# Patient Record
Sex: MALE | NOT HISPANIC OR LATINO | URBAN - METROPOLITAN AREA
[De-identification: names, ages, dates, MRNs, and addresses within clinical notes are randomized per-mention and may not be internally consistent; named-entity substitution may affect disease eponyms.]

---

## 2020-11-19 ENCOUNTER — TRANSCRIBE ORDERS (OUTPATIENT)
Dept: ADMINISTRATIVE | Facility: HOSPITAL | Age: 75
End: 2020-11-19

## 2020-11-19 DIAGNOSIS — R97.20 ELEVATED PSA: Primary | ICD-10-CM

## 2020-12-10 ENCOUNTER — HOSPITAL ENCOUNTER (OUTPATIENT)
Dept: MRI IMAGING | Facility: HOSPITAL | Age: 75
Discharge: HOME | End: 2020-12-10
Admitting: UROLOGY

## 2020-12-10 DIAGNOSIS — R97.20 ELEVATED PSA: ICD-10-CM

## 2020-12-10 PROCEDURE — 0 GADOBENATE DIMEGLUMINE 529 MG/ML SOLUTION: Performed by: UROLOGY

## 2020-12-10 PROCEDURE — 82565 ASSAY OF CREATININE: CPT

## 2020-12-10 PROCEDURE — A9577 INJ MULTIHANCE: HCPCS | Performed by: UROLOGY

## 2020-12-10 PROCEDURE — 72197 MRI PELVIS W/O & W/DYE: CPT

## 2020-12-10 RX ADMIN — GADOBENATE DIMEGLUMINE 19 ML: 529 INJECTION, SOLUTION INTRAVENOUS at 20:16

## 2020-12-15 LAB — CREAT BLDA-MCNC: 1.6 MG/DL (ref 0.6–1.3)

## 2024-08-04 ENCOUNTER — APPOINTMENT (OUTPATIENT)
Dept: GENERAL RADIOLOGY | Facility: HOSPITAL | Age: 79
DRG: 638 | End: 2024-08-04
Payer: MEDICARE

## 2024-08-04 ENCOUNTER — HOSPITAL ENCOUNTER (INPATIENT)
Facility: HOSPITAL | Age: 79
LOS: 5 days | Discharge: NURSING, REHAB OR LONG-TERM CARE | DRG: 638 | End: 2024-08-09
Attending: EMERGENCY MEDICINE | Admitting: INTERNAL MEDICINE
Payer: MEDICARE

## 2024-08-04 ENCOUNTER — APPOINTMENT (OUTPATIENT)
Dept: GENERAL RADIOLOGY | Facility: HOSPITAL | Age: 79
DRG: 638 | End: 2024-08-04
Payer: COMMERCIAL

## 2024-08-04 ENCOUNTER — APPOINTMENT (OUTPATIENT)
Dept: CT IMAGING | Facility: HOSPITAL | Age: 79
DRG: 638 | End: 2024-08-04
Payer: COMMERCIAL

## 2024-08-04 DIAGNOSIS — R41.82 ALTERED MENTAL STATUS, UNSPECIFIED ALTERED MENTAL STATUS TYPE: ICD-10-CM

## 2024-08-04 DIAGNOSIS — N17.0 ACUTE KIDNEY INJURY (AKI) WITH ACUTE TUBULAR NECROSIS (ATN): ICD-10-CM

## 2024-08-04 DIAGNOSIS — E13.10 DIABETIC KETOACIDOSIS WITHOUT COMA ASSOCIATED WITH OTHER SPECIFIED DIABETES MELLITUS: Primary | ICD-10-CM

## 2024-08-04 DIAGNOSIS — E87.5 HYPERKALEMIA: ICD-10-CM

## 2024-08-04 DIAGNOSIS — E86.0 SEVERE DEHYDRATION: ICD-10-CM

## 2024-08-04 PROBLEM — E11.10 DKA (DIABETIC KETOACIDOSIS): Status: ACTIVE | Noted: 2024-08-04

## 2024-08-04 LAB
ALBUMIN SERPL-MCNC: 3.4 G/DL (ref 3.5–5.2)
ALBUMIN/GLOB SERPL: 0.9 G/DL
ALP SERPL-CCNC: 155 U/L (ref 39–117)
ALT SERPL W P-5'-P-CCNC: 27 U/L (ref 1–41)
AMPHET+METHAMPHET UR QL: NEGATIVE
ANION GAP SERPL CALCULATED.3IONS-SCNC: 29 MMOL/L (ref 5–15)
ANION GAP SERPL CALCULATED.3IONS-SCNC: 29.5 MMOL/L (ref 5–15)
APAP SERPL-MCNC: <5 MCG/ML (ref 0–30)
APTT PPP: 28.6 SECONDS (ref 22.7–35.4)
AST SERPL-CCNC: 19 U/L (ref 1–40)
ATMOSPHERIC PRESS: 748.2 MMHG
B-OH-BUTYR SERPL-SCNC: 1.49 MMOL/L (ref 0.02–0.27)
BACTERIA UR QL AUTO: ABNORMAL /HPF
BARBITURATES UR QL SCN: NEGATIVE
BASE EXCESS BLDV CALC-SCNC: -16.5 MMOL/L (ref -2–2)
BASOPHILS # BLD AUTO: 0.02 10*3/MM3 (ref 0–0.2)
BASOPHILS NFR BLD AUTO: 0.1 % (ref 0–1.5)
BENZODIAZ UR QL SCN: NEGATIVE
BILIRUB SERPL-MCNC: 0.9 MG/DL (ref 0–1.2)
BILIRUB UR QL STRIP: NEGATIVE
BUN SERPL-MCNC: 175 MG/DL (ref 8–23)
BUN SERPL-MCNC: 189 MG/DL (ref 8–23)
BUN/CREAT SERPL: 10.6 (ref 7–25)
BUN/CREAT SERPL: 11 (ref 7–25)
CALCIUM SPEC-SCNC: 8.2 MG/DL (ref 8.6–10.5)
CALCIUM SPEC-SCNC: 8.8 MG/DL (ref 8.6–10.5)
CANNABINOIDS SERPL QL: NEGATIVE
CHLORIDE SERPL-SCNC: 87 MMOL/L (ref 98–107)
CHLORIDE SERPL-SCNC: 92 MMOL/L (ref 98–107)
CLARITY UR: CLEAR
CO2 BLDA-SCNC: 8.5 MMOL/L (ref 23–27)
CO2 SERPL-SCNC: 11 MMOL/L (ref 22–29)
CO2 SERPL-SCNC: 13.5 MMOL/L (ref 22–29)
COCAINE UR QL: NEGATIVE
COLOR UR: YELLOW
CREAT SERPL-MCNC: 16.55 MG/DL (ref 0.76–1.27)
CREAT SERPL-MCNC: 17.15 MG/DL (ref 0.76–1.27)
D-LACTATE SERPL-SCNC: 1.2 MMOL/L (ref 0.5–2)
DEPRECATED RDW RBC AUTO: 38.6 FL (ref 37–54)
DEVICE COMMENT: ABNORMAL
EGFRCR SERPLBLD CKD-EPI 2021: 2.5 ML/MIN/1.73
EGFRCR SERPLBLD CKD-EPI 2021: 2.6 ML/MIN/1.73
EOSINOPHIL # BLD AUTO: 0.03 10*3/MM3 (ref 0–0.4)
EOSINOPHIL NFR BLD AUTO: 0.2 % (ref 0.3–6.2)
ERYTHROCYTE [DISTWIDTH] IN BLOOD BY AUTOMATED COUNT: 11.8 % (ref 12.3–15.4)
ETHANOL BLD-MCNC: <10 MG/DL (ref 0–10)
ETHANOL UR QL: <0.01 %
FENTANYL UR-MCNC: NEGATIVE NG/ML
GLOBULIN UR ELPH-MCNC: 3.6 GM/DL
GLUCOSE BLDC GLUCOMTR-MCNC: 142 MG/DL (ref 70–130)
GLUCOSE BLDC GLUCOMTR-MCNC: 150 MG/DL (ref 70–130)
GLUCOSE BLDC GLUCOMTR-MCNC: 151 MG/DL (ref 70–130)
GLUCOSE BLDC GLUCOMTR-MCNC: 156 MG/DL (ref 70–130)
GLUCOSE BLDC GLUCOMTR-MCNC: 162 MG/DL (ref 70–130)
GLUCOSE BLDC GLUCOMTR-MCNC: 172 MG/DL (ref 70–130)
GLUCOSE SERPL-MCNC: 151 MG/DL (ref 65–99)
GLUCOSE SERPL-MCNC: 162 MG/DL (ref 65–99)
GLUCOSE UR STRIP-MCNC: ABNORMAL MG/DL
HBA1C MFR BLD: 7.9 % (ref 4.8–5.6)
HCO3 BLDV-SCNC: 7.9 MMOL/L (ref 22–28)
HCT VFR BLD AUTO: 44.2 % (ref 37.5–51)
HGB BLD-MCNC: 15 G/DL (ref 13–17.7)
HGB UR QL STRIP.AUTO: ABNORMAL
HYALINE CASTS UR QL AUTO: ABNORMAL /LPF
IMM GRANULOCYTES # BLD AUTO: 0.16 10*3/MM3 (ref 0–0.05)
IMM GRANULOCYTES NFR BLD AUTO: 0.9 % (ref 0–0.5)
INR PPP: 1.36 (ref 0.9–1.1)
KETONES UR QL STRIP: NEGATIVE
LEUKOCYTE ESTERASE UR QL STRIP.AUTO: ABNORMAL
LYMPHOCYTES # BLD AUTO: 0.41 10*3/MM3 (ref 0.7–3.1)
LYMPHOCYTES NFR BLD AUTO: 2.3 % (ref 19.6–45.3)
MAGNESIUM SERPL-MCNC: 2.5 MG/DL (ref 1.6–2.4)
MAGNESIUM SERPL-MCNC: 2.7 MG/DL (ref 1.6–2.4)
MCH RBC QN AUTO: 30.4 PG (ref 26.6–33)
MCHC RBC AUTO-ENTMCNC: 33.9 G/DL (ref 31.5–35.7)
MCV RBC AUTO: 89.5 FL (ref 79–97)
METHADONE UR QL SCN: NEGATIVE
MODALITY: ABNORMAL
MONOCYTES # BLD AUTO: 1.64 10*3/MM3 (ref 0.1–0.9)
MONOCYTES NFR BLD AUTO: 9.1 % (ref 5–12)
NEUTROPHILS NFR BLD AUTO: 15.84 10*3/MM3 (ref 1.7–7)
NEUTROPHILS NFR BLD AUTO: 87.4 % (ref 42.7–76)
NITRITE UR QL STRIP: NEGATIVE
NOTIFIED WHO: ABNORMAL
NRBC BLD AUTO-RTO: 0 /100 WBC (ref 0–0.2)
OPIATES UR QL: NEGATIVE
OSMOLALITY SERPL: 350 MOSM/KG (ref 280–301)
OXYCODONE UR QL SCN: NEGATIVE
PCO2 BLDV: 18 MM HG (ref 41–51)
PH BLDV: 7.25 PH UNITS (ref 7.31–7.41)
PH UR STRIP.AUTO: 5.5 [PH] (ref 5–8)
PHOSPHATE SERPL-MCNC: 9.2 MG/DL (ref 2.5–4.5)
PHOSPHATE SERPL-MCNC: 9.9 MG/DL (ref 2.5–4.5)
PLATELET # BLD AUTO: 297 10*3/MM3 (ref 140–450)
PMV BLD AUTO: 9.4 FL (ref 6–12)
PO2 BLDV: 53.8 MM HG (ref 35–45)
POTASSIUM SERPL-SCNC: 6.2 MMOL/L (ref 3.5–5.2)
POTASSIUM SERPL-SCNC: 7 MMOL/L (ref 3.5–5.2)
PROCALCITONIN SERPL-MCNC: 2.66 NG/ML (ref 0–0.25)
PROT SERPL-MCNC: 7 G/DL (ref 6–8.5)
PROT UR QL STRIP: ABNORMAL
PROTHROMBIN TIME: 17 SECONDS (ref 11.7–14.2)
PSA SERPL-MCNC: 53.1 NG/ML (ref 0–4)
RBC # BLD AUTO: 4.94 10*6/MM3 (ref 4.14–5.8)
RBC # UR STRIP: ABNORMAL /HPF
REF LAB TEST METHOD: ABNORMAL
SALICYLATES SERPL-MCNC: <0.3 MG/DL
SAO2 % BLDCOV: 83.5 % (ref 45–75)
SODIUM SERPL-SCNC: 127 MMOL/L (ref 136–145)
SODIUM SERPL-SCNC: 135 MMOL/L (ref 136–145)
SP GR UR STRIP: 1.01 (ref 1–1.03)
SQUAMOUS #/AREA URNS HPF: ABNORMAL /HPF
TOTAL RATE: 18 BREATHS/MINUTE
TROPONIN T SERPL HS-MCNC: 44 NG/L
UROBILINOGEN UR QL STRIP: ABNORMAL
WBC # UR STRIP: ABNORMAL /HPF
WBC NRBC COR # BLD AUTO: 18.1 10*3/MM3 (ref 3.4–10.8)

## 2024-08-04 PROCEDURE — 84153 ASSAY OF PSA TOTAL: CPT | Performed by: INTERNAL MEDICINE

## 2024-08-04 PROCEDURE — 82948 REAGENT STRIP/BLOOD GLUCOSE: CPT

## 2024-08-04 PROCEDURE — 84484 ASSAY OF TROPONIN QUANT: CPT | Performed by: EMERGENCY MEDICINE

## 2024-08-04 PROCEDURE — 83036 HEMOGLOBIN GLYCOSYLATED A1C: CPT | Performed by: EMERGENCY MEDICINE

## 2024-08-04 PROCEDURE — 93010 ELECTROCARDIOGRAM REPORT: CPT | Performed by: INTERNAL MEDICINE

## 2024-08-04 PROCEDURE — 85610 PROTHROMBIN TIME: CPT | Performed by: EMERGENCY MEDICINE

## 2024-08-04 PROCEDURE — 83605 ASSAY OF LACTIC ACID: CPT | Performed by: EMERGENCY MEDICINE

## 2024-08-04 PROCEDURE — 70450 CT HEAD/BRAIN W/O DYE: CPT

## 2024-08-04 PROCEDURE — 80307 DRUG TEST PRSMV CHEM ANLYZR: CPT | Performed by: EMERGENCY MEDICINE

## 2024-08-04 PROCEDURE — 74176 CT ABD & PELVIS W/O CONTRAST: CPT

## 2024-08-04 PROCEDURE — 80179 DRUG ASSAY SALICYLATE: CPT | Performed by: EMERGENCY MEDICINE

## 2024-08-04 PROCEDURE — 71045 X-RAY EXAM CHEST 1 VIEW: CPT

## 2024-08-04 PROCEDURE — 0 DEXTROSE 5 % SOLUTION: Performed by: INTERNAL MEDICINE

## 2024-08-04 PROCEDURE — 85730 THROMBOPLASTIN TIME PARTIAL: CPT | Performed by: EMERGENCY MEDICINE

## 2024-08-04 PROCEDURE — 81001 URINALYSIS AUTO W/SCOPE: CPT | Performed by: EMERGENCY MEDICINE

## 2024-08-04 PROCEDURE — 87040 BLOOD CULTURE FOR BACTERIA: CPT | Performed by: EMERGENCY MEDICINE

## 2024-08-04 PROCEDURE — 36415 COLL VENOUS BLD VENIPUNCTURE: CPT

## 2024-08-04 PROCEDURE — 85025 COMPLETE CBC W/AUTO DIFF WBC: CPT | Performed by: EMERGENCY MEDICINE

## 2024-08-04 PROCEDURE — 80143 DRUG ASSAY ACETAMINOPHEN: CPT | Performed by: EMERGENCY MEDICINE

## 2024-08-04 PROCEDURE — 84145 PROCALCITONIN (PCT): CPT | Performed by: EMERGENCY MEDICINE

## 2024-08-04 PROCEDURE — 99285 EMERGENCY DEPT VISIT HI MDM: CPT

## 2024-08-04 PROCEDURE — 83930 ASSAY OF BLOOD OSMOLALITY: CPT | Performed by: EMERGENCY MEDICINE

## 2024-08-04 PROCEDURE — 87086 URINE CULTURE/COLONY COUNT: CPT | Performed by: INTERNAL MEDICINE

## 2024-08-04 PROCEDURE — 25010000002 CALCIUM GLUCONATE-NACL 1-0.675 GM/50ML-% SOLUTION: Performed by: EMERGENCY MEDICINE

## 2024-08-04 PROCEDURE — 25010000002 CEFTRIAXONE PER 250 MG: Performed by: INTERNAL MEDICINE

## 2024-08-04 PROCEDURE — 82803 BLOOD GASES ANY COMBINATION: CPT | Performed by: EMERGENCY MEDICINE

## 2024-08-04 PROCEDURE — 80053 COMPREHEN METABOLIC PANEL: CPT | Performed by: EMERGENCY MEDICINE

## 2024-08-04 PROCEDURE — 25810000003 SODIUM CHLORIDE 0.9 % SOLUTION: Performed by: EMERGENCY MEDICINE

## 2024-08-04 PROCEDURE — 25810000003 DEXTROSE 5 % AND SODIUM CHLORIDE 0.9 % 5-0.9 % SOLUTION: Performed by: EMERGENCY MEDICINE

## 2024-08-04 PROCEDURE — 83735 ASSAY OF MAGNESIUM: CPT | Performed by: EMERGENCY MEDICINE

## 2024-08-04 PROCEDURE — 93005 ELECTROCARDIOGRAM TRACING: CPT | Performed by: EMERGENCY MEDICINE

## 2024-08-04 PROCEDURE — 82010 KETONE BODYS QUAN: CPT | Performed by: EMERGENCY MEDICINE

## 2024-08-04 PROCEDURE — 51702 INSERT TEMP BLADDER CATH: CPT

## 2024-08-04 PROCEDURE — 84100 ASSAY OF PHOSPHORUS: CPT | Performed by: EMERGENCY MEDICINE

## 2024-08-04 PROCEDURE — 82077 ASSAY SPEC XCP UR&BREATH IA: CPT | Performed by: EMERGENCY MEDICINE

## 2024-08-04 RX ORDER — DEXTROSE MONOHYDRATE, SODIUM CHLORIDE, AND POTASSIUM CHLORIDE 50; 2.98; 4.5 G/1000ML; G/1000ML; G/1000ML
150 INJECTION, SOLUTION INTRAVENOUS CONTINUOUS PRN
Status: DISCONTINUED | OUTPATIENT
Start: 2024-08-04 | End: 2024-08-04

## 2024-08-04 RX ORDER — ASPIRIN 81 MG/1
81 TABLET ORAL DAILY
Status: ON HOLD | COMMUNITY

## 2024-08-04 RX ORDER — SODIUM CHLORIDE 9 MG/ML
250 INJECTION, SOLUTION INTRAVENOUS CONTINUOUS PRN
Status: DISCONTINUED | OUTPATIENT
Start: 2024-08-04 | End: 2024-08-04

## 2024-08-04 RX ORDER — SODIUM CHLORIDE 0.9 % (FLUSH) 0.9 %
10 SYRINGE (ML) INJECTION EVERY 12 HOURS SCHEDULED
Status: DISCONTINUED | OUTPATIENT
Start: 2024-08-04 | End: 2024-08-04

## 2024-08-04 RX ORDER — DEXTROSE MONOHYDRATE, SODIUM CHLORIDE, AND POTASSIUM CHLORIDE 50; 1.49; 9 G/1000ML; G/1000ML; G/1000ML
150 INJECTION, SOLUTION INTRAVENOUS CONTINUOUS PRN
Status: DISCONTINUED | OUTPATIENT
Start: 2024-08-04 | End: 2024-08-04

## 2024-08-04 RX ORDER — SODIUM CHLORIDE 450 MG/100ML
250 INJECTION, SOLUTION INTRAVENOUS CONTINUOUS PRN
Status: DISCONTINUED | OUTPATIENT
Start: 2024-08-04 | End: 2024-08-04

## 2024-08-04 RX ORDER — NICOTINE POLACRILEX 4 MG
15 LOZENGE BUCCAL
Status: DISCONTINUED | OUTPATIENT
Start: 2024-08-04 | End: 2024-08-06 | Stop reason: SDUPTHER

## 2024-08-04 RX ORDER — SODIUM CHLORIDE 0.9 % (FLUSH) 0.9 %
10 SYRINGE (ML) INJECTION EVERY 12 HOURS SCHEDULED
Status: DISCONTINUED | OUTPATIENT
Start: 2024-08-04 | End: 2024-08-09 | Stop reason: HOSPADM

## 2024-08-04 RX ORDER — DEXTROSE MONOHYDRATE AND SODIUM CHLORIDE 5; .45 G/100ML; G/100ML
150 INJECTION, SOLUTION INTRAVENOUS CONTINUOUS PRN
Status: DISCONTINUED | OUTPATIENT
Start: 2024-08-04 | End: 2024-08-04

## 2024-08-04 RX ORDER — DEXTROSE MONOHYDRATE, SODIUM CHLORIDE, AND POTASSIUM CHLORIDE 50; 2.98; 9 G/1000ML; G/1000ML; G/1000ML
150 INJECTION, SOLUTION INTRAVENOUS CONTINUOUS PRN
Status: DISCONTINUED | OUTPATIENT
Start: 2024-08-04 | End: 2024-08-04

## 2024-08-04 RX ORDER — SODIUM CHLORIDE AND POTASSIUM CHLORIDE 300; 900 MG/100ML; MG/100ML
250 INJECTION, SOLUTION INTRAVENOUS CONTINUOUS PRN
Status: DISCONTINUED | OUTPATIENT
Start: 2024-08-04 | End: 2024-08-04

## 2024-08-04 RX ORDER — DEXTROSE MONOHYDRATE, SODIUM CHLORIDE, AND POTASSIUM CHLORIDE 50; 1.49; 4.5 G/1000ML; G/1000ML; G/1000ML
150 INJECTION, SOLUTION INTRAVENOUS CONTINUOUS PRN
Status: DISCONTINUED | OUTPATIENT
Start: 2024-08-04 | End: 2024-08-04

## 2024-08-04 RX ORDER — ACETAMINOPHEN 650 MG/1
650 SUPPOSITORY RECTAL EVERY 4 HOURS PRN
Status: DISCONTINUED | OUTPATIENT
Start: 2024-08-04 | End: 2024-08-09 | Stop reason: HOSPADM

## 2024-08-04 RX ORDER — POLYETHYLENE GLYCOL 3350 17 G/17G
17 POWDER, FOR SOLUTION ORAL DAILY PRN
Status: DISCONTINUED | OUTPATIENT
Start: 2024-08-04 | End: 2024-08-09 | Stop reason: HOSPADM

## 2024-08-04 RX ORDER — SODIUM CHLORIDE 9 MG/ML
125 INJECTION, SOLUTION INTRAVENOUS CONTINUOUS
Status: DISCONTINUED | OUTPATIENT
Start: 2024-08-04 | End: 2024-08-04

## 2024-08-04 RX ORDER — SODIUM CHLORIDE 9 MG/ML
40 INJECTION, SOLUTION INTRAVENOUS AS NEEDED
Status: DISCONTINUED | OUTPATIENT
Start: 2024-08-04 | End: 2024-08-09 | Stop reason: HOSPADM

## 2024-08-04 RX ORDER — DONEPEZIL HYDROCHLORIDE 10 MG/1
10 TABLET, FILM COATED ORAL NIGHTLY
Status: ON HOLD | COMMUNITY
Start: 2023-10-04 | End: 2024-10-03

## 2024-08-04 RX ORDER — ATORVASTATIN CALCIUM 10 MG/1
10 TABLET, FILM COATED ORAL DAILY
Status: ON HOLD | COMMUNITY

## 2024-08-04 RX ORDER — BISACODYL 10 MG
10 SUPPOSITORY, RECTAL RECTAL DAILY PRN
Status: DISCONTINUED | OUTPATIENT
Start: 2024-08-04 | End: 2024-08-09 | Stop reason: HOSPADM

## 2024-08-04 RX ORDER — IBUPROFEN 600 MG/1
1 TABLET ORAL
Status: DISCONTINUED | OUTPATIENT
Start: 2024-08-04 | End: 2024-08-04

## 2024-08-04 RX ORDER — MEMANTINE HYDROCHLORIDE 10 MG/1
10 TABLET ORAL DAILY
Status: ON HOLD | COMMUNITY
Start: 2023-11-06 | End: 2024-11-05

## 2024-08-04 RX ORDER — SODIUM CHLORIDE 9 MG/ML
40 INJECTION, SOLUTION INTRAVENOUS AS NEEDED
Status: DISCONTINUED | OUTPATIENT
Start: 2024-08-04 | End: 2024-08-04

## 2024-08-04 RX ORDER — IBUPROFEN 600 MG/1
1 TABLET ORAL
Status: DISCONTINUED | OUTPATIENT
Start: 2024-08-04 | End: 2024-08-06 | Stop reason: SDUPTHER

## 2024-08-04 RX ORDER — ONDANSETRON 2 MG/ML
4 INJECTION INTRAMUSCULAR; INTRAVENOUS EVERY 6 HOURS PRN
Status: DISCONTINUED | OUTPATIENT
Start: 2024-08-04 | End: 2024-08-09 | Stop reason: HOSPADM

## 2024-08-04 RX ORDER — SODIUM CHLORIDE AND POTASSIUM CHLORIDE 150; 450 MG/100ML; MG/100ML
250 INJECTION, SOLUTION INTRAVENOUS CONTINUOUS PRN
Status: DISCONTINUED | OUTPATIENT
Start: 2024-08-04 | End: 2024-08-04

## 2024-08-04 RX ORDER — DEXTROSE MONOHYDRATE AND SODIUM CHLORIDE 5; .9 G/100ML; G/100ML
150 INJECTION, SOLUTION INTRAVENOUS CONTINUOUS PRN
Status: DISCONTINUED | OUTPATIENT
Start: 2024-08-04 | End: 2024-08-04

## 2024-08-04 RX ORDER — CALCIUM GLUCONATE 20 MG/ML
1000 INJECTION, SOLUTION INTRAVENOUS ONCE
Status: COMPLETED | OUTPATIENT
Start: 2024-08-04 | End: 2024-08-04

## 2024-08-04 RX ORDER — DEXTROSE MONOHYDRATE 25 G/50ML
10-50 INJECTION, SOLUTION INTRAVENOUS
Status: DISCONTINUED | OUTPATIENT
Start: 2024-08-04 | End: 2024-08-04

## 2024-08-04 RX ORDER — NITROGLYCERIN 0.4 MG/1
0.4 TABLET SUBLINGUAL
Status: DISCONTINUED | OUTPATIENT
Start: 2024-08-04 | End: 2024-08-09 | Stop reason: HOSPADM

## 2024-08-04 RX ORDER — DEXTROSE MONOHYDRATE 25 G/50ML
10-50 INJECTION, SOLUTION INTRAVENOUS
Status: DISCONTINUED | OUTPATIENT
Start: 2024-08-04 | End: 2024-08-09 | Stop reason: HOSPADM

## 2024-08-04 RX ORDER — AMOXICILLIN 250 MG
2 CAPSULE ORAL 2 TIMES DAILY
Status: DISCONTINUED | OUTPATIENT
Start: 2024-08-04 | End: 2024-08-09 | Stop reason: HOSPADM

## 2024-08-04 RX ORDER — SODIUM CHLORIDE 0.9 % (FLUSH) 0.9 %
10 SYRINGE (ML) INJECTION AS NEEDED
Status: DISCONTINUED | OUTPATIENT
Start: 2024-08-04 | End: 2024-08-09 | Stop reason: HOSPADM

## 2024-08-04 RX ORDER — MEMANTINE HYDROCHLORIDE 10 MG/1
10 TABLET ORAL DAILY
Status: DISCONTINUED | OUTPATIENT
Start: 2024-08-05 | End: 2024-08-09 | Stop reason: HOSPADM

## 2024-08-04 RX ORDER — DONEPEZIL HYDROCHLORIDE 10 MG/1
10 TABLET, FILM COATED ORAL NIGHTLY
Status: DISCONTINUED | OUTPATIENT
Start: 2024-08-04 | End: 2024-08-09 | Stop reason: HOSPADM

## 2024-08-04 RX ORDER — NICOTINE POLACRILEX 4 MG
15 LOZENGE BUCCAL
Status: DISCONTINUED | OUTPATIENT
Start: 2024-08-04 | End: 2024-08-04

## 2024-08-04 RX ORDER — ACETAMINOPHEN 325 MG/1
650 TABLET ORAL EVERY 4 HOURS PRN
Status: DISCONTINUED | OUTPATIENT
Start: 2024-08-04 | End: 2024-08-09 | Stop reason: HOSPADM

## 2024-08-04 RX ORDER — SODIUM CHLORIDE 0.9 % (FLUSH) 0.9 %
10 SYRINGE (ML) INJECTION AS NEEDED
Status: DISCONTINUED | OUTPATIENT
Start: 2024-08-04 | End: 2024-08-04

## 2024-08-04 RX ORDER — SODIUM CHLORIDE AND POTASSIUM CHLORIDE 150; 900 MG/100ML; MG/100ML
250 INJECTION, SOLUTION INTRAVENOUS CONTINUOUS PRN
Status: DISCONTINUED | OUTPATIENT
Start: 2024-08-04 | End: 2024-08-04

## 2024-08-04 RX ORDER — BISACODYL 5 MG/1
5 TABLET, DELAYED RELEASE ORAL DAILY PRN
Status: DISCONTINUED | OUTPATIENT
Start: 2024-08-04 | End: 2024-08-09 | Stop reason: HOSPADM

## 2024-08-04 RX ORDER — SILODOSIN 8 MG/1
8 CAPSULE ORAL DAILY
COMMUNITY
End: 2024-08-09 | Stop reason: HOSPADM

## 2024-08-04 RX ADMIN — SODIUM CHLORIDE 1000 ML/HR: 9 INJECTION, SOLUTION INTRAVENOUS at 19:54

## 2024-08-04 RX ADMIN — Medication 10 ML: at 20:01

## 2024-08-04 RX ADMIN — SODIUM CHLORIDE 1000 ML: 9 INJECTION, SOLUTION INTRAVENOUS at 18:15

## 2024-08-04 RX ADMIN — SODIUM CHLORIDE 1000 ML: 9 INJECTION, SOLUTION INTRAVENOUS at 19:46

## 2024-08-04 RX ADMIN — INSULIN HUMAN 1 UNITS/HR: 1 INJECTION, SOLUTION INTRAVENOUS at 20:33

## 2024-08-04 RX ADMIN — CALCIUM GLUCONATE 1000 MG: 20 INJECTION, SOLUTION INTRAVENOUS at 20:00

## 2024-08-04 RX ADMIN — CEFTRIAXONE 2000 MG: 2 INJECTION, POWDER, FOR SOLUTION INTRAMUSCULAR; INTRAVENOUS at 23:45

## 2024-08-04 RX ADMIN — Medication 10 ML: at 20:29

## 2024-08-04 RX ADMIN — SODIUM BICARBONATE 150 MEQ: 84 INJECTION, SOLUTION INTRAVENOUS at 21:30

## 2024-08-04 RX ADMIN — SODIUM CHLORIDE 1000 ML: 9 INJECTION, SOLUTION INTRAVENOUS at 20:05

## 2024-08-04 RX ADMIN — SODIUM CHLORIDE 125 ML/HR: 9 INJECTION, SOLUTION INTRAVENOUS at 18:15

## 2024-08-04 RX ADMIN — DEXTROSE AND SODIUM CHLORIDE 150 ML/HR: 5; 900 INJECTION, SOLUTION INTRAVENOUS at 20:28

## 2024-08-04 RX ADMIN — SODIUM BICARBONATE 100 MEQ: 84 INJECTION INTRAVENOUS at 19:51

## 2024-08-04 RX ADMIN — Medication 10 ML: at 22:08

## 2024-08-04 NOTE — ED PROVIDER NOTES
EMERGENCY DEPARTMENT ENCOUNTER  Room Number:  28/28  PCP: Ryan Gutierrez MD  Independent Historians: Patient, family and paramedic      HPI:  Chief Complaint: Poor appetite, altered mental status    A complete HPI/ROS/PMH/PSH/SH/FH are unobtainable due to: Altered Mental Status    Chronic or social conditions impacting patient care (Social Determinants of Health): None      Context: Froy Ngo is a 78 y.o. male with a medical history of BPH, GERD, hyperlipidemia, hypertension, early onset dementia, and diabetes who presents to the ED c/o acute generalized weakness and inability to stand or walk since yesterday.  Patient began with GI symptoms that initially were limited to vomiting 1 week ago and throughout the week he had really no appetite and very poor p.o. intake.  Last night he was sitting on the toilet and his spouse could not get him up and required assistance from emergency services.  Today he was so weak that EMS was called again and he was brought to the emergency department.  Patient who is being investigated for early onset dementia has been even more confused this week.  Patient denies any ongoing focal pain such as headache, chest pain, abdominal pain.  No reported diarrhea, black or bloody stools, dysuria or fevers.      Review of prior external notes (non-ED) -and- Review of prior external test results outside of this encounter:  Neurology office note 1/17/2024 by Dr. 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      FAMILY HISTORY  No family history on file.      SOCIAL HISTORY  Social History     Socioeconomic History     Marital status: Single         ALLERGIES  Patient has no known allergies.      REVIEW OF SYSTEMS  Review of Systems  Included in HPI  All systems reviewed and negative except for those discussed in HPI.      PHYSICAL EXAM    I have reviewed the triage vital signs and nursing notes.    ED Triage Vitals [08/04/24 1653]   Temp Heart Rate Resp BP SpO2   98.6 °F (37 °C) 110 20 123/75 95 %      Temp src Heart Rate Source Patient Position BP Location FiO2 (%)   Oral Monitor -- -- --       Physical Exam    Physical Exam   Constitutional: No distress.  Nontoxic but ill-appearing  HENT:  Head: Normocephalic and atraumatic.   Oropharynx: Mucous membranes are moderately dry  Eyes: . No scleral icterus. No conjunctival pallor.  PERRL, EOMI  Neck: Normal range of motion. Neck supple.   Cardiovascular: Pink warm and well perfused throughout.  Giller but tachycardic  Pulmonary/Chest: No respiratory distress.  No tachypnea or increased work of breathing appreciated.  Clear  Abdominal: Soft. There is no tenderness. There is no rebound and no guarding.  Benign  Musculoskeletal: Moves all extremities equally.    Neurological: Alert and to self and family.  Tangential and limited historian suggesting confusion/dementia.  Moves all extremities equally.  Speech intelligible and face symmetric  Skin: Skin is pink, warm, and dry.   Psychiatric: Mood and affect normal.   Nursing note and vitals reviewed.             LAB RESULTS  Recent Results (from the past 24 hour(s))   ECG 12 Lead Electrolyte Imbalance    Collection Time: 08/04/24  5:27 PM   Result Value Ref Range    QT Interval 318 ms    QTC Interval 426 ms   Comprehensive Metabolic Panel    Collection Time: 08/04/24  6:14 PM    Specimen: Blood   Result Value Ref Range    Glucose 162 (H) 65 - 99 mg/dL     (H) 8 - 23 mg/dL    Creatinine 17.15 (H) 0.76 - 1.27 mg/dL    Sodium 127 (L) 136 - 145 mmol/L    Potassium 7.0 (C) 3.5 - 5.2 mmol/L    Chloride 87 (L) 98 - 107 mmol/L    CO2 11.0  (L) 22.0 - 29.0 mmol/L    Calcium 8.8 8.6 - 10.5 mg/dL    Total Protein 7.0 6.0 - 8.5 g/dL    Albumin 3.4 (L) 3.5 - 5.2 g/dL    ALT (SGPT) 27 1 - 41 U/L    AST (SGOT) 19 1 - 40 U/L    Alkaline Phosphatase 155 (H) 39 - 117 U/L    Total Bilirubin 0.9 0.0 - 1.2 mg/dL    Globulin 3.6 gm/dL    A/G Ratio 0.9 g/dL    BUN/Creatinine Ratio 11.0 7.0 - 25.0    Anion Gap 29.0 (H) 5.0 - 15.0 mmol/L    eGFR 2.5 (L) >60.0 mL/min/1.73   Protime-INR    Collection Time: 08/04/24  6:14 PM    Specimen: Blood   Result Value Ref Range    Protime 17.0 (H) 11.7 - 14.2 Seconds    INR 1.36 (H) 0.90 - 1.10   aPTT    Collection Time: 08/04/24  6:14 PM    Specimen: Blood   Result Value Ref Range    PTT 28.6 22.7 - 35.4 seconds   Single High Sensitivity Troponin T    Collection Time: 08/04/24  6:14 PM    Specimen: Blood   Result Value Ref Range    HS Troponin T 44 (H) <22 ng/L   Acetaminophen Level    Collection Time: 08/04/24  6:14 PM    Specimen: Blood   Result Value Ref Range    Acetaminophen <5.0 0.0 - 30.0 mcg/mL   Ethanol    Collection Time: 08/04/24  6:14 PM    Specimen: Blood   Result Value Ref Range    Ethanol <10 0 - 10 mg/dL    Ethanol % <0.010 %   Salicylate Level    Collection Time: 08/04/24  6:14 PM    Specimen: Blood   Result Value Ref Range    Salicylate <0.3 <=30.0 mg/dL   CBC Auto Differential    Collection Time: 08/04/24  6:14 PM    Specimen: Blood   Result Value Ref Range    WBC 18.10 (H) 3.40 - 10.80 10*3/mm3    RBC 4.94 4.14 - 5.80 10*6/mm3    Hemoglobin 15.0 13.0 - 17.7 g/dL    Hematocrit 44.2 37.5 - 51.0 %    MCV 89.5 79.0 - 97.0 fL    MCH 30.4 26.6 - 33.0 pg    MCHC 33.9 31.5 - 35.7 g/dL    RDW 11.8 (L) 12.3 - 15.4 %    RDW-SD 38.6 37.0 - 54.0 fl    MPV 9.4 6.0 - 12.0 fL    Platelets 297 140 - 450 10*3/mm3    Neutrophil % 87.4 (H) 42.7 - 76.0 %    Lymphocyte % 2.3 (L) 19.6 - 45.3 %    Monocyte % 9.1 5.0 - 12.0 %    Eosinophil % 0.2 (L) 0.3 - 6.2 %    Basophil % 0.1 0.0 - 1.5 %    Immature Grans % 0.9 (H) 0.0 - 0.5 %     Neutrophils, Absolute 15.84 (H) 1.70 - 7.00 10*3/mm3    Lymphocytes, Absolute 0.41 (L) 0.70 - 3.10 10*3/mm3    Monocytes, Absolute 1.64 (H) 0.10 - 0.90 10*3/mm3    Eosinophils, Absolute 0.03 0.00 - 0.40 10*3/mm3    Basophils, Absolute 0.02 0.00 - 0.20 10*3/mm3    Immature Grans, Absolute 0.16 (H) 0.00 - 0.05 10*3/mm3    nRBC 0.0 0.0 - 0.2 /100 WBC   Beta Hydroxybutyrate Quantitative    Collection Time: 08/04/24  6:14 PM    Specimen: Blood   Result Value Ref Range    Beta-Hydroxybutyrate Quant 1.489 (H) 0.020 - 0.270 mmol/L   Phosphorus    Collection Time: 08/04/24  6:14 PM    Specimen: Blood   Result Value Ref Range    Phosphorus 9.9 (H) 2.5 - 4.5 mg/dL   Magnesium    Collection Time: 08/04/24  6:14 PM    Specimen: Blood   Result Value Ref Range    Magnesium 2.7 (H) 1.6 - 2.4 mg/dL   Hemoglobin A1c    Collection Time: 08/04/24  6:14 PM    Specimen: Blood   Result Value Ref Range    Hemoglobin A1C 7.90 (H) 4.80 - 5.60 %   POC Glucose Once    Collection Time: 08/04/24  6:38 PM    Specimen: Blood   Result Value Ref Range    Glucose 151 (H) 70 - 130 mg/dL   Blood Gas, Venous -    Collection Time: 08/04/24  7:10 PM    Specimen: Venous Blood   Result Value Ref Range    pH, Venous 7.252 (C) 7.310 - 7.410 pH Units    pCO2, Venous 18.0 (L) 41.0 - 51.0 mm Hg    pO2, Venous 53.8 (H) 35.0 - 45.0 mm Hg    HCO3, Venous 7.9 (L) 22.0 - 28.0 mmol/L    Base Excess, Venous -16.5 (L) -2.0 - 2.0 mmol/L    O2 Saturation, Venous 83.5 (H) 45.0 - 75.0 %    CO2 Content 8.5 (L) 23 - 27 mmol/L    Barometric Pressure for Blood Gas 748.2000 mmHg    Modality Room Air     Rate 18 Breaths/minute    Notified Who Ryan Smith M.D Device Comment drawn by [id number] Sat 97%          RADIOLOGY  No Radiology Exams Resulted Within Past 24 Hours      MEDICATIONS GIVEN IN ER  Medications   sodium chloride 0.9 % flush 10 mL (has no administration in time range)   sodium chloride 0.9 % infusion (125 mL/hr Intravenous New Bag 8/4/24 5912)   sodium chloride  0.9 % bolus 1,000 mL (1,000 mL Intravenous New Bag 8/4/24 1946)   sodium chloride 0.9 % flush 10 mL (has no administration in time range)   sodium chloride 0.9 % flush 10 mL (has no administration in time range)   sodium chloride 0.9 % infusion 40 mL (has no administration in time range)   dextrose (GLUTOSE) oral gel 15 g (has no administration in time range)   dextrose (D50W) (25 g/50 mL) IV injection 10-50 mL (has no administration in time range)   glucagon (GLUCAGEN) injection 1 mg (has no administration in time range)   sodium chloride 0.9 % bolus (has no administration in time range)   sodium chloride 0.9 % infusion (has no administration in time range)   sodium chloride 0.9 % with KCl 20 mEq/L infusion (has no administration in time range)   sodium chloride 0.9 % with KCl 40 mEq/L infusion (has no administration in time range)   dextrose 5 % and sodium chloride 0.9 % infusion (has no administration in time range)   dextrose 5 % and sodium chloride 0.9 % with KCl 20 mEq/L infusion (has no administration in time range)   dextrose 5 % and sodium chloride 0.9 % with KCl 40 mEq/L infusion (has no administration in time range)   sodium chloride 0.45 % infusion (has no administration in time range)   sodium chloride 0.45 % with KCl 20 mEq/L infusion (has no administration in time range)   sodium chloride 0.45 % 1,000 mL with potassium chloride 40 mEq infusion (has no administration in time range)   dextrose 5 % and sodium chloride 0.45 % infusion (has no administration in time range)   dextrose 5 % and sodium chloride 0.45 % with KCl 20 mEq/L infusion (has no administration in time range)   dextrose 5 % and sodium chloride 0.45 % with KCl 40 mEq/L infusion (has no administration in time range)   insulin regular 1 unit/mL in 0.9% sodium chloride (Glucommander) (has no administration in time range)   Potassium Replacement - Follow Nurse / BPA Driven Protocol (has no administration in time range)   Magnesium Standard  Dose Replacement - Follow Nurse / BPA Driven Protocol (has no administration in time range)   Phosphorus Replacement - Follow Nurse / BPA Driven Protocol (has no administration in time range)   Calcium Replacement - Follow Nurse / BPA Driven Protocol (has no administration in time range)   calcium gluconate 1000 Mg/50ml 0.675% NaCl IV SOLN (has no administration in time range)   sodium chloride 0.9 % bolus 1,000 mL (has no administration in time range)   sodium bicarbonate injection 8.4% 100 mEq (has no administration in time range)   sodium chloride 0.9 % bolus 1,000 mL (0 mL Intravenous Stopped 8/4/24 1946)         ORDERS PLACED DURING THIS VISIT:  Orders Placed This Encounter   Procedures    Blood Culture - Blood,    Blood Culture - Blood,    CT Head Without Contrast    CT Abdomen Pelvis Without Contrast    XR Chest 1 View    Comprehensive Metabolic Panel    Protime-INR    aPTT    Urinalysis With Microscopic If Indicated (No Culture) - Urine, Clean Catch    Single High Sensitivity Troponin T    Acetaminophen Level    Ethanol    Urine Drug Screen - Urine, Clean Catch    Salicylate Level    CBC Auto Differential    Blood Gas, Venous -    Beta Hydroxybutyrate Quantitative    Phosphorus    Magnesium    Osmolality, Serum    Hemoglobin A1c    Basic Metabolic Panel    Magnesium    Phosphorus    Lactic Acid, Plasma    Procalcitonin    PSA DIAGNOSTIC    NPO Diet NPO Type: Strict NPO    Monitor Blood Pressure    Vital Signs    Strict Intake & Output    Daily Weights    Continuous Pulse Oximetry    Saline Lock & Maintain IV Access    Corrected Serum Sodium = Measured Sodium + [1.6 x ((Glucose - 100)/100)]    Do NOT Discontinue Insulin Infusion Until 2 Hours After First Dose of Basal SQ Insulin    Prior to Initiating Glucommander™, Ensure All Prior Insulin Orders Are Discontinued    Do Not Start Insulin Infusion if Potassium < 3.3    Use a Dedicated Line for Insulin Infusion (If Possible).  May Use a Carrier Fluid of NS at  KVO Rate if Insulin Rate is Insufficient to Maintain IV Patency.  Prime IV Line With Insulin Infusion    Glucommander Must Be Discontinued if Insulin Infusion is Discontinued.  If Insulin Infusion is Restarted, Previous Glucommander Settings Must Be Discontinued and Re-Entered From New Order    Once DKA Meets Resolution Criteria - Call Provider for Transition Orders From IV to SQ Insulin    Utilize the Start Meal Feature / Meal Bolus Feature in Glucommander if Patient Starts a Diet or Bolus Tube Feedings    Notify Provider - Insulin Infusion    RN to Release PRN POC Glucose Orders Per Glucommander    RN to Order STAT Glucose For Any POC Glucose <10 or >600    If Insulin Infusion is Paused - Follow Glucommander Instructions    DKA / HHS Patients - Phosphorus of 1 mg/dL or Higher Does NOT Require Replacement (While Insulin Infusing)    Insert Indwelling Urinary Catheter    Assess Need for Indwelling Urinary Catheter - Follow Removal Protocol    Urinary Catheter Care    Inpatient Diabetes Educator Consult    Pulmonology (on-call MD unless specified)    Nephrology (on -call MD unless specified)    Patient is on Glucommander    Oxygen Therapy- Nasal Cannula; Titrate 1-6 LPM Per SpO2; 90 - 95%    POC Glucose STAT    POC Glucose Once    POC Glucose PRN    ECG 12 Lead Electrolyte Imbalance    Insert Peripheral IV    Insert Peripheral IV x2    Inpatient Admission    CBC & Differential    Ketone Bodies, Serum (Not performed at [place])         OUTPATIENT MEDICATION MANAGEMENT:  Current Facility-Administered Medications Ordered in Epic   Medication Dose Route Frequency Provider Last Rate Last Admin    calcium gluconate 1000 Mg/50ml 0.675% NaCl IV SOLN  1,000 mg Intravenous Once Dread Melendez MD        Calcium Replacement - Follow Nurse / BPA Driven Protocol   Does not apply PRN Dread Melendez MD        dextrose (D50W) (25 g/50 mL) IV injection 10-50 mL  10-50 mL Intravenous Q15 Min PRN Dread Melendez MD        dextrose  (GLUTOSE) oral gel 15 g  15 g Oral Q15 Min PRN Dread Melendez MD        dextrose 5 % and sodium chloride 0.45 % infusion  150 mL/hr Intravenous Continuous PRN Dread Melendez MD        dextrose 5 % and sodium chloride 0.45 % with KCl 20 mEq/L infusion  150 mL/hr Intravenous Continuous PRN Dread Melendez MD        dextrose 5 % and sodium chloride 0.45 % with KCl 40 mEq/L infusion  150 mL/hr Intravenous Continuous PRN Dread Melendez MD        dextrose 5 % and sodium chloride 0.9 % infusion  150 mL/hr Intravenous Continuous PRN Dread Melendez MD        dextrose 5 % and sodium chloride 0.9 % with KCl 20 mEq/L infusion  150 mL/hr Intravenous Continuous PRN Dread Melendez MD        dextrose 5 % and sodium chloride 0.9 % with KCl 40 mEq/L infusion  150 mL/hr Intravenous Continuous PRN Dread Melendez MD        glucagon (GLUCAGEN) injection 1 mg  1 mg Intramuscular Q15 Min PRN Dread Melendez MD        insulin regular 1 unit/mL in 0.9% sodium chloride (Glucommander)  0-100 Units/hr Intravenous Titrated Dread Melendez MD        Magnesium Standard Dose Replacement - Follow Nurse / BPA Driven Protocol   Does not apply Dread Blankenship MD        Phosphorus Replacement - Follow Nurse / BPA Driven Protocol   Does not apply Dread Blankenship MD        Potassium Replacement - Follow Nurse / BPA Driven Protocol   Does not apply Dread Blankenship MD        sodium bicarbonate injection 8.4% 100 mEq  100 mEq Intravenous Once Dread Melendez MD        sodium chloride 0.45 % 1,000 mL with potassium chloride 40 mEq infusion  250 mL/hr Intravenous Continuous PRDread Cordon MD        sodium chloride 0.45 % infusion  250 mL/hr Intravenous Continuous PRN Dread Melendez MD        sodium chloride 0.45 % with KCl 20 mEq/L infusion  250 mL/hr Intravenous Continuous PRN Dread Melendez MD        sodium chloride 0.9 % bolus 1,000 mL  1,000 mL Intravenous Once Dread Melendez MD 2,000 mL/hr at 08/04/24 1946 1,000 mL at 08/04/24 1946    sodium  chloride 0.9 % bolus 1,000 mL  1,000 mL Intravenous Once Dread Melendez MD        sodium chloride 0.9 % bolus  1,000 mL/hr Intravenous Continuous Dread Melendez MD        sodium chloride 0.9 % flush 10 mL  10 mL Intravenous PRN Dread Melendez MD        sodium chloride 0.9 % flush 10 mL  10 mL Intravenous Q12H Dread Melendez MD        sodium chloride 0.9 % flush 10 mL  10 mL Intravenous PRN Dread Melendez MD        sodium chloride 0.9 % infusion 40 mL  40 mL Intravenous PRN Dread Melendez MD        sodium chloride 0.9 % infusion  125 mL/hr Intravenous Continuous Dread Melendez  mL/hr at 08/04/24 1815 125 mL/hr at 08/04/24 1815    sodium chloride 0.9 % infusion  250 mL/hr Intravenous Continuous PRN Dread Melendez MD        sodium chloride 0.9 % with KCl 20 mEq/L infusion  250 mL/hr Intravenous Continuous PRN Dread Melendez MD        sodium chloride 0.9 % with KCl 40 mEq/L infusion  250 mL/hr Intravenous Continuous PRDread Cordon MD         Current Outpatient Medications Ordered in Epic   Medication Sig Dispense Refill    aspirin 81 MG EC tablet Take 1 tablet by mouth Daily.      atorvastatin (LIPITOR) 10 MG tablet Take 1 tablet by mouth Daily.      Dapagliflozin Propanediol (FARXIGA PO) Take  by mouth Daily.      donepezil (ARICEPT) 10 MG tablet Take 1 tablet by mouth Every Night.      memantine (NAMENDA) 10 MG tablet Take 1 tablet by mouth Daily.      metFORMIN (GLUCOPHAGE) 1000 MG tablet Take 1 tablet by mouth 2 (Two) Times a Day With Meals. 1.5 tablets in AM; 1 tablet in PM      Semaglutide,0.25 or 0.5MG/DOS, (OZEMPIC) 2 MG/1.5ML solution pen-injector Inject 0.25 mg under the skin into the appropriate area as directed 1 (One) Time Per Week.      silodosin (RAPAFLO) 8 MG capsule capsule Take 1 capsule by mouth Daily. With a meal           PROCEDURES  Procedures      Total critical care time: Approximately 65 minutes    Due to a high probability of clinically significant, life threatening deterioration, the  patient required my highest level of preparedness to intervene emergently and I personally spent this critical care time directly and personally managing the patient. This critical care time included obtaining a history; examining the patient; vital sign monitoring; ordering and review of studies; arranging urgent treatment with development of a management plan; evaluation of patient's response to treatment; frequent reassessment; and, discussions with other providers.    This critical care time was performed to assess and manage the high probability of imminent, life-threatening deterioration that could result in multi-organ failure. It was exclusive of separately billable procedures and treating other patients and teaching time.    Please see MDM section and the rest of the note for further information on patient assessment and treatment.        PROGRESS, DATA ANALYSIS, CONSULTS, AND MEDICAL DECISION MAKING  All labs have been independently interpreted by me.  All radiology studies have been reviewed by me. All EKG's have been independently viewed and interpreted by me.  Discussion below represents my analysis of pertinent findings related to patient's condition, differential diagnosis, treatment plan and final disposition.    Differential diagnosis:   My differential diagnosis includes but is not limited to generalized weakness, electrolyte abnormality, CVA, TIA, Bell's palsy, acute MI, GI bleed, urinary tract infection, systemic infections including sepsis, alcohol abuse, drug abuse including prescription and street drug.      Clinical Scores:                  ED Course as of 08/04/24 1950   Sun Aug 04, 2024   1729 EKG           EKG time: 1727  Rhythm/Rate: Sinus tachycardia, 105-110  P waves and DC: GABINO within normal limits  QRS, axis: Narrow complex  ST and T waves: No STEMI; QTc within normal limits    Interpreted Contemporaneously by me, independently viewed  Comparison: None available   [RS]   1852  Beta-Hydroxybutyrate Quant(!): 1.489  High-level suspicion for DKA.  Awaiting chemistries and VBG. [RS]   1858 HS Troponin T(!): 44 [RS]   1858 Ethanol: <10 [RS]   1858 WBC(!): 18.10 [RS]   1858 Hemoglobin: 15.0 [RS]   1858 Immature Grans, Absolute(!): 0.16 [RS]   1912 BUN(!): 189 [RS]   1912 Creatinine(!): 17.15 [RS]   1912 Potassium(!!): 7.0  Initiate therapy [RS]   1912 Anion Gap(!): 29.0 [RS]   1917 pH, Venous(!!): 7.252  Confirms acidosis.  Copious IV fluids infusing.  Initiate insulin [RS]   1928 CONSULT        Provider: Dr. Armstrong -clinical pharmacist    Discussion: Discussed patient's critical nature and significant laboratory abnormalities as well as need to initiate insulin drip.  Agreeable with plan for calcium.    Agreeable c treatment and planned disposition.         [RS]   1938 CONSULT        Provider: Dr. Salas - Nephro    Discussion: Reviewed patient history, ED presentation evaluation.  He agrees with plan for insulin drip in ICU.  He request to boluses of bicarb and he will see the patient in consultation.    Agreeable c treatment and planned disposition.         [RS]   1941 CONSULT        Provider: Dr. GRISELDA Delcid-ICU    Discussion: Patient history, ED presentation evaluation as well as consultation with nephrology and my concerns.  Agreeable to accept patient to the ICU.    Agreeable c treatment and planned disposition.         [RS]   1941 RADIOLOGY      Study: Noncontrast CT abdomen pelvis  Findings: Massively distended bladder with very large prostate and bilateral hydronephrosis and perinephric stranding concerning for urinary outlet obstruction.  I independently viewed and interpreted these images contemporaneously with treatment.    [RS]   1941 Rodriguez catheter requested. [RS]      ED Course User Index  [RS] Dread Melendez MD         Prescription drug monitoring program review:     AS OF 19:50 EDT VITALS:    BP - 171/97  HR - 98  TEMP - 98.6 °F (37 °C) (Oral)  O2 SATS - 98%    COMPLEXITY OF  CARE  The patient requires admission.      DIAGNOSIS  Final diagnoses:   Diabetic ketoacidosis without coma associated with other specified diabetes mellitus   Altered mental status, unspecified altered mental status type   Severe dehydration   Acute kidney injury (NEHEMIAS) with acute tubular necrosis (ATN)   Hyperkalemia         DISPOSITION  ED Disposition       ED Disposition   Decision to Admit    Condition   --    Comment   Level of Care: Critical Care [6]   Diagnosis: DKA (diabetic ketoacidosis) [283466]   Admitting Physician: DIONICIO SULLIVAN [[id number]]   Certification: I Certify That Inpatient Hospital Services Are Medically Necessary For Greater Than 2 Midnights                    ADMISSION    Discussed treatment plan and reason for admission with pt/family and admitting physician.  Pt/family voiced understanding of the plan for admission for further testing/treatment as needed.       Please note that portions of this document were completed with a voice recognition program.    Note Disclaimer: At [facility], we believe that sharing information builds trust and better relationships. You are receiving this note because you recently visited [facility]. It is possible you will see health information before a provider has talked with you about it. This kind of information can be easy to misunderstand. To help you fully understand what it means for your health, we urge you to discuss this note with your provider.         Dread Melendez MD  08/04/24 1950

## 2024-08-04 NOTE — PROGRESS NOTES
Attending MD Staffing Note.   [facility] EMERGENCY DEPARTMENT  8/4/2024    Patient:  Name:  Froy Ngo  MRN:  [id number]  1945  78 y.o.  male         CC: ams    History:  Patient is a 78-year-old with previous medical history of BPH diabetes hyperlipidemia and Alzheimer's based who presented to the emergency room with confusion and decreased p.o. intake.  Found to have acute renal failure leukocytosis euglycemic DKA with metabolic acidosis anion gap with a CT scan showing bilateral hydro and ureteral nephrosis enlarged prostate and significantly distended bladder.        Physical Exam:  /97   Pulse 98   Temp 98.6 °F (37 °C) (Oral)   Resp 20   SpO2 98%   There is no height or weight on file to calculate BMI.    Intake/Output Summary (Last 24 hours) at 8/4/2024 1955  Last data filed at 8/4/2024 1946  Gross per 24 hour   Intake 1000 ml   Output --   Net 1000 ml     General appearance: Ill-appearing however conversant  Eyes: anicteric sclerae, moist conjunctivae  HENT: Atraumatic; oropharynx clear with dry mucous membranes    Neck: Trachea midline;  supple   Lungs: CTA, with normal respiratory effort and no intercostal retractions  CV: RRR, no rub   Abdomen: Soft, nontender; BS+  Extremities: No peripheral edema  Skin: Warm dry      Data Review:  Notable Labs:  Results from last 7 days   Lab Units 08/04/24  1814   WBC 10*3/mm3 18.10*   HEMOGLOBIN g/dL 15.0   PLATELETS 10*3/mm3 297     Results from last 7 days   Lab Units 08/04/24  1814   SODIUM mmol/L 127*   POTASSIUM mmol/L 7.0*   CHLORIDE mmol/L 87*   CO2 mmol/L 11.0*   BUN mg/dL 189*   CREATININE mg/dL 17.15*   GLUCOSE mg/dL 162*   CALCIUM mg/dL 8.8   MAGNESIUM mg/dL 2.7*   PHOSPHORUS mg/dL 9.9*   CrCl cannot be calculated (Unknown ideal weight.).    Results from last 7 days   Lab Units 08/04/24  1814   AST (SGOT) U/L 19   ALT (SGPT) U/L 27   PLATELETS 10*3/mm3 297             Imaging:  Reviewed chest images personally from  past 3 days    IMPRESSION:     1. The patient has moderate bilateral hydroureteronephrosis. This is favored to be secondary to urinary retention, given marked distention of the urinary bladder, and the patient's enlarged prostate gland. Patient may benefit from catheterization. There is extensive bilateral perinephric and periureteral stranding, and findings are favored to  reflect ascending urinary tract infection.  Radiation dose reduction techniques were utilized, including automated exposure control and exposure modulation based on body size.     This report was finalized on 8/4/2024 8:06 PM by Dr. Amy Hernandez M.D on Workstation: [id number]       ASSESSMENT  /  PLAN:  Obstructive uropathy   Enlarged prostate  Uremia  Hyperkalemia  AGMA  Euglycemic DKA  Hyponatremia  Leukocytosis  Bilateral hydroureteronephrosis  Alzheimers dementia  Hematuria  Hld  Htn  GERD      Rodriguez catheter  Psa  Urology consultation    Bicarb  Ivfs  Serial labs  Nephrology consulted - juan Crawley Rai he wishes to manage bicarb drip and IVFs.  If unable to resole anion gap with above may need to convert to complete dka protocol with iv insulin and dextrose containing ivfs.  Will monitor gap with serial labs and above plan    Insulin iv  Ivfs    Cover with ceftriaxone, leukocytosis could be reactive however also will need to follow UA, uc bcs.    Juan er md, nephrologist and patients wife at bedside.    Total critical care time was 60 minutes, excluding any separately billable procedure time.  Time did not overlap with any other provider.    Electronically signed by Tio Delcid MD, 08/04/24, 10:20 PM EDT.

## 2024-08-04 NOTE — H&P
Group: [facility]        HISTORY AND PHYSICAL  Patient Identification:  Froy Ngo  78 y.o.  male  1945  [id number]            CC: Confusion, weakness    History of Present Illness:  Froy Ngo is an 78-year-old male with a past medical history of BPH, diabetes mellitus, type II , hyperlipidemia, and Alzheimer's disease.    He presented to the emergency department on 8/4/2024 with complaints of generalized weakness and inability to ambulate.  Patient's wife is at bedside providing most of patient's history.  She reports that about a week ago patient began to feel nauseous and developed on bloody vomiting.  Since that episode, patient has not had any thing solid to eat for about a week.  She has been trying to encourage him to increase his oral liquid intake.  Night prior to ED presentation, patient was so weak that he was unable to get off the toilet and required emergency medical services to assist him back to bed.  Patient was hesitant to come to the emergency department at that time.  His weakness persisted and emergency medical services was called to the house again prior to ED presentation and patient presented to the ER at that time.  Patient denies any specific complaints besides weakness.  Denies headache, chest pain, shortness of breath, fever, chills, diarrhea.  ED evaluation included: High-sensitivity troponin 44, sodium 127, potassium 7.0, creatinine 17.15, , glucose 162, beta-hydroxybutyrate 1.489, WBC 18.10.  PCA was 53.10.  VBG was performed which showed pH 7.252, CO2 18.0, O2 53.8, HCO3 7.9.  CT of the abdomen and pelvis without contrast was performed which showed moderate bilateral hydroureteronephrosis, marked distention of the urinary bladder and enlarged prostate gland.  Extensive bilateral perinephritic and periureteral stranding noted as well.    Nephrology was consulted from the emergency department for his acute kidney injury.  Patient is admitted  to the intensive care unit for new ischemic DKA, metabolic acidosis, and acute renal failure.    Review of Systems:  CONSTITUTIONAL:  Denies fevers or chills, positive for weakness  EYE:  No new vision changes  EAR:  No change in hearing  CARDIAC:  No chest pain  PULMONARY:  No productive cough or shortness of breath  GI:  No diarrhea, hematemesis or hematochezia, + nausea  RENAL:  No dysuria or urinary frequency  MUSCULOSKELETAL:  No musculoskeletal complaints  ENDOCRINE:  No heat or cold intolerance  INTEGUMENTARY: No skin rashes  NEUROLOGICAL: Positive for confusion, no dizziness or seizure activity  PSYCHIATRIC:  No new anxiety or depression  12 system review of systems performed and all else negative     Past Medical History:  No past medical history on file.    Past Surgical History:  No past surgical history on file.     Home Meds:  (Not in a hospital admission)      Allergies:  No Known Allergies    Social History:   Social History     Socioeconomic History    Marital status: Single       Family History:  No family history on file.    Physical Exam:  /90   Pulse 102   Temp 98.6 °F (37 °C) (Oral)   Resp 16   SpO2 99%  There is no height or weight on file to calculate BMI. 99%      Constitutional: Elderly male, acutely ill appearing pt in bed, No acute respiratory distress, no accessory muscle use  Head: - NCAT  Eyes: No pallor, Anicteric conjunctiva, EOMI.  ENMT:  Mallampati 3, no oral thrush. Dry MM.   NECK: Trachea midline, No thyromegaly, no palpable cervical LNpathy, no JVD  Heart: RRR, no murmur. No pedal edema   Lungs: TAYLOR +, No wheezes/ crackles heard    Abdomen: Soft. No tenderness, guarding or rigidity. No palpable masses  Extremities: Extremities warm and well perfused. No cyanosis/ clubbing, poor skin turgor  Neuro: Conscious, answers appropriately, no gross focal neuro deficits, tremors noted in bilateral upper extremities with arms held up  Psych: Mood and affect appropriate    PPE  "recommended per [facility] infectious disease Isolation protocol for the current clinical scenario(as mentioned below) was followed.      LABS:  No results found for: \"COVID19\"    Lab Results   Component Value Date    CALCIUM 8.8 08/04/2024    PHOS 9.2 (H) 08/04/2024     Results from last 7 days   Lab Units 08/04/24 1958 08/04/24 1814   MAGNESIUM mg/dL 2.5* 2.7*   SODIUM mmol/L  --  127*   POTASSIUM mmol/L  --  7.0*   CHLORIDE mmol/L  --  87*   CO2 mmol/L  --  11.0*   BUN mg/dL  --  189*   CREATININE mg/dL  --  17.15*   GLUCOSE mg/dL  --  162*   CALCIUM mg/dL  --  8.8   WBC 10*3/mm3  --  18.10*   HEMOGLOBIN g/dL  --  15.0   PLATELETS 10*3/mm3  --  297   ALT (SGPT) U/L  --  27   AST (SGOT) U/L  --  19   PROCALCITONIN ng/mL  --  2.66*     Lab Results   Component Value Date    TROPONINT 44 (H) 08/04/2024     Results from last 7 days   Lab Units 08/04/24  1814   HSTROP T ng/L 44*         Results from last 7 days   Lab Units 08/04/24 1958 08/04/24  1814   PROCALCITONIN ng/mL  --  2.66*   LACTATE mmol/L 1.2  --      Results from last 7 days   Lab Units 08/04/24  1910   MODALITY  Room Air         Results from last 7 days   Lab Units 08/04/24  1814   INR  1.36*         No results found for: \"TSH\"  CrCl cannot be calculated (Unknown ideal weight.).  Results from last 7 days   Lab Units 08/04/24 1951   NITRITE UA  Negative   WBC UA /HPF 6-10*   BACTERIA UA /HPF None Seen   SQUAM EPITHEL UA /HPF 0-2        Imaging: I personally visualized the images of scans/x-rays performed within last 3 days.      Assessment:  Euglycemic DKA  Metabolic acidosis  Acute renal failure  Hyperkalemia  Leukocytosis  Enlarged prostate  Hematuria  Hyperlipidemia  Hypertension  Alzheimer disease  GERD    Recommendations:  Euglycemic DKA  Patient is Farxiga-sodium HCO3 11.0, anion gap 29.9, blood sugar 126, beta hydroxybutyrate 1.489.  Discussed with nephrology, plan for sodium bicarbonate drip given significance of metabolic acidosis-felt " "to be secondary to post renal failure and dehydration.  Sodium bicarbonate to be ordered by nephrology.  Continue insulin drip per hyperglycemia protocol.  Home medication: Farxiga, metformin, Ozempic-on hold    Metabolic acidosis  VB.252/18.0/7.9/base excess -16.5.  Nephrology consulted to aid in management.  Plan for sodium bicarbonate drip with serial labs.    Acute renal failure  Hyperkalemia  Creatinine 17.15, , potassium 7.0.  CT of the abdomen and pelvis shows enlarged prostate with distended bladder, hydroureteronephrosis.  Rodriguez catheter has been inserted with urine output.  IV fluid resuscitation ongoing.  Followed by nephrology-the hope is that with IV fluid resuscitation and resolution of his postrenal obstruction patient's kidneys will improve.  Monitoring BMP every 4 hours.  Defer initiation of hemodialysis to nephrology if indicated.    Leukocytosis  WBC 18.10, no complaints of fever, chills, shortness of breath, dysuria.  Urinalysis performed which showed no evidence of nitrates or bacteria.  Procalcitonin elevated, however of unclear significance with NEHEMIAS.  Hold off on antibiotics, monitor fever and WBC curve.    Enlarged prostate  Hematuria  CT abdomen pelvis performed describing a \"very enlarged prostate gland\".  PSA 53.100.  According to patient's current cares provider at bedside, he has had multiple appointments with outpatient neurology and has canceled all of them.  Consult urology to assess patient for enlarged prostate and for hematuria noted after Rodriguez catheter insertion.    Hyperlipidemia  Medication: Atorvastatin 10 mg daily, hold for now, restart when appropriate.    Hypertension  Currently not on antihypertensive therapy at baseline-patient has had a couple high blood pressures in the emergency department.  As needed hydralazine available for systolic greater than 180.    Alzheimer disease  Home medication: Donezepil, and Namenda-continue.    Discussed plan of care with " patient's wife, patient, Dr. Tio Delcid, Dr. Dempsey and patient's  physician at bedside in the emergency department.    Full code  Clear liquid diet, encourage oral intake as tolerated  SCDs    Patient was placed in face mask upon entering room and kept mask on throughout our encounter. I wore full protective equipment throughout this patient encounter including a face mask, gown and gloves. Hand hygiene was performed before donning protective equipment and after removal when leaving the room.    Mary Brunner, APRN  8/4/2024  20:29 EDT      Much of this encounter note is an electronic transcription/translation of spoken language to printed text using Dragon Software.

## 2024-08-05 LAB
ANION GAP SERPL CALCULATED.3IONS-SCNC: 15 MMOL/L (ref 5–15)
ANION GAP SERPL CALCULATED.3IONS-SCNC: 16 MMOL/L (ref 5–15)
ANION GAP SERPL CALCULATED.3IONS-SCNC: 16.7 MMOL/L (ref 5–15)
ANION GAP SERPL CALCULATED.3IONS-SCNC: 18.1 MMOL/L (ref 5–15)
ANION GAP SERPL CALCULATED.3IONS-SCNC: 21.1 MMOL/L (ref 5–15)
ANION GAP SERPL CALCULATED.3IONS-SCNC: 23.3 MMOL/L (ref 5–15)
BASOPHILS # BLD AUTO: 0.01 10*3/MM3 (ref 0–0.2)
BASOPHILS NFR BLD AUTO: 0.1 % (ref 0–1.5)
BUN SERPL-MCNC: 117 MG/DL (ref 8–23)
BUN SERPL-MCNC: 137 MG/DL (ref 8–23)
BUN SERPL-MCNC: 137 MG/DL (ref 8–23)
BUN SERPL-MCNC: 143 MG/DL (ref 8–23)
BUN SERPL-MCNC: 147 MG/DL (ref 8–23)
BUN SERPL-MCNC: 160 MG/DL (ref 8–23)
BUN/CREAT SERPL: 11.2 (ref 7–25)
BUN/CREAT SERPL: 11.4 (ref 7–25)
BUN/CREAT SERPL: 13.1 (ref 7–25)
BUN/CREAT SERPL: 13.1 (ref 7–25)
BUN/CREAT SERPL: 13.3 (ref 7–25)
BUN/CREAT SERPL: 14.6 (ref 7–25)
CALCIUM SPEC-SCNC: 7.3 MG/DL (ref 8.6–10.5)
CALCIUM SPEC-SCNC: 7.6 MG/DL (ref 8.6–10.5)
CALCIUM SPEC-SCNC: 7.6 MG/DL (ref 8.6–10.5)
CALCIUM SPEC-SCNC: 7.9 MG/DL (ref 8.6–10.5)
CALCIUM SPEC-SCNC: 7.9 MG/DL (ref 8.6–10.5)
CALCIUM SPEC-SCNC: 8 MG/DL (ref 8.6–10.5)
CHLORIDE SERPL-SCNC: 101 MMOL/L (ref 98–107)
CHLORIDE SERPL-SCNC: 102 MMOL/L (ref 98–107)
CHLORIDE SERPL-SCNC: 102 MMOL/L (ref 98–107)
CHLORIDE SERPL-SCNC: 98 MMOL/L (ref 98–107)
CHLORIDE SERPL-SCNC: 99 MMOL/L (ref 98–107)
CHLORIDE SERPL-SCNC: 99 MMOL/L (ref 98–107)
CO2 SERPL-SCNC: 13.7 MMOL/L (ref 22–29)
CO2 SERPL-SCNC: 16.9 MMOL/L (ref 22–29)
CO2 SERPL-SCNC: 18.9 MMOL/L (ref 22–29)
CO2 SERPL-SCNC: 19 MMOL/L (ref 22–29)
CO2 SERPL-SCNC: 19 MMOL/L (ref 22–29)
CO2 SERPL-SCNC: 20.3 MMOL/L (ref 22–29)
CREAT SERPL-MCNC: 10.27 MG/DL (ref 0.76–1.27)
CREAT SERPL-MCNC: 10.47 MG/DL (ref 0.76–1.27)
CREAT SERPL-MCNC: 12.9 MG/DL (ref 0.76–1.27)
CREAT SERPL-MCNC: 14.34 MG/DL (ref 0.76–1.27)
CREAT SERPL-MCNC: 8.93 MG/DL (ref 0.76–1.27)
CREAT SERPL-MCNC: 9.8 MG/DL (ref 0.76–1.27)
DEPRECATED RDW RBC AUTO: 43.1 FL (ref 37–54)
EGFRCR SERPLBLD CKD-EPI 2021: 3.1 ML/MIN/1.73
EGFRCR SERPLBLD CKD-EPI 2021: 3.5 ML/MIN/1.73
EGFRCR SERPLBLD CKD-EPI 2021: 4.5 ML/MIN/1.73
EGFRCR SERPLBLD CKD-EPI 2021: 4.6 ML/MIN/1.73
EGFRCR SERPLBLD CKD-EPI 2021: 4.9 ML/MIN/1.73
EGFRCR SERPLBLD CKD-EPI 2021: 5.5 ML/MIN/1.73
EOSINOPHIL # BLD AUTO: 0.04 10*3/MM3 (ref 0–0.4)
EOSINOPHIL NFR BLD AUTO: 0.3 % (ref 0.3–6.2)
ERYTHROCYTE [DISTWIDTH] IN BLOOD BY AUTOMATED COUNT: 13.1 % (ref 12.3–15.4)
GLUCOSE BLDC GLUCOMTR-MCNC: 101 MG/DL (ref 70–130)
GLUCOSE BLDC GLUCOMTR-MCNC: 113 MG/DL (ref 70–130)
GLUCOSE BLDC GLUCOMTR-MCNC: 116 MG/DL (ref 70–130)
GLUCOSE BLDC GLUCOMTR-MCNC: 118 MG/DL (ref 70–130)
GLUCOSE BLDC GLUCOMTR-MCNC: 119 MG/DL (ref 70–130)
GLUCOSE BLDC GLUCOMTR-MCNC: 122 MG/DL (ref 70–130)
GLUCOSE BLDC GLUCOMTR-MCNC: 124 MG/DL (ref 70–130)
GLUCOSE BLDC GLUCOMTR-MCNC: 125 MG/DL (ref 70–130)
GLUCOSE BLDC GLUCOMTR-MCNC: 125 MG/DL (ref 70–130)
GLUCOSE BLDC GLUCOMTR-MCNC: 129 MG/DL (ref 70–130)
GLUCOSE BLDC GLUCOMTR-MCNC: 130 MG/DL (ref 70–130)
GLUCOSE BLDC GLUCOMTR-MCNC: 136 MG/DL (ref 70–130)
GLUCOSE BLDC GLUCOMTR-MCNC: 137 MG/DL (ref 70–130)
GLUCOSE BLDC GLUCOMTR-MCNC: 139 MG/DL (ref 70–130)
GLUCOSE BLDC GLUCOMTR-MCNC: 165 MG/DL (ref 70–130)
GLUCOSE BLDC GLUCOMTR-MCNC: 166 MG/DL (ref 70–130)
GLUCOSE BLDC GLUCOMTR-MCNC: 173 MG/DL (ref 70–130)
GLUCOSE BLDC GLUCOMTR-MCNC: 176 MG/DL (ref 70–130)
GLUCOSE BLDC GLUCOMTR-MCNC: 185 MG/DL (ref 70–130)
GLUCOSE BLDC GLUCOMTR-MCNC: 98 MG/DL (ref 70–130)
GLUCOSE SERPL-MCNC: 104 MG/DL (ref 65–99)
GLUCOSE SERPL-MCNC: 130 MG/DL (ref 65–99)
GLUCOSE SERPL-MCNC: 164 MG/DL (ref 65–99)
GLUCOSE SERPL-MCNC: 167 MG/DL (ref 65–99)
GLUCOSE SERPL-MCNC: 226 MG/DL (ref 65–99)
GLUCOSE SERPL-MCNC: 271 MG/DL (ref 65–99)
HCT VFR BLD AUTO: 35.5 % (ref 37.5–51)
HGB BLD-MCNC: 12 G/DL (ref 13–17.7)
IMM GRANULOCYTES # BLD AUTO: 0.08 10*3/MM3 (ref 0–0.05)
IMM GRANULOCYTES NFR BLD AUTO: 0.7 % (ref 0–0.5)
LYMPHOCYTES # BLD AUTO: 0.58 10*3/MM3 (ref 0.7–3.1)
LYMPHOCYTES NFR BLD AUTO: 4.8 % (ref 19.6–45.3)
MAGNESIUM SERPL-MCNC: 2.4 MG/DL (ref 1.6–2.4)
MCH RBC QN AUTO: 30.5 PG (ref 26.6–33)
MCHC RBC AUTO-ENTMCNC: 33.8 G/DL (ref 31.5–35.7)
MCV RBC AUTO: 90.3 FL (ref 79–97)
MONOCYTES # BLD AUTO: 1.31 10*3/MM3 (ref 0.1–0.9)
MONOCYTES NFR BLD AUTO: 10.8 % (ref 5–12)
NEUTROPHILS NFR BLD AUTO: 10.06 10*3/MM3 (ref 1.7–7)
NEUTROPHILS NFR BLD AUTO: 83.3 % (ref 42.7–76)
NRBC BLD AUTO-RTO: 0 /100 WBC (ref 0–0.2)
PLATELET # BLD AUTO: 252 10*3/MM3 (ref 140–450)
PMV BLD AUTO: 9.6 FL (ref 6–12)
POTASSIUM SERPL-SCNC: 4 MMOL/L (ref 3.5–5.2)
POTASSIUM SERPL-SCNC: 4.2 MMOL/L (ref 3.5–5.2)
POTASSIUM SERPL-SCNC: 4.3 MMOL/L (ref 3.5–5.2)
POTASSIUM SERPL-SCNC: 4.5 MMOL/L (ref 3.5–5.2)
POTASSIUM SERPL-SCNC: 4.5 MMOL/L (ref 3.5–5.2)
POTASSIUM SERPL-SCNC: 5.3 MMOL/L (ref 3.5–5.2)
QT INTERVAL: 318 MS
QTC INTERVAL: 426 MS
RBC # BLD AUTO: 3.93 10*6/MM3 (ref 4.14–5.8)
SODIUM SERPL-SCNC: 134 MMOL/L (ref 136–145)
SODIUM SERPL-SCNC: 135 MMOL/L (ref 136–145)
SODIUM SERPL-SCNC: 135 MMOL/L (ref 136–145)
SODIUM SERPL-SCNC: 136 MMOL/L (ref 136–145)
SODIUM SERPL-SCNC: 139 MMOL/L (ref 136–145)
SODIUM SERPL-SCNC: 140 MMOL/L (ref 136–145)
WBC NRBC COR # BLD AUTO: 12.08 10*3/MM3 (ref 3.4–10.8)

## 2024-08-05 PROCEDURE — 85025 COMPLETE CBC W/AUTO DIFF WBC: CPT

## 2024-08-05 PROCEDURE — 83735 ASSAY OF MAGNESIUM: CPT

## 2024-08-05 PROCEDURE — 80048 BASIC METABOLIC PNL TOTAL CA: CPT

## 2024-08-05 PROCEDURE — 82948 REAGENT STRIP/BLOOD GLUCOSE: CPT

## 2024-08-05 PROCEDURE — 25010000002 CEFTRIAXONE PER 250 MG: Performed by: INTERNAL MEDICINE

## 2024-08-05 PROCEDURE — 0 DEXTROSE 5 % SOLUTION: Performed by: INTERNAL MEDICINE

## 2024-08-05 RX ORDER — TAMSULOSIN HYDROCHLORIDE 0.4 MG/1
0.4 CAPSULE ORAL DAILY
Status: DISCONTINUED | OUTPATIENT
Start: 2024-08-05 | End: 2024-08-09 | Stop reason: HOSPADM

## 2024-08-05 RX ORDER — SODIUM BICARBONATE 650 MG/1
650 TABLET ORAL 3 TIMES DAILY
Status: DISCONTINUED | OUTPATIENT
Start: 2024-08-05 | End: 2024-08-07

## 2024-08-05 RX ORDER — DEXTROSE MONOHYDRATE AND SODIUM CHLORIDE 5; .45 G/100ML; G/100ML
125 INJECTION, SOLUTION INTRAVENOUS CONTINUOUS
Status: DISCONTINUED | OUTPATIENT
Start: 2024-08-05 | End: 2024-08-06

## 2024-08-05 RX ADMIN — MEMANTINE HYDROCHLORIDE 10 MG: 10 TABLET, FILM COATED ORAL at 08:32

## 2024-08-05 RX ADMIN — DONEPEZIL HYDROCHLORIDE 10 MG: 10 TABLET, FILM COATED ORAL at 20:50

## 2024-08-05 RX ADMIN — DEXTROSE AND SODIUM CHLORIDE 125 ML/HR: 5; 450 INJECTION, SOLUTION INTRAVENOUS at 21:55

## 2024-08-05 RX ADMIN — SODIUM BICARBONATE 650 MG: 650 TABLET, ORALLY DISINTEGRATING ORAL at 08:32

## 2024-08-05 RX ADMIN — CEFTRIAXONE 2000 MG: 2 INJECTION, POWDER, FOR SOLUTION INTRAMUSCULAR; INTRAVENOUS at 23:06

## 2024-08-05 RX ADMIN — Medication 10 ML: at 10:28

## 2024-08-05 RX ADMIN — Medication 10 ML: at 20:51

## 2024-08-05 RX ADMIN — SODIUM BICARBONATE 150 MEQ: 84 INJECTION, SOLUTION INTRAVENOUS at 05:28

## 2024-08-05 RX ADMIN — SENNOSIDES AND DOCUSATE SODIUM 2 TABLET: 50; 8.6 TABLET ORAL at 08:32

## 2024-08-05 RX ADMIN — ACETAMINOPHEN 325MG 650 MG: 325 TABLET ORAL at 11:07

## 2024-08-05 RX ADMIN — SODIUM BICARBONATE 650 MG: 650 TABLET, ORALLY DISINTEGRATING ORAL at 20:50

## 2024-08-05 RX ADMIN — DEXTROSE AND SODIUM CHLORIDE 125 ML/HR: 5; 450 INJECTION, SOLUTION INTRAVENOUS at 08:03

## 2024-08-05 RX ADMIN — TAMSULOSIN HYDROCHLORIDE 0.4 MG: 0.4 CAPSULE ORAL at 16:01

## 2024-08-05 RX ADMIN — SODIUM BICARBONATE 650 MG: 650 TABLET, ORALLY DISINTEGRATING ORAL at 16:02

## 2024-08-05 NOTE — PROGRESS NOTES
78 year old white male admitted to ICU with     . Euglycemic ketoacidosis    . Acute urinary retention from prostate enlargement. History of elevated PSA with a nonmalignant MRi in 2020    . Hyperkalemia    . 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 Hypertension    . Hypercholesterolemia on atorvastatin    . Microcytic anemia with normal EGD, colonoscopy    . Gastroesophageal reflux disease    . Vertigo    . Alllergy to cefdinir-rash, lip swelling    . Tdap Nov 2023, Pvax and Prevnar 2014, 2016, Zostavax 2015, Hep A 2019    Appreciate all the care. Please feel free to let me know if I can help in any way.        Ryan Gutierrez MD  [phone]

## 2024-08-05 NOTE — PAYOR COMM NOTE
"Froy Turner (78 y.o. Male)                ATTENTION; INITIAL CLINICALS AUTH PENDING [id number]             REPLY TO [facility] DEPT  [phone] OR CALL   AVIS KING LPN           Date of Birth   1945    Social Security Number       Address   [address] [place] [address]    Home Phone   [phone]    MRN   [id number]       Jain   Unknown    Marital Status   Single                            Admission Date   8/4/24    Admission Type   Emergency    Admitting Provider   Tio Delcid MD    Attending Provider   Scott Delgado MD    Department, Room/Bed   [facility] INTENSIVE CARE, I382/1       Discharge Date       Discharge Disposition       Discharge Destination                                 Attending Provider: Scott Delgado MD    Allergies: No Known Allergies    Isolation: None   Infection: None   Code Status: CPR    Ht: 182.9 cm (72\")   Wt: 83.9 kg (185 lb)    Admission Cmt: None   Principal Problem: DKA (diabetic ketoacidosis) [E11.10]                   Active Insurance as of 8/4/2024       Primary Coverage       Payor Plan Insurance Group Employer/Plan Group    ANTHEM BLUE CROSS ANTHEM BLUE CROSS BLUE SHIELD PPO [id number]        Payor Plan Address Payor Plan Phone Number Payor Plan Fax Number Effective Dates    [address] [phone]  4/1/2019 - None Entered    [facility] [id number]         Subscriber Name Subscriber Birth Date Member ID       FROY TURNER 1945 [id number]               Secondary Coverage       Payor Plan Insurance Group Employer/Plan Group    MEDICARE MEDICARE A ONLY        Payor Plan Address Payor Plan Phone Number Payor Plan Fax Number Effective Dates    [address] [phone]  6/1/2009 - None Entered    [facility] [id number]         Subscriber Name Subscriber Birth Date Member ID       FROY TURNER 1945 [id number]                     Emergency Contacts        (Rel.) Home Phone " Work Phone Mobile Phone    ROBERT NGO (Spouse) [phone] -- [phone]    robert braswell (Daughter) [phone] -- --                 History & Physical        Mary Brunner APRN at 08/04/24 1942          Group: [facility]        HISTORY AND PHYSICAL  Patient Identification:  Froy Ngo  78 y.o.  male  1945  [id number]            CC: Confusion, weakness    History of Present Illness:  Froy Ngo is an 78-year-old male with a past medical history of BPH, diabetes mellitus, type II , hyperlipidemia, and Alzheimer's disease.    He presented to the emergency department on 8/4/2024 with complaints of generalized weakness and inability to ambulate.  Patient's wife is at bedside providing most of patient's history.  She reports that about a week ago patient began to feel nauseous and developed on bloody vomiting.  Since that episode, patient has not had any thing solid to eat for about a week.  She has been trying to encourage him to increase his oral liquid intake.  Night prior to ED presentation, patient was so weak that he was unable to get off the toilet and required emergency medical services to assist him back to bed.  Patient was hesitant to come to the emergency department at that time.  His weakness persisted and emergency medical services was called to the house again prior to ED presentation and patient presented to the ER at that time.  Patient denies any specific complaints besides weakness.  Denies headache, chest pain, shortness of breath, fever, chills, diarrhea.  ED evaluation included: High-sensitivity troponin 44, sodium 127, potassium 7.0, creatinine 17.15, , glucose 162, beta-hydroxybutyrate 1.489, WBC 18.10.  PCA was 53.10.  VBG was performed which showed pH 7.252, CO2 18.0, O2 53.8, HCO3 7.9.  CT of the abdomen and pelvis without contrast was performed which showed moderate bilateral hydroureteronephrosis, marked distention of the  urinary bladder and enlarged prostate gland.  Extensive bilateral perinephritic and periureteral stranding noted as well.    Nephrology was consulted from the emergency department for his acute kidney injury.  Patient is admitted to the intensive care unit for new ischemic DKA, metabolic acidosis, and acute renal failure.    Review of Systems:  CONSTITUTIONAL:  Denies fevers or chills, positive for weakness  EYE:  No new vision changes  EAR:  No change in hearing  CARDIAC:  No chest pain  PULMONARY:  No productive cough or shortness of breath  GI:  No diarrhea, hematemesis or hematochezia, + nausea  RENAL:  No dysuria or urinary frequency  MUSCULOSKELETAL:  No musculoskeletal complaints  ENDOCRINE:  No heat or cold intolerance  INTEGUMENTARY: No skin rashes  NEUROLOGICAL: Positive for confusion, no dizziness or seizure activity  PSYCHIATRIC:  No new anxiety or depression  12 system review of systems performed and all else negative     Past Medical History:  No past medical history on file.    Past Surgical History:  No past surgical history on file.     Home Meds:  (Not in a hospital admission)      Allergies:  No Known Allergies    Social History:   Social History     Socioeconomic History    Marital status: Single       Family History:  No family history on file.    Physical Exam:  /90   Pulse 102   Temp 98.6 °F (37 °C) (Oral)   Resp 16   SpO2 99%  There is no height or weight on file to calculate BMI. 99%      Constitutional: Elderly male, acutely ill appearing pt in bed, No acute respiratory distress, no accessory muscle use  Head: - NCAT  Eyes: No pallor, Anicteric conjunctiva, EOMI.  ENMT:  Mallampati 3, no oral thrush. Dry MM.   NECK: Trachea midline, No thyromegaly, no palpable cervical LNpathy, no JVD  Heart: RRR, no murmur. No pedal edema   Lungs: TAYLOR +, No wheezes/ crackles heard    Abdomen: Soft. No tenderness, guarding or rigidity. No palpable masses  Extremities: Extremities warm and well  "perfused. No cyanosis/ clubbing, poor skin turgor  Neuro: Conscious, answers appropriately, no gross focal neuro deficits, tremors noted in bilateral upper extremities with arms held up  Psych: Mood and affect appropriate    PPE recommended per [facility] infectious disease Isolation protocol for the current clinical scenario(as mentioned below) was followed.      LABS:  No results found for: \"COVID19\"    Lab Results   Component Value Date    CALCIUM 8.8 08/04/2024    PHOS 9.2 (H) 08/04/2024     Results from last 7 days   Lab Units 08/04/24 1958 08/04/24 1814   MAGNESIUM mg/dL 2.5* 2.7*   SODIUM mmol/L  --  127*   POTASSIUM mmol/L  --  7.0*   CHLORIDE mmol/L  --  87*   CO2 mmol/L  --  11.0*   BUN mg/dL  --  189*   CREATININE mg/dL  --  17.15*   GLUCOSE mg/dL  --  162*   CALCIUM mg/dL  --  8.8   WBC 10*3/mm3  --  18.10*   HEMOGLOBIN g/dL  --  15.0   PLATELETS 10*3/mm3  --  297   ALT (SGPT) U/L  --  27   AST (SGOT) U/L  --  19   PROCALCITONIN ng/mL  --  2.66*     Lab Results   Component Value Date    TROPONINT 44 (H) 08/04/2024     Results from last 7 days   Lab Units 08/04/24  1814   HSTROP T ng/L 44*         Results from last 7 days   Lab Units 08/04/24 1958 08/04/24  1814   PROCALCITONIN ng/mL  --  2.66*   LACTATE mmol/L 1.2  --      Results from last 7 days   Lab Units 08/04/24  1910   MODALITY  Room Air         Results from last 7 days   Lab Units 08/04/24  1814   INR  1.36*         No results found for: \"TSH\"  CrCl cannot be calculated (Unknown ideal weight.).  Results from last 7 days   Lab Units 08/04/24 1951   NITRITE UA  Negative   WBC UA /HPF 6-10*   BACTERIA UA /HPF None Seen   SQUAM EPITHEL UA /HPF 0-2        Imaging: I personally visualized the images of scans/x-rays performed within last 3 days.      Assessment:  Euglycemic DKA  Metabolic acidosis  Acute renal failure  Hyperkalemia  Leukocytosis  Enlarged prostate  Hematuria  Hyperlipidemia  Hypertension  Alzheimer " "disease  GERD    Recommendations:  Euglycemic DKA  Patient is Farxiga-sodium HCO3 11.0, anion gap 29.9, blood sugar 126, beta hydroxybutyrate 1.489.  Discussed with nephrology, plan for sodium bicarbonate drip given significance of metabolic acidosis-felt to be secondary to post renal failure and dehydration.  Sodium bicarbonate to be ordered by nephrology.  Continue insulin drip per hyperglycemia protocol.  Home medication: Farxiga, metformin, Ozempic-on hold    Metabolic acidosis  VB.252/18.0/7.9/base excess -16.5.  Nephrology consulted to aid in management.  Plan for sodium bicarbonate drip with serial labs.    Acute renal failure  Hyperkalemia  Creatinine 17.15, , potassium 7.0.  CT of the abdomen and pelvis shows enlarged prostate with distended bladder, hydroureteronephrosis.  Rodriguez catheter has been inserted with urine output.  IV fluid resuscitation ongoing.  Followed by nephrology-the hope is that with IV fluid resuscitation and resolution of his postrenal obstruction patient's kidneys will improve.  Monitoring BMP every 4 hours.  Defer initiation of hemodialysis to nephrology if indicated.    Leukocytosis  WBC 18.10, no complaints of fever, chills, shortness of breath, dysuria.  Urinalysis performed which showed no evidence of nitrates or bacteria.  Procalcitonin elevated, however of unclear significance with NEHEMIAS.  Hold off on antibiotics, monitor fever and WBC curve.    Enlarged prostate  Hematuria  CT abdomen pelvis performed describing a \"very enlarged prostate gland\".  PSA 53.100.  According to patient's current cares provider at bedside, he has had multiple appointments with outpatient neurology and has canceled all of them.  Consult urology to assess patient for enlarged prostate and for hematuria noted after Rodriguez catheter insertion.    Hyperlipidemia  Medication: Atorvastatin 10 mg daily, hold for now, restart when appropriate.    Hypertension  Currently not on antihypertensive therapy " at baseline-patient has had a couple high blood pressures in the emergency department.  As needed hydralazine available for systolic greater than 180.    Alzheimer disease  Home medication: Donezepil, and Namenda-continue.    Discussed plan of care with patient's wife, patient, Dr. Tio Delcid, Dr. Dempsey and patient's  physician at bedside in the emergency department.    Full code  Clear liquid diet, encourage oral intake as tolerated  SCDs    Patient was placed in face mask upon entering room and kept mask on throughout our encounter. I wore full protective equipment throughout this patient encounter including a face mask, gown and gloves. Hand hygiene was performed before donning protective equipment and after removal when leaving the room.    HAILY Garcia  8/4/2024  20:29 EDT      Much of this encounter note is an electronic transcription/translation of spoken language to printed text using Dragon Software.     Electronically signed by Mary Brunner APRN at 08/04/24 2055          Emergency Department Notes        Dread Melendez MD at 08/04/24 1659           EMERGENCY DEPARTMENT ENCOUNTER  Room Number:  28/28  PCP: Ryan Gutierrez MD  Independent Historians: Patient, family and paramedic      HPI:  Chief Complaint: Poor appetite, altered mental status    A complete HPI/ROS/PMH/PSH/SH/FH are unobtainable due to: Altered Mental Status    Chronic or social conditions impacting patient care (Social Determinants of Health): None      Context: Froy Ngo is a 78 y.o. male with a medical history of BPH, GERD, hyperlipidemia, hypertension, early onset dementia, and diabetes who presents to the ED c/o acute generalized weakness and inability to stand or walk since yesterday.  Patient began with GI symptoms that initially were limited to vomiting 1 week ago and throughout the week he had really no appetite and very poor p.o. intake.  Last night he was sitting on the toilet and his spouse  could not get him up and required assistance from emergency services.  Today he was so weak that EMS was called again and he was brought to the emergency department.  Patient who is being investigated for early onset dementia has been even more confused this week.  Patient denies any ongoing focal pain such as headache, chest pain, abdominal pain.  No reported diarrhea, black or bloody stools, dysuria or fevers.      Review of prior external notes (non-ED) -and- Review of prior external test results outside of this encounter:  Neurology office note 1/17/2024 by Dr. 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      FAMILY HISTORY  No family history on file.      SOCIAL HISTORY  Social History     Socioeconomic History    Marital status: Single         ALLERGIES  Patient has no known allergies.      REVIEW OF SYSTEMS  Review of Systems  Included in HPI  All systems reviewed and negative except for those discussed in HPI.      PHYSICAL EXAM    I have reviewed the triage vital signs and nursing notes.    ED Triage Vitals [08/04/24 1653]   Temp Heart Rate Resp BP SpO2   98.6 °F (37 °C) 110 20 123/75 95 %      Temp src Heart Rate Source Patient Position BP Location FiO2 (%)   Oral Monitor -- -- --       Physical Exam    Physical Exam   Constitutional: No distress.  Nontoxic but ill-appearing  HENT:  Head: Normocephalic and atraumatic.   Oropharynx: Mucous membranes are moderately dry  Eyes: . No scleral icterus. No conjunctival pallor.  PERRL, EOMI  Neck: Normal range of motion. Neck supple.   Cardiovascular: Pink warm and well perfused throughout.  Anaya  but tachycardic  Pulmonary/Chest: No respiratory distress.  No tachypnea or increased work of breathing appreciated.  Clear  Abdominal: Soft. There is no tenderness. There is no rebound and no guarding.  Benign  Musculoskeletal: Moves all extremities equally.    Neurological: Alert and to self and family.  Tangential and limited historian suggesting confusion/dementia.  Moves all extremities equally.  Speech intelligible and face symmetric  Skin: Skin is pink, warm, and dry.   Psychiatric: Mood and affect normal.   Nursing note and vitals reviewed.             LAB RESULTS  Recent Results (from the past 24 hour(s))   ECG 12 Lead Electrolyte Imbalance    Collection Time: 08/04/24  5:27 PM   Result Value Ref Range    QT Interval 318 ms    QTC Interval 426 ms   Comprehensive Metabolic Panel    Collection Time: 08/04/24  6:14 PM    Specimen: Blood   Result Value Ref Range    Glucose 162 (H) 65 - 99 mg/dL     (H) 8 - 23 mg/dL    Creatinine 17.15 (H) 0.76 - 1.27 mg/dL    Sodium 127 (L) 136 - 145 mmol/L    Potassium 7.0 (C) 3.5 - 5.2 mmol/L    Chloride 87 (L) 98 - 107 mmol/L    CO2 11.0 (L) 22.0 - 29.0 mmol/L    Calcium 8.8 8.6 - 10.5 mg/dL    Total Protein 7.0 6.0 - 8.5 g/dL    Albumin 3.4 (L) 3.5 - 5.2 g/dL    ALT (SGPT) 27 1 - 41 U/L    AST (SGOT) 19 1 - 40 U/L    Alkaline Phosphatase 155 (H) 39 - 117 U/L    Total Bilirubin 0.9 0.0 - 1.2 mg/dL    Globulin 3.6 gm/dL    A/G Ratio 0.9 g/dL    BUN/Creatinine Ratio 11.0 7.0 - 25.0    Anion Gap 29.0 (H) 5.0 - 15.0 mmol/L    eGFR 2.5 (L) >60.0 mL/min/1.73   Protime-INR    Collection Time: 08/04/24  6:14 PM    Specimen: Blood   Result Value Ref Range    Protime 17.0 (H) 11.7 - 14.2 Seconds    INR 1.36 (H) 0.90 - 1.10   aPTT    Collection Time: 08/04/24  6:14 PM    Specimen: Blood   Result Value Ref Range    PTT 28.6 22.7 - 35.4 seconds   Single High Sensitivity Troponin T    Collection Time: 08/04/24  6:14 PM    Specimen: Blood   Result Value Ref Range    HS Troponin T 44  (H) <22 ng/L   Acetaminophen Level    Collection Time: 08/04/24  6:14 PM    Specimen: Blood   Result Value Ref Range    Acetaminophen <5.0 0.0 - 30.0 mcg/mL   Ethanol    Collection Time: 08/04/24  6:14 PM    Specimen: Blood   Result Value Ref Range    Ethanol <10 0 - 10 mg/dL    Ethanol % <0.010 %   Salicylate Level    Collection Time: 08/04/24  6:14 PM    Specimen: Blood   Result Value Ref Range    Salicylate <0.3 <=30.0 mg/dL   CBC Auto Differential    Collection Time: 08/04/24  6:14 PM    Specimen: Blood   Result Value Ref Range    WBC 18.10 (H) 3.40 - 10.80 10*3/mm3    RBC 4.94 4.14 - 5.80 10*6/mm3    Hemoglobin 15.0 13.0 - 17.7 g/dL    Hematocrit 44.2 37.5 - 51.0 %    MCV 89.5 79.0 - 97.0 fL    MCH 30.4 26.6 - 33.0 pg    MCHC 33.9 31.5 - 35.7 g/dL    RDW 11.8 (L) 12.3 - 15.4 %    RDW-SD 38.6 37.0 - 54.0 fl    MPV 9.4 6.0 - 12.0 fL    Platelets 297 140 - 450 10*3/mm3    Neutrophil % 87.4 (H) 42.7 - 76.0 %    Lymphocyte % 2.3 (L) 19.6 - 45.3 %    Monocyte % 9.1 5.0 - 12.0 %    Eosinophil % 0.2 (L) 0.3 - 6.2 %    Basophil % 0.1 0.0 - 1.5 %    Immature Grans % 0.9 (H) 0.0 - 0.5 %    Neutrophils, Absolute 15.84 (H) 1.70 - 7.00 10*3/mm3    Lymphocytes, Absolute 0.41 (L) 0.70 - 3.10 10*3/mm3    Monocytes, Absolute 1.64 (H) 0.10 - 0.90 10*3/mm3    Eosinophils, Absolute 0.03 0.00 - 0.40 10*3/mm3    Basophils, Absolute 0.02 0.00 - 0.20 10*3/mm3    Immature Grans, Absolute 0.16 (H) 0.00 - 0.05 10*3/mm3    nRBC 0.0 0.0 - 0.2 /100 WBC   Beta Hydroxybutyrate Quantitative    Collection Time: 08/04/24  6:14 PM    Specimen: Blood   Result Value Ref Range    Beta-Hydroxybutyrate Quant 1.489 (H) 0.020 - 0.270 mmol/L   Phosphorus    Collection Time: 08/04/24  6:14 PM    Specimen: Blood   Result Value Ref Range    Phosphorus 9.9 (H) 2.5 - 4.5 mg/dL   Magnesium    Collection Time: 08/04/24  6:14 PM    Specimen: Blood   Result Value Ref Range    Magnesium 2.7 (H) 1.6 - 2.4 mg/dL   Hemoglobin A1c    Collection Time: 08/04/24  6:14 PM     Specimen: Blood   Result Value Ref Range    Hemoglobin A1C 7.90 (H) 4.80 - 5.60 %   POC Glucose Once    Collection Time: 08/04/24  6:38 PM    Specimen: Blood   Result Value Ref Range    Glucose 151 (H) 70 - 130 mg/dL   Blood Gas, Venous -    Collection Time: 08/04/24  7:10 PM    Specimen: Venous Blood   Result Value Ref Range    pH, Venous 7.252 (C) 7.310 - 7.410 pH Units    pCO2, Venous 18.0 (L) 41.0 - 51.0 mm Hg    pO2, Venous 53.8 (H) 35.0 - 45.0 mm Hg    HCO3, Venous 7.9 (L) 22.0 - 28.0 mmol/L    Base Excess, Venous -16.5 (L) -2.0 - 2.0 mmol/L    O2 Saturation, Venous 83.5 (H) 45.0 - 75.0 %    CO2 Content 8.5 (L) 23 - 27 mmol/L    Barometric Pressure for Blood Gas 748.2000 mmHg    Modality Room Air     Rate 18 Breaths/minute    Notified Who Dread Melendez M.D     Device Comment drawn by [id number] Sat 97%          RADIOLOGY  No Radiology Exams Resulted Within Past 24 Hours      MEDICATIONS GIVEN IN ER  Medications   sodium chloride 0.9 % flush 10 mL (has no administration in time range)   sodium chloride 0.9 % infusion (125 mL/hr Intravenous New Bag 8/4/24 1815)   sodium chloride 0.9 % bolus 1,000 mL (1,000 mL Intravenous New Bag 8/4/24 1946)   sodium chloride 0.9 % flush 10 mL (has no administration in time range)   sodium chloride 0.9 % flush 10 mL (has no administration in time range)   sodium chloride 0.9 % infusion 40 mL (has no administration in time range)   dextrose (GLUTOSE) oral gel 15 g (has no administration in time range)   dextrose (D50W) (25 g/50 mL) IV injection 10-50 mL (has no administration in time range)   glucagon (GLUCAGEN) injection 1 mg (has no administration in time range)   sodium chloride 0.9 % bolus (has no administration in time range)   sodium chloride 0.9 % infusion (has no administration in time range)   sodium chloride 0.9 % with KCl 20 mEq/L infusion (has no administration in time range)   sodium chloride 0.9 % with KCl 40 mEq/L infusion (has no administration in time range)    dextrose 5 % and sodium chloride 0.9 % infusion (has no administration in time range)   dextrose 5 % and sodium chloride 0.9 % with KCl 20 mEq/L infusion (has no administration in time range)   dextrose 5 % and sodium chloride 0.9 % with KCl 40 mEq/L infusion (has no administration in time range)   sodium chloride 0.45 % infusion (has no administration in time range)   sodium chloride 0.45 % with KCl 20 mEq/L infusion (has no administration in time range)   sodium chloride 0.45 % 1,000 mL with potassium chloride 40 mEq infusion (has no administration in time range)   dextrose 5 % and sodium chloride 0.45 % infusion (has no administration in time range)   dextrose 5 % and sodium chloride 0.45 % with KCl 20 mEq/L infusion (has no administration in time range)   dextrose 5 % and sodium chloride 0.45 % with KCl 40 mEq/L infusion (has no administration in time range)   insulin regular 1 unit/mL in 0.9% sodium chloride (Glucommander) (has no administration in time range)   Potassium Replacement - Follow Nurse / BPA Driven Protocol (has no administration in time range)   Magnesium Standard Dose Replacement - Follow Nurse / BPA Driven Protocol (has no administration in time range)   Phosphorus Replacement - Follow Nurse / BPA Driven Protocol (has no administration in time range)   Calcium Replacement - Follow Nurse / BPA Driven Protocol (has no administration in time range)   calcium gluconate 1000 Mg/50ml 0.675% NaCl IV SOLN (has no administration in time range)   sodium chloride 0.9 % bolus 1,000 mL (has no administration in time range)   sodium bicarbonate injection 8.4% 100 mEq (has no administration in time range)   sodium chloride 0.9 % bolus 1,000 mL (0 mL Intravenous Stopped 8/4/24 1946)         ORDERS PLACED DURING THIS VISIT:  Orders Placed This Encounter   Procedures    Blood Culture - Blood,    Blood Culture - Blood,    CT Head Without Contrast    CT Abdomen Pelvis Without Contrast    XR Chest 1 View     Comprehensive Metabolic Panel    Protime-INR    aPTT    Urinalysis With Microscopic If Indicated (No Culture) - Urine, Clean Catch    Single High Sensitivity Troponin T    Acetaminophen Level    Ethanol    Urine Drug Screen - Urine, Clean Catch    Salicylate Level    CBC Auto Differential    Blood Gas, Venous -    Beta Hydroxybutyrate Quantitative    Phosphorus    Magnesium    Osmolality, Serum    Hemoglobin A1c    Basic Metabolic Panel    Magnesium    Phosphorus    Lactic Acid, Plasma    Procalcitonin    PSA DIAGNOSTIC    NPO Diet NPO Type: Strict NPO    Monitor Blood Pressure    Vital Signs    Strict Intake & Output    Daily Weights    Continuous Pulse Oximetry    Saline Lock & Maintain IV Access    Corrected Serum Sodium = Measured Sodium + [1.6 x ((Glucose - 100)/100)]    Do NOT Discontinue Insulin Infusion Until 2 Hours After First Dose of Basal SQ Insulin    Prior to Initiating Glucommander™, Ensure All Prior Insulin Orders Are Discontinued    Do Not Start Insulin Infusion if Potassium < 3.3    Use a Dedicated Line for Insulin Infusion (If Possible).  May Use a Carrier Fluid of NS at KVO Rate if Insulin Rate is Insufficient to Maintain IV Patency.  Prime IV Line With Insulin Infusion    Glucommander Must Be Discontinued if Insulin Infusion is Discontinued.  If Insulin Infusion is Restarted, Previous Glucommander Settings Must Be Discontinued and Re-Entered From New Order    Once DKA Meets Resolution Criteria - Call Provider for Transition Orders From IV to SQ Insulin    Utilize the Start Meal Feature / Meal Bolus Feature in Glucommander if Patient Starts a Diet or Bolus Tube Feedings    Notify Provider - Insulin Infusion    RN to Release PRN POC Glucose Orders Per Glucommander    RN to Order STAT Glucose For Any POC Glucose <10 or >600    If Insulin Infusion is Paused - Follow Glucommander Instructions    DKA / HHS Patients - Phosphorus of 1 mg/dL or Higher Does NOT Require Replacement (While Insulin Infusing)     Insert Indwelling Urinary Catheter    Assess Need for Indwelling Urinary Catheter - Follow Removal Protocol    Urinary Catheter Care    Inpatient Diabetes Educator Consult    Pulmonology (on-call MD unless specified)    Nephrology (on -call MD unless specified)    Patient is on Glucommander    Oxygen Therapy- Nasal Cannula; Titrate 1-6 LPM Per SpO2; 90 - 95%    POC Glucose STAT    POC Glucose Once    POC Glucose PRN    ECG 12 Lead Electrolyte Imbalance    Insert Peripheral IV    Insert Peripheral IV x2    Inpatient Admission    CBC & Differential    Ketone Bodies, Serum (Not performed at [facility])         OUTPATIENT MEDICATION MANAGEMENT:  Current Facility-Administered Medications Ordered in Epic   Medication Dose Route Frequency Provider Last Rate Last Admin    calcium gluconate 1000 Mg/50ml 0.675% NaCl IV SOLN  1,000 mg Intravenous Once Dread Melendez MD        Calcium Replacement - Follow Nurse / BPA Driven Protocol   Does not apply PRN Dread Melendez MD        dextrose (D50W) (25 g/50 mL) IV injection 10-50 mL  10-50 mL Intravenous Q15 Min Dread Blankenship MD        dextrose (GLUTOSE) oral gel 15 g  15 g Oral Q15 Min PRDread Cordon MD        dextrose 5 % and sodium chloride 0.45 % infusion  150 mL/hr Intravenous Continuous PRDread Cordon MD        dextrose 5 % and sodium chloride 0.45 % with KCl 20 mEq/L infusion  150 mL/hr Intravenous Continuous Dread Blankenship MD        dextrose 5 % and sodium chloride 0.45 % with KCl 40 mEq/L infusion  150 mL/hr Intravenous Continuous PRDread Cordon MD        dextrose 5 % and sodium chloride 0.9 % infusion  150 mL/hr Intravenous Continuous PRDread Cordon MD        dextrose 5 % and sodium chloride 0.9 % with KCl 20 mEq/L infusion  150 mL/hr Intravenous Continuous Dread Blankenship MD        dextrose 5 % and sodium chloride 0.9 % with KCl 40 mEq/L infusion  150 mL/hr Intravenous Continuous PRDread Cordon MD        glucagon (GLUCAGEN) injection 1 mg   1 mg Intramuscular Q15 Min PRDread Cordon MD        insulin regular 1 unit/mL in 0.9% sodium chloride (Glucommander)  0-100 Units/hr Intravenous Titrated Dread Melendez MD        Magnesium Standard Dose Replacement - Follow Nurse / BPA Driven Protocol   Does not apply Dread Blankenship MD        Phosphorus Replacement - Follow Nurse / BPA Driven Protocol   Does not apply Dread Blankenship MD        Potassium Replacement - Follow Nurse / BPA Driven Protocol   Does not apply Dread Blankenship MD        sodium bicarbonate injection 8.4% 100 mEq  100 mEq Intravenous Once Dread Melendez MD        sodium chloride 0.45 % 1,000 mL with potassium chloride 40 mEq infusion  250 mL/hr Intravenous Continuous PRN Dread Melendez MD        sodium chloride 0.45 % infusion  250 mL/hr Intravenous Continuous PRN Dread Melendez MD        sodium chloride 0.45 % with KCl 20 mEq/L infusion  250 mL/hr Intravenous Continuous PRN Dread Melendez MD        sodium chloride 0.9 % bolus 1,000 mL  1,000 mL Intravenous Once Dread Melendez MD 2,000 mL/hr at 08/04/24 1946 1,000 mL at 08/04/24 1946    sodium chloride 0.9 % bolus 1,000 mL  1,000 mL Intravenous Once Dread Melendez MD        sodium chloride 0.9 % bolus  1,000 mL/hr Intravenous Continuous Dread Melendez MD        sodium chloride 0.9 % flush 10 mL  10 mL Intravenous PRN Dread Melendez MD        sodium chloride 0.9 % flush 10 mL  10 mL Intravenous Q12H Dread Melendez MD        sodium chloride 0.9 % flush 10 mL  10 mL Intravenous PRN Dread Melendez MD        sodium chloride 0.9 % infusion 40 mL  40 mL Intravenous PRN Dread Melendez MD        sodium chloride 0.9 % infusion  125 mL/hr Intravenous Continuous Dread Melendez  mL/hr at 08/04/24 1815 125 mL/hr at 08/04/24 1815    sodium chloride 0.9 % infusion  250 mL/hr Intravenous Continuous PRN Dread Melendez MD        sodium chloride 0.9 % with KCl 20 mEq/L infusion  250 mL/hr Intravenous Continuous PRDread Cordon MD         sodium chloride 0.9 % with KCl 40 mEq/L infusion  250 mL/hr Intravenous Continuous PRN Dread Melendez MD         Current Outpatient Medications Ordered in Epic   Medication Sig Dispense Refill    aspirin 81 MG EC tablet Take 1 tablet by mouth Daily.      atorvastatin (LIPITOR) 10 MG tablet Take 1 tablet by mouth Daily.      Dapagliflozin Propanediol (FARXIGA PO) Take  by mouth Daily.      donepezil (ARICEPT) 10 MG tablet Take 1 tablet by mouth Every Night.      memantine (NAMENDA) 10 MG tablet Take 1 tablet by mouth Daily.      metFORMIN (GLUCOPHAGE) 1000 MG tablet Take 1 tablet by mouth 2 (Two) Times a Day With Meals. 1.5 tablets in AM; 1 tablet in PM      Semaglutide,0.25 or 0.5MG/DOS, (OZEMPIC) 2 MG/1.5ML solution pen-injector Inject 0.25 mg under the skin into the appropriate area as directed 1 (One) Time Per Week.      silodosin (RAPAFLO) 8 MG capsule capsule Take 1 capsule by mouth Daily. With a meal           PROCEDURES  Procedures      Total critical care time: Approximately 65 minutes    Due to a high probability of clinically significant, life threatening deterioration, the patient required my highest level of preparedness to intervene emergently and I personally spent this critical care time directly and personally managing the patient. This critical care time included obtaining a history; examining the patient; vital sign monitoring; ordering and review of studies; arranging urgent treatment with development of a management plan; evaluation of patient's response to treatment; frequent reassessment; and, discussions with other providers.    This critical care time was performed to assess and manage the high probability of imminent, life-threatening deterioration that could result in multi-organ failure. It was exclusive of separately billable procedures and treating other patients and teaching time.    Please see MDM section and the rest of the note for further information on patient assessment and  treatment.        PROGRESS, DATA ANALYSIS, CONSULTS, AND MEDICAL DECISION MAKING  All labs have been independently interpreted by me.  All radiology studies have been reviewed by me. All EKG's have been independently viewed and interpreted by me.  Discussion below represents my analysis of pertinent findings related to patient's condition, differential diagnosis, treatment plan and final disposition.    Differential diagnosis:   My differential diagnosis includes but is not limited to generalized weakness, electrolyte abnormality, CVA, TIA, Bell's palsy, acute MI, GI bleed, urinary tract infection, systemic infections including sepsis, alcohol abuse, drug abuse including prescription and street drug.      Clinical Scores:                  ED Course as of 08/04/24 1950   Sun Aug 04, 2024   1729 EKG           EKG time: 1727  Rhythm/Rate: Sinus tachycardia, 105-110  P waves and CT: GABINO within normal limits  QRS, axis: Narrow complex  ST and T waves: No STEMI; QTc within normal limits    Interpreted Contemporaneously by me, independently viewed  Comparison: None available   [RS]   1857 Beta-Hydroxybutyrate Quant(!): 1.489  High-level suspicion for DKA.  Awaiting chemistries and VBG. [RS]   1858 HS Troponin T(!): 44 [RS]   1858 Ethanol: <10 [RS]   1858 WBC(!): 18.10 [RS]   1858 Hemoglobin: 15.0 [RS]   1858 Immature Grans, Absolute(!): 0.16 [RS]   1912 BUN(!): 189 [RS]   1912 Creatinine(!): 17.15 [RS]   1912 Potassium(!!): 7.0  Initiate therapy [RS]   1912 Anion Gap(!): 29.0 [RS]   1917 pH, Venous(!!): 7.252  Confirms acidosis.  Copious IV fluids infusing.  Initiate insulin [RS]   1928 CONSULT        Provider: Dr. Armstrong -clinical pharmacist    Discussion: Discussed patient's critical nature and significant laboratory abnormalities as well as need to initiate insulin drip.  Agreeable with plan for calcium.    Agreeable c treatment and planned disposition.         [RS]   1938 CONSULT        Provider: Dr. Salas -  Nephro    Discussion: Reviewed patient history, ED presentation evaluation.  He agrees with plan for insulin drip in ICU.  He request to boluses of bicarb and he will see the patient in consultation.    Agreeable c treatment and planned disposition.         [RS]   1941 CONSULT        Provider: Dr. GRISELDA Delcid-ICU    Discussion: Patient history, ED presentation evaluation as well as consultation with nephrology and my concerns.  Agreeable to accept patient to the ICU.    Agreeable c treatment and planned disposition.         [RS]   1941 RADIOLOGY      Study: Noncontrast CT abdomen pelvis  Findings: Massively distended bladder with very large prostate and bilateral hydronephrosis and perinephric stranding concerning for urinary outlet obstruction.  I independently viewed and interpreted these images contemporaneously with treatment.    [RS]   1941 Rodriguez catheter requested. [RS]      ED Course User Index  [RS] Dread Melendez MD         Prescription drug monitoring program review:     AS OF 19:50 EDT VITALS:    BP - 171/97  HR - 98  TEMP - 98.6 °F (37 °C) (Oral)  O2 SATS - 98%    COMPLEXITY OF CARE  The patient requires admission.      DIAGNOSIS  Final diagnoses:   Diabetic ketoacidosis without coma associated with other specified diabetes mellitus   Altered mental status, unspecified altered mental status type   Severe dehydration   Acute kidney injury (NEHEMIAS) with acute tubular necrosis (ATN)   Hyperkalemia         DISPOSITION  ED Disposition       ED Disposition   Decision to Admit    Condition   --    Comment   Level of Care: Critical Care [6]   Diagnosis: DKA (diabetic ketoacidosis) [892005]   Admitting Physician: DIONICIO DELCID [[id number]]   Certification: I Certify That Inpatient Hospital Services Are Medically Necessary For Greater Than 2 Midnights                    ADMISSION    Discussed treatment plan and reason for admission with pt/family and admitting physician.  Pt/family voiced understanding of the plan  for admission for further testing/treatment as needed.       Please note that portions of this document were completed with a voice recognition program.    Note Disclaimer: At [facility], we believe that sharing information builds trust and better relationships. You are receiving this note because you recently visited [facility]. It is possible you will see health information before a provider has talked with you about it. This kind of information can be easy to misunderstand. To help you fully understand what it means for your health, we urge you to discuss this note with your provider.         Dread Melendez MD  08/04/24 1950      Electronically signed by Dread Melendez MD at 08/04/24 1950       Vital Signs (last day)       Date/Time Temp Temp src Pulse Resp BP Patient Position SpO2    08/05/24 1629 98 (36.7) Oral 79 -- 161/80 -- 97    08/05/24 1600 -- -- 82 -- 160/70 -- 97    08/05/24 1500 -- -- 78 -- 142/67 -- 96    08/05/24 1430 -- -- 80 -- 143/68 -- 95    08/05/24 1415 -- -- 90 -- -- -- 98    08/05/24 1400 -- -- 92 -- 92/70 -- --    08/05/24 1345 -- -- 91 -- -- -- --    08/05/24 1330 -- -- 95 -- 154/118 -- 99    08/05/24 1315 -- -- 97 -- -- -- 94    08/05/24 1300 -- -- 90 -- 185/58 -- 96    08/05/24 1245 -- -- 93 -- -- -- 97    08/05/24 1230 -- -- 91 -- 196/86 -- 96    08/05/24 1215 -- -- 90 -- -- -- 97    08/05/24 1200 -- -- 82 -- -- -- 97    08/05/24 1129 98.2 (36.8) Oral -- -- -- -- --    08/05/24 1100 -- -- 84 -- 183/137 -- 97    08/05/24 1000 -- -- 121 -- -- -- 96    08/05/24 0900 -- -- 94 -- 136/85 -- 98    08/05/24 0800 -- -- 84 -- 157/81 -- 98    08/05/24 0722 97.7 (36.5) Oral -- -- -- -- --    08/05/24 0600 -- -- 87 -- 167/74 -- 96    08/05/24 0500 97.9 (36.6) Oral 89 -- 144/86 -- 96    08/05/24 0400 -- -- 87 -- 139/85 -- 96    08/05/24 0300 -- -- 85 -- 143/85 -- 95    08/05/24 0200 -- -- 90 -- 138/69 -- 93    08/05/24 0100 -- -- 81 -- 139/81 -- 96    08/05/24 0000 -- -- 89 -- 150/79 -- 96     08/04/24 2300 -- -- 87 -- 146/74 -- 96    08/04/24 2200 98.3 (36.8) Oral 97 -- 181/86 -- 98    08/04/24 2045 -- -- 100 16 189/91 -- 99    08/04/24 2030 -- -- 93 16 190/76 -- 100    08/04/24 2015 -- -- 99 16 165/87 -- 99    08/04/24 2000 -- -- 102 16 159/90 -- 99    08/04/24 1901 -- -- 98 -- 171/97 -- 98    08/04/24 1830 -- -- 99 -- 175/99 -- 97    08/04/24 1653 98.6 (37) Oral 110 20 123/75 -- 95          Oxygen Therapy (last day)       Date/Time SpO2 Device (Oxygen Therapy) Flow (L/min) Oxygen Concentration (%) ETCO2 (mmHg)    08/05/24 1629 97 -- -- -- --    08/05/24 1600 97 -- -- -- --    08/05/24 1500 96 -- -- -- --    08/05/24 1430 95 -- -- -- --    08/05/24 1415 98 -- -- -- --    08/05/24 1330 99 -- -- -- --    08/05/24 1315 94 -- -- -- --    08/05/24 1300 96 -- -- -- --    08/05/24 1245 97 -- -- -- --    08/05/24 1230 96 -- -- -- --    08/05/24 1215 97 -- -- -- --    08/05/24 1200 97 room air -- -- --    08/05/24 1100 97 -- -- -- --    08/05/24 1000 96 -- -- -- --    08/05/24 0900 98 -- -- -- --    08/05/24 0800 98 room air -- -- --    08/05/24 0600 96 -- -- -- --    08/05/24 0500 96 -- -- -- --    08/05/24 0400 96 -- -- -- --    08/05/24 0300 95 -- -- -- --    08/05/24 0200 93 -- -- -- --    08/05/24 0100 96 -- -- -- --    08/05/24 0000 96 -- -- -- --    08/04/24 2300 96 -- -- -- --    08/04/24 2200 98 room air -- -- --    08/04/24 2045 99 room air -- -- --    08/04/24 2030 100 room air -- -- --    08/04/24 2015 99 -- -- -- --    08/04/24 2000 99 room air -- -- --    08/04/24 1901 98 -- -- -- --    08/04/24 1830 97 -- -- -- --    08/04/24 1653 95 room air -- -- --          Lines, Drains & Airways       Active LDAs       Name Placement date Placement time Site Days    Peripheral IV 08/04/24 1814 Left Antecubital 08/04/24 1814  Antecubital  less than 1    Peripheral IV 08/04/24 2000 Right;Upper;Medial Arm 08/04/24 2000  Arm  less than 1    Peripheral IV 08/04/24 2028 Left;Upper;Medial Arm 08/04/24 2028  Arm   less than 1    Urethral Catheter Latex;Coude 16 Fr. 08/04/24 2001  -- less than 1              Inactive LDAs       None                  Facility-Administered Medications as of 8/5/2024   Medication Dose Route Frequency Provider Last Rate Last Admin    acetaminophen (TYLENOL) tablet 650 mg  650 mg Oral Q4H PRN Mary Brunner APRN   650 mg at 08/05/24 1107    Or    acetaminophen (TYLENOL) suppository 650 mg  650 mg Rectal Q4H PRN Mary Brunner APRN        sennosides-docusate (PERICOLACE) 8.6-50 MG per tablet 2 tablet  2 tablet Oral BID Mary Brunner APRN   2 tablet at 08/05/24 0832    And    polyethylene glycol (MIRALAX) packet 17 g  17 g Oral Daily PRN Mary Brunner APRN        And    bisacodyl (DULCOLAX) EC tablet 5 mg  5 mg Oral Daily PRN Mary Brunner APRN        And    bisacodyl (DULCOLAX) suppository 10 mg  10 mg Rectal Daily PRN Mary Brunner APRN        [COMPLETED] calcium gluconate 1000 Mg/50ml 0.675% NaCl IV SOLN  1,000 mg Intravenous Once Dread Melendez MD   Stopped at 08/04/24 2027    cefTRIAXone (ROCEPHIN) 2,000 mg in sodium chloride 0.9 % 100 mL MBP  2,000 mg Intravenous Q24H Tio Delcid  mL/hr at 08/04/24 2345 2,000 mg at 08/04/24 2345    dextrose (D50W) (25 g/50 mL) IV injection 10-50 mL  10-50 mL Intravenous Q15 Min PRN Mary Brunner APRN        dextrose (GLUTOSE) oral gel 15 g  15 g Oral Q15 Min PRN Mary Brunner APRN        dextrose 5 % and sodium chloride 0.45 % infusion  125 mL/hr Intravenous Continuous Gigi Engel  mL/hr at 08/05/24 0803 125 mL/hr at 08/05/24 0803    donepezil (ARICEPT) tablet 10 mg  10 mg Oral Nightly Mary Brunner APRN        glucagon (GLUCAGEN) injection 1 mg  1 mg Intramuscular Q15 Min PRN Kannan, Mary J, APRN        insulin regular 1 unit/mL in 0.9% sodium chloride (Glucommander)  0-100 Units/hr Intravenous Titrated Mary Brunner, HAILY 2 mL/hr at 08/05/24 1609 2 Units/hr at 08/05/24 1609    memantine  (NAMENDA) tablet 10 mg  10 mg Oral Daily Mary Brunner APRN   10 mg at 08/05/24 0832    nitroglycerin (NITROSTAT) SL tablet 0.4 mg  0.4 mg Sublingual Q5 Min PRN Mary Brunner APRN        ondansetron (ZOFRAN) injection 4 mg  4 mg Intravenous Q6H PRN Mary Brunner APRN        Potassium Replacement - Follow Nurse / BPA Driven Protocol   Does not apply PRN Mary Brunner APRN        [COMPLETED] sodium bicarbonate injection 8.4% 100 mEq  100 mEq Intravenous Once Dread Melendez MD   100 mEq at 08/04/24 1951    sodium bicarbonate tablet 650 mg  650 mg Oral TID Gigi Engel MD   650 mg at 08/05/24 1602    [COMPLETED] sodium chloride 0.9 % bolus 1,000 mL  1,000 mL Intravenous Once Dread Melendez MD   Stopped at 08/04/24 1946    [COMPLETED] sodium chloride 0.9 % bolus 1,000 mL  1,000 mL Intravenous Once Dread Melendez MD   Stopped at 08/04/24 2034    [COMPLETED] sodium chloride 0.9 % bolus 1,000 mL  1,000 mL Intravenous Once Dread Melendez MD 2,000 mL/hr at 08/04/24 2005 1,000 mL at 08/04/24 2005    sodium chloride 0.9 % flush 10 mL  10 mL Intravenous PRN Dread Melendez MD        sodium chloride 0.9 % flush 10 mL  10 mL Intravenous Q12H Mary Brunner APRN   10 mL at 08/05/24 1028    sodium chloride 0.9 % flush 10 mL  10 mL Intravenous PRN Mary Brunner APRN        sodium chloride 0.9 % flush 10 mL  10 mL Intravenous Q12H Mary Brunner APRN   10 mL at 08/05/24 1028    sodium chloride 0.9 % flush 10 mL  10 mL Intravenous PRN Mary Brunner APRN        sodium chloride 0.9 % infusion 40 mL  40 mL Intravenous PRN Mary Brunner APRN        sodium chloride 0.9 % infusion 40 mL  40 mL Intravenous PRN Mary Brunner APRN        tamsulosin (FLOMAX) 24 hr capsule 0.4 mg  0.4 mg Oral Daily Duane Hubbard MD   0.4 mg at 08/05/24 1601     Orders (last 24 hrs)        Start     Ordered    08/05/24 1610  POC Glucose Once  PROCEDURE ONCE        Comments: Complete no more than 45 minutes prior to  patient eating      08/05/24 1607    08/05/24 1403  POC Glucose Once  PROCEDURE ONCE        Comments: Complete no more than 45 minutes prior to patient eating      08/05/24 1400    08/05/24 1330  tamsulosin (FLOMAX) 24 hr capsule 0.4 mg  Daily         08/05/24 1227    08/05/24 1307  POC Glucose Once  PROCEDURE ONCE        Comments: Complete no more than 45 minutes prior to patient eating      08/05/24 1305    08/05/24 1213  Bed Rest  Until Discontinued         08/05/24 1212    08/05/24 1145  POC Glucose Once  PROCEDURE ONCE        Comments: Complete no more than 45 minutes prior to patient eating      08/05/24 1143    08/05/24 1124  POC Glucose Once  PROCEDURE ONCE        Comments: Complete no more than 45 minutes prior to patient eating      08/05/24 1118    08/05/24 1044  PT Consult: Eval & Treat Functional Mobility Below Baseline  Once         08/05/24 1043    08/05/24 1044  OT Consult: Eval & Treat ADL Performance Below Baseline  Once         08/05/24 1043    08/05/24 1015  POC Glucose Once  PROCEDURE ONCE        Comments: Complete no more than 45 minutes prior to patient eating      08/05/24 1011    08/05/24 0910  POC Glucose Once  PROCEDURE ONCE        Comments: Complete no more than 45 minutes prior to patient eating      08/05/24 0908    08/05/24 0900  memantine (NAMENDA) tablet 10 mg  Daily         08/04/24 2050    08/05/24 0900  sodium bicarbonate tablet 650 mg  3 Times Daily         08/05/24 0725    08/05/24 0815  dextrose 5 % and sodium chloride 0.45 % infusion  Continuous         08/05/24 0725    08/05/24 0806  POC Glucose Once  PROCEDURE ONCE        Comments: Complete no more than 45 minutes prior to patient eating      08/05/24 0804    08/05/24 0719  Please switch IVF back to DKA protocol  Nursing Communication  Once        Comments: Please switch IVF back to DKA protocol    08/05/24 0719    08/05/24 0702  Inpatient Urology Consult  IN         Specialty:  Urology  Provider:  Duane Hubbard,  MD    08/04/24 2028 08/05/24 0659  POC Glucose Once  PROCEDURE ONCE        Comments: Complete no more than 45 minutes prior to patient eating      08/05/24 0657    08/05/24 0600  Daily Weights  Daily,   Status:  Canceled       08/04/24 1918    08/05/24 0600  Basic Metabolic Panel  Daily       08/04/24 2028 08/05/24 0600  CBC & Differential  Daily       08/04/24 2028 08/05/24 0600  Magnesium  Daily       08/04/24 2028 08/05/24 0600  CBC Auto Differential  PROCEDURE ONCE         08/04/24 2202 08/05/24 0502  POC Glucose Once  PROCEDURE ONCE        Comments: Complete no more than 45 minutes prior to patient eating      08/05/24 0500    08/05/24 0402  POC Glucose Once  PROCEDURE ONCE        Comments: Complete no more than 45 minutes prior to patient eating      08/05/24 0356    08/05/24 0241  POC Glucose Once  PROCEDURE ONCE        Comments: Complete no more than 45 minutes prior to patient eating      08/05/24 0238    08/05/24 0146  POC Glucose Once  PROCEDURE ONCE        Comments: Complete no more than 45 minutes prior to patient eating      08/05/24 0143    08/05/24 0041  POC Glucose Once  PROCEDURE ONCE        Comments: Complete no more than 45 minutes prior to patient eating      08/05/24 0037    08/05/24 0000  Basic Metabolic Panel  Every 4 Hours       08/04/24 2054    08/04/24 2337  POC Glucose Once  PROCEDURE ONCE        Comments: Complete no more than 45 minutes prior to patient eating      08/04/24 2329 08/04/24 2314  Urine Culture - Urine, Urine, Clean Catch  Once         08/04/24 2313    08/04/24 2245  cefTRIAXone (ROCEPHIN) 2,000 mg in sodium chloride 0.9 % 100 mL MBP  Every 24 Hours         08/04/24 2155 08/04/24 2214  POC Glucose Once  PROCEDURE ONCE        Comments: Complete no more than 45 minutes prior to patient eating      08/04/24 2212 08/04/24 2156  Urinalysis With Culture If Indicated - Urine, Clean Catch  Once,   Status:  Canceled         08/04/24 2155 08/04/24 2110   "sodium chloride 0.9 % flush 10 mL  Every 12 Hours Scheduled         08/04/24 2054 08/04/24 2110  insulin regular 1 unit/mL in 0.9% sodium chloride (Glucommander)  Titrated        Note to Pharmacy: Upon initiation of Glucommander insulin drip, make sure all other insulin orders and anti-diabetic medications have been discontinued.    08/04/24 2054 08/04/24 2106  donepezil (ARICEPT) tablet 10 mg  Nightly         08/04/24 2050 08/04/24 2100  sodium chloride 0.9 % flush 10 mL  Every 12 Hours Scheduled,   Status:  Discontinued         08/04/24 1918 08/04/24 2100  Intake & Output  Every Hour       08/04/24 2028 08/04/24 2100  sodium chloride 0.9 % flush 10 mL  Every 12 Hours Scheduled         08/04/24 2028 08/04/24 2100  sennosides-docusate (PERICOLACE) 8.6-50 MG per tablet 2 tablet  2 Times Daily        Placed in \"And\" Linked Group    08/04/24 2028 08/04/24 2058  POC Glucose Once  PROCEDURE ONCE        Comments: Complete no more than 45 minutes prior to patient eating      08/04/24 2057 08/04/24 2056  sodium bicarbonate 150 mEq/1000 mL D5W infusion  Continuous,   Status:  Discontinued        Note to Pharmacy: D5W with 3 amps(150 meq/L) of sodium bicarbonate at 125 cc an hour    08/04/24 2040 08/04/24 2054  Vital Signs  Per Hospital Policy         08/04/24 2054 08/04/24 2054  Insert Peripheral IV x2  Once         08/04/24 2054 08/04/24 2054  Saline Lock & Maintain IV Access  Continuous         08/04/24 2054 08/04/24 2054  Prior to Initiating Glucommander™, Ensure All Prior Insulin Orders Are Discontinued  Once         08/04/24 2054 08/04/24 2054  Do Not Start Insulin Infusion if Potassium < 3.3  Per Order Details         08/04/24 2054 08/04/24 2054  Use a Dedicated Line for Insulin Infusion (If Possible).  May Use a Carrier Fluid of NS at KVO Rate if Insulin Rate is Insufficient to Maintain IV Patency.  Prime IV Line With Insulin Infusion  Continuous         08/04/24 2054    " 08/04/24 2054  Glucommander Must Be Discontinued if Insulin Infusion is Discontinued.  If Insulin Infusion is Restarted, Previous Glucommander Settings Must Be Discontinued and Re-Entered From New Order  Per Order Details         08/04/24 2054 08/04/24 2054  Patient is on Glucommander  Continuous         08/04/24 2054 08/04/24 2054  Utilize the Start Meal Feature / Meal Bolus Feature in Glucommander if Patient Starts a Diet or Bolus Tube Feedings  Until Discontinued         08/04/24 2054 08/04/24 2054  Notify Provider - Insulin Infusion  Continuous        Comments: Open Order Report to View Parameters Requiring Provider Notification    08/04/24 2054 08/04/24 2054  RN to Release PRN POC Glucose Orders Per Glucommander  Continuous        Comments: Glucommander Recommended POC Glucose Testing Will Vary Between Every 15 Minutes & Every 2 Hours   Release PRN POC Glucose Orders as Needed    08/04/24 2054 08/04/24 2054  RN to Order STAT Glucose For Any POC Glucose <10 or >600  Continuous        Comments: Do Not Delay Treatment of Unstable Patient to Obtain Glucose Sample  Inform Provider of Results    08/04/24 2054 08/04/24 2053  Potassium Replacement - Follow Nurse / BPA Driven Protocol  As Needed         08/04/24 2054 08/04/24 2053  sodium chloride 0.9 % flush 10 mL  As Needed         08/04/24 2054 08/04/24 2053  sodium chloride 0.9 % infusion 40 mL  As Needed         08/04/24 2054 08/04/24 2053  dextrose (GLUTOSE) oral gel 15 g  Every 15 Minutes PRN         08/04/24 2054 08/04/24 2053  dextrose (D50W) (25 g/50 mL) IV injection 10-50 mL  Every 15 Minutes PRN         08/04/24 2054 08/04/24 2053  glucagon (GLUCAGEN) injection 1 mg  Every 15 Minutes PRN         08/04/24 2054 08/04/24 2034  POC Glucose Once  PROCEDURE ONCE        Comments: Complete no more than 45 minutes prior to patient eating      08/04/24 2032 08/04/24 2029  Daily Weights  Daily       08/04/24 2028 08/04/24 2028   "ondansetron (ZOFRAN) injection 4 mg  Every 6 Hours PRN         08/04/24 2028 08/04/24 2028  acetaminophen (TYLENOL) tablet 650 mg  Every 4 Hours PRN        Placed in \"Or\" Linked Group    08/04/24 2028 08/04/24 2028  acetaminophen (TYLENOL) suppository 650 mg  Every 4 Hours PRN        Placed in \"Or\" Linked Group    08/04/24 2028 08/04/24 2027  Diet: Liquid; Clear Liquid; Fluid Consistency: Thin (IDDSI 0)  Diet Effective Now         08/04/24 2028 08/04/24 2026  Continuous Cardiac Monitoring  Continuous        Comments: Follow Standing Orders As Outlined in Process Instructions (Open Order Report to View Full Instructions)    08/04/24 2028 08/04/24 2026  Maintain IV Access  Continuous,   Status:  Canceled         08/04/24 2028 08/04/24 2026  Telemetry - Place Orders & Notify Provider of Results When Patient Experiences Acute Chest Pain, Dysrhythmia or Respiratory Distress  Continuous        Comments: Open Order Report to View Parameters Requiring Provider Notification    08/04/24 2028 08/04/24 2026  Continuous Pulse Oximetry  Continuous         08/04/24 2028 08/04/24 2026  Height & Weight  Once         08/04/24 2028 08/04/24 2026  Oral Care - Patient Not on NPPV & Not Intubated  Every Shift       08/04/24 2028 08/04/24 2026  Target Arousal Level RASS 0 to -2  Continuous         08/04/24 2028 08/04/24 2026  Use Mobility Guidelines for Advancement of Activity  Continuous         08/04/24 2028 08/04/24 2026  Insert Peripheral IV  Once         08/04/24 2028 08/04/24 2026  Saline Lock & Maintain IV Access  Continuous,   Status:  Canceled         08/04/24 2028 08/04/24 2026  Code Status and Medical Interventions: CPR (Attempt to Resuscitate); Full Support  Continuous         08/04/24 2028 08/04/24 2026  Place Sequential Compression Device  Once         08/04/24 2028 08/04/24 2026  Maintain Sequential Compression Device  Continuous         08/04/24 2028 08/04/24 2026  If " "Patient has BG Less Than 80 & is Symptomatic But Not on IV Insulin Protocol - Use Adult Hypoglycemia Treatment Orders  Continuous         08/04/24 2028 08/04/24 2026  Maintain IV Access  Continuous,   Status:  Canceled         08/04/24 2028 08/04/24 2026  ICU / CCU - Place Order Consult Intensivist For Critical Care Management (If Patient Not Admitted to Cardiology for Primary Cardiology Condition)  Continuous         08/04/24 2028 08/04/24 2026  ICU / CCU - Notify All Physicians When Patient is Transferred  Continuous        Comments: Open Order Report to View Parameters Requiring Provider Notification    08/04/24 2028 08/04/24 2025  polyethylene glycol (MIRALAX) packet 17 g  Daily PRN        Placed in \"And\" Linked Group    08/04/24 2028 08/04/24 2025  bisacodyl (DULCOLAX) EC tablet 5 mg  Daily PRN        Placed in \"And\" Linked Group    08/04/24 2028 08/04/24 2025  bisacodyl (DULCOLAX) suppository 10 mg  Daily PRN        Placed in \"And\" Linked Group    08/04/24 2028 08/04/24 2025  nitroglycerin (NITROSTAT) SL tablet 0.4 mg  Every 5 Minutes PRN         08/04/24 2028 08/04/24 2025  sodium chloride 0.9 % flush 10 mL  As Needed         08/04/24 2028 08/04/24 2025  sodium chloride 0.9 % infusion 40 mL  As Needed         08/04/24 2028 08/04/24 2009  Urinalysis, Microscopic Only - Urine, Clean Catch  Once         08/04/24 2008 08/04/24 2000  Vital Signs  Every Hour,   Status:  Canceled       08/04/24 1918 08/04/24 2000  Strict Intake & Output  Every Hour,   Status:  Canceled      Comments: While on Insulin Infusion    08/04/24 1918 08/04/24 2000  Basic Metabolic Panel  Every 4 Hours,   Status:  Canceled       08/04/24 1918 08/04/24 2000  Magnesium  Every 4 Hours,   Status:  Canceled       08/04/24 1918 08/04/24 2000  Phosphorus  Every 4 Hours,   Status:  Canceled       08/04/24 1918 08/04/24 1959  POC Glucose Once  PROCEDURE ONCE        Comments: Complete no more than 45 " "minutes prior to patient eating      08/04/24 1957 08/04/24 1958  sodium bicarbonate injection 8.4% 100 mEq  Once         08/1943 08/04/24 1944  calcium gluconate 1000 Mg/50ml 0.675% NaCl IV SOLN  Once         08/04/24 1928 08/04/24 1944  sodium chloride 0.9 % bolus 1,000 mL  Once         08/04/24 1928 08/04/24 1944  PSA DIAGNOSTIC  Once         08/1943    08/04/24 1944  Inpatient Admission  Once         08/1943    08/04/24 1938  Insert Indwelling Urinary Catheter  Once        Comments: Follow Protocol As Outlined in Process Instructions (Open Order Report to View Full Instructions)   Placed in \"And\" Linked Group    08/04/24 1937 08/04/24 1938  Assess Need for Indwelling Urinary Catheter - Follow Removal Protocol  Continuous        Comments: Follow Protocol As Outlined in Process Instructions (Open Order Report to View Full Instructions)   Placed in \"And\" Linked Group    08/04/24 1937 08/04/24 1938  Urinary Catheter Care  Every Shift      Placed in \"And\" Linked Group    08/04/24 1937 08/04/24 1934  sodium chloride 0.9 % bolus  Continuous,   Status:  Discontinued         08/04/24 1918 08/04/24 1934  insulin regular 1 unit/mL in 0.9% sodium chloride (Glucommander)  Titrated,   Status:  Discontinued        Note to Pharmacy: Upon initiation of Glucommander insulin drip, make sure all other insulin orders and anti-diabetic medications have been discontinued.    08/04/24 1918 08/04/24 1931  Blood Culture - Blood,  Once         08/04/24 1930 08/04/24 1931  Blood Culture - Blood, Arm, Left  Once         08/04/24 1930 08/04/24 1931  Lactic Acid, Plasma  Once         08/04/24 1930 08/04/24 1931  Procalcitonin  Once         08/04/24 1930 08/04/24 1919  Pulmonology (on-call MD unless specified)  Once        Specialty:  Pulmonary Disease  Provider:  (Not yet assigned)    08/04/24 1918 08/04/24 1919  Nephrology (on -call MD unless specified)  Once        Specialty:  " Nephrology  Provider:  (Not yet assigned)    08/04/24 1918 08/04/24 1918  Continuous Pulse Oximetry  Continuous,   Status:  Canceled         08/04/24 1918 08/04/24 1918  Oxygen Therapy- Nasal Cannula; Titrate 1-6 LPM Per SpO2; 90 - 95%  Continuous,   Status:  Canceled         08/04/24 1918 08/04/24 1918  Insert Peripheral IV x2  Once,   Status:  Canceled         08/04/24 1918 08/04/24 1918  Saline Lock & Maintain IV Access  Continuous,   Status:  Canceled         08/04/24 1918 08/04/24 1918  Corrected Serum Sodium = Measured Sodium + [1.6 x ((Glucose - 100)/100)]  Continuous,   Status:  Canceled         08/04/24 1918 08/04/24 1918  Do NOT Discontinue Insulin Infusion Until 2 Hours After First Dose of Basal SQ Insulin  Continuous,   Status:  Canceled         08/04/24 1918 08/04/24 1918  Prior to Initiating Glucommander™, Ensure All Prior Insulin Orders Are Discontinued  Once,   Status:  Canceled         08/04/24 1918 08/04/24 1918  Do Not Start Insulin Infusion if Potassium < 3.3  Per Order Details,   Status:  Canceled         08/04/24 1918 08/04/24 1918  Use a Dedicated Line for Insulin Infusion (If Possible).  May Use a Carrier Fluid of NS at KVO Rate if Insulin Rate is Insufficient to Maintain IV Patency.  Prime IV Line With Insulin Infusion  Continuous,   Status:  Canceled         08/04/24 1918 08/04/24 1918  Glucommander Must Be Discontinued if Insulin Infusion is Discontinued.  If Insulin Infusion is Restarted, Previous Glucommander Settings Must Be Discontinued and Re-Entered From New Order  Per Order Details,   Status:  Canceled         08/04/24 1918 08/04/24 1918  Patient is on Glucommander  Continuous,   Status:  Canceled         08/04/24 1918 08/04/24 1918  Once DKA Meets Resolution Criteria - Call Provider for Transition Orders From IV to SQ Insulin  Per Order Details,   Status:  Canceled        Comments: RESOLUTION of DKA:   Blood Glucose < 200 mg/dL, AND TWO of the  Following:   - Serum Bicarbonate > 15   - Venous pH > 7.3   - Calculated Anion Gap </= 12 mEq/L    08/04/24 1918 08/04/24 1918  NPO Diet NPO Type: Strict NPO  Diet Effective Now,   Status:  Canceled         08/04/24 1918 08/04/24 1918  Utilize the Start Meal Feature / Meal Bolus Feature in Glucommander if Patient Starts a Diet or Bolus Tube Feedings  Until Discontinued,   Status:  Canceled         08/04/24 1918 08/04/24 1918  Notify Provider - Insulin Infusion  Continuous,   Status:  Canceled        Comments: Open Order Report to View Parameters Requiring Provider Notification    08/04/24 1918 08/04/24 1918  Inpatient Diabetes Educator Consult  Once,   Status:  Canceled        Provider:  (Not yet assigned)    08/04/24 1918 08/04/24 1918  Phosphorus  STAT         08/04/24 1918 08/04/24 1918  Magnesium  STAT         08/04/24 1918 08/04/24 1918  Osmolality, Serum  STAT         08/04/24 1918 08/04/24 1918  Hemoglobin A1c  STAT         08/04/24 1918 08/04/24 1918  XR Chest 1 View  1 Time Imaging         08/04/24 1918 08/04/24 1918  RN to Release PRN POC Glucose Orders Per Glucommander  Continuous,   Status:  Canceled        Comments: Glucommander Recommended POC Glucose Testing Will Vary Between Every 15 Minutes & Every 2 Hours   Release PRN POC Glucose Orders as Needed    08/04/24 1918 08/04/24 1918  RN to Order STAT Glucose For Any POC Glucose <10 or >600  Continuous,   Status:  Canceled        Comments: Do Not Delay Treatment of Unstable Patient to Obtain Glucose Sample  Inform Provider of Results    08/04/24 1918 08/04/24 1918  Magnesium Standard Dose Replacement - Follow Nurse / BPA Driven Protocol  As Needed,   Status:  Discontinued         08/04/24 1918 08/04/24 1918  Phosphorus Replacement - Follow Nurse / BPA Driven Protocol  As Needed,   Status:  Discontinued         08/04/24 1918 08/04/24 1918  Calcium Replacement - Follow Nurse / BPA Driven Protocol  As Needed,    Status:  Discontinued         08/04/24 1918 08/04/24 1918  DKA / HHS Patients - Phosphorus of 1 mg/dL or Higher Does NOT Require Replacement (While Insulin Infusing)  Continuous,   Status:  Canceled         08/04/24 1918 08/04/24 1917  POC Glucose PRN  As Needed,   Status:  Canceled      Comments: Glucommander Recommended POC Glucose Testing Will Vary Between Every 15 Minutes & Every 2 Hours Release PRN POC Glucose Orders as Needed      08/04/24 1918 08/04/24 1917  Treat Hypoglycemia As Recommended By Glucommander™ & Notify Provider of Treatment  As Needed,   Status:  Canceled      Comments: Follow Hypoglycemia Orders As Outlined in Process Instructions (Open Order Report to View Full Instructions)  Notify Provider Any Time Hypoglycemia Treatment is Administered    08/04/24 1918 08/04/24 1917  If Insulin Infusion is Paused - Follow Glucommander Instructions  As Needed,   Status:  Canceled       08/04/24 1918 08/04/24 1917  dextrose (GLUTOSE) oral gel 15 g  Every 15 Minutes PRN,   Status:  Discontinued         08/04/24 1918 08/04/24 1917  dextrose (D50W) (25 g/50 mL) IV injection 10-50 mL  Every 15 Minutes PRN,   Status:  Discontinued         08/04/24 1918 08/04/24 1917  glucagon (GLUCAGEN) injection 1 mg  Every 15 Minutes PRN,   Status:  Discontinued         08/04/24 1918 08/04/24 1917  sodium chloride 0.9 % infusion  Continuous PRN,   Status:  Discontinued         08/04/24 1918 08/04/24 1917  sodium chloride 0.9 % with KCl 20 mEq/L infusion  Continuous PRN,   Status:  Discontinued         08/04/24 1918 08/04/24 1917  sodium chloride 0.9 % with KCl 40 mEq/L infusion  Continuous PRN,   Status:  Discontinued         08/04/24 1918 08/04/24 1917  dextrose 5 % and sodium chloride 0.9 % infusion  Continuous PRN,   Status:  Discontinued         08/04/24 1918 08/04/24 1917  dextrose 5 % and sodium chloride 0.9 % with KCl 20 mEq/L infusion  Continuous PRN,   Status:  Discontinued          08/04/24 1918 08/04/24 1917  dextrose 5 % and sodium chloride 0.9 % with KCl 40 mEq/L infusion  Continuous PRN,   Status:  Discontinued         08/04/24 1918 08/04/24 1917  sodium chloride 0.45 % infusion  Continuous PRN,   Status:  Discontinued         08/04/24 1918 08/04/24 1917  sodium chloride 0.45 % with KCl 20 mEq/L infusion  Continuous PRN,   Status:  Discontinued         08/04/24 1918 08/04/24 1917  sodium chloride 0.45 % 1,000 mL with potassium chloride 40 mEq infusion  Continuous PRN,   Status:  Discontinued         08/04/24 1918 08/04/24 1917  dextrose 5 % and sodium chloride 0.45 % infusion  Continuous PRN,   Status:  Discontinued         08/04/24 1918 08/04/24 1917  dextrose 5 % and sodium chloride 0.45 % with KCl 20 mEq/L infusion  Continuous PRN,   Status:  Discontinued         08/04/24 1918 08/04/24 1917  dextrose 5 % and sodium chloride 0.45 % with KCl 40 mEq/L infusion  Continuous PRN,   Status:  Discontinued         08/04/24 1918 08/04/24 1917  Potassium Replacement - Follow Nurse / BPA Driven Protocol  As Needed,   Status:  Discontinued         08/04/24 1918 08/04/24 1917  sodium chloride 0.9 % flush 10 mL  As Needed,   Status:  Discontinued         08/04/24 1918 08/04/24 1917  sodium chloride 0.9 % infusion 40 mL  As Needed,   Status:  Discontinued         08/04/24 1918 08/04/24 1916  sodium chloride 0.9 % bolus 1,000 mL  Once         08/04/24 1900    08/04/24 1910  CT Abdomen Pelvis Without Contrast  1 Time Imaging        Comments: IV CONTRAST ONLY      08/04/24 1741    08/04/24 1840  POC Glucose Once  PROCEDURE ONCE        Comments: Complete no more than 45 minutes prior to patient eating      08/04/24 1838    08/04/24 1751  sodium chloride 0.9 % bolus 1,000 mL  Once         08/04/24 1735    08/04/24 1751  sodium chloride 0.9 % infusion  Continuous,   Status:  Discontinued         08/04/24 1735    08/04/24 1742  Ketone Bodies, Serum (Not performed at [place])   "Once         08/04/24 1741    08/04/24 1742  Beta Hydroxybutyrate Quantitative  PROCEDURE ONCE         08/04/24 1741    08/04/24 1742  CT Abdomen Pelvis With Contrast  1 Time Imaging,   Status:  Canceled        Comments: IV CONTRAST ONLY      08/04/24 1741    08/04/24 1736  Blood Gas, Venous -  Once         08/04/24 1735    08/04/24 1703  sodium chloride 0.9 % flush 10 mL  As Needed        Placed in \"And\" Linked Group    08/04/24 1703    11/06/23 0000  memantine (NAMENDA) 10 MG tablet  Daily         08/04/24 1748    10/04/23 0000  donepezil (ARICEPT) 10 MG tablet  Nightly         08/04/24 1748    Unscheduled  Oxygen Therapy- Nasal Cannula; Titrate 1-6 LPM Per SpO2; 90 - 95%  Continuous PRN       08/04/24 2028    Unscheduled  Treat Hypoglycemia As Recommended By Glucommander™ & Notify Provider of Treatment  As Needed      Comments: Follow Hypoglycemia Orders As Outlined in Process Instructions (Open Order Report to View Full Instructions)  Notify Provider Any Time Hypoglycemia Treatment is Administered    08/04/24 2054    Unscheduled  If Insulin Infusion is Paused - Follow Glucommander Instructions  As Needed       08/04/24 2054    Unscheduled  POC Glucose PRN  As Needed      Comments: Glucommander Recommended POC Glucose Testing Will Vary Between Every 15 Minutes & Every 2 Hours Release PRN POC Glucose Orders as Needed      08/04/24 2054    --  aspirin 81 MG EC tablet  Daily         08/04/24 1748    --  atorvastatin (LIPITOR) 10 MG tablet  Daily         08/04/24 1748    --  silodosin (RAPAFLO) 8 MG capsule capsule  Daily         08/04/24 1748    --  metFORMIN (GLUCOPHAGE) 1000 MG tablet  2 Times Daily With Meals         08/04/24 1750    --  Dapagliflozin Propanediol (FARXIGA PO)  Daily         08/04/24 1750    --  Semaglutide,0.25 or 0.5MG/DOS, (OZEMPIC) 2 MG/1.5ML solution pen-injector  Weekly         08/04/24 1750                     Physician Progress Notes (all)        Ryan Gutierrez MD at 08/05/24 1249  "         78 year old white male admitted to ICU with     . Euglycemic ketoacidosis    . Acute urinary retention from prostate enlargement. History of elevated PSA with a nonmalignant MRi in 2020    . Hyperkalemia    . 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 Hypertension    . Hypercholesterolemia on atorvastatin    . Microcytic anemia with normal EGD, colonoscopy    . Gastroesophageal reflux disease    . Vertigo    . Alllergy to cefdinir-rash, lip swelling    . Tdap Nov 2023, Pvax and Prevnar 2014, 2016, Zostavax 2015, Hep A 2019    Appreciate all the care. Please feel free to let me know if I can help in any way.        Ryan Gutierrez MD  [phone]    Electronically signed by Ryan Gutierrez MD at 08/05/24 1300       Scott Delgado MD at 08/05/24 0967                                                        LOS: 1 day   Patient Care Team:  Ryan Gutierrez MD as PCP - General (Internal Medicine)    Chief Complaint:  F/up EUKA, NEHEMIAS, obstructive uropathy and critical care management    Subjective   Interval History  I reviewed the admission note, progress notes, PMH, PSH, Family hx, social history, imagings and prior records on this admission, summarized the finding in my note and formulated a transition of care plan.      Blood sugar improved with insulin drip.  Patient has a Rodriguez catheter.  Hematuria improved.    REVIEW OF SYSTEMS:   CARDIOVASCULAR: No chest pain, chest pressure or chest discomfort. No palpitations or edema.   RESPIRATORY: No shortness of breath, cough or sputum.   GASTROINTESTINAL: No anorexia, nausea, vomiting or diarrhea. No abdominal pain.  CONSTITUTIONAL: No fever or chills.     Ventilator/Non-Invasive Ventilation Settings (From admission, onward)      None                  Physical Exam:     Vital Signs  Temp:  [97.7 °F (36.5 °C)-98.6 °F (37 °C)] 97.7 °F (36.5 °C)  Heart Rate:  [] 84  Resp:  [16-20] 16  BP: (123-190)/(69-99) 157/81    Intake/Output  "Summary (Last 24 hours) at 8/5/2024 0939  Last data filed at 8/5/2024 0628  Gross per 24 hour   Intake 4313 ml   Output 5875 ml   Net -1562 ml     Flowsheet Rows      Flowsheet Row First Filed Value   Admission Height 182.9 cm (72\") Documented at 08/04/2024 2029   Admission Weight 83.9 kg (185 lb) Documented at 08/04/2024 2029            PPE used per hospital policy    General Appearance:   Alert, cooperative, in no acute distress   ENMT:  Mallampati score 3, dry mucous membrane   Eyes:  Pupils equal and reactive to light. EOMI   Neck:    Trachea midline. No thyromegaly.   Lungs:    Clear to auscultation,respirations regular, even and nonlabored    Heart:   Regular rhythm and normal rate, normal S1 and S2, no         murmur   Skin:   No rash or ecchymosis   Abdomen:    Obese. Soft. No tenderness. No HSM.   Neuro/psych:  Conscious, alert, oriented x3. Strength 5/5 in upper and lower  ext.  Appropriate mood and affect   Extremities:  No cyanosis, clubbing or edema.  Warm extremities and well-perfused          Results Review:        Results from last 7 days   Lab Units 08/05/24  0359 08/04/24  2335 08/04/24 1958   SODIUM mmol/L 140 136 135*   POTASSIUM mmol/L 4.5 5.3* 6.2*   CHLORIDE mmol/L 102 99 92*   CO2 mmol/L 16.9* 13.7* 13.5*   BUN mg/dL 147* 160* 175*   CREATININE mg/dL 12.90* 14.34* 16.55*   GLUCOSE mg/dL 130* 164* 151*   CALCIUM mg/dL 8.0* 7.9* 8.2*     Results from last 7 days   Lab Units 08/04/24  1814   HSTROP T ng/L 44*     Results from last 7 days   Lab Units 08/05/24  0359 08/04/24  1814   WBC 10*3/mm3 12.08* 18.10*   HEMOGLOBIN g/dL 12.0* 15.0   HEMATOCRIT % 35.5* 44.2   PLATELETS 10*3/mm3 252 297     Results from last 7 days   Lab Units 08/04/24  1814   INR  1.36*   APTT seconds 28.6                       Results from last 7 days   Lab Units 08/04/24  1910   MODALITY  Room Air         I reviewed the patient's new clinical results.        Medication Review:   cefTRIAXone, 2,000 mg, Intravenous, " Q24H  donepezil, 10 mg, Oral, Nightly  memantine, 10 mg, Oral, Daily  senna-docusate sodium, 2 tablet, Oral, BID  sodium bicarbonate, 650 mg, Oral, TID  sodium chloride, 10 mL, Intravenous, Q12H  sodium chloride, 10 mL, Intravenous, Q12H        dextrose 5 % and sodium chloride 0.45 %, 125 mL/hr, Last Rate: 125 mL/hr (24 0803)  insulin, 0-100 Units/hr, Last Rate: 1.8 Units/hr (24 0909)        Diagnostic imaging:  I personally and independently reviewed the following images:  CXR 2024: Clear    Assessment     Nonoliguric NEHEMIAS, secondary to obstructive uropathy.  Obstructive uropathy with bilateral hydronephrosis  Gross hematuria post Hagen insertion  Euglycemic DKA  Electrolytes disturbance: Hyperkalemia and hyperphosphatemia  Leukocytosis: Stress-induced VS UTI  Anemia  Enlarged prostate on CT abdomen pelvis 2024    HTN  DM type II, on Ozempic, Farxiga and metformin  Alzheimer's  GERD    All problems new to me    Plan       Empiric Rocephin awaiting urine culture  Hagen catheter.  Start tamsulosin.  Consult urology.  Insulin drip per DKA protocol  Continue donepezil and memantine  IV hydration with D5 half-normal saline.    Discussed with family at bedside and discussed with ICU staff    Scott Delgado MD  24  09:39 EDT      Time: Critical care 37 min      This note was dictated utilizing Dragon dictation     Electronically signed by Scott Delgado MD at 24 [phone]       Gigi Engel MD at 24 [phone]              [facility] Progress Note      Patient Name: Froy Ngo  : 1945  MRN: [id number]  Primary Care Physician:  Ryan Gutierrez MD  Date of admission: 2024    Subjective     Interval History:   F/u NEHEMIAS     Review of Systems:   I/O 4.3/5.8  UOP robust after hagen  On bicarb drip   On insulin drip  Denies dyspnea/nausea     Objective     Vitals:   Temp:  [97.9 °F (36.6 °C)-98.6 °F (37 °C)] 97.9 °F (36.6 °C)  Heart Rate:   [] 87  Resp:  [16-20] 16  BP: (123-190)/(69-99) 167/74    Intake/Output Summary (Last 24 hours) at 8/5/2024 0655  Last data filed at 8/5/2024 0628  Gross per 24 hour   Intake 4313 ml   Output 5875 ml   Net -1562 ml       Physical Exam:    General Appearance: frail confused WM no distress on RA  Neck: supple, no JVD  Lungs: CTA bilat no rales  Heart: RRR, normal S1 and S2  Abdomen: soft, nontender, nondistended  : +hagen with bloody urine  Extremities: no edema, cyanosis or clubbing       Scheduled Meds:     cefTRIAXone, 2,000 mg, Intravenous, Q24H  donepezil, 10 mg, Oral, Nightly  memantine, 10 mg, Oral, Daily  senna-docusate sodium, 2 tablet, Oral, BID  sodium chloride, 10 mL, Intravenous, Q12H  sodium chloride, 10 mL, Intravenous, Q12H      IV Meds:   insulin, 0-100 Units/hr, Last Rate: 1 Units/hr (08/05/24 0358)  sodium bicarbonate, 150 mEq, Last Rate: 150 mEq (08/05/24 0528)        Results Reviewed:   I have personally reviewed the results from the time of this admission to 8/5/2024 06:55 EDT     Results from last 7 days   Lab Units 08/05/24  0359 08/04/24  2335 08/04/24  1958 08/04/24  1814   SODIUM mmol/L 140 136 135* 127*   POTASSIUM mmol/L 4.5 5.3* 6.2* 7.0*   CHLORIDE mmol/L 102 99 92* 87*   CO2 mmol/L 16.9* 13.7* 13.5* 11.0*   BUN mg/dL 147* 160* 175* 189*   CREATININE mg/dL 12.90* 14.34* 16.55* 17.15*   CALCIUM mg/dL 8.0* 7.9* 8.2* 8.8   BILIRUBIN mg/dL  --   --   --  0.9   ALK PHOS U/L  --   --   --  155*   ALT (SGPT) U/L  --   --   --  27   AST (SGOT) U/L  --   --   --  19   GLUCOSE mg/dL 130* 164* 151* 162*     Estimated Creatinine Clearance: 5.4 mL/min (A) (by C-G formula based on SCr of 12.9 mg/dL (H)).  Results from last 7 days   Lab Units 08/05/24  0359 08/04/24 1958 08/04/24  1814   MAGNESIUM mg/dL 2.4 2.5* 2.7*   PHOSPHORUS mg/dL  --  9.2* 9.9*         Results from last 7 days   Lab Units 08/05/24  0359 08/04/24  1814   WBC 10*3/mm3 12.08* 18.10*   HEMOGLOBIN g/dL 12.0* 15.0   PLATELETS  10*3/mm3 252 297     Results from last 7 days   Lab Units 08/04/24  1814   INR  1.36*       Assessment / Plan     ASSESSMENT:  Non olig NEHEMIAS - obs uropathy.  CT abdomen showed marked distention of bladder with bilateral hydronephrosis.  Improved post hagen, BUN/Cr 189/17 -> 147/13.  Post-obs diuresis  Hyperkalemia.  Secondary to acute kidney injury and metabolic acidosis.  K down to 4.5 (from 7)  EuDKA - on insulin drip and bicarb drip, change latter back to DKA protocol as bicarb up to 17 and AG has closed.  Was on SGLT2 inh  Dementia   DM2, on ozempic, farxiga, metformin at home  Bladder outlet obstruction with bilateral hydronephrosis.  Hagen catheter in place with good urine output.  Gross hematuria.  On rocephin.  UROL to see   Hyperphosphatemia.  Secondary to renal failure, should improve as NEHEMIAS resolves   Hypertension.  BP labile, better in general overnight 130s to 160s systolic, not no antihypertensives at home.  Will give IV hydralazine PRN     PLAN:  Stop bicarb drip and resume IVF per DKA protocol ie D5 1/2 NS  Start oral nahco3  UROL to see  D/W RN      Gigi Engel MD  08/05/24  06:55 EDT    [facility]  [phone]    Electronically signed by Gigi Engel MD at 08/05/24 5051       Tio Delcid MD at 08/04/24 9295            Attending MD Staffing Note.   [facility] EMERGENCY DEPARTMENT  8/4/2024    Patient:  Name:  Froy Ngo  MRN:  [id number]  1945  78 y.o.  male         CC: ams    History:  Patient is a 78-year-old with previous medical history of BPH diabetes hyperlipidemia and Alzheimer's based who presented to the emergency room with confusion and decreased p.o. intake.  Found to have acute renal failure leukocytosis euglycemic DKA with metabolic acidosis anion gap with a CT scan showing bilateral hydro and ureteral nephrosis enlarged prostate and significantly distended bladder.        Physical Exam:  BP  171/97   Pulse 98   Temp 98.6 °F (37 °C) (Oral)   Resp 20   SpO2 98%   There is no height or weight on file to calculate BMI.    Intake/Output Summary (Last 24 hours) at 8/4/2024 1955  Last data filed at 8/4/2024 1946  Gross per 24 hour   Intake 1000 ml   Output --   Net 1000 ml     General appearance: Ill-appearing however conversant  Eyes: anicteric sclerae, moist conjunctivae  HENT: Atraumatic; oropharynx clear with dry mucous membranes    Neck: Trachea midline;  supple   Lungs: CTA, with normal respiratory effort and no intercostal retractions  CV: RRR, no rub   Abdomen: Soft, nontender; BS+  Extremities: No peripheral edema  Skin: Warm dry      Data Review:  Notable Labs:  Results from last 7 days   Lab Units 08/04/24  1814   WBC 10*3/mm3 18.10*   HEMOGLOBIN g/dL 15.0   PLATELETS 10*3/mm3 297     Results from last 7 days   Lab Units 08/04/24  1814   SODIUM mmol/L 127*   POTASSIUM mmol/L 7.0*   CHLORIDE mmol/L 87*   CO2 mmol/L 11.0*   BUN mg/dL 189*   CREATININE mg/dL 17.15*   GLUCOSE mg/dL 162*   CALCIUM mg/dL 8.8   MAGNESIUM mg/dL 2.7*   PHOSPHORUS mg/dL 9.9*   CrCl cannot be calculated (Unknown ideal weight.).    Results from last 7 days   Lab Units 08/04/24  1814   AST (SGOT) U/L 19   ALT (SGPT) U/L 27   PLATELETS 10*3/mm3 297             Imaging:  Reviewed chest images personally from past 3 days    IMPRESSION:     1. The patient has moderate bilateral hydroureteronephrosis. This is favored to be secondary to urinary retention, given marked distention of the urinary bladder, and the patient's enlarged prostate gland. Patient may benefit from catheterization. There is extensive bilateral perinephric and periureteral stranding, and findings are favored to  reflect ascending urinary tract infection.  Radiation dose reduction techniques were utilized, including automated exposure control and exposure modulation based on body size.     This report was finalized on 8/4/2024 8:06 PM by Dr. Reyes  HEIDI Hernandez on Workstation: [id number]       ASSESSMENT  /  PLAN:  Obstructive uropathy   Enlarged prostate  Uremia  Hyperkalemia  AGMA  Euglycemic DKA  Hyponatremia  Leukocytosis  Bilateral hydroureteronephrosis  Alzheimers dementia  Hematuria  Hld  Htn  GERD      Rodriguez catheter  Psa  Urology consultation    Bicarb  Ivfs  Serial labs  Nephrology consulted - melody Crawley Rai he wishes to manage bicarb drip and IVFs.  If unable to resole anion gap with above may need to convert to complete dka protocol with iv insulin and dextrose containing ivfs.  Will monitor gap with serial labs and above plan    Insulin iv  Ivfs    Cover with ceftriaxone, leukocytosis could be reactive however also will need to follow UA, uc bcs.    Melody er md, nephrologist and patients wife at bedside.    Total critical care time was 60 minutes, excluding any separately billable procedure time.  Time did not overlap with any other provider.    Electronically signed by Tio Delcid MD, 24, 10:20 PM EDT.          Electronically signed by Tio Delcid MD at 24 2222          Consult Notes (all)        Duane Hubbard MD at 24 1216               FIRST UROLOGY CONSULT      Patient Identification:  NAME:  Froy Ngo  Age:  78 y.o.   Sex:  male   :  1945   MRN:  [id number]     Chief complaint: Retention.      History of present illness:      Admitted with nausea inability to urinate.  History from family. NEHEMIAS on admission with severe bladder distention B HN. Rodriguez in place currently. CR improving.  Not on Flomax. Large prostate on ct scan.  Elevated PSa - 53.      In hospital:  -AVSS, good UOP  -WBC - 12.08  -Creat - 10.47  -Asked to see    Past medical history:  No past medical history on file.    Past surgical history:  No past surgical history on file.    Allergies:  Patient has no known allergies.    Home medications:  Medications Prior to Admission   Medication Sig Dispense Refill Last  Dose    aspirin 81 MG EC tablet Take 1 tablet by mouth Daily.   Past Week    atorvastatin (LIPITOR) 10 MG tablet Take 1 tablet by mouth Daily.   Past Week    Dapagliflozin Propanediol (FARXIGA PO) Take  by mouth Daily.   Past Week    donepezil (ARICEPT) 10 MG tablet Take 1 tablet by mouth Every Night.   Past Week    memantine (NAMENDA) 10 MG tablet Take 1 tablet by mouth Daily.   Past Week    metFORMIN (GLUCOPHAGE) 1000 MG tablet Take 1 tablet by mouth 2 (Two) Times a Day With Meals. 1.5 tablets in AM; 1 tablet in PM   Past Week    Semaglutide,0.25 or 0.5MG/DOS, (OZEMPIC) 2 MG/1.5ML solution pen-injector Inject 0.25 mg under the skin into the appropriate area as directed 1 (One) Time Per Week.   Past Week    silodosin (RAPAFLO) 8 MG capsule capsule Take 1 capsule by mouth Daily. With a meal   Past Week        Hospital medications:  cefTRIAXone, 2,000 mg, Intravenous, Q24H  donepezil, 10 mg, Oral, Nightly  memantine, 10 mg, Oral, Daily  senna-docusate sodium, 2 tablet, Oral, BID  sodium bicarbonate, 650 mg, Oral, TID  sodium chloride, 10 mL, Intravenous, Q12H  sodium chloride, 10 mL, Intravenous, Q12H      dextrose 5 % and sodium chloride 0.45 %, 125 mL/hr, Last Rate: 125 mL/hr (24 0803)  insulin, 0-100 Units/hr, Last Rate: 0.7 Units/hr (24 1145)        acetaminophen **OR** acetaminophen    senna-docusate sodium **AND** polyethylene glycol **AND** bisacodyl **AND** bisacodyl    dextrose    dextrose    glucagon (human recombinant)    nitroglycerin    ondansetron    Potassium Replacement - Follow Nurse / BPA Driven Protocol    [COMPLETED] Insert Peripheral IV **AND** sodium chloride    sodium chloride    sodium chloride    sodium chloride    sodium chloride    Family history:  No family history on file.    Social history:       Review of systems:      Positive for:  Retention.    Negative for:  chest pain, cough, sob, o/w neg    Objective:  TMax 24 hours:   Temp (24hrs), Av.1 °F (36.7 °C), Min:97.7 °F  (36.5 °C), Max:98.6 °F (37 °C)      Vitals Ranges:   Temp:  [97.7 °F (36.5 °C)-98.6 °F (37 °C)] 98.2 °F (36.8 °C)  Heart Rate:  [] 84  Resp:  [16-20] 16  BP: (123-190)/() 183/137    Intake/Output Last 3 shifts:  I/O last 3 completed shifts:  In: 4313 [P.O.:240; I.V.:2023; IV Piggyback:2050]  Out: 5875 [Urine:5875]     Physical Exam:    General Appearance:    Alert, cooperative, NAD   Back:     No CVA tenderness   Lungs:     Respirations unlabored, no wheezing    Heart:    RRR, intact peripheral pulses   Abdomen:     Soft, NDNT, no masses, no guarding   Neuro/Psych:   Orientation intact, mood/affect pleasant       Results review:   I reviewed the patient's new clinical results.    Data review:  Lab Results (last 24 hours)       Procedure Component Value Units Date/Time    POC Glucose Once [[id number]]  (Normal) Collected: 08/05/24 1143    Specimen: Blood Updated: 08/05/24 1145     Glucose 113 mg/dL     POC Glucose Once [[id number]]  (Normal) Collected: 08/05/24 1118    Specimen: Blood Updated: 08/05/24 1123     Glucose 118 mg/dL     Basic Metabolic Panel [[id number]]  (Abnormal) Collected: 08/05/24 1027    Specimen: Blood Updated: 08/05/24 1107     Glucose 226 mg/dL       mg/dL      Creatinine 10.47 mg/dL      Sodium 135 mmol/L      Potassium 4.0 mmol/L      Chloride 101 mmol/L      CO2 19.0 mmol/L      Calcium 7.6 mg/dL      BUN/Creatinine Ratio 13.1     Anion Gap 15.0 mmol/L      eGFR 4.5 mL/min/1.73      Comment: <15 Indicative of kidney failure       Narrative:      GFR Normal >60  Chronic Kidney Disease <60  Kidney Failure <15    The GFR formula is only valid for adults with stable renal function between ages 18 and 70.    POC Glucose Once [[id number]]  (Abnormal) Collected: 08/05/24 1011    Specimen: Blood Updated: 08/05/24 1014     Glucose 176 mg/dL     Urine Culture - Urine, Urine, Clean Catch [[id number]]  (Normal) Collected: 08/04/24 1951    Specimen: Urine, Clean Catch Updated: 08/05/24 0937      Urine Culture No growth    POC Glucose Once [[id number]]  (Abnormal) Collected: 08/05/24 0908    Specimen: Blood Updated: 08/05/24 0909     Glucose 173 mg/dL     POC Glucose Once [[id number]]  (Normal) Collected: 08/05/24 0804    Specimen: Blood Updated: 08/05/24 0805     Glucose 129 mg/dL     POC Glucose Once [[id number]]  (Normal) Collected: 08/05/24 0657    Specimen: Blood Updated: 08/05/24 0658     Glucose 119 mg/dL     POC Glucose Once [[id number]]  (Normal) Collected: 08/05/24 0500    Specimen: Blood Updated: 08/05/24 0501     Glucose 125 mg/dL     Basic Metabolic Panel [[id number]]  (Abnormal) Collected: 08/05/24 0359    Specimen: Blood Updated: 08/05/24 0456     Glucose 130 mg/dL       mg/dL      Creatinine 12.90 mg/dL      Sodium 140 mmol/L      Potassium 4.5 mmol/L      Chloride 102 mmol/L      CO2 16.9 mmol/L      Calcium 8.0 mg/dL      BUN/Creatinine Ratio 11.4     Anion Gap 21.1 mmol/L      eGFR 3.5 mL/min/1.73      Comment: <15 Indicative of kidney failure       Narrative:      GFR Normal >60  Chronic Kidney Disease <60  Kidney Failure <15    The GFR formula is only valid for adults with stable renal function between ages 18 and 70.    Magnesium [[id number]]  (Normal) Collected: 08/05/24 0359    Specimen: Blood Updated: 08/05/24 0451     Magnesium 2.4 mg/dL     CBC & Differential [[id number]]  (Abnormal) Collected: 08/05/24 0359    Specimen: Blood Updated: 08/05/24 0436    Narrative:      The following orders were created for panel order CBC & Differential.  Procedure                               Abnormality         Status                     ---------                               -----------         ------                     CBC Auto Differential[[id number]]        Abnormal            Final result                 Please view results for these tests on the individual orders.    CBC Auto Differential [[id number]]  (Abnormal) Collected: 08/05/24 0359    Specimen: Blood Updated: 08/05/24 0436     WBC  12.08 10*3/mm3      RBC 3.93 10*6/mm3      Hemoglobin 12.0 g/dL      Hematocrit 35.5 %      MCV 90.3 fL      MCH 30.5 pg      MCHC 33.8 g/dL      RDW 13.1 %      RDW-SD 43.1 fl      MPV 9.6 fL      Platelets 252 10*3/mm3      Neutrophil % 83.3 %      Lymphocyte % 4.8 %      Monocyte % 10.8 %      Eosinophil % 0.3 %      Basophil % 0.1 %      Immature Grans % 0.7 %      Neutrophils, Absolute 10.06 10*3/mm3      Lymphocytes, Absolute 0.58 10*3/mm3      Monocytes, Absolute 1.31 10*3/mm3      Eosinophils, Absolute 0.04 10*3/mm3      Basophils, Absolute 0.01 10*3/mm3      Immature Grans, Absolute 0.08 10*3/mm3      nRBC 0.0 /100 WBC     POC Glucose Once [[id number]]  (Normal) Collected: 08/05/24 0356    Specimen: Blood Updated: 08/05/24 0401     Glucose 125 mg/dL     POC Glucose Once [[id number]]  (Abnormal) Collected: 08/05/24 0238    Specimen: Blood Updated: 08/05/24 0240     Glucose 136 mg/dL     POC Glucose Once [[id number]]  (Abnormal) Collected: 08/05/24 0143    Specimen: Blood Updated: 08/05/24 0145     Glucose 137 mg/dL     POC Glucose Once [[id number]]  (Abnormal) Collected: 08/05/24 0037    Specimen: Blood Updated: 08/05/24 0040     Glucose 165 mg/dL     Basic Metabolic Panel [[id number]]  (Abnormal) Collected: 08/04/24 2335    Specimen: Blood Updated: 08/05/24 0011     Glucose 164 mg/dL       mg/dL      Creatinine 14.34 mg/dL      Sodium 136 mmol/L      Potassium 5.3 mmol/L      Chloride 99 mmol/L      CO2 13.7 mmol/L      Calcium 7.9 mg/dL      BUN/Creatinine Ratio 11.2     Anion Gap 23.3 mmol/L      eGFR 3.1 mL/min/1.73      Comment: <15 Indicative of kidney failure       Narrative:      GFR Normal >60  Chronic Kidney Disease <60  Kidney Failure <15    The GFR formula is only valid for adults with stable renal function between ages 18 and 70.    Blood Culture - Blood, Arm, Left [[id number]] Collected: 08/04/24 2335    Specimen: Blood from Arm, Left Updated: 08/04/24 2340    POC Glucose Once [[id number]]   (Abnormal) Collected: 08/04/24 2329    Specimen: Blood Updated: 08/04/24 2336     Glucose 162 mg/dL     POC Glucose Once [[id number]]  (Abnormal) Collected: 08/04/24 2212    Specimen: Blood Updated: 08/04/24 2213     Glucose 150 mg/dL     POC Glucose Once [[id number]]  (Abnormal) Collected: 08/04/24 2057    Specimen: Blood Updated: 08/04/24 2057     Glucose 142 mg/dL     Basic Metabolic Panel [[id number]]  (Abnormal) Collected: 08/04/24 1958    Specimen: Blood Updated: 08/04/24 2043     Glucose 151 mg/dL       mg/dL      Creatinine 16.55 mg/dL      Sodium 135 mmol/L      Potassium 6.2 mmol/L      Comment: Slight hemolysis detected by analyzer. Result may be falsely elevated.        Chloride 92 mmol/L      CO2 13.5 mmol/L      Calcium 8.2 mg/dL      BUN/Creatinine Ratio 10.6     Anion Gap 29.5 mmol/L      eGFR 2.6 mL/min/1.73      Comment: <15 Indicative of kidney failure       Narrative:      GFR Normal >60  Chronic Kidney Disease <60  Kidney Failure <15    The GFR formula is only valid for adults with stable renal function between ages 18 and 70.    POC Glucose Once [[id number]]  (Abnormal) Collected: 08/04/24 2032    Specimen: Blood Updated: 08/04/24 2033     Glucose 156 mg/dL     Urine Drug Screen - Urine, Clean Catch [[id number]]  (Normal) Collected: 08/04/24 1951    Specimen: Urine, Clean Catch Updated: 08/04/24 2028     Amphet/Methamphet, Screen Negative     Barbiturates Screen, Urine Negative     Benzodiazepine Screen, Urine Negative     Cocaine Screen, Urine Negative     Opiate Screen Negative     THC, Screen, Urine Negative     Methadone Screen, Urine Negative     Oxycodone Screen, Urine Negative     Fentanyl, Urine Negative    Narrative:      Negative Thresholds Per Drugs Screened:    Amphetamines                 500 ng/ml  Barbiturates                 200 ng/ml  Benzodiazepines              100 ng/ml  Cocaine                      300 ng/ml  Methadone                    300 ng/ml  Opiates                       300 ng/ml  Oxycodone                    100 ng/ml  THC                           50 ng/ml  Fentanyl                       5 ng/ml      The Normal Value for all drugs tested is negative. This report includes final unconfirmed screening results to be used for medical treatment purposes only. Unconfirmed results must not be used for non-medical purposes such as employment or legal testing. Clinical consideration should be applied to any drug of abuse test, particularly when unconfirmed results are used.            Magnesium [[id number]]  (Abnormal) Collected: 08/04/24 1958    Specimen: Blood Updated: 08/04/24 2026     Magnesium 2.5 mg/dL     PSA DIAGNOSTIC [[id number]]  (Abnormal) Collected: 08/04/24 1814    Specimen: Blood Updated: 08/04/24 2026     PSA 53.100 ng/mL     Narrative:      Results may be falsely decreased if patient taking Biotin.    Testing Method: [employer] Electrochemiluminescence Immunoassay(ECLIA)  Values obtained with different assay methods or kits cannot be used interchangeably.    Phosphorus [[id number]]  (Abnormal) Collected: 08/04/24 1958    Specimen: Blood Updated: 08/04/24 2026     Phosphorus 9.2 mg/dL     Lactic Acid, Plasma [[id number]]  (Normal) Collected: 08/04/24 1958    Specimen: Blood Updated: 08/04/24 2024     Lactate 1.2 mmol/L     Osmolality, Serum [[id number]]  (Abnormal) Collected: 08/04/24 1958    Specimen: Blood Updated: 08/04/24 2018     Osmolality 350 mOsm/kg     Urinalysis With Microscopic If Indicated (No Culture) - Urine, Clean Catch [[id number]]  (Abnormal) Collected: 08/04/24 1951    Specimen: Urine, Clean Catch Updated: 08/04/24 2011     Color, UA Yellow     Appearance, UA Clear     pH, UA 5.5     Specific Gravity, UA 1.014     Glucose,  mg/dL (Trace)     Ketones, UA Negative     Bilirubin, UA Negative     Blood, UA Large (3+)     Protein, UA 30 mg/dL (1+)     Leuk Esterase, UA Small (1+)     Nitrite, UA Negative     Urobilinogen, UA 0.2 E.U./dL     "Urinalysis, Microscopic Only - Urine, Clean Catch [[id number]]  (Abnormal) Collected: 08/04/24 1951    Specimen: Urine, Clean Catch Updated: 08/04/24 2011     RBC, UA Too Numerous to Count /HPF      WBC, UA 6-10 /HPF      Bacteria, UA None Seen /HPF      Squamous Epithelial Cells, UA 0-2 /HPF      Hyaline Casts, UA 0-2 /LPF      Methodology Automated Microscopy    Procalcitonin [[id number]]  (Abnormal) Collected: 08/04/24 1814    Specimen: Blood Updated: 08/04/24 2008     Procalcitonin 2.66 ng/mL     Narrative:      As a Marker for Sepsis (Non-Neonates):    1. <0.5 ng/mL represents a low risk of severe sepsis and/or septic shock.  2. >2 ng/mL represents a high risk of severe sepsis and/or septic shock.    As a Marker for Lower Respiratory Tract Infections that require antibiotic therapy:    PCT on Admission    Antibiotic Therapy       6-12 Hrs later    >0.5                Strongly Recommended  >0.25 - <0.5        Recommended   0.1 - 0.25          Discouraged              Remeasure/reassess PCT  <0.1                Strongly Discouraged     Remeasure/reassess PCT    As 28 day mortality risk marker: \"Change in Procalcitonin Result\" (>80% or <=80%) if Day 0 (or Day 1) and Day 4 values are available. Refer to http://[email or web address]-pct-calculator.com    Change in PCT <=80%  A decrease of PCT levels below or equal to 80% defines a positive change in PCT test result representing a higher risk for 28-day all-cause mortality of patients diagnosed with severe sepsis for septic shock.    Change in PCT >80%  A decrease of PCT levels of more than 80% defines a negative change in PCT result representing a lower risk for 28-day all-cause mortality of patients diagnosed with severe sepsis or septic shock.       POC Glucose Once [[id number]]  (Abnormal) Collected: 08/04/24 1957    Specimen: Blood Updated: 08/04/24 1958     Glucose 172 mg/dL     Phosphorus [[id number]]  (Abnormal) Collected: 08/04/24 1814    Specimen: Blood Updated: 08/04/24 " 1935     Phosphorus 9.9 mg/dL     Magnesium [[id number]]  (Abnormal) Collected: 08/04/24 1814    Specimen: Blood Updated: 08/04/24 1935     Magnesium 2.7 mg/dL     Hemoglobin A1c [[id number]]  (Abnormal) Collected: 08/04/24 1814    Specimen: Blood Updated: 08/04/24 1934     Hemoglobin A1C 7.90 %     Narrative:      Hemoglobin A1C Ranges:    Increased Risk for Diabetes  5.7% to 6.4%  Diabetes                     >= 6.5%  Diabetic Goal                < 7.0%    Blood Gas, Venous - [[id number]]  (Abnormal) Collected: 08/04/24 1910    Specimen: Venous Blood Updated: 08/04/24 1914     pH, Venous 7.252 pH Units      pCO2, Venous 18.0 mm Hg      pO2, Venous 53.8 mm Hg      HCO3, Venous 7.9 mmol/L      Base Excess, Venous -16.5 mmol/L      Comment: Serial Number: [id number]:  [id number]        O2 Saturation, Venous 83.5 %      CO2 Content 8.5 mmol/L      Barometric Pressure for Blood Gas 748.2000 mmHg      Modality Room Air     Rate 18 Breaths/minute      Notified Who Dread Melendez M.[email or web address]-social     Device Comment drawn by [id number] Sat 97%    Comprehensive Metabolic Panel [[id number]]  (Abnormal) Collected: 08/04/24 1814    Specimen: Blood Updated: 08/04/24 1904     Glucose 162 mg/dL       mg/dL      Creatinine 17.15 mg/dL      Sodium 127 mmol/L      Potassium 7.0 mmol/L      Comment: Slight hemolysis detected by analyzer. Result may be falsely elevated.        Chloride 87 mmol/L      CO2 11.0 mmol/L      Calcium 8.8 mg/dL      Total Protein 7.0 g/dL      Albumin 3.4 g/dL      ALT (SGPT) 27 U/L      AST (SGOT) 19 U/L      Alkaline Phosphatase 155 U/L      Total Bilirubin 0.9 mg/dL      Globulin 3.6 gm/dL      A/G Ratio 0.9 g/dL      BUN/Creatinine Ratio 11.0     Anion Gap 29.0 mmol/L      eGFR 2.5 mL/min/1.73      Comment: <15 Indicative of kidney failure       Narrative:      GFR Normal >60  Chronic Kidney Disease <60  Kidney Failure <15    The GFR formula is only valid for adults with stable renal function between ages 18 and 70.     Ketone Bodies, Serum (Not performed at [place]) [[id number]]  (Abnormal) Collected: 08/04/24 1814    Specimen: Blood Updated: 08/04/24 1853    Narrative:      The following orders were created for panel order Ketone Bodies, Serum (Not performed at [place]).  Procedure                               Abnormality         Status                     ---------                               -----------         ------                     Beta Hydroxybutyrate Martín...[[id number]]  Abnormal            Final result                 Please view results for these tests on the individual orders.    Beta Hydroxybutyrate Quantitative [[id number]]  (Abnormal) Collected: 08/04/24 1814    Specimen: Blood Updated: 08/04/24 1853     Beta-Hydroxybutyrate Quant 1.489 mmol/L     Narrative:      In the assessment of possible diabetic ketoacidosis, the test should be interpreted along with other clinical and laboratory findings.  A level greater than 1 mmol/L should require further evaluation and levels of more than 3 mmol/L require immediate medical review.    Single High Sensitivity Troponin T [[id number]]  (Abnormal) Collected: 08/04/24 1814    Specimen: Blood Updated: 08/04/24 1852     HS Troponin T 44 ng/L     Narrative:      High Sensitive Troponin T Reference Range:  <14.0 ng/L- Negative Female for AMI  <22.0 ng/L- Negative Male for AMI  >=14 - Abnormal Female indicating possible myocardial injury.  >=22 - Abnormal Male indicating possible myocardial injury.   Clinicians would have to utilize clinical acumen, EKG, Troponin, and serial changes to determine if it is an Acute Myocardial Infarction or myocardial injury due to an underlying chronic condition.         Acetaminophen Level [[id number]]  (Normal) Collected: 08/04/24 1814    Specimen: Blood Updated: 08/04/24 1852     Acetaminophen <5.0 mcg/mL     Ethanol [[id number]] Collected: 08/04/24 1814    Specimen: Blood Updated: 08/04/24 1852     Ethanol <10 mg/dL      Ethanol % <0.010 %      Salicylate Level [[id number]]  (Normal) Collected: 08/04/24 1814    Specimen: Blood Updated: 08/04/24 1850     Salicylate <0.3 mg/dL     Narrative:      Therapeutic range for Salicylates:  3.0 - 10.0 mg/dL for antipyretic/analgesic conditions  15.0 - 30.0 mg/dL for anti-inflammatory conditions    Protime-INR [[id number]]  (Abnormal) Collected: 08/04/24 1814    Specimen: Blood Updated: 08/04/24 1843     Protime 17.0 Seconds      INR 1.36    aPTT [[id number]]  (Normal) Collected: 08/04/24 1814    Specimen: Blood Updated: 08/04/24 1843     PTT 28.6 seconds     POC Glucose Once [[id number]]  (Abnormal) Collected: 08/04/24 1838    Specimen: Blood Updated: 08/04/24 1839     Glucose 151 mg/dL     CBC & Differential [[id number]]  (Abnormal) Collected: 08/04/24 1814    Specimen: Blood Updated: 08/04/24 1830    Narrative:      The following orders were created for panel order CBC & Differential.  Procedure                               Abnormality         Status                     ---------                               -----------         ------                     CBC Auto Differential[[id number]]        Abnormal            Final result                 Please view results for these tests on the individual orders.    CBC Auto Differential [[id number]]  (Abnormal) Collected: 08/04/24 1814    Specimen: Blood Updated: 08/04/24 1830     WBC 18.10 10*3/mm3      RBC 4.94 10*6/mm3      Hemoglobin 15.0 g/dL      Hematocrit 44.2 %      MCV 89.5 fL      MCH 30.4 pg      MCHC 33.9 g/dL      RDW 11.8 %      RDW-SD 38.6 fl      MPV 9.4 fL      Platelets 297 10*3/mm3      Neutrophil % 87.4 %      Lymphocyte % 2.3 %      Monocyte % 9.1 %      Eosinophil % 0.2 %      Basophil % 0.1 %      Immature Grans % 0.9 %      Neutrophils, Absolute 15.84 10*3/mm3      Lymphocytes, Absolute 0.41 10*3/mm3      Monocytes, Absolute 1.64 10*3/mm3      Eosinophils, Absolute 0.03 10*3/mm3      Basophils, Absolute 0.02 10*3/mm3      Immature Grans, Absolute 0.16  10*3/mm3      nRBC 0.0 /100 WBC              Imaging:  Imaging Results (Last 24 Hours)       Procedure Component Value Units Date/Time    XR Chest 1 View [[id number]] Collected: 08/04/24 2059     Updated: 08/04/24 2102    Narrative:      SINGLE VIEW OF THE CHEST     HISTORY: Altered mental status     COMPARISON: None available.     FINDINGS:  Heart size is within normal limits, given technique. There is  calcification of the aorta. No pneumothorax, pleural effusion, or acute  infiltrate is seen.       Impression:      No acute findings.     This report was finalized on 8/4/2024 8:59 PM by Dr. Amy Hernandez M.D on Workstation: [id number]       CT Abdomen Pelvis Without Contrast [[id number]] Collected: 08/04/24 2000     Updated: 08/04/24 2009    Narrative:      CT OF THE AND PELVIS WITHOUT CONTRAST     HISTORY: Altered mental status     COMPARISON: None available.     TECHNIQUE: Axial CT imaging was obtained through the abdomen and pelvis.  No IV contrast was administered.     FINDINGS:  Images are degraded by artifact from patient's arms. There is some  minimal dependent atelectasis. There is a trace right pleural effusion  and small left pleural effusion. No suspicious hepatic lesions are seen.  There is cholelithiasis. Pancreas is atrophic. Adrenal glands are  normal. There is borderline enlargement of the spleen, which measures up  to 13.7 cm in AP dimensions. Duodenum appears normal. The patient has  bilateral hydroureteronephrosis. No obstructing stone is seen. Urinary  bladder is distended, which may reflect urinary retention, especially  given the presence of a very enlarged prostate gland. There is extensive  perinephric and periureteral stranding. Appearance is concerning for  ascending urinary tract infection. There are aortoiliac calcifications.  Low-attenuation lesion within the inferior pole of the right kidney is  favored to be a cyst. Trace fluid tracks along the paracolic gutters,  particularly  on the right. There is colonic diverticulosis. The appendix  is normal. There is no bowel obstruction. No acute osseous abnormalities  are seen. There is some mild body wall edema.          Impression:         1. The patient has moderate bilateral hydroureteronephrosis. This is  favored to be secondary to urinary retention, given marked distention of  the urinary bladder, and the patient's enlarged prostate gland. Patient  may benefit from catheterization. There is extensive bilateral  perinephric and periureteral stranding, and findings are favored to  reflect ascending urinary tract infection.  Radiation dose reduction techniques were utilized, including automated  exposure control and exposure modulation based on body size.        This report was finalized on 8/4/2024 8:06 PM by Dr. Amy Hernandez M.D on Workstation: [id number]       CT Head Without Contrast [[id number]] Collected: 08/04/24 1957     Updated: 08/04/24 2003    Narrative:      CT OF THE HEAD WITHOUT CONTRAST     HISTORY: Inability to eat and altered mental status     COMPARISON: None available.     TECHNIQUE: Axial CT imaging was obtained through the brain. No IV  contrast was administered.     FINDINGS:  No acute intracranial hemorrhage is seen. There is atrophy. There is  periventricular and deep white matter microangiopathic change. There is  no midline shift or mass effect. Images suggest prior sinus surgery.  Mastoid air cells are clear.       Impression:      No acute intracranial findings.     Radiation dose reduction techniques were utilized, including automated  exposure control and exposure modulation based on body size.        This report was finalized on 8/4/2024 7:59 PM by Dr. Amy Hernandez M.D on Workstation: [id number]                  Assessment:     Urinary retention.  BPH.  NEHEMIAS.      Plan:     Start Flomax 0.4mg QD. Cont hagen on discharge to allow bladder to hopefully recover. Will plan VT 2 weeks in office. Will  repeat MICAELA at that time. Family understands bladder may not recover and is atonic.   Elevated PSA 53. Could be related to retention.  Will work up at later time.    Duane Hubbard MD  08/05/24  12:16 EDT              Electronically signed by Duane Hubbard MD at 08/05/24 1224       Yair Salas MD at 08/04/24 2040        Consult Orders    1. Nephrology (on -call MD unless specified) [[id number]] ordered by Dread Melendez MD at 08/04/24 1918                 [facility] CONSULT NOTE    Referring Provider: Dread Muniz MD   Reason for Consultation: Acute kidney injury, metabolic acidosis, hyperkalemia    Chief complaint generalized weakness    History of present illness: Patient is 78 years old male with a history of hypertension, type 2 diabetes, dementia, brought to the hospital with generalized weakness and some confusion.  As per patient's wife, he started feeling sick about a week ago after eating out and had nausea and vomiting.  Since then he has been feeling very tired with decreased appetite and oral intake.  Patient is not very good historian but admits decreased urination.  No reported fever, cough, expectoration, hematuria or dysuria.  Patient lab workup showed acute renal failure along with hyperkalemia and severe metabolic acidosis.  Patient blood sugar was not very high but beta-hydroxybutyrate level was high.  CT abdomen showed marked distention of urinary bladder and moderate bilateral hydronephrosis.      History  No past medical history on file.  No past surgical history on file.     No family history on file.    Review of Systems  ROS  As per HPI  Objective     Vital Signs  Temp:  [98.6 °F (37 °C)] 98.6 °F (37 °C)  Heart Rate:  [] 93  Resp:  [16-20] 16  BP: (123-190)/(75-99) 190/76    I/O this shift:  In: 2325 [I.V.:275; IV Piggyback:2050]  Out: 1300 [Urine:1300]  No intake/output data recorded.    Physical Exam:  Physical Exam    General Appearance: Chronically  "ill-appearing  Skin: warm and dry  HEENT: oral mucosa dry, nonicteric sclera  Neck: supple, no JVD  Lungs: CTA  Heart: RRR, normal S1 and S2  Abdomen: soft, nontender, nondistended  : no palpable bladder  Extremities: no edema, cyanosis or clubbing  Neuro: normal speech and mental status     Results Review:   I reviewed the patient's new clinical results.    Lab Results   Component Value Date    CALCIUM 8.2 (L) 08/04/2024    PHOS 9.2 (H) 08/04/2024     Results from last 7 days   Lab Units 08/04/24 1958 08/04/24  1814   MAGNESIUM mg/dL 2.5* 2.7*   SODIUM mmol/L 135* 127*   POTASSIUM mmol/L 6.2* 7.0*   CHLORIDE mmol/L 92* 87*   CO2 mmol/L 13.5* 11.0*   BUN mg/dL 175* 189*   CREATININE mg/dL 16.55* 17.15*   GLUCOSE mg/dL 151* 162*   CALCIUM mg/dL 8.2* 8.8   WBC 10*3/mm3  --  18.10*   HEMOGLOBIN g/dL  --  15.0   PLATELETS 10*3/mm3  --  297   ALT (SGPT) U/L  --  27   AST (SGOT) U/L  --  19     Lab Results   Component Value Date    TROPONINT 44 (H) 08/04/2024     Estimated Creatinine Clearance: 4.2 mL/min (A) (by C-G formula based on SCr of 16.55 mg/dL (H)).No results found for: \"URICACID\"    Brief Urine Lab Results  (Last result in the past 365 days)        Color   Clarity   Blood   Leuk Est   Nitrite   Protein   CREAT   Urine HCG        08/04/24 1951 Yellow   Clear   Large (3+)   Small (1+)   Negative   30 mg/dL (1+)                   Prior to Admission medications    Medication Sig Start Date End Date Taking? Authorizing Provider   aspirin 81 MG EC tablet Take 1 tablet by mouth Daily.   Yes Araseli Price MD   atorvastatin (LIPITOR) 10 MG tablet Take 1 tablet by mouth Daily.   Yes ProviderAraseli MD   Dapagliflozin Propanediol (FARXIGA PO) Take  by mouth Daily.   Yes rAaseli Price MD   donepezil (ARICEPT) 10 MG tablet Take 1 tablet by mouth Every Night. 10/4/23 10/3/24 Yes ProviderAraseli MD   memantine (NAMENDA) 10 MG tablet Take 1 tablet by mouth Daily. 11/6/23 11/5/24 Yes Provider, " MD Araseli   metFORMIN (GLUCOPHAGE) 1000 MG tablet Take 1 tablet by mouth 2 (Two) Times a Day With Meals. 1.5 tablets in AM; 1 tablet in PM   Yes ProviderAraseli MD   Semaglutide,0.25 or 0.5MG/DOS, (OZEMPIC) 2 MG/1.5ML solution pen-injector Inject 0.25 mg under the skin into the appropriate area as directed 1 (One) Time Per Week.   Yes Araseli Price MD   silodosin (RAPAFLO) 8 MG capsule capsule Take 1 capsule by mouth Daily. With a meal   Yes Araseli Price MD       senna-docusate sodium, 2 tablet, Oral, BID  sodium chloride, 10 mL, Intravenous, Q12H  sodium chloride, 10 mL, Intravenous, Q12H      dextrose 5 % and sodium chloride 0.45 %, 150 mL/hr  dextrose 5 % and sodium chloride 0.45 % with KCl 20 mEq/L, 150 mL/hr  dextrose 5 % and sodium chloride 0.45 % with KCl 40 mEq/L, 150 mL/hr  dextrose 5 % and sodium chloride 0.9 %, 150 mL/hr, Last Rate: 150 mL/hr (08/04/24 2028)  dextrose 5 % and sodium chloride 0.9 % with KCl 20 mEq, 150 mL/hr  dextrose 5% and sodium chloride 0.9% with KCl 40 mEq/L, 150 mL/hr  insulin, 0-100 Units/hr, Last Rate: 1 Units/hr (08/04/24 2033)  sodium bicarbonate, 150 mEq  sodium chloride 0.45 % 1,000 mL with potassium chloride 40 mEq infusion, 250 mL/hr  sodium chloride, 250 mL/hr  sodium chloride 0.45 % with KCl 20 mEq, 250 mL/hr  sodium chloride, 1,000 mL/hr, Last Rate: 1,000 mL/hr (08/04/24 1954)  sodium chloride, 125 mL/hr, Last Rate: Stopped (08/04/24 2027)  sodium chloride, 250 mL/hr  sodium chloride 0.9 % with KCl 20 mEq, 250 mL/hr  sodium chloride 0.9 % with KCl 40 mEq/L, 250 mL/hr        Assessment & Plan       Acute kidney injury.  Secondary to obstruction.  CT abdomen showed marked distention of bladder with bilateral hydronephrosis.  Rodriguez catheter in place and has good urine output  Hyperkalemia.  Secondary to acute kidney injury and metabolic acidosis.  Patient received IV calcium gluconate and bolus IV sodium bicarbonate.  Patient started on insulin  drip  Acute metabolic acidosis.  Secondary to acute kidney injury and possibly DKA  Diabetic ketoacidosis?  Euglycemic.  Patient was on SGLT2 inhibitor  Hyponatremia  Bladder outlet obstruction with bilateral hydronephrosis.  Rodriguez catheter in place with good urine output  Hyperphosphatemia.  Secondary to renal failure  Hypertension.  Blood pressure very high, stress-induced    Plan:  Patient receiving IV fluid boluses  Start bicarb drip after fluid boluses  Check BMP every 4 hours.   Correcting hyperkalemia medically.  Potassium already improving, hopefully will not require dialysis  Patient started on IV hydralazine as needed for blood pressure control for now      I discussed the patients findings and my recommendations with patient, family, nursing staff, and consulting provider    Yair Salas MD  08/04/24  20:46 EDT            Electronically signed by Yair Slaas MD at 08/04/24 2100

## 2024-08-05 NOTE — PLAN OF CARE
Goal Outcome Evaluation:               Pt's still on insulin drip. Pt VSS. Hypertension addressed to physician:No new orders. Pt have intermittent agitation. Rodriguez flush to maintain patency.

## 2024-08-05 NOTE — CONSULTS
FIRST UROLOGY CONSULT      Patient Identification:  NAME:  Froy Ngo  Age:  78 y.o.   Sex:  male   :  1945   MRN:  [id number]     Chief complaint: Retention.      History of present illness:      Admitted with nausea inability to urinate.  History from family. NEHEMIAS on admission with severe bladder distention B HN. Rodriguez in place currently. CR improving.  Not on Flomax. Large prostate on ct scan.  Elevated PSa - 53.      In hospital:  -AVSS, good UOP  -WBC - 12.08  -Creat - 10.47  -Asked to see    Past medical history:  No past medical history on file.    Past surgical history:  No past surgical history on file.    Allergies:  Patient has no known allergies.    Home medications:  Medications Prior to Admission   Medication Sig Dispense Refill Last Dose    aspirin 81 MG EC tablet Take 1 tablet by mouth Daily.   Past Week    atorvastatin (LIPITOR) 10 MG tablet Take 1 tablet by mouth Daily.   Past Week    Dapagliflozin Propanediol (FARXIGA PO) Take  by mouth Daily.   Past Week    donepezil (ARICEPT) 10 MG tablet Take 1 tablet by mouth Every Night.   Past Week    memantine (NAMENDA) 10 MG tablet Take 1 tablet by mouth Daily.   Past Week    metFORMIN (GLUCOPHAGE) 1000 MG tablet Take 1 tablet by mouth 2 (Two) Times a Day With Meals. 1.5 tablets in AM; 1 tablet in PM   Past Week    Semaglutide,0.25 or 0.5MG/DOS, (OZEMPIC) 2 MG/1.5ML solution pen-injector Inject 0.25 mg under the skin into the appropriate area as directed 1 (One) Time Per Week.   Past Week    silodosin (RAPAFLO) 8 MG capsule capsule Take 1 capsule by mouth Daily. With a meal   Past Week        Hospital medications:  cefTRIAXone, 2,000 mg, Intravenous, Q24H  donepezil, 10 mg, Oral, Nightly  memantine, 10 mg, Oral, Daily  senna-docusate sodium, 2 tablet, Oral, BID  sodium bicarbonate, 650 mg, Oral, TID  sodium chloride, 10 mL, Intravenous, Q12H  sodium chloride, 10 mL, Intravenous, Q12H      dextrose 5 % and sodium chloride 0.45 %, 125  mL/hr, Last Rate: 125 mL/hr (24 0803)  insulin, 0-100 Units/hr, Last Rate: 0.7 Units/hr (24 1145)        acetaminophen **OR** acetaminophen    senna-docusate sodium **AND** polyethylene glycol **AND** bisacodyl **AND** bisacodyl    dextrose    dextrose    glucagon (human recombinant)    nitroglycerin    ondansetron    Potassium Replacement - Follow Nurse / BPA Driven Protocol    [COMPLETED] Insert Peripheral IV **AND** sodium chloride    sodium chloride    sodium chloride    sodium chloride    sodium chloride    Family history:  No family history on file.    Social history:       Review of systems:      Positive for:  Retention.    Negative for:  chest pain, cough, sob, o/w neg    Objective:  TMax 24 hours:   Temp (24hrs), Av.1 °F (36.7 °C), Min:97.7 °F (36.5 °C), Max:98.6 °F (37 °C)      Vitals Ranges:   Temp:  [97.7 °F (36.5 °C)-98.6 °F (37 °C)] 98.2 °F (36.8 °C)  Heart Rate:  [] 84  Resp:  [16-20] 16  BP: (123-190)/() 183/137    Intake/Output Last 3 shifts:  I/O last 3 completed shifts:  In: 4313 [P.O.:240; I.V.:; IV Piggyback:]  Out: 5875 [Urine:5875]     Physical Exam:    General Appearance:    Alert, cooperative, NAD   Back:     No CVA tenderness   Lungs:     Respirations unlabored, no wheezing    Heart:    RRR, intact peripheral pulses   Abdomen:     Soft, NDNT, no masses, no guarding   Neuro/Psych:   Orientation intact, mood/affect pleasant       Results review:   I reviewed the patient's new clinical results.    Data review:  Lab Results (last 24 hours)       Procedure Component Value Units Date/Time    POC Glucose Once [[id number]]  (Normal) Collected: 24 1143    Specimen: Blood Updated: 24 1145     Glucose 113 mg/dL     POC Glucose Once [[id number]]  (Normal) Collected: 24 1118    Specimen: Blood Updated: 24 1123     Glucose 118 mg/dL     Basic Metabolic Panel [[id number]]  (Abnormal) Collected: 24 1027    Specimen: Blood Updated: 24  1107     Glucose 226 mg/dL       mg/dL      Creatinine 10.47 mg/dL      Sodium 135 mmol/L      Potassium 4.0 mmol/L      Chloride 101 mmol/L      CO2 19.0 mmol/L      Calcium 7.6 mg/dL      BUN/Creatinine Ratio 13.1     Anion Gap 15.0 mmol/L      eGFR 4.5 mL/min/1.73      Comment: <15 Indicative of kidney failure       Narrative:      GFR Normal >60  Chronic Kidney Disease <60  Kidney Failure <15    The GFR formula is only valid for adults with stable renal function between ages 18 and 70.    POC Glucose Once [[id number]]  (Abnormal) Collected: 08/05/24 1011    Specimen: Blood Updated: 08/05/24 1014     Glucose 176 mg/dL     Urine Culture - Urine, Urine, Clean Catch [[id number]]  (Normal) Collected: 08/04/24 1951    Specimen: Urine, Clean Catch Updated: 08/05/24 0937     Urine Culture No growth    POC Glucose Once [[id number]]  (Abnormal) Collected: 08/05/24 0908    Specimen: Blood Updated: 08/05/24 0909     Glucose 173 mg/dL     POC Glucose Once [[id number]]  (Normal) Collected: 08/05/24 0804    Specimen: Blood Updated: 08/05/24 0805     Glucose 129 mg/dL     POC Glucose Once [[id number]]  (Normal) Collected: 08/05/24 0657    Specimen: Blood Updated: 08/05/24 0658     Glucose 119 mg/dL     POC Glucose Once [[id number]]  (Normal) Collected: 08/05/24 0500    Specimen: Blood Updated: 08/05/24 0501     Glucose 125 mg/dL     Basic Metabolic Panel [[id number]]  (Abnormal) Collected: 08/05/24 0359    Specimen: Blood Updated: 08/05/24 0456     Glucose 130 mg/dL       mg/dL      Creatinine 12.90 mg/dL      Sodium 140 mmol/L      Potassium 4.5 mmol/L      Chloride 102 mmol/L      CO2 16.9 mmol/L      Calcium 8.0 mg/dL      BUN/Creatinine Ratio 11.4     Anion Gap 21.1 mmol/L      eGFR 3.5 mL/min/1.73      Comment: <15 Indicative of kidney failure       Narrative:      GFR Normal >60  Chronic Kidney Disease <60  Kidney Failure <15    The GFR formula is only valid for adults with stable renal function between ages 18 and  70.    Magnesium [[id number]]  (Normal) Collected: 08/05/24 0359    Specimen: Blood Updated: 08/05/24 0451     Magnesium 2.4 mg/dL     CBC & Differential [[id number]]  (Abnormal) Collected: 08/05/24 0359    Specimen: Blood Updated: 08/05/24 0436    Narrative:      The following orders were created for panel order CBC & Differential.  Procedure                               Abnormality         Status                     ---------                               -----------         ------                     CBC Auto Differential[[id number]]        Abnormal            Final result                 Please view results for these tests on the individual orders.    CBC Auto Differential [[id number]]  (Abnormal) Collected: 08/05/24 0359    Specimen: Blood Updated: 08/05/24 0436     WBC 12.08 10*3/mm3      RBC 3.93 10*6/mm3      Hemoglobin 12.0 g/dL      Hematocrit 35.5 %      MCV 90.3 fL      MCH 30.5 pg      MCHC 33.8 g/dL      RDW 13.1 %      RDW-SD 43.1 fl      MPV 9.6 fL      Platelets 252 10*3/mm3      Neutrophil % 83.3 %      Lymphocyte % 4.8 %      Monocyte % 10.8 %      Eosinophil % 0.3 %      Basophil % 0.1 %      Immature Grans % 0.7 %      Neutrophils, Absolute 10.06 10*3/mm3      Lymphocytes, Absolute 0.58 10*3/mm3      Monocytes, Absolute 1.31 10*3/mm3      Eosinophils, Absolute 0.04 10*3/mm3      Basophils, Absolute 0.01 10*3/mm3      Immature Grans, Absolute 0.08 10*3/mm3      nRBC 0.0 /100 WBC     POC Glucose Once [[id number]]  (Normal) Collected: 08/05/24 0356    Specimen: Blood Updated: 08/05/24 0401     Glucose 125 mg/dL     POC Glucose Once [[id number]]  (Abnormal) Collected: 08/05/24 0238    Specimen: Blood Updated: 08/05/24 0240     Glucose 136 mg/dL     POC Glucose Once [[id number]]  (Abnormal) Collected: 08/05/24 0143    Specimen: Blood Updated: 08/05/24 0145     Glucose 137 mg/dL     POC Glucose Once [[id number]]  (Abnormal) Collected: 08/05/24 0037    Specimen: Blood Updated: 08/05/24 0040     Glucose 165  mg/dL     Basic Metabolic Panel [[id number]]  (Abnormal) Collected: 08/04/24 2335    Specimen: Blood Updated: 08/05/24 0011     Glucose 164 mg/dL       mg/dL      Creatinine 14.34 mg/dL      Sodium 136 mmol/L      Potassium 5.3 mmol/L      Chloride 99 mmol/L      CO2 13.7 mmol/L      Calcium 7.9 mg/dL      BUN/Creatinine Ratio 11.2     Anion Gap 23.3 mmol/L      eGFR 3.1 mL/min/1.73      Comment: <15 Indicative of kidney failure       Narrative:      GFR Normal >60  Chronic Kidney Disease <60  Kidney Failure <15    The GFR formula is only valid for adults with stable renal function between ages 18 and 70.    Blood Culture - Blood, Arm, Left [[id number]] Collected: 08/04/24 2335    Specimen: Blood from Arm, Left Updated: 08/04/24 2340    POC Glucose Once [[id number]]  (Abnormal) Collected: 08/04/24 2329    Specimen: Blood Updated: 08/04/24 2336     Glucose 162 mg/dL     POC Glucose Once [[id number]]  (Abnormal) Collected: 08/04/24 2212    Specimen: Blood Updated: 08/04/24 2213     Glucose 150 mg/dL     POC Glucose Once [[id number]]  (Abnormal) Collected: 08/04/24 2057    Specimen: Blood Updated: 08/04/24 2057     Glucose 142 mg/dL     Basic Metabolic Panel [[id number]]  (Abnormal) Collected: 08/04/24 1958    Specimen: Blood Updated: 08/04/24 2043     Glucose 151 mg/dL       mg/dL      Creatinine 16.55 mg/dL      Sodium 135 mmol/L      Potassium 6.2 mmol/L      Comment: Slight hemolysis detected by analyzer. Result may be falsely elevated.        Chloride 92 mmol/L      CO2 13.5 mmol/L      Calcium 8.2 mg/dL      BUN/Creatinine Ratio 10.6     Anion Gap 29.5 mmol/L      eGFR 2.6 mL/min/1.73      Comment: <15 Indicative of kidney failure       Narrative:      GFR Normal >60  Chronic Kidney Disease <60  Kidney Failure <15    The GFR formula is only valid for adults with stable renal function between ages 18 and 70.    POC Glucose Once [[id number]]  (Abnormal) Collected: 08/04/24 2032    Specimen: Blood Updated:  08/04/24 2033     Glucose 156 mg/dL     Urine Drug Screen - Urine, Clean Catch [[id number]]  (Normal) Collected: 08/04/24 1951    Specimen: Urine, Clean Catch Updated: 08/04/24 2028     Amphet/Methamphet, Screen Negative     Barbiturates Screen, Urine Negative     Benzodiazepine Screen, Urine Negative     Cocaine Screen, Urine Negative     Opiate Screen Negative     THC, Screen, Urine Negative     Methadone Screen, Urine Negative     Oxycodone Screen, Urine Negative     Fentanyl, Urine Negative    Narrative:      Negative Thresholds Per Drugs Screened:    Amphetamines                 500 ng/ml  Barbiturates                 200 ng/ml  Benzodiazepines              100 ng/ml  Cocaine                      300 ng/ml  Methadone                    300 ng/ml  Opiates                      300 ng/ml  Oxycodone                    100 ng/ml  THC                           50 ng/ml  Fentanyl                       5 ng/ml      The Normal Value for all drugs tested is negative. This report includes final unconfirmed screening results to be used for medical treatment purposes only. Unconfirmed results must not be used for non-medical purposes such as employment or legal testing. Clinical consideration should be applied to any drug of abuse test, particularly when unconfirmed results are used.            Magnesium [[id number]]  (Abnormal) Collected: 08/04/24 1958    Specimen: Blood Updated: 08/04/24 2026     Magnesium 2.5 mg/dL     PSA DIAGNOSTIC [[id number]]  (Abnormal) Collected: 08/04/24 1814    Specimen: Blood Updated: 08/04/24 2026     PSA 53.100 ng/mL     Narrative:      Results may be falsely decreased if patient taking Biotin.    Testing Method: [employer] Electrochemiluminescence Immunoassay(ECLIA)  Values obtained with different assay methods or kits cannot be used interchangeably.    Phosphorus [[id number]]  (Abnormal) Collected: 08/04/24 1958    Specimen: Blood Updated: 08/04/24 2026     Phosphorus 9.2 mg/dL     Lactic  "Acid, Plasma [[id number]]  (Normal) Collected: 08/04/24 1958    Specimen: Blood Updated: 08/04/24 2024     Lactate 1.2 mmol/L     Osmolality, Serum [[id number]]  (Abnormal) Collected: 08/04/24 1958    Specimen: Blood Updated: 08/04/24 2018     Osmolality 350 mOsm/kg     Urinalysis With Microscopic If Indicated (No Culture) - Urine, Clean Catch [[id number]]  (Abnormal) Collected: 08/04/24 1951    Specimen: Urine, Clean Catch Updated: 08/04/24 2011     Color, UA Yellow     Appearance, UA Clear     pH, UA 5.5     Specific Gravity, UA 1.014     Glucose,  mg/dL (Trace)     Ketones, UA Negative     Bilirubin, UA Negative     Blood, UA Large (3+)     Protein, UA 30 mg/dL (1+)     Leuk Esterase, UA Small (1+)     Nitrite, UA Negative     Urobilinogen, UA 0.2 E.U./dL    Urinalysis, Microscopic Only - Urine, Clean Catch [[id number]]  (Abnormal) Collected: 08/04/24 1951    Specimen: Urine, Clean Catch Updated: 08/04/24 2011     RBC, UA Too Numerous to Count /HPF      WBC, UA 6-10 /HPF      Bacteria, UA None Seen /HPF      Squamous Epithelial Cells, UA 0-2 /HPF      Hyaline Casts, UA 0-2 /LPF      Methodology Automated Microscopy    Procalcitonin [[id number]]  (Abnormal) Collected: 08/04/24 1814    Specimen: Blood Updated: 08/04/24 2008     Procalcitonin 2.66 ng/mL     Narrative:      As a Marker for Sepsis (Non-Neonates):    1. <0.5 ng/mL represents a low risk of severe sepsis and/or septic shock.  2. >2 ng/mL represents a high risk of severe sepsis and/or septic shock.    As a Marker for Lower Respiratory Tract Infections that require antibiotic therapy:    PCT on Admission    Antibiotic Therapy       6-12 Hrs later    >0.5                Strongly Recommended  >0.25 - <0.5        Recommended   0.1 - 0.25          Discouraged              Remeasure/reassess PCT  <0.1                Strongly Discouraged     Remeasure/reassess PCT    As 28 day mortality risk marker: \"Change in Procalcitonin Result\" (>80% or <=80%) if Day 0 (or " Day 1) and Day 4 values are available. Refer to http://[email or web address]-pct-[email or web address]    Change in PCT <=80%  A decrease of PCT levels below or equal to 80% defines a positive change in PCT test result representing a higher risk for 28-day all-cause mortality of patients diagnosed with severe sepsis for septic shock.    Change in PCT >80%  A decrease of PCT levels of more than 80% defines a negative change in PCT result representing a lower risk for 28-day all-cause mortality of patients diagnosed with severe sepsis or septic shock.       POC Glucose Once [[id number]]  (Abnormal) Collected: 08/04/24 1957    Specimen: Blood Updated: 08/04/24 1958     Glucose 172 mg/dL     Phosphorus [[id number]]  (Abnormal) Collected: 08/04/24 1814    Specimen: Blood Updated: 08/04/24 1935     Phosphorus 9.9 mg/dL     Magnesium [[id number]]  (Abnormal) Collected: 08/04/24 1814    Specimen: Blood Updated: 08/04/24 1935     Magnesium 2.7 mg/dL     Hemoglobin A1c [[id number]]  (Abnormal) Collected: 08/04/24 1814    Specimen: Blood Updated: 08/04/24 1934     Hemoglobin A1C 7.90 %     Narrative:      Hemoglobin A1C Ranges:    Increased Risk for Diabetes  5.7% to 6.4%  Diabetes                     >= 6.5%  Diabetic Goal                < 7.0%    Blood Gas, Venous - [[id number]]  (Abnormal) Collected: 08/04/24 1910    Specimen: Venous Blood Updated: 08/04/24 1914     pH, Venous 7.252 pH Units      pCO2, Venous 18.0 mm Hg      pO2, Venous 53.8 mm Hg      HCO3, Venous 7.9 mmol/L      Base Excess, Venous -16.5 mmol/L      Comment: Serial Number: [id number]:  [id number]        O2 Saturation, Venous 83.5 %      CO2 Content 8.5 mmol/L      Barometric Pressure for Blood Gas 748.2000 mmHg      Modality Room Air     Rate 18 Breaths/minute      Notified Who Ryan Smith M.D Device Comment drawn by [id number] Sat 97%    Comprehensive Metabolic Panel [[id number]]  (Abnormal) Collected: 08/04/24 1814    Specimen: Blood Updated: 08/04/24 1904     Glucose 162  mg/dL       mg/dL      Creatinine 17.15 mg/dL      Sodium 127 mmol/L      Potassium 7.0 mmol/L      Comment: Slight hemolysis detected by analyzer. Result may be falsely elevated.        Chloride 87 mmol/L      CO2 11.0 mmol/L      Calcium 8.8 mg/dL      Total Protein 7.0 g/dL      Albumin 3.4 g/dL      ALT (SGPT) 27 U/L      AST (SGOT) 19 U/L      Alkaline Phosphatase 155 U/L      Total Bilirubin 0.9 mg/dL      Globulin 3.6 gm/dL      A/G Ratio 0.9 g/dL      BUN/Creatinine Ratio 11.0     Anion Gap 29.0 mmol/L      eGFR 2.5 mL/min/1.73      Comment: <15 Indicative of kidney failure       Narrative:      GFR Normal >60  Chronic Kidney Disease <60  Kidney Failure <15    The GFR formula is only valid for adults with stable renal function between ages 18 and 70.    Ketone Bodies, Serum (Not performed at [facility]) [[id number]]  (Abnormal) Collected: 08/04/24 1814    Specimen: Blood Updated: 08/04/24 1853    Narrative:      The following orders were created for panel order Ketone Bodies, Serum (Not performed at [facility]).  Procedure                               Abnormality         Status                     ---------                               -----------         ------                     Beta Hydroxybutyrate Martín...[[id number]]  Abnormal            Final result                 Please view results for these tests on the individual orders.    Beta Hydroxybutyrate Quantitative [[id number]]  (Abnormal) Collected: 08/04/24 1814    Specimen: Blood Updated: 08/04/24 1853     Beta-Hydroxybutyrate Quant 1.489 mmol/L     Narrative:      In the assessment of possible diabetic ketoacidosis, the test should be interpreted along with other clinical and laboratory findings.  A level greater than 1 mmol/L should require further evaluation and levels of more than 3 mmol/L require immediate medical review.    Single High Sensitivity Troponin T [[id number]]  (Abnormal) Collected: 08/04/24 1814    Specimen: Blood Updated: 08/04/24  1852     HS Troponin T 44 ng/L     Narrative:      High Sensitive Troponin T Reference Range:  <14.0 ng/L- Negative Female for AMI  <22.0 ng/L- Negative Male for AMI  >=14 - Abnormal Female indicating possible myocardial injury.  >=22 - Abnormal Male indicating possible myocardial injury.   Clinicians would have to utilize clinical acumen, EKG, Troponin, and serial changes to determine if it is an Acute Myocardial Infarction or myocardial injury due to an underlying chronic condition.         Acetaminophen Level [[id number]]  (Normal) Collected: 08/04/24 1814    Specimen: Blood Updated: 08/04/24 1852     Acetaminophen <5.0 mcg/mL     Ethanol [[id number]] Collected: 08/04/24 1814    Specimen: Blood Updated: 08/04/24 1852     Ethanol <10 mg/dL      Ethanol % <0.010 %     Salicylate Level [[id number]]  (Normal) Collected: 08/04/24 1814    Specimen: Blood Updated: 08/04/24 1850     Salicylate <0.3 mg/dL     Narrative:      Therapeutic range for Salicylates:  3.0 - 10.0 mg/dL for antipyretic/analgesic conditions  15.0 - 30.0 mg/dL for anti-inflammatory conditions    Protime-INR [[id number]]  (Abnormal) Collected: 08/04/24 1814    Specimen: Blood Updated: 08/04/24 1843     Protime 17.0 Seconds      INR 1.36    aPTT [[id number]]  (Normal) Collected: 08/04/24 1814    Specimen: Blood Updated: 08/04/24 1843     PTT 28.6 seconds     POC Glucose Once [[id number]]  (Abnormal) Collected: 08/04/24 1838    Specimen: Blood Updated: 08/04/24 1839     Glucose 151 mg/dL     CBC & Differential [[id number]]  (Abnormal) Collected: 08/04/24 1814    Specimen: Blood Updated: 08/04/24 1830    Narrative:      The following orders were created for panel order CBC & Differential.  Procedure                               Abnormality         Status                     ---------                               -----------         ------                     CBC Auto Differential[[id number]]        Abnormal            Final result                 Please view  results for these tests on the individual orders.    CBC Auto Differential [[id number]]  (Abnormal) Collected: 08/04/24 1814    Specimen: Blood Updated: 08/04/24 1830     WBC 18.10 10*3/mm3      RBC 4.94 10*6/mm3      Hemoglobin 15.0 g/dL      Hematocrit 44.2 %      MCV 89.5 fL      MCH 30.4 pg      MCHC 33.9 g/dL      RDW 11.8 %      RDW-SD 38.6 fl      MPV 9.4 fL      Platelets 297 10*3/mm3      Neutrophil % 87.4 %      Lymphocyte % 2.3 %      Monocyte % 9.1 %      Eosinophil % 0.2 %      Basophil % 0.1 %      Immature Grans % 0.9 %      Neutrophils, Absolute 15.84 10*3/mm3      Lymphocytes, Absolute 0.41 10*3/mm3      Monocytes, Absolute 1.64 10*3/mm3      Eosinophils, Absolute 0.03 10*3/mm3      Basophils, Absolute 0.02 10*3/mm3      Immature Grans, Absolute 0.16 10*3/mm3      nRBC 0.0 /100 WBC              Imaging:  Imaging Results (Last 24 Hours)       Procedure Component Value Units Date/Time    XR Chest 1 View [[id number]] Collected: 08/04/24 2059     Updated: 08/04/24 2102    Narrative:      SINGLE VIEW OF THE CHEST     HISTORY: Altered mental status     COMPARISON: None available.     FINDINGS:  Heart size is within normal limits, given technique. There is  calcification of the aorta. No pneumothorax, pleural effusion, or acute  infiltrate is seen.       Impression:      No acute findings.     This report was finalized on 8/4/2024 8:59 PM by Dr. Amy Hernandez M.D on Workstation: [id number]       CT Abdomen Pelvis Without Contrast [[id number]] Collected: 08/04/24 2000     Updated: 08/04/24 2009    Narrative:      CT OF THE AND PELVIS WITHOUT CONTRAST     HISTORY: Altered mental status     COMPARISON: None available.     TECHNIQUE: Axial CT imaging was obtained through the abdomen and pelvis.  No IV contrast was administered.     FINDINGS:  Images are degraded by artifact from patient's arms. There is some  minimal dependent atelectasis. There is a trace right pleural effusion  and small left pleural  effusion. No suspicious hepatic lesions are seen.  There is cholelithiasis. Pancreas is atrophic. Adrenal glands are  normal. There is borderline enlargement of the spleen, which measures up  to 13.7 cm in AP dimensions. Duodenum appears normal. The patient has  bilateral hydroureteronephrosis. No obstructing stone is seen. Urinary  bladder is distended, which may reflect urinary retention, especially  given the presence of a very enlarged prostate gland. There is extensive  perinephric and periureteral stranding. Appearance is concerning for  ascending urinary tract infection. There are aortoiliac calcifications.  Low-attenuation lesion within the inferior pole of the right kidney is  favored to be a cyst. Trace fluid tracks along the paracolic gutters,  particularly on the right. There is colonic diverticulosis. The appendix  is normal. There is no bowel obstruction. No acute osseous abnormalities  are seen. There is some mild body wall edema.          Impression:         1. The patient has moderate bilateral hydroureteronephrosis. This is  favored to be secondary to urinary retention, given marked distention of  the urinary bladder, and the patient's enlarged prostate gland. Patient  may benefit from catheterization. There is extensive bilateral  perinephric and periureteral stranding, and findings are favored to  reflect ascending urinary tract infection.  Radiation dose reduction techniques were utilized, including automated  exposure control and exposure modulation based on body size.        This report was finalized on 8/4/2024 8:06 PM by Dr. Amy Hernandez M.D on Workstation: [id number]       CT Head Without Contrast [[id number]] Collected: 08/04/24 1957     Updated: 08/04/24 2003    Narrative:      CT OF THE HEAD WITHOUT CONTRAST     HISTORY: Inability to eat and altered mental status     COMPARISON: None available.     TECHNIQUE: Axial CT imaging was obtained through the brain. No IV  contrast was  administered.     FINDINGS:  No acute intracranial hemorrhage is seen. There is atrophy. There is  periventricular and deep white matter microangiopathic change. There is  no midline shift or mass effect. Images suggest prior sinus surgery.  Mastoid air cells are clear.       Impression:      No acute intracranial findings.     Radiation dose reduction techniques were utilized, including automated  exposure control and exposure modulation based on body size.        This report was finalized on 8/4/2024 7:59 PM by Dr. Amy Hernandez M.D on Workstation: [id number]                  Assessment:     Urinary retention.  BPH.  NEHEMIAS.      Plan:     Start Flomax 0.4mg QD. Cont hagen on discharge to allow bladder to hopefully recover. Will plan VT 2 weeks in office. Will repeat MICAELA at that time. Family understands bladder may not recover and is atonic.   Elevated PSA 53. Could be related to retention.  Will work up at later time.    Duane Hubbard MD  08/05/24  12:16 EDT

## 2024-08-05 NOTE — PROGRESS NOTES
[facility] Progress Note      Patient Name: Froy Ngo  : 1945  MRN: [id number]  Primary Care Physician:  Ryan Gutierrez MD  Date of admission: 2024    Subjective     Interval History:   F/u NEHEMIAS     Review of Systems:   I/O 4.3/5.8  UOP robust after hagen  On bicarb drip   On insulin drip  Denies dyspnea/nausea     Objective     Vitals:   Temp:  [97.9 °F (36.6 °C)-98.6 °F (37 °C)] 97.9 °F (36.6 °C)  Heart Rate:  [] 87  Resp:  [16-20] 16  BP: (123-190)/(69-99) 167/74    Intake/Output Summary (Last 24 hours) at 2024 0655  Last data filed at 2024 0628  Gross per 24 hour   Intake 4313 ml   Output 5875 ml   Net -1562 ml       Physical Exam:    General Appearance: frail confused WM no distress on RA  Neck: supple, no JVD  Lungs: CTA bilat no rales  Heart: RRR, normal S1 and S2  Abdomen: soft, nontender, nondistended  : +hagen with bloody urine  Extremities: no edema, cyanosis or clubbing       Scheduled Meds:     cefTRIAXone, 2,000 mg, Intravenous, Q24H  donepezil, 10 mg, Oral, Nightly  memantine, 10 mg, Oral, Daily  senna-docusate sodium, 2 tablet, Oral, BID  sodium chloride, 10 mL, Intravenous, Q12H  sodium chloride, 10 mL, Intravenous, Q12H      IV Meds:   insulin, 0-100 Units/hr, Last Rate: 1 Units/hr (24)  sodium bicarbonate, 150 mEq, Last Rate: 150 mEq (24)        Results Reviewed:   I have personally reviewed the results from the time of this admission to 2024 06:55 EDT     Results from last 7 days   Lab Units 24  0359 24  2335 248 24  1814   SODIUM mmol/L 140 136 135* 127*   POTASSIUM mmol/L 4.5 5.3* 6.2* 7.0*   CHLORIDE mmol/L 102 99 92* 87*   CO2 mmol/L 16.9* 13.7* 13.5* 11.0*   BUN mg/dL 147* 160* 175* 189*   CREATININE mg/dL 12.90* 14.34* 16.55* 17.15*   CALCIUM mg/dL 8.0* 7.9* 8.2* 8.8   BILIRUBIN mg/dL  --   --   --  0.9   ALK PHOS U/L  --   --   --  155*   ALT (SGPT) U/L  --   --    --  27   AST (SGOT) U/L  --   --   --  19   GLUCOSE mg/dL 130* 164* 151* 162*     Estimated Creatinine Clearance: 5.4 mL/min (A) (by C-G formula based on SCr of 12.9 mg/dL (H)).  Results from last 7 days   Lab Units 08/05/24  0359 08/04/24 1958 08/04/24  1814   MAGNESIUM mg/dL 2.4 2.5* 2.7*   PHOSPHORUS mg/dL  --  9.2* 9.9*         Results from last 7 days   Lab Units 08/05/24  0359 08/04/24  1814   WBC 10*3/mm3 12.08* 18.10*   HEMOGLOBIN g/dL 12.0* 15.0   PLATELETS 10*3/mm3 252 297     Results from last 7 days   Lab Units 08/04/24  1814   INR  1.36*       Assessment / Plan     ASSESSMENT:  Non olig NEHEMIAS - obs uropathy.  CT abdomen showed marked distention of bladder with bilateral hydronephrosis.  Improved post hagen, BUN/Cr 189/17 -> 147/13.  Post-obs diuresis  Hyperkalemia.  Secondary to acute kidney injury and metabolic acidosis.  K down to 4.5 (from 7)  EuDKA - on insulin drip and bicarb drip, change latter back to DKA protocol as bicarb up to 17 and AG has closed.  Was on SGLT2 inh  Dementia   DM2, on ozempic, farxiga, metformin at home  Bladder outlet obstruction with bilateral hydronephrosis.  Hagen catheter in place with good urine output.  Gross hematuria.  On rocephin.  UROL to see   Hyperphosphatemia.  Secondary to renal failure, should improve as NEHEMIAS resolves   Hypertension.  BP labile, better in general overnight 130s to 160s systolic, not no antihypertensives at home.  Will give IV hydralazine PRN     PLAN:  Stop bicarb drip and resume IVF per DKA protocol ie D5 1/2 NS  Start oral nahco3  UROL to see  D/W RN      Gigi Engel MD  08/05/24  06:55 EDT    [facility]  [phone]

## 2024-08-05 NOTE — PROGRESS NOTES
"                                              LOS: 1 day   Patient Care Team:  Ryan Gutierrez MD as PCP - General (Internal Medicine)    Chief Complaint:  F/up EUKA, NEHEMIAS, obstructive uropathy and critical care management    Subjective   Interval History  I reviewed the admission note, progress notes, PMH, PSH, Family hx, social history, imagings and prior records on this admission, summarized the finding in my note and formulated a transition of care plan.      Blood sugar improved with insulin drip.  Patient has a Rodriguez catheter.  Hematuria improved.    REVIEW OF SYSTEMS:   CARDIOVASCULAR: No chest pain, chest pressure or chest discomfort. No palpitations or edema.   RESPIRATORY: No shortness of breath, cough or sputum.   GASTROINTESTINAL: No anorexia, nausea, vomiting or diarrhea. No abdominal pain.  CONSTITUTIONAL: No fever or chills.     Ventilator/Non-Invasive Ventilation Settings (From admission, onward)      None                  Physical Exam:     Vital Signs  Temp:  [97.7 °F (36.5 °C)-98.6 °F (37 °C)] 97.7 °F (36.5 °C)  Heart Rate:  [] 84  Resp:  [16-20] 16  BP: (123-190)/(69-99) 157/81    Intake/Output Summary (Last 24 hours) at 8/5/2024 0939  Last data filed at 8/5/2024 0628  Gross per 24 hour   Intake 4313 ml   Output 5875 ml   Net -1562 ml     Flowsheet Rows      Flowsheet Row First Filed Value   Admission Height 182.9 cm (72\") Documented at 08/04/2024 2029   Admission Weight 83.9 kg (185 lb) Documented at 08/04/2024 2029            PPE used per hospital policy    General Appearance:   Alert, cooperative, in no acute distress   ENMT:  Mallampati score 3, dry mucous membrane   Eyes:  Pupils equal and reactive to light. EOMI   Neck:    Trachea midline. No thyromegaly.   Lungs:    Clear to auscultation,respirations regular, even and nonlabored    Heart:   Regular rhythm and normal rate, normal S1 and S2, no         murmur   Skin:   No rash or ecchymosis   Abdomen:    Obese. Soft. No tenderness. " No HSM.   Neuro/psych:  Conscious, alert, oriented x3. Strength 5/5 in upper and lower  ext.  Appropriate mood and affect   Extremities:  No cyanosis, clubbing or edema.  Warm extremities and well-perfused          Results Review:        Results from last 7 days   Lab Units 08/05/24  0359 08/04/24  2335 08/04/24  1958   SODIUM mmol/L 140 136 135*   POTASSIUM mmol/L 4.5 5.3* 6.2*   CHLORIDE mmol/L 102 99 92*   CO2 mmol/L 16.9* 13.7* 13.5*   BUN mg/dL 147* 160* 175*   CREATININE mg/dL 12.90* 14.34* 16.55*   GLUCOSE mg/dL 130* 164* 151*   CALCIUM mg/dL 8.0* 7.9* 8.2*     Results from last 7 days   Lab Units 08/04/24  1814   HSTROP T ng/L 44*     Results from last 7 days   Lab Units 08/05/24  0359 08/04/24  1814   WBC 10*3/mm3 12.08* 18.10*   HEMOGLOBIN g/dL 12.0* 15.0   HEMATOCRIT % 35.5* 44.2   PLATELETS 10*3/mm3 252 297     Results from last 7 days   Lab Units 08/04/24  1814   INR  1.36*   APTT seconds 28.6                       Results from last 7 days   Lab Units 08/04/24  1910   MODALITY  Room Air         I reviewed the patient's new clinical results.        Medication Review:   cefTRIAXone, 2,000 mg, Intravenous, Q24H  donepezil, 10 mg, Oral, Nightly  memantine, 10 mg, Oral, Daily  senna-docusate sodium, 2 tablet, Oral, BID  sodium bicarbonate, 650 mg, Oral, TID  sodium chloride, 10 mL, Intravenous, Q12H  sodium chloride, 10 mL, Intravenous, Q12H        dextrose 5 % and sodium chloride 0.45 %, 125 mL/hr, Last Rate: 125 mL/hr (08/05/24 0803)  insulin, 0-100 Units/hr, Last Rate: 1.8 Units/hr (08/05/24 0909)        Diagnostic imaging:  I personally and independently reviewed the following images:  CXR 8/4/2024: Clear    Assessment     Nonoliguric NEHEMIAS, secondary to obstructive uropathy.  Obstructive uropathy with bilateral hydronephrosis  Gross hematuria post Rodriguez insertion  Euglycemic DKA  Electrolytes disturbance: Hyperkalemia and hyperphosphatemia  Leukocytosis: Stress-induced VS UTI  Anemia  Enlarged prostate on  CT abdomen pelvis 8/4/2024    HTN  DM type II, on Ozempic, Farxiga and metformin  Alzheimer's  GERD    All problems new to me    Plan       Empiric Rocephin awaiting urine culture  Rodriguez catheter.  Start tamsulosin.  Consult urology.  Insulin drip per DKA protocol  Continue donepezil and memantine  IV hydration with D5 half-normal saline.    Discussed with family at bedside and discussed with ICU staff    Scott Delgado MD  08/05/24  09:39 EDT      Time: Critical care 37 min      This note was dictated utilizing Dragon dictation

## 2024-08-05 NOTE — CASE MANAGEMENT/SOCIAL WORK
Discharge Planning Assessment  [facility]     Patient Name: Froy Ngo  MRN: [id number]  Today's Date: 8/5/2024    Admit Date: 8/4/2024    Plan: Plan is undetermined  Following for therapy evaluations   Discharge Needs Assessment       Row Name 08/05/24 1549       Living Environment    People in Home spouse    Current Living Arrangements home    Potentially Unsafe Housing Conditions none    In the past 12 months has the electric, gas, oil, or water company threatened to shut off services in your home? No    Primary Care Provided by self    Provides Primary Care For no one    Family Caregiver if Needed none       Resource/Environmental Concerns    Resource/Environmental Concerns none    Transportation Concerns none       Transportation Needs    In the past 12 months, has lack of transportation kept you from medical appointments or from getting medications? no    In the past 12 months, has lack of transportation kept you from meetings, work, or from getting things needed for daily living? No       Transition Planning    Patient/Family Anticipates Transition to home with family    Patient/Family Anticipated Services at Transition none       Discharge Needs Assessment    Equipment Currently Used at Home none    Anticipated Changes Related to Illness none    Equipment Needed After Discharge none                   Discharge Plan       Row Name 08/05/24 1556       Plan    Plan Plan is undetermined  Following for therapy evaluations    Plan Comments CCP spoke to patient and his wife at bedside  Pt face sheet verified Pharmacy and physician verified.  Pt lives with his wife in a single story house.  He uses no DME to ambulate.  he is IADL's.  He has no Hh or rehab history  CCP following for discharge needs as clinical course evolves.                  Continued Care and Services - Admitted Since 8/4/2024    No active coordination exists for this encounter.          Demographic Summary    No documentation.                   Functional Status    No documentation.                  Psychosocial    No documentation.                  Abuse/Neglect    No documentation.                  Legal    No documentation.                  Substance Abuse    No documentation.                  Patient Forms    No documentation.                     Linnea Fine RN

## 2024-08-06 LAB
ANION GAP SERPL CALCULATED.3IONS-SCNC: 10 MMOL/L (ref 5–15)
ANION GAP SERPL CALCULATED.3IONS-SCNC: 15.5 MMOL/L (ref 5–15)
ANION GAP SERPL CALCULATED.3IONS-SCNC: 8.9 MMOL/L (ref 5–15)
BACTERIA SPEC AEROBE CULT: NO GROWTH
BASOPHILS # BLD AUTO: 0.01 10*3/MM3 (ref 0–0.2)
BASOPHILS NFR BLD AUTO: 0.1 % (ref 0–1.5)
BUN SERPL-MCNC: 108 MG/DL (ref 8–23)
BUN SERPL-MCNC: 110 MG/DL (ref 8–23)
BUN SERPL-MCNC: 111 MG/DL (ref 8–23)
BUN/CREAT SERPL: 14.2 (ref 7–25)
BUN/CREAT SERPL: 16.8 (ref 7–25)
BUN/CREAT SERPL: 18.8 (ref 7–25)
CALCIUM SPEC-SCNC: 6.9 MG/DL (ref 8.6–10.5)
CALCIUM SPEC-SCNC: 7.6 MG/DL (ref 8.6–10.5)
CALCIUM SPEC-SCNC: 7.8 MG/DL (ref 8.6–10.5)
CHLORIDE SERPL-SCNC: 100 MMOL/L (ref 98–107)
CHLORIDE SERPL-SCNC: 103 MMOL/L (ref 98–107)
CHLORIDE SERPL-SCNC: 103 MMOL/L (ref 98–107)
CO2 SERPL-SCNC: 19.1 MMOL/L (ref 22–29)
CO2 SERPL-SCNC: 19.5 MMOL/L (ref 22–29)
CO2 SERPL-SCNC: 20 MMOL/L (ref 22–29)
CREAT SERPL-MCNC: 5.84 MG/DL (ref 0.76–1.27)
CREAT SERPL-MCNC: 6.62 MG/DL (ref 0.76–1.27)
CREAT SERPL-MCNC: 7.6 MG/DL (ref 0.76–1.27)
DEPRECATED RDW RBC AUTO: 42 FL (ref 37–54)
EGFRCR SERPLBLD CKD-EPI 2021: 6.7 ML/MIN/1.73
EGFRCR SERPLBLD CKD-EPI 2021: 7.9 ML/MIN/1.73
EGFRCR SERPLBLD CKD-EPI 2021: 9.2 ML/MIN/1.73
EOSINOPHIL # BLD AUTO: 0.08 10*3/MM3 (ref 0–0.4)
EOSINOPHIL NFR BLD AUTO: 0.9 % (ref 0.3–6.2)
ERYTHROCYTE [DISTWIDTH] IN BLOOD BY AUTOMATED COUNT: 12.7 % (ref 12.3–15.4)
GLUCOSE BLDC GLUCOMTR-MCNC: 118 MG/DL (ref 70–130)
GLUCOSE BLDC GLUCOMTR-MCNC: 131 MG/DL (ref 70–130)
GLUCOSE BLDC GLUCOMTR-MCNC: 134 MG/DL (ref 70–130)
GLUCOSE BLDC GLUCOMTR-MCNC: 137 MG/DL (ref 70–130)
GLUCOSE BLDC GLUCOMTR-MCNC: 155 MG/DL (ref 70–130)
GLUCOSE BLDC GLUCOMTR-MCNC: 156 MG/DL (ref 70–130)
GLUCOSE BLDC GLUCOMTR-MCNC: 157 MG/DL (ref 70–130)
GLUCOSE BLDC GLUCOMTR-MCNC: 159 MG/DL (ref 70–130)
GLUCOSE BLDC GLUCOMTR-MCNC: 160 MG/DL (ref 70–130)
GLUCOSE BLDC GLUCOMTR-MCNC: 177 MG/DL (ref 70–130)
GLUCOSE BLDC GLUCOMTR-MCNC: 184 MG/DL (ref 70–130)
GLUCOSE BLDC GLUCOMTR-MCNC: 220 MG/DL (ref 70–130)
GLUCOSE BLDC GLUCOMTR-MCNC: 314 MG/DL (ref 70–130)
GLUCOSE SERPL-MCNC: 148 MG/DL (ref 65–99)
GLUCOSE SERPL-MCNC: 157 MG/DL (ref 65–99)
GLUCOSE SERPL-MCNC: 337 MG/DL (ref 65–99)
HCT VFR BLD AUTO: 32.4 % (ref 37.5–51)
HGB BLD-MCNC: 10.6 G/DL (ref 13–17.7)
IMM GRANULOCYTES # BLD AUTO: 0.08 10*3/MM3 (ref 0–0.05)
IMM GRANULOCYTES NFR BLD AUTO: 0.9 % (ref 0–0.5)
LYMPHOCYTES # BLD AUTO: 0.65 10*3/MM3 (ref 0.7–3.1)
LYMPHOCYTES NFR BLD AUTO: 7.2 % (ref 19.6–45.3)
MAGNESIUM SERPL-MCNC: 2.1 MG/DL (ref 1.6–2.4)
MCH RBC QN AUTO: 30 PG (ref 26.6–33)
MCHC RBC AUTO-ENTMCNC: 32.7 G/DL (ref 31.5–35.7)
MCV RBC AUTO: 91.8 FL (ref 79–97)
MONOCYTES # BLD AUTO: 0.79 10*3/MM3 (ref 0.1–0.9)
MONOCYTES NFR BLD AUTO: 8.8 % (ref 5–12)
NEUTROPHILS NFR BLD AUTO: 7.39 10*3/MM3 (ref 1.7–7)
NEUTROPHILS NFR BLD AUTO: 82.1 % (ref 42.7–76)
NRBC BLD AUTO-RTO: 0 /100 WBC (ref 0–0.2)
PLATELET # BLD AUTO: 246 10*3/MM3 (ref 140–450)
PMV BLD AUTO: 9.4 FL (ref 6–12)
POTASSIUM SERPL-SCNC: 3.6 MMOL/L (ref 3.5–5.2)
POTASSIUM SERPL-SCNC: 4 MMOL/L (ref 3.5–5.2)
POTASSIUM SERPL-SCNC: 4.7 MMOL/L (ref 3.5–5.2)
RBC # BLD AUTO: 3.53 10*6/MM3 (ref 4.14–5.8)
SODIUM SERPL-SCNC: 131 MMOL/L (ref 136–145)
SODIUM SERPL-SCNC: 133 MMOL/L (ref 136–145)
SODIUM SERPL-SCNC: 135 MMOL/L (ref 136–145)
WBC NRBC COR # BLD AUTO: 9 10*3/MM3 (ref 3.4–10.8)

## 2024-08-06 PROCEDURE — 97530 THERAPEUTIC ACTIVITIES: CPT

## 2024-08-06 PROCEDURE — 85025 COMPLETE CBC W/AUTO DIFF WBC: CPT

## 2024-08-06 PROCEDURE — 97535 SELF CARE MNGMENT TRAINING: CPT

## 2024-08-06 PROCEDURE — 63710000001 INSULIN GLARGINE PER 5 UNITS: Performed by: INTERNAL MEDICINE

## 2024-08-06 PROCEDURE — 25010000002 CALCIUM GLUCONATE-NACL 1-0.675 GM/50ML-% SOLUTION: Performed by: INTERNAL MEDICINE

## 2024-08-06 PROCEDURE — 82948 REAGENT STRIP/BLOOD GLUCOSE: CPT

## 2024-08-06 PROCEDURE — 80048 BASIC METABOLIC PNL TOTAL CA: CPT

## 2024-08-06 PROCEDURE — 97166 OT EVAL MOD COMPLEX 45 MIN: CPT

## 2024-08-06 PROCEDURE — 83735 ASSAY OF MAGNESIUM: CPT

## 2024-08-06 PROCEDURE — 97116 GAIT TRAINING THERAPY: CPT

## 2024-08-06 PROCEDURE — 25810000003 SODIUM CHLORIDE 0.9 % SOLUTION: Performed by: INTERNAL MEDICINE

## 2024-08-06 PROCEDURE — 97162 PT EVAL MOD COMPLEX 30 MIN: CPT

## 2024-08-06 RX ORDER — INSULIN LISPRO 100 [IU]/ML
1-200 INJECTION, SOLUTION INTRAVENOUS; SUBCUTANEOUS AS NEEDED
Status: DISCONTINUED | OUTPATIENT
Start: 2024-08-06 | End: 2024-08-09 | Stop reason: HOSPADM

## 2024-08-06 RX ORDER — INSULIN LISPRO 100 [IU]/ML
1-200 INJECTION, SOLUTION INTRAVENOUS; SUBCUTANEOUS
Status: DISCONTINUED | OUTPATIENT
Start: 2024-08-06 | End: 2024-08-08

## 2024-08-06 RX ORDER — SODIUM CHLORIDE 9 MG/ML
75 INJECTION, SOLUTION INTRAVENOUS CONTINUOUS
Status: DISCONTINUED | OUTPATIENT
Start: 2024-08-06 | End: 2024-08-08

## 2024-08-06 RX ORDER — CALCIUM GLUCONATE 20 MG/ML
1000 INJECTION, SOLUTION INTRAVENOUS ONCE
Status: COMPLETED | OUTPATIENT
Start: 2024-08-06 | End: 2024-08-06

## 2024-08-06 RX ORDER — DEXTROSE MONOHYDRATE 25 G/50ML
10-50 INJECTION, SOLUTION INTRAVENOUS
Status: DISCONTINUED | OUTPATIENT
Start: 2024-08-06 | End: 2024-08-09 | Stop reason: HOSPADM

## 2024-08-06 RX ORDER — IBUPROFEN 600 MG/1
1 TABLET ORAL
Status: DISCONTINUED | OUTPATIENT
Start: 2024-08-06 | End: 2024-08-09 | Stop reason: HOSPADM

## 2024-08-06 RX ORDER — NICOTINE POLACRILEX 4 MG
15 LOZENGE BUCCAL
Status: DISCONTINUED | OUTPATIENT
Start: 2024-08-06 | End: 2024-08-09 | Stop reason: HOSPADM

## 2024-08-06 RX ADMIN — DONEPEZIL HYDROCHLORIDE 10 MG: 10 TABLET, FILM COATED ORAL at 22:19

## 2024-08-06 RX ADMIN — Medication 10 ML: at 11:51

## 2024-08-06 RX ADMIN — INSULIN GLARGINE 16 UNITS: 100 INJECTION, SOLUTION SUBCUTANEOUS at 13:30

## 2024-08-06 RX ADMIN — SODIUM BICARBONATE 650 MG: 650 TABLET, ORALLY DISINTEGRATING ORAL at 08:00

## 2024-08-06 RX ADMIN — Medication 10 ML: at 20:53

## 2024-08-06 RX ADMIN — SODIUM BICARBONATE 650 MG: 650 TABLET, ORALLY DISINTEGRATING ORAL at 22:19

## 2024-08-06 RX ADMIN — SODIUM CHLORIDE 75 ML/HR: 9 INJECTION, SOLUTION INTRAVENOUS at 11:44

## 2024-08-06 RX ADMIN — MEMANTINE HYDROCHLORIDE 10 MG: 10 TABLET, FILM COATED ORAL at 08:00

## 2024-08-06 RX ADMIN — SODIUM BICARBONATE 650 MG: 650 TABLET, ORALLY DISINTEGRATING ORAL at 16:45

## 2024-08-06 RX ADMIN — INSULIN GLARGINE 13 UNITS: 100 INJECTION, SOLUTION SUBCUTANEOUS at 20:52

## 2024-08-06 RX ADMIN — CALCIUM GLUCONATE 1000 MG: 20 INJECTION, SOLUTION INTRAVENOUS at 11:44

## 2024-08-06 RX ADMIN — TAMSULOSIN HYDROCHLORIDE 0.4 MG: 0.4 CAPSULE ORAL at 08:00

## 2024-08-06 NOTE — PLAN OF CARE
Goal Outcome Evaluation:  Plan of Care Reviewed With: patient, daughter           Outcome Evaluation: Pt is an 77 yo M admitted from home with DKA, AMS, NEHEMIAS, B hydronephrosis. Pt with hx of dementia and confused on eval. Per chart, pt lives with his wife and reports independence at BL with no AD. Pt impulsive throughout session, perseverating on various things and needing frequent redirection. Pt presents to PT with impaired balance, endurance, and cognition limiting mobility. Pt transferred to EOB with min A x2 and stood with min A x2 - HHA. Pt with posterior lean initially but able to correct with cues. Sequenced side steps well, so progressed gait in room - ambulated 40ft with HHA x1-2 and min A for safety/line management. Pt's family requesting pt use the bathroom, increased time spent d/t perseverating on wiping, but required only SV for sitting balance on commode for safety though pt repeatedly asking door to be closed. Pt stood min A x1 from commode and ambulated back around room, RN requesting pt return to bed. PT will continue to follow, anticipate DC to SNF at this time, though hopeful for improved mobility status pending cognition.      Anticipated Discharge Disposition (PT): skilled nursing facility (Pending progress)

## 2024-08-06 NOTE — PLAN OF CARE
Goal Outcome Evaluation:  Plan of Care Reviewed With: patient, daughter           Outcome Evaluation: Pt is an 79 y/o M admitted with DKA, NEHEMIAS, howard hydronephrosis. Hx of dementia. Pt lives with his spouse and is independent with ADLs/fxl mobility. Today he presents with confusion and impulsivity, requiring multiple cues for safety t/o session. He sits EOB and stands with MinAx2, posterior LOBs initially in standing. Performs fxl ambulation in room/to bathroom with minAx2 for balance/safety. Perseverates on toileting hygiene after BM, requires cues to terminate task and assisted back to bed. OT will cont to follow. D/c recs for SNF pending cognition.

## 2024-08-06 NOTE — THERAPY EVALUATION
Patient Name: Froy Ngo  : 1945    MRN: [id number]                              Today's Date: 2024       Admit Date: 2024    Visit Dx:     ICD-10-CM ICD-9-CM   1. Diabetic ketoacidosis without coma associated with other specified diabetes mellitus  E13.10 250.12   2. Altered mental status, unspecified altered mental status type  R41.82 780.97   3. Severe dehydration  E86.0 276.51   4. Acute kidney injury (NEHEMIAS) with acute tubular necrosis (ATN)  N17.0 584.5   5. Hyperkalemia  E87.5 276.7     Patient Active Problem List   Diagnosis    DKA (diabetic ketoacidosis)     No past medical history on file.  No past surgical history on file.   General Information       Row Name 24 1239          OT Time and Intention    Document Type evaluation  -     Mode of Treatment occupational therapy  -       Row Name 24 1239          General Information    Patient Profile Reviewed yes  -JW     Prior Level of Function independent:;ADL's;all household mobility  w/o AD  -     Existing Precautions/Restrictions fall  -     Barriers to Rehab cognitive status  -       Row Name 24 1239          Living Environment    People in Home spouse  -       Row Name 24 1239          Cognition    Orientation Status (Cognition) oriented to;person;place;verbal cues/prompts needed for orientation  confused about situation. Easily distracted and perseverating on multiple topics t/o  -       Row Name 24 1239          Safety Issues, Functional Mobility    Safety Issues Affecting Function (Mobility) awareness of need for assistance;impulsivity;insight into deficits/self-awareness;judgment;problem-solving;safety precaution awareness;safety precautions follow-through/compliance;sequencing abilities  -     Impairments Affecting Function (Mobility) balance;cognition;endurance/activity tolerance  -     Cognitive Impairments, Mobility Safety/Performance impulsivity;insight into  deficits/self-awareness;judgment;problem-solving/reasoning;safety precaution follow-through;sequencing abilities;attention  -     Comment, Safety Issues/Impairments (Mobility) cues for safety  -               User Key  (r) = Recorded By, (t) = Taken By, (c) = Cosigned By      Initials Name Provider Type     Shelli Bloom OT Occupational Therapist                     Mobility/ADL's       Row Name 08/06/24 1242          Bed Mobility    Bed Mobility supine-sit;sit-supine  -     Supine-Sit [place] (Bed Mobility) minimum assist (75% patient effort);2 person assist;verbal cues  -     Sit-Supine [place] (Bed Mobility) standby assist;contact guard;verbal cues  -     Assistive Device (Bed Mobility) bed rails;head of bed elevated  -[facility] Name 08/06/24 1242          Transfers    Transfers sit-stand transfer;toilet transfer  -       Row Name 08/06/24 124          Sit-Stand Transfer    Sit-Stand [place] (Transfers) minimum assist (75% patient effort);2 person assist;verbal cues  [facility]     Comment, (Sit-Stand Transfer) HHA, posterior leaning initially  -[facility] Name 08/06/24 1242          Toilet Transfer    Type (Toilet Transfer) sit-stand;stand-sit  -     [place] Level (Toilet Transfer) minimum assist (75% patient effort);verbal cues;1 person assist  -     Assistive Device (Toilet Transfer) commode;grab bars/safety frame  -       Row Name 08/06/24 1242          Functional Mobility    Functional Mobility- Comment fxl ambulation around room and into bathroom with MinAx2/ HHA, multiple cues for safety  -       Row Name 08/06/24 1242          Activities of Daily Living    BADL Assessment/Intervention toileting  -[facility] Name 08/06/24 1242          Toileting Assessment/Training    [place] Level (Toileting) toileting skills;adjust/manage clothing;perform perineal hygiene;standby assist;verbal cues  [facility]     Assistive Devices (Toileting) commode;grab bar/safety frame  -      Position (Toileting) supported standing;unsupported sitting  -     Comment, (Toileting) has hagen cath. Ambulates to bathroom for BM, able to complete [place] hygiene with sba, pt perseverating on task and requires cues to terminate.  -               User Key  (r) = Recorded By, (t) = Taken By, (c) = Cosigned By      Initials Name Provider Type     Shelli Bloom OT Occupational Therapist                   Obj/Interventions       Row Name 08/06/24 1246          Sensory Assessment (Somatosensory)    Sensory Assessment (Somatosensory) sensation intact  -[facility] Name 08/06/24 1246          Vision Assessment/Intervention    Visual Impairment/Limitations WNL  -[facility] Name 08/06/24 1246          Range of Motion Comprehensive    General Range of Motion bilateral upper extremity ROM [facility] Name 08/06/24 1246          Strength Comprehensive (MMT)    Comment, General Manual Muscle Testing (MMT) Assessment UE strength [facility]  -[facility] Name 08/06/24 1246          Balance    Balance Assessment sitting static balance;sitting dynamic balance;standing static balance;standing dynamic balance  -     Static Sitting Balance standby assist  -     Dynamic Sitting Balance contact guard  -     Position, Sitting Balance sitting edge of bed  -     Static Standing Balance minimal assist  -     Dynamic Standing Balance minimal assist;2-person assist  -     Position/Device Used, Standing Balance supported  -     Balance Interventions sitting;standing;occupation based/functional task  -               User Key  (r) = Recorded By, (t) = Taken By, (c) = Cosigned By      Initials Name Provider Type     Shelli Bloom OT Occupational Therapist                   Goals/Plan       Row Name 08/06/24 1300          Transfer Goal 1 (OT)    Activity/Assistive Device (Transfer Goal 1, OT) transfers, all  -     [place] Level/Cues Needed (Transfer Goal 1, OT) contact guard required  -     Time Frame (Transfer  Goal 1, OT) short term goal (STG);2 weeks  -JW     Progress/Outcome (Transfer Goal 1, OT) goal ongoing  -       Row Name 08/06/24 1300          Dressing Goal 1 (OT)    Activity/Device (Dressing Goal 1, OT) dressing skills, all  -JW     [place]/Cues Needed (Dressing Goal 1, OT) standby assist  -JW     Time Frame (Dressing Goal 1, OT) short term goal (STG);2 weeks  -JW     Progress/Outcome (Dressing Goal 1, OT) goal ongoing  -       Row Name 08/06/24 1300          Toileting Goal 1 (OT)    Activity/Device (Toileting Goal 1, OT) toileting skills, all  -JW     [place] Level/Cues Needed (Toileting Goal 1, OT) set-up required;supervision required  -JW     Time Frame (Toileting Goal 1, OT) short term goal (STG);2 weeks  -JW     Progress/Outcome (Toileting Goal 1, OT) goal ongoing  -       Row Name 08/06/24 1300          Grooming Goal 1 (OT)    Activity/Device (Grooming Goal 1, OT) grooming skills, all  -JW     [place] (Grooming Goal 1, OT) independent  -JW     Time Frame (Grooming Goal 1, OT) short term goal (STG);2 weeks  -JW     Progress/Outcome (Grooming Goal 1, OT) goal ongoing  -       Row Name 08/06/24 1300          Problem Specific Goal 1 (OT)    Problem Specific Goal 1 (OT) Pt will perform ADLs at the sink with </=1 VC for sequencing/termination of task  -JW     Time Frame (Problem Specific Goal 1, OT) short term goal (STG);2 weeks  -     Progress/Outcome (Problem Specific Goal 1, OT) goal ongoing  -       Row Name 08/06/24 1300          Therapy Assessment/Plan (OT)    Planned Therapy Interventions (OT) activity tolerance training;BADL retraining;functional balance retraining;occupation/activity based interventions;patient/caregiver education/training;transfer/mobility retraining  -               User Key  (r) = Recorded By, (t) = Taken By, (c) = Cosigned By      Initials Name Provider Type    JW Shelli Bloom, OT Occupational Therapist                   Clinical Impression       Row Name  08/06/24 1246          Pain Assessment    Pretreatment Pain Rating 0/10 - no pain  -     Posttreatment Pain Rating 0/10 - no pain  -       Row Name 08/06/24 1246          Plan of Care Review    Plan of Care Reviewed With patient;daughter  -     Outcome Evaluation Pt is an 77 y/o M admitted with DKA, NEHEMIAS, howard hydronephrosis. Hx of dementia. Pt lives with his spouse and is independent with ADLs/fxl mobility. Today he presents with confusion and impulsivity, requiring multiple cues for safety t/o session. He sits EOB and stands with MinAx2, posterior LOBs initially in standing. Performs fxl ambulation in room/to bathroom with minAx2 for balance/safety. Perseverates on toileting hygiene after BM, requires cues to terminate task and assisted back to bed. OT will cont to follow. D/c recs for SNF pending cognition.  -       Row Name 08/06/24 1246          Therapy Assessment/Plan (OT)    Rehab Potential (OT) good, to achieve stated therapy goals  -     Criteria for Skilled Therapeutic Interventions Met (OT) yes;skilled treatment is necessary  -     Therapy Frequency (OT) 5 times/wk  -       Row Name 08/06/24 1246          Therapy Plan Review/Discharge Plan (OT)    Anticipated Discharge Disposition (OT) skilled nursing facility  pending cognition  -       Row Name 08/06/24 1246          Positioning and Restraints    Pre-Treatment Position in bed  -     Post Treatment Position bed  -JW     In Bed fowlers;call light within reach;encouraged to call for assist;exit alarm on  -               User Key  (r) = Recorded By, (t) = Taken By, (c) = Cosigned By      Initials Name Provider Type    Shelli Banuelos OT Occupational Therapist                   Outcome Measures       Row Name 08/06/24 8314          How much help from another is currently needed...    Putting on and taking off regular lower body clothing? 2  -JW     Bathing (including washing, rinsing, and drying) 3  -JW     Toileting (which includes using  toilet bed pan or urinal) 3  -JW     Putting on and taking off regular upper body clothing 3  -JW     Taking care of personal grooming (such as brushing teeth) 3  -JW     Eating meals 3  -     AM-PAC 6 Clicks Score (OT) 17  -       Row Name 08/06/24 1303          Modified [place] Scale    Modified [place] Scale 4 - Moderately severe disability.  Unable to walk without assistance, and unable to attend to own bodily needs without assistance.  -       Row Name 08/06/24 1303          Functional Assessment    Outcome Measure Options AM-PAC 6 Clicks Daily Activity (OT);Modified [place]  -               User Key  (r) = Recorded By, (t) = Taken By, (c) = Cosigned By      Initials Name Provider Type    Shelli Banuelos OT Occupational Therapist                    Occupational Therapy Education       Title: PT OT SLP Therapies (In Progress)       Topic: Occupational Therapy (In Progress)       Point: ADL training (Done)       Description:   Instruct learner(s) on proper safety adaptation and remediation techniques during self care or transfers.   Instruct in proper use of assistive devices.                  Learning Progress Summary             Patient Acceptance, E, VU by TYLER at 8/6/2024 1303                         Point: Home exercise program (Not Started)       Description:   Instruct learner(s) on appropriate technique for monitoring, assisting and/or progressing therapeutic exercises/activities.                  Learner Progress:  Not documented in this visit.              Point: Precautions (Done)       Description:   Instruct learner(s) on prescribed precautions during self-care and functional transfers.                  Learning Progress Summary             Patient Acceptance, E, VU by TYLER at 8/6/2024 1303                         Point: Body mechanics (Done)       Description:   Instruct learner(s) on proper positioning and spine alignment during self-care, functional mobility activities and/or exercises.                   Learning Progress Summary             Patient Acceptance, E, VU by  at 8/6/2024 1303                                         User Key       Initials Effective Dates Name Provider Type Discipline     06/10/21 -  Shelli Bloom OT Occupational Therapist OT                  OT Recommendation and Plan  Planned Therapy Interventions (OT): activity tolerance training, BADL retraining, functional balance retraining, occupation/activity based interventions, patient/caregiver education/training, transfer/mobility retraining  Therapy Frequency (OT): 5 times/wk  Plan of Care Review  Plan of Care Reviewed With: patient, daughter  Outcome Evaluation: Pt is an 79 y/o M admitted with DKA, NEHEMIAS, howard hydronephrosis. Hx of dementia. Pt lives with his spouse and is independent with ADLs/fxl mobility. Today he presents with confusion and impulsivity, requiring multiple cues for safety t/o session. He sits EOB and stands with MinAx2, posterior LOBs initially in standing. Performs fxl ambulation in room/to bathroom with minAx2 for balance/safety. Perseverates on toileting hygiene after BM, requires cues to terminate task and assisted back to bed. OT will cont to follow. D/c recs for SNF pending cognition.     Time Calculation:   Evaluation Complexity (OT)  Review Occupational Profile/Medical/Therapy History Complexity: expanded/moderate complexity  Assessment, Occupational Performance/Identification of Deficit Complexity: 3-5 performance deficits  Clinical Decision Making Complexity (OT): detailed assessment/moderate complexity  Overall Complexity of Evaluation (OT): moderate complexity     Time Calculation- OT       Row Name 08/06/24 1304             Time Calculation- OT    OT Start Time 1029  -      OT Stop Time 1105  -      OT Time Calculation (min) 36 min  -      Total Timed Code Minutes- OT 26 minute(s)  -      OT Received On 08/06/24  -      OT - Next Appointment 08/07/24  -      OT Goal Re-Cert Due Date  08/20/24  -         Timed Charges    13217 - OT Self Care/Mgmt Minutes 26  -JW         Untimed Charges    OT Eval/Re-eval Minutes 10  -JW         Total Minutes    Timed Charges Total Minutes 26  -JW      Untimed Charges Total Minutes 10  -JW       Total Minutes 36  -JW                User Key  (r) = Recorded By, (t) = Taken By, (c) = Cosigned By      Initials Name Provider Type     Shelli Bloom OT Occupational Therapist                  Therapy Charges for Today       Code Description Service Date Service Provider Modifiers Qty    85711367902 HC OT SELF CARE/MGMT/TRAIN EA 15 MIN 8/6/2024 Shelli Bloom OT GO 2    [id number]  OT EVAL MOD COMPLEXITY 3 8/6/2024 Shelli Bloom OT GO 1                 Shelli Bloom OT  8/6/2024

## 2024-08-06 NOTE — PROGRESS NOTES
[facility] Progress Note      Patient Name: Froy Ngo  : 1945  MRN: [id number]  Primary Care Physician:  Ryan Gutierrez MD  Date of admission: 2024    Subjective     Interval History:   F/u NEHEMIAS    Review of Systems:   UOP 6750   Remains on insulin drip   D5 1/2 NS IVF @ 125   Denies dyspnea or nausea     Objective     Vitals:   Temp:  [97.7 °F (36.5 °C)-98.2 °F (36.8 °C)] 97.7 °F (36.5 °C)  Heart Rate:  [] 65  BP: ()/() 143/54    Intake/Output Summary (Last 24 hours) at 2024 0730  Last data filed at 2024 0700  Gross per 24 hour   Intake --   Output 6750 ml   Net -6750 ml       Physical Exam:    General Appearance: comfortable alert  Neck: supple, no JVD  Lungs: CTA bilat no rales  Heart: RRR, normal S1 and S2  Abdomen: soft, nontender, nondistended  : hagen with vi urine  Extremities: trace BLE ankle edema, no cyanosis or clubbing       Scheduled Meds:     cefTRIAXone, 2,000 mg, Intravenous, Q24H  donepezil, 10 mg, Oral, Nightly  memantine, 10 mg, Oral, Daily  senna-docusate sodium, 2 tablet, Oral, BID  sodium bicarbonate, 650 mg, Oral, TID  sodium chloride, 10 mL, Intravenous, Q12H  sodium chloride, 10 mL, Intravenous, Q12H  tamsulosin, 0.4 mg, Oral, Daily      IV Meds:   dextrose 5 % and sodium chloride 0.45 %, 125 mL/hr, Last Rate: 125 mL/hr (24 2155)  insulin, 0-100 Units/hr, Last Rate: 0.9 Units/hr (24 0650)        Results Reviewed:   I have personally reviewed the results from the time of this admission to 2024 07:30 EDT     Results from last 7 days   Lab Units 24  0444 24  0015 24  1948 24  1958 24  1814   SODIUM mmol/L 133* 135* 139   < > 127*   POTASSIUM mmol/L 4.7 4.0 4.5   < > 7.0*   CHLORIDE mmol/L 103 100 102   < > 87*   CO2 mmol/L 20.0* 19.5* 20.3*   < > 11.0*   BUN mg/dL 111* 108* 117*   < > 189*   CREATININE mg/dL 6.62* 7.60* 8.93*   < > 17.15*   CALCIUM mg/dL 7.6*  7.8* 7.9*   < > 8.8   BILIRUBIN mg/dL  --   --   --   --  0.9   ALK PHOS U/L  --   --   --   --  155*   ALT (SGPT) U/L  --   --   --   --  27   AST (SGOT) U/L  --   --   --   --  19   GLUCOSE mg/dL 157* 148* 104*   < > 162*    < > = values in this interval not displayed.     Estimated Creatinine Clearance: 10.6 mL/min (A) (by C-G formula based on SCr of 6.62 mg/dL (H)).  Results from last 7 days   Lab Units 08/06/24  0444 08/05/24  0359 08/04/24 1958 08/04/24  1814   MAGNESIUM mg/dL 2.1 2.4 2.5* 2.7*   PHOSPHORUS mg/dL  --   --  9.2* 9.9*         Results from last 7 days   Lab Units 08/06/24  0444 08/05/24  0359 08/04/24  1814   WBC 10*3/mm3 9.00 12.08* 18.10*   HEMOGLOBIN g/dL 10.6* 12.0* 15.0   PLATELETS 10*3/mm3 246 252 297     Results from last 7 days   Lab Units 08/04/24  1814   INR  1.36*       Assessment / Plan     ASSESSMENT:  Non olig NEHEMIAS - obs uropathy.  CT abdomen showed marked distention of bladder with bilateral hydronephrosis.  Improving rapidly post hagen, peak BUN/Cr 189/17, now 111/6.6.  Post-obs diuresis continues.  K normal.  Na down to 133, on hypotonic IVF with DKA protocol   Hyperkalemia.  Resolved  EuDKA - Was on SGLT2 inh.  AG has closed.   this AM.  On insulin drip  Dementia, treated   DM2, on ozempic, farxiga, metformin at home  Bladder outlet obstruction with bilateral hydronephrosis.  Gross hematuria resolving.  On rocephin.  UROL eval noted, plan outpatient VT but note atonic bladder  Hyperphosphatemia.  Secondary to renal failure, should improve as NEHEMIAS resolves - recheck  Hypertension.  BP labile, better in general, not no antihypertensives at home  Hypocalcemia - ca 7.6 - will replace but check alb    PLAN:  Change IVF to NS at 75 cc/hr once off insulin drip  D/W RN, family at bedside  Ca gluconate  Recheck Phos/alb      Gigi Engel MD  08/06/24  07:30 EDT    [facility]  [phone]

## 2024-08-06 NOTE — THERAPY EVALUATION
Patient Name: Froy Ngo  : 1945    MRN: [id number]                              Today's Date: 2024       Admit Date: 2024    Visit Dx:     ICD-10-CM ICD-9-CM   1. Diabetic ketoacidosis without coma associated with other specified diabetes mellitus  E13.10 250.12   2. Altered mental status, unspecified altered mental status type  R41.82 780.97   3. Severe dehydration  E86.0 276.51   4. Acute kidney injury (NEHEMIAS) with acute tubular necrosis (ATN)  N17.0 584.5   5. Hyperkalemia  E87.5 276.7     Patient Active Problem List   Diagnosis    DKA (diabetic ketoacidosis)     No past medical history on file.  No past surgical history on file.   General Information       [facility] Name 24 1321          Physical Therapy Time and Intention    Document Type evaluation  -     Mode of Treatment physical therapy;co-treatment  -       Row Name 24 1321          General Information    Patient Profile Reviewed yes  -     Prior Level of Function independent:;gait;transfer;bed mobility  -     Existing Precautions/Restrictions fall  -     Barriers to Rehab cognitive status  -       Row Name 24 1321          Living Environment    People in Home spouse  -       Row Name 24 1321          Cognition    Orientation Status (Cognition) oriented to;person;place;verbal cues/prompts needed for orientation;other (see comments)  Situational confusion, easliy distracted and difficult to redirect  -       Row Name 24 1321          Safety Issues, Functional Mobility    Safety Issues Affecting Function (Mobility) awareness of need for assistance;impulsivity;insight into deficits/self-awareness;judgment;safety precaution awareness;safety precautions follow-through/compliance;sequencing abilities;problem-solving  -     Impairments Affecting Function (Mobility) balance;cognition;endurance/activity tolerance  -     Cognitive Impairments, Mobility Safety/Performance attention;awareness, need  for assistance;impulsivity;insight into deficits/self-awareness;judgment;problem-solving/reasoning;safety precaution awareness;safety precaution follow-through;sequencing abilities  -     Comment, Safety Issues/Impairments (Mobility) Co treatment medically appropriate and necessary due to patient acuity level, activity tolerance and safety of patient and staff. Evaluation established to achieve all goals in POC.  -               User Key  (r) = Recorded By, (t) = Taken By, (c) = Cosigned By      Initials Name Provider Type     Dolores Obrien PT Physical Therapist                   Mobility       Row Name 08/06/24 1322          Bed Mobility    Bed Mobility supine-sit;sit-supine  -     Supine-Sit Desha (Bed Mobility) minimum assist (75% patient effort);2 person assist;verbal cues  -     Sit-Supine Desha (Bed Mobility) standby assist;contact guard;verbal cues  -     Assistive Device (Bed Mobility) bed rails;head of bed elevated  -       Row Name 08/06/24 1322          Sit-Stand Transfer    Sit-Stand Desha (Transfers) minimum assist (75% patient effort);2 person assist;verbal cues  -     Assistive Device (Sit-Stand Transfers) other (see comments)  HHA x2  -       Row Name 08/06/24 1322          Gait/Stairs (Locomotion)    Desha Level (Gait) verbal cues;minimum assist (75% patient effort);1 person to manage equipment;1 person assist  -     Assistive Device (Gait) other (see comments)  HHA x1  -     Distance in Feet (Gait) 40  -     Deviations/Abnormal Patterns (Gait) mary decreased;gait speed decreased;stride length decreased  -     Bilateral Gait Deviations forward flexed posture  -               User Key  (r) = Recorded By, (t) = Taken By, (c) = Cosigned By      Initials Name Provider Type     Dolores Obrien PT Physical Therapist                   Obj/Interventions       Row Name 08/06/24 1324          Range of Motion Comprehensive    General Range of  Motion bilateral lower extremity ROM L  -[facility] Name 08/06/24 1324          Strength Comprehensive (MMT)    General Manual Muscle Testing (MMT) Assessment no strength deficits identified  -     Comment, General Manual Muscle Testing (MMT) Assessment BLE [facility] Name 08/06/24 1324          Balance    Balance Assessment sitting static balance;sitting dynamic balance;standing static balance;standing dynamic balance  -     Static Sitting Balance standby assist  -     Dynamic Sitting Balance contact guard  -     Position, Sitting Balance unsupported;sitting edge of bed  -     Static Standing Balance contact guard;verbal cues;minimal assist  -     Dynamic Standing Balance minimal assist;verbal cues;2-person assist  -     Position/Device Used, Standing Balance supported;other (see comments)  A x1-2  -     Balance Interventions sitting;standing;sit to stand;static;dynamic  -     Comment, Balance A couple posterior LOB requiring min A to correct  -               User Key  (r) = Recorded By, (t) = Taken By, (c) = Cosigned By      Initials Name Provider Type     Dolores Obrien, PT Physical Therapist                   Goals/Plan       Row Name 08/06/24 1330          Bed Mobility Goal 1 (PT)    Activity/Assistive Device (Bed Mobility Goal 1, PT) bed mobility activities, all  -     [place] Level/Cues Needed (Bed Mobility Goal 1, PT) supervision required  -     Time Frame (Bed Mobility Goal 1, PT) 1 week  -BH       Row Name 08/06/24 1330          Transfer Goal 1 (PT)    Activity/Assistive Device (Transfer Goal 1, PT) transfers, all  -     [place] Level/Cues Needed (Transfer Goal 1, PT) supervision required  -     Time Frame (Transfer Goal 1, PT) 1 week  -BH       Row Name 08/06/24 1330          Gait Training Goal 1 (PT)    Activity/Assistive Device (Gait Training Goal 1, PT) gait (walking locomotion)  -     [place] Level (Gait Training Goal 1, PT) supervision  required  -     Distance (Gait Training Goal 1, PT) 100ft  -     Time Frame (Gait Training Goal 1, PT) 1 week  -       Row Name 08/06/24 8160          Therapy Assessment/Plan (PT)    Planned Therapy Interventions (PT) balance training;bed mobility training;gait training;home exercise program;patient/family education;strengthening;transfer training  -               User Key  (r) = Recorded By, (t) = Taken By, (c) = Cosigned By      Initials Name Provider Type     Dolores Obrien, PT Physical Therapist                   Clinical Impression       Row Name 08/06/24 1324          Pain    Pretreatment Pain Rating 0/10 - no pain  -     Posttreatment Pain Rating 0/10 - no pain  -       Row Name 08/06/24 1327          Plan of Care Review    Plan of Care Reviewed With patient;daughter  -     Outcome Evaluation Pt is an 77 yo M admitted from home with DKA, AMS, NEHEMIAS, B hydronephrosis. Pt with hx of dementia and confused on eval. Per chart, pt lives with his wife and reports independence at BL with no AD. Pt impulsive throughout session, perseverating on various things and needing frequent redirection. Pt presents to PT with impaired balance, endurance, and cognition limiting mobility. Pt transferred to EOB with min A x2 and stood with min A x2 - HHA. Pt with posterior lean initially but able to correct with cues. Sequenced side steps well, so progressed gait in room - ambulated 40ft with HHA x1-2 and min A for safety/line management. Pt's family requesting pt use the bathroom, increased time spent d/t perseverating on wiping, but required only SV for sitting balance on commode for safety though pt repeatedly asking door to be closed. Pt stood min A x1 from commode and ambulated back around room, RN requesting pt return to bed. PT will continue to follow, anticipate DC to SNF at this time, though hopeful for improved mobility status pending cognition.  -       Row Name 08/06/24 3306          Therapy  Assessment/Plan (PT)    Patient/Family Therapy Goals Statement (PT) Return to PLOF  -     Rehab Potential (PT) good, to achieve stated therapy goals  -     Criteria for Skilled Interventions Met (PT) yes  -     Therapy Frequency (PT) 5 times/wk  -       Row Name 08/06/24 1325          Vital Signs    O2 Delivery Pre Treatment room air  -     O2 Delivery Intra Treatment room air  -     O2 Delivery Post Treatment room air  -[facility] Name 08/06/24 1325          Positioning and Restraints    Pre-Treatment Position in bed  -     Post Treatment Position bed  -     In Bed notified nsg;fowlers;call light within reach;encouraged to call for assist;exit alarm on  -               User Key  (r) = Recorded By, (t) = Taken By, (c) = Cosigned By      Initials Name Provider Type     Dolores Obrien, PT Physical Therapist                   Outcome Measures       [facility] Name 08/06/24 1330          How much help from another person do you currently need...    Turning from your back to your side while in flat bed without using bedrails? 3  -     Moving from lying on back to sitting on the side of a flat bed without bedrails? 3  -     Moving to and from a bed to a chair (including a wheelchair)? 3  -     Standing up from a chair using your arms (e.g., wheelchair, bedside chair)? 3  -     Climbing 3-5 steps with a railing? 2  -     To walk in hospital room? 3  -     AM-PAC 6 Clicks Score (PT) 17  -     Highest Level of Mobility Goal 5 --> Static standing  -       Row Name 08/06/24 1303          Modified Benson Scale    Modified Benson Scale 4 - Moderately severe disability.  Unable to walk without assistance, and unable to attend to own bodily needs without assistance.  -       Row Name 08/06/24 1330 08/06/24 1303       Functional Assessment    Outcome Measure Options AM-PAC 6 Clicks Basic Mobility (PT)  - AM-PAC 6 Clicks Daily Activity (OT);Modified Jose  -              User Key  (r) =  Recorded By, (t) = Taken By, (c) = Cosigned By      Initials Name Provider Type     Dolores Obrien, PT Physical Therapist    Shelli Banuelos OT Occupational Therapist                                 Physical Therapy Education       Title: PT OT SLP Therapies (In Progress)       Topic: Physical Therapy (In Progress)       Point: Mobility training (Done)       Learning Progress Summary             Patient Acceptance, E,TB,D, VU,NR by  at 8/6/2024 1330                         Point: Home exercise program (Not Started)       Learner Progress:  Not documented in this visit.              Point: Body mechanics (Done)       Learning Progress Summary             Patient Acceptance, E,TB,D, VU,NR by  at 8/6/2024 1330                         Point: Precautions (Done)       Learning Progress Summary             Patient Acceptance, E,TB,D, VU,NR by  at 8/6/2024 1330                                         User Key       Initials Effective Dates Name Provider Type Discipline     04/08/22 -  Dolores Obrien PT Physical Therapist PT                  PT Recommendation and Plan  Planned Therapy Interventions (PT): balance training, bed mobility training, gait training, home exercise program, patient/family education, strengthening, transfer training  Plan of Care Reviewed With: patient, daughter  Outcome Evaluation: Pt is an 79 yo M admitted from home with DKA, AMS, NEHEMIAS, B hydronephrosis. Pt with hx of dementia and confused on eval. Per chart, pt lives with his wife and reports independence at BL with no AD. Pt impulsive throughout session, perseverating on various things and needing frequent redirection. Pt presents to PT with impaired balance, endurance, and cognition limiting mobility. Pt transferred to EOB with min A x2 and stood with min A x2 - HHA. Pt with posterior lean initially but able to correct with cues. Sequenced side steps well, so progressed gait in room - ambulated 40ft with HHA x1-2 and min A for  safety/line management. Pt's family requesting pt use the bathroom, increased time spent d/t perseverating on wiping, but required only SV for sitting balance on commode for safety though pt repeatedly asking door to be closed. Pt stood min A x1 from commode and ambulated back around room, RN requesting pt return to bed. PT will continue to follow, anticipate DC to SNF at this time, though hopeful for improved mobility status pending cognition.     Time Calculation:   PT Evaluation Complexity  History, PT Evaluation Complexity: 1-2 personal factors and/or comorbidities  Examination of Body Systems (PT Eval Complexity): total of 3 or more elements  Clinical Presentation (PT Evaluation Complexity): evolving  Clinical Decision Making (PT Evaluation Complexity): moderate complexity  Overall Complexity (PT Evaluation Complexity): moderate complexity     PT Charges       Row Name 08/06/24 1331             Time Calculation    Start Time 1032  -      Stop Time 1105  -      Time Calculation (min) 33 min  -      PT Received On 08/06/24  -      PT - Next Appointment 08/07/24  -      PT Goal Re-Cert Due Date 08/13/24  -         Time Calculation- PT    Total Timed Code Minutes- PT 25 minute(s)  -         Timed Charges    04389 - Gait Training Minutes  10  -BH      34899 - PT Therapeutic Activity Minutes 15  -BH         Total Minutes    Timed Charges Total Minutes 25  -       Total Minutes 25  -                User Key  (r) = Recorded By, (t) = Taken By, (c) = Cosigned By      Initials Name Provider Type     Dolores Obrien, PT Physical Therapist                  Therapy Charges for Today       Code Description Service Date Service Provider Modifiers Qty    52977494293 HC GAIT TRAINING EA 15 MIN 8/6/2024 Dolores Obrien, PT GP 1    87580091932 HC PT THERAPEUTIC ACT EA 15 MIN 8/6/2024 Dolores Obrien, PT GP 1    07161306271 HC PT EVAL MOD COMPLEXITY 3 8/6/2024 Dolores Obrien, PT GP 1            PT  G-Codes  Outcome Measure Options: AM-PAC 6 Clicks Basic Mobility (PT)  AM-PAC 6 Clicks Score (PT): 17  AM-PAC 6 Clicks Score (OT): 17  Modified [place] Scale: 4 - Moderately severe disability.  Unable to walk without assistance, and unable to attend to own bodily needs without assistance.  PT Discharge Summary  Anticipated Discharge Disposition (PT): skilled nursing facility (Pending progress)    Dolores Obrien, PT  8/6/2024

## 2024-08-06 NOTE — PROGRESS NOTES
"                                              LOS: 2 days   Patient Care Team:  Ryan Gutierrez MD as PCP - General (Internal Medicine)    Chief Complaint:  F/up EUKA, NEHEMIAS, obstructive uropathy and critical care management    Subjective   Interval History      Blood sugar improved with insulin drip.  Patient has a Rodriguez catheter.  Hematuria improved.    REVIEW OF SYSTEMS:   CARDIOVASCULAR: No chest pain, chest pressure or chest discomfort. No palpitations or edema.   RESPIRATORY: No shortness of breath, cough or sputum.   GASTROINTESTINAL: No anorexia, nausea, vomiting or diarrhea. No abdominal pain.  CONSTITUTIONAL: No fever or chills.     Ventilator/Non-Invasive Ventilation Settings (From admission, onward)      None                  Physical Exam:     Vital Signs  Temp:  [97.7 °F (36.5 °C)-98.2 °F (36.8 °C)] 97.7 °F (36.5 °C)  Heart Rate:  [] 65  BP: ()/() 143/54    Intake/Output Summary (Last 24 hours) at 8/6/2024 0827  Last data filed at 8/6/2024 0700  Gross per 24 hour   Intake --   Output 6750 ml   Net -6750 ml     Flowsheet Rows      Flowsheet Row First Filed Value   Admission Height 182.9 cm (72\") Documented at 08/04/2024 2029   Admission Weight 83.9 kg (185 lb) Documented at 08/04/2024 2029            PPE used per hospital policy    General Appearance:   Alert, cooperative, in no acute distress   ENMT:  Mallampati score 3, dry mucous membrane   Eyes:  Pupils equal and reactive to light. EOMI   Neck:    Trachea midline. No thyromegaly.   Lungs:    Clear to auscultation,respirations regular, even and nonlabored    Heart:   Regular rhythm and normal rate, normal S1 and S2, no         murmur   Skin:   No rash or ecchymosis   Abdomen:    Obese. Soft. No tenderness. No HSM.   Neuro/psych:  Conscious, alert, oriented x3. Strength 5/5 in upper and lower  ext.  Appropriate mood and affect   Extremities:  No cyanosis, clubbing or edema.  Warm extremities and well-perfused          Results " Review:        Results from last 7 days   Lab Units 08/06/24  0444 08/06/24  0015 08/05/24  1948   SODIUM mmol/L 133* 135* 139   POTASSIUM mmol/L 4.7 4.0 4.5   CHLORIDE mmol/L 103 100 102   CO2 mmol/L 20.0* 19.5* 20.3*   BUN mg/dL 111* 108* 117*   CREATININE mg/dL 6.62* 7.60* 8.93*   GLUCOSE mg/dL 157* 148* 104*   CALCIUM mg/dL 7.6* 7.8* 7.9*     Results from last 7 days   Lab Units 08/04/24  1814   HSTROP T ng/L 44*     Results from last 7 days   Lab Units 08/06/24  0444 08/05/24  0359 08/04/24  1814   WBC 10*3/mm3 9.00 12.08* 18.10*   HEMOGLOBIN g/dL 10.6* 12.0* 15.0   HEMATOCRIT % 32.4* 35.5* 44.2   PLATELETS 10*3/mm3 246 252 297     Results from last 7 days   Lab Units 08/04/24  1814   INR  1.36*   APTT seconds 28.6                       Results from last 7 days   Lab Units 08/04/24  1910   MODALITY  Room Air         I reviewed the patient's new clinical results.        Medication Review:   cefTRIAXone, 2,000 mg, Intravenous, Q24H  donepezil, 10 mg, Oral, Nightly  memantine, 10 mg, Oral, Daily  senna-docusate sodium, 2 tablet, Oral, BID  sodium bicarbonate, 650 mg, Oral, TID  sodium chloride, 10 mL, Intravenous, Q12H  sodium chloride, 10 mL, Intravenous, Q12H  tamsulosin, 0.4 mg, Oral, Daily        dextrose 5 % and sodium chloride 0.45 %, 125 mL/hr, Last Rate: 125 mL/hr (08/05/24 2155)  insulin, 0-100 Units/hr, Last Rate: 0.6 Units/hr (08/06/24 0811)        Diagnostic imaging:  I personally and independently reviewed the following images:  CXR 8/4/2024: Clear    Assessment     Nonoliguric NEHEMIAS, secondary to obstructive uropathy.  Obstructive uropathy with bilateral hydronephrosis  Gross hematuria post Rodriguez insertion  Euglycemic DKA  Electrolytes disturbance: Hyperkalemia and hyperphosphatemia  Leukocytosis: Stress-induced VS UTI  Anemia, unclear chronicity.  Worse probably due to hydration  Enlarged prostate on CT abdomen pelvis 8/4/2024    HTN  DM type II, on Ozempic, Farxiga and  metformin  Alzheimer's  GERD      Plan       Empiric Rocephin awaiting urine culture  Rodriguez catheter.  Tamsulosin- he will have to go home with that.   DC Insulin drip and transition to SC Insulin  Continue donepezil and memantine  IV hydration: DC D5 half-normal saline and switch NS    Discussed with family at bedside and discussed with ICU staff    Scott Delgado MD  08/06/24  08:27 EDT          This note was dictated utilizing Dragon dictation

## 2024-08-07 PROBLEM — N13.9 OBSTRUCTIVE UROPATHY: Status: ACTIVE | Noted: 2024-08-07

## 2024-08-07 PROBLEM — N13.30 BILATERAL HYDRONEPHROSIS: Status: ACTIVE | Noted: 2024-08-07

## 2024-08-07 PROBLEM — E87.5 HYPERKALEMIA: Status: ACTIVE | Noted: 2024-08-07

## 2024-08-07 LAB
ALBUMIN SERPL-MCNC: 2.3 G/DL (ref 3.5–5.2)
ANION GAP SERPL CALCULATED.3IONS-SCNC: 11.3 MMOL/L (ref 5–15)
BASOPHILS # BLD AUTO: 0.02 10*3/MM3 (ref 0–0.2)
BASOPHILS NFR BLD AUTO: 0.2 % (ref 0–1.5)
BUN SERPL-MCNC: 73 MG/DL (ref 8–23)
BUN/CREAT SERPL: 19 (ref 7–25)
CALCIUM SPEC-SCNC: 7.8 MG/DL (ref 8.6–10.5)
CHLORIDE SERPL-SCNC: 106 MMOL/L (ref 98–107)
CO2 SERPL-SCNC: 20.7 MMOL/L (ref 22–29)
CREAT SERPL-MCNC: 3.84 MG/DL (ref 0.76–1.27)
DEPRECATED RDW RBC AUTO: 42.4 FL (ref 37–54)
EGFRCR SERPLBLD CKD-EPI 2021: 15.1 ML/MIN/1.73
EOSINOPHIL # BLD AUTO: 0.24 10*3/MM3 (ref 0–0.4)
EOSINOPHIL NFR BLD AUTO: 2.9 % (ref 0.3–6.2)
ERYTHROCYTE [DISTWIDTH] IN BLOOD BY AUTOMATED COUNT: 12.3 % (ref 12.3–15.4)
GLUCOSE BLDC GLUCOMTR-MCNC: 110 MG/DL (ref 70–130)
GLUCOSE BLDC GLUCOMTR-MCNC: 148 MG/DL (ref 70–130)
GLUCOSE BLDC GLUCOMTR-MCNC: 153 MG/DL (ref 70–130)
GLUCOSE BLDC GLUCOMTR-MCNC: 79 MG/DL (ref 70–130)
GLUCOSE SERPL-MCNC: 90 MG/DL (ref 65–99)
HCT VFR BLD AUTO: 32.4 % (ref 37.5–51)
HGB BLD-MCNC: 10.7 G/DL (ref 13–17.7)
IMM GRANULOCYTES # BLD AUTO: 0.08 10*3/MM3 (ref 0–0.05)
IMM GRANULOCYTES NFR BLD AUTO: 1 % (ref 0–0.5)
LYMPHOCYTES # BLD AUTO: 1.2 10*3/MM3 (ref 0.7–3.1)
LYMPHOCYTES NFR BLD AUTO: 14.7 % (ref 19.6–45.3)
MAGNESIUM SERPL-MCNC: 1.6 MG/DL (ref 1.6–2.4)
MCH RBC QN AUTO: 30.5 PG (ref 26.6–33)
MCHC RBC AUTO-ENTMCNC: 33 G/DL (ref 31.5–35.7)
MCV RBC AUTO: 92.3 FL (ref 79–97)
MONOCYTES # BLD AUTO: 0.92 10*3/MM3 (ref 0.1–0.9)
MONOCYTES NFR BLD AUTO: 11.3 % (ref 5–12)
NEUTROPHILS NFR BLD AUTO: 5.71 10*3/MM3 (ref 1.7–7)
NEUTROPHILS NFR BLD AUTO: 69.9 % (ref 42.7–76)
NRBC BLD AUTO-RTO: 0 /100 WBC (ref 0–0.2)
PHOSPHATE SERPL-MCNC: 4.1 MG/DL (ref 2.5–4.5)
PLATELET # BLD AUTO: 268 10*3/MM3 (ref 140–450)
PMV BLD AUTO: 9.2 FL (ref 6–12)
POTASSIUM SERPL-SCNC: 3.7 MMOL/L (ref 3.5–5.2)
RBC # BLD AUTO: 3.51 10*6/MM3 (ref 4.14–5.8)
SODIUM SERPL-SCNC: 138 MMOL/L (ref 136–145)
WBC NRBC COR # BLD AUTO: 8.17 10*3/MM3 (ref 3.4–10.8)

## 2024-08-07 PROCEDURE — 63710000001 INSULIN GLARGINE PER 5 UNITS: Performed by: INTERNAL MEDICINE

## 2024-08-07 PROCEDURE — 82948 REAGENT STRIP/BLOOD GLUCOSE: CPT

## 2024-08-07 PROCEDURE — 25810000003 SODIUM CHLORIDE 0.9 % SOLUTION: Performed by: INTERNAL MEDICINE

## 2024-08-07 PROCEDURE — 63710000001 INSULIN LISPRO (HUMAN) PER 5 UNITS: Performed by: INTERNAL MEDICINE

## 2024-08-07 PROCEDURE — 83735 ASSAY OF MAGNESIUM: CPT | Performed by: INTERNAL MEDICINE

## 2024-08-07 PROCEDURE — 80069 RENAL FUNCTION PANEL: CPT | Performed by: INTERNAL MEDICINE

## 2024-08-07 PROCEDURE — 85025 COMPLETE CBC W/AUTO DIFF WBC: CPT | Performed by: INTERNAL MEDICINE

## 2024-08-07 PROCEDURE — 25010000002 CEFTRIAXONE PER 250 MG: Performed by: INTERNAL MEDICINE

## 2024-08-07 PROCEDURE — 36415 COLL VENOUS BLD VENIPUNCTURE: CPT | Performed by: INTERNAL MEDICINE

## 2024-08-07 RX ORDER — SODIUM BICARBONATE 650 MG/1
650 TABLET ORAL 2 TIMES DAILY
Status: DISCONTINUED | OUTPATIENT
Start: 2024-08-07 | End: 2024-08-08

## 2024-08-07 RX ORDER — AMLODIPINE BESYLATE 5 MG/1
5 TABLET ORAL
Status: DISCONTINUED | OUTPATIENT
Start: 2024-08-07 | End: 2024-08-09 | Stop reason: HOSPADM

## 2024-08-07 RX ADMIN — DONEPEZIL HYDROCHLORIDE 10 MG: 10 TABLET, FILM COATED ORAL at 21:00

## 2024-08-07 RX ADMIN — Medication 10 ML: at 08:38

## 2024-08-07 RX ADMIN — CEFTRIAXONE 2000 MG: 2 INJECTION, POWDER, FOR SOLUTION INTRAMUSCULAR; INTRAVENOUS at 00:02

## 2024-08-07 RX ADMIN — SENNOSIDES AND DOCUSATE SODIUM 2 TABLET: 50; 8.6 TABLET ORAL at 21:00

## 2024-08-07 RX ADMIN — TAMSULOSIN HYDROCHLORIDE 0.4 MG: 0.4 CAPSULE ORAL at 08:38

## 2024-08-07 RX ADMIN — Medication 10 ML: at 21:00

## 2024-08-07 RX ADMIN — SODIUM BICARBONATE 650 MG: 650 TABLET ORAL at 21:00

## 2024-08-07 RX ADMIN — SODIUM CHLORIDE 75 ML/HR: 9 INJECTION, SOLUTION INTRAVENOUS at 00:03

## 2024-08-07 RX ADMIN — INSULIN LISPRO 1 UNITS: 100 INJECTION, SOLUTION INTRAVENOUS; SUBCUTANEOUS at 08:38

## 2024-08-07 RX ADMIN — INSULIN GLARGINE 10 UNITS: 100 INJECTION, SOLUTION SUBCUTANEOUS at 21:00

## 2024-08-07 RX ADMIN — SODIUM BICARBONATE 650 MG: 650 TABLET ORAL at 08:38

## 2024-08-07 RX ADMIN — INSULIN LISPRO 2 UNITS: 100 INJECTION, SOLUTION INTRAVENOUS; SUBCUTANEOUS at 12:29

## 2024-08-07 RX ADMIN — MEMANTINE HYDROCHLORIDE 10 MG: 10 TABLET, FILM COATED ORAL at 08:38

## 2024-08-07 RX ADMIN — INSULIN LISPRO 4 UNITS: 100 INJECTION, SOLUTION INTRAVENOUS; SUBCUTANEOUS at 17:22

## 2024-08-07 RX ADMIN — AMLODIPINE BESYLATE 5 MG: 5 TABLET ORAL at 12:29

## 2024-08-07 RX ADMIN — SENNOSIDES AND DOCUSATE SODIUM 2 TABLET: 50; 8.6 TABLET ORAL at 08:37

## 2024-08-07 RX ADMIN — CEFTRIAXONE 2000 MG: 2 INJECTION, POWDER, FOR SOLUTION INTRAMUSCULAR; INTRAVENOUS at 22:14

## 2024-08-07 NOTE — PROGRESS NOTES
[facility] Progress Note      Patient Name: Froy Ngo  : 1945  MRN: [id number]  Primary Care Physician:  Ryan Gutierrez MD  Date of admission: 2024    Subjective     Interval History:   F/u NEHEMIAS    Review of Systems:   UOP 6400   No c/o  Refused PT per family     Objective     Vitals:   Temp:  [97.8 °F (36.6 °C)-98.6 °F (37 °C)] 98.6 °F (37 °C)  Heart Rate:  [60-89] 70  Resp:  [17-18] 18  BP: (129-189)/() 176/59    Intake/Output Summary (Last 24 hours) at 2024 0824  Last data filed at 2024 0500  Gross per 24 hour   Intake 1281.25 ml   Output 6400 ml   Net -5118.75 ml       Physical Exam:    General Appearance: frail WM no distress  Neck: supple, no JVD  Lungs: CTA bilat no rales  Heart: RRR, normal S1 and S2  Abdomen: soft, nontender, nondistended  : pink tinged urine in hagen bag  Extremities: no edema, cyanosis or clubbing       Scheduled Meds:     cefTRIAXone, 2,000 mg, Intravenous, Q24H  donepezil, 10 mg, Oral, Nightly  insulin glargine, 1-200 Units, Subcutaneous, Nightly - Glucommander  insulin lispro, 1-200 Units, Subcutaneous, 4x Daily With Meals & Nightly  memantine, 10 mg, Oral, Daily  senna-docusate sodium, 2 tablet, Oral, BID  sodium bicarbonate, 650 mg, Oral, TID  sodium chloride, 10 mL, Intravenous, Q12H  sodium chloride, 10 mL, Intravenous, Q12H  tamsulosin, 0.4 mg, Oral, Daily      IV Meds:   sodium chloride, 75 mL/hr, Last Rate: 75 mL/hr (24 0003)        Results Reviewed:   I have personally reviewed the results from the time of this admission to 2024 08:24 EDT     Results from last 7 days   Lab Units 24  0227 24  0809 24  0444 24  1958 24  1814   SODIUM mmol/L 138 131* 133*   < > 127*   POTASSIUM mmol/L 3.7 3.6 4.7   < > 7.0*   CHLORIDE mmol/L 106 103 103   < > 87*   CO2 mmol/L 20.7* 19.1* 20.0*   < > 11.0*   BUN mg/dL 73* 110* 111*   < > 189*   CREATININE mg/dL 3.84* 5.84* 6.62*   < >  17.15*   CALCIUM mg/dL 7.8* 6.9* 7.6*   < > 8.8   BILIRUBIN mg/dL  --   --   --   --  0.9   ALK PHOS U/L  --   --   --   --  155*   ALT (SGPT) U/L  --   --   --   --  27   AST (SGOT) U/L  --   --   --   --  19   GLUCOSE mg/dL 90 337* 157*   < > 162*    < > = values in this interval not displayed.     Estimated Creatinine Clearance: 19.1 mL/min (A) (by C-G formula based on SCr of 3.84 mg/dL (H)).  Results from last 7 days   Lab Units 08/07/24 0227 08/06/24 0444 08/05/24 0359 08/04/24 1958 08/04/24  1814   MAGNESIUM mg/dL 1.6 2.1 2.4 2.5* 2.7*   PHOSPHORUS mg/dL 4.1  --   --  9.2* 9.9*         Results from last 7 days   Lab Units 08/07/24 0227 08/06/24 0444 08/05/24 0359 08/04/24  1814   WBC 10*3/mm3 8.17 9.00 12.08* 18.10*   HEMOGLOBIN g/dL 10.7* 10.6* 12.0* 15.0   PLATELETS 10*3/mm3 268 246 252 297     Results from last 7 days   Lab Units 08/04/24  1814   INR  1.36*       Assessment / Plan     ASSESSMENT:  Non olig NEHEMIAS - obs uropathy.  CT abdomen showed marked distention of bladder with bilateral hydronephrosis.  Improving rapidly post hagen, peak BUN/Cr 189/17, now 73/3.6.  Post-obs diuresis continues.  K normal.     Hyperkalemia.  Resolved  EuDKA - Was on SGLT2 inh.  Resolved.  Now w NAGMA stable on bicarb supplement  Dementia, treated .  Note need for MRI with contrast before discharge for poss immune therapy for Alzheimers - on hold based on GFR currently  DM2, on ozempic, farxiga, metformin at home  Bladder outlet obstruction with bilateral hydronephrosis.  Gross hematuria resolving.  On rocephin.  UROL eval noted, plan outpatient VT but note atonic bladder  Hyperphosphatemia.  Resolved  Hypertension.  BP high still.  Not on BP meds at home  Hypocalcemia - ca 7.8 but corrects ~ 9 for low alb    PLAN:  Dec nahco3 650 BID  Continue NS IVF 75 cc/hr for now, may stop it tomorrow  Start low dose norvasc 5mg  Hold off MRI contrast pending bit more recovery of renal fcn (ie Cr down to ~ 2 should  sufftanya)      Gigi Engel MD  08/07/24  08:24 EDT    [facility]  [phone]

## 2024-08-07 NOTE — PROGRESS NOTES
Chart reviewed, discussed with patient, wife, daughter. Bun/cr responding well. Family is  interested in rehab at [facility], bed evidently available. Stressed to patient his involvement/cooperation is imperative. Will need to get changed over to a home diabetes regimen and decide if a trial without the hagen on Flomax is needed. Also hoping to get an MRI head with contrast and [id number] test prior to discharge (will need a Xanax around MRI) for possible Alzheimer's immune therapy.    Appreciate all the care.

## 2024-08-07 NOTE — PLAN OF CARE
Goal Outcome Evaluation:            Up to chair today. Pt still confused. Rodriguez cath in place and patent. Plan of care ongoing

## 2024-08-07 NOTE — DISCHARGE PLACEMENT REQUEST
"Froy Ngo (78 y.o. Male)       Date of Birth   1945    Social Security Number       Address   [address] [place] [address]    Home Phone   [phone]    MRN   [id number]       Christianity   Unknown    Marital Status   Single                            Admission Date   8/4/24    Admission Type   Emergency    Admitting Provider   Tio Delcid MD    Attending Provider   Scott Delgado MD    Department, Room/Bed   [address], [address]/1       Discharge Date       Discharge Disposition       Discharge Destination                                 Attending Provider: Scott Delgado MD    Allergies: No Known Allergies    Isolation: None   Infection: None   Code Status: CPR    Ht: 182.9 cm (72\")   Wt: 88.2 kg (194 lb 7.1 oz)    Admission Cmt: None   Principal Problem: DKA (diabetic ketoacidosis) [E11.10]                   Active Insurance as of 8/4/2024       Primary Coverage       Payor Plan Insurance Group Employer/Plan Group    ANTHEM BLUE CROSS ANTHEM BLUE CROSS BLUE SHIELD PPO [id number]       Payor Plan Address Payor Plan Phone Number Payor Plan Fax Number Effective Dates    [address] [phone]  4/1/2019 - None Entered    [facility] [id number]         Subscriber Name Subscriber Birth Date Member ID       FROY NGO 1945 [id number]               Secondary Coverage       Payor Plan Insurance Group Employer/Plan Group    MEDICARE MEDICARE A ONLY        Payor Plan Address Payor Plan Phone Number Payor Plan Fax Number Effective Dates    [address] [phone]  6/1/2009 - None Entered    [facility] [id number]         Subscriber Name Subscriber Birth Date Member ID       FROY NGO 1945 [id number]                     Emergency Contacts        (Rel.) Home Phone Work Phone Mobile Phone    VASU NGO (Spouse) [phone] -- [phone]    [email or web address] (Daughter) [phone] -- --                "

## 2024-08-07 NOTE — PROGRESS NOTES
"                                              LOS: 3 days   Patient Care Team:  Ryan Gutierrez MD as PCP - General (Internal Medicine)    Chief Complaint:  F/up EUKA, NEHEMIAS, obstructive uropathy and critical care management    Subjective   Interval History      Feels well.  Rodriguez catheter remains in place.  Hematuria improving.    REVIEW OF SYSTEMS:   CARDIOVASCULAR: No chest pain, chest pressure or chest discomfort. No palpitations or edema.   RESPIRATORY: No shortness of breath, cough or sputum.   GASTROINTESTINAL: No anorexia, nausea, vomiting or diarrhea. No abdominal pain.  CONSTITUTIONAL: No fever or chills.     Ventilator/Non-Invasive Ventilation Settings (From admission, onward)      None                  Physical Exam:     Vital Signs  Temp:  [97.9 °F (36.6 °C)-98.6 °F (37 °C)] 98.6 °F (37 °C)  Heart Rate:  [60-80] 70  Resp:  [17-18] 18  BP: (129-189)/(59-88) 176/59    Intake/Output Summary (Last 24 hours) at 8/7/2024 1159  Last data filed at 8/7/2024 1100  Gross per 24 hour   Intake 1281.25 ml   Output 7300 ml   Net -6018.75 ml     Flowsheet Rows      Flowsheet Row First Filed Value   Admission Height 182.9 cm (72\") Documented at 08/04/2024 2029   Admission Weight 83.9 kg (185 lb) Documented at 08/04/2024 2029            PPE used per hospital policy    General Appearance:   Alert, cooperative, in no acute distress   ENMT:  Mallampati score 3, dry mucous membrane   Eyes:  Pupils equal and reactive to light. EOMI   Neck:    Trachea midline. No thyromegaly.   Lungs:    Clear to auscultation,respirations regular, even and nonlabored    Heart:   Regular rhythm and normal rate, normal S1 and S2, no         murmur   Skin:   No rash or ecchymosis   Abdomen:    Obese. Soft. No tenderness. No HSM.   Neuro/psych:  Conscious, alert, oriented x3. Strength 5/5 in upper and lower  ext.  Appropriate mood and affect   Extremities:  No cyanosis, clubbing or edema.  Warm extremities and well-perfused          Results " Review:        Results from last 7 days   Lab Units 08/07/24 0227 08/06/24  0809 08/06/24  0444   SODIUM mmol/L 138 131* 133*   POTASSIUM mmol/L 3.7 3.6 4.7   CHLORIDE mmol/L 106 103 103   CO2 mmol/L 20.7* 19.1* 20.0*   BUN mg/dL 73* 110* 111*   CREATININE mg/dL 3.84* 5.84* 6.62*   GLUCOSE mg/dL 90 337* 157*   CALCIUM mg/dL 7.8* 6.9* 7.6*     Results from last 7 days   Lab Units 08/04/24  1814   HSTROP T ng/L 44*     Results from last 7 days   Lab Units 08/07/24 0227 08/06/24 0444 08/05/24  0359   WBC 10*3/mm3 8.17 9.00 12.08*   HEMOGLOBIN g/dL 10.7* 10.6* 12.0*   HEMATOCRIT % 32.4* 32.4* 35.5*   PLATELETS 10*3/mm3 268 246 252     Results from last 7 days   Lab Units 08/04/24  1814   INR  1.36*   APTT seconds 28.6                       Results from last 7 days   Lab Units 08/04/24  1910   MODALITY  Room Air         I reviewed the patient's new clinical results.        Medication Review:   amLODIPine, 5 mg, Oral, Q24H  cefTRIAXone, 2,000 mg, Intravenous, Q24H  donepezil, 10 mg, Oral, Nightly  insulin glargine, 1-200 Units, Subcutaneous, Nightly - Glucommander  insulin lispro, 1-200 Units, Subcutaneous, 4x Daily With Meals & Nightly  memantine, 10 mg, Oral, Daily  senna-docusate sodium, 2 tablet, Oral, BID  sodium bicarbonate, 650 mg, Oral, BID  sodium chloride, 10 mL, Intravenous, Q12H  sodium chloride, 10 mL, Intravenous, Q12H  tamsulosin, 0.4 mg, Oral, Daily        sodium chloride, 75 mL/hr, Last Rate: 75 mL/hr (08/07/24 0003)        Diagnostic imaging:  I personally and independently reviewed the following images:  CXR 8/4/2024: Clear    Assessment     Nonoliguric NEHEMIAS, secondary to obstructive uropathy.  Obstructive uropathy with bilateral hydronephrosis  Gross hematuria post Rodriguez insertion  Euglycemic DKA  Electrolytes disturbance: Hyperkalemia and hyperphosphatemia, corrected  Leukocytosis: Stress-induced VS UTI  Anemia, unclear chronicity.  Worse probably due to hydration and some hematuria  Enlarged  prostate on CT abdomen pelvis 8/4/2024    HTN  DM type II, on Ozempic, Farxiga and metformin  Alzheimer's  GERD      Plan       Change Rocephin to 5 days only as urine culture is negative.  Rodriguez catheter.  Tamsulosin- he will have to go home with his Rodriguez  Insulin glargine and lispro per Glucomander  Continue donepezil and memantine  IV hydration: NS 75 mL/H    Consult medicine team for management of hyperglycemia and NEHEMIAS.  Dispo: SNF VS home with caregiver    Scott Delgado MD  08/07/24  11:59 EDT          This note was dictated utilizing Dragon dictation

## 2024-08-07 NOTE — SIGNIFICANT NOTE
08/07/24 1130   OTHER   Discipline occupational therapist   Rehab Time/Intention   Session Not Performed patient/family declined treatment  (Attempted OT/PT cotreat 2x this morning, patient refusing despite multiple attempts from therapy and family. Pt becoming argumentative and agitated. OT will f/u next service date as appropriate.)   Recommendation   OT - Next Appointment 08/08/24

## 2024-08-07 NOTE — PLAN OF CARE
Goal Outcome Evaluation:  Plan of Care Reviewed With: patient           Outcome Evaluation: Pt transfer from ICU. VSS, no c/o pain. Pt oriented to self at baseline. Pt walked with walker and gait belt with 1 x assist. Caregiver at bedside, attentive to pt. IVF continued. Alarms in place. Will CTM, safety maintained.

## 2024-08-07 NOTE — CASE MANAGEMENT/SOCIAL WORK
Continued Stay Note  [facility]     Patient Name: Froy Ngo  MRN: [id number]  Today's Date: 8/7/2024    Admit Date: 8/4/2024    Plan: SNF vs home with care givers, may need ambulance transport   Discharge Plan       Row Name 08/07/24 1004       Plan    Plan SNF vs home with care givers, may need ambulance transport    Patient/Family in Agreement with Plan yes    Plan Comments Met with pt at bedside. Permission obtained to speak to pt in front of his family. Confirmed with pt previous dc planning notes.  Discussed dc needs and PT recommendations of rehab. Pt stated that he would consider it. Family has requested a referral to [facility]. Referral placed in Epic. Gemma F/Barnard informed of referral. Will need transportation arranged if dc to facility per family.                   Discharge Codes    No documentation.                 Expected Discharge Date and Time       Expected Discharge Date Expected Discharge Time    Aug 9, 2024               Davina Cesar RN

## 2024-08-07 NOTE — CONSULTS
Patient Name:  Froy Ngo  YOB: 1945  MRN:  [id number]  Admit Date:  8/4/2024  Patient Care Team:  Ryan Gutierrez MD as PCP - General (Internal Medicine)      Subjective   History Present Illness     Chief Complaint   Patient presents with    Altered Mental Status       This is an 78-year-old man with a past medical history of type 2 diabetes, hyperlipidemia, Alzheimer's dementia who presented to hospital with generalized weakness and difficulty walking.  He was ultimately brought to the ED where he was noted to have a beta-hydroxybutyrate level of 1.5, pH of 7.25, PaCO2 of 18.  Potassium was 7, sodium was 127.  Was started on Glucomander protocol for DKA.  His anion gap, which was initially as high as 29, eventually closed.  He was initially started on ceftriaxone for UTI.    Personal History     Past Medical History:   Diagnosis Date    Dementia     Diabetes mellitus     Elevated cholesterol     Prostate disorder     Urinary retention      History reviewed. No pertinent surgical history.  History reviewed. No pertinent family history.  Social History     Tobacco Use    Smoking status: Never     Passive exposure: Never    Smokeless tobacco: Never   Vaping Use    Vaping status: Never Used   Substance Use Topics    Alcohol use: Never    Drug use: Never     No current facility-administered medications on file prior to encounter.     Current Outpatient Medications on File Prior to Encounter   Medication Sig Dispense Refill    aspirin 81 MG EC tablet Take 1 tablet by mouth Daily.      atorvastatin (LIPITOR) 10 MG tablet Take 1 tablet by mouth Daily.      Dapagliflozin Propanediol (FARXIGA PO) Take  by mouth Daily.      donepezil (ARICEPT) 10 MG tablet Take 1 tablet by mouth Every Night.      memantine (NAMENDA) 10 MG tablet Take 1 tablet by mouth Daily.      metFORMIN (GLUCOPHAGE) 1000 MG tablet Take 1 tablet by mouth 2 (Two) Times a Day With Meals. 1.5 tablets in AM; 1 tablet in PM       Semaglutide,0.25 or 0.5MG/DOS, (OZEMPIC) 2 MG/1.5ML solution pen-injector Inject 0.25 mg under the skin into the appropriate area as directed 1 (One) Time Per Week.      silodosin (RAPAFLO) 8 MG capsule capsule Take 1 capsule by mouth Daily. With a meal       No Known Allergies    Objective    Objective     Vital Signs  Temp:  [97.9 °F (36.6 °C)-98.6 °F (37 °C)] 98.6 °F (37 °C)  Heart Rate:  [60-80] 70  Resp:  [17-18] 18  BP: (129-189)/(59-88) 176/59  SpO2:  [95 %-100 %] 97 %  on   ;   Device (Oxygen Therapy): room air  Body mass index is 26.37 kg/m².    Physical Exam  Constitutional:       General: He is not in acute distress.  HENT:      Head: Normocephalic and atraumatic.   Eyes:      Extraocular Movements: Extraocular movements intact.      Pupils: Pupils are equal, round, and reactive to light.   Cardiovascular:      Rate and Rhythm: Normal rate and regular rhythm.   Pulmonary:      Effort: Pulmonary effort is normal. No respiratory distress.   Abdominal:      General: There is no distension.      Palpations: Abdomen is soft.      Tenderness: There is no abdominal tenderness.   Musculoskeletal:      Right lower leg: No edema.      Left lower leg: No edema.   Skin:     General: Skin is warm and dry.   Neurological:      General: No focal deficit present.      Mental Status: He is alert and oriented to person, place, and time.         Results Review:  I reviewed the patient's new clinical results.  I reviewed the patient's new imaging results and agree with the interpretation.  I reviewed the patient's other test results and agree with the interpretation  I personally viewed and interpreted the patient's EKG/Telemetry data  Discussed with ED provider.    Lab Results (last 24 hours)       Procedure Component Value Units Date/Time    POC Glucose Once [[id number]]  (Abnormal) Collected: 08/06/24 1337    Specimen: Blood Updated: 08/06/24 1337     Glucose 220 mg/dL     POC Glucose Once [[id number]]  (Abnormal)  Collected: 08/06/24 1605    Specimen: Blood Updated: 08/06/24 1607     Glucose 157 mg/dL     POC Glucose Once [[id number]]  (Abnormal) Collected: 08/06/24 2041    Specimen: Blood Updated: 08/06/24 2042     Glucose 159 mg/dL     CBC & Differential [[id number]]  (Abnormal) Collected: 08/07/24 0227    Specimen: Blood Updated: 08/07/24 0321    Narrative:      The following orders were created for panel order CBC & Differential.  Procedure                               Abnormality         Status                     ---------                               -----------         ------                     CBC Auto Differential[[id number]]        Abnormal            Final result                 Please view results for these tests on the individual orders.    Magnesium [[id number]]  (Normal) Collected: 08/07/24 0227    Specimen: Blood Updated: 08/07/24 0342     Magnesium 1.6 mg/dL     Renal Function Panel [[id number]]  (Abnormal) Collected: 08/07/24 0227    Specimen: Blood Updated: 08/07/24 0403     Glucose 90 mg/dL      BUN 73 mg/dL      Creatinine 3.84 mg/dL      Sodium 138 mmol/L      Potassium 3.7 mmol/L      Chloride 106 mmol/L      CO2 20.7 mmol/L      Calcium 7.8 mg/dL      Albumin 2.3 g/dL      Phosphorus 4.1 mg/dL      Anion Gap 11.3 mmol/L      BUN/Creatinine Ratio 19.0     eGFR 15.1 mL/min/1.73     Narrative:      GFR Normal >60  Chronic Kidney Disease <60  Kidney Failure <15    The GFR formula is only valid for adults with stable renal function between ages 18 and 70.    CBC Auto Differential [[id number]]  (Abnormal) Collected: 08/07/24 0227    Specimen: Blood Updated: 08/07/24 0321     WBC 8.17 10*3/mm3      RBC 3.51 10*6/mm3      Hemoglobin 10.7 g/dL      Hematocrit 32.4 %      MCV 92.3 fL      MCH 30.5 pg      MCHC 33.0 g/dL      RDW 12.3 %      RDW-SD 42.4 fl      MPV 9.2 fL      Platelets 268 10*3/mm3      Neutrophil % 69.9 %      Lymphocyte % 14.7 %      Monocyte % 11.3 %      Eosinophil % 2.9 %      Basophil % 0.2  %      Immature Grans % 1.0 %      Neutrophils, Absolute 5.71 10*3/mm3      Lymphocytes, Absolute 1.20 10*3/mm3      Monocytes, Absolute 0.92 10*3/mm3      Eosinophils, Absolute 0.24 10*3/mm3      Basophils, Absolute 0.02 10*3/mm3      Immature Grans, Absolute 0.08 10*3/mm3      nRBC 0.0 /100 WBC     POC Glucose Once [[id number]]  (Normal) Collected: 08/07/24 0806    Specimen: Blood Updated: 08/07/24 0808     Glucose 79 mg/dL     POC Glucose Once [[id number]]  (Normal) Collected: 08/07/24 1216    Specimen: Blood Updated: 08/07/24 1218     Glucose 110 mg/dL             Results for orders placed or performed during the hospital encounter of 08/04/24   Blood Culture - Blood, Arm, Left    Specimen: Arm, Left; Blood   Result Value Ref Range    Blood Culture No growth at 2 days        Imaging Results (Last 24 Hours)       ** No results found for the last 24 hours. **                ECG 12 Lead Electrolyte Imbalance   Final Result   HEART FVHT=035  bpm   RR Qmsxzrmh=373  ms   VT Tkitpcxn=332  ms   P Horizontal Axis=17  deg   P Front Axis=55  deg   QRSD Interval=83  ms   QT Cqduvvgt=458  ms   VRaK=823  ms   QRS Axis=-32  deg   T Wave Axis=31  deg   - ABNORMAL ECG -   Sinus tachycardia   Inferior  infarct, old   Anteroseptal  infarct, old   No Prior Tracing for Comparison   Electronically Signed By: Mere Johnston ([facility]) 2024-08-05 08:26:32   Date and Time of Study:2024-08-04 17:27:48      Telemetry Scan   Final Result      Telemetry Scan   Final Result      Telemetry Scan   Final Result                 Assessment/Plan     Active Hospital Problems    Diagnosis  POA    **DKA (diabetic ketoacidosis) [E11.10]  Yes    Obstructive uropathy [N13.9]  Unknown    Bilateral hydronephrosis [N13.30]  Unknown    Hyperkalemia [E87.5]  Unknown      Resolved Hospital Problems   No resolved problems to display.     Diabetic you acidosis  Type 2 diabetes  Patient is on several oral antihyperglycemic medications at home.  Currently on insulin  via Glucomander protocol.   Hemoglobin A1c is 7.9 and I am not compelled to start the patient on insulin upon discharge.    Obstructive uropathy with bilateral hydronephrosis  Acute renal failure  Rodriguez catheter has been placed per urology see the patient.  He will need to be discharged with a Rodriguez.  Continue tamsulosin  Trend Scr. Was initially as high as 17. Nephrology following    Urine tract infection  On Rocephin.  Urine cultures negative.    Dementia  On namenda and Aricept.  I would rather have the MRI occur as an outpatient when his renal function sandra improved to baseline due to the risks of nephrogenic systemic fibrosis in the setting of MRI contrast.     I discussed the patient's findings and my recommendations with patient and family.    VTE Prophylaxis - SCDs.  Code Status - Full code.       Gurvinder Farrell MD  [facility]  08/07/24  13:04 EDT

## 2024-08-07 NOTE — SIGNIFICANT NOTE
08/07/24 1300   OTHER   Discipline physical therapy assistant   Rehab Time/Intention   Session Not Performed patient/family declined treatment  (PT checked on pt twice this AM. Pt refused both times to participate. Extensive Education and encouragement provided. Pt continued to refuse. Informed pt and family PT unable to get back today d/t PT census.  Will follow up with pt tomorrow.)   Recommendation   PT - Next Appointment 08/08/24

## 2024-08-08 LAB
ALBUMIN SERPL-MCNC: 2.4 G/DL (ref 3.5–5.2)
ANION GAP SERPL CALCULATED.3IONS-SCNC: 8.9 MMOL/L (ref 5–15)
BUN SERPL-MCNC: 49 MG/DL (ref 8–23)
BUN/CREAT SERPL: 20 (ref 7–25)
CALCIUM SPEC-SCNC: 8 MG/DL (ref 8.6–10.5)
CHLORIDE SERPL-SCNC: 109 MMOL/L (ref 98–107)
CO2 SERPL-SCNC: 22.1 MMOL/L (ref 22–29)
CREAT SERPL-MCNC: 2.45 MG/DL (ref 0.76–1.27)
EGFRCR SERPLBLD CKD-EPI 2021: 26 ML/MIN/1.73
GLUCOSE BLDC GLUCOMTR-MCNC: 131 MG/DL (ref 70–130)
GLUCOSE BLDC GLUCOMTR-MCNC: 153 MG/DL (ref 70–130)
GLUCOSE BLDC GLUCOMTR-MCNC: 159 MG/DL (ref 70–130)
GLUCOSE BLDC GLUCOMTR-MCNC: 163 MG/DL (ref 70–130)
GLUCOSE SERPL-MCNC: 147 MG/DL (ref 65–99)
PHOSPHATE SERPL-MCNC: 3.6 MG/DL (ref 2.5–4.5)
POTASSIUM SERPL-SCNC: 3.8 MMOL/L (ref 3.5–5.2)
SODIUM SERPL-SCNC: 140 MMOL/L (ref 136–145)

## 2024-08-08 PROCEDURE — 80069 RENAL FUNCTION PANEL: CPT | Performed by: INTERNAL MEDICINE

## 2024-08-08 PROCEDURE — 63710000001 INSULIN LISPRO (HUMAN) PER 5 UNITS: Performed by: INTERNAL MEDICINE

## 2024-08-08 PROCEDURE — 97116 GAIT TRAINING THERAPY: CPT

## 2024-08-08 PROCEDURE — 82948 REAGENT STRIP/BLOOD GLUCOSE: CPT

## 2024-08-08 PROCEDURE — 63710000001 INSULIN LISPRO (HUMAN) PER 5 UNITS: Performed by: STUDENT IN AN ORGANIZED HEALTH CARE EDUCATION/TRAINING PROGRAM

## 2024-08-08 PROCEDURE — 25010000002 CEFTRIAXONE PER 250 MG: Performed by: INTERNAL MEDICINE

## 2024-08-08 PROCEDURE — 25810000003 SODIUM CHLORIDE 0.9 % SOLUTION: Performed by: INTERNAL MEDICINE

## 2024-08-08 RX ORDER — DEXTROSE MONOHYDRATE 25 G/50ML
25 INJECTION, SOLUTION INTRAVENOUS
Status: DISCONTINUED | OUTPATIENT
Start: 2024-08-08 | End: 2024-08-09 | Stop reason: HOSPADM

## 2024-08-08 RX ORDER — NICOTINE POLACRILEX 4 MG
15 LOZENGE BUCCAL
Status: DISCONTINUED | OUTPATIENT
Start: 2024-08-08 | End: 2024-08-09 | Stop reason: HOSPADM

## 2024-08-08 RX ORDER — HYDRALAZINE HYDROCHLORIDE 25 MG/1
25 TABLET, FILM COATED ORAL EVERY 8 HOURS SCHEDULED
Status: DISCONTINUED | OUTPATIENT
Start: 2024-08-08 | End: 2024-08-09 | Stop reason: HOSPADM

## 2024-08-08 RX ORDER — INSULIN LISPRO 100 [IU]/ML
2-7 INJECTION, SOLUTION INTRAVENOUS; SUBCUTANEOUS
Status: DISCONTINUED | OUTPATIENT
Start: 2024-08-08 | End: 2024-08-09 | Stop reason: HOSPADM

## 2024-08-08 RX ADMIN — Medication 10 ML: at 08:56

## 2024-08-08 RX ADMIN — HYDRALAZINE HYDROCHLORIDE 25 MG: 50 TABLET ORAL at 22:48

## 2024-08-08 RX ADMIN — AMLODIPINE BESYLATE 5 MG: 5 TABLET ORAL at 08:56

## 2024-08-08 RX ADMIN — TAMSULOSIN HYDROCHLORIDE 0.4 MG: 0.4 CAPSULE ORAL at 08:56

## 2024-08-08 RX ADMIN — Medication 10 ML: at 20:06

## 2024-08-08 RX ADMIN — DONEPEZIL HYDROCHLORIDE 10 MG: 10 TABLET, FILM COATED ORAL at 20:06

## 2024-08-08 RX ADMIN — MEMANTINE HYDROCHLORIDE 10 MG: 10 TABLET, FILM COATED ORAL at 08:56

## 2024-08-08 RX ADMIN — INSULIN LISPRO 2 UNITS: 100 INJECTION, SOLUTION INTRAVENOUS; SUBCUTANEOUS at 08:55

## 2024-08-08 RX ADMIN — HYDRALAZINE HYDROCHLORIDE 25 MG: 50 TABLET ORAL at 15:31

## 2024-08-08 RX ADMIN — CEFTRIAXONE 2000 MG: 2 INJECTION, POWDER, FOR SOLUTION INTRAMUSCULAR; INTRAVENOUS at 22:48

## 2024-08-08 RX ADMIN — INSULIN LISPRO 2 UNITS: 100 INJECTION, SOLUTION INTRAVENOUS; SUBCUTANEOUS at 17:25

## 2024-08-08 RX ADMIN — INSULIN LISPRO 2 UNITS: 100 INJECTION, SOLUTION INTRAVENOUS; SUBCUTANEOUS at 12:36

## 2024-08-08 RX ADMIN — SODIUM CHLORIDE 75 ML/HR: 9 INJECTION, SOLUTION INTRAVENOUS at 06:15

## 2024-08-08 RX ADMIN — INSULIN LISPRO 2 UNITS: 100 INJECTION, SOLUTION INTRAVENOUS; SUBCUTANEOUS at 20:24

## 2024-08-08 NOTE — THERAPY TREATMENT NOTE
Patient Name: Froy Ngo  : 1945    MRN: [id number]                              Today's Date: 2024       Admit Date: 2024    Visit Dx:     ICD-10-CM ICD-9-CM   1. Diabetic ketoacidosis without coma associated with other specified diabetes mellitus  E13.10 250.12   2. Altered mental status, unspecified altered mental status type  R41.82 780.97   3. Severe dehydration  E86.0 276.51   4. Acute kidney injury (NEHEMIAS) with acute tubular necrosis (ATN)  N17.0 584.5   5. Hyperkalemia  E87.5 276.7     Patient Active Problem List   Diagnosis    DKA (diabetic ketoacidosis)    Obstructive uropathy    Bilateral hydronephrosis    Hyperkalemia     Past Medical History:   Diagnosis Date    Dementia     Diabetes mellitus     Elevated cholesterol     Prostate disorder     Urinary retention      History reviewed. No pertinent surgical history.   General Information       Row Name 24 1305          Physical Therapy Time and Intention    Document Type therapy note (daily note)  -     Mode of Treatment physical therapy  -EB       Row Name 24 1305          General Information    Patient Profile Reviewed yes  -EB     Existing Precautions/Restrictions fall  -EB       Row Name 24 1305          Cognition    Orientation Status (Cognition) oriented to;person;place  -EB       Row Name 24 1305          Safety Issues, Functional Mobility    Safety Issues Affecting Function (Mobility) awareness of need for assistance;impulsivity;insight into deficits/self-awareness;judgment;problem-solving;sequencing abilities  -EB     Impairments Affecting Function (Mobility) balance;cognition;endurance/activity tolerance;strength  -EB               User Key  (r) = Recorded By, (t) = Taken By, (c) = Cosigned By      Initials Name Provider Type    EB Rakel Mccormack PTA Physical Therapist Assistant                   Mobility       Row Name 24 1306          Bed Mobility    Supine-Sit [place] (Bed Mobility)  minimum assist (75% patient effort);verbal cues;nonverbal cues (demo/gesture)  -EB     Sit-Supine [place] (Bed Mobility) not tested  -EB     Assistive Device (Bed Mobility) bed rails;head of bed elevated  -EB     Comment, (Bed Mobility) encouragement/education provided.  Pt's spouse present in room attempting to encourage. Pt a little more receptive/agreeable when wife present in room than daughter.  -EB       Row Name 08/08/24 1306          Sit-Stand Transfer    Sit-Stand [place] (Transfers) minimum assist (75% patient effort);2 person assist  -EB     Assistive Device (Sit-Stand Transfers) walker, front-wheeled  -EB       Row Name 08/08/24 1306          Gait/Stairs (Locomotion)    [place] Level (Gait) minimum assist (75% patient effort)  -EB     Assistive Device (Gait) walker, front-wheeled  -EB     Distance in Feet (Gait) 30  -EB     Deviations/Abnormal Patterns (Gait) mary decreased;gait speed decreased;stride length decreased  -EB     Bilateral Gait Deviations forward flexed posture  -EB     Comment, (Gait/Stairs) pt impulsive, declined to attempt further gait today. Needs redirection to stay on task, can be impulsive.  -EB               User Key  (r) = Recorded By, (t) = Taken By, (c) = Cosigned By      Initials Name Provider Type    Rakel Pizano PTA Physical Therapist Assistant                   Obj/Interventions    No documentation.                  Goals/Plan    No documentation.                  Clinical Impression       Row Name 08/08/24 131          Plan of Care Review    Plan of Care Reviewed With patient  -EB     Progress improving  -EB     Outcome Evaluation Pt seen for PT tx this AM. Pt agreeable with encouragement and education.  Pt is confused and can be impulsive.  Pt able to complete bed mobility with Brien and stood with MinAX2. Pt ambulated 30ft with rwx, Brien. Pt a little steadier with walker today but needed cues to maintain a safe distance. Pt declined further gait and  sat in the chair. Will continue to follow pt for d/c needs.  -EB       Row Name 08/08/24 1314          Therapy Assessment/Plan (PT)    Therapy Frequency (PT) 5 times/wk  -EB       Row Name 08/08/24 1314          Positioning and Restraints    Pre-Treatment Position in bed  -EB     Post Treatment Position chair  -EB     In Chair sitting;call light within reach;encouraged to call for assist;with family/caregiver  -               User Key  (r) = Recorded By, (t) = Taken By, (c) = Cosigned By      Initials Name Provider Type    Rakel Pizano PTA Physical Therapist Assistant                   Outcome Measures       Row Name 08/08/24 1321          How much help from another person do you currently need...    Turning from your back to your side while in flat bed without using bedrails? 3  -EB     Moving from lying on back to sitting on the side of a flat bed without bedrails? 3  -EB     Moving to and from a bed to a chair (including a wheelchair)? 3  -EB     Standing up from a chair using your arms (e.g., wheelchair, bedside chair)? 2  -EB     Climbing 3-5 steps with a railing? 2  -EB     To walk in hospital room? 3  -EB     AM-PAC 6 Clicks Score (PT) 16  -EB     Highest Level of Mobility Goal 5 --> Static standing  -               User Key  (r) = Recorded By, (t) = Taken By, (c) = Cosigned By      Initials Name Provider Type    Rakel Pizano PTA Physical Therapist Assistant                                 Physical Therapy Education       Title: PT OT SLP Therapies (In Progress)       Topic: Physical Therapy (In Progress)       Point: Mobility training (Done)       Learning Progress Summary             Patient Acceptance, E,D, VU,NR by TYRA at 8/8/2024 1321    Acceptance, E,TB,D, VU,NR by  at 8/6/2024 1330                         Point: Home exercise program (Not Started)       Learner Progress:  Not documented in this visit.              Point: Body mechanics (Done)       Learning Progress Summary              Patient Acceptance, E,D, VU,NR by  at 8/8/2024 1321    Acceptance, E,TB,D, VU,NR by  at 8/6/2024 1330                         Point: Precautions (Done)       Learning Progress Summary             Patient Acceptance, E,TB,D, VU,NR by  at 8/6/2024 1330                                         User Key       Initials Effective Dates Name Provider Type Discipline     02/14/23 -  Rakel Mccormack PTA Physical Therapist Assistant PT     04/08/22 -  Dolores Obrien PT Physical Therapist PT                  PT Recommendation and Plan     Plan of Care Reviewed With: patient  Progress: improving  Outcome Evaluation: Pt seen for PT tx this AM. Pt agreeable with encouragement and education.  Pt is confused and can be impulsive.  Pt able to complete bed mobility with Brien and stood with MinAX2. Pt ambulated 30ft with rwx, Brien. Pt a little steadier with walker today but needed cues to maintain a safe distance. Pt declined further gait and sat in the chair. Will continue to follow pt for d/c needs.     Time Calculation:         PT Charges       Row Name 08/08/24 1322             Time Calculation    Start Time 1004  -EB      Stop Time 1016  -      Time Calculation (min) 12 min  -      PT Received On 08/08/24  -      PT - Next Appointment 08/09/24  -         Time Calculation- PT    Total Timed Code Minutes- PT 12 minute(s)  -                User Key  (r) = Recorded By, (t) = Taken By, (c) = Cosigned By      Initials Name Provider Type     Rakel Mccormack PTA Physical Therapist Assistant                  Therapy Charges for Today       Code Description Service Date Service Provider Modifiers Qty    [id number] HC GAIT TRAINING EA 15 MIN 8/8/2024 Rakel Mccormack PTA GP 1            PT G-Codes  Outcome Measure Options: AM-PAC 6 Clicks Basic Mobility (PT)  AM-PAC 6 Clicks Score (PT): 16  AM-PAC 6 Clicks Score (OT): 17  Modified Jose Scale: 4 - Moderately severe disability.  Unable to walk without assistance, and  unable to attend to own bodily needs without assistance.       Rakel Mccormack, PTA  8/8/2024

## 2024-08-08 NOTE — PLAN OF CARE
Goal Outcome Evaluation:  Plan of Care Reviewed With: patient        Progress: improving  Outcome Evaluation: Pt seen for PT tx this AM. Pt agreeable with encouragement and education.  Pt is confused and can be impulsive.  Pt able to complete bed mobility with Brien and stood with MinAX2. Pt ambulated 30ft with rwx, Brien. Pt a little steadier with walker today but needed cues to maintain a safe distance. Pt declined further gait and sat in the chair. Will continue to follow pt for d/c needs.

## 2024-08-08 NOTE — PROGRESS NOTES
Name: Froy Ngo ADMIT: 2024   : 1945  PCP: Ryan Gutierrez MD    MRN: [id number] LOS: 4 days   AGE/SEX: 78 y.o. male  ROOM: [facility]     Subjective   No new complaints. Scr continues to improve.     Objective   Objective   Vital Signs  Temp:  [97.9 °F (36.6 °C)-98.2 °F (36.8 °C)] 98.2 °F (36.8 °C)  Heart Rate:  [62-73] 69  Resp:  [18] 18  BP: (135-199)/(53-84) 188/84  SpO2:  [96 %-98 %] 96 %  on   ;   Device (Oxygen Therapy): room air  Body mass index is 26.37 kg/m².  Physical Exam  Constitutional:       General: He is not in acute distress.  HENT:      Head: Normocephalic and atraumatic.   Cardiovascular:      Rate and Rhythm: Normal rate and regular rhythm.   Pulmonary:      Effort: Pulmonary effort is normal. No respiratory distress.   Abdominal:      General: There is no distension.      Palpations: Abdomen is soft.      Tenderness: There is no abdominal tenderness.   Neurological:      Mental Status: He is alert and oriented to person, place, and time.         Results Review     I reviewed the patient's new clinical results.  Results from last 7 days   Lab Units 24  0227 24  0444 24  0359 24  1814   WBC 10*3/mm3 8.17 9.00 12.08* 18.10*   HEMOGLOBIN g/dL 10.7* 10.6* 12.0* 15.0   PLATELETS 10*3/mm3 268 246 252 297     Results from last 7 days   Lab Units 24  0313 24  0227 24  0809 24  0444   SODIUM mmol/L 140 138 131* 133*   POTASSIUM mmol/L 3.8 3.7 3.6 4.7   CHLORIDE mmol/L 109* 106 103 103   CO2 mmol/L 22.1 20.7* 19.1* 20.0*   BUN mg/dL 49* 73* 110* 111*   CREATININE mg/dL 2.45* 3.84* 5.84* 6.62*   GLUCOSE mg/dL 147* 90 337* 157*   Estimated Creatinine Clearance: 30 mL/min (A) (by C-G formula based on SCr of 2.45 mg/dL (H)).  Results from last 7 days   Lab Units 24  0313 24  0227 24  1814   ALBUMIN g/dL 2.4* 2.3* 3.4*   BILIRUBIN mg/dL  --   --  0.9   ALK PHOS U/L  --   --  155*   AST (SGOT) U/L  --   --  19   ALT  "(SGPT) U/L  --   --  27     Results from last 7 days   Lab Units 08/08/24  0313 08/07/24  0227 08/06/24  0809 08/06/24  0444 08/05/24  1027 08/05/24  0359 08/04/24 2335 08/04/24 1958 08/04/24  1814   CALCIUM mg/dL 8.0* 7.8* 6.9* 7.6*   < > 8.0*   < > 8.2* 8.8   ALBUMIN g/dL 2.4* 2.3*  --   --   --   --   --   --  3.4*   MAGNESIUM mg/dL  --  1.6  --  2.1  --  2.4  --  2.5* 2.7*   PHOSPHORUS mg/dL 3.6 4.1  --   --   --   --   --  9.2* 9.9*    < > = values in this interval not displayed.     Results from last 7 days   Lab Units 08/04/24 1958 08/04/24  1814   PROCALCITONIN ng/mL  --  2.66*   LACTATE mmol/L 1.2  --    No results found for: \"COVID19\"  Glucose   Date/Time Value Ref Range Status   08/08/2024 0753 131 (H) 70 - 130 mg/dL Final   08/07/2024 2033 148 (H) 70 - 130 mg/dL Final   08/07/2024 1712 153 (H) 70 - 130 mg/dL Final   08/07/2024 1216 110 70 - 130 mg/dL Final   08/07/2024 0806 79 70 - 130 mg/dL Final   08/06/2024 2041 159 (H) 70 - 130 mg/dL Final   08/06/2024 1605 157 (H) 70 - 130 mg/dL Final     Results for orders placed or performed during the hospital encounter of 08/04/24   Blood Culture - Blood, Arm, Left    Specimen: Arm, Left; Blood   Result Value Ref Range    Blood Culture No growth at 3 days          XR Chest 1 View  Narrative: SINGLE VIEW OF THE CHEST     HISTORY: Altered mental status     COMPARISON: None available.     FINDINGS:  Heart size is within normal limits, given technique. There is  calcification of the aorta. No pneumothorax, pleural effusion, or acute  infiltrate is seen.     Impression: No acute findings.     This report was finalized on 8/4/2024 8:59 PM by Dr. Amy Hernandez M.D on Workstation: [id number]     CT Abdomen Pelvis Without Contrast  Narrative: CT OF THE AND PELVIS WITHOUT CONTRAST     HISTORY: Altered mental status     COMPARISON: None available.     TECHNIQUE: Axial CT imaging was obtained through the abdomen and pelvis.  No IV contrast was administered.   "   FINDINGS:  Images are degraded by artifact from patient's arms. There is some  minimal dependent atelectasis. There is a trace right pleural effusion  and small left pleural effusion. No suspicious hepatic lesions are seen.  There is cholelithiasis. Pancreas is atrophic. Adrenal glands are  normal. There is borderline enlargement of the spleen, which measures up  to 13.7 cm in AP dimensions. Duodenum appears normal. The patient has  bilateral hydroureteronephrosis. No obstructing stone is seen. Urinary  bladder is distended, which may reflect urinary retention, especially  given the presence of a very enlarged prostate gland. There is extensive  perinephric and periureteral stranding. Appearance is concerning for  ascending urinary tract infection. There are aortoiliac calcifications.  Low-attenuation lesion within the inferior pole of the right kidney is  favored to be a cyst. Trace fluid tracks along the paracolic gutters,  particularly on the right. There is colonic diverticulosis. The appendix  is normal. There is no bowel obstruction. No acute osseous abnormalities  are seen. There is some mild body wall edema.        Impression:    1. The patient has moderate bilateral hydroureteronephrosis. This is  favored to be secondary to urinary retention, given marked distention of  the urinary bladder, and the patient's enlarged prostate gland. Patient  may benefit from catheterization. There is extensive bilateral  perinephric and periureteral stranding, and findings are favored to  reflect ascending urinary tract infection.  Radiation dose reduction techniques were utilized, including automated  exposure control and exposure modulation based on body size.        This report was finalized on 8/4/2024 8:06 PM by Dr. Amy Hernandez M.D on Workstation: [id number]     CT Head Without Contrast  Narrative: CT OF THE HEAD WITHOUT CONTRAST     HISTORY: Inability to eat and altered mental status     COMPARISON: None  available.     TECHNIQUE: Axial CT imaging was obtained through the brain. No IV  contrast was administered.     FINDINGS:  No acute intracranial hemorrhage is seen. There is atrophy. There is  periventricular and deep white matter microangiopathic change. There is  no midline shift or mass effect. Images suggest prior sinus surgery.  Mastoid air cells are clear.     Impression: No acute intracranial findings.     Radiation dose reduction techniques were utilized, including automated  exposure control and exposure modulation based on body size.        This report was finalized on 8/4/2024 7:59 PM by Dr. Amy Hernandez M.D on Workstation: [id number]       Scheduled Medications  amLODIPine, 5 mg, Oral, Q24H  cefTRIAXone, 2,000 mg, Intravenous, Q24H  donepezil, 10 mg, Oral, Nightly  hydrALAZINE, 25 mg, Oral, Q8H  insulin glargine, 1-200 Units, Subcutaneous, Nightly - Glucommander  memantine, 10 mg, Oral, Daily  senna-docusate sodium, 2 tablet, Oral, BID  sodium chloride, 10 mL, Intravenous, Q12H  sodium chloride, 10 mL, Intravenous, Q12H  tamsulosin, 0.4 mg, Oral, Daily    Infusions   Diet  Diet: Diabetic; Consistent Carbohydrate; Fluid Consistency: Thin (IDDSI 0)       Assessment/Plan     Active Hospital Problems    Diagnosis  POA    **DKA (diabetic ketoacidosis) [E11.10]  Yes    Obstructive uropathy [N13.9]  Unknown    Bilateral hydronephrosis [N13.30]  Unknown    Hyperkalemia [E87.5]  Unknown      Resolved Hospital Problems   No resolved problems to display.       78 y.o. male admitted with DKA (diabetic ketoacidosis).    Diabetic you acidosis  Type 2 diabetes  Patient is on several oral antihyperglycemic medications at home.  Currently on insulin via Glucomander protocol.   Hemoglobin A1c is 7.9 and I am not compelled to start the patient on insulin upon discharge.  Monitor blood sugars      Obstructive uropathy with bilateral hydronephrosis  Acute renal failure  Rodriguez catheter has been placed per urology see  the patient.  He will need to be discharged with a Rodriguez.  Continue tamsulosin  Trend Scr. Was initially as high as 17. Nephrology following. Continues to improve     Urine tract infection  On Rocephin.  Urine cultures negative.     Dementia  On namenda and Aricept.  -MRI brain w and wo as long as Scr under 2          SCDs for DVT prophylaxis.  Full code.  Discussed with patient, spouse, and nursing staff.  Anticipate discharge home with HH vs SNU facility in 1-2 days.      Gurvinder Farrell MD  [facility]  08/08/24  11:26 EDT

## 2024-08-08 NOTE — PROGRESS NOTES
[facility] Progress Note      Patient Name: Froy Ngo  : 1945  MRN: [id number]  Primary Care Physician:  Ryan Gutierrez MD  Date of admission: 2024    Subjective     Interval History:   F/u NEHEMIAS    Review of Systems:   UOP down slightly 5.5L/24h  Denies dyspnea or nausea     Objective     Vitals:   Temp:  [97.9 °F (36.6 °C)-98.2 °F (36.8 °C)] 98.2 °F (36.8 °C)  Heart Rate:  [62-73] 69  Resp:  [18] 18  BP: (135-199)/(53-84) 188/84    Intake/Output Summary (Last 24 hours) at 2024 0823  Last data filed at 2024 0716  Gross per 24 hour   Intake --   Output 5950 ml   Net -5950 ml       Physical Exam:    General Appearance: alert   Neck: supple, no JVD  Lungs: CTA bilat no rales  Heart: RRR, normal S1 and S2  Abdomen: soft, nontender, nondistended  : +hagen   Extremities: no edema, cyanosis or clubbing       Scheduled Meds:     amLODIPine, 5 mg, Oral, Q24H  cefTRIAXone, 2,000 mg, Intravenous, Q24H  donepezil, 10 mg, Oral, Nightly  insulin glargine, 1-200 Units, Subcutaneous, Nightly - Glucommander  insulin lispro, 1-200 Units, Subcutaneous, 4x Daily With Meals & Nightly  memantine, 10 mg, Oral, Daily  senna-docusate sodium, 2 tablet, Oral, BID  sodium bicarbonate, 650 mg, Oral, BID  sodium chloride, 10 mL, Intravenous, Q12H  sodium chloride, 10 mL, Intravenous, Q12H  tamsulosin, 0.4 mg, Oral, Daily      IV Meds:   sodium chloride, 75 mL/hr, Last Rate: 75 mL/hr (24 0615)        Results Reviewed:   I have personally reviewed the results from the time of this admission to 2024 08:23 EDT     Results from last 7 days   Lab Units 24  0313 24  0227 24  0809 24  1958 24  1814   SODIUM mmol/L 140 138 131*   < > 127*   POTASSIUM mmol/L 3.8 3.7 3.6   < > 7.0*   CHLORIDE mmol/L 109* 106 103   < > 87*   CO2 mmol/L 22.1 20.7* 19.1*   < > 11.0*   BUN mg/dL 49* 73* 110*   < > 189*   CREATININE mg/dL 2.45* 3.84* 5.84*   < > 17.15*    CALCIUM mg/dL 8.0* 7.8* 6.9*   < > 8.8   BILIRUBIN mg/dL  --   --   --   --  0.9   ALK PHOS U/L  --   --   --   --  155*   ALT (SGPT) U/L  --   --   --   --  27   AST (SGOT) U/L  --   --   --   --  19   GLUCOSE mg/dL 147* 90 337*   < > 162*    < > = values in this interval not displayed.     Estimated Creatinine Clearance: 30 mL/min (A) (by C-G formula based on SCr of 2.45 mg/dL (H)).  Results from last 7 days   Lab Units 08/08/24  0313 08/07/24  0227 08/06/24  0444 08/05/24  0359 08/04/24  1958   MAGNESIUM mg/dL  --  1.6 2.1 2.4 2.5*   PHOSPHORUS mg/dL 3.6 4.1  --   --  9.2*         Results from last 7 days   Lab Units 08/07/24 0227 08/06/24 0444 08/05/24 0359 08/04/24  1814   WBC 10*3/mm3 8.17 9.00 12.08* 18.10*   HEMOGLOBIN g/dL 10.7* 10.6* 12.0* 15.0   PLATELETS 10*3/mm3 268 246 252 297     Results from last 7 days   Lab Units 08/04/24  1814   INR  1.36*       Assessment / Plan     ASSESSMENT:  Non olig NEHEMIAS - obs uropathy.  CT abdomen showed marked distention of bladder with bilateral hydronephrosis.  Improving rapidly post hagen, peak BUN/Cr 189/17, now 49/2.5.  Post-obs diuresis continues but slowing some.  K normal.     Hyperkalemia.  Resolved  EuDKA - Was on SGLT2 inh.  Resolved.  Now w NAGMA stable on bicarb supplement  Dementia, treated .  Note need for MRI with contrast before discharge for poss immune therapy for Alzheimers - on hold based on GFR currently  DM2, on ozempic, farxiga, metformin at home  Bladder outlet obstruction with bilateral hydronephrosis.  Gross hematuria resolving.  On rocephin.  UROL eval noted, plan outpatient VT but note atonic bladder  Hyperphosphatemia.  Resolved  Hypertension.  BP high still.  Added norvasc 5mg.  Was briefly lower 135/73 yesterday      PLAN:  DC IVF   DC bicarb tabs  Add hydralazine   Ok with MRI with contrast tomorrow , defer ordering of study to primary team      Gigi Engel MD  08/08/24  08:23 EDT    [facility]  [phone]

## 2024-08-08 NOTE — PLAN OF CARE
Goal Outcome Evaluation:              Outcome Evaluation: vss. pt confused to place time situation. caregivers at the bedside.

## 2024-08-09 ENCOUNTER — APPOINTMENT (OUTPATIENT)
Dept: MRI IMAGING | Facility: HOSPITAL | Age: 79
DRG: 638 | End: 2024-08-09
Payer: MEDICARE

## 2024-08-09 VITALS
WEIGHT: 193.78 LBS | SYSTOLIC BLOOD PRESSURE: 121 MMHG | DIASTOLIC BLOOD PRESSURE: 68 MMHG | BODY MASS INDEX: 26.25 KG/M2 | OXYGEN SATURATION: 95 % | HEIGHT: 72 IN | TEMPERATURE: 98.2 F | HEART RATE: 98 BPM | RESPIRATION RATE: 18 BRPM

## 2024-08-09 LAB
ALBUMIN SERPL-MCNC: 2.5 G/DL (ref 3.5–5.2)
ANION GAP SERPL CALCULATED.3IONS-SCNC: 8.7 MMOL/L (ref 5–15)
BACTERIA SPEC AEROBE CULT: NORMAL
BUN SERPL-MCNC: 37 MG/DL (ref 8–23)
BUN/CREAT SERPL: 16.7 (ref 7–25)
CALCIUM SPEC-SCNC: 7.9 MG/DL (ref 8.6–10.5)
CHLORIDE SERPL-SCNC: 105 MMOL/L (ref 98–107)
CO2 SERPL-SCNC: 21.3 MMOL/L (ref 22–29)
CREAT SERPL-MCNC: 2.22 MG/DL (ref 0.76–1.27)
EGFRCR SERPLBLD CKD-EPI 2021: 29.2 ML/MIN/1.73
GLUCOSE BLDC GLUCOMTR-MCNC: 113 MG/DL (ref 70–130)
GLUCOSE BLDC GLUCOMTR-MCNC: 141 MG/DL (ref 70–130)
GLUCOSE SERPL-MCNC: 126 MG/DL (ref 65–99)
PHOSPHATE SERPL-MCNC: 3.5 MG/DL (ref 2.5–4.5)
POTASSIUM SERPL-SCNC: 3.7 MMOL/L (ref 3.5–5.2)
SODIUM SERPL-SCNC: 135 MMOL/L (ref 136–145)

## 2024-08-09 PROCEDURE — 70553 MRI BRAIN STEM W/O & W/DYE: CPT

## 2024-08-09 PROCEDURE — A9585 GADOBUTROL INJECTION: HCPCS | Performed by: STUDENT IN AN ORGANIZED HEALTH CARE EDUCATION/TRAINING PROGRAM

## 2024-08-09 PROCEDURE — 0 GADOBUTROL 1 MMOL/ML SOLUTION: Performed by: STUDENT IN AN ORGANIZED HEALTH CARE EDUCATION/TRAINING PROGRAM

## 2024-08-09 PROCEDURE — 80069 RENAL FUNCTION PANEL: CPT | Performed by: INTERNAL MEDICINE

## 2024-08-09 PROCEDURE — 82948 REAGENT STRIP/BLOOD GLUCOSE: CPT

## 2024-08-09 RX ORDER — AMLODIPINE BESYLATE 5 MG/1
5 TABLET ORAL
Qty: 30 TABLET | Refills: 0 | Status: ON HOLD | OUTPATIENT
Start: 2024-08-10

## 2024-08-09 RX ORDER — HYDRALAZINE HYDROCHLORIDE 25 MG/1
25 TABLET, FILM COATED ORAL EVERY 8 HOURS SCHEDULED
Qty: 90 TABLET | Refills: 0 | Status: ON HOLD | OUTPATIENT
Start: 2024-08-09

## 2024-08-09 RX ORDER — ALPRAZOLAM 0.25 MG/1
0.25 TABLET ORAL ONCE
Status: COMPLETED | OUTPATIENT
Start: 2024-08-09 | End: 2024-08-09

## 2024-08-09 RX ORDER — TAMSULOSIN HYDROCHLORIDE 0.4 MG/1
0.4 CAPSULE ORAL DAILY
Qty: 30 CAPSULE | Refills: 0 | Status: ON HOLD | OUTPATIENT
Start: 2024-08-10

## 2024-08-09 RX ORDER — GADOBUTROL 604.72 MG/ML
9 INJECTION INTRAVENOUS
Status: COMPLETED | OUTPATIENT
Start: 2024-08-09 | End: 2024-08-09

## 2024-08-09 RX ADMIN — SENNOSIDES AND DOCUSATE SODIUM 2 TABLET: 50; 8.6 TABLET ORAL at 09:30

## 2024-08-09 RX ADMIN — Medication 10 ML: at 09:31

## 2024-08-09 RX ADMIN — GADOBUTROL 9 ML: 604.72 INJECTION INTRAVENOUS at 15:58

## 2024-08-09 RX ADMIN — MEMANTINE HYDROCHLORIDE 10 MG: 10 TABLET, FILM COATED ORAL at 09:30

## 2024-08-09 RX ADMIN — HYDRALAZINE HYDROCHLORIDE 25 MG: 50 TABLET ORAL at 06:42

## 2024-08-09 RX ADMIN — AMLODIPINE BESYLATE 5 MG: 5 TABLET ORAL at 09:30

## 2024-08-09 RX ADMIN — ALPRAZOLAM 0.25 MG: 0.25 TABLET ORAL at 12:16

## 2024-08-09 RX ADMIN — TAMSULOSIN HYDROCHLORIDE 0.4 MG: 0.4 CAPSULE ORAL at 09:30

## 2024-08-09 NOTE — PROGRESS NOTES
[facility] Progress Note      Patient Name: Froy Ngo  : 1945  MRN: [id number]  Primary Care Physician:  Ryan Gutierrez MD  Date of admission: 2024    Subjective     Interval History:   F/u NEHEMIAS    Review of Systems:   UOP 5100   No c/o  Going down for MRI brain with contrast - I spoke to Dr Farrell earlier to approve this    Objective     Vitals:   Temp:  [98.1 °F (36.7 °C)-98.4 °F (36.9 °C)] 98.1 °F (36.7 °C)  Heart Rate:  [73-86] 78  Resp:  [18] 18  BP: (147-175)/(66-82) 147/68    Intake/Output Summary (Last 24 hours) at 2024 0841  Last data filed at 2024 0642  Gross per 24 hour   Intake 222 ml   Output 4800 ml   Net -4578 ml       Physical Exam:    General Appearance: frail WM no distress  Neck: supple, no JVD  Lungs: CTA bilat no rales  Heart: RRR, normal S1 and S2  Abdomen: soft, nontender, nondistended  : +hagen  Extremities: no edema, cyanosis or clubbing       Scheduled Meds:     amLODIPine, 5 mg, Oral, Q24H  donepezil, 10 mg, Oral, Nightly  hydrALAZINE, 25 mg, Oral, Q8H  insulin lispro, 2-7 Units, Subcutaneous, 4x Daily AC & at Bedtime  memantine, 10 mg, Oral, Daily  senna-docusate sodium, 2 tablet, Oral, BID  sodium chloride, 10 mL, Intravenous, Q12H  sodium chloride, 10 mL, Intravenous, Q12H  tamsulosin, 0.4 mg, Oral, Daily      IV Meds:        Results Reviewed:   I have personally reviewed the results from the time of this admission to 2024 08:41 EDT     Results from last 7 days   Lab Units 24  0325 24  0313 24  0227 24  1958 24  1814   SODIUM mmol/L 135* 140 138   < > 127*   POTASSIUM mmol/L 3.7 3.8 3.7   < > 7.0*   CHLORIDE mmol/L 105 109* 106   < > 87*   CO2 mmol/L 21.3* 22.1 20.7*   < > 11.0*   BUN mg/dL 37* 49* 73*   < > 189*   CREATININE mg/dL 2.22* 2.45* 3.84*   < > 17.15*   CALCIUM mg/dL 7.9* 8.0* 7.8*   < > 8.8   BILIRUBIN mg/dL  --   --   --   --  0.9   ALK PHOS U/L  --   --   --   --  155*   ALT  (SGPT) U/L  --   --   --   --  27   AST (SGOT) U/L  --   --   --   --  19   GLUCOSE mg/dL 126* 147* 90   < > 162*    < > = values in this interval not displayed.     Estimated Creatinine Clearance: 33 mL/min (A) (by C-G formula based on SCr of 2.22 mg/dL (H)).  Results from last 7 days   Lab Units 08/09/24  0325 08/08/24  0313 08/07/24 0227 08/06/24  0444 08/05/24  0359   MAGNESIUM mg/dL  --   --  1.6 2.1 2.4   PHOSPHORUS mg/dL 3.5 3.6 4.1  --   --          Results from last 7 days   Lab Units 08/07/24 0227 08/06/24 0444 08/05/24  0359 08/04/24  1814   WBC 10*3/mm3 8.17 9.00 12.08* 18.10*   HEMOGLOBIN g/dL 10.7* 10.6* 12.0* 15.0   PLATELETS 10*3/mm3 268 246 252 297     Results from last 7 days   Lab Units 08/04/24  1814   INR  1.36*       Assessment / Plan     ASSESSMENT:  Non olig NEHEMIAS - obs uropathy.  CT abdomen showed marked distention of bladder with bilateral hydronephrosis.  Improving rapidly post hagen, peak BUN/Cr 189/17, now 372/2.2.  Post-obs diuresis continues but slowing some.  K normal.     Hyperkalemia.  Resolved  EuDKA - Was on SGLT2 inh.  Resolved.  Now w NAGMA stable, stopped bicarb supplement  Dementia, treated .  Needs MRI with contrast before discharge for poss immune therapy consideration for Alzheimers    DM2, on ozempic, farxiga, metformin at home  Bladder outlet obstruction with bilateral hydronephrosis.  Gross hematuria resolved.  Finished rocephin.  UROL eval noted, plan outpatient VT but note atonic bladder  Hyperphosphatemia.  Resolved  Hypertension.  BP labile but improved.  Added norvasc 5mg.          PLAN:  Ok to proceed with MRI with contrast given degree of improvement in renal fcn (low risk for nephrogenic fibrosis); note plan for discharge to SNF, ok from nephrology standpoint.  Will do outpatient VT with UROL.  WIll arrange 2 week follow up with me      Gigi Engel MD  08/09/24  08:41 EDT    [facility]  [phone]

## 2024-08-09 NOTE — CASE MANAGEMENT/SOCIAL WORK
Case Management Discharge Note      Final Note: DC to [facility] via [facility]         Selected Continued Care - Discharged on 8/9/2024 Admission date: 8/4/2024 - Discharge disposition: Skilled Nursing Facility (DC - External)      Destination Coordination complete.      Service Provider Selected Services Address Phone Fax Patient Preferred    [facility] [address] [place], [place] [id number] [phone] --              Durable Medical Equipment    No services have been selected for the patient.                Dialysis/Infusion    No services have been selected for the patient.                Home Medical Care    No services have been selected for the patient.                Therapy    No services have been selected for the patient.                Community Resources    No services have been selected for the patient.                Community & DME    No services have been selected for the patient.                    Transportation Services  Ambulance: [facility]    Final Discharge Disposition Code: 03 - skilled nursing facility (SNF)

## 2024-08-09 NOTE — PAYOR COMM NOTE
"Froy Turner (78 y.o. Male)                      ATTENTION; CONTINUED CLINICALS CASE REF [id number]                     REPLY TO [facility] DEPT  [phone] OR CALL   AVIS KING LPSHAHRZAD           Date of Birth   1945    Social Security Number       Address   [address] [place] [address]    Home Phone   [phone]    MRN   [id number]       Latter-day   Unknown    Marital Status   Single                            Admission Date   8/4/24    Admission Type   Emergency    Admitting Provider   Tio Delcid MD    Attending Provider   Gurvinder Farrell MD    Department, Room/Bed   [address], N425/1       Discharge Date       Discharge Disposition   Skilled Nursing Facility (DC - External)    Discharge Destination                                 Attending Provider: Gurvinder Farrell MD    Allergies: No Known Allergies    Isolation: None   Infection: None   Code Status: CPR    Ht: 182.9 cm (72\")   Wt: 87.9 kg (193 lb 12.6 oz)    Admission Cmt: None   Principal Problem: DKA (diabetic ketoacidosis) [E11.10]                   Active Insurance as of 8/4/2024       Primary Coverage       Payor Plan Insurance Group Employer/Plan Group    MEDICARE MEDICARE A ONLY        Payor Plan Address Payor Plan Phone Number Payor Plan Fax Number Effective Dates    [address] [phone]  6/1/2009 - None Entered    [facility] [id number]         Subscriber Name Subscriber Birth Date Member ID       FROY TURNER 1945 [id number]               Secondary Coverage       Payor Plan Insurance Group Employer/Plan Group    ANTHEM BLUE CROSS ANTHEM BLUE CROSS BLUE SHIELD PPO [id number]       Payor Plan Address Payor Plan Phone Number Payor Plan Fax Number Effective Dates    [address] [phone]  4/1/2019 - None Entered    [facility] [id number]         Subscriber Name Subscriber Birth Date Member ID       FROY TURNER 1945 [id number]                     Emergency " Contacts        (Rel.) Home Phone Work Phone Mobile Phone    ROBERT TURNER (Spouse) [phone] -- [phone]    robert braswell (Daughter) [phone] -- --              Vital Signs (last 4 days)       Date/Time Temp Temp src Pulse Resp BP Patient Position SpO2    08/09/24 1301 98.2 (36.8) Oral 98 18 121/68 Lying 95    08/09/24 0803 98.1 (36.7) Oral 78 18 147/68 Lying 97    08/09/24 0642 -- -- 73 -- 166/77 -- --    08/08/24 2248 98.4 (36.9) Oral 75 18 162/66 Lying 98    08/08/24 2017 98.1 (36.7) Oral 80 18 157/69 Lying 98    08/08/24 1307 98.1 (36.7) Oral 86 18 175/82 Lying 97    08/08/24 0716 98.2 (36.8) Oral 69 18 188/84  Lying 96    BP: RN Notified at 08/08/24 0716    08/08/24 0041 98.1 (36.7) Oral 62 18 154/53 Lying 98    08/07/24 2030 97.9 (36.6) Oral 73 18 135/73 Lying 98    08/07/24 1314 98.2 (36.8) Oral 69 18 199/78  Lying 97    BP: RN Notified at 08/07/24 1314    08/07/24 0730 -- -- 70 -- 176/59 Lying 97    08/07/24 0728 98.6 (37) Oral 77 18 179/69 Lying 96    08/06/24 2314 98.6 (37) Oral 76 18 168/65 Lying 100    08/06/24 1935 97.9 (36.6) Oral 75 17 141/68 Lying 98    08/06/24 1745 -- -- 80 -- -- -- 100    08/06/24 1730 -- -- 71 -- -- -- 99    08/06/24 1715 -- -- 80 -- -- -- 97    08/06/24 1700 -- -- 74 -- -- -- 98    08/06/24 1645 -- -- 78 -- -- -- 98    08/06/24 1630 -- -- 80 -- 183/80 -- 97    08/06/24 1615 -- -- 71 -- -- -- 96    08/06/24 1600 -- -- 78 -- 189/88 -- 96 08/06/24 1552 98 (36.7) Oral -- -- -- -- --    08/06/24 1545 -- -- 78 -- -- -- 98    08/06/24 1530 -- -- 64 -- 139/69 -- 96 08/06/24 1515 -- -- 67 -- -- -- 96 08/06/24 1500 -- -- 60 -- 138/66 -- 96 08/06/24 1445 -- -- 61 -- -- -- 95 08/06/24 1430 -- -- 60 -- 155/72 -- 97    08/06/24 1415 -- -- 63 -- -- -- 95 08/06/24 1400 -- -- 64 -- 129/66 -- 95    08/06/24 1345 -- -- 63 -- -- -- 95 08/06/24 1330 -- -- 66 -- 134/67 -- 96    08/06/24 1315 -- -- 73 -- -- -- 95 08/06/24 1300 -- -- 69 --  181/68 -- 92    08/06/24 1200 -- -- 74 -- 153/75 -- 93    08/06/24 1130 97.8 (36.6) Oral 89 -- -- -- 96    08/06/24 1115 -- -- 80 -- -- -- 94    08/06/24 1100 -- -- -- -- 166/98 -- --    08/06/24 1030 -- -- 80 -- 160/73 -- 97    08/06/24 1015 -- -- 68 -- -- -- 98    08/06/24 1000 -- -- 66 -- 154/65 -- 96    08/06/24 0930 -- -- 77 -- 170/86 -- 97    08/06/24 0915 -- -- 80 -- -- -- 97    08/06/24 0900 -- -- 78 -- 174/64 -- 97    08/06/24 0845 -- -- 84 -- -- -- 97    08/06/24 0830 -- -- 83 -- 148/131 -- 95 08/06/24 0815 -- -- 70 -- -- -- 98    08/06/24 0800 -- -- 71 -- 153/68 -- 97    08/06/24 0745 -- -- 76 -- -- -- 95    08/06/24 0730 -- -- 65 -- 157/64 -- 95    08/06/24 0717 97.7 (36.5) Oral -- -- -- -- --    08/06/24 0715 -- -- 65 -- -- -- 96    08/06/24 0700 -- -- 67 -- 155/69 -- 96    08/06/24 0600 -- -- 65 -- 143/54 -- 95    08/06/24 0500 -- -- 65 -- 143/64 -- 96    08/06/24 0450 98 (36.7) Axillary -- -- -- -- --    08/06/24 0400 -- -- 68 -- 132/69 -- 96    08/06/24 0300 -- -- 74 -- 142/65 -- 97    08/06/24 0200 -- -- 69 -- 132/59 -- 94    08/06/24 0100 -- -- 73 -- 131/56 -- 95    08/06/24 0000 -- -- 90 -- 149/87 -- 97    08/05/24 2300 -- -- 91 -- 173/75 -- 97    08/05/24 2200 -- -- 78 -- 165/72 -- 97    08/05/24 2115 98.2 (36.8) Oral -- -- -- -- --    08/05/24 2100 -- -- 80 -- 160/70 -- 97    08/05/24 2000 -- -- 80 -- 169/83 -- 98    08/05/24 1900 -- -- 77 -- 160/80 -- 96    08/05/24 1800 -- -- 72 -- 147/82 -- 96    08/05/24 1700 -- -- 71 -- 152/77 -- 98    08/05/24 1629 98 (36.7) Oral 79 -- 161/80 -- 97    08/05/24 1600 -- -- 82 -- 160/70 -- 97    08/05/24 1500 -- -- 78 -- 142/67 -- 96    08/05/24 1430 -- -- 80 -- 143/68 -- 95    08/05/24 1415 -- -- 90 -- -- -- 98    08/05/24 1400 -- -- 92 -- 92/70 -- --    08/05/24 1345 -- -- 91 -- -- -- --    08/05/24 1330 -- -- 95 -- 154/118 -- 99    08/05/24 1315 -- -- 97 -- -- -- 94    08/05/24 1300 -- -- 90 -- 185/58 -- 96    08/05/24 1245 -- -- 93 -- -- -- 97     08/05/24 1230 -- -- 91 -- 196/86 -- 96    08/05/24 1215 -- -- 90 -- -- -- 97    08/05/24 1200 -- -- 82 -- -- -- 97    08/05/24 1129 98.2 (36.8) Oral -- -- -- -- --    08/05/24 1100 -- -- 84 -- 183/137 -- 97    08/05/24 1000 -- -- 121 -- -- -- 96    08/05/24 0900 -- -- 94 -- 136/85 -- 98    08/05/24 0800 -- -- 84 -- 157/81 -- 98    08/05/24 0722 97.7 (36.5) Oral -- -- -- -- --    08/05/24 0600 -- -- 87 -- 167/74 -- 96    08/05/24 0500 97.9 (36.6) Oral 89 -- 144/86 -- 96    08/05/24 0400 -- -- 87 -- 139/85 -- 96    08/05/24 0300 -- -- 85 -- 143/85 -- 95    08/05/24 0200 -- -- 90 -- 138/69 -- 93    08/05/24 0100 -- -- 81 -- 139/81 -- 96    08/05/24 0000 -- -- 89 -- 150/79 -- 96          Orders (last 72 hrs)        Start     Ordered    08/16/24 0000  metFORMIN (GLUCOPHAGE) 1000 MG tablet  2 Times Daily With Meals         08/09/24 1229    08/10/24 0000  amLODIPine (NORVASC) 5 MG tablet  Every 24 Hours Scheduled         08/09/24 1229    08/10/24 0000  tamsulosin (FLOMAX) 0.4 MG capsule 24 hr capsule  Daily         08/09/24 1232    08/09/24 1229  Discharge patient  Once         08/09/24 1229    08/09/24 1200  POC Glucose Once  PROCEDURE ONCE        Comments: Complete no more than 45 minutes prior to patient eating      08/09/24 1157    08/09/24 1124  ALPRAZolam (XANAX) tablet 0.25 mg  Once         08/09/24 1124    08/09/24 1116  MRI Brain With & Without Contrast  1 Time Imaging         08/09/24 1115    08/09/24 1111  MRI Brain With & Without Contrast  1 Time Imaging,   Status:  Canceled         08/09/24 1111    08/09/24 0803  POC Glucose Once  PROCEDURE ONCE        Comments: Complete no more than 45 minutes prior to patient eating      08/09/24 0800    08/09/24 0000  hydrALAZINE (APRESOLINE) 25 MG tablet  Every 8 Hours Scheduled         08/09/24 1229    08/08/24 2025  POC Glucose Once  PROCEDURE ONCE        Comments: Complete no more than 45 minutes prior to patient eating      08/08/24 2018    08/08/24 1700  POC Glucose  4x Daily Before Meals & at Bedtime  4 Times Daily Before Meals & at Bedtime      Comments: Complete no more than 45 minutes prior to patient eating      08/08/24 1130    08/08/24 1645  POC Glucose Once  PROCEDURE ONCE        Comments: Complete no more than 45 minutes prior to patient eating      08/08/24 1643    08/08/24 1400  hydrALAZINE (APRESOLINE) tablet 25 mg  Every 8 Hours Scheduled         08/08/24 1037    08/08/24 1230  insulin lispro (HUMALOG/ADMELOG) injection 2-7 Units  4 Times Daily Before Meals & Nightly         08/08/24 1130    08/08/24 1201  POC Glucose Once  PROCEDURE ONCE        Comments: Complete no more than 45 minutes prior to patient eating      08/08/24 1158    08/08/24 1131  Discontinue Cardiac Monitoring  Once         08/08/24 1130    08/08/24 1129  dextrose (GLUTOSE) oral gel 15 g  Every 15 Minutes PRN         08/08/24 1130    08/08/24 1129  dextrose (D50W) (25 g/50 mL) IV injection 25 g  Every 15 Minutes PRN         08/08/24 1130    08/08/24 0755  POC Glucose Once  PROCEDURE ONCE        Comments: Complete no more than 45 minutes prior to patient eating      08/08/24 0753    08/07/24 2040  POC Glucose Once  PROCEDURE ONCE        Comments: Complete no more than 45 minutes prior to patient eating      08/07/24 2033    08/07/24 1714  POC Glucose Once  PROCEDURE ONCE        Comments: Complete no more than 45 minutes prior to patient eating      08/07/24 1712    08/07/24 1218  POC Glucose Once  PROCEDURE ONCE        Comments: Complete no more than 45 minutes prior to patient eating      08/07/24 1216    08/07/24 0915  amLODIPine (NORVASC) tablet 5 mg  Every 24 Hours Scheduled         08/07/24 0825    08/07/24 0900  sodium bicarbonate tablet 650 mg  2 Times Daily,   Status:  Discontinued         08/07/24 0825 08/07/24 0809  POC Glucose Once  PROCEDURE ONCE        Comments: Complete no more than 45 minutes prior to patient eating      08/07/24 0806    08/07/24 0736  Inpatient Internal Medicine  Consult  Once        Specialty:  Internal Medicine  Provider:  Justo Lewis DO    08/07/24 0735    08/07/24 0733  Inpatient Case Management  Consult  Once        Provider:  (Not yet assigned)    08/07/24 0732    08/07/24 0600  Renal Function Panel  Daily       08/06/24 0846    08/07/24 0600  CBC Auto Differential  PROCEDURE ONCE         08/06/24 2201 08/06/24 2043  POC Glucose Once  PROCEDURE ONCE        Comments: Complete no more than 45 minutes prior to patient eating      08/06/24 2041 08/06/24 2037  Advance Diet As Tolerated -  Until Discontinued         08/06/24 2036 08/06/24 1608  POC Glucose Once  PROCEDURE ONCE        Comments: Complete no more than 45 minutes prior to patient eating      08/06/24 1605    08/06/24 1430  insulin lispro (HUMALOG/ADMELOG) injection 1-200 Units  4 Times Daily With Meals & Nightly,   Status:  Discontinued         08/06/24 1040    08/06/24 1130  insulin glargine (LANTUS, SEMGLEE) injection 1-200 Units  Nightly - Glucommander,   Status:  Discontinued         08/06/24 1040    08/06/24 1100  POC Glucose 4x Daily Before Meals & at Bedtime  4 Times Daily Before Meals & at Bedtime      Comments: Complete no more than 45 minutes prior to patient eating      08/06/24 1040    08/06/24 1040  insulin lispro (HUMALOG/ADMELOG) injection 1-200 Units  As Needed         08/06/24 1040    08/06/24 1040  Treat Hypoglycemia As Recommended By Glucommander™ & Notify Provider of Treatment  As Needed,   Status:  Canceled      Comments: Follow Hypoglycemia Orders As Outlined in Process Instructions (Open Order Report to View Full Instructions)  Notify Provider Any Time Hypoglycemia Treatment is Administered    08/06/24 1040    08/06/24 1040  dextrose (GLUTOSE) oral gel 15 g  Every 15 Minutes PRN         08/06/24 1040    08/06/24 1040  dextrose (D50W) (25 g/50 mL) IV injection 10-50 mL  Every 15 Minutes PRN         08/06/24 1040    08/06/24 1040  glucagon (GLUCAGEN) injection 1 mg   "Every 15 Minutes PRN         08/06/24 1040    08/06/24 1040  Potassium Replacement - Follow Nurse / BPA Driven Protocol  As Needed         08/06/24 1040    08/06/24 0945  sodium chloride 0.9 % infusion  Continuous,   Status:  Discontinued         08/06/24 0846    08/05/24 1330  tamsulosin (FLOMAX) 24 hr capsule 0.4 mg  Daily         08/05/24 1227    08/05/24 0900  memantine (NAMENDA) tablet 10 mg  Daily         08/04/24 2050 08/05/24 0900  sodium bicarbonate tablet 650 mg  3 Times Daily,   Status:  Discontinued         08/05/24 0725    08/05/24 0815  dextrose 5 % and sodium chloride 0.45 % infusion  Continuous,   Status:  Discontinued         08/05/24 0725 08/05/24 0600  CBC & Differential  Daily       08/04/24 2028 08/05/24 0600  Magnesium  Daily       08/04/24 2028 08/05/24 0000  Basic Metabolic Panel  Every 4 Hours,   Status:  Canceled       08/04/24 2054 08/04/24 2245  cefTRIAXone (ROCEPHIN) 2,000 mg in sodium chloride 0.9 % 100 mL MBP  Every 24 Hours         08/04/24 2155 08/04/24 2110  sodium chloride 0.9 % flush 10 mL  Every 12 Hours Scheduled         08/04/24 2054 08/04/24 2110  insulin regular 1 unit/mL in 0.9% sodium chloride (Glucommander)  Titrated,   Status:  Discontinued        Note to Pharmacy: Upon initiation of Glucommander insulin drip, make sure all other insulin orders and anti-diabetic medications have been discontinued.    08/04/24 2054 08/04/24 2106  donepezil (ARICEPT) tablet 10 mg  Nightly         08/04/24 2050 08/04/24 2100  Intake & Output  Every Hour       08/04/24 2028 08/04/24 2100  sodium chloride 0.9 % flush 10 mL  Every 12 Hours Scheduled         08/04/24 2028 08/04/24 2100  sennosides-docusate (PERICOLACE) 8.6-50 MG per tablet 2 tablet  2 Times Daily        Placed in \"And\" Linked Group    08/04/24 2028 08/04/24 2053  sodium chloride 0.9 % flush 10 mL  As Needed         08/04/24 2054 08/04/24 2053  sodium chloride 0.9 % infusion 40 mL  As Needed " "        08/04/24 2054 08/04/24 2053  Treat Hypoglycemia As Recommended By Glucommander™ & Notify Provider of Treatment  As Needed,   Status:  Canceled      Comments: Follow Hypoglycemia Orders As Outlined in Process Instructions (Open Order Report to View Full Instructions)  Notify Provider Any Time Hypoglycemia Treatment is Administered    08/04/24 2054 08/04/24 2053  dextrose (D50W) (25 g/50 mL) IV injection 10-50 mL  Every 15 Minutes PRN         08/04/24 2054 08/04/24 2029  Daily Weights  Daily       08/04/24 2028 08/04/24 2028  ondansetron (ZOFRAN) injection 4 mg  Every 6 Hours PRN         08/04/24 2028 08/04/24 2028  acetaminophen (TYLENOL) tablet 650 mg  Every 4 Hours PRN        Placed in \"Or\" Linked Group    08/04/24 2028 08/04/24 2028  acetaminophen (TYLENOL) suppository 650 mg  Every 4 Hours PRN        Placed in \"Or\" Linked Group    08/04/24 2028 08/04/24 2026  Oral Care - Patient Not on NPPV & Not Intubated  Every Shift       08/04/24 2028 08/04/24 2025  polyethylene glycol (MIRALAX) packet 17 g  Daily PRN        Placed in \"And\" Linked Group    08/04/24 2028 08/04/24 2025  bisacodyl (DULCOLAX) EC tablet 5 mg  Daily PRN        Placed in \"And\" Linked Group    08/04/24 2028 08/04/24 2025  bisacodyl (DULCOLAX) suppository 10 mg  Daily PRN        Placed in \"And\" Linked Group    08/04/24 2028 08/04/24 2025  nitroglycerin (NITROSTAT) SL tablet 0.4 mg  Every 5 Minutes PRN         08/04/24 2028 08/04/24 2025  sodium chloride 0.9 % flush 10 mL  As Needed         08/04/24 2028 08/04/24 2025  sodium chloride 0.9 % infusion 40 mL  As Needed         08/04/24 2028 08/04/24 1938  Urinary Catheter Care  Every Shift      Placed in \"And\" Linked Group    08/04/24 1937 08/04/24 1703  sodium chloride 0.9 % flush 10 mL  As Needed        Placed in \"And\" Linked Group    08/04/24 1703    11/06/23 0000  memantine (NAMENDA) 10 MG tablet  Daily         08/04/24 1748    10/04/23 0000  " donepezil (ARICEPT) 10 MG tablet  Nightly         24    Unscheduled  Oxygen Therapy- Nasal Cannula; Titrate 1-6 LPM Per SpO2; 90 - 95%  Continuous PRN       24    Unscheduled  If Insulin Infusion is Paused - Follow Glucommander Instructions  As Needed       24    Unscheduled  POC Glucose PRN  As Needed      Comments: Glucommander Recommended POC Glucose Testing Will Vary Between Every 15 Minutes & Every 2 Hours Release PRN POC Glucose Orders as Needed      24    Unscheduled  POC Glucose PRN  As Needed      Comments: Complete no more than 45 minutes prior to patient eating      24 1040    Unscheduled  Follow Hypoglycemia Standing Orders For Blood Glucose <70 & Notify Provider of Treatment  As Needed      Comments: Follow Hypoglycemia Orders As Outlined in Process Instructions (Open Order Report to View Full Instructions)  Notify Provider Any Time Hypoglycemia Treatment is Administered    24 1130    --  aspirin 81 MG EC tablet  Daily         24    --  atorvastatin (LIPITOR) 10 MG tablet  Daily         24    --  silodosin (RAPAFLO) 8 MG capsule capsule  Daily         24    --  metFORMIN (GLUCOPHAGE) 1000 MG tablet  2 Times Daily With Meals,   Status:  Discontinued         24    --  Dapagliflozin Propanediol (FARXIGA PO)  Daily         24    --  Semaglutide,0.25 or 0.5MG/DOS, (OZEMPIC) 2 MG/1.5ML solution pen-injector  Weekly         24 175                     Physician Progress Notes (last 5 days)        Gigi Engel MD at 24 0841              [facility] Progress Note      Patient Name: Froy Ngo  : 1945  MRN: [id number]  Primary Care Physician:  Ryan Gutierrez MD  Date of admission: 2024    Subjective     Interval History:   F/u NEHEMIAS    Review of Systems:   UOP 5100   No c/o  Going down for MRI brain with contrast - I spoke to   Bud moore to approve this    Objective     Vitals:   Temp:  [98.1 °F (36.7 °C)-98.4 °F (36.9 °C)] 98.1 °F (36.7 °C)  Heart Rate:  [73-86] 78  Resp:  [18] 18  BP: (147-175)/(66-82) 147/68    Intake/Output Summary (Last 24 hours) at 8/9/2024 0841  Last data filed at 8/9/2024 0642  Gross per 24 hour   Intake 222 ml   Output 4800 ml   Net -4578 ml       Physical Exam:    General Appearance: frail WM no distress  Neck: supple, no JVD  Lungs: CTA bilat no rales  Heart: RRR, normal S1 and S2  Abdomen: soft, nontender, nondistended  : +hagen  Extremities: no edema, cyanosis or clubbing       Scheduled Meds:     amLODIPine, 5 mg, Oral, Q24H  donepezil, 10 mg, Oral, Nightly  hydrALAZINE, 25 mg, Oral, Q8H  insulin lispro, 2-7 Units, Subcutaneous, 4x Daily AC & at Bedtime  memantine, 10 mg, Oral, Daily  senna-docusate sodium, 2 tablet, Oral, BID  sodium chloride, 10 mL, Intravenous, Q12H  sodium chloride, 10 mL, Intravenous, Q12H  tamsulosin, 0.4 mg, Oral, Daily      IV Meds:        Results Reviewed:   I have personally reviewed the results from the time of this admission to 8/9/2024 08:41 EDT     Results from last 7 days   Lab Units 08/09/24  0325 08/08/24  0313 08/07/24  0227 08/04/24  1958 08/04/24  1814   SODIUM mmol/L 135* 140 138   < > 127*   POTASSIUM mmol/L 3.7 3.8 3.7   < > 7.0*   CHLORIDE mmol/L 105 109* 106   < > 87*   CO2 mmol/L 21.3* 22.1 20.7*   < > 11.0*   BUN mg/dL 37* 49* 73*   < > 189*   CREATININE mg/dL 2.22* 2.45* 3.84*   < > 17.15*   CALCIUM mg/dL 7.9* 8.0* 7.8*   < > 8.8   BILIRUBIN mg/dL  --   --   --   --  0.9   ALK PHOS U/L  --   --   --   --  155*   ALT (SGPT) U/L  --   --   --   --  27   AST (SGOT) U/L  --   --   --   --  19   GLUCOSE mg/dL 126* 147* 90   < > 162*    < > = values in this interval not displayed.     Estimated Creatinine Clearance: 33 mL/min (A) (by C-G formula based on SCr of 2.22 mg/dL (H)).  Results from last 7 days   Lab Units 08/09/24  0325 08/08/24  0313 08/07/24  2895  24  0444 24  0359   MAGNESIUM mg/dL  --   --  1.6 2.1 2.4   PHOSPHORUS mg/dL 3.5 3.6 4.1  --   --          Results from last 7 days   Lab Units 24  0227 24  0444 24  0359 24  1814   WBC 10*3/mm3 8.17 9.00 12.08* 18.10*   HEMOGLOBIN g/dL 10.7* 10.6* 12.0* 15.0   PLATELETS 10*3/mm3 268 246 252 297     Results from last 7 days   Lab Units 24  1814   INR  1.36*       Assessment / Plan     ASSESSMENT:  Non olig NEHEMIAS - obs uropathy.  CT abdomen showed marked distention of bladder with bilateral hydronephrosis.  Improving rapidly post hagen, peak BUN/Cr 189/17, now 372/2.2.  Post-obs diuresis continues but slowing some.  K normal.     Hyperkalemia.  Resolved  EuDKA - Was on SGLT2 inh.  Resolved.  Now w NAGMA stable, stopped bicarb supplement  Dementia, treated .  Needs MRI with contrast before discharge for poss immune therapy consideration for Alzheimers    DM2, on ozempic, farxiga, metformin at home  Bladder outlet obstruction with bilateral hydronephrosis.  Gross hematuria resolved.  Finished rocephin.  UROL eval noted, plan outpatient VT but note atonic bladder  Hyperphosphatemia.  Resolved  Hypertension.  BP labile but improved.  Added norvasc 5mg.          PLAN:  Ok to proceed with MRI with contrast given degree of improvement in renal fcn (low risk for nephrogenic fibrosis); note plan for discharge to SNF, ok from nephrology standpoint.  Will do outpatient VT with UROL.  WIll arrange 2 week follow up with me      Gigi Engel MD  24  08:41 EDT    [facility]  [phone]    Electronically signed by Gigi Engel MD at 24 4205       Gurvinder Farrell MD at 24 1126              Name: Froy Ngo ADMIT: 2024   : 1945  PCP: Ryan Gutierrez MD    MRN: [id number] LOS: 4 days   AGE/SEX: 78 y.o. male  ROOM: N4/     Subjective   No new complaints. Scr continues to improve.     Objective    Objective   Vital Signs  Temp:  [97.9 °F (36.6 °C)-98.2 °F (36.8 °C)] 98.2 °F (36.8 °C)  Heart Rate:  [62-73] 69  Resp:  [18] 18  BP: (135-199)/(53-84) 188/84  SpO2:  [96 %-98 %] 96 %  on   ;   Device (Oxygen Therapy): room air  Body mass index is 26.37 kg/m².  Physical Exam  Constitutional:       General: He is not in acute distress.  HENT:      Head: Normocephalic and atraumatic.   Cardiovascular:      Rate and Rhythm: Normal rate and regular rhythm.   Pulmonary:      Effort: Pulmonary effort is normal. No respiratory distress.   Abdominal:      General: There is no distension.      Palpations: Abdomen is soft.      Tenderness: There is no abdominal tenderness.   Neurological:      Mental Status: He is alert and oriented to person, place, and time.         Results Review     I reviewed the patient's new clinical results.  Results from last 7 days   Lab Units 08/07/24 0227 08/06/24 0444 08/05/24  0359 08/04/24  1814   WBC 10*3/mm3 8.17 9.00 12.08* 18.10*   HEMOGLOBIN g/dL 10.7* 10.6* 12.0* 15.0   PLATELETS 10*3/mm3 268 246 252 297     Results from last 7 days   Lab Units 08/08/24 0313 08/07/24 0227 08/06/24  0809 08/06/24  0444   SODIUM mmol/L 140 138 131* 133*   POTASSIUM mmol/L 3.8 3.7 3.6 4.7   CHLORIDE mmol/L 109* 106 103 103   CO2 mmol/L 22.1 20.7* 19.1* 20.0*   BUN mg/dL 49* 73* 110* 111*   CREATININE mg/dL 2.45* 3.84* 5.84* 6.62*   GLUCOSE mg/dL 147* 90 337* 157*   Estimated Creatinine Clearance: 30 mL/min (A) (by C-G formula based on SCr of 2.45 mg/dL (H)).  Results from last 7 days   Lab Units 08/08/24 0313 08/07/24 0227 08/04/24  1814   ALBUMIN g/dL 2.4* 2.3* 3.4*   BILIRUBIN mg/dL  --   --  0.9   ALK PHOS U/L  --   --  155*   AST (SGOT) U/L  --   --  19   ALT (SGPT) U/L  --   --  27     Results from last 7 days   Lab Units 08/08/24  0313 08/07/24  0227 08/06/24  0809 08/06/24  0444 08/05/24  1027 08/05/24  0359 08/04/24  2335 08/04/24  1958 08/04/24  1814   CALCIUM mg/dL 8.0* 7.8* 6.9* 7.6*   < >  "8.0*   < > 8.2* 8.8   ALBUMIN g/dL 2.4* 2.3*  --   --   --   --   --   --  3.4*   MAGNESIUM mg/dL  --  1.6  --  2.1  --  2.4  --  2.5* 2.7*   PHOSPHORUS mg/dL 3.6 4.1  --   --   --   --   --  9.2* 9.9*    < > = values in this interval not displayed.     Results from last 7 days   Lab Units 08/04/24 1958 08/04/24  1814   PROCALCITONIN ng/mL  --  2.66*   LACTATE mmol/L 1.2  --    No results found for: \"COVID19\"  Glucose   Date/Time Value Ref Range Status   08/08/2024 0753 131 (H) 70 - 130 mg/dL Final   08/07/2024 2033 148 (H) 70 - 130 mg/dL Final   08/07/2024 1712 153 (H) 70 - 130 mg/dL Final   08/07/2024 1216 110 70 - 130 mg/dL Final   08/07/2024 0806 79 70 - 130 mg/dL Final   08/06/2024 2041 159 (H) 70 - 130 mg/dL Final   08/06/2024 1605 157 (H) 70 - 130 mg/dL Final     Results for orders placed or performed during the hospital encounter of 08/04/24   Blood Culture - Blood, Arm, Left    Specimen: Arm, Left; Blood   Result Value Ref Range    Blood Culture No growth at 3 days          XR Chest 1 View  Narrative: SINGLE VIEW OF THE CHEST     HISTORY: Altered mental status     COMPARISON: None available.     FINDINGS:  Heart size is within normal limits, given technique. There is  calcification of the aorta. No pneumothorax, pleural effusion, or acute  infiltrate is seen.     Impression: No acute findings.     This report was finalized on 8/4/2024 8:59 PM by Dr. Amy Hernandez M.D on Workstation: [id number]     CT Abdomen Pelvis Without Contrast  Narrative: CT OF THE AND PELVIS WITHOUT CONTRAST     HISTORY: Altered mental status     COMPARISON: None available.     TECHNIQUE: Axial CT imaging was obtained through the abdomen and pelvis.  No IV contrast was administered.     FINDINGS:  Images are degraded by artifact from patient's arms. There is some  minimal dependent atelectasis. There is a trace right pleural effusion  and small left pleural effusion. No suspicious hepatic lesions are seen.  There is " cholelithiasis. Pancreas is atrophic. Adrenal glands are  normal. There is borderline enlargement of the spleen, which measures up  to 13.7 cm in AP dimensions. Duodenum appears normal. The patient has  bilateral hydroureteronephrosis. No obstructing stone is seen. Urinary  bladder is distended, which may reflect urinary retention, especially  given the presence of a very enlarged prostate gland. There is extensive  perinephric and periureteral stranding. Appearance is concerning for  ascending urinary tract infection. There are aortoiliac calcifications.  Low-attenuation lesion within the inferior pole of the right kidney is  favored to be a cyst. Trace fluid tracks along the paracolic gutters,  particularly on the right. There is colonic diverticulosis. The appendix  is normal. There is no bowel obstruction. No acute osseous abnormalities  are seen. There is some mild body wall edema.        Impression:    1. The patient has moderate bilateral hydroureteronephrosis. This is  favored to be secondary to urinary retention, given marked distention of  the urinary bladder, and the patient's enlarged prostate gland. Patient  may benefit from catheterization. There is extensive bilateral  perinephric and periureteral stranding, and findings are favored to  reflect ascending urinary tract infection.  Radiation dose reduction techniques were utilized, including automated  exposure control and exposure modulation based on body size.        This report was finalized on 8/4/2024 8:06 PM by Dr. Amy Hernandez M.D on Workstation: [id number]     CT Head Without Contrast  Narrative: CT OF THE HEAD WITHOUT CONTRAST     HISTORY: Inability to eat and altered mental status     COMPARISON: None available.     TECHNIQUE: Axial CT imaging was obtained through the brain. No IV  contrast was administered.     FINDINGS:  No acute intracranial hemorrhage is seen. There is atrophy. There is  periventricular and deep white matter  microangiopathic change. There is  no midline shift or mass effect. Images suggest prior sinus surgery.  Mastoid air cells are clear.     Impression: No acute intracranial findings.     Radiation dose reduction techniques were utilized, including automated  exposure control and exposure modulation based on body size.        This report was finalized on 8/4/2024 7:59 PM by Dr. Amy Hernandez M.D on Workstation: [id number]       Scheduled Medications  amLODIPine, 5 mg, Oral, Q24H  cefTRIAXone, 2,000 mg, Intravenous, Q24H  donepezil, 10 mg, Oral, Nightly  hydrALAZINE, 25 mg, Oral, Q8H  insulin glargine, 1-200 Units, Subcutaneous, Nightly - Glucommander  memantine, 10 mg, Oral, Daily  senna-docusate sodium, 2 tablet, Oral, BID  sodium chloride, 10 mL, Intravenous, Q12H  sodium chloride, 10 mL, Intravenous, Q12H  tamsulosin, 0.4 mg, Oral, Daily    Infusions   Diet  Diet: Diabetic; Consistent Carbohydrate; Fluid Consistency: Thin (IDDSI 0)      Assessment/Plan     Active Hospital Problems    Diagnosis  POA    **DKA (diabetic ketoacidosis) [E11.10]  Yes    Obstructive uropathy [N13.9]  Unknown    Bilateral hydronephrosis [N13.30]  Unknown    Hyperkalemia [E87.5]  Unknown      Resolved Hospital Problems   No resolved problems to display.       78 y.o. male admitted with DKA (diabetic ketoacidosis).    Diabetic you acidosis  Type 2 diabetes  Patient is on several oral antihyperglycemic medications at home.  Currently on insulin via Glucomander protocol.   Hemoglobin A1c is 7.9 and I am not compelled to start the patient on insulin upon discharge.  Monitor blood sugars      Obstructive uropathy with bilateral hydronephrosis  Acute renal failure  Rodriguez catheter has been placed per urology see the patient.  He will need to be discharged with a Rodriguez.  Continue tamsulosin  Trend Scr. Was initially as high as 17. Nephrology following. Continues to improve     Urine tract infection  On Rocephin.  Urine cultures negative.      Dementia  On namenda and Aricept.  -MRI brain w and wo as long as Scr under 2          SCDs for DVT prophylaxis.  Full code.  Discussed with patient, spouse, and nursing staff.  Anticipate discharge home with HH vs SNU facility in 1-2 days.      Gurvinder Farrell MD  [facility]  24  11:26 EDT          Electronically signed by Gurvinder Farrell MD at 24 1226       Gigi Engel MD at 24 0823              [facility] Progress Note      Patient Name: Froy Ngo  : 1945  MRN: [id number]  Primary Care Physician:  Ryan Gutierrez MD  Date of admission: 2024    Subjective     Interval History:   F/u NEHEMIAS    Review of Systems:   UOP down slightly 5.5L/24h  Denies dyspnea or nausea     Objective     Vitals:   Temp:  [97.9 °F (36.6 °C)-98.2 °F (36.8 °C)] 98.2 °F (36.8 °C)  Heart Rate:  [62-73] 69  Resp:  [18] 18  BP: (135-199)/(53-84) 188/84    Intake/Output Summary (Last 24 hours) at 2024 0823  Last data filed at 2024 0716  Gross per 24 hour   Intake --   Output 5950 ml   Net -5950 ml       Physical Exam:    General Appearance: alert   Neck: supple, no JVD  Lungs: CTA bilat no rales  Heart: RRR, normal S1 and S2  Abdomen: soft, nontender, nondistended  : +hagen   Extremities: no edema, cyanosis or clubbing       Scheduled Meds:     amLODIPine, 5 mg, Oral, Q24H  cefTRIAXone, 2,000 mg, Intravenous, Q24H  donepezil, 10 mg, Oral, Nightly  insulin glargine, 1-200 Units, Subcutaneous, Nightly - Glucommander  insulin lispro, 1-200 Units, Subcutaneous, 4x Daily With Meals & Nightly  memantine, 10 mg, Oral, Daily  senna-docusate sodium, 2 tablet, Oral, BID  sodium bicarbonate, 650 mg, Oral, BID  sodium chloride, 10 mL, Intravenous, Q12H  sodium chloride, 10 mL, Intravenous, Q12H  tamsulosin, 0.4 mg, Oral, Daily      IV Meds:   sodium chloride, 75 mL/hr, Last Rate: 75 mL/hr (24 0615)        Results Reviewed:   I have  personally reviewed the results from the time of this admission to 8/8/2024 08:23 EDT     Results from last 7 days   Lab Units 08/08/24 0313 08/07/24 0227 08/06/24  0809 08/04/24 1958 08/04/24 1814   SODIUM mmol/L 140 138 131*   < > 127*   POTASSIUM mmol/L 3.8 3.7 3.6   < > 7.0*   CHLORIDE mmol/L 109* 106 103   < > 87*   CO2 mmol/L 22.1 20.7* 19.1*   < > 11.0*   BUN mg/dL 49* 73* 110*   < > 189*   CREATININE mg/dL 2.45* 3.84* 5.84*   < > 17.15*   CALCIUM mg/dL 8.0* 7.8* 6.9*   < > 8.8   BILIRUBIN mg/dL  --   --   --   --  0.9   ALK PHOS U/L  --   --   --   --  155*   ALT (SGPT) U/L  --   --   --   --  27   AST (SGOT) U/L  --   --   --   --  19   GLUCOSE mg/dL 147* 90 337*   < > 162*    < > = values in this interval not displayed.     Estimated Creatinine Clearance: 30 mL/min (A) (by C-G formula based on SCr of 2.45 mg/dL (H)).  Results from last 7 days   Lab Units 08/08/24 0313 08/07/24 0227 08/06/24 0444 08/05/24 0359 08/04/24 1958   MAGNESIUM mg/dL  --  1.6 2.1 2.4 2.5*   PHOSPHORUS mg/dL 3.6 4.1  --   --  9.2*         Results from last 7 days   Lab Units 08/07/24 0227 08/06/24 0444 08/05/24 0359 08/04/24 1814   WBC 10*3/mm3 8.17 9.00 12.08* 18.10*   HEMOGLOBIN g/dL 10.7* 10.6* 12.0* 15.0   PLATELETS 10*3/mm3 268 246 252 297     Results from last 7 days   Lab Units 08/04/24 1814   INR  1.36*       Assessment / Plan     ASSESSMENT:  Non olig NEHEMIAS - obs uropathy.  CT abdomen showed marked distention of bladder with bilateral hydronephrosis.  Improving rapidly post hagen, peak BUN/Cr 189/17, now 49/2.5.  Post-obs diuresis continues but slowing some.  K normal.     Hyperkalemia.  Resolved  EuDKA - Was on SGLT2 inh.  Resolved.  Now w NAGMA stable on bicarb supplement  Dementia, treated .  Note need for MRI with contrast before discharge for poss immune therapy for Alzheimers - on hold based on GFR currently  DM2, on ozempic, farxiga, metformin at home  Bladder outlet obstruction with bilateral  hydronephrosis.  Gross hematuria resolving.  On rocephin.  UROL eval noted, plan outpatient VT but note atonic bladder  Hyperphosphatemia.  Resolved  Hypertension.  BP high still.  Added norvasc 5mg.  Was briefly lower 135/73 yesterday      PLAN:  DC IVF   DC bicarb tabs  Add hydralazine   Ok with MRI with contrast tomorrow , defer ordering of study to primary team      Gigi Engel MD  08/08/24  08:23 EDT    [facility]  [phone]    Electronically signed by Gigi Engel MD at 08/08/24 1037       Ryan Gutierrez MD at 08/07/24 1205          Chart reviewed, discussed with patient, wife, daughter. Bun/cr responding well. Family is  interested in rehab at [facility], bed evidently available. Stressed to patient his involvement/cooperation is imperative. Will need to get changed over to a home diabetes regimen and decide if a trial without the hagen on Flomax is needed. Also hoping to get an MRI head with contrast and [id number] test prior to discharge (will need a Xanax around MRI) for possible Alzheimer's immune therapy.    Appreciate all the care.        Electronically signed by Ryan Gutierrez MD at 08/07/24 1209       Scott Delgado MD at 08/07/24 1159                                                        LOS: 3 days   Patient Care Team:  Ryan Gutierrez MD as PCP - General (Internal Medicine)    Chief Complaint:  F/up EUKA, NEHEMIAS, obstructive uropathy and critical care management    Subjective   Interval History      Feels well.  Hagen catheter remains in place.  Hematuria improving.    REVIEW OF SYSTEMS:   CARDIOVASCULAR: No chest pain, chest pressure or chest discomfort. No palpitations or edema.   RESPIRATORY: No shortness of breath, cough or sputum.   GASTROINTESTINAL: No anorexia, nausea, vomiting or diarrhea. No abdominal pain.  CONSTITUTIONAL: No fever or chills.     Ventilator/Non-Invasive Ventilation Settings (From admission, onward)      None  "                 Physical Exam:     Vital Signs  Temp:  [97.9 °F (36.6 °C)-98.6 °F (37 °C)] 98.6 °F (37 °C)  Heart Rate:  [60-80] 70  Resp:  [17-18] 18  BP: (129-189)/(59-88) 176/59    Intake/Output Summary (Last 24 hours) at 8/7/2024 1159  Last data filed at 8/7/2024 1100  Gross per 24 hour   Intake 1281.25 ml   Output 7300 ml   Net -6018.75 ml     Flowsheet Rows      Flowsheet Row First Filed Value   Admission Height 182.9 cm (72\") Documented at 08/04/2024 2029   Admission Weight 83.9 kg (185 lb) Documented at 08/04/2024 2029            PPE used per hospital policy    General Appearance:   Alert, cooperative, in no acute distress   ENMT:  Mallampati score 3, dry mucous membrane   Eyes:  Pupils equal and reactive to light. EOMI   Neck:    Trachea midline. No thyromegaly.   Lungs:    Clear to auscultation,respirations regular, even and nonlabored    Heart:   Regular rhythm and normal rate, normal S1 and S2, no         murmur   Skin:   No rash or ecchymosis   Abdomen:    Obese. Soft. No tenderness. No HSM.   Neuro/psych:  Conscious, alert, oriented x3. Strength 5/5 in upper and lower  ext.  Appropriate mood and affect   Extremities:  No cyanosis, clubbing or edema.  Warm extremities and well-perfused          Results Review:        Results from last 7 days   Lab Units 08/07/24 0227 08/06/24  0809 08/06/24  0444   SODIUM mmol/L 138 131* 133*   POTASSIUM mmol/L 3.7 3.6 4.7   CHLORIDE mmol/L 106 103 103   CO2 mmol/L 20.7* 19.1* 20.0*   BUN mg/dL 73* 110* 111*   CREATININE mg/dL 3.84* 5.84* 6.62*   GLUCOSE mg/dL 90 337* 157*   CALCIUM mg/dL 7.8* 6.9* 7.6*     Results from last 7 days   Lab Units 08/04/24  1814   HSTROP T ng/L 44*     Results from last 7 days   Lab Units 08/07/24 0227 08/06/24  0444 08/05/24  0359   WBC 10*3/mm3 8.17 9.00 12.08*   HEMOGLOBIN g/dL 10.7* 10.6* 12.0*   HEMATOCRIT % 32.4* 32.4* 35.5*   PLATELETS 10*3/mm3 268 246 252     Results from last 7 days   Lab Units 08/04/24  1814   INR  1.36*   APTT " seconds 28.6                       Results from last 7 days   Lab Units 08/04/24  1910   MODALITY  Room Air         I reviewed the patient's new clinical results.        Medication Review:   amLODIPine, 5 mg, Oral, Q24H  cefTRIAXone, 2,000 mg, Intravenous, Q24H  donepezil, 10 mg, Oral, Nightly  insulin glargine, 1-200 Units, Subcutaneous, Nightly - Glucommander  insulin lispro, 1-200 Units, Subcutaneous, 4x Daily With Meals & Nightly  memantine, 10 mg, Oral, Daily  senna-docusate sodium, 2 tablet, Oral, BID  sodium bicarbonate, 650 mg, Oral, BID  sodium chloride, 10 mL, Intravenous, Q12H  sodium chloride, 10 mL, Intravenous, Q12H  tamsulosin, 0.4 mg, Oral, Daily        sodium chloride, 75 mL/hr, Last Rate: 75 mL/hr (08/07/24 0003)        Diagnostic imaging:  I personally and independently reviewed the following images:  CXR 8/4/2024: Clear    Assessment     Nonoliguric NEHEMIAS, secondary to obstructive uropathy.  Obstructive uropathy with bilateral hydronephrosis  Gross hematuria post Rodriguez insertion  Euglycemic DKA  Electrolytes disturbance: Hyperkalemia and hyperphosphatemia, corrected  Leukocytosis: Stress-induced VS UTI  Anemia, unclear chronicity.  Worse probably due to hydration and some hematuria  Enlarged prostate on CT abdomen pelvis 8/4/2024    HTN  DM type II, on Ozempic, Farxiga and metformin  Alzheimer's  GERD      Plan       Change Rocephin to 5 days only as urine culture is negative.  Rodriguez catheter.  Tamsulosin- he will have to go home with his Rodriguez  Insulin glargine and lispro per Glucomander  Continue donepezil and memantine  IV hydration: NS 75 mL/H    Consult medicine team for management of hyperglycemia and NEHEMIAS.  Dispo: SNF VS home with caregiver    Scott Delgado MD  08/07/24  11:59 EDT          This note was dictated utilizing Dragon dictation     Electronically signed by Scott Delgado MD at 08/07/24 1201       Gigi Engel MD at 08/07/24 0818              [facility] Progress Note      Patient Name: Froy Ngo  : 1945  MRN: [id number]  Primary Care Physician:  Ryan Gutierrez MD  Date of admission: 2024    Subjective     Interval History:   F/u NEHEMIAS    Review of Systems:   UOP 6400   No c/o  Refused PT per family     Objective     Vitals:   Temp:  [97.8 °F (36.6 °C)-98.6 °F (37 °C)] 98.6 °F (37 °C)  Heart Rate:  [60-89] 70  Resp:  [17-18] 18  BP: (129-189)/() 176/59    Intake/Output Summary (Last 24 hours) at 2024 0824  Last data filed at 2024 0500  Gross per 24 hour   Intake 1281.25 ml   Output 6400 ml   Net -5118.75 ml       Physical Exam:    General Appearance: frail WM no distress  Neck: supple, no JVD  Lungs: CTA bilat no rales  Heart: RRR, normal S1 and S2  Abdomen: soft, nontender, nondistended  : pink tinged urine in hagen bag  Extremities: no edema, cyanosis or clubbing       Scheduled Meds:     cefTRIAXone, 2,000 mg, Intravenous, Q24H  donepezil, 10 mg, Oral, Nightly  insulin glargine, 1-200 Units, Subcutaneous, Nightly - Glucommander  insulin lispro, 1-200 Units, Subcutaneous, 4x Daily With Meals & Nightly  memantine, 10 mg, Oral, Daily  senna-docusate sodium, 2 tablet, Oral, BID  sodium bicarbonate, 650 mg, Oral, TID  sodium chloride, 10 mL, Intravenous, Q12H  sodium chloride, 10 mL, Intravenous, Q12H  tamsulosin, 0.4 mg, Oral, Daily      IV Meds:   sodium chloride, 75 mL/hr, Last Rate: 75 mL/hr (24 0003)        Results Reviewed:   I have personally reviewed the results from the time of this admission to 2024 08:24 EDT     Results from last 7 days   Lab Units 24  0227 24  0809 24  0444 24  1958 24  1814   SODIUM mmol/L 138 131* 133*   < > 127*   POTASSIUM mmol/L 3.7 3.6 4.7   < > 7.0*   CHLORIDE mmol/L 106 103 103   < > 87*   CO2 mmol/L 20.7* 19.1* 20.0*   < > 11.0*   BUN mg/dL 73* 110* 111*   < > 189*   CREATININE mg/dL 3.84* 5.84* 6.62*   < > 17.15*   CALCIUM mg/dL 7.8* 6.9*  7.6*   < > 8.8   BILIRUBIN mg/dL  --   --   --   --  0.9   ALK PHOS U/L  --   --   --   --  155*   ALT (SGPT) U/L  --   --   --   --  27   AST (SGOT) U/L  --   --   --   --  19   GLUCOSE mg/dL 90 337* 157*   < > 162*    < > = values in this interval not displayed.     Estimated Creatinine Clearance: 19.1 mL/min (A) (by C-G formula based on SCr of 3.84 mg/dL (H)).  Results from last 7 days   Lab Units 08/07/24 0227 08/06/24 0444 08/05/24 0359 08/04/24 1958 08/04/24  1814   MAGNESIUM mg/dL 1.6 2.1 2.4 2.5* 2.7*   PHOSPHORUS mg/dL 4.1  --   --  9.2* 9.9*         Results from last 7 days   Lab Units 08/07/24 0227 08/06/24 0444 08/05/24 0359 08/04/24  1814   WBC 10*3/mm3 8.17 9.00 12.08* 18.10*   HEMOGLOBIN g/dL 10.7* 10.6* 12.0* 15.0   PLATELETS 10*3/mm3 268 246 252 297     Results from last 7 days   Lab Units 08/04/24  1814   INR  1.36*       Assessment / Plan     ASSESSMENT:  Non olig NEHEMIAS - obs uropathy.  CT abdomen showed marked distention of bladder with bilateral hydronephrosis.  Improving rapidly post hagen, peak BUN/Cr 189/17, now 73/3.6.  Post-obs diuresis continues.  K normal.     Hyperkalemia.  Resolved  EuDKA - Was on SGLT2 inh.  Resolved.  Now w NAGMA stable on bicarb supplement  Dementia, treated .  Note need for MRI with contrast before discharge for poss immune therapy for Alzheimers - on hold based on GFR currently  DM2, on ozempic, farxiga, metformin at home  Bladder outlet obstruction with bilateral hydronephrosis.  Gross hematuria resolving.  On rocephin.  UROL eval noted, plan outpatient VT but note atonic bladder  Hyperphosphatemia.  Resolved  Hypertension.  BP high still.  Not on BP meds at home  Hypocalcemia - ca 7.8 but corrects ~ 9 for low alb    PLAN:  Dec nahco3 650 BID  Continue NS IVF 75 cc/hr for now, may stop it tomorrow  Start low dose norvasc 5mg  Hold off MRI contrast pending bit more recovery of renal fcn (ie Cr down to ~ 2 should suffice)      Gigi Engel,  "MD  08/07/24  08:24 EDT    [facility]  [phone]    Electronically signed by Gigi Engel MD at 08/07/24 1454       Scott Delgado MD at 08/06/24 0827                                                        LOS: 2 days   Patient Care Team:  Ryan Gutierrez MD as PCP - General (Internal Medicine)    Chief Complaint:  F/up EUKA, NEHEMIAS, obstructive uropathy and critical care management    Subjective   Interval History      Blood sugar improved with insulin drip.  Patient has a Rodriguez catheter.  Hematuria improved.    REVIEW OF SYSTEMS:   CARDIOVASCULAR: No chest pain, chest pressure or chest discomfort. No palpitations or edema.   RESPIRATORY: No shortness of breath, cough or sputum.   GASTROINTESTINAL: No anorexia, nausea, vomiting or diarrhea. No abdominal pain.  CONSTITUTIONAL: No fever or chills.     Ventilator/Non-Invasive Ventilation Settings (From admission, onward)      None                  Physical Exam:     Vital Signs  Temp:  [97.7 °F (36.5 °C)-98.2 °F (36.8 °C)] 97.7 °F (36.5 °C)  Heart Rate:  [] 65  BP: ()/() 143/54    Intake/Output Summary (Last 24 hours) at 8/6/2024 0827  Last data filed at 8/6/2024 0700  Gross per 24 hour   Intake --   Output 6750 ml   Net -6750 ml     Flowsheet Rows      Flowsheet Row First Filed Value   Admission Height 182.9 cm (72\") Documented at 08/04/2024 2029   Admission Weight 83.9 kg (185 lb) Documented at 08/04/2024 2029            PPE used per hospital policy    General Appearance:   Alert, cooperative, in no acute distress   ENMT:  Mallampati score 3, dry mucous membrane   Eyes:  Pupils equal and reactive to light. EOMI   Neck:    Trachea midline. No thyromegaly.   Lungs:    Clear to auscultation,respirations regular, even and nonlabored    Heart:   Regular rhythm and normal rate, normal S1 and S2, no         murmur   Skin:   No rash or ecchymosis   Abdomen:    Obese. Soft. No tenderness. No HSM.   Neuro/psych:  " Conscious, alert, oriented x3. Strength 5/5 in upper and lower  ext.  Appropriate mood and affect   Extremities:  No cyanosis, clubbing or edema.  Warm extremities and well-perfused          Results Review:        Results from last 7 days   Lab Units 08/06/24  0444 08/06/24  0015 08/05/24  1948   SODIUM mmol/L 133* 135* 139   POTASSIUM mmol/L 4.7 4.0 4.5   CHLORIDE mmol/L 103 100 102   CO2 mmol/L 20.0* 19.5* 20.3*   BUN mg/dL 111* 108* 117*   CREATININE mg/dL 6.62* 7.60* 8.93*   GLUCOSE mg/dL 157* 148* 104*   CALCIUM mg/dL 7.6* 7.8* 7.9*     Results from last 7 days   Lab Units 08/04/24  1814   HSTROP T ng/L 44*     Results from last 7 days   Lab Units 08/06/24  0444 08/05/24  0359 08/04/24  1814   WBC 10*3/mm3 9.00 12.08* 18.10*   HEMOGLOBIN g/dL 10.6* 12.0* 15.0   HEMATOCRIT % 32.4* 35.5* 44.2   PLATELETS 10*3/mm3 246 252 297     Results from last 7 days   Lab Units 08/04/24  1814   INR  1.36*   APTT seconds 28.6                       Results from last 7 days   Lab Units 08/04/24  1910   MODALITY  Room Air         I reviewed the patient's new clinical results.        Medication Review:   cefTRIAXone, 2,000 mg, Intravenous, Q24H  donepezil, 10 mg, Oral, Nightly  memantine, 10 mg, Oral, Daily  senna-docusate sodium, 2 tablet, Oral, BID  sodium bicarbonate, 650 mg, Oral, TID  sodium chloride, 10 mL, Intravenous, Q12H  sodium chloride, 10 mL, Intravenous, Q12H  tamsulosin, 0.4 mg, Oral, Daily        dextrose 5 % and sodium chloride 0.45 %, 125 mL/hr, Last Rate: 125 mL/hr (08/05/24 2155)  insulin, 0-100 Units/hr, Last Rate: 0.6 Units/hr (08/06/24 0811)        Diagnostic imaging:  I personally and independently reviewed the following images:  CXR 8/4/2024: Clear    Assessment     Nonoliguric NEHEMIAS, secondary to obstructive uropathy.  Obstructive uropathy with bilateral hydronephrosis  Gross hematuria post Rodriguez insertion  Euglycemic DKA  Electrolytes disturbance: Hyperkalemia and hyperphosphatemia  Leukocytosis:  Stress-induced VS UTI  Anemia, unclear chronicity.  Worse probably due to hydration  Enlarged prostate on CT abdomen pelvis 2024    HTN  DM type II, on Ozempic, Farxiga and metformin  Alzheimer's  GERD      Plan       Empiric Rocephin awaiting urine culture  Hagen catheter.  Tamsulosin- he will have to go home with that.   DC Insulin drip and transition to SC Insulin  Continue donepezil and memantine  IV hydration: DC D5 half-normal saline and switch NS    Discussed with family at bedside and discussed with ICU staff    Scott Delgado MD  24  08:27 EDT          This note was dictated utilizing Dragon dictation     Electronically signed by Scott Delgado MD at 24 1406       Gigi Engel MD at 24 0730              [facility] Progress Note      Patient Name: Froy Ngo  : 1945  MRN: [id number]  Primary Care Physician:  Ryan Gutierrez MD  Date of admission: 2024    Subjective     Interval History:   F/u NEHEMIAS    Review of Systems:   UOP 6750   Remains on insulin drip   D5 1/2 NS IVF @ 125   Denies dyspnea or nausea     Objective     Vitals:   Temp:  [97.7 °F (36.5 °C)-98.2 °F (36.8 °C)] 97.7 °F (36.5 °C)  Heart Rate:  [] 65  BP: ()/() 143/54    Intake/Output Summary (Last 24 hours) at 2024 0730  Last data filed at 2024 0700  Gross per 24 hour   Intake --   Output 6750 ml   Net -6750 ml       Physical Exam:    General Appearance: comfortable alert  Neck: supple, no JVD  Lungs: CTA bilat no rales  Heart: RRR, normal S1 and S2  Abdomen: soft, nontender, nondistended  : hagen with vi urine  Extremities: trace BLE ankle edema, no cyanosis or clubbing       Scheduled Meds:     cefTRIAXone, 2,000 mg, Intravenous, Q24H  donepezil, 10 mg, Oral, Nightly  memantine, 10 mg, Oral, Daily  senna-docusate sodium, 2 tablet, Oral, BID  sodium bicarbonate, 650 mg, Oral, TID  sodium chloride, 10 mL, Intravenous, Q12H  sodium  chloride, 10 mL, Intravenous, Q12H  tamsulosin, 0.4 mg, Oral, Daily      IV Meds:   dextrose 5 % and sodium chloride 0.45 %, 125 mL/hr, Last Rate: 125 mL/hr (08/05/24 2155)  insulin, 0-100 Units/hr, Last Rate: 0.9 Units/hr (08/06/24 0650)        Results Reviewed:   I have personally reviewed the results from the time of this admission to 8/6/2024 07:30 EDT     Results from last 7 days   Lab Units 08/06/24 0444 08/06/24  0015 08/05/24 1948 08/04/24 1958 08/04/24  1814   SODIUM mmol/L 133* 135* 139   < > 127*   POTASSIUM mmol/L 4.7 4.0 4.5   < > 7.0*   CHLORIDE mmol/L 103 100 102   < > 87*   CO2 mmol/L 20.0* 19.5* 20.3*   < > 11.0*   BUN mg/dL 111* 108* 117*   < > 189*   CREATININE mg/dL 6.62* 7.60* 8.93*   < > 17.15*   CALCIUM mg/dL 7.6* 7.8* 7.9*   < > 8.8   BILIRUBIN mg/dL  --   --   --   --  0.9   ALK PHOS U/L  --   --   --   --  155*   ALT (SGPT) U/L  --   --   --   --  27   AST (SGOT) U/L  --   --   --   --  19   GLUCOSE mg/dL 157* 148* 104*   < > 162*    < > = values in this interval not displayed.     Estimated Creatinine Clearance: 10.6 mL/min (A) (by C-G formula based on SCr of 6.62 mg/dL (H)).  Results from last 7 days   Lab Units 08/06/24 0444 08/05/24  0359 08/04/24 1958 08/04/24  1814   MAGNESIUM mg/dL 2.1 2.4 2.5* 2.7*   PHOSPHORUS mg/dL  --   --  9.2* 9.9*         Results from last 7 days   Lab Units 08/06/24 0444 08/05/24  0359 08/04/24  1814   WBC 10*3/mm3 9.00 12.08* 18.10*   HEMOGLOBIN g/dL 10.6* 12.0* 15.0   PLATELETS 10*3/mm3 246 252 297     Results from last 7 days   Lab Units 08/04/24  1814   INR  1.36*       Assessment / Plan     ASSESSMENT:  Non olig NEHEMIAS - obs uropathy.  CT abdomen showed marked distention of bladder with bilateral hydronephrosis.  Improving rapidly post hagen, peak BUN/Cr 189/17, now 111/6.6.  Post-obs diuresis continues.  K normal.  Na down to 133, on hypotonic IVF with DKA protocol   Hyperkalemia.  Resolved  EuDKA - Was on SGLT2 inh.  AG has closed.   this AM.   On insulin drip  Dementia, treated   DM2, on ozempic, farxiga, metformin at home  Bladder outlet obstruction with bilateral hydronephrosis.  Gross hematuria resolving.  On rocephin.  UROL eval noted, plan outpatient VT but note atonic bladder  Hyperphosphatemia.  Secondary to renal failure, should improve as NEHEMIAS resolves - recheck  Hypertension.  BP labile, better in general, not no antihypertensives at home  Hypocalcemia - ca 7.6 - will replace but check alb    PLAN:  Change IVF to NS at 75 cc/hr once off insulin drip  D/W RN, family at bedside  Ca gluconate  Recheck Phos/alb      Gigi Engel MD  24  07:30 EDT    [facility]  [phone]    Electronically signed by Gigi Engel MD at [phone]       Ryan Gutierrez MD at [phone]          78 year old white male admitted to ICU with     . Euglycemic ketoacidosis    . Acute urinary retention from prostate enlargement. History of elevated PSA with a nonmalignant MRi in     . Hyperkalemia    . 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 Hypertension    . Hypercholesterolemia on atorvastatin    . Microcytic anemia with normal EGD, colonoscopy    . Gastroesophageal reflux disease    . Vertigo    . Alllergy to cefdinir-rash, lip swelling    . Tdap 2023, Pvax and Prevnar , 2016, Zostavax 2015, Hep A 2019    Appreciate all the care. Please feel free to let me know if I can help in any way.        Ryan Gutierrez MD  [phone]    Electronically signed by Ryan Gutierrez MD at 24 1300          Gurvinder Farrell MD   Physician  Hospitalist     Discharge Summary      Addendum     Date of Service: 24 1105  Creation Time: 24 110     Expand All Collapse All         Patient Name: Froy Ngo  : 1945  MRN: [id number]     Date of Admission: 2024  Date of Discharge:  2024  Primary Care Physician: Ryan Gutierrez MD        Chief  Complaint:   Altered Mental Status        Discharge Diagnoses            Active Hospital Problems     Diagnosis   POA    **DKA (diabetic ketoacidosis) [E11.10]   Yes    Obstructive uropathy [N13.9]   Unknown    Bilateral hydronephrosis [N13.30]   Unknown    Hyperkalemia [E87.5]   Unknown       Resolved Hospital Problems   No resolved problems to display.         Hospital Course      This is an 78-year-old man with a past medical history of type 2 diabetes, hyperlipidemia, Alzheimer's dementia who presented to hospital with generalized weakness and difficulty walking. he was noted to have a beta-hydroxybutyrate level of 1.5, pH of 7.25, PaCO2 of 18. Potassium was 7, sodium was 127. He was admitted to the ICU for insulin drip and fluid resuscitation. He was noted to have a very distended bladder on imaging. Urology was consulted, it was noted that he had an atonic bladder and a hagen catheter was inserted.     He will not be discharged on any insulin as his A1c is only 7.9.      For further workup of Alzheimer's dementia, an MRI was requested.  After reviewing with nephrology, as his renal recovery was occurring at a high rate, it was deemed that he would be able to tolerate gadolinium without any concern.  The MRI was ordered prior to discharge.     His hemoglobin did decrease after initial fluid resuscitation but has remained stable thereafter.  He denies any GI bleeding or gross blood loss. CBC should be monitored in the near future.     He should follow-up with his primary care provider in approximately 1 week after discharge for further evaluation of chemistries.             Physical Exam:  Temp:  [98.1 °F (36.7 °C)-98.4 °F (36.9 °C)] 98.1 °F (36.7 °C)  Heart Rate:  [73-86] 78  Resp:  [18] 18  BP: (147-175)/(66-82) 147/68  Body mass index is 26.28 kg/m².  Physical Exam  Constitutional:       General: He is not in acute distress.  HENT:      Head: Normocephalic and atraumatic.   Cardiovascular:      Rate and  Rhythm: Normal rate and regular rhythm.   Pulmonary:      Effort: Pulmonary effort is normal. No respiratory distress.   Abdominal:      General: There is no distension.      Palpations: Abdomen is soft.      Tenderness: There is no abdominal tenderness.   Neurological:      Mental Status: He is alert. Mental status is at baseline.      Motor: Weakness present.            Consultants      Consult Orders (all) (From admission, onward)          Start     Ordered     08/07/24 0736   Inpatient Internal Medicine Consult  Once        Specialty:  Internal Medicine  Provider:  Justo Lewis DO    08/07/24 0735     08/07/24 0733   Inpatient Case Management  Consult  Once        Provider:  (Not yet assigned)    08/07/24 0732     08/05/24 0702   Inpatient Urology Consult  IN AM        Specialty:  Urology  Provider:  Duane Hubbard MD    08/04/24 2028 08/04/24 1919   Pulmonology (on-call MD unless specified)  Once        Specialty:  Pulmonary Disease  Provider:  (Not yet assigned)    08/04/24 1918 08/04/24 1919   Nephrology (on -call MD unless specified)  Once        Specialty:  Nephrology  Provider:  (Not yet assigned)    08/04/24 1918 08/04/24 1918   Inpatient Diabetes Educator Consult  Once,   Status:  Canceled        Provider:  (Not yet assigned)    08/04/24 1918                       Procedures      Imaging Results (All)         Procedure Component Value Units Date/Time     XR Chest 1 View [[id number]] Collected: 08/04/24 2059       Updated: 08/04/24 2102     Narrative:       SINGLE VIEW OF THE CHEST     HISTORY: Altered mental status     COMPARISON: None available.     FINDINGS:  Heart size is within normal limits, given technique. There is  calcification of the aorta. No pneumothorax, pleural effusion, or acute  infiltrate is seen.        Impression:       No acute findings.     This report was finalized on 8/4/2024 8:59 PM by Dr. Amy Hernandez M.D on Workstation: [id number]         CT Abdomen Pelvis Without Contrast [[id number]] Collected: 08/04/24 2000       Updated: 08/04/24 2009     Narrative:       CT OF THE AND PELVIS WITHOUT CONTRAST     HISTORY: Altered mental status     COMPARISON: None available.     TECHNIQUE: Axial CT imaging was obtained through the abdomen and pelvis.  No IV contrast was administered.     FINDINGS:  Images are degraded by artifact from patient's arms. There is some  minimal dependent atelectasis. There is a trace right pleural effusion  and small left pleural effusion. No suspicious hepatic lesions are seen.  There is cholelithiasis. Pancreas is atrophic. Adrenal glands are  normal. There is borderline enlargement of the spleen, which measures up  to 13.7 cm in AP dimensions. Duodenum appears normal. The patient has  bilateral hydroureteronephrosis. No obstructing stone is seen. Urinary  bladder is distended, which may reflect urinary retention, especially  given the presence of a very enlarged prostate gland. There is extensive  perinephric and periureteral stranding. Appearance is concerning for  ascending urinary tract infection. There are aortoiliac calcifications.  Low-attenuation lesion within the inferior pole of the right kidney is  favored to be a cyst. Trace fluid tracks along the paracolic gutters,  particularly on the right. There is colonic diverticulosis. The appendix  is normal. There is no bowel obstruction. No acute osseous abnormalities  are seen. There is some mild body wall edema.           Impression:          1. The patient has moderate bilateral hydroureteronephrosis. This is  favored to be secondary to urinary retention, given marked distention of  the urinary bladder, and the patient's enlarged prostate gland. Patient  may benefit from catheterization. There is extensive bilateral  perinephric and periureteral stranding, and findings are favored to  reflect ascending urinary tract infection.  Radiation dose reduction techniques were  utilized, including automated  exposure control and exposure modulation based on body size.        This report was finalized on 8/4/2024 8:06 PM by Dr. Amy Hernandez M.D on Workstation: [id number]        CT Head Without Contrast [[id number]] Collected: 08/04/24 1957       Updated: 08/04/24 2003     Narrative:       CT OF THE HEAD WITHOUT CONTRAST     HISTORY: Inability to eat and altered mental status     COMPARISON: None available.     TECHNIQUE: Axial CT imaging was obtained through the brain. No IV  contrast was administered.     FINDINGS:  No acute intracranial hemorrhage is seen. There is atrophy. There is  periventricular and deep white matter microangiopathic change. There is  no midline shift or mass effect. Images suggest prior sinus surgery.  Mastoid air cells are clear.        Impression:       No acute intracranial findings.     Radiation dose reduction techniques were utilized, including automated  exposure control and exposure modulation based on body size.        This report was finalized on 8/4/2024 7:59 PM by Dr. Amy Hernandez M.D on Workstation: [id number]                   Pertinent Labs              Results from last 7 days   Lab Units 08/07/24  0227 08/06/24  0444 08/05/24  0359 08/04/24  1814   WBC 10*3/mm3 8.17 9.00 12.08* 18.10*   HEMOGLOBIN g/dL 10.7* 10.6* 12.0* 15.0   PLATELETS 10*3/mm3 268 246 252 297              Results from last 7 days   Lab Units 08/09/24  0325 08/08/24  0313 08/07/24  0227 08/06/24  0809   SODIUM mmol/L 135* 140 138 131*   POTASSIUM mmol/L 3.7 3.8 3.7 3.6   CHLORIDE mmol/L 105 109* 106 103   CO2 mmol/L 21.3* 22.1 20.7* 19.1*   BUN mg/dL 37* 49* 73* 110*   CREATININE mg/dL 2.22* 2.45* 3.84* 5.84*   GLUCOSE mg/dL 126* 147* 90 337*   Estimated Creatinine Clearance: 33 mL/min (A) (by C-G formula based on SCr of 2.22 mg/dL (H)).          Results from last 7 days   Lab Units 08/09/24  0325 08/08/24  0313 08/07/24  0227 08/04/24  1814   ALBUMIN g/dL 2.5* 2.4*  "2.3* 3.4*   BILIRUBIN mg/dL  --   --   --  0.9   ALK PHOS U/L  --   --   --  155*   AST (SGOT) U/L  --   --   --  19   ALT (SGPT) U/L  --   --   --  27                    Results from last 7 days   Lab Units 08/09/24  0325 08/08/24  0313 08/07/24  0227 08/06/24  0809 08/06/24  0444 08/05/24  1027 08/05/24  0359 08/04/24 2335 08/04/24 1958 08/04/24 1814   CALCIUM mg/dL 7.9* 8.0* 7.8* 6.9* 7.6*   < > 8.0*   < > 8.2* 8.8   ALBUMIN g/dL 2.5* 2.4* 2.3*  --   --   --   --   --   --  3.4*   MAGNESIUM mg/dL  --   --  1.6  --  2.1  --  2.4  --  2.5* 2.7*   PHOSPHORUS mg/dL 3.5 3.6 4.1  --   --   --   --   --  9.2* 9.9*    < > = values in this interval not displayed.            Results from last 7 days   Lab Units 08/04/24 1814   HSTROP T ng/L 44*            Invalid input(s): \"LDLCALC\"        Results from last 7 days   Lab Units 08/04/24 2335 08/04/24 1951   BLOODCX   No growth at 4 days  --    URINECX    --  No growth         Test Results Pending at Discharge      Pending Labs         Order Current Status     Blood Culture - Blood, Arm, Left Preliminary result                Discharge Details          Discharge Medications          New Medications         Instructions Start Date   amLODIPine 5 MG tablet  Commonly known as: NORVASC    5 mg, Oral, Every 24 Hours Scheduled    Start Date: August 10, 2024      hydrALAZINE 25 MG tablet  Commonly known as: APRESOLINE    25 mg, Oral, Every 8 Hours Scheduled        tamsulosin 0.4 MG capsule 24 hr capsule  Commonly known as: FLOMAX    0.4 mg, Oral, Daily    Start Date: August 10, 2024                Changes to Medications         Instructions Start Date   metFORMIN 1000 MG tablet  Commonly known as: GLUCOPHAGE  What changed: These instructions start on August 16, 2024. If you are unsure what to do until then, ask your doctor or other care provider.    1,000 mg, Oral, 2 Times Daily With Meals, 1.5 tablets in AM; 1 tablet in PM    Start Date: August 16, 2024              "   Continue These Medications         Instructions Start Date   aspirin 81 MG EC tablet    81 mg, Oral, Daily        atorvastatin 10 MG tablet  Commonly known as: LIPITOR    10 mg, Oral, Daily        donepezil 10 MG tablet  Commonly known as: ARICEPT    10 mg, Oral, Nightly        memantine 10 MG tablet  Commonly known as: NAMENDA    10 mg, Oral, Daily        Semaglutide(0.25 or 0.5MG/DOS) 2 MG/1.5ML solution pen-injector  Commonly known as: OZEMPIC    0.25 mg, Subcutaneous, Weekly                  Stop These Medications       FARXIGA PO      silodosin 8 MG capsule capsule  Commonly known as: RAPAFLO                   Allergies   No Known Allergies           Discharge Disposition: SNF  Skilled Nursing Facility (DC - External)     Discharge Diet:      Diet Order   Procedures    Diet: Diabetic; Consistent Carbohydrate; Fluid Consistency: Thin (IDDSI 0)         Discharge Activity: as tolerated         CODE STATUS:        Code Status and Medical Interventions: CPR (Attempt to Resuscitate); Full Support   Ordered at: 08/04/24 2028     Code Status (Patient has no pulse and is not breathing):     CPR (Attempt to Resuscitate)     Medical Interventions (Patient has pulse or is breathing):     Full Support         No future appointments.    Contact information for follow-up providers         Ryan Gutierrez MD .    Specialty: Internal Medicine  Contact information:  [address] [place] [id number] [phone]                              Contact information for after-discharge care         Destination         [facility] .    Service: Skilled Nursing  Contact information:  [address] [id number] [phone]                                             Time Spent on Discharge:  Greater than 30 minutes        Gurvinder Farrell MD  [facility]  08/09/24  11:06 EDT                          Revision History    Routing History

## 2024-08-09 NOTE — PLAN OF CARE
Goal Outcome Evaluation:  Plan of Care Reviewed With: patient        Progress: no change  Outcome Evaluation: All needs met. Rested well. CC diet. Rodriguez catheter. VSS. Oriented to self and place only. SR. RA. IV antibiotics continued. No complaints. Blood glucose monitoring. Plans for MRI today.

## 2024-08-09 NOTE — DISCHARGE SUMMARY
Patient Name: Froy Ngo  : 1945  MRN: [id number]    Date of Admission: 2024  Date of Discharge:  2024  Primary Care Physician: Ryan Gutierrez MD      Chief Complaint:   Altered Mental Status      Discharge Diagnoses     Active Hospital Problems    Diagnosis  POA    **DKA (diabetic ketoacidosis) [E11.10]  Yes    Obstructive uropathy [N13.9]  Unknown    Bilateral hydronephrosis [N13.30]  Unknown    Hyperkalemia [E87.5]  Unknown      Resolved Hospital Problems   No resolved problems to display.        Hospital Course     This is an 78-year-old man with a past medical history of type 2 diabetes, hyperlipidemia, Alzheimer's dementia who presented to hospital with generalized weakness and difficulty walking. he was noted to have a beta-hydroxybutyrate level of 1.5, pH of 7.25, PaCO2 of 18. Potassium was 7, sodium was 127. He was admitted to the ICU for insulin drip and fluid resuscitation. He was noted to have a very distended bladder on imaging. Urology was consulted, it was noted that he had an atonic bladder and a hagen catheter was inserted.    He will not be discharged on any insulin as his A1c is only 7.9.     For further workup of Alzheimer's dementia, an MRI was requested.  After reviewing with nephrology, as his renal recovery was occurring at a high rate, it was deemed that he would be able to tolerate gadolinium without any concern.  The MRI was ordered prior to discharge.    His hemoglobin did decrease after initial fluid resuscitation but has remained stable thereafter.  He denies any GI bleeding or gross blood loss. CBC should be monitored in the near future.    He should follow-up with his primary care provider in approximately 1 week after discharge for further evaluation of chemistries.          Physical Exam:  Temp:  [98.1 °F (36.7 °C)-98.4 °F (36.9 °C)] 98.1 °F (36.7 °C)  Heart Rate:  [73-86] 78  Resp:  [18] 18  BP: (147-175)/(66-82) 147/68  Body mass index is 26.28  kg/m².  Physical Exam  Constitutional:       General: He is not in acute distress.  HENT:      Head: Normocephalic and atraumatic.   Cardiovascular:      Rate and Rhythm: Normal rate and regular rhythm.   Pulmonary:      Effort: Pulmonary effort is normal. No respiratory distress.   Abdominal:      General: There is no distension.      Palpations: Abdomen is soft.      Tenderness: There is no abdominal tenderness.   Neurological:      Mental Status: He is alert. Mental status is at baseline.      Motor: Weakness present.         Consultants     Consult Orders (all) (From admission, onward)       Start     Ordered    08/07/24 0736  Inpatient Internal Medicine Consult  Once        Specialty:  Internal Medicine  Provider:  Justo Lewis DO    08/07/24 0735    08/07/24 0733  Inpatient Case Management  Consult  Once        Provider:  (Not yet assigned)    08/07/24 0732    08/05/24 0702  Inpatient Urology Consult  IN AM        Specialty:  Urology  Provider:  Duane Hubbard MD    08/04/24 2028 08/04/24 1919  Pulmonology (on-call MD unless specified)  Once        Specialty:  Pulmonary Disease  Provider:  (Not yet assigned)    08/04/24 1918 08/04/24 1919  Nephrology (on -call MD unless specified)  Once        Specialty:  Nephrology  Provider:  (Not yet assigned)    08/04/24 1918 08/04/24 1918  Inpatient Diabetes Educator Consult  Once,   Status:  Canceled        Provider:  (Not yet assigned)    08/04/24 1918                  Procedures     Imaging Results (All)       Procedure Component Value Units Date/Time    XR Chest 1 View [[id number]] Collected: 08/04/24 2059     Updated: 08/04/24 2102    Narrative:      SINGLE VIEW OF THE CHEST     HISTORY: Altered mental status     COMPARISON: None available.     FINDINGS:  Heart size is within normal limits, given technique. There is  calcification of the aorta. No pneumothorax, pleural effusion, or acute  infiltrate is seen.       Impression:      No  acute findings.     This report was finalized on 8/4/2024 8:59 PM by Dr. Amy Hernandez M.D on Workstation: [id number]       CT Abdomen Pelvis Without Contrast [[id number]] Collected: 08/04/24 2000     Updated: 08/04/24 2009    Narrative:      CT OF THE AND PELVIS WITHOUT CONTRAST     HISTORY: Altered mental status     COMPARISON: None available.     TECHNIQUE: Axial CT imaging was obtained through the abdomen and pelvis.  No IV contrast was administered.     FINDINGS:  Images are degraded by artifact from patient's arms. There is some  minimal dependent atelectasis. There is a trace right pleural effusion  and small left pleural effusion. No suspicious hepatic lesions are seen.  There is cholelithiasis. Pancreas is atrophic. Adrenal glands are  normal. There is borderline enlargement of the spleen, which measures up  to 13.7 cm in AP dimensions. Duodenum appears normal. The patient has  bilateral hydroureteronephrosis. No obstructing stone is seen. Urinary  bladder is distended, which may reflect urinary retention, especially  given the presence of a very enlarged prostate gland. There is extensive  perinephric and periureteral stranding. Appearance is concerning for  ascending urinary tract infection. There are aortoiliac calcifications.  Low-attenuation lesion within the inferior pole of the right kidney is  favored to be a cyst. Trace fluid tracks along the paracolic gutters,  particularly on the right. There is colonic diverticulosis. The appendix  is normal. There is no bowel obstruction. No acute osseous abnormalities  are seen. There is some mild body wall edema.          Impression:         1. The patient has moderate bilateral hydroureteronephrosis. This is  favored to be secondary to urinary retention, given marked distention of  the urinary bladder, and the patient's enlarged prostate gland. Patient  may benefit from catheterization. There is extensive bilateral  perinephric and periureteral  stranding, and findings are favored to  reflect ascending urinary tract infection.  Radiation dose reduction techniques were utilized, including automated  exposure control and exposure modulation based on body size.        This report was finalized on 8/4/2024 8:06 PM by Dr. Amy Hernandez M.D on Workstation: [id number]       CT Head Without Contrast [[id number]] Collected: 08/04/24 1957     Updated: 08/04/24 2003    Narrative:      CT OF THE HEAD WITHOUT CONTRAST     HISTORY: Inability to eat and altered mental status     COMPARISON: None available.     TECHNIQUE: Axial CT imaging was obtained through the brain. No IV  contrast was administered.     FINDINGS:  No acute intracranial hemorrhage is seen. There is atrophy. There is  periventricular and deep white matter microangiopathic change. There is  no midline shift or mass effect. Images suggest prior sinus surgery.  Mastoid air cells are clear.       Impression:      No acute intracranial findings.     Radiation dose reduction techniques were utilized, including automated  exposure control and exposure modulation based on body size.        This report was finalized on 8/4/2024 7:59 PM by Dr. Amy Hernandez M.D on Workstation: [id number]               Pertinent Labs     Results from last 7 days   Lab Units 08/07/24  0227 08/06/24  0444 08/05/24  0359 08/04/24  1814   WBC 10*3/mm3 8.17 9.00 12.08* 18.10*   HEMOGLOBIN g/dL 10.7* 10.6* 12.0* 15.0   PLATELETS 10*3/mm3 268 246 252 297     Results from last 7 days   Lab Units 08/09/24  0325 08/08/24  0313 08/07/24  0227 08/06/24  0809   SODIUM mmol/L 135* 140 138 131*   POTASSIUM mmol/L 3.7 3.8 3.7 3.6   CHLORIDE mmol/L 105 109* 106 103   CO2 mmol/L 21.3* 22.1 20.7* 19.1*   BUN mg/dL 37* 49* 73* 110*   CREATININE mg/dL 2.22* 2.45* 3.84* 5.84*   GLUCOSE mg/dL 126* 147* 90 337*   Estimated Creatinine Clearance: 33 mL/min (A) (by C-G formula based on SCr of 2.22 mg/dL (H)).  Results from last 7 days   Lab  "Units 08/09/24  0325 08/08/24 0313 08/07/24 0227 08/04/24  1814   ALBUMIN g/dL 2.5* 2.4* 2.3* 3.4*   BILIRUBIN mg/dL  --   --   --  0.9   ALK PHOS U/L  --   --   --  155*   AST (SGOT) U/L  --   --   --  19   ALT (SGPT) U/L  --   --   --  27     Results from last 7 days   Lab Units 08/09/24 0325 08/08/24 0313 08/07/24 0227 08/06/24  0809 08/06/24  0444 08/05/24  1027 08/05/24 0359 08/04/24 2335 08/04/24 1958 08/04/24  1814   CALCIUM mg/dL 7.9* 8.0* 7.8* 6.9* 7.6*   < > 8.0*   < > 8.2* 8.8   ALBUMIN g/dL 2.5* 2.4* 2.3*  --   --   --   --   --   --  3.4*   MAGNESIUM mg/dL  --   --  1.6  --  2.1  --  2.4  --  2.5* 2.7*   PHOSPHORUS mg/dL 3.5 3.6 4.1  --   --   --   --   --  9.2* 9.9*    < > = values in this interval not displayed.       Results from last 7 days   Lab Units 08/04/24  1814   HSTROP T ng/L 44*           Invalid input(s): \"LDLCALC\"  Results from last 7 days   Lab Units 08/04/24 2335 08/04/24 1951   BLOODCX  No growth at 4 days  --    URINECX   --  No growth       Test Results Pending at Discharge     Pending Labs       Order Current Status    Blood Culture - Blood, Arm, Left Preliminary result            Discharge Details        Discharge Medications        New Medications        Instructions Start Date   amLODIPine 5 MG tablet  Commonly known as: NORVASC   5 mg, Oral, Every 24 Hours Scheduled   Start Date: August 10, 2024     hydrALAZINE 25 MG tablet  Commonly known as: APRESOLINE   25 mg, Oral, Every 8 Hours Scheduled      tamsulosin 0.4 MG capsule 24 hr capsule  Commonly known as: FLOMAX   0.4 mg, Oral, Daily   Start Date: August 10, 2024            Changes to Medications        Instructions Start Date   metFORMIN 1000 MG tablet  Commonly known as: GLUCOPHAGE  What changed: These instructions start on August 16, 2024. If you are unsure what to do until then, ask your doctor or other care provider.   1,000 mg, Oral, 2 Times Daily With Meals, 1.5 tablets in AM; 1 tablet in PM   Start Date: August " 16, 2024            Continue These Medications        Instructions Start Date   aspirin 81 MG EC tablet   81 mg, Oral, Daily      atorvastatin 10 MG tablet  Commonly known as: LIPITOR   10 mg, Oral, Daily      donepezil 10 MG tablet  Commonly known as: ARICEPT   10 mg, Oral, Nightly      memantine 10 MG tablet  Commonly known as: NAMENDA   10 mg, Oral, Daily      Semaglutide(0.25 or 0.5MG/DOS) 2 MG/1.5ML solution pen-injector  Commonly known as: OZEMPIC   0.25 mg, Subcutaneous, Weekly             Stop These Medications      FARXIGA PO     silodosin 8 MG capsule capsule  Commonly known as: RAPAFLO              No Known Allergies      Discharge Disposition: SNF  Skilled Nursing Facility (DC - External)    Discharge Diet:  Diet Order   Procedures    Diet: Diabetic; Consistent Carbohydrate; Fluid Consistency: Thin (IDDSI 0)       Discharge Activity: as tolerated       CODE STATUS:    Code Status and Medical Interventions: CPR (Attempt to Resuscitate); Full Support   Ordered at: 08/04/24 2028     Code Status (Patient has no pulse and is not breathing):    CPR (Attempt to Resuscitate)     Medical Interventions (Patient has pulse or is breathing):    Full Support       No future appointments.   Contact information for follow-up providers       Ryan Gutierrez MD .    Specialty: Internal Medicine  Contact information:  [address] [place] [id number] [phone]                       Contact information for after-discharge care       Destination       [facility] .    Service: Skilled Nursing  Contact information:  [address] [id number] [phone]                                   Time Spent on Discharge:  Greater than 30 minutes      Gurvinder Farrell MD  [facility]  08/09/24  11:06 EDT

## 2024-08-09 NOTE — CASE MANAGEMENT/SOCIAL WORK
Continued Stay Note  [facility]     Patient Name: Froy Ngo  MRN: [id number]  Today's Date: 8/9/2024    Admit Date: 8/4/2024    Plan: DC to [facility] to transport   Discharge Plan       Row Name 08/09/24 1310       Plan    Plan DC to [facility] to transport    Plan Comments DC to [facility] to transport at 1500.  Informed pt and spouse of disposition. COntacted [facility] of disposition. Packet to nurse.                   Discharge Codes    No documentation.                 Expected Discharge Date and Time       Expected Discharge Date Expected Discharge Time    Aug 9, 2024               Davina Cesar RN

## 2024-08-09 NOTE — PLAN OF CARE
Goal Outcome Evaluation:pt transferred to [facility]  Pt has been very confused-can't remember from one minute to the next what the plan of care  Report called to Ryan @ [facility]  Pt did NOT want to go to the nursing home @ 5 even though he told Dr Gutierrez this am he was agreeable  Wife at side and is agreeable to transfer to [place]  Safety maintained this shift

## 2024-08-11 ENCOUNTER — APPOINTMENT (OUTPATIENT)
Dept: GENERAL RADIOLOGY | Facility: HOSPITAL | Age: 79
End: 2024-08-11
Payer: COMMERCIAL

## 2024-08-11 ENCOUNTER — HOSPITAL ENCOUNTER (INPATIENT)
Facility: HOSPITAL | Age: 79
LOS: 9 days | Discharge: HOME | End: 2024-08-20
Attending: EMERGENCY MEDICINE | Admitting: STUDENT IN AN ORGANIZED HEALTH CARE EDUCATION/TRAINING PROGRAM
Payer: COMMERCIAL

## 2024-08-11 ENCOUNTER — APPOINTMENT (OUTPATIENT)
Dept: CT IMAGING | Facility: HOSPITAL | Age: 79
End: 2024-08-11
Payer: COMMERCIAL

## 2024-08-11 DIAGNOSIS — N28.9 ACUTE KIDNEY INSUFFICIENCY: ICD-10-CM

## 2024-08-11 DIAGNOSIS — R34 OLIGURIA: ICD-10-CM

## 2024-08-11 DIAGNOSIS — N13.30 BILATERAL HYDRONEPHROSIS: Primary | ICD-10-CM

## 2024-08-11 DIAGNOSIS — D72.829 LEUKOCYTOSIS, UNSPECIFIED TYPE: ICD-10-CM

## 2024-08-11 PROBLEM — F03.90 DEMENTIA WITHOUT BEHAVIORAL DISTURBANCE: Status: ACTIVE | Noted: 2024-08-11

## 2024-08-11 PROBLEM — N17.9 ACUTE RENAL FAILURE: Status: ACTIVE | Noted: 2024-08-11

## 2024-08-11 PROBLEM — E11.65 TYPE 2 DIABETES MELLITUS WITH HYPERGLYCEMIA: Status: ACTIVE | Noted: 2024-08-11

## 2024-08-11 PROBLEM — I10 ESSENTIAL HYPERTENSION, BENIGN: Status: ACTIVE | Noted: 2024-08-11

## 2024-08-11 LAB
ALBUMIN SERPL-MCNC: 2.6 G/DL (ref 3.5–5.2)
ALBUMIN/GLOB SERPL: 0.7 G/DL
ALP SERPL-CCNC: 108 U/L (ref 39–117)
ALT SERPL W P-5'-P-CCNC: 16 U/L (ref 1–41)
ANION GAP SERPL CALCULATED.3IONS-SCNC: 15 MMOL/L (ref 5–15)
AST SERPL-CCNC: 16 U/L (ref 1–40)
BASOPHILS # BLD AUTO: 0.03 10*3/MM3 (ref 0–0.2)
BASOPHILS NFR BLD AUTO: 0.2 % (ref 0–1.5)
BILIRUB SERPL-MCNC: 0.8 MG/DL (ref 0–1.2)
BUN SERPL-MCNC: 59 MG/DL (ref 8–23)
BUN/CREAT SERPL: 10.1 (ref 7–25)
CALCIUM SPEC-SCNC: 8.3 MG/DL (ref 8.6–10.5)
CHLORIDE SERPL-SCNC: 99 MMOL/L (ref 98–107)
CO2 SERPL-SCNC: 18 MMOL/L (ref 22–29)
CREAT SERPL-MCNC: 5.84 MG/DL (ref 0.76–1.27)
D-LACTATE SERPL-SCNC: 0.8 MMOL/L (ref 0.5–2)
DEPRECATED RDW RBC AUTO: 39 FL (ref 37–54)
EGFRCR SERPLBLD CKD-EPI 2021: 9.2 ML/MIN/1.73
EOSINOPHIL # BLD AUTO: 0.15 10*3/MM3 (ref 0–0.4)
EOSINOPHIL NFR BLD AUTO: 0.8 % (ref 0.3–6.2)
ERYTHROCYTE [DISTWIDTH] IN BLOOD BY AUTOMATED COUNT: 12 % (ref 12.3–15.4)
GLOBULIN UR ELPH-MCNC: 3.5 GM/DL
GLUCOSE BLDC GLUCOMTR-MCNC: 136 MG/DL (ref 70–130)
GLUCOSE BLDC GLUCOMTR-MCNC: 148 MG/DL (ref 70–130)
GLUCOSE SERPL-MCNC: 150 MG/DL (ref 65–99)
HCT VFR BLD AUTO: 35.4 % (ref 37.5–51)
HGB BLD-MCNC: 11.8 G/DL (ref 13–17.7)
HOLD SPECIMEN: NORMAL
HOLD SPECIMEN: NORMAL
IMM GRANULOCYTES # BLD AUTO: 0.21 10*3/MM3 (ref 0–0.05)
IMM GRANULOCYTES NFR BLD AUTO: 1.1 % (ref 0–0.5)
LIPASE SERPL-CCNC: 96 U/L (ref 13–60)
LYMPHOCYTES # BLD AUTO: 0.62 10*3/MM3 (ref 0.7–3.1)
LYMPHOCYTES NFR BLD AUTO: 3.1 % (ref 19.6–45.3)
MCH RBC QN AUTO: 30.1 PG (ref 26.6–33)
MCHC RBC AUTO-ENTMCNC: 33.3 G/DL (ref 31.5–35.7)
MCV RBC AUTO: 90.3 FL (ref 79–97)
MONOCYTES # BLD AUTO: 1.54 10*3/MM3 (ref 0.1–0.9)
MONOCYTES NFR BLD AUTO: 7.8 % (ref 5–12)
NEUTROPHILS NFR BLD AUTO: 17.32 10*3/MM3 (ref 1.7–7)
NEUTROPHILS NFR BLD AUTO: 87 % (ref 42.7–76)
NRBC BLD AUTO-RTO: 0 /100 WBC (ref 0–0.2)
PLATELET # BLD AUTO: 279 10*3/MM3 (ref 140–450)
PMV BLD AUTO: 8.7 FL (ref 6–12)
POTASSIUM SERPL-SCNC: 4.3 MMOL/L (ref 3.5–5.2)
PROCALCITONIN SERPL-MCNC: 0.34 NG/ML (ref 0–0.25)
PROT SERPL-MCNC: 6.1 G/DL (ref 6–8.5)
RBC # BLD AUTO: 3.92 10*6/MM3 (ref 4.14–5.8)
SODIUM SERPL-SCNC: 132 MMOL/L (ref 136–145)
URATE SERPL-MCNC: 8.1 MG/DL (ref 3.4–7)
WBC NRBC COR # BLD AUTO: 19.87 10*3/MM3 (ref 3.4–10.8)
WHOLE BLOOD HOLD COAG: NORMAL
WHOLE BLOOD HOLD SPECIMEN: NORMAL

## 2024-08-11 PROCEDURE — 87040 BLOOD CULTURE FOR BACTERIA: CPT | Performed by: EMERGENCY MEDICINE

## 2024-08-11 PROCEDURE — 83690 ASSAY OF LIPASE: CPT | Performed by: EMERGENCY MEDICINE

## 2024-08-11 PROCEDURE — 84550 ASSAY OF BLOOD/URIC ACID: CPT | Performed by: INTERNAL MEDICINE

## 2024-08-11 PROCEDURE — 83605 ASSAY OF LACTIC ACID: CPT | Performed by: EMERGENCY MEDICINE

## 2024-08-11 PROCEDURE — 25010000002 HEPARIN (PORCINE) PER 1000 UNITS: Performed by: STUDENT IN AN ORGANIZED HEALTH CARE EDUCATION/TRAINING PROGRAM

## 2024-08-11 PROCEDURE — 25810000003 SODIUM CHLORIDE 0.9 % SOLUTION: Performed by: EMERGENCY MEDICINE

## 2024-08-11 PROCEDURE — 36415 COLL VENOUS BLD VENIPUNCTURE: CPT

## 2024-08-11 PROCEDURE — 25010000002 CEFTRIAXONE PER 250 MG: Performed by: EMERGENCY MEDICINE

## 2024-08-11 PROCEDURE — 25810000003 SODIUM CHLORIDE 0.9 % SOLUTION: Performed by: STUDENT IN AN ORGANIZED HEALTH CARE EDUCATION/TRAINING PROGRAM

## 2024-08-11 PROCEDURE — 25010000002 HYDROMORPHONE PER 4 MG: Performed by: EMERGENCY MEDICINE

## 2024-08-11 PROCEDURE — 84145 PROCALCITONIN (PCT): CPT | Performed by: EMERGENCY MEDICINE

## 2024-08-11 PROCEDURE — 85025 COMPLETE CBC W/AUTO DIFF WBC: CPT | Performed by: EMERGENCY MEDICINE

## 2024-08-11 PROCEDURE — 99285 EMERGENCY DEPT VISIT HI MDM: CPT

## 2024-08-11 PROCEDURE — 82948 REAGENT STRIP/BLOOD GLUCOSE: CPT

## 2024-08-11 PROCEDURE — 71045 X-RAY EXAM CHEST 1 VIEW: CPT

## 2024-08-11 PROCEDURE — 74176 CT ABD & PELVIS W/O CONTRAST: CPT

## 2024-08-11 PROCEDURE — 80053 COMPREHEN METABOLIC PANEL: CPT | Performed by: EMERGENCY MEDICINE

## 2024-08-11 RX ORDER — AMLODIPINE BESYLATE 5 MG/1
5 TABLET ORAL
Status: DISCONTINUED | OUTPATIENT
Start: 2024-08-11 | End: 2024-08-20 | Stop reason: HOSPADM

## 2024-08-11 RX ORDER — SODIUM CHLORIDE 0.9 % (FLUSH) 0.9 %
10 SYRINGE (ML) INJECTION AS NEEDED
Status: DISCONTINUED | OUTPATIENT
Start: 2024-08-11 | End: 2024-08-20 | Stop reason: HOSPADM

## 2024-08-11 RX ORDER — POLYETHYLENE GLYCOL 3350 17 G/17G
17 POWDER, FOR SOLUTION ORAL DAILY PRN
Status: DISCONTINUED | OUTPATIENT
Start: 2024-08-11 | End: 2024-08-20 | Stop reason: HOSPADM

## 2024-08-11 RX ORDER — SILODOSIN 4 MG/1
8 CAPSULE ORAL
COMMUNITY
End: 2024-08-20 | Stop reason: HOSPADM

## 2024-08-11 RX ORDER — ACETAMINOPHEN 650 MG/1
650 SUPPOSITORY RECTAL EVERY 4 HOURS PRN
Status: DISCONTINUED | OUTPATIENT
Start: 2024-08-11 | End: 2024-08-20 | Stop reason: HOSPADM

## 2024-08-11 RX ORDER — ACETAMINOPHEN 160 MG/5ML
650 SOLUTION ORAL EVERY 4 HOURS PRN
Status: DISCONTINUED | OUTPATIENT
Start: 2024-08-11 | End: 2024-08-20 | Stop reason: HOSPADM

## 2024-08-11 RX ORDER — HYDRALAZINE HYDROCHLORIDE 25 MG/1
25 TABLET, FILM COATED ORAL EVERY 8 HOURS SCHEDULED
Status: DISCONTINUED | OUTPATIENT
Start: 2024-08-11 | End: 2024-08-20 | Stop reason: HOSPADM

## 2024-08-11 RX ORDER — ASPIRIN 81 MG/1
81 TABLET ORAL DAILY
Status: DISCONTINUED | OUTPATIENT
Start: 2024-08-11 | End: 2024-08-20 | Stop reason: HOSPADM

## 2024-08-11 RX ORDER — ONDANSETRON 2 MG/ML
4 INJECTION INTRAMUSCULAR; INTRAVENOUS EVERY 6 HOURS PRN
Status: DISCONTINUED | OUTPATIENT
Start: 2024-08-11 | End: 2024-08-20 | Stop reason: HOSPADM

## 2024-08-11 RX ORDER — BISACODYL 5 MG/1
5 TABLET, DELAYED RELEASE ORAL DAILY PRN
Status: DISCONTINUED | OUTPATIENT
Start: 2024-08-11 | End: 2024-08-20 | Stop reason: HOSPADM

## 2024-08-11 RX ORDER — ATORVASTATIN CALCIUM 20 MG/1
10 TABLET, FILM COATED ORAL DAILY
Status: DISCONTINUED | OUTPATIENT
Start: 2024-08-11 | End: 2024-08-20 | Stop reason: HOSPADM

## 2024-08-11 RX ORDER — INSULIN LISPRO 100 [IU]/ML
2-7 INJECTION, SOLUTION INTRAVENOUS; SUBCUTANEOUS
Status: DISCONTINUED | OUTPATIENT
Start: 2024-08-11 | End: 2024-08-20 | Stop reason: HOSPADM

## 2024-08-11 RX ORDER — TAMSULOSIN HYDROCHLORIDE 0.4 MG/1
0.4 CAPSULE ORAL DAILY
Status: DISCONTINUED | OUTPATIENT
Start: 2024-08-11 | End: 2024-08-20 | Stop reason: HOSPADM

## 2024-08-11 RX ORDER — AMOXICILLIN 250 MG
2 CAPSULE ORAL 2 TIMES DAILY PRN
Status: DISCONTINUED | OUTPATIENT
Start: 2024-08-11 | End: 2024-08-20 | Stop reason: HOSPADM

## 2024-08-11 RX ORDER — SODIUM CHLORIDE 9 MG/ML
40 INJECTION, SOLUTION INTRAVENOUS AS NEEDED
Status: DISCONTINUED | OUTPATIENT
Start: 2024-08-11 | End: 2024-08-20 | Stop reason: HOSPADM

## 2024-08-11 RX ORDER — DONEPEZIL HYDROCHLORIDE 10 MG/1
10 TABLET, FILM COATED ORAL NIGHTLY
Status: DISCONTINUED | OUTPATIENT
Start: 2024-08-11 | End: 2024-08-20 | Stop reason: HOSPADM

## 2024-08-11 RX ORDER — DEXTROSE MONOHYDRATE 25 G/50ML
25 INJECTION, SOLUTION INTRAVENOUS
Status: DISCONTINUED | OUTPATIENT
Start: 2024-08-11 | End: 2024-08-20 | Stop reason: HOSPADM

## 2024-08-11 RX ORDER — SODIUM CHLORIDE 0.9 % (FLUSH) 0.9 %
10 SYRINGE (ML) INJECTION EVERY 12 HOURS SCHEDULED
Status: DISCONTINUED | OUTPATIENT
Start: 2024-08-11 | End: 2024-08-20 | Stop reason: HOSPADM

## 2024-08-11 RX ORDER — SODIUM CHLORIDE 9 MG/ML
100 INJECTION, SOLUTION INTRAVENOUS CONTINUOUS
Status: DISCONTINUED | OUTPATIENT
Start: 2024-08-11 | End: 2024-08-13

## 2024-08-11 RX ORDER — HYDROMORPHONE HYDROCHLORIDE 1 MG/ML
0.25 INJECTION, SOLUTION INTRAMUSCULAR; INTRAVENOUS; SUBCUTANEOUS ONCE
Status: COMPLETED | OUTPATIENT
Start: 2024-08-11 | End: 2024-08-11

## 2024-08-11 RX ORDER — HEPARIN SODIUM 5000 [USP'U]/ML
5000 INJECTION, SOLUTION INTRAVENOUS; SUBCUTANEOUS EVERY 8 HOURS SCHEDULED
Status: DISCONTINUED | OUTPATIENT
Start: 2024-08-11 | End: 2024-08-12

## 2024-08-11 RX ORDER — ONDANSETRON 4 MG/1
4 TABLET, ORALLY DISINTEGRATING ORAL EVERY 6 HOURS PRN
Status: DISCONTINUED | OUTPATIENT
Start: 2024-08-11 | End: 2024-08-20 | Stop reason: HOSPADM

## 2024-08-11 RX ORDER — ACETAMINOPHEN 325 MG/1
650 TABLET ORAL EVERY 4 HOURS PRN
Status: DISCONTINUED | OUTPATIENT
Start: 2024-08-11 | End: 2024-08-20 | Stop reason: HOSPADM

## 2024-08-11 RX ORDER — MEMANTINE HYDROCHLORIDE 10 MG/1
10 TABLET ORAL DAILY
Status: DISCONTINUED | OUTPATIENT
Start: 2024-08-11 | End: 2024-08-20 | Stop reason: HOSPADM

## 2024-08-11 RX ORDER — LIDOCAINE HYDROCHLORIDE 20 MG/ML
JELLY TOPICAL ONCE
Status: COMPLETED | OUTPATIENT
Start: 2024-08-11 | End: 2024-08-11

## 2024-08-11 RX ORDER — BISACODYL 10 MG
10 SUPPOSITORY, RECTAL RECTAL DAILY PRN
Status: DISCONTINUED | OUTPATIENT
Start: 2024-08-11 | End: 2024-08-20 | Stop reason: HOSPADM

## 2024-08-11 RX ORDER — NICOTINE POLACRILEX 4 MG
15 LOZENGE BUCCAL
Status: DISCONTINUED | OUTPATIENT
Start: 2024-08-11 | End: 2024-08-20 | Stop reason: HOSPADM

## 2024-08-11 RX ORDER — IBUPROFEN 600 MG/1
1 TABLET ORAL
Status: DISCONTINUED | OUTPATIENT
Start: 2024-08-11 | End: 2024-08-20 | Stop reason: HOSPADM

## 2024-08-11 RX ADMIN — AMLODIPINE BESYLATE 5 MG: 5 TABLET ORAL at 18:31

## 2024-08-11 RX ADMIN — HYDRALAZINE HYDROCHLORIDE 25 MG: 25 TABLET ORAL at 21:26

## 2024-08-11 RX ADMIN — Medication 10 ML: at 21:41

## 2024-08-11 RX ADMIN — HEPARIN SODIUM 5000 UNITS: 5000 INJECTION INTRAVENOUS; SUBCUTANEOUS at 21:41

## 2024-08-11 RX ADMIN — CEFTRIAXONE SODIUM 1000 MG: 1 INJECTION, POWDER, FOR SOLUTION INTRAMUSCULAR; INTRAVENOUS at 13:24

## 2024-08-11 RX ADMIN — TAMSULOSIN HYDROCHLORIDE 0.4 MG: 0.4 CAPSULE ORAL at 18:31

## 2024-08-11 RX ADMIN — SODIUM CHLORIDE 1000 ML: 9 INJECTION, SOLUTION INTRAVENOUS at 11:42

## 2024-08-11 RX ADMIN — ASPIRIN 81 MG: 81 TABLET, COATED ORAL at 18:31

## 2024-08-11 RX ADMIN — LIDOCAINE HYDROCHLORIDE: 20 JELLY TOPICAL at 11:58

## 2024-08-11 RX ADMIN — HYDROMORPHONE HYDROCHLORIDE 0.25 MG: 1 INJECTION, SOLUTION INTRAMUSCULAR; INTRAVENOUS; SUBCUTANEOUS at 11:57

## 2024-08-11 RX ADMIN — ATORVASTATIN CALCIUM 10 MG: 20 TABLET, FILM COATED ORAL at 18:31

## 2024-08-11 RX ADMIN — SODIUM CHLORIDE 75 ML/HR: 9 INJECTION, SOLUTION INTRAVENOUS at 16:51

## 2024-08-11 RX ADMIN — MEMANTINE HYDROCHLORIDE 10 MG: 10 TABLET, FILM COATED ORAL at 18:37

## 2024-08-11 RX ADMIN — DONEPEZIL HYDROCHLORIDE 10 MG: 10 TABLET, FILM COATED ORAL at 21:26

## 2024-08-11 NOTE — ED NOTES
Continuous bladder irrigation stopped, due to clear output and no clots present. Pt tolerated well.

## 2024-08-11 NOTE — H&P
Patient Name:  Froy Ngo  YOB: 1945  MRN:  [id number]  Admit Date:  8/11/2024  Patient Care Team:  Ryan Gutierrez MD as PCP - General (Internal Medicine)      Subjective   History Present Illness     Chief Complaint   Patient presents with    Urinary Retention       Mr. Ngo is a 78 y.o. with a history of type 2 diabetes, hyperlipidemia, Alzheimer's dementia, recent admission for euglycemic DKA, NEHEMIAS/obstructive uropathy with bilateral hydronephrosis-discharged with in-dwelling Rodriguez that presents to [facility] complaining of decreased urine output.     History of Present Illness  78-year-old gentleman with history as above who presents from his rehab facility for decreased urine output.  History is collected from wife and daughter at bedside.  Patient was discharged from this facility 2 days ago to [facility] for rehab.  He had been diagnosed with urinary obstruction secondary to BPH and discharged with an indwelling Rodriguez.  Family hired a private sitter to be with the patient while at rehab.  The sitter noted that he did not have much urine output from his Rodriguez.  Per wife, the rehab facility did exchange his Rodriguez 1 time however there was no significant increase in urine output.  Patient also had intermittent blood clots via Rodriguez.  Family requested patient be transferred back to [facility] for further evaluation.  The patient denies any abdominal pain pain, nausea, vomiting.  His family does report that he has a decreased appetite and did not eat lunch or dinner yesterday.  The patient's main complaint for me is that he is significantly tired.    Review of Systems   Constitutional:  Positive for appetite change (decreased) and fatigue. Negative for fever.   Respiratory:  Negative for cough and shortness of breath.    Cardiovascular:  Negative for chest pain and leg swelling.   Gastrointestinal:  Negative for abdominal pain, nausea and vomiting.    Genitourinary:  Positive for hematuria.   Neurological:  Negative for weakness.        Personal History     Past Medical History:   Diagnosis Date    Dementia     Diabetes mellitus     Elevated cholesterol     Prostate disorder     Urinary retention      No past surgical history on file.  No family history on file.  Social History     Tobacco Use    Smoking status: Never     Passive exposure: Never    Smokeless tobacco: Never   Vaping Use    Vaping status: Never Used   Substance Use Topics    Alcohol use: Never    Drug use: Never     No current facility-administered medications on file prior to encounter.     Current Outpatient Medications on File Prior to Encounter   Medication Sig Dispense Refill    amLODIPine (NORVASC) 5 MG tablet Take 1 tablet by mouth Daily. 30 tablet 0    aspirin 81 MG EC tablet Take 1 tablet by mouth Daily.      atorvastatin (LIPITOR) 10 MG tablet Take 1 tablet by mouth Daily.      donepezil (ARICEPT) 10 MG tablet Take 1 tablet by mouth Every Night.      hydrALAZINE (APRESOLINE) 25 MG tablet Take 1 tablet by mouth Every 8 (Eight) Hours. 90 tablet 0    memantine (NAMENDA) 10 MG tablet Take 1 tablet by mouth Daily.      [START ON 8/16/2024] metFORMIN (GLUCOPHAGE) 1000 MG tablet Take 1 tablet by mouth 2 (Two) Times a Day With Meals. 1.5 tablets in AM; 1 tablet in PM 90 tablet 0    Semaglutide,0.25 or 0.5MG/DOS, (OZEMPIC) 2 MG/1.5ML solution pen-injector Inject 0.25 mg under the skin into the appropriate area as directed 1 (One) Time Per Week.      tamsulosin (FLOMAX) 0.4 MG capsule 24 hr capsule Take 1 capsule by mouth Daily. 30 capsule 0     No Known Allergies    Objective    Objective     Vital Signs  Temp:  [97.2 °F (36.2 °C)] 97.2 °F (36.2 °C)  Heart Rate:  [] 87  Resp:  [16] 16  BP: (139-157)/(69-81) 139/69  SpO2:  [93 %-95 %] 93 %  on   ;   Device (Oxygen Therapy): room air  There is no height or weight on file to calculate BMI.    Physical Exam  Constitutional:       General: He  is not in acute distress.     Appearance: He is ill-appearing. He is not toxic-appearing.   HENT:      Head: Normocephalic and atraumatic.      Mouth/Throat:      Mouth: Mucous membranes are moist.      Pharynx: Oropharynx is clear.   Eyes:      Extraocular Movements: Extraocular movements intact.      Conjunctiva/sclera: Conjunctivae normal.      Pupils: Pupils are equal, round, and reactive to light.   Cardiovascular:      Rate and Rhythm: Normal rate and regular rhythm.   Pulmonary:      Effort: Pulmonary effort is normal. No respiratory distress.      Breath sounds: Normal breath sounds. No wheezing.   Abdominal:      General: Abdomen is flat.      Palpations: Abdomen is soft.      Tenderness: There is no abdominal tenderness.   Genitourinary:     Comments: Rodriguez in place, no urine noted in bag however recently changed in ED  Musculoskeletal:         General: No tenderness.      Right lower leg: No edema.      Left lower leg: No edema.   Skin:     General: Skin is warm and dry.   Neurological:      General: No focal deficit present.      Mental Status: He is alert and oriented to person, place, and time. Mental status is at baseline.      Motor: No weakness.       Results Review:  I reviewed the patient's new clinical results.  I reviewed the patient's new imaging results and agree with the interpretation.  I reviewed the patient's other test results and agree with the interpretation  I personally viewed and interpreted the patient's EKG/Telemetry data  Discussed with ED provider.    Lab Results (last 24 hours)       Procedure Component Value Units Date/Time    CBC & Differential [[id number]]  (Abnormal) Collected: 08/11/24 1001    Specimen: Blood Updated: 08/11/24 1011    Narrative:      The following orders were created for panel order CBC & Differential.  Procedure                               Abnormality         Status                     ---------                               -----------         ------                      CBC Auto Differential[[id number]]        Abnormal            Final result                 Please view results for these tests on the individual orders.    Comprehensive Metabolic Panel [[id number]]  (Abnormal) Collected: 08/11/24 1001    Specimen: Blood Updated: 08/11/24 1036     Glucose 150 mg/dL      BUN 59 mg/dL      Creatinine 5.84 mg/dL      Sodium 132 mmol/L      Potassium 4.3 mmol/L      Comment: Slight hemolysis detected by analyzer. Result may be falsely elevated.        Chloride 99 mmol/L      CO2 18.0 mmol/L      Calcium 8.3 mg/dL      Total Protein 6.1 g/dL      Albumin 2.6 g/dL      ALT (SGPT) 16 U/L      AST (SGOT) 16 U/L      Comment: Slight hemolysis detected by analyzer. Result may be falsely elevated.        Alkaline Phosphatase 108 U/L      Total Bilirubin 0.8 mg/dL      Globulin 3.5 gm/dL      A/G Ratio 0.7 g/dL      BUN/Creatinine Ratio 10.1     Anion Gap 15.0 mmol/L      eGFR 9.2 mL/min/1.73      Comment: <15 Indicative of kidney failure       Narrative:      GFR Normal >60  Chronic Kidney Disease <60  Kidney Failure <15    The GFR formula is only valid for adults with stable renal function between ages 18 and 70.    CBC Auto Differential [[id number]]  (Abnormal) Collected: 08/11/24 1001    Specimen: Blood Updated: 08/11/24 1011     WBC 19.87 10*3/mm3      RBC 3.92 10*6/mm3      Hemoglobin 11.8 g/dL      Hematocrit 35.4 %      MCV 90.3 fL      MCH 30.1 pg      MCHC 33.3 g/dL      RDW 12.0 %      RDW-SD 39.0 fl      MPV 8.7 fL      Platelets 279 10*3/mm3      Neutrophil % 87.0 %      Lymphocyte % 3.1 %      Monocyte % 7.8 %      Eosinophil % 0.8 %      Basophil % 0.2 %      Immature Grans % 1.1 %      Neutrophils, Absolute 17.32 10*3/mm3      Lymphocytes, Absolute 0.62 10*3/mm3      Monocytes, Absolute 1.54 10*3/mm3      Eosinophils, Absolute 0.15 10*3/mm3      Basophils, Absolute 0.03 10*3/mm3      Immature Grans, Absolute 0.21 10*3/mm3      nRBC 0.0 /100 WBC     Procalcitonin  "[[id number]]  (Abnormal) Collected: 08/11/24 1001    Specimen: Blood Updated: 08/11/24 1158     Procalcitonin 0.34 ng/mL     Narrative:      As a Marker for Sepsis (Non-Neonates):    1. <0.5 ng/mL represents a low risk of severe sepsis and/or septic shock.  2. >2 ng/mL represents a high risk of severe sepsis and/or septic shock.    As a Marker for Lower Respiratory Tract Infections that require antibiotic therapy:    PCT on Admission    Antibiotic Therapy       6-12 Hrs later    >0.5                Strongly Recommended  >0.25 - <0.5        Recommended   0.1 - 0.25          Discouraged              Remeasure/reassess PCT  <0.1                Strongly Discouraged     Remeasure/reassess PCT    As 28 day mortality risk marker: \"Change in Procalcitonin Result\" (>80% or <=80%) if Day 0 (or Day 1) and Day 4 values are available. Refer to http://[email or web address]-pct-calculator.[email or web address]    Change in PCT <=80%  A decrease of PCT levels below or equal to 80% defines a positive change in PCT test result representing a higher risk for 28-day all-cause mortality of patients diagnosed with severe sepsis for septic shock.    Change in PCT >80%  A decrease of PCT levels of more than 80% defines a negative change in PCT result representing a lower risk for 28-day all-cause mortality of patients diagnosed with severe sepsis or septic shock.       Lipase [[id number]]  (Abnormal) Collected: 08/11/24 1001    Specimen: Blood Updated: 08/11/24 1147     Lipase 96 U/L     Blood Culture - Blood, Arm, Left [[id number]] Collected: 08/11/24 1313    Specimen: Blood from Arm, Left Updated: 08/11/24 1322    Blood Culture - Blood, Arm, Left [[id number]] Collected: 08/11/24 1313    Specimen: Blood from Arm, Left Updated: 08/11/24 1322    Lactic Acid, Plasma [[id number]]  (Normal) Collected: 08/11/24 1313    Specimen: Blood Updated: 08/11/24 1346     Lactate 0.8 mmol/L             Imaging Results (Last 24 Hours)       Procedure Component Value Units Date/Time    CT " Abdomen Pelvis Without Contrast [[id number]] Collected: 08/11/24 1321     Updated: 08/11/24 1321    Narrative:      CT OF THE ABDOMEN AND PELVIS WITHOUT CONTRAST 08/11/2024     HISTORY: Abdominal pain. Diminished urinary output.     Spiral images were obtained from the lung bases to the symphysis pubis.  No intravenous or oral contrast was given.     Small left and very minimal right pleural effusions are seen with some  minimal bibasilar atelectasis.     Liver appears unremarkable. There are multiple gallstones. No  gallbladder inflammatory changes are seen. Pancreas is atrophic. Spleen  and adrenals appear unremarkable.     There is mild-to-moderate bilateral hydronephrosis with mild ureteral  dilatation. This finding is also seen on the 08/04/2024 study. There is  bilateral perinephric stranding. There is bilateral periureteral  stranding as well. There is an approximately 4.1 cm low-density right  renal lesion presumably a cyst on this unenhanced scan. No intrarenal  stones or ureteral stones are seen.     There is some aortic calcification. Rodriguez catheter is seen in the  urinary bladder. There is mild urinary bladder wall thickening.  Moderately severe prostate gland enlargement is seen. Small left  inguinal hernia seen containing small amount of fluid. There is colonic  diverticulosis.       Impression:      1. Bilateral hydroureteronephrosis. There is also bilateral perinephric  and periureteral stranding. This finding is also seen on the previous  study of 08/04/2024.  2. Low-density right renal cyst.  3. Small pleural effusions with bibasilar atelectasis.  4. Cholelithiasis.  5. Rodriguez catheter is seen in the urinary bladder as well as some air.  There is urinary bladder wall thickening with moderately severe prostate  gland enlargement.  6. No ureteral stones are seen.     Radiation dose reduction techniques were utilized, including automated  exposure control and exposure modulation based on body size.           XR Chest 1 View [[id number]] Collected: 08/11/24 1221     Updated: 08/11/24 1227    Narrative:      XR CHEST 1 VW-8/11/2024     HISTORY: Possible pneumonia.     The heart size is within normal limits. Lungs appear clear. There is  some aortic calcification. Bony structures appear unremarkable.       Impression:      1. No acute process        This report was finalized on 8/11/2024 12:23 PM by Dr. Rajiv Canela M.D on Workstation: [id number]                   No orders to display        Assessment/Plan     Active Hospital Problems    Diagnosis  POA    **Acute renal failure [N17.9]  Yes    Type 2 diabetes mellitus with hyperglycemia [E11.65]  Yes    Essential hypertension, benign [I10]  Yes    Dementia without behavioral disturbance [F03.90]  Yes    Obstructive uropathy [N13.9]  Yes    Bilateral hydronephrosis [N13.30]  Yes      Resolved Hospital Problems   No resolved problems to display.       Mr. Ngo is a 78 y.o.  with a history of type 2 diabetes, hyperlipidemia, Alzheimer's dementia, recent admission for euglycemic DKA, NEHEMIAS/obstructive uropathy with bilateral hydronephrosis-discharged with in-dwelling Rodriguez who presents with decreased urine output found to have NEHEMIAS.      NEHEMIAS  Non-anion gap metabolic acidosis  Urinary retention from bladder outlet obstruction with bilateral hydroureteronephrosis  Hematuria- had 3-way irrigation in ED- urine cleared  Possible UTI- ED unable to get sample prior to starting abx  - renal and urology consulted; received 1L NS in the ED; start gentle IVF and defer further fluid mgmt to renal  - bilateral hydroureteronephrosis on Ct similar to CT last week  - ct w/ perinephric/periureteral stranding, procal slightly elevated, WBC 19; cont rocephin, urine cx if able; may be unreliable since patient already received abx    Abnormal MRI brain  - MRI from last admit reviewed-lateral cerebral subdural hygromas pachymeningeal thickening possibly related to chronic subdural  hematoma versus hygroma, acute subdural hygroma not excluded; no history of recent head trauma  -Will ask neurosurgery to review    Type 2 diabetes  - hold home meds  - continue SSI while admitted    Hypertension  - resume amlodipine and hydralazine    Dementia  - cont donepezil, namenda  - frequent reorientation; high risk for delirium  -MRI from 8/9 with chronic microhemorrhages likely secondary to 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    VTE Prophylaxis - Heparin SC.  Code Status - Full code.       Shruthi Acosta MD  [facility]  08/11/24  15:20 EDT

## 2024-08-11 NOTE — ED PROVIDER NOTES
EMERGENCY DEPARTMENT ENCOUNTER    CHIEF COMPLAINT  Chief Complaint: Rodriguez malfunction  History given by: EMS and family at bedside  History limited by: Patient is back to baseline confusion  Room Number: 15/15  PMD: Ryan Gutierrez MD  Urologist: Seen by Dr. Lester Marin  Nephrologist: Dr. Yair Salas    HPI:  Pt is a 78 y.o. male brought by EMS from [place] with report of Rodriguez dysfunction.  It was noted that Rodriguez was not working properly, decreased urine output last night with some manipulation of the Rodriguez, irrigated with clot cleared last night but today with persistent oliguria.  Family at bedside reports she requested patient be evaluated in the ER given his recent significant renal failure.  Patient denies nausea, vomiting, fever, cough, chest pain, shortness of air, denies abdominal pain.      Aggravating Factors: Indwelling Rodriguez  Alleviating Factors: Irrigation at the facility  Treatment before arrival: Irrigation, EMS transport  Chronic or social conditions impacting care: Baseline confusion    Additional sources:  Discussed/ obtained information from independent historians: EMS and spouse    External (non-ED) record review:   Patient admitted 8/4 through 8/9/2024 with obstructive uropathy, kidney failure, hyperkalemia with Rodriguez placed in the emergency department with drop of creatinine from admission and 17.15 to 2.22 at discharge.  Patient had plan for voiding trial 2 weeks after discharge in the office.  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        PAST MEDICAL HISTORY  Active Ambulatory Problems     Diagnosis Date Noted    DKA (diabetic ketoacidosis) 08/04/2024    Obstructive uropathy 08/07/2024    Bilateral hydronephrosis 08/07/2024    Hyperkalemia 08/07/2024     Resolved Ambulatory Problems     Diagnosis Date Noted    No Resolved Ambulatory Problems     Past Medical History:   Diagnosis Date    Dementia     Diabetes mellitus      Elevated cholesterol     Prostate disorder     Urinary retention        PAST SURGICAL HISTORY  No past surgical history on file.    FAMILY HISTORY  No family history on file.    SOCIAL HISTORY  Social History     Socioeconomic History    Marital status: Single   Tobacco Use    Smoking status: Never     Passive exposure: Never    Smokeless tobacco: Never   Vaping Use    Vaping status: Never Used   Substance and Sexual Activity    Alcohol use: Never    Drug use: Never    Sexual activity: Not Currently       ALLERGIES  Patient has no known allergies.    REVIEW OF SYSTEMS  Review of Systems    PHYSICAL EXAM  ED Triage Vitals [08/11/24 0916]   Temp Heart Rate Resp BP SpO2   97.2 °F (36.2 °C) 110 16 145/72 95 %      Temp src Heart Rate Source Patient Position BP Location FiO2 (%)   Tympanic Monitor -- -- --       Physical Exam  Vitals and nursing note reviewed.   Constitutional:       General: He is in acute distress.   HENT:      Head: Normocephalic and atraumatic.   Cardiovascular:      Rate and Rhythm: Normal rate.      Pulses:           Posterior tibial pulses are 2+ on the right side and 2+ on the left side.      Heart sounds: Normal heart sounds. No murmur heard.  Pulmonary:      Effort: Pulmonary effort is normal. No respiratory distress.      Breath sounds: Normal breath sounds. No wheezing.   Abdominal:      General: Bowel sounds are normal.      Palpations: Abdomen is soft.      Tenderness: There is abdominal tenderness (Diffuse tenderness without guarding or rebound). There is no guarding or rebound.   Musculoskeletal:         General: Normal range of motion.      Cervical back: Normal range of motion.   Skin:     General: Skin is warm and dry.   Neurological:      Mental Status: He is alert. He is disoriented.      Comments: Patient pleasantly confused, cooperative.   Psychiatric:         Mood and Affect: Mood and affect normal.         LAB RESULTS  Recent Results (from the past 24 hour(s))   Comprehensive  Metabolic Panel    Collection Time: 08/11/24 10:01 AM    Specimen: Blood   Result Value Ref Range    Glucose 150 (H) 65 - 99 mg/dL    BUN 59 (H) 8 - 23 mg/dL    Creatinine 5.84 (H) 0.76 - 1.27 mg/dL    Sodium 132 (L) 136 - 145 mmol/L    Potassium 4.3 3.5 - 5.2 mmol/L    Chloride 99 98 - 107 mmol/L    CO2 18.0 (L) 22.0 - 29.0 mmol/L    Calcium 8.3 (L) 8.6 - 10.5 mg/dL    Total Protein 6.1 6.0 - 8.5 g/dL    Albumin 2.6 (L) 3.5 - 5.2 g/dL    ALT (SGPT) 16 1 - 41 U/L    AST (SGOT) 16 1 - 40 U/L    Alkaline Phosphatase 108 39 - 117 U/L    Total Bilirubin 0.8 0.0 - 1.2 mg/dL    Globulin 3.5 gm/dL    A/G Ratio 0.7 g/dL    BUN/Creatinine Ratio 10.1 7.0 - 25.0    Anion Gap 15.0 5.0 - 15.0 mmol/L    eGFR 9.2 (L) >60.0 mL/min/1.73   CBC Auto Differential    Collection Time: 08/11/24 10:01 AM    Specimen: Blood   Result Value Ref Range    WBC 19.87 (H) 3.40 - 10.80 10*3/mm3    RBC 3.92 (L) 4.14 - 5.80 10*6/mm3    Hemoglobin 11.8 (L) 13.0 - 17.7 g/dL    Hematocrit 35.4 (L) 37.5 - 51.0 %    MCV 90.3 79.0 - 97.0 fL    MCH 30.1 26.6 - 33.0 pg    MCHC 33.3 31.5 - 35.7 g/dL    RDW 12.0 (L) 12.3 - 15.4 %    RDW-SD 39.0 37.0 - 54.0 fl    MPV 8.7 6.0 - 12.0 fL    Platelets 279 140 - 450 10*3/mm3    Neutrophil % 87.0 (H) 42.7 - 76.0 %    Lymphocyte % 3.1 (L) 19.6 - 45.3 %    Monocyte % 7.8 5.0 - 12.0 %    Eosinophil % 0.8 0.3 - 6.2 %    Basophil % 0.2 0.0 - 1.5 %    Immature Grans % 1.1 (H) 0.0 - 0.5 %    Neutrophils, Absolute 17.32 (H) 1.70 - 7.00 10*3/mm3    Lymphocytes, Absolute 0.62 (L) 0.70 - 3.10 10*3/mm3    Monocytes, Absolute 1.54 (H) 0.10 - 0.90 10*3/mm3    Eosinophils, Absolute 0.15 0.00 - 0.40 10*3/mm3    Basophils, Absolute 0.03 0.00 - 0.20 10*3/mm3    Immature Grans, Absolute 0.21 (H) 0.00 - 0.05 10*3/mm3    nRBC 0.0 0.0 - 0.2 /100 WBC   Green Top (Gel)    Collection Time: 08/11/24 10:01 AM   Result Value Ref Range    Extra Tube Hold for add-ons.    Lavender Top    Collection Time: 08/11/24 10:01 AM   Result Value Ref Range     Extra Tube hold for add-on    Gold Top - SST    Collection Time: 08/11/24 10:01 AM   Result Value Ref Range    Extra Tube Hold for add-ons.    Light Blue Top    Collection Time: 08/11/24 10:01 AM   Result Value Ref Range    Extra Tube Hold for add-ons.    Lipase    Collection Time: 08/11/24 10:01 AM    Specimen: Blood   Result Value Ref Range    Lipase 96 (H) 13 - 60 U/L       I ordered the above labs and reviewed the results    RADIOLOGY  Chest x-ray, no obvious pneumothorax    CT abdomen without pending    I ordered the above noted radiological studies. Interpreted by radiologist. Viewed and interpreted by me in PACS -no large pneumothorax      PROCEDURES  Procedures      MEDICATIONS GIVEN IN THE EMERGENCY DEPARTMENT  Medications   sodium chloride 0.9 % flush 10 mL (has no administration in time range)   sodium chloride 0.9 % bolus 1,000 mL (1,000 mL Intravenous New Bag 8/11/24 1142)   cefTRIAXone (ROCEPHIN) 1,000 mg in sodium chloride 0.9 % 100 mL MBP (has no administration in time range)   HYDROmorphone (DILAUDID) injection 0.25 mg (has no administration in time range)   Lidocaine HCl gel (XYLOCAINE) urethral/mucosal syringe (has no administration in time range)           MEDICAL DECISION MAKING  Results were reviewed/discussed with the patient and they were also made aware of online access. Pt also made aware that some labs, such as cultures, will not be resulted during ER visit and followup with PMD is necessary.   EKGs and labs independently viewed and interpreted by me.  Discussion below represents my analysis of pertinent findings related to patient's condition, differential diagnosis, treatment plan and final disposition.    Differential diagnosis includes but is not limited to:  Dehydration, renal failure, hematuria, Rodriguez displacement, urinary catheter complication,    Independent interpretation of labs, radiology studies, and discussions with consultants:  ED Course as of 08/11/24 1157   Sun Aug 11,  2024 0947 RN Tanner reports both bladder scanners and unable to get a value for volume in patient's urinary bladder, reporting they are working properly.  She reports patient with abdominal distention, no urine in Rodriguez bag, we discussed need for patient to have a working Rodriguez and agreed for removal of current Rodriguez with replacement here in the ER [TO]   1111 RN Tanner reports when old Rodriguez removed, a few clots noted to the tip of the Rodriguez, she reports new Rodriguez placed without any urine output.  Plan for irrigation, will CT abdomen given oliguria and lack of urine output.  Plan for admission for NEHEMIAS [TO]   1144 Discussed with Dr. Ritesh Collins, on-call for Dr. Gutierrez who is aware of patient's presentation, we discussed labs, plan for CT imaging and admit to A for further testing, treatment as needed.  He reports he will come and see the patient/family in the ED to discuss the plan.  He reports Dr. Mckenna patients get admitted to the hospitalist service. [TO]   1155 Discussed with Dr. Suki Acosta who is aware of patient presentation, acute renal failure, decreased urine output, some blood with urine cath but minimum amount of blood on irrigation,, thus concern for true oliguria in setting of worsening renal function.  She agrees with a dose of antibiotics in the ER, will follow CT results and reports she will place consult for nephrology.  She is aware patient is being seen by Dr. Ritesh Collins, covering for Dr. Ryan Gutierrez and agrees to admit for further testing, treatment as needed. [TO]      ED Course User Index  [TO] Fatuma Paredes MD         Shared decision making: Discussed with patient, family, Dr. Collins multiple times regarding worsening renal function, however electrolytes are reassuring, poor urine production, hematuria not persistent, and need for admission for further testing, treatment as needed.  Family voiced understanding and agrees to this plan      MDM         DIAGNOSIS  Final diagnoses:    Acute kidney insufficiency   Oliguria   Leukocytosis, unspecified type       DISPOSITION  ADMISSION    Discussed treatment plan and reason for admission with pt/family and admitting physician.  Pt/family voiced understanding of the plan for admission for further testing/treatment as needed.       Latest Documented Vital Signs:  As of 11:57 EDT  BP- 153/78 HR- 85 Temp- 97.2 °F (36.2 °C) (Tympanic) O2 sat- 94%    --      Please note that portions of this note were completed with a voice recognition program.       Note Disclaimer: At [facility], we believe that sharing information builds trust and better relationships. You are receiving this note because you are receiving care at [facility] or recently visited. It is possible you will see health information before a provider has talked with you about it. This kind of information can be easy to misunderstand. To help you fully understand what it means for your health, we urge you to discuss this note with your provider.     Fatuma Paredes MD  08/11/24 [id number]

## 2024-08-11 NOTE — ED NOTES
Pt was discharged from here to [facility] c a hagen cath.  Last night staff flushed it and noted clots.  This am there is no urine in the bag

## 2024-08-11 NOTE — ED NOTES
Nursing report ED to floor  Froy Ngo  78 y.o.  male    HPI :  HPI (Adult)  Stated Reason for Visit: urinary retention  History Obtained From: EMS    Chief Complaint  Chief Complaint   Patient presents with    Urinary Retention       Admitting doctor:   Shruthi Acosta MD    Admitting diagnosis:   The primary encounter diagnosis was Acute kidney insufficiency. Diagnoses of Oliguria and Leukocytosis, unspecified type were also pertinent to this visit.    Code status:   Current Code Status       Date Active Code Status Order ID Comments User Context       Prior            Allergies:   Patient has no known allergies.    Isolation:   No active isolations    Intake and Output    Intake/Output Summary (Last 24 hours) at 8/11/2024 1259  Last data filed at 8/11/2024 1222  Gross per 24 hour   Intake --   Output 525 ml   Net -525 ml       Weight:   There were no vitals filed for this visit.    Most recent vitals:   Vitals:    08/11/24 0916 08/11/24 0947 08/11/24 1031 08/11/24 1201   BP: 145/72 157/74 153/78 140/81   BP Location:  Right arm     Patient Position:  Lying     Pulse: 110  85 113   Resp: 16      Temp: 97.2 °F (36.2 °C)      TempSrc: Tympanic      SpO2: 95%  94% 95%       Active LDAs/IV Access:   Lines, Drains & Airways       Active LDAs       Name Placement date Placement time Site Days    Peripheral IV 08/11/24 1002 Right Antecubital 08/11/24  1002  Antecubital  less than 1    Continuous Bladder Irrigation Triple-lumen Other (Comment) 08/11/24  1137  Triple-lumen  less than 1                    Labs (abnormal labs have a star):   Labs Reviewed   COMPREHENSIVE METABOLIC PANEL - Abnormal; Notable for the following components:       Result Value    Glucose 150 (*)     BUN 59 (*)     Creatinine 5.84 (*)     Sodium 132 (*)     CO2 18.0 (*)     Calcium 8.3 (*)     Albumin 2.6 (*)     eGFR 9.2 (*)     All other components within normal limits    Narrative:     GFR Normal >60  Chronic Kidney Disease  "<60  Kidney Failure <15    The GFR formula is only valid for adults with stable renal function between ages 18 and 70.   CBC WITH AUTO DIFFERENTIAL - Abnormal; Notable for the following components:    WBC 19.87 (*)     RBC 3.92 (*)     Hemoglobin 11.8 (*)     Hematocrit 35.4 (*)     RDW 12.0 (*)     Neutrophil % 87.0 (*)     Lymphocyte % 3.1 (*)     Immature Grans % 1.1 (*)     Neutrophils, Absolute 17.32 (*)     Lymphocytes, Absolute 0.62 (*)     Monocytes, Absolute 1.54 (*)     Immature Grans, Absolute 0.21 (*)     All other components within normal limits   PROCALCITONIN - Abnormal; Notable for the following components:    Procalcitonin 0.34 (*)     All other components within normal limits    Narrative:     As a Marker for Sepsis (Non-Neonates):    1. <0.5 ng/mL represents a low risk of severe sepsis and/or septic shock.  2. >2 ng/mL represents a high risk of severe sepsis and/or septic shock.    As a Marker for Lower Respiratory Tract Infections that require antibiotic therapy:    PCT on Admission    Antibiotic Therapy       6-12 Hrs later    >0.5                Strongly Recommended  >0.25 - <0.5        Recommended   0.1 - 0.25          Discouraged              Remeasure/reassess PCT  <0.1                Strongly Discouraged     Remeasure/reassess PCT    As 28 day mortality risk marker: \"Change in Procalcitonin Result\" (>80% or <=80%) if Day 0 (or Day 1) and Day 4 values are available. Refer to http://[email or web address]-pct-calculator.[email or web address]    Change in PCT <=80%  A decrease of PCT levels below or equal to 80% defines a positive change in PCT test result representing a higher risk for 28-day all-cause mortality of patients diagnosed with severe sepsis for septic shock.    Change in PCT >80%  A decrease of PCT levels of more than 80% defines a negative change in PCT result representing a lower risk for 28-day all-cause mortality of patients diagnosed with severe sepsis or septic shock.      LIPASE - Abnormal; Notable for " the following components:    Lipase 96 (*)     All other components within normal limits   BLOOD CULTURE   BLOOD CULTURE   RAINBOW DRAW    Narrative:     The following orders were created for panel order [place] Draw.  Procedure                               Abnormality         Status                     ---------                               -----------         ------                     Green Top (Gel)[[id number]]                                  Final result               Lavender Top[[id number]]                                     Final result               Gold Top - SST[[id number]]                                   Final result               Light Blue Top[[id number]]                                   Final result                 Please view results for these tests on the individual orders.   URINALYSIS W/ MICROSCOPIC IF INDICATED (NO CULTURE)   LACTIC ACID, PLASMA   CBC AND DIFFERENTIAL    Narrative:     The following orders were created for panel order CBC & Differential.  Procedure                               Abnormality         Status                     ---------                               -----------         ------                     CBC Auto Differential[[id number]]        Abnormal            Final result                 Please view results for these tests on the individual orders.   GREEN TOP   LAVENDER TOP   GOLD TOP - SST   LIGHT BLUE TOP       EKG:   No orders to display       Meds given in ED:   Medications   sodium chloride 0.9 % flush 10 mL (has no administration in time range)   cefTRIAXone (ROCEPHIN) 1,000 mg in sodium chloride 0.9 % 100 mL MBP (has no administration in time range)   sodium chloride 0.9 % flush 10 mL (has no administration in time range)   sodium chloride 0.9 % flush 10 mL (has no administration in time range)   sodium chloride 0.9 % infusion 40 mL (has no administration in time range)   ondansetron ODT (ZOFRAN-ODT) disintegrating tablet 4 mg (has no administration in time  range)     Or   ondansetron (ZOFRAN) injection 4 mg (has no administration in time range)   melatonin tablet 5 mg (has no administration in time range)   sennosides-docusate (PERICOLACE) 8.6-50 MG per tablet 2 tablet (has no administration in time range)     And   polyethylene glycol (MIRALAX) packet 17 g (has no administration in time range)     And   bisacodyl (DULCOLAX) EC tablet 5 mg (has no administration in time range)     And   bisacodyl (DULCOLAX) suppository 10 mg (has no administration in time range)   acetaminophen (TYLENOL) tablet 650 mg (has no administration in time range)     Or   acetaminophen (TYLENOL) 160 MG/5ML oral solution 650 mg (has no administration in time range)     Or   acetaminophen (TYLENOL) suppository 650 mg (has no administration in time range)   sodium chloride 0.9 % bolus 1,000 mL (1,000 mL Intravenous New Bag 8/11/24 1142)   HYDROmorphone (DILAUDID) injection 0.25 mg (0.25 mg Intravenous Given 8/11/24 1157)   Lidocaine HCl gel (XYLOCAINE) urethral/mucosal syringe ( Topical Given 8/11/24 1158)       Imaging results:  XR Chest 1 View    Result Date: 8/11/2024  1. No acute process   This report was finalized on 8/11/2024 12:23 PM by Dr. Rajiv Canela M.D on Workstation: [id number]      MRI Brain With & Without Contrast    Result Date: 8/9/2024   There is mildly prominent diffuse pachymeningeal thickening that is noted supratentorially. There are subtle lateral cerebral hemispheric subdural hygromas which measure up to approximately 4 to 5 mm in maximal diameter lateral to both frontal lobes. The pachymeningeal thickening may be due to chronic subdural hematomas or hygromas. However, the possibility of acute subdural hygromas is not entirely excluded. Please correlate for the possibility of any recent head trauma.  Several chronic microhemorrhages within the right parietal and occipital lobes likely secondary to underlying amyloid.  Mild changes of chronic small vessel ischemic  phenomena.   This report was finalized on 8/9/2024 4:49 PM by Dr. Jimenez Herrera M.D on Workstation: [id number]       Ambulatory status:   - assist     Social issues:   Social History     Socioeconomic History    Marital status: Single   Tobacco Use    Smoking status: Never     Passive exposure: Never    Smokeless tobacco: Never   Vaping Use    Vaping status: Never Used   Substance and Sexual Activity    Alcohol use: Never    Drug use: Never    Sexual activity: Not Currently       Peripheral Neurovascular  Peripheral Neurovascular (Adult)  Peripheral Neurovascular WDL: WDL    Neuro Cognitive  Neuro Cognitive (Adult)  Cognitive/Neuro/Behavioral WDL: WDL    Learning  Learning Assessment (Adult)  Learning Readiness and Ability: cognitive limitation noted  Education Provided  Person Taught: patient, spouse    Respiratory  Respiratory WDL  Respiratory WDL: WDL    Abdominal Pain       Pain Assessments  Pain (Adult)  (0-10) Pain Rating: Rest: 0  (0-10) Pain Rating: Activity: 0    NIH Stroke Scale       Mary Alcazar RN  08/11/24 12:59 EDT

## 2024-08-11 NOTE — Clinical Note
Level of Care: Telemetry [5]   Diagnosis: Acute renal failure [139846]   Admitting Physician: ROMULO FUENTES [[id number]]   Attending Physician: ROMULO FUENTES [[id number]]

## 2024-08-11 NOTE — CONSULTS
[facility] Consult Note      Patient Name: Froy Ngo  : 1945  MRN: [id number]  Primary Care Physician:  Ryan Gutierrez MD  Referring Physician: No ref. provider found  Date of admission: 2024    Subjective     Reason for Consult:  NEHEMIAS vs NEHEMIAS/CKD    HPI:   Froy Ngo is a 78 y.o. male with unclear baseline SCR, recent severe postobstructive NEHEMIAS just earlier this month (peak SCR 17.2 on ; SCR was 2.2 upon discharge ) with severe bladder distention and bilateral hydronephrosis attributed to severe BPH, dementia, DM2, and hypertension, readmitted from nursing home earlier this morning for lack of urine output.  Apparently blood clots were seen in his catheter tubing at the nursing home.  CT scan this afternoon revealed bilateral hydronephrosis and moderately severe prostate enlargement.  In ER earlier today, Rodriguez catheter replaced, and reportedly no significant urine output resulted.  Family describes brief bladder irrigation in the ER, which was then stopped.  Continues not to have any urine in catheter bag.    Denies any current abdominal pain or distention  No fever  No shortness of breath on room air; no chest pain  Appetite fair, though family indicates his appetite has been poor    Review of Systems:   14 point review of systems is otherwise negative except for mentioned above on HPI    Personal History     Past Medical History:   Diagnosis Date    Dementia     Diabetes mellitus     Elevated cholesterol     Prostate disorder     Urinary retention        No past surgical history on file.    Family History: family history is not on file.    Social History:  reports that he has never smoked. He has never been exposed to tobacco smoke. He has never used smokeless tobacco. He reports that he does not drink alcohol and does not use drugs.    Home Medications:  Prior to Admission medications    Medication Sig Start Date End Date Taking? Authorizing  Provider   amLODIPine (NORVASC) 5 MG tablet Take 1 tablet by mouth Daily. 8/10/24  Yes Gurvinder Farrell MD   aspirin 81 MG EC tablet Take 1 tablet by mouth Daily.   Yes Araseli Price MD   atorvastatin (LIPITOR) 10 MG tablet Take 1 tablet by mouth Daily.   Yes Araseli Price MD   donepezil (ARICEPT) 10 MG tablet Take 1 tablet by mouth Every Night. 10/4/23 10/3/24 Yes Araseli Price MD   hydrALAZINE (APRESOLINE) 25 MG tablet Take 1 tablet by mouth Every 8 (Eight) Hours. 8/9/24  Yes Gurvinder Farrell MD   memantine (NAMENDA) 10 MG tablet Take 1 tablet by mouth Daily. 11/6/23 11/5/24 Yes Araseli Price MD   metFORMIN (GLUCOPHAGE) 1000 MG tablet Take 1 tablet by mouth 2 (Two) Times a Day With Meals. 1.5 tablets in AM; 1 tablet in PM 8/16/24  Yes Gurvinder Farrell MD   Semaglutide,0.25 or 0.5MG/DOS, (OZEMPIC) 2 MG/1.5ML solution pen-injector Inject 0.25 mg under the skin into the appropriate area as directed 1 (One) Time Per Week.   Yes Araseli Price MD   silodosin (RAPAFLO) 4 MG capsule capsule Take 2 capsules by mouth Daily With Breakfast.   Yes Araseli Price MD   tamsulosin (FLOMAX) 0.4 MG capsule 24 hr capsule Take 1 capsule by mouth Daily. 8/10/24  Yes Gurvinder Farrell MD       Allergies:  No Known Allergies    Objective     Vitals:   Temp:  [97.2 °F (36.2 °C)-97.9 °F (36.6 °C)] 97.9 °F (36.6 °C)  Heart Rate:  [] 85  Resp:  [16-19] 19  BP: (139-169)/(69-83) 169/83    Intake/Output Summary (Last 24 hours) at 8/11/2024 1811  Last data filed at 8/11/2024 1319  Gross per 24 hour   Intake 240 ml   Output 525 ml   Net -285 ml       Physical Exam:   Constitutional: Awake, alert, NAD, chronically ill  HEENT: Sclera anicteric, no conjunctival injection  Neck: Supple, no carotid bruit, trachea at midline, no JVD  Respiratory: Clear to auscultation bilaterally, nonlabored on RA  Cardiovascular: RRR, no rub  Gastrointestinal: BS +, soft, nontender and nondistended  : No palpable bladder;  Rodriguez catheter anchored  Musculoskeletal: No significant edema, no clubbing or cyanosis  Psychiatric: Appropriate affect, cooperative  Neurologic: No asterixis, moving all extremities, normal speech   Skin: Warm and dry       Scheduled Meds:     amLODIPine, 5 mg, Oral, Q24H  aspirin, 81 mg, Oral, Daily  atorvastatin, 10 mg, Oral, Daily  [START ON 8/12/2024] cefTRIAXone, 1,000 mg, Intravenous, Q24H  donepezil, 10 mg, Oral, Nightly  heparin (porcine), 5,000 Units, Subcutaneous, Q8H  hydrALAZINE, 25 mg, Oral, Q8H  insulin lispro, 2-7 Units, Subcutaneous, 4x Daily AC & at Bedtime  memantine, 10 mg, Oral, Daily  sodium chloride, 10 mL, Intravenous, Q12H  tamsulosin, 0.4 mg, Oral, Daily      IV Meds:   sodium chloride, 75 mL/hr, Last Rate: 75 mL/hr (08/11/24 1651)        Results Reviewed:   I have personally reviewed the results from the time of this admission to 8/11/2024 18:11 EDT     Lab Results   Component Value Date    GLUCOSE 150 (H) 08/11/2024    CALCIUM 8.3 (L) 08/11/2024     (L) 08/11/2024    K 4.3 08/11/2024    CO2 18.0 (L) 08/11/2024    CL 99 08/11/2024    BUN 59 (H) 08/11/2024    CREATININE 5.84 (H) 08/11/2024    BCR 10.1 08/11/2024    ANIONGAP 15.0 08/11/2024      Lab Results   Component Value Date    MG 1.6 08/07/2024    PHOS 3.5 08/09/2024    ALBUMIN 2.6 (L) 08/11/2024           Assessment / Plan       Acute renal failure    Obstructive uropathy    Bilateral hydronephrosis    Type 2 diabetes mellitus with hyperglycemia    Essential hypertension, benign    Dementia without behavioral disturbance      ASSESSMENT:  1.  NEHEMIAS versus NEHEMIAS/CKD:  baseline SCR not known.  Severe obstructive uropathy earlier this month with substantial though not complete recovery (SCR 2.2 upon discharge 8/9).  Wonder if today's CT showing bilateral hydronephrosis is simply a chronic finding, rather than indicating any acute finding.  Current NEHEMIAS may actually represent just prerenal azotemia from lackluster appetite since leaving  the hospital.  Looks dry; likely NAGMA.  No urine studies yet  2.  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  Alzheimer's dementia  4.  Anemia  5.  Hypertension  6.  Leukocytosis  7.  DM2:  apparently was receiving metformin at the nursing home    PLAN:  1.  Normal saline at 100 mL/h  2.  FENa; serum uric acid  3.  Urology evaluation pending  4.  Discussed with family at bedside    Thank you for involving us in the care of Froy Ngo.  Please feel free to call with any questions.    Jorge Javed MD  08/11/24  18:11 EDT    [facility]  [phone]      Please note that portions of this note were completed with a voice recognition program.

## 2024-08-12 LAB
ALBUMIN SERPL-MCNC: 2.4 G/DL (ref 3.5–5.2)
ALBUMIN SERPL-MCNC: 2.4 G/DL (ref 3.5–5.2)
ANION GAP SERPL CALCULATED.3IONS-SCNC: 17.5 MMOL/L (ref 5–15)
ANION GAP SERPL CALCULATED.3IONS-SCNC: 17.8 MMOL/L (ref 5–15)
BACTERIA UR QL AUTO: ABNORMAL /HPF
BILIRUB UR QL STRIP: ABNORMAL
BUN SERPL-MCNC: 63 MG/DL (ref 8–23)
BUN SERPL-MCNC: 67 MG/DL (ref 8–23)
BUN/CREAT SERPL: 8.1 (ref 7–25)
BUN/CREAT SERPL: 8.3 (ref 7–25)
CALCIUM SPEC-SCNC: 7.7 MG/DL (ref 8.6–10.5)
CALCIUM SPEC-SCNC: 8.1 MG/DL (ref 8.6–10.5)
CHLORIDE SERPL-SCNC: 100 MMOL/L (ref 98–107)
CHLORIDE SERPL-SCNC: 99 MMOL/L (ref 98–107)
CLARITY UR: ABNORMAL
CO2 SERPL-SCNC: 14.2 MMOL/L (ref 22–29)
CO2 SERPL-SCNC: 14.5 MMOL/L (ref 22–29)
COLOR UR: ABNORMAL
CREAT SERPL-MCNC: 7.56 MG/DL (ref 0.76–1.27)
CREAT SERPL-MCNC: 8.26 MG/DL (ref 0.76–1.27)
CREAT UR-MCNC: <4.2 MG/DL
DEPRECATED RDW RBC AUTO: 39.8 FL (ref 37–54)
EGFRCR SERPLBLD CKD-EPI 2021: 6 ML/MIN/1.73
EGFRCR SERPLBLD CKD-EPI 2021: 6.7 ML/MIN/1.73
ERYTHROCYTE [DISTWIDTH] IN BLOOD BY AUTOMATED COUNT: 11.9 % (ref 12.3–15.4)
GLUCOSE BLDC GLUCOMTR-MCNC: 127 MG/DL (ref 70–130)
GLUCOSE BLDC GLUCOMTR-MCNC: 130 MG/DL (ref 70–130)
GLUCOSE BLDC GLUCOMTR-MCNC: 132 MG/DL (ref 70–130)
GLUCOSE BLDC GLUCOMTR-MCNC: 168 MG/DL (ref 70–130)
GLUCOSE SERPL-MCNC: 129 MG/DL (ref 65–99)
GLUCOSE SERPL-MCNC: 130 MG/DL (ref 65–99)
GLUCOSE UR STRIP-MCNC: NEGATIVE MG/DL
HCT VFR BLD AUTO: 33.5 % (ref 37.5–51)
HGB BLD-MCNC: 11.1 G/DL (ref 13–17.7)
HGB UR QL STRIP.AUTO: ABNORMAL
HYALINE CASTS UR QL AUTO: ABNORMAL /LPF
KETONES UR QL STRIP: NEGATIVE
LEUKOCYTE ESTERASE UR QL STRIP.AUTO: ABNORMAL
MAGNESIUM SERPL-MCNC: 1.5 MG/DL (ref 1.6–2.4)
MCH RBC QN AUTO: 30.3 PG (ref 26.6–33)
MCHC RBC AUTO-ENTMCNC: 33.1 G/DL (ref 31.5–35.7)
MCV RBC AUTO: 91.5 FL (ref 79–97)
NITRITE UR QL STRIP: POSITIVE
PH UR STRIP.AUTO: 7.5 [PH] (ref 5–8)
PHOSPHATE SERPL-MCNC: 7.1 MG/DL (ref 2.5–4.5)
PHOSPHATE SERPL-MCNC: 7.1 MG/DL (ref 2.5–4.5)
PLATELET # BLD AUTO: 312 10*3/MM3 (ref 140–450)
PMV BLD AUTO: 9 FL (ref 6–12)
POTASSIUM SERPL-SCNC: 4.4 MMOL/L (ref 3.5–5.2)
POTASSIUM SERPL-SCNC: 4.5 MMOL/L (ref 3.5–5.2)
PROT UR QL STRIP: ABNORMAL
RBC # BLD AUTO: 3.66 10*6/MM3 (ref 4.14–5.8)
RBC # UR STRIP: ABNORMAL /HPF
REF LAB TEST METHOD: ABNORMAL
SODIUM SERPL-SCNC: 131 MMOL/L (ref 136–145)
SODIUM SERPL-SCNC: 132 MMOL/L (ref 136–145)
SODIUM UR-SCNC: 145 MMOL/L
SP GR UR STRIP: 1.01 (ref 1–1.03)
SQUAMOUS #/AREA URNS HPF: ABNORMAL /HPF
UROBILINOGEN UR QL STRIP: ABNORMAL
WBC # UR STRIP: ABNORMAL /HPF
WBC NRBC COR # BLD AUTO: 18.57 10*3/MM3 (ref 3.4–10.8)

## 2024-08-12 PROCEDURE — 25010000002 ONDANSETRON PER 1 MG: Performed by: STUDENT IN AN ORGANIZED HEALTH CARE EDUCATION/TRAINING PROGRAM

## 2024-08-12 PROCEDURE — 82948 REAGENT STRIP/BLOOD GLUCOSE: CPT

## 2024-08-12 PROCEDURE — 86037 ANCA TITER EACH ANTIBODY: CPT | Performed by: HOSPITALIST

## 2024-08-12 PROCEDURE — 25010000002 HEPARIN (PORCINE) PER 1000 UNITS: Performed by: STUDENT IN AN ORGANIZED HEALTH CARE EDUCATION/TRAINING PROGRAM

## 2024-08-12 PROCEDURE — 83521 IG LIGHT CHAINS FREE EACH: CPT | Performed by: HOSPITALIST

## 2024-08-12 PROCEDURE — 81001 URINALYSIS AUTO W/SCOPE: CPT | Performed by: EMERGENCY MEDICINE

## 2024-08-12 PROCEDURE — 84155 ASSAY OF PROTEIN SERUM: CPT | Performed by: HOSPITALIST

## 2024-08-12 PROCEDURE — 80069 RENAL FUNCTION PANEL: CPT | Performed by: INTERNAL MEDICINE

## 2024-08-12 PROCEDURE — 86160 COMPLEMENT ANTIGEN: CPT | Performed by: HOSPITALIST

## 2024-08-12 PROCEDURE — 83735 ASSAY OF MAGNESIUM: CPT | Performed by: STUDENT IN AN ORGANIZED HEALTH CARE EDUCATION/TRAINING PROGRAM

## 2024-08-12 PROCEDURE — 99222 1ST HOSP IP/OBS MODERATE 55: CPT | Performed by: NEUROLOGICAL SURGERY

## 2024-08-12 PROCEDURE — 86431 RHEUMATOID FACTOR QUANT: CPT | Performed by: HOSPITALIST

## 2024-08-12 PROCEDURE — 84165 PROTEIN E-PHORESIS SERUM: CPT | Performed by: HOSPITALIST

## 2024-08-12 PROCEDURE — 25010000002 CEFTRIAXONE PER 250 MG: Performed by: STUDENT IN AN ORGANIZED HEALTH CARE EDUCATION/TRAINING PROGRAM

## 2024-08-12 PROCEDURE — 84300 ASSAY OF URINE SODIUM: CPT | Performed by: INTERNAL MEDICINE

## 2024-08-12 PROCEDURE — 83516 IMMUNOASSAY NONANTIBODY: CPT | Performed by: HOSPITALIST

## 2024-08-12 PROCEDURE — 63710000001 INSULIN LISPRO (HUMAN) PER 5 UNITS: Performed by: STUDENT IN AN ORGANIZED HEALTH CARE EDUCATION/TRAINING PROGRAM

## 2024-08-12 PROCEDURE — 82570 ASSAY OF URINE CREATININE: CPT | Performed by: INTERNAL MEDICINE

## 2024-08-12 PROCEDURE — 25810000003 SODIUM CHLORIDE 0.9 % SOLUTION: Performed by: INTERNAL MEDICINE

## 2024-08-12 PROCEDURE — 80069 RENAL FUNCTION PANEL: CPT

## 2024-08-12 PROCEDURE — 86038 ANTINUCLEAR ANTIBODIES: CPT | Performed by: HOSPITALIST

## 2024-08-12 PROCEDURE — 85027 COMPLETE CBC AUTOMATED: CPT | Performed by: STUDENT IN AN ORGANIZED HEALTH CARE EDUCATION/TRAINING PROGRAM

## 2024-08-12 PROCEDURE — 36415 COLL VENOUS BLD VENIPUNCTURE: CPT | Performed by: STUDENT IN AN ORGANIZED HEALTH CARE EDUCATION/TRAINING PROGRAM

## 2024-08-12 RX ORDER — OXYCODONE AND ACETAMINOPHEN 5; 325 MG/1; MG/1
1 TABLET ORAL EVERY 4 HOURS PRN
Status: DISPENSED | OUTPATIENT
Start: 2024-08-12 | End: 2024-08-17

## 2024-08-12 RX ADMIN — CEFTRIAXONE SODIUM 1000 MG: 1 INJECTION, POWDER, FOR SOLUTION INTRAMUSCULAR; INTRAVENOUS at 13:08

## 2024-08-12 RX ADMIN — HYDRALAZINE HYDROCHLORIDE 25 MG: 25 TABLET ORAL at 21:22

## 2024-08-12 RX ADMIN — HYDRALAZINE HYDROCHLORIDE 25 MG: 25 TABLET ORAL at 14:06

## 2024-08-12 RX ADMIN — HYDRALAZINE HYDROCHLORIDE 25 MG: 25 TABLET ORAL at 05:44

## 2024-08-12 RX ADMIN — SODIUM CHLORIDE 100 ML/HR: 9 INJECTION, SOLUTION INTRAVENOUS at 02:56

## 2024-08-12 RX ADMIN — Medication 10 ML: at 08:33

## 2024-08-12 RX ADMIN — Medication 10 ML: at 21:26

## 2024-08-12 RX ADMIN — DONEPEZIL HYDROCHLORIDE 10 MG: 10 TABLET, FILM COATED ORAL at 21:22

## 2024-08-12 RX ADMIN — ONDANSETRON 4 MG: 2 INJECTION INTRAMUSCULAR; INTRAVENOUS at 07:20

## 2024-08-12 RX ADMIN — ATORVASTATIN CALCIUM 10 MG: 20 TABLET, FILM COATED ORAL at 08:32

## 2024-08-12 RX ADMIN — SODIUM CHLORIDE 100 ML/HR: 9 INJECTION, SOLUTION INTRAVENOUS at 23:25

## 2024-08-12 RX ADMIN — SODIUM CHLORIDE 100 ML/HR: 9 INJECTION, SOLUTION INTRAVENOUS at 12:28

## 2024-08-12 RX ADMIN — AMLODIPINE BESYLATE 5 MG: 5 TABLET ORAL at 08:32

## 2024-08-12 RX ADMIN — HEPARIN SODIUM 5000 UNITS: 5000 INJECTION INTRAVENOUS; SUBCUTANEOUS at 05:44

## 2024-08-12 RX ADMIN — ASPIRIN 81 MG: 81 TABLET, COATED ORAL at 08:32

## 2024-08-12 RX ADMIN — INSULIN LISPRO 2 UNITS: 100 INJECTION, SOLUTION INTRAVENOUS; SUBCUTANEOUS at 11:36

## 2024-08-12 RX ADMIN — MEMANTINE HYDROCHLORIDE 10 MG: 10 TABLET, FILM COATED ORAL at 08:31

## 2024-08-12 RX ADMIN — TAMSULOSIN HYDROCHLORIDE 0.4 MG: 0.4 CAPSULE ORAL at 08:31

## 2024-08-12 RX ADMIN — OXYCODONE HYDROCHLORIDE AND ACETAMINOPHEN 1 TABLET: 5; 325 TABLET ORAL at 17:22

## 2024-08-12 NOTE — PAYOR COMM NOTE
"Froy Ngo (78 y.o. Male)     PLEASE SEE ATTACHED FOR INPT AUTH     REF #   [id number]    PLEASE CALL ELIER NELSON RN/ DEPT @ [phone]  OR FAX  DEPARTMENT @  [phone]    THANK YOU   ELIER NELSON RN  [facility]          Date of Birth   1945    Social Security Number       Address   [address] [place] [address]    Home Phone   [phone]    MRN   [id number]       Amish   Unknown    Marital Status   Single                            Admission Date   8/11/24    Admission Type   Emergency    Admitting Provider   Shruthi Acosta MD    Attending Provider   Shruthi Acosta MD    Department, Room/Bed   [address], N524/1       Discharge Date       Discharge Disposition       Discharge Destination                                 Attending Provider: Shruthi Acosta MD    Allergies: No Known Allergies    Isolation: None   Infection: None   Code Status: CPR    Ht: 182.9 cm (72\")   Wt: 91.5 kg (201 lb 11.5 oz)    Admission Cmt: None   Principal Problem: Acute renal failure [N17.9]                   Active Insurance as of 8/11/2024       Primary Coverage       Payor Plan Insurance Group Employer/Plan Group    MEDICARE MEDICARE A ONLY        Payor Plan Address Payor Plan Phone Number Payor Plan Fax Number Effective Dates    [address] [phone]  6/1/2009 - None Entered    [facility] [id number]         Subscriber Name Subscriber Birth Date Member ID       FROY NGO 1945 [id number]               Secondary Coverage       Payor Plan Insurance Group Employer/Plan Group    ANTHEM BLUE CROSS ANTHEM BLUE CROSS BLUE SHIELD PPO [id number]       Payor Plan Address Payor Plan Phone Number Payor Plan Fax Number Effective Dates    [address] [phone]  4/1/2019 - None Entered    [facility] [id number]         Subscriber Name Subscriber Birth Date Member ID       FROY NGO 1945 [id number]               "       Emergency Contacts        (Rel.) Home Phone Work Phone Mobile Phone    VASU TURNER (Spouse) [phone] -- [phone]    michaellevasu arauz (Daughter) [phone] -- --              [place]: [facility] [id number]  Tax ID [id number]     History & Physical        Shruthi Acosta MD at 08/11/24 1208              Patient Name:  Froy Turner  YOB: 1945  MRN:  [id number]  Admit Date:  8/11/2024  Patient Care Team:  Ryan Gutierrez MD as PCP - General (Internal Medicine)      Subjective  History Present Illness     Chief Complaint   Patient presents with    Urinary Retention       Mr. Turner is a 78 y.o. with a history of type 2 diabetes, hyperlipidemia, Alzheimer's dementia, recent admission for euglycemic DKA, NEHEMIAS/obstructive uropathy with bilateral hydronephrosis-discharged with in-dwelling Rodriguez that presents to [facility] complaining of decreased urine output.     History of Present Illness  78-year-old gentleman with history as above who presents from his rehab facility for decreased urine output.  History is collected from wife and daughter at bedside.  Patient was discharged from this facility 2 days ago to [place] for rehab.  He had been diagnosed with urinary obstruction secondary to BPH and discharged with an indwelling Rodriguez.  Family hired a private sitter to be with the patient while at rehab.  The sitter noted that he did not have much urine output from his Rodriguez.  Per wife, the rehab facility did exchange his Rodriguez 1 time however there was no significant increase in urine output.  Patient also had intermittent blood clots via Rodriguez.  Family requested patient be transferred back to [facility] for further evaluation.  The patient denies any abdominal pain pain, nausea, vomiting.  His family does report that he has a decreased appetite and did not eat lunch or dinner yesterday.  The patient's main complaint for me is that he is  significantly tired.    Review of Systems   Constitutional:  Positive for appetite change (decreased) and fatigue. Negative for fever.   Respiratory:  Negative for cough and shortness of breath.    Cardiovascular:  Negative for chest pain and leg swelling.   Gastrointestinal:  Negative for abdominal pain, nausea and vomiting.   Genitourinary:  Positive for hematuria.   Neurological:  Negative for weakness.        Personal History     Past Medical History:   Diagnosis Date    Dementia     Diabetes mellitus     Elevated cholesterol     Prostate disorder     Urinary retention      No past surgical history on file.  No family history on file.  Social History     Tobacco Use    Smoking status: Never     Passive exposure: Never    Smokeless tobacco: Never   Vaping Use    Vaping status: Never Used   Substance Use Topics    Alcohol use: Never    Drug use: Never     No current facility-administered medications on file prior to encounter.     Current Outpatient Medications on File Prior to Encounter   Medication Sig Dispense Refill    amLODIPine (NORVASC) 5 MG tablet Take 1 tablet by mouth Daily. 30 tablet 0    aspirin 81 MG EC tablet Take 1 tablet by mouth Daily.      atorvastatin (LIPITOR) 10 MG tablet Take 1 tablet by mouth Daily.      donepezil (ARICEPT) 10 MG tablet Take 1 tablet by mouth Every Night.      hydrALAZINE (APRESOLINE) 25 MG tablet Take 1 tablet by mouth Every 8 (Eight) Hours. 90 tablet 0    memantine (NAMENDA) 10 MG tablet Take 1 tablet by mouth Daily.      [START ON 8/16/2024] metFORMIN (GLUCOPHAGE) 1000 MG tablet Take 1 tablet by mouth 2 (Two) Times a Day With Meals. 1.5 tablets in AM; 1 tablet in PM 90 tablet 0    Semaglutide,0.25 or 0.5MG/DOS, (OZEMPIC) 2 MG/1.5ML solution pen-injector Inject 0.25 mg under the skin into the appropriate area as directed 1 (One) Time Per Week.      tamsulosin (FLOMAX) 0.4 MG capsule 24 hr capsule Take 1 capsule by mouth Daily. 30 capsule 0     No Known  Allergies    Objective   Objective     Vital Signs  Temp:  [97.2 °F (36.2 °C)] 97.2 °F (36.2 °C)  Heart Rate:  [] 87  Resp:  [16] 16  BP: (139-157)/(69-81) 139/69  SpO2:  [93 %-95 %] 93 %  on   ;   Device (Oxygen Therapy): room air  There is no height or weight on file to calculate BMI.    Physical Exam  Constitutional:       General: He is not in acute distress.     Appearance: He is ill-appearing. He is not toxic-appearing.   HENT:      Head: Normocephalic and atraumatic.      Mouth/Throat:      Mouth: Mucous membranes are moist.      Pharynx: Oropharynx is clear.   Eyes:      Extraocular Movements: Extraocular movements intact.      Conjunctiva/sclera: Conjunctivae normal.      Pupils: Pupils are equal, round, and reactive to light.   Cardiovascular:      Rate and Rhythm: Normal rate and regular rhythm.   Pulmonary:      Effort: Pulmonary effort is normal. No respiratory distress.      Breath sounds: Normal breath sounds. No wheezing.   Abdominal:      General: Abdomen is flat.      Palpations: Abdomen is soft.      Tenderness: There is no abdominal tenderness.   Genitourinary:     Comments: Rodriguez in place, no urine noted in bag however recently changed in ED  Musculoskeletal:         General: No tenderness.      Right lower leg: No edema.      Left lower leg: No edema.   Skin:     General: Skin is warm and dry.   Neurological:      General: No focal deficit present.      Mental Status: He is alert and oriented to person, place, and time. Mental status is at baseline.      Motor: No weakness.       Results Review:  I reviewed the patient's new clinical results.  I reviewed the patient's new imaging results and agree with the interpretation.  I reviewed the patient's other test results and agree with the interpretation  I personally viewed and interpreted the patient's EKG/Telemetry data  Discussed with ED provider.    Lab Results (last 24 hours)       Procedure Component Value Units Date/Time    CBC &  Differential [[id number]]  (Abnormal) Collected: 08/11/24 1001    Specimen: Blood Updated: 08/11/24 1011    Narrative:      The following orders were created for panel order CBC & Differential.  Procedure                               Abnormality         Status                     ---------                               -----------         ------                     CBC Auto Differential[[id number]]        Abnormal            Final result                 Please view results for these tests on the individual orders.    Comprehensive Metabolic Panel [[id number]]  (Abnormal) Collected: 08/11/24 1001    Specimen: Blood Updated: 08/11/24 1036     Glucose 150 mg/dL      BUN 59 mg/dL      Creatinine 5.84 mg/dL      Sodium 132 mmol/L      Potassium 4.3 mmol/L      Comment: Slight hemolysis detected by analyzer. Result may be falsely elevated.        Chloride 99 mmol/L      CO2 18.0 mmol/L      Calcium 8.3 mg/dL      Total Protein 6.1 g/dL      Albumin 2.6 g/dL      ALT (SGPT) 16 U/L      AST (SGOT) 16 U/L      Comment: Slight hemolysis detected by analyzer. Result may be falsely elevated.        Alkaline Phosphatase 108 U/L      Total Bilirubin 0.8 mg/dL      Globulin 3.5 gm/dL      A/G Ratio 0.7 g/dL      BUN/Creatinine Ratio 10.1     Anion Gap 15.0 mmol/L      eGFR 9.2 mL/min/1.73      Comment: <15 Indicative of kidney failure       Narrative:      GFR Normal >60  Chronic Kidney Disease <60  Kidney Failure <15    The GFR formula is only valid for adults with stable renal function between ages 18 and 70.    CBC Auto Differential [[id number]]  (Abnormal) Collected: 08/11/24 1001    Specimen: Blood Updated: 08/11/24 1011     WBC 19.87 10*3/mm3      RBC 3.92 10*6/mm3      Hemoglobin 11.8 g/dL      Hematocrit 35.4 %      MCV 90.3 fL      MCH 30.1 pg      MCHC 33.3 g/dL      RDW 12.0 %      RDW-SD 39.0 fl      MPV 8.7 fL      Platelets 279 10*3/mm3      Neutrophil % 87.0 %      Lymphocyte % 3.1 %      Monocyte % 7.8 %       "Eosinophil % 0.8 %      Basophil % 0.2 %      Immature Grans % 1.1 %      Neutrophils, Absolute 17.32 10*3/mm3      Lymphocytes, Absolute 0.62 10*3/mm3      Monocytes, Absolute 1.54 10*3/mm3      Eosinophils, Absolute 0.15 10*3/mm3      Basophils, Absolute 0.03 10*3/mm3      Immature Grans, Absolute 0.21 10*3/mm3      nRBC 0.0 /100 WBC     Procalcitonin [[id number]]  (Abnormal) Collected: 08/11/24 1001    Specimen: Blood Updated: 08/11/24 1158     Procalcitonin 0.34 ng/mL     Narrative:      As a Marker for Sepsis (Non-Neonates):    1. <0.5 ng/mL represents a low risk of severe sepsis and/or septic shock.  2. >2 ng/mL represents a high risk of severe sepsis and/or septic shock.    As a Marker for Lower Respiratory Tract Infections that require antibiotic therapy:    PCT on Admission    Antibiotic Therapy       6-12 Hrs later    >0.5                Strongly Recommended  >0.25 - <0.5        Recommended   0.1 - 0.25          Discouraged              Remeasure/reassess PCT  <0.1                Strongly Discouraged     Remeasure/reassess PCT    As 28 day mortality risk marker: \"Change in Procalcitonin Result\" (>80% or <=80%) if Day 0 (or Day 1) and Day 4 values are available. Refer to http://www.[facility]-pct-calculator.com    Change in PCT <=80%  A decrease of PCT levels below or equal to 80% defines a positive change in PCT test result representing a higher risk for 28-day all-cause mortality of patients diagnosed with severe sepsis for septic shock.    Change in PCT >80%  A decrease of PCT levels of more than 80% defines a negative change in PCT result representing a lower risk for 28-day all-cause mortality of patients diagnosed with severe sepsis or septic shock.       Lipase [[id number]]  (Abnormal) Collected: 08/11/24 1001    Specimen: Blood Updated: 08/11/24 1147     Lipase 96 U/L     Blood Culture - Blood, Arm, Left [[id number]] Collected: 08/11/24 1313    Specimen: Blood from Arm, Left Updated: 08/11/24 1322    Blood " Culture - Blood, Arm, Left [[id number]] Collected: 08/11/24 1313    Specimen: Blood from Arm, Left Updated: 08/11/24 1322    Lactic Acid, Plasma [[id number]]  (Normal) Collected: 08/11/24 1313    Specimen: Blood Updated: 08/11/24 1346     Lactate 0.8 mmol/L             Imaging Results (Last 24 Hours)       Procedure Component Value Units Date/Time    CT Abdomen Pelvis Without Contrast [[id number]] Collected: 08/11/24 1321     Updated: 08/11/24 1321    Narrative:      CT OF THE ABDOMEN AND PELVIS WITHOUT CONTRAST 08/11/2024     HISTORY: Abdominal pain. Diminished urinary output.     Spiral images were obtained from the lung bases to the symphysis pubis.  No intravenous or oral contrast was given.     Small left and very minimal right pleural effusions are seen with some  minimal bibasilar atelectasis.     Liver appears unremarkable. There are multiple gallstones. No  gallbladder inflammatory changes are seen. Pancreas is atrophic. Spleen  and adrenals appear unremarkable.     There is mild-to-moderate bilateral hydronephrosis with mild ureteral  dilatation. This finding is also seen on the 08/04/2024 study. There is  bilateral perinephric stranding. There is bilateral periureteral  stranding as well. There is an approximately 4.1 cm low-density right  renal lesion presumably a cyst on this unenhanced scan. No intrarenal  stones or ureteral stones are seen.     There is some aortic calcification. Rodriguez catheter is seen in the  urinary bladder. There is mild urinary bladder wall thickening.  Moderately severe prostate gland enlargement is seen. Small left  inguinal hernia seen containing small amount of fluid. There is colonic  diverticulosis.       Impression:      1. Bilateral hydroureteronephrosis. There is also bilateral perinephric  and periureteral stranding. This finding is also seen on the previous  study of 08/04/2024.  2. Low-density right renal cyst.  3. Small pleural effusions with bibasilar atelectasis.  4.  Cholelithiasis.  5. Rodriguez catheter is seen in the urinary bladder as well as some air.  There is urinary bladder wall thickening with moderately severe prostate  gland enlargement.  6. No ureteral stones are seen.     Radiation dose reduction techniques were utilized, including automated  exposure control and exposure modulation based on body size.          XR Chest 1 View [[id number]] Collected: 08/11/24 1221     Updated: 08/11/24 1227    Narrative:      XR CHEST 1 VW-8/11/2024     HISTORY: Possible pneumonia.     The heart size is within normal limits. Lungs appear clear. There is  some aortic calcification. Bony structures appear unremarkable.       Impression:      1. No acute process        This report was finalized on 8/11/2024 12:23 PM by Dr. Rajiv Canela M.D on Workstation: [id number]                   No orders to display        Assessment/Plan     Active Hospital Problems    Diagnosis  POA    **Acute renal failure [N17.9]  Yes    Type 2 diabetes mellitus with hyperglycemia [E11.65]  Yes    Essential hypertension, benign [I10]  Yes    Dementia without behavioral disturbance [F03.90]  Yes    Obstructive uropathy [N13.9]  Yes    Bilateral hydronephrosis [N13.30]  Yes      Resolved Hospital Problems   No resolved problems to display.       Mr. Ngo is a 78 y.o.  with a history of type 2 diabetes, hyperlipidemia, Alzheimer's dementia, recent admission for euglycemic DKA, NEHEMIAS/obstructive uropathy with bilateral hydronephrosis-discharged with in-dwelling Rodriguez who presents with decreased urine output found to have NEHEMIAS.      NEHEMIAS  Non-anion gap metabolic acidosis  Urinary retention from bladder outlet obstruction with bilateral hydroureteronephrosis  Hematuria- had 3-way irrigation in ED- urine cleared  Possible UTI- ED unable to get sample prior to starting abx  - renal and urology consulted; received 1L NS in the ED; start gentle IVF and defer further fluid mgmt to renal  - bilateral  hydroureteronephrosis on Ct similar to CT last week  - ct w/ perinephric/periureteral stranding, procal slightly elevated, WBC 19; cont rocephin, urine cx if able; may be unreliable since patient already received abx    Abnormal MRI brain  - MRI from last admit reviewed-lateral cerebral subdural hygromas pachymeningeal thickening possibly related to chronic subdural hematoma versus hygroma, acute subdural hygroma not excluded; no history of recent head trauma  -Will ask neurosurgery to review    Type 2 diabetes  - hold home meds  - continue SSI while admitted    Hypertension  - resume amlodipine and hydralazine    Dementia  - cont donepezil, namenda  - frequent reorientation; high risk for delirium  -MRI from 8/9 with chronic microhemorrhages likely secondary to 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    VTE Prophylaxis - Heparin SC.  Code Status - Full code.       Shruthi Acosta MD  [facility]  08/11/24  15:20 EDT      Electronically signed by Shruthi Acosta MD at 08/11/24 1649          Emergency Department Notes        Sera Ha, RN at 08/11/24 1321          Continuous bladder irrigation stopped, due to clear output and no clots present. Pt tolerated well.     Electronically signed by Sera Ha, RN at 08/11/24 1322       Mary Alcazar, RN at 08/11/24 1259          Nursing report ED to floor  Froy Ngo  78 y.o.  male    HPI :  HPI (Adult)  Stated Reason for Visit: urinary retention  History Obtained From: EMS    Chief Complaint  Chief Complaint   Patient presents with    Urinary Retention       Admitting doctor:   Shruthi Acosta MD    Admitting diagnosis:   The primary encounter diagnosis was Acute kidney insufficiency. Diagnoses of Oliguria and Leukocytosis, unspecified type  were also pertinent to this visit.    Code status:   Current Code Status       Date Active Code Status Order ID Comments User Context       Prior            Allergies:   Patient has no known allergies.    Isolation:   No active isolations    Intake and Output    Intake/Output Summary (Last 24 hours) at 8/11/2024 1259  Last data filed at 8/11/2024 1222  Gross per 24 hour   Intake --   Output 525 ml   Net -525 ml       Weight:   There were no vitals filed for this visit.    Most recent vitals:   Vitals:    08/11/24 0916 08/11/24 0947 08/11/24 1031 08/11/24 1201   BP: 145/72 157/74 153/78 140/81   BP Location:  Right arm     Patient Position:  Lying     Pulse: 110  85 113   Resp: 16      Temp: 97.2 °F (36.2 °C)      TempSrc: Tympanic      SpO2: 95%  94% 95%       Active LDAs/IV Access:   Lines, Drains & Airways       Active LDAs       Name Placement date Placement time Site Days    Peripheral IV 08/11/24 1002 Right Antecubital 08/11/24  1002  Antecubital  less than 1    Continuous Bladder Irrigation Triple-lumen Other (Comment) 08/11/24  1137  Triple-lumen  less than 1                    Labs (abnormal labs have a star):   Labs Reviewed   COMPREHENSIVE METABOLIC PANEL - Abnormal; Notable for the following components:       Result Value    Glucose 150 (*)     BUN 59 (*)     Creatinine 5.84 (*)     Sodium 132 (*)     CO2 18.0 (*)     Calcium 8.3 (*)     Albumin 2.6 (*)     eGFR 9.2 (*)     All other components within normal limits    Narrative:     GFR Normal >60  Chronic Kidney Disease <60  Kidney Failure <15    The GFR formula is only valid for adults with stable renal function between ages 18 and 70.   CBC WITH AUTO DIFFERENTIAL - Abnormal; Notable for the following components:    WBC 19.87 (*)     RBC 3.92 (*)     Hemoglobin 11.8 (*)     Hematocrit 35.4 (*)     RDW 12.0 (*)     Neutrophil % 87.0 (*)     Lymphocyte % 3.1 (*)     Immature Grans % 1.1 (*)     Neutrophils, Absolute 17.32 (*)     Lymphocytes, Absolute  "0.62 (*)     Monocytes, Absolute 1.54 (*)     Immature Grans, Absolute 0.21 (*)     All other components within normal limits   PROCALCITONIN - Abnormal; Notable for the following components:    Procalcitonin 0.34 (*)     All other components within normal limits    Narrative:     As a Marker for Sepsis (Non-Neonates):    1. <0.5 ng/mL represents a low risk of severe sepsis and/or septic shock.  2. >2 ng/mL represents a high risk of severe sepsis and/or septic shock.    As a Marker for Lower Respiratory Tract Infections that require antibiotic therapy:    PCT on Admission    Antibiotic Therapy       6-12 Hrs later    >0.5                Strongly Recommended  >0.25 - <0.5        Recommended   0.1 - 0.25          Discouraged              Remeasure/reassess PCT  <0.1                Strongly Discouraged     Remeasure/reassess PCT    As 28 day mortality risk marker: \"Change in Procalcitonin Result\" (>80% or <=80%) if Day 0 (or Day 1) and Day 4 values are available. Refer to http://www.[facility]-pct-calculator.com    Change in PCT <=80%  A decrease of PCT levels below or equal to 80% defines a positive change in PCT test result representing a higher risk for 28-day all-cause mortality of patients diagnosed with severe sepsis for septic shock.    Change in PCT >80%  A decrease of PCT levels of more than 80% defines a negative change in PCT result representing a lower risk for 28-day all-cause mortality of patients diagnosed with severe sepsis or septic shock.      LIPASE - Abnormal; Notable for the following components:    Lipase 96 (*)     All other components within normal limits   BLOOD CULTURE   BLOOD CULTURE   RAINBOW DRAW    Narrative:     The following orders were created for panel order [place] Draw.  Procedure                               Abnormality         Status                     ---------                               -----------         ------                     Green Top (Gel)[[id number]]                     "              Final result               Lavender Top[[id number]]                                     Final result               Gold Top - SST[[id number]]                                   Final result               Light Blue Top[[id number]]                                   Final result                 Please view results for these tests on the individual orders.   URINALYSIS W/ MICROSCOPIC IF INDICATED (NO CULTURE)   LACTIC ACID, PLASMA   CBC AND DIFFERENTIAL    Narrative:     The following orders were created for panel order CBC & Differential.  Procedure                               Abnormality         Status                     ---------                               -----------         ------                     CBC Auto Differential[[id number]]        Abnormal            Final result                 Please view results for these tests on the individual orders.   GREEN TOP   LAVENDER TOP   GOLD TOP - SST   LIGHT BLUE TOP       EKG:   No orders to display       Meds given in ED:   Medications   sodium chloride 0.9 % flush 10 mL (has no administration in time range)   cefTRIAXone (ROCEPHIN) 1,000 mg in sodium chloride 0.9 % 100 mL MBP (has no administration in time range)   sodium chloride 0.9 % flush 10 mL (has no administration in time range)   sodium chloride 0.9 % flush 10 mL (has no administration in time range)   sodium chloride 0.9 % infusion 40 mL (has no administration in time range)   ondansetron ODT (ZOFRAN-ODT) disintegrating tablet 4 mg (has no administration in time range)     Or   ondansetron (ZOFRAN) injection 4 mg (has no administration in time range)   melatonin tablet 5 mg (has no administration in time range)   sennosides-docusate (PERICOLACE) 8.6-50 MG per tablet 2 tablet (has no administration in time range)     And   polyethylene glycol (MIRALAX) packet 17 g (has no administration in time range)     And   bisacodyl (DULCOLAX) EC tablet 5 mg (has no administration in time range)     And    bisacodyl (DULCOLAX) suppository 10 mg (has no administration in time range)   acetaminophen (TYLENOL) tablet 650 mg (has no administration in time range)     Or   acetaminophen (TYLENOL) 160 MG/5ML oral solution 650 mg (has no administration in time range)     Or   acetaminophen (TYLENOL) suppository 650 mg (has no administration in time range)   sodium chloride 0.9 % bolus 1,000 mL (1,000 mL Intravenous New Bag 8/11/24 1142)   HYDROmorphone (DILAUDID) injection 0.25 mg (0.25 mg Intravenous Given 8/11/24 1157)   Lidocaine HCl gel (XYLOCAINE) urethral/mucosal syringe ( Topical Given 8/11/24 1158)       Imaging results:  XR Chest 1 View    Result Date: 8/11/2024  1. No acute process   This report was finalized on 8/11/2024 12:23 PM by Dr. Rajiv Canela M.D on Workstation: [id number]      MRI Brain With & Without Contrast    Result Date: 8/9/2024   There is mildly prominent diffuse pachymeningeal thickening that is noted supratentorially. There are subtle lateral cerebral hemispheric subdural hygromas which measure up to approximately 4 to 5 mm in maximal diameter lateral to both frontal lobes. The pachymeningeal thickening may be due to chronic subdural hematomas or hygromas. However, the possibility of acute subdural hygromas is not entirely excluded. Please correlate for the possibility of any recent head trauma.  Several chronic microhemorrhages within the right parietal and occipital lobes likely secondary to underlying amyloid.  Mild changes of chronic small vessel ischemic phenomena.   This report was finalized on 8/9/2024 4:49 PM by Dr. Jimenez Herrera M.D on Workstation: [id number]       Ambulatory status:   - assist     Social issues:   Social History     Socioeconomic History    Marital status: Single   Tobacco Use    Smoking status: Never     Passive exposure: Never    Smokeless tobacco: Never   Vaping Use    Vaping status: Never Used   Substance and Sexual Activity    Alcohol use: Never    Drug  use: Never    Sexual activity: Not Currently       Peripheral Neurovascular  Peripheral Neurovascular (Adult)  Peripheral Neurovascular WDL: WDL    Neuro Cognitive  Neuro Cognitive (Adult)  Cognitive/Neuro/Behavioral WDL: WDL    Learning  Learning Assessment (Adult)  Learning Readiness and Ability: cognitive limitation noted  Education Provided  Person Taught: patient, spouse    Respiratory  Respiratory WDL  Respiratory WDL: WDL    Abdominal Pain       Pain Assessments  Pain (Adult)  (0-10) Pain Rating: Rest: 0  (0-10) Pain Rating: Activity: 0    NIH Stroke Scale       Mary Alcazar RN  08/11/24 12:59 EDT     Electronically signed by Mary Alcazar RN at 08/11/24 1259       Fatuma Paredes MD at 08/11/24 0947           EMERGENCY DEPARTMENT ENCOUNTER    CHIEF COMPLAINT  Chief Complaint: Rodriguez malfunction  History given by: EMS and family at bedside  History limited by: Patient is back to baseline confusion  Room Number: 15/15  PMD: Ryan Gutierrez MD  Urologist: Seen by Dr. Lester Marin  Nephrologist: Dr. Yair Salas    HPI:  Pt is a 78 y.o. male brought by EMS from [place] with report of Rodriguez dysfunction.  It was noted that Rodriguez was not working properly, decreased urine output last night with some manipulation of the Rodriguez, irrigated with clot cleared last night but today with persistent oliguria.  Family at bedside reports she requested patient be evaluated in the ER given his recent significant renal failure.  Patient denies nausea, vomiting, fever, cough, chest pain, shortness of air, denies abdominal pain.      Aggravating Factors: Indwelling Rodriguez  Alleviating Factors: Irrigation at the facility  Treatment before arrival: Irrigation, EMS transport  Chronic or social conditions impacting care: Baseline confusion    Additional sources:  Discussed/ obtained information from independent historians: EMS and spouse    External (non-ED) record review:   Patient admitted 8/4 through 8/9/2024 with obstructive  uropathy, kidney failure, hyperkalemia with Rodriguez placed in the emergency department with drop of creatinine from admission and 17.15 to 2.22 at discharge.  Patient had plan for voiding trial 2 weeks after discharge in the office.  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        PAST MEDICAL HISTORY  Active Ambulatory Problems     Diagnosis Date Noted    DKA (diabetic ketoacidosis) 08/04/2024    Obstructive uropathy 08/07/2024    Bilateral hydronephrosis 08/07/2024    Hyperkalemia 08/07/2024     Resolved Ambulatory Problems     Diagnosis Date Noted    No Resolved Ambulatory Problems     Past Medical History:   Diagnosis Date    Dementia     Diabetes mellitus     Elevated cholesterol     Prostate disorder     Urinary retention        PAST SURGICAL HISTORY  No past surgical history on file.    FAMILY HISTORY  No family history on file.    SOCIAL HISTORY  Social History     Socioeconomic History    Marital status: Single   Tobacco Use    Smoking status: Never     Passive exposure: Never    Smokeless tobacco: Never   Vaping Use    Vaping status: Never Used   Substance and Sexual Activity    Alcohol use: Never    Drug use: Never    Sexual activity: Not Currently       ALLERGIES  Patient has no known allergies.    REVIEW OF SYSTEMS  Review of Systems    PHYSICAL EXAM  ED Triage Vitals [08/11/24 0916]   Temp Heart Rate Resp BP SpO2   97.2 °F (36.2 °C) 110 16 145/72 95 %      Temp src Heart Rate Source Patient Position BP Location FiO2 (%)   Tympanic Monitor -- -- --       Physical Exam  Vitals and nursing note reviewed.   Constitutional:       General: He is in acute distress.   HENT:      Head: Normocephalic and atraumatic.   Cardiovascular:      Rate and Rhythm: Normal rate.      Pulses:           Posterior tibial pulses are 2+ on the right side and 2+ on the left side.      Heart sounds: Normal heart sounds. No murmur heard.  Pulmonary:      Effort:  Pulmonary effort is normal. No respiratory distress.      Breath sounds: Normal breath sounds. No wheezing.   Abdominal:      General: Bowel sounds are normal.      Palpations: Abdomen is soft.      Tenderness: There is abdominal tenderness (Diffuse tenderness without guarding or rebound). There is no guarding or rebound.   Musculoskeletal:         General: Normal range of motion.      Cervical back: Normal range of motion.   Skin:     General: Skin is warm and dry.   Neurological:      Mental Status: He is alert. He is disoriented.      Comments: Patient pleasantly confused, cooperative.   Psychiatric:         Mood and Affect: Mood and affect normal.         LAB RESULTS  Recent Results (from the past 24 hour(s))   Comprehensive Metabolic Panel    Collection Time: 08/11/24 10:01 AM    Specimen: Blood   Result Value Ref Range    Glucose 150 (H) 65 - 99 mg/dL    BUN 59 (H) 8 - 23 mg/dL    Creatinine 5.84 (H) 0.76 - 1.27 mg/dL    Sodium 132 (L) 136 - 145 mmol/L    Potassium 4.3 3.5 - 5.2 mmol/L    Chloride 99 98 - 107 mmol/L    CO2 18.0 (L) 22.0 - 29.0 mmol/L    Calcium 8.3 (L) 8.6 - 10.5 mg/dL    Total Protein 6.1 6.0 - 8.5 g/dL    Albumin 2.6 (L) 3.5 - 5.2 g/dL    ALT (SGPT) 16 1 - 41 U/L    AST (SGOT) 16 1 - 40 U/L    Alkaline Phosphatase 108 39 - 117 U/L    Total Bilirubin 0.8 0.0 - 1.2 mg/dL    Globulin 3.5 gm/dL    A/G Ratio 0.7 g/dL    BUN/Creatinine Ratio 10.1 7.0 - 25.0    Anion Gap 15.0 5.0 - 15.0 mmol/L    eGFR 9.2 (L) >60.0 mL/min/1.73   CBC Auto Differential    Collection Time: 08/11/24 10:01 AM    Specimen: Blood   Result Value Ref Range    WBC 19.87 (H) 3.40 - 10.80 10*3/mm3    RBC 3.92 (L) 4.14 - 5.80 10*6/mm3    Hemoglobin 11.8 (L) 13.0 - 17.7 g/dL    Hematocrit 35.4 (L) 37.5 - 51.0 %    MCV 90.3 79.0 - 97.0 fL    MCH 30.1 26.6 - 33.0 pg    MCHC 33.3 31.5 - 35.7 g/dL    RDW 12.0 (L) 12.3 - 15.4 %    RDW-SD 39.0 37.0 - 54.0 fl    MPV 8.7 6.0 - 12.0 fL    Platelets 279 140 - 450 10*3/mm3    Neutrophil %  87.0 (H) 42.7 - 76.0 %    Lymphocyte % 3.1 (L) 19.6 - 45.3 %    Monocyte % 7.8 5.0 - 12.0 %    Eosinophil % 0.8 0.3 - 6.2 %    Basophil % 0.2 0.0 - 1.5 %    Immature Grans % 1.1 (H) 0.0 - 0.5 %    Neutrophils, Absolute 17.32 (H) 1.70 - 7.00 10*3/mm3    Lymphocytes, Absolute 0.62 (L) 0.70 - 3.10 10*3/mm3    Monocytes, Absolute 1.54 (H) 0.10 - 0.90 10*3/mm3    Eosinophils, Absolute 0.15 0.00 - 0.40 10*3/mm3    Basophils, Absolute 0.03 0.00 - 0.20 10*3/mm3    Immature Grans, Absolute 0.21 (H) 0.00 - 0.05 10*3/mm3    nRBC 0.0 0.0 - 0.2 /100 WBC   Green Top (Gel)    Collection Time: 08/11/24 10:01 AM   Result Value Ref Range    Extra Tube Hold for add-ons.    Lavender Top    Collection Time: 08/11/24 10:01 AM   Result Value Ref Range    Extra Tube hold for add-on    Gold Top - SST    Collection Time: 08/11/24 10:01 AM   Result Value Ref Range    Extra Tube Hold for add-ons.    Light Blue Top    Collection Time: 08/11/24 10:01 AM   Result Value Ref Range    Extra Tube Hold for add-ons.    Lipase    Collection Time: 08/11/24 10:01 AM    Specimen: Blood   Result Value Ref Range    Lipase 96 (H) 13 - 60 U/L       I ordered the above labs and reviewed the results    RADIOLOGY  Chest x-ray, no obvious pneumothorax    CT abdomen without pending    I ordered the above noted radiological studies. Interpreted by radiologist. Viewed and interpreted by me in PACS -no large pneumothorax      PROCEDURES  Procedures      MEDICATIONS GIVEN IN THE EMERGENCY DEPARTMENT  Medications   sodium chloride 0.9 % flush 10 mL (has no administration in time range)   sodium chloride 0.9 % bolus 1,000 mL (1,000 mL Intravenous New Bag 8/11/24 1362)   cefTRIAXone (ROCEPHIN) 1,000 mg in sodium chloride 0.9 % 100 mL MBP (has no administration in time range)   HYDROmorphone (DILAUDID) injection 0.25 mg (has no administration in time range)   Lidocaine HCl gel (XYLOCAINE) urethral/mucosal syringe (has no administration in time range)           MEDICAL  DECISION MAKING  Results were reviewed/discussed with the patient and they were also made aware of online access. Pt also made aware that some labs, such as cultures, will not be resulted during ER visit and followup with PMD is necessary.   EKGs and labs independently viewed and interpreted by me.  Discussion below represents my analysis of pertinent findings related to patient's condition, differential diagnosis, treatment plan and final disposition.    Differential diagnosis includes but is not limited to:  Dehydration, renal failure, hematuria, Rodriguez displacement, urinary catheter complication,    Independent interpretation of labs, radiology studies, and discussions with consultants:  ED Course as of 08/11/24 1157   Sun Aug 11, 2024   0947 MARIA TERESA Hart reports both bladder scanners and unable to get a value for volume in patient's urinary bladder, reporting they are working properly.  She reports patient with abdominal distention, no urine in Rodriguez bag, we discussed need for patient to have a working Rodriguez and agreed for removal of current Rodriguez with replacement here in the ER [TO]   1111 MARIA TERESA Hart reports when old Rodriguez removed, a few clots noted to the tip of the Rodriguez, she reports new Rodriguez placed without any urine output.  Plan for irrigation, will CT abdomen given oliguria and lack of urine output.  Plan for admission for NEHEMIAS [TO]   1144 Discussed with Dr. Ritesh Collins, on-call for Dr. Gutierrez who is aware of patient's presentation, we discussed labs, plan for CT imaging and admit to A for further testing, treatment as needed.  He reports he will come and see the patient/family in the ED to discuss the plan.  He reports Dr. Mckenna patients get admitted to the hospitalist service. [TO]   1155 Discussed with Dr. Suki Acosta who is aware of patient presentation, acute renal failure, decreased urine output, some blood with urine cath but minimum amount of blood on irrigation,, thus concern for true oliguria in  setting of worsening renal function.  She agrees with a dose of antibiotics in the ER, will follow CT results and reports she will place consult for nephrology.  She is aware patient is being seen by Dr. Ritesh Collins, covering for Dr. Ryan Gutierrez and agrees to admit for further testing, treatment as needed. [TO]      ED Course User Index  [TO] Fatuma Paredes MD         Shared decision making: Discussed with patient, family, Dr. Collins multiple times regarding worsening renal function, however electrolytes are reassuring, poor urine production, hematuria not persistent, and need for admission for further testing, treatment as needed.  Family voiced understanding and agrees to this plan      MDM         DIAGNOSIS  Final diagnoses:   Acute kidney insufficiency   Oliguria   Leukocytosis, unspecified type       DISPOSITION  ADMISSION    Discussed treatment plan and reason for admission with pt/family and admitting physician.  Pt/family voiced understanding of the plan for admission for further testing/treatment as needed.       Latest Documented Vital Signs:  As of 11:57 EDT  BP- 153/78 HR- 85 Temp- 97.2 °F (36.2 °C) (Tympanic) O2 sat- 94%    --      Please note that portions of this note were completed with a voice recognition program.       Note Disclaimer: At [facility], we believe that sharing information builds trust and better relationships. You are receiving this note because you are receiving care at [facility] or recently visited. It is possible you will see health information before a provider has talked with you about it. This kind of information can be easy to misunderstand. To help you fully understand what it means for your health, we urge you to discuss this note with your provider.     Fatuma Paredes MD  08/11/24 1159      Electronically signed by Fatuma Paredes MD at 08/11/24 1159       Degonda, Janet, RN at 08/11/24 0961          Pt was discharged from here to [facility] c a hagen cath.  Last  night staff flushed it and noted clots.  This am there is no urine in the bag    Electronically signed by Degonda, Janet, RN at 08/11/24 0915          Physician Progress Notes (last 48 hours)        Ritesh Farrell MD at 08/12/24 0738          Non-billing note covering for Dr. Gutierrez    Pt admitted yesterday from [place] due to no urine output from catheter.  Patient had just been discharged from the hospital last week when he was treated for acidosis related to obstructive renal failure.  ER changed out catheter with no output.  Bladder scan without distended bladder. CT scan showed bilateral hydronephrosis similar to CT scan from previous admit.  He did have elevated white count as well as some evidence of inflammation near the ureters on CT scan.  He was started on Rocephin empirically since there was no urine output to send urinalysis or urine culture.  He was seen by nephrology and was thought that he may be prerenal and was continued on IV fluids at 100 cc an hour overnight.  He had a very small amount of bloody urine this morning.  He refused a.m. labs.  I spoke with wife and daughter giving them an update.  Blood pressure, heart rate, blood sugars remained stable.  Concern is that we are not seeing return of some renal function.  Stressed the importance of him allowing for blood draws in order to monitor.  Urology is going to see today.  Await a.m. labs.  Continues on Rocephin empirically.  Blood cultures pending.  Small amount of urine sent this morning.  Will continue to follow to keep family informed.  Wife currently at bedside.  Spoke with daughter via phone.    Electronically signed by Ritesh Farrell MD at 08/12/24 0744          Consult Notes (last 48 hours)        Dino Henry MD at 08/12/24 0951        Consult Orders    1. Inpatient Neurosurgery Consult [[id number]] ordered by Shruthi Acosta MD at 08/11/24 1644                 NEUROSURGERY CONSULT      Froy BAKER  "Jeni  1945  [id number]    Referring Provider: No referring provider defined for this encounter.  Reason for Consultation: \" Possible subdural hygroma by MRI report\"    Patient Care Team:  Ryan Gutierrez MD as PCP - General (Internal Medicine)    Chief Complaint: Memory dysfunction    Subjective .     History of Present Illness: 78-year-old  male presents to the hospital for decreased urine output with a known diagnosis of BPH and has an indwelling Rodriguez catheter.  Rodriguez catheter was not putting out much output and therefore he was brought to the hospital for reevaluation.  Patient denies any recent trauma.  Patient sitting up talking with family he has no complaint of headache, nausea or vomiting.  Patient's had a recent decline in his mental status with little p.o. intake and confusion therefore workup with CT and MRI were obtained.  Neurosurgery's been asked to comment on the results of the MRI.  Most of his history is provided by his wife and daughter.    Review of Systems  Review of Systems   Unable to perform ROS: Mental status change       History  Past Medical History:   Diagnosis Date    Dementia     Diabetes mellitus     Elevated cholesterol     Prostate disorder     Urinary retention    , No past surgical history on file., No family history on file.,   Social History     Socioeconomic History    Marital status: Single   Tobacco Use    Smoking status: Never     Passive exposure: Never    Smokeless tobacco: Never   Vaping Use    Vaping status: Never Used   Substance and Sexual Activity    Alcohol use: Never    Drug use: Never    Sexual activity: Not Currently     E-cigarette/Vaping    E-cigarette/Vaping Use Never User      E-cigarette/Vaping Substances     E-cigarette/Vaping Devices         ,   Medications Prior to Admission   Medication Sig Dispense Refill Last Dose    amLODIPine (NORVASC) 5 MG tablet Take 1 tablet by mouth Daily. 30 tablet 0     aspirin 81 MG EC tablet Take 1 tablet " by mouth Daily.       atorvastatin (LIPITOR) 10 MG tablet Take 1 tablet by mouth Daily.       donepezil (ARICEPT) 10 MG tablet Take 1 tablet by mouth Every Night.       hydrALAZINE (APRESOLINE) 25 MG tablet Take 1 tablet by mouth Every 8 (Eight) Hours. 90 tablet 0     memantine (NAMENDA) 10 MG tablet Take 1 tablet by mouth Daily.       [START ON 8/16/2024] metFORMIN (GLUCOPHAGE) 1000 MG tablet Take 1 tablet by mouth 2 (Two) Times a Day With Meals. 1.5 tablets in AM; 1 tablet in PM 90 tablet 0     Semaglutide,0.25 or 0.5MG/DOS, (OZEMPIC) 2 MG/1.5ML solution pen-injector Inject 0.25 mg under the skin into the appropriate area as directed 1 (One) Time Per Week.       silodosin (RAPAFLO) 4 MG capsule capsule Take 2 capsules by mouth Daily With Breakfast.       tamsulosin (FLOMAX) 0.4 MG capsule 24 hr capsule Take 1 capsule by mouth Daily. 30 capsule 0    , Scheduled Meds:  amLODIPine, 5 mg, Oral, Q24H  aspirin, 81 mg, Oral, Daily  atorvastatin, 10 mg, Oral, Daily  cefTRIAXone, 1,000 mg, Intravenous, Q24H  donepezil, 10 mg, Oral, Nightly  heparin (porcine), 5,000 Units, Subcutaneous, Q8H  hydrALAZINE, 25 mg, Oral, Q8H  insulin lispro, 2-7 Units, Subcutaneous, 4x Daily AC & at Bedtime  memantine, 10 mg, Oral, Daily  sodium chloride, 10 mL, Intravenous, Q12H  tamsulosin, 0.4 mg, Oral, Daily   , Continuous Infusions:  sodium chloride, 100 mL/hr, Last Rate: 100 mL/hr (08/12/24 0256)   , PRN Meds:    acetaminophen **OR** acetaminophen **OR** acetaminophen    senna-docusate sodium **AND** polyethylene glycol **AND** bisacodyl **AND** bisacodyl    dextrose    dextrose    glucagon (human recombinant)    melatonin    ondansetron ODT **OR** ondansetron    [COMPLETED] Insert Peripheral IV **AND** sodium chloride    sodium chloride    sodium chloride, and Allergies:  Patient has no known allergies.    Tobacco Use: Low Risk  (8/7/2024)    Patient History     Smoking Tobacco Use: Never     Smokeless Tobacco Use: Never     Passive  Exposure: Never        Objective     Vital Signs   Temp:  [97.9 °F (36.6 °C)-98.6 °F (37 °C)] 98.4 °F (36.9 °C)  Heart Rate:  [] 103  Resp:  [17-19] 17  BP: (129-169)/(63-83) 142/67  Body mass index is 27.36 kg/m².    Physical Exam    CONSTITUTIONAL: This 78 year old  male appears well developed, well-nourished and in no acute distress.    HEAD & FACE: the head and face are symmetric, normocephalic and atraumatic.    EYES: Inspection of the conjunctivae and lids reveals no swelling, erythema or discharge.  Pupils are round, equal and reactive to light and there is no scleral icterus.    EARS, NOSE, MOUTH & THROAT: On inspection, the ears, nose and oral cavity are within normal limits.    NECK: The neck is supple and symmetric. The trachea is midline with no masses.    PULMONARY: Respiratory effort is normal with no increased work of breathing or signs of respiratory distress.    CARDIOVASCULAR: Pedal pulses are +2/4 bilaterally. Examination of the extremities shows no edema or varicosities.    MUSCULOSKELETAL: Gait and station are not assessed. The spine has normal alignment and range of motion,    SKIN: The skin is warm, dry and intact    NEUROLOGIC:    Cranial Nerves 2 through 12 grossly intact  Normal motor strength noted without ulnar drift. Muscle bulk and tone are normal.  Sensory exam is normal to fine touch to confrontational testing bilaterally.  Superficial/Primitive Reflexes: primitive reflexes were absent.  No coordination deficit observed.  Cortical function is difficult to assess his family answers most difficult questions for him and he seemed did not recall that we had talked about certain subjects as he brought them back up later in conversation as if we had not spoke about the. Speech is fluent.    PSYCHIATRIC: Oriented to person and place only. Patient's mood and affect are normal with a slightly blunted affect.      Results Review:   I reviewed the patient's new clinical  results.    Images:    I personally reviewed the images from the following radiographic studies.    CT scan of the head done on August 4, 2024 reveals no acute intracranial hemorrhage.  There is atrophy noted and microangiopathic changes to the deep white matter.  No midline shift or mass effect.    MRI of the brain done on August 9, 2024 at [facility] reveals mildly prominent diffuse pachymeningeal thickening noted supratentorially.  There are subtle areas of lateral cerebral hemispheric subdural hygromas measuring no more than 4 to 5 mm in maximum diameter lateral to both frontal lobes.  Changes associated with possible amyloid angiopathy is also noted in the right parietal and occipital regions.    Lab Results (last 24 hours)       Procedure Component Value Units Date/Time    CBC & Differential [[id number]]  (Abnormal) Collected: 08/11/24 1001    Specimen: Blood Updated: 08/11/24 1011    Narrative:      The following orders were created for panel order CBC & Differential.  Procedure                               Abnormality         Status                     ---------                               -----------         ------                     CBC Auto Differential[[id number]]        Abnormal            Final result                 Please view results for these tests on the individual orders.    Comprehensive Metabolic Panel [[id number]]  (Abnormal) Collected: 08/11/24 1001    Specimen: Blood Updated: 08/11/24 1036     Glucose 150 mg/dL      BUN 59 mg/dL      Creatinine 5.84 mg/dL      Sodium 132 mmol/L      Potassium 4.3 mmol/L      Comment: Slight hemolysis detected by analyzer. Result may be falsely elevated.        Chloride 99 mmol/L      CO2 18.0 mmol/L      Calcium 8.3 mg/dL      Total Protein 6.1 g/dL      Albumin 2.6 g/dL      ALT (SGPT) 16 U/L      AST (SGOT) 16 U/L      Comment: Slight hemolysis detected by analyzer. Result may be falsely elevated.        Alkaline Phosphatase 108 U/L      Total  "Bilirubin 0.8 mg/dL      Globulin 3.5 gm/dL      A/G Ratio 0.7 g/dL      BUN/Creatinine Ratio 10.1     Anion Gap 15.0 mmol/L      eGFR 9.2 mL/min/1.73      Comment: <15 Indicative of kidney failure       Narrative:      GFR Normal >60  Chronic Kidney Disease <60  Kidney Failure <15    The GFR formula is only valid for adults with stable renal function between ages 18 and 70.    CBC Auto Differential [[id number]]  (Abnormal) Collected: 08/11/24 1001    Specimen: Blood Updated: 08/11/24 1011     WBC 19.87 10*3/mm3      RBC 3.92 10*6/mm3      Hemoglobin 11.8 g/dL      Hematocrit 35.4 %      MCV 90.3 fL      MCH 30.1 pg      MCHC 33.3 g/dL      RDW 12.0 %      RDW-SD 39.0 fl      MPV 8.7 fL      Platelets 279 10*3/mm3      Neutrophil % 87.0 %      Lymphocyte % 3.1 %      Monocyte % 7.8 %      Eosinophil % 0.8 %      Basophil % 0.2 %      Immature Grans % 1.1 %      Neutrophils, Absolute 17.32 10*3/mm3      Lymphocytes, Absolute 0.62 10*3/mm3      Monocytes, Absolute 1.54 10*3/mm3      Eosinophils, Absolute 0.15 10*3/mm3      Basophils, Absolute 0.03 10*3/mm3      Immature Grans, Absolute 0.21 10*3/mm3      nRBC 0.0 /100 WBC     Procalcitonin [[id number]]  (Abnormal) Collected: 08/11/24 1001    Specimen: Blood Updated: 08/11/24 1158     Procalcitonin 0.34 ng/mL     Narrative:      As a Marker for Sepsis (Non-Neonates):    1. <0.5 ng/mL represents a low risk of severe sepsis and/or septic shock.  2. >2 ng/mL represents a high risk of severe sepsis and/or septic shock.    As a Marker for Lower Respiratory Tract Infections that require antibiotic therapy:    PCT on Admission    Antibiotic Therapy       6-12 Hrs later    >0.5                Strongly Recommended  >0.25 - <0.5        Recommended   0.1 - 0.25          Discouraged              Remeasure/reassess PCT  <0.1                Strongly Discouraged     Remeasure/reassess PCT    As 28 day mortality risk marker: \"Change in Procalcitonin Result\" (>80% or <=80%) if Day 0 (or " Day 1) and Day 4 values are available. Refer to http://www.[facility]-pct-calculator.com    Change in PCT <=80%  A decrease of PCT levels below or equal to 80% defines a positive change in PCT test result representing a higher risk for 28-day all-cause mortality of patients diagnosed with severe sepsis for septic shock.    Change in PCT >80%  A decrease of PCT levels of more than 80% defines a negative change in PCT result representing a lower risk for 28-day all-cause mortality of patients diagnosed with severe sepsis or septic shock.       Lipase [[id number]]  (Abnormal) Collected: 08/11/24 1001    Specimen: Blood Updated: 08/11/24 1147     Lipase 96 U/L     Uric Acid [[id number]]  (Abnormal) Collected: 08/11/24 1001    Specimen: Blood Updated: 08/11/24 1857     Uric Acid 8.1 mg/dL     Blood Culture - Blood, Arm, Left [[id number]] Collected: 08/11/24 1313    Specimen: Blood from Arm, Left Updated: 08/11/24 1322    Blood Culture - Blood, Arm, Left [[id number]] Collected: 08/11/24 1313    Specimen: Blood from Arm, Left Updated: 08/11/24 1322    Lactic Acid, Plasma [[id number]]  (Normal) Collected: 08/11/24 1313    Specimen: Blood Updated: 08/11/24 1346     Lactate 0.8 mmol/L     POC Glucose Once [[id number]]  (Abnormal) Collected: 08/11/24 1623    Specimen: Blood Updated: 08/11/24 1625     Glucose 136 mg/dL     POC Glucose Once [[id number]]  (Abnormal) Collected: 08/11/24 2023    Specimen: Blood Updated: 08/11/24 2024     Glucose 148 mg/dL     Sodium, Urine, Random - Indwelling Urethral Catheter [[id number]] Collected: 08/12/24 0615    Specimen: Urine from Indwelling Urethral Catheter Updated: 08/12/24 0655     Sodium, Urine 145 mmol/L     Narrative:      Reference intervals for random urine have not been established.  Clinical usage is dependent upon physician's interpretation in combination with other laboratory tests.       Creatinine Urine Random (kidney function) GFR component - Indwelling Urethral Catheter [[id number]]  Collected: 08/12/24 0615    Specimen: Urine from Indwelling Urethral Catheter Updated: 08/12/24 0658     Creatinine, Urine <4.2 mg/dL     Narrative:      Reference intervals for random urine have not been established.  Clinical usage is dependent upon physician's interpretation in combination with other laboratory tests.       Urinalysis With Microscopic If Indicated (No Culture) - Indwelling Urethral Catheter [[id number]]  (Abnormal) Collected: 08/12/24 0616    Specimen: Urine from Indwelling Urethral Catheter Updated: 08/12/24 0654     Color, UA Red     Comment: Any Substance that causes an abnormal urine color can alter the accuracy of the chemical reactions.        Appearance, UA Turbid     pH, UA 7.5     Specific Gravity, UA 1.014     Glucose, UA Negative     Ketones, UA Negative     Bilirubin, UA Moderate (2+)     Blood, UA Moderate (2+)     Protein, UA >=300 mg/dL (3+)     Leuk Esterase, UA Large (3+)     Nitrite, UA Positive     Urobilinogen, UA 0.2 E.U./dL    Urinalysis, Microscopic Only - Indwelling Urethral Catheter [[id number]]  (Abnormal) Collected: 08/12/24 0616    Specimen: Urine from Indwelling Urethral Catheter Updated: 08/12/24 0741     RBC, UA Too Numerous to Count /HPF      WBC, UA       Unable to determine due to loaded field     /HPF     Bacteria, UA       Unable to determine due to loaded field     /HPF     Squamous Epithelial Cells, UA       Unable to determine due to loaded field     /HPF     Hyaline Casts, UA       Unable to determine due to loaded field     /LPF     Methodology Manual Light Microscopy    POC Glucose Once [[id number]]  (Normal) Collected: 08/12/24 0619    Specimen: Blood Updated: 08/12/24 0620     Glucose 127 mg/dL     Magnesium [[id number]]  (Abnormal) Collected: 08/12/24 0750    Specimen: Blood Updated: 08/12/24 0839     Magnesium 1.5 mg/dL     CBC (No Diff) [[id number]]  (Abnormal) Collected: 08/12/24 0750    Specimen: Blood Updated: 08/12/24 0820     WBC 18.57 10*3/mm3       RBC 3.66 10*6/mm3      Hemoglobin 11.1 g/dL      Hematocrit 33.5 %      MCV 91.5 fL      MCH 30.3 pg      MCHC 33.1 g/dL      RDW 11.9 %      RDW-SD 39.8 fl      MPV 9.0 fL      Platelets 312 10*3/mm3     Renal Function Panel [[id number]]  (Abnormal) Collected: 08/12/24 0750    Specimen: Blood Updated: 08/12/24 0843     Glucose 130 mg/dL      BUN 63 mg/dL      Creatinine 7.56 mg/dL      Sodium 131 mmol/L      Potassium 4.4 mmol/L      Chloride 99 mmol/L      CO2 14.5 mmol/L      Calcium 8.1 mg/dL      Albumin 2.4 g/dL      Phosphorus 7.1 mg/dL      Anion Gap 17.5 mmol/L      BUN/Creatinine Ratio 8.3     eGFR 6.7 mL/min/1.73      Comment: <15 Indicative of kidney failure       Narrative:      GFR Normal >60  Chronic Kidney Disease <60  Kidney Failure <15    The GFR formula is only valid for adults with stable renal function between ages 18 and 70.            Assessment & Plan       Acute renal failure    Obstructive uropathy    Bilateral hydronephrosis    Type 2 diabetes mellitus with hyperglycemia    Essential hypertension, benign    Dementia without behavioral disturbance      The MRI reveals subtle findings that are not evident on the CT scan of the head and therefore would not be considered surgically significant.  Likely the changes are the result of the patient's atrophy and compensatory extra-axial space associated with that atrophy.  There is no evidence of acute hemorrhage on CT scan of the head and therefore no role for acute neurosurgical intervention or additional imaging at this time.  Due to the findings on the MRI it is reasonable to repeat a CT scan of the head in 1 month to make sure he does not develop any progressive changes related to the dural thickening and extra-axial fluid spaces.    Patient and his family has a desire to pursue a comprehensive dementia evaluation and his family is in contact with Dr. Nghia Butterfield, who is a family friend, to set them up with possible evaluation in the [place]  area or possibly even [place] once the patient is medically stable to travel.    No role for any acute neurosurgical intervention and the findings seen on MRI are likely incidental and asymptomatic in nature.  We will be happy to follow him from the neurosurgical perspective in the office in 1 month with a repeat CT scan of the head without contrast.    I discussed the patient's findings and my recommendations with patient and family    Dino Henry MD  24  09:51 EDT    Electronically signed by Dino Henry MD at 24 1005       Jorge Javed MD at 24 1811            [facility] Consult Note      Patient Name: Froy Ngo  : 1945  MRN: [id number]  Primary Care Physician:  Ryan Gutierrez MD  Referring Physician: No ref. provider found  Date of admission: 2024    Subjective     Reason for Consult:  NEHEMIAS vs NEHEMIAS/CKD    HPI:   Froy Ngo is a 78 y.o. male with unclear baseline SCR, recent severe postobstructive NEHEMIAS just earlier this month (peak SCR 17.2 on ; SCR was 2.2 upon discharge ) with severe bladder distention and bilateral hydronephrosis attributed to severe BPH, dementia, DM2, and hypertension, readmitted from nursing home earlier this morning for lack of urine output.  Apparently blood clots were seen in his catheter tubing at the nursing home.  CT scan this afternoon revealed bilateral hydronephrosis and moderately severe prostate enlargement.  In ER earlier today, Rodriguez catheter replaced, and reportedly no significant urine output resulted.  Family describes brief bladder irrigation in the ER, which was then stopped.  Continues not to have any urine in catheter bag.    Denies any current abdominal pain or distention  No fever  No shortness of breath on room air; no chest pain  Appetite fair, though family indicates his appetite has been poor    Review of Systems:   14 point review of systems is  otherwise negative except for mentioned above on HPI    Personal History     Past Medical History:   Diagnosis Date    Dementia     Diabetes mellitus     Elevated cholesterol     Prostate disorder     Urinary retention        No past surgical history on file.    Family History: family history is not on file.    Social History:  reports that he has never smoked. He has never been exposed to tobacco smoke. He has never used smokeless tobacco. He reports that he does not drink alcohol and does not use drugs.    Home Medications:  Prior to Admission medications    Medication Sig Start Date End Date Taking? Authorizing Provider   amLODIPine (NORVASC) 5 MG tablet Take 1 tablet by mouth Daily. 8/10/24  Yes Gurvinder Farrell MD   aspirin 81 MG EC tablet Take 1 tablet by mouth Daily.   Yes Araseli Price MD   atorvastatin (LIPITOR) 10 MG tablet Take 1 tablet by mouth Daily.   Yes Araseli Price MD   donepezil (ARICEPT) 10 MG tablet Take 1 tablet by mouth Every Night. 10/4/23 10/3/24 Yes Araseli Price MD   hydrALAZINE (APRESOLINE) 25 MG tablet Take 1 tablet by mouth Every 8 (Eight) Hours. 8/9/24  Yes Gurvinder Farrell MD   memantine (NAMENDA) 10 MG tablet Take 1 tablet by mouth Daily. 11/6/23 11/5/24 Yes Araseli Price MD   metFORMIN (GLUCOPHAGE) 1000 MG tablet Take 1 tablet by mouth 2 (Two) Times a Day With Meals. 1.5 tablets in AM; 1 tablet in PM 8/16/24  Yes Gurvinder Farrell MD   Semaglutide,0.25 or 0.5MG/DOS, (OZEMPIC) 2 MG/1.5ML solution pen-injector Inject 0.25 mg under the skin into the appropriate area as directed 1 (One) Time Per Week.   Yes Araseli Price MD   silodosin (RAPAFLO) 4 MG capsule capsule Take 2 capsules by mouth Daily With Breakfast.   Yes Araseli Price MD   tamsulosin (FLOMAX) 0.4 MG capsule 24 hr capsule Take 1 capsule by mouth Daily. 8/10/24  Yes Gurvinder Farrell MD       Allergies:  No Known Allergies    Objective     Vitals:   Temp:  [97.2 °F (36.2 °C)-97.9 °F (36.6  °C)] 97.9 °F (36.6 °C)  Heart Rate:  [] 85  Resp:  [16-19] 19  BP: (139-169)/(69-83) 169/83    Intake/Output Summary (Last 24 hours) at 8/11/2024 1811  Last data filed at 8/11/2024 1319  Gross per 24 hour   Intake 240 ml   Output 525 ml   Net -285 ml       Physical Exam:   Constitutional: Awake, alert, NAD, chronically ill  HEENT: Sclera anicteric, no conjunctival injection  Neck: Supple, no carotid bruit, trachea at midline, no JVD  Respiratory: Clear to auscultation bilaterally, nonlabored on RA  Cardiovascular: RRR, no rub  Gastrointestinal: BS +, soft, nontender and nondistended  : No palpable bladder; Rodriguez catheter anchored  Musculoskeletal: No significant edema, no clubbing or cyanosis  Psychiatric: Appropriate affect, cooperative  Neurologic: No asterixis, moving all extremities, normal speech   Skin: Warm and dry       Scheduled Meds:     amLODIPine, 5 mg, Oral, Q24H  aspirin, 81 mg, Oral, Daily  atorvastatin, 10 mg, Oral, Daily  [START ON 8/12/2024] cefTRIAXone, 1,000 mg, Intravenous, Q24H  donepezil, 10 mg, Oral, Nightly  heparin (porcine), 5,000 Units, Subcutaneous, Q8H  hydrALAZINE, 25 mg, Oral, Q8H  insulin lispro, 2-7 Units, Subcutaneous, 4x Daily AC & at Bedtime  memantine, 10 mg, Oral, Daily  sodium chloride, 10 mL, Intravenous, Q12H  tamsulosin, 0.4 mg, Oral, Daily      IV Meds:   sodium chloride, 75 mL/hr, Last Rate: 75 mL/hr (08/11/24 1651)        Results Reviewed:   I have personally reviewed the results from the time of this admission to 8/11/2024 18:11 EDT     Lab Results   Component Value Date    GLUCOSE 150 (H) 08/11/2024    CALCIUM 8.3 (L) 08/11/2024     (L) 08/11/2024    K 4.3 08/11/2024    CO2 18.0 (L) 08/11/2024    CL 99 08/11/2024    BUN 59 (H) 08/11/2024    CREATININE 5.84 (H) 08/11/2024    BCR 10.1 08/11/2024    ANIONGAP 15.0 08/11/2024      Lab Results   Component Value Date    MG 1.6 08/07/2024    PHOS 3.5 08/09/2024    ALBUMIN 2.6 (L) 08/11/2024           Assessment /  Plan       Acute renal failure    Obstructive uropathy    Bilateral hydronephrosis    Type 2 diabetes mellitus with hyperglycemia    Essential hypertension, benign    Dementia without behavioral disturbance      ASSESSMENT:  1.  NEHEMIAS versus NEHEMIAS/CKD:  baseline SCR not known.  Severe obstructive uropathy earlier this month with substantial though not complete recovery (SCR 2.2 upon discharge 8/9).  Wonder if today's CT showing bilateral hydronephrosis is simply a chronic finding, rather than indicating any acute finding.  Current NEHEMIAS may actually represent just prerenal azotemia from lackluster appetite since leaving the hospital.  Looks dry; likely NAGMA.  No urine studies yet  2.  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  Alzheimer's dementia  4.  Anemia  5.  Hypertension  6.  Leukocytosis  7.  DM2:  apparently was receiving metformin at the nursing home    PLAN:  1.  Normal saline at 100 mL/h  2.  FENa; serum uric acid  3.  Urology evaluation pending  4.  Discussed with family at bedside    Thank you for involving us in the care of Froy Ngo.  Please feel free to call with any questions.    Jorge Javed MD  08/11/24  18:11 EDT    [facility]  [phone]      Please note that portions of this note were completed with a voice recognition program.    Electronically signed by Jorge Javed MD at 08/11/24 1101

## 2024-08-12 NOTE — DISCHARGE PLACEMENT REQUEST
"Dalton Ngo (78 y.o. Male)       Date of Birth   1945    Social Security Number       Address   [address] [place] [address]    Home Phone   [phone]    MRN   [id number]       Taoist   Unknown    Marital Status   Single                            Admission Date   8/11/24    Admission Type   Emergency    Admitting Provider   Shruthi Acosta MD    Attending Provider   Shruthi Acosta MD    Department, Room/Bed   [address], [address]/1       Discharge Date       Discharge Disposition       Discharge Destination                                 Attending Provider: Shruthi Acosta MD    Allergies: No Known Allergies    Isolation: None   Infection: None   Code Status: CPR    Ht: 182.9 cm (72\")   Wt: 91.5 kg (201 lb 11.5 oz)    Admission Cmt: None   Principal Problem: Acute renal failure [N17.9]                   Active Insurance as of 8/11/2024       Primary Coverage       Payor Plan Insurance Group Employer/Plan Group    MEDICARE MEDICARE A ONLY        Payor Plan Address Payor Plan Phone Number Payor Plan Fax Number Effective Dates    [address] [phone]  6/1/2009 - None Entered    [facility] [id number]         Subscriber Name Subscriber Birth Date Member ID       DALTON NGO 1945 [id number]               Secondary Coverage       Payor Plan Insurance Group Employer/Plan Group    [employer]HEM BLUE CROSS ANTHEM BLUE CROSS BLUE SHIELD PPO [id number]       Payor Plan Address Payor Plan Phone Number Payor Plan Fax Number Effective Dates    [address] [phone]  4/1/2019 - None Entered    [facility] [id number]         Subscriber Name Subscriber Birth Date Member ID       DALTON NGO 1945 [id number]                     Emergency Contacts        (Rel.) Home Phone Work Phone Mobile Phone    VASU NGO (Spouse) [phone] -- [phone]    [email or web address] (Daughter) [phone] -- --                "

## 2024-08-12 NOTE — CONSULTS
FIRST UROLOGY CONSULT      Patient Identification:  NAME:  Froy Ngo  Age:  78 y.o.   Sex:  male   :  1945   MRN:  [id number]       Chief complaint: Generalized weakness and malaise    History of present illness: 78-year-old patient with a history of BPH and elevated PSA.  I did seen and probably for 5 years.  He has missed a lot of follow-up visits to follow his PSA.  He had previous MRIs of his prostate and previous prostate biopsies due to his significant elevation of his PSA almost 20.  All of his MRIs have shown an enlarged gland and previous ultrasounds have measured this out to be about 130 cm³.  He was in the hospital last week with renal failure.  A catheter was placed.  My partner Dr. Lester Seymour saw him in consultation then the plan was to send him home with a Rodriguez for follow-up in the office for voiding trial later with a follow-up renal ultrasound.  He comes back in has been readmitted for generalized malaise.  He is a poor urine output and has had some bloody discharge.  CT scan shows mild to moderate bilateral hydronephrosis and his catheter appears to be in good position.  It is a Gaetano three-way catheter and there is not a significant amount of clots that I see on the scan in the lumen of his bladder.  His had some decline in his mental status here recently and did not recognize me or our prior history.  A lot of the information was obtained from his wife and and another family member.  He had a daughter on the phone who lives in [place] as well.      Past medical history:  Past Medical History:   Diagnosis Date    Dementia     Diabetes mellitus     Elevated cholesterol     Prostate disorder     Urinary retention        Past surgical history:  History reviewed. No pertinent surgical history.    Allergies:  Patient has no known allergies.    Home medications:  Medications Prior to Admission   Medication Sig Dispense Refill Last Dose    amLODIPine (NORVASC) 5 MG tablet Take  1 tablet by mouth Daily. 30 tablet 0     aspirin 81 MG EC tablet Take 1 tablet by mouth Daily.       atorvastatin (LIPITOR) 10 MG tablet Take 1 tablet by mouth Daily.       donepezil (ARICEPT) 10 MG tablet Take 1 tablet by mouth Every Night.       hydrALAZINE (APRESOLINE) 25 MG tablet Take 1 tablet by mouth Every 8 (Eight) Hours. 90 tablet 0     memantine (NAMENDA) 10 MG tablet Take 1 tablet by mouth Daily.       [START ON 8/16/2024] metFORMIN (GLUCOPHAGE) 1000 MG tablet Take 1 tablet by mouth 2 (Two) Times a Day With Meals. 1.5 tablets in AM; 1 tablet in PM 90 tablet 0     Semaglutide,0.25 or 0.5MG/DOS, (OZEMPIC) 2 MG/1.5ML solution pen-injector Inject 0.25 mg under the skin into the appropriate area as directed 1 (One) Time Per Week.       silodosin (RAPAFLO) 4 MG capsule capsule Take 2 capsules by mouth Daily With Breakfast.       tamsulosin (FLOMAX) 0.4 MG capsule 24 hr capsule Take 1 capsule by mouth Daily. 30 capsule 0         Hospital medications:  amLODIPine, 5 mg, Oral, Q24H  aspirin, 81 mg, Oral, Daily  atorvastatin, 10 mg, Oral, Daily  cefTRIAXone, 1,000 mg, Intravenous, Q24H  donepezil, 10 mg, Oral, Nightly  hydrALAZINE, 25 mg, Oral, Q8H  insulin lispro, 2-7 Units, Subcutaneous, 4x Daily AC & at Bedtime  memantine, 10 mg, Oral, Daily  sodium chloride, 10 mL, Intravenous, Q12H  tamsulosin, 0.4 mg, Oral, Daily      sodium chloride, 100 mL/hr, Last Rate: 100 mL/hr (08/12/24 1228)        acetaminophen **OR** acetaminophen **OR** acetaminophen    senna-docusate sodium **AND** polyethylene glycol **AND** bisacodyl **AND** bisacodyl    dextrose    dextrose    glucagon (human recombinant)    melatonin    ondansetron ODT **OR** ondansetron    [COMPLETED] Insert Peripheral IV **AND** sodium chloride    sodium chloride    sodium chloride    Family history:  History reviewed. No pertinent family history.    Social history:  Social History     Tobacco Use    Smoking status: Never     Passive exposure: Never     Smokeless tobacco: Never   Vaping Use    Vaping status: Never Used   Substance Use Topics    Alcohol use: Never    Drug use: Never       Review of systems:    Negative 12-system ROS except for the following: As above poor historian      Objective:  TMax 24 hours:   Temp (24hrs), Av.3 °F (36.8 °C), Min:97.9 °F (36.6 °C), Max:98.6 °F (37 °C)      Vitals Ranges:   Temp:  [97.9 °F (36.6 °C)-98.6 °F (37 °C)] 98.4 °F (36.9 °C)  Heart Rate:  [] 103  Resp:  [17-19] 17  BP: (129-169)/(63-83) 142/67    Intake/Output Last 3 shifts:  I/O last 3 completed shifts:  In: 240 [Other:240]  Out: 525 [Urine:525]     Physical Exam:       General Appearance:    Alert, cooperative, in no acute distress   Head:    Normocephalic, without obvious abnormality, atraumatic   Eyes:          PERRL, conjunctivae and corneas clear   Ears:    Normal external inspection   Throat:   No oral lesions, oral mucosa moist   Neck:   Supple, no LAD, trachea midline   Back:     No CVA tenderness   Lungs:     Respirations unlabored, symmetric excursion    Heart:    RRR, intact peripheral pulses   Abdomen:   Soft benign no bladder distention   :  Gaetano three-way catheter anchored   LOLA:  Extremities:   .    No edema, no deformity   Skin:   No bleeding, bruising or rashes   Neuro/Psych:   Orientation intact, mood/affect pleasant, no focal findings       Results review:   I reviewed the patient's prior history and old records to the best of my ability.   I have personally reviewed all pertinent imaging myself and their accompanying reports.   I have reviewed all labs.    Data review:  Lab Results (last 24 hours)       Procedure Component Value Units Date/Time    POC Glucose Once [[id number]]  (Abnormal) Collected: 24 1051    Specimen: Blood Updated: 24 1052     Glucose 168 mg/dL     Renal Function Panel [[id number]]  (Abnormal) Collected: 24 0750    Specimen: Blood Updated: 24 0843     Glucose 130 mg/dL      BUN 63 mg/dL       Creatinine 7.56 mg/dL      Sodium 131 mmol/L      Potassium 4.4 mmol/L      Chloride 99 mmol/L      CO2 14.5 mmol/L      Calcium 8.1 mg/dL      Albumin 2.4 g/dL      Phosphorus 7.1 mg/dL      Anion Gap 17.5 mmol/L      BUN/Creatinine Ratio 8.3     eGFR 6.7 mL/min/1.73      Comment: <15 Indicative of kidney failure       Narrative:      GFR Normal >60  Chronic Kidney Disease <60  Kidney Failure <15    The GFR formula is only valid for adults with stable renal function between ages 18 and 70.    Magnesium [[id number]]  (Abnormal) Collected: 08/12/24 0750    Specimen: Blood Updated: 08/12/24 0839     Magnesium 1.5 mg/dL     CBC (No Diff) [[id number]]  (Abnormal) Collected: 08/12/24 0750    Specimen: Blood Updated: 08/12/24 0820     WBC 18.57 10*3/mm3      RBC 3.66 10*6/mm3      Hemoglobin 11.1 g/dL      Hematocrit 33.5 %      MCV 91.5 fL      MCH 30.3 pg      MCHC 33.1 g/dL      RDW 11.9 %      RDW-SD 39.8 fl      MPV 9.0 fL      Platelets 312 10*3/mm3     Urinalysis, Microscopic Only - Indwelling Urethral Catheter [[id number]]  (Abnormal) Collected: 08/12/24 0616    Specimen: Urine from Indwelling Urethral Catheter Updated: 08/12/24 0741     RBC, UA Too Numerous to Count /HPF      WBC, UA       Unable to determine due to loaded field     /HPF     Bacteria, UA       Unable to determine due to loaded field     /HPF     Squamous Epithelial Cells, UA       Unable to determine due to loaded field     /HPF     Hyaline Casts, UA       Unable to determine due to loaded field     /LPF     Methodology Manual Light Microscopy    Creatinine Urine Random (kidney function) GFR component - Indwelling Urethral Catheter [[id number]] Collected: 08/12/24 0615    Specimen: Urine from Indwelling Urethral Catheter Updated: 08/12/24 0658     Creatinine, Urine <4.2 mg/dL     Narrative:      Reference intervals for random urine have not been established.  Clinical usage is dependent upon physician's interpretation in combination with other  laboratory tests.       Sodium, Urine, Random - Indwelling Urethral Catheter [[id number]] Collected: 08/12/24 0615    Specimen: Urine from Indwelling Urethral Catheter Updated: 08/12/24 0655     Sodium, Urine 145 mmol/L     Narrative:      Reference intervals for random urine have not been established.  Clinical usage is dependent upon physician's interpretation in combination with other laboratory tests.       Urinalysis With Microscopic If Indicated (No Culture) - Indwelling Urethral Catheter [[id number]]  (Abnormal) Collected: 08/12/24 0616    Specimen: Urine from Indwelling Urethral Catheter Updated: 08/12/24 0654     Color, UA Red     Comment: Any Substance that causes an abnormal urine color can alter the accuracy of the chemical reactions.        Appearance, UA Turbid     pH, UA 7.5     Specific Gravity, UA 1.014     Glucose, UA Negative     Ketones, UA Negative     Bilirubin, UA Moderate (2+)     Blood, UA Moderate (2+)     Protein, UA >=300 mg/dL (3+)     Leuk Esterase, UA Large (3+)     Nitrite, UA Positive     Urobilinogen, UA 0.2 E.U./dL    POC Glucose Once [[id number]]  (Normal) Collected: 08/12/24 0619    Specimen: Blood Updated: 08/12/24 0620     Glucose 127 mg/dL     POC Glucose Once [[id number]]  (Abnormal) Collected: 08/11/24 2023    Specimen: Blood Updated: 08/11/24 2024     Glucose 148 mg/dL     Uric Acid [[id number]]  (Abnormal) Collected: 08/11/24 1001    Specimen: Blood Updated: 08/11/24 1857     Uric Acid 8.1 mg/dL     POC Glucose Once [[id number]]  (Abnormal) Collected: 08/11/24 1623    Specimen: Blood Updated: 08/11/24 1625     Glucose 136 mg/dL     Lactic Acid, Plasma [[id number]]  (Normal) Collected: 08/11/24 1313    Specimen: Blood Updated: 08/11/24 1346     Lactate 0.8 mmol/L     Blood Culture - Blood, Arm, Left [[id number]] Collected: 08/11/24 1313    Specimen: Blood from Arm, Left Updated: 08/11/24 1322    Blood Culture - Blood, Arm, Left [[id number]] Collected: 08/11/24 1313    Specimen:  Blood from Arm, Left Updated: 08/11/24 1322             Imaging:  Imaging Results (Last 24 Hours)       Procedure Component Value Units Date/Time    CT Abdomen Pelvis Without Contrast [[id number]] Collected: 08/11/24 1321     Updated: 08/11/24 2239    Narrative:      CT OF THE ABDOMEN AND PELVIS WITHOUT CONTRAST 08/11/2024     HISTORY: Abdominal pain. Diminished urinary output.     Spiral images were obtained from the lung bases to the symphysis pubis.  No intravenous or oral contrast was given.     Small left and very minimal right pleural effusions are seen with some  minimal bibasilar atelectasis.     Liver appears unremarkable. There are multiple gallstones. No  gallbladder inflammatory changes are seen. Pancreas is atrophic. Spleen  and adrenals appear unremarkable.     There is mild-to-moderate bilateral hydronephrosis with mild ureteral  dilatation. This finding is also seen on the 08/04/2024 study. There is  bilateral perinephric stranding. There is bilateral periureteral  stranding as well. There is an approximately 4.1 cm low-density right  renal lesion presumably a cyst on this unenhanced scan. No intrarenal  stones or ureteral stones are seen.     There is some aortic calcification. Rodriguez catheter is seen in the  urinary bladder. There is mild urinary bladder wall thickening.  Moderately severe prostate gland enlargement is seen. Small left  inguinal hernia seen containing small amount of fluid. There is colonic  diverticulosis.       Impression:      1. Bilateral hydroureteronephrosis. There is also bilateral perinephric  and periureteral stranding. This finding is also seen on the previous  study of 08/04/2024.  2. Low-density right renal cyst.  3. Small pleural effusions with bibasilar atelectasis.  4. Cholelithiasis.  5. Rodriguez catheter is seen in the urinary bladder as well as some air.  There is urinary bladder wall thickening with moderately severe prostate  gland enlargement.  6. No ureteral stones  are seen.     Radiation dose reduction techniques were utilized, including automated  exposure control and exposure modulation based on body size.        This report was finalized on 8/11/2024 10:36 PM by Dr. Rajiv Canela M.D on Workstation: [id number]                  Assessment:       Acute renal failure    Obstructive uropathy    Bilateral hydronephrosis    Type 2 diabetes mellitus with hyperglycemia    Essential hypertension, benign    Dementia without behavioral disturbance    Chronic urinary retention with bilateral hydronephrosis now readmitted with worsening renal function.  Severe BPH with chronically elevated PSA.      Plan:     Will start his irrigation slowly today.  N.p.o. after midnight.  He may require stents tomorrow if we do not see an improvement of his creatinine.  He did improve with Rodriguez placement last week.    Antoni Melendez MD  08/12/24  12:45 EDT

## 2024-08-12 NOTE — PLAN OF CARE
Goal Outcome Evaluation:      Pt Aox2, RA. No acute changes during shift. Family at bedside. F/C in place, drainage bag below height of bladder. Bladder irrigation stopped on dayshift according to order parameters. NS @ 100 infusing. Very low UOP this shift. Scant amount of urine in drainage bag noted to be pink with red streaks. F/C needed flushed to allow for more drainage. Plan of care ongoing.

## 2024-08-12 NOTE — PAYOR COMM NOTE
"Froy Turner (78 y.o. Male)                        ATTENTION; DISCHARGE CASE REF [id number]                      FAX DAYS APPROVED TO [facility] DE[facility]  [phone] OR CALL   AVIS JOHNSONBetsy CERDA           Date of Birth   1945    Social Security Number       Address   [address] [address]    Home Phone   [phone]    MRN   [id number]       Shinto   Unknown    Marital Status   Single                            Admission Date   8/4/24    Admission Type   Emergency    Admitting Provider   Tio Delcid MD    Attending Provider       Department, Room/Bed   [address], [address]/1       Discharge Date   8/9/2024    Discharge Disposition   Skilled Nursing Facility (DC - External)    Discharge Destination                                 Attending Provider: (none)   Allergies: No Known Allergies    Isolation: None   Infection: None   Code Status: CPR    Ht: 182.9 cm (72\")   Wt: 87.9 kg (193 lb 12.6 oz)    Admission Cmt: None   Principal Problem: DKA (diabetic ketoacidosis) [E11.10]                   Active Insurance as of 8/4/2024       Primary Coverage       Payor Plan Insurance Group Employer/Plan Group    MEDICARE MEDICARE A ONLY        Payor Plan Address Payor Plan Phone Number Payor Plan Fax Number Effective Dates    [address] [phone]  6/1/2009 - None Entered    [facility] [id number]         Subscriber Name Subscriber Birth Date Member ID       FROY TURNER 1945 [id number]               Secondary Coverage       Payor Plan Insurance Group Employer/Plan Group    ANTHEM BLUE CROSS ANTHEM BLUE CROSS BLUE SHIELD PPO [id number]       Payor Plan Address Payor Plan Phone Number Payor Plan Fax Number Effective Dates    [address] [phone]  4/1/2019 - None Entered    [facility] [id number]         Subscriber Name Subscriber Birth Date Member ID       FROY TURNER 1945 [id number]                     Emergency Contacts       "  (Rel.) Home Phone Work Phone Mobile Phone    VASU TURNER (Spouse) [phone] -- [phone]    vasu braswell (Daughter) [phone] -- --                 Discharge Summary        Gurvinder Farrell MD at 24 1105              Patient Name: Froy Turner  : 1945  MRN: [id number]    Date of Admission: 2024  Date of Discharge:  2024  Primary Care Physician: Ryan Gutierrez MD      Chief Complaint:   Altered Mental Status      Discharge Diagnoses     Active Hospital Problems    Diagnosis  POA    **DKA (diabetic ketoacidosis) [E11.10]  Yes    Obstructive uropathy [N13.9]  Unknown    Bilateral hydronephrosis [N13.30]  Unknown    Hyperkalemia [E87.5]  Unknown      Resolved Hospital Problems   No resolved problems to display.        Hospital Course     This is an 78-year-old man with a past medical history of type 2 diabetes, hyperlipidemia, Alzheimer's dementia who presented to hospital with generalized weakness and difficulty walking. he was noted to have a beta-hydroxybutyrate level of 1.5, pH of 7.25, PaCO2 of 18. Potassium was 7, sodium was 127. He was admitted to the ICU for insulin drip and fluid resuscitation. He was noted to have a very distended bladder on imaging. Urology was consulted, it was noted that he had an atonic bladder and a hagen catheter was inserted.    He will not be discharged on any insulin as his A1c is only 7.9.     For further workup of Alzheimer's dementia, an MRI was requested.  After reviewing with nephrology, as his renal recovery was occurring at a high rate, it was deemed that he would be able to tolerate gadolinium without any concern.  The MRI was ordered prior to discharge.    His hemoglobin did decrease after initial fluid resuscitation but has remained stable thereafter.  He denies any GI bleeding or gross blood loss. CBC should be monitored in the near future.    He should follow-up with his primary care provider in  approximately 1 week after discharge for further evaluation of chemistries.          Physical Exam:  Temp:  [98.1 °F (36.7 °C)-98.4 °F (36.9 °C)] 98.1 °F (36.7 °C)  Heart Rate:  [73-86] 78  Resp:  [18] 18  BP: (147-175)/(66-82) 147/68  Body mass index is 26.28 kg/m².  Physical Exam  Constitutional:       General: He is not in acute distress.  HENT:      Head: Normocephalic and atraumatic.   Cardiovascular:      Rate and Rhythm: Normal rate and regular rhythm.   Pulmonary:      Effort: Pulmonary effort is normal. No respiratory distress.   Abdominal:      General: There is no distension.      Palpations: Abdomen is soft.      Tenderness: There is no abdominal tenderness.   Neurological:      Mental Status: He is alert. Mental status is at baseline.      Motor: Weakness present.         Consultants     Consult Orders (all) (From admission, onward)       Start     Ordered    08/07/24 0736  Inpatient Internal Medicine Consult  Once        Specialty:  Internal Medicine  Provider:  Justo Lewis DO    08/07/24 0735    08/07/24 0733  Inpatient Case Management  Consult  Once        Provider:  (Not yet assigned)    08/07/24 0732    08/05/24 0702  Inpatient Urology Consult  IN AM        Specialty:  Urology  Provider:  Duane Hubbard MD    08/04/24 2028 08/04/24 1919  Pulmonology (on-call MD unless specified)  Once        Specialty:  Pulmonary Disease  Provider:  (Not yet assigned)    08/04/24 1918 08/04/24 1919  Nephrology (on -call MD unless specified)  Once        Specialty:  Nephrology  Provider:  (Not yet assigned)    08/04/24 1918 08/04/24 1918  Inpatient Diabetes Educator Consult  Once,   Status:  Canceled        Provider:  (Not yet assigned)    08/04/24 1918                  Procedures     Imaging Results (All)       Procedure Component Value Units Date/Time    XR Chest 1 View [[id number]] Collected: 08/04/24 2059     Updated: 08/04/24 2102    Narrative:      SINGLE VIEW OF THE CHEST      HISTORY: Altered mental status     COMPARISON: None available.     FINDINGS:  Heart size is within normal limits, given technique. There is  calcification of the aorta. No pneumothorax, pleural effusion, or acute  infiltrate is seen.       Impression:      No acute findings.     This report was finalized on 8/4/2024 8:59 PM by Dr. Amy Hernandez M.D on Workstation: [id number]       CT Abdomen Pelvis Without Contrast [[id number]] Collected: 08/04/24 2000     Updated: 08/04/24 2009    Narrative:      CT OF THE AND PELVIS WITHOUT CONTRAST     HISTORY: Altered mental status     COMPARISON: None available.     TECHNIQUE: Axial CT imaging was obtained through the abdomen and pelvis.  No IV contrast was administered.     FINDINGS:  Images are degraded by artifact from patient's arms. There is some  minimal dependent atelectasis. There is a trace right pleural effusion  and small left pleural effusion. No suspicious hepatic lesions are seen.  There is cholelithiasis. Pancreas is atrophic. Adrenal glands are  normal. There is borderline enlargement of the spleen, which measures up  to 13.7 cm in AP dimensions. Duodenum appears normal. The patient has  bilateral hydroureteronephrosis. No obstructing stone is seen. Urinary  bladder is distended, which may reflect urinary retention, especially  given the presence of a very enlarged prostate gland. There is extensive  perinephric and periureteral stranding. Appearance is concerning for  ascending urinary tract infection. There are aortoiliac calcifications.  Low-attenuation lesion within the inferior pole of the right kidney is  favored to be a cyst. Trace fluid tracks along the paracolic gutters,  particularly on the right. There is colonic diverticulosis. The appendix  is normal. There is no bowel obstruction. No acute osseous abnormalities  are seen. There is some mild body wall edema.          Impression:         1. The patient has moderate bilateral  hydroureteronephrosis. This is  favored to be secondary to urinary retention, given marked distention of  the urinary bladder, and the patient's enlarged prostate gland. Patient  may benefit from catheterization. There is extensive bilateral  perinephric and periureteral stranding, and findings are favored to  reflect ascending urinary tract infection.  Radiation dose reduction techniques were utilized, including automated  exposure control and exposure modulation based on body size.        This report was finalized on 8/4/2024 8:06 PM by Dr. Amy Hernandez M.D on Workstation: [id number]       CT Head Without Contrast [[id number]] Collected: 08/04/24 1957     Updated: 08/04/24 2003    Narrative:      CT OF THE HEAD WITHOUT CONTRAST     HISTORY: Inability to eat and altered mental status     COMPARISON: None available.     TECHNIQUE: Axial CT imaging was obtained through the brain. No IV  contrast was administered.     FINDINGS:  No acute intracranial hemorrhage is seen. There is atrophy. There is  periventricular and deep white matter microangiopathic change. There is  no midline shift or mass effect. Images suggest prior sinus surgery.  Mastoid air cells are clear.       Impression:      No acute intracranial findings.     Radiation dose reduction techniques were utilized, including automated  exposure control and exposure modulation based on body size.        This report was finalized on 8/4/2024 7:59 PM by Dr. Amy Hernandez M.D on Workstation: [id number]               Pertinent Labs     Results from last 7 days   Lab Units 08/07/24  0227 08/06/24  0444 08/05/24  0359 08/04/24  1814   WBC 10*3/mm3 8.17 9.00 12.08* 18.10*   HEMOGLOBIN g/dL 10.7* 10.6* 12.0* 15.0   PLATELETS 10*3/mm3 268 246 252 297     Results from last 7 days   Lab Units 08/09/24  0325 08/08/24  0313 08/07/24  0227 08/06/24  0809   SODIUM mmol/L 135* 140 138 131*   POTASSIUM mmol/L 3.7 3.8 3.7 3.6   CHLORIDE mmol/L 105 109* 106 103  "  CO2 mmol/L 21.3* 22.1 20.7* 19.1*   BUN mg/dL 37* 49* 73* 110*   CREATININE mg/dL 2.22* 2.45* 3.84* 5.84*   GLUCOSE mg/dL 126* 147* 90 337*   Estimated Creatinine Clearance: 33 mL/min (A) (by C-G formula based on SCr of 2.22 mg/dL (H)).  Results from last 7 days   Lab Units 08/09/24 0325 08/08/24 0313 08/07/24 0227 08/04/24  1814   ALBUMIN g/dL 2.5* 2.4* 2.3* 3.4*   BILIRUBIN mg/dL  --   --   --  0.9   ALK PHOS U/L  --   --   --  155*   AST (SGOT) U/L  --   --   --  19   ALT (SGPT) U/L  --   --   --  27     Results from last 7 days   Lab Units 08/09/24 0325 08/08/24 0313 08/07/24 0227 08/06/24  0809 08/06/24  0444 08/05/24  1027 08/05/24 0359 08/04/24 2335 08/04/24 1958 08/04/24  1814   CALCIUM mg/dL 7.9* 8.0* 7.8* 6.9* 7.6*   < > 8.0*   < > 8.2* 8.8   ALBUMIN g/dL 2.5* 2.4* 2.3*  --   --   --   --   --   --  3.4*   MAGNESIUM mg/dL  --   --  1.6  --  2.1  --  2.4  --  2.5* 2.7*   PHOSPHORUS mg/dL 3.5 3.6 4.1  --   --   --   --   --  9.2* 9.9*    < > = values in this interval not displayed.       Results from last 7 days   Lab Units 08/04/24  1814   HSTROP T ng/L 44*           Invalid input(s): \"LDLCALC\"  Results from last 7 days   Lab Units 08/04/24 2335 08/04/24 1951   BLOODCX  No growth at 4 days  --    URINECX   --  No growth       Test Results Pending at Discharge     Pending Labs       Order Current Status    Blood Culture - Blood, Arm, Left Preliminary result            Discharge Details        Discharge Medications        New Medications        Instructions Start Date   amLODIPine 5 MG tablet  Commonly known as: NORVASC   5 mg, Oral, Every 24 Hours Scheduled   Start Date: August 10, 2024     hydrALAZINE 25 MG tablet  Commonly known as: APRESOLINE   25 mg, Oral, Every 8 Hours Scheduled      tamsulosin 0.4 MG capsule 24 hr capsule  Commonly known as: FLOMAX   0.4 mg, Oral, Daily   Start Date: August 10, 2024            Changes to Medications        Instructions Start Date   metFORMIN 1000 MG " tablet  Commonly known as: GLUCOPHAGE  What changed: These instructions start on August 16, 2024. If you are unsure what to do until then, ask your doctor or other care provider.   1,000 mg, Oral, 2 Times Daily With Meals, 1.5 tablets in AM; 1 tablet in PM   Start Date: August 16, 2024            Continue These Medications        Instructions Start Date   aspirin 81 MG EC tablet   81 mg, Oral, Daily      atorvastatin 10 MG tablet  Commonly known as: LIPITOR   10 mg, Oral, Daily      donepezil 10 MG tablet  Commonly known as: ARICEPT   10 mg, Oral, Nightly      memantine 10 MG tablet  Commonly known as: NAMENDA   10 mg, Oral, Daily      Semaglutide(0.25 or 0.5MG/DOS) 2 MG/1.5ML solution pen-injector  Commonly known as: OZEMPIC   0.25 mg, Subcutaneous, Weekly             Stop These Medications      FARXIGA PO     silodosin 8 MG capsule capsule  Commonly known as: RAPAFLO              No Known Allergies      Discharge Disposition: SNF  Skilled Nursing Facility (DC - External)    Discharge Diet:  Diet Order   Procedures    Diet: Diabetic; Consistent Carbohydrate; Fluid Consistency: Thin (IDDSI 0)       Discharge Activity: as tolerated       CODE STATUS:    Code Status and Medical Interventions: CPR (Attempt to Resuscitate); Full Support   Ordered at: 08/04/24 2028     Code Status (Patient has no pulse and is not breathing):    CPR (Attempt to Resuscitate)     Medical Interventions (Patient has pulse or is breathing):    Full Support       No future appointments.   Contact information for follow-up providers       Ryan Gutierrez MD .    Specialty: Internal Medicine  Contact information:  [address] [place] [address] [phone]                       Contact information for after-discharge care       Destination       [facility] .    Service: Skilled Nursing  Contact information:  [address] [facility] [address]  [phone]                                   Time Spent on Discharge:   Greater than 30 minutes      Gurvinder Farrell MD  [facility]  08/09/24  11:06 EDT                Electronically signed by Gurvinder Farrell MD at 08/09/24 6049

## 2024-08-12 NOTE — NURSING NOTE
[facility]  pt has a hagen wife concerned that it is not draining, might have been clotted, this nurse flushed the hagen, no clots were noted, urine pink red, waiting for a response

## 2024-08-12 NOTE — PLAN OF CARE
Goal Outcome Evaluation:    Problem: Adult Inpatient Plan of Care  Goal: Absence of Hospital-Acquired Illness or Injury  Intervention: Identify and Manage Fall Risk  Recent Flowsheet Documentation  Taken 8/12/2024 1800 by Holden Roberts RN  Safety Promotion/Fall Prevention: safety round/check completed  Taken 8/12/2024 1600 by Holden Roberts RN  Safety Promotion/Fall Prevention: safety round/check completed  Taken 8/12/2024 1400 by Holden Roberts RN  Safety Promotion/Fall Prevention: safety round/check completed  Taken 8/12/2024 1245 by Holden Roberts RN  Safety Promotion/Fall Prevention:   toileting scheduled   safety round/check completed   nonskid shoes/slippers when out of bed   lighting adjusted   fall prevention program maintained   clutter free environment maintained   activity supervised  Taken 8/12/2024 1000 by Holden Roberts RN  Safety Promotion/Fall Prevention: safety round/check completed  Taken 8/12/2024 0815 by Holden Roberts RN  Safety Promotion/Fall Prevention: safety round/check completed   Plan of Care Reviewed With: patient confused, room air, uncooperative, grimacing and pushing back gave pain medication see MAR, on iv ABX continuous slow bladder irrigation per order, urine pink no clots noted

## 2024-08-12 NOTE — CASE MANAGEMENT/SOCIAL WORK
Discharge Planning Assessment  [facility]     Patient Name: Froy Ngo  MRN: [id number]  Today's Date: 8/12/2024    Admit Date: 8/11/2024    Plan: [place] pending hospital course   Discharge Needs Assessment       Row Name 08/12/24 1241       Resource/Environmental Concerns    Transportation Concerns none       Discharge Needs Assessment    Equipment Currently Used at Home none    Concerns to be Addressed discharge planning    Anticipated Changes Related to Illness none    Equipment Needed After Discharge none    Provided Post Acute Provider List? N/A    N/A Provider List Comment Patient at [place]      Row Name 08/12/24 1237       Living Environment    People in Home spouse    Current Living Arrangements home    Potentially Unsafe Housing Conditions none    In the past 12 months has the electric, gas, oil, or water company threatened to shut off services in your home? No    Primary Care Provided by self    Provides Primary Care For no one    Family Caregiver if Needed spouse    Quality of Family Relationships helpful;involved;supportive       Resource/Environmental Concerns    Resource/Environmental Concerns none       Transition Planning    Patient/Family Anticipates Transition to inpatient rehabilitation facility    Patient/Family Anticipated Services at Transition rehabilitation services                   Discharge Plan       Row Name 08/12/24 1242       Plan    Plan [place] pending hospital course    Roadmap to Recovery Yes    Patient/Family in Agreement with Plan yes    Plan Comments Spoke with patient, patient's spouse, and family caregiver at bedside. Introduced self and explained CCP role. Verified face sheet. Patient recently discharged to [place] via [facility]. Patient's wife denies use of HH and DME prior to previous hospitalization. Spouse does state they have a w/c but no one uses it. Wife states that plan is for patient to return to [place] at discharge. Will need transport.  Wife states  prior to hospitalization beginning of August patient was IADL, still working, and still drove. Patient lives with spouse in 2 story home with 2 PRAS[address]NA. Wife states that bedroom is on 2nd floor. Spoke with Mary/ [facility], holding bed for patient.                  Continued Care and Services - Admitted Since 8/11/2024       Destination       Service Provider Request Status Selected Services Address Phone Fax Patient Preferred    [facility] Accepted N/A [address] [address] [phone] --                  Selected Continued Care - Prior Encounters Includes continued care and service providers with selected services from prior encounters from 5/13/2024 to 8/12/2024      Discharged on 8/9/2024 Admission date: 8/4/2024 - Discharge disposition: Skilled Nursing Facility (DC - External)      Destination       Service Provider Selected Services Address Phone Fax Patient Preferred    [facility] Skilled Nursing [address] [address] [phone] --                             Demographic Summary       Row Name 08/12/24 1229       General Information    Admission Type inpatient    Required Notices Provided Important Message from Medicare    Referral Source admission list    Reason for Consult discharge planning    Preferred Language English                   Functional Status       Row Name 08/12/24 1237       Functional Status    Usual Activity Tolerance moderate    Current Activity Tolerance moderate       Functional Status, IADL    Medications independent    Meal Preparation independent    Housekeeping assistive person    Laundry assistive person    Shopping assistive person       Mental Status    General Appearance WDL WDL                   Psychosocial    No documentation.                  Abuse/Neglect    No documentation.                  Legal    No documentation.                  Substance Abuse    No documentation.                  Patient Forms    No  documentation.                     Marii Chavez RN

## 2024-08-12 NOTE — PROGRESS NOTES
Name: Froy Ngo ADMIT: 2024   : 1945  PCP: Ryan Gutierrez MD    MRN: [id number] LOS: 1 days   AGE/SEX: 78 y.o. male  ROOM: [facility]     Subjective   Subjective   Resting in bed, labs this morning with declining renal function. Continues on IV fluids, minimal urine production. Oriented but slightly forgetful, asks me the same question several times in our encounter.        Objective   Objective   Vital Signs  Temp:  [97.9 °F (36.6 °C)-98.6 °F (37 °C)] 98.4 °F (36.9 °C)  Heart Rate:  [] 103  Resp:  [17-19] 17  BP: (129-169)/(63-83) 142/67  SpO2:  [93 %-100 %] 96 %  on   ;   Device (Oxygen Therapy): room air  Body mass index is 27.36 kg/m².  Physical Exam  Constitutional:       General: He is not in acute distress.  HENT:      Head: Normocephalic and atraumatic.   Cardiovascular:      Rate and Rhythm: Normal rate and regular rhythm.   Pulmonary:      Effort: Pulmonary effort is normal. No respiratory distress.   Abdominal:      General: Abdomen is flat. There is no distension.      Palpations: Abdomen is soft.      Tenderness: There is no abdominal tenderness.   Genitourinary:     Comments: Rodriguez in place with small amount of mary-aid colored urine  Skin:     General: Skin is warm and dry.   Neurological:      Mental Status: He is alert and oriented to person, place, and time.      Comments: Oriented but forgetful         Results Review     I reviewed the patient's new clinical results.  Results from last 7 days   Lab Units 24  0750 24  1001 24  0227 24  0444   WBC 10*3/mm3 18.57* 19.87* 8.17 9.00   HEMOGLOBIN g/dL 11.1* 11.8* 10.7* 10.6*   PLATELETS 10*3/mm3 312 279 268 246     Results from last 7 days   Lab Units 24  0750 24  1001 24  0325 24  0313   SODIUM mmol/L 131* 132* 135* 140   POTASSIUM mmol/L 4.4 4.3 3.7 3.8   CHLORIDE mmol/L 99 99 105 109*   CO2 mmol/L 14.5* 18.0* 21.3* 22.1   BUN mg/dL 63* 59* 37* 49*   CREATININE mg/dL  "7.56* 5.84* 2.22* 2.45*   GLUCOSE mg/dL 130* 150* 126* 147*   Estimated Creatinine Clearance: 10.1 mL/min (A) (by C-G formula based on SCr of 7.56 mg/dL (H)).  Results from last 7 days   Lab Units 08/12/24  0750 08/11/24  1001 08/09/24  0325 08/08/24  0313   ALBUMIN g/dL 2.4* 2.6* 2.5* 2.4*   BILIRUBIN mg/dL  --  0.8  --   --    ALK PHOS U/L  --  108  --   --    AST (SGOT) U/L  --  16  --   --    ALT (SGPT) U/L  --  16  --   --      Results from last 7 days   Lab Units 08/12/24  0750 08/11/24  1001 08/09/24  0325 08/08/24  0313 08/07/24  0227 08/06/24  0809 08/06/24  0444   CALCIUM mg/dL 8.1* 8.3* 7.9* 8.0* 7.8*   < > 7.6*   ALBUMIN g/dL 2.4* 2.6* 2.5* 2.4* 2.3*   < >  --    MAGNESIUM mg/dL 1.5*  --   --   --  1.6  --  2.1   PHOSPHORUS mg/dL 7.1*  --  3.5 3.6 4.1  --   --     < > = values in this interval not displayed.     Results from last 7 days   Lab Units 08/11/24  1313 08/11/24  1001   PROCALCITONIN ng/mL  --  0.34*   LACTATE mmol/L 0.8  --    No results found for: \"COVID19\"  Glucose   Date/Time Value Ref Range Status   08/12/2024 0619 127 70 - 130 mg/dL Final   08/11/2024 2023 148 (H) 70 - 130 mg/dL Final   08/11/2024 1623 136 (H) 70 - 130 mg/dL Final   08/09/2024 1157 141 (H) 70 - 130 mg/dL Final       CT Abdomen Pelvis Without Contrast  Narrative: CT OF THE ABDOMEN AND PELVIS WITHOUT CONTRAST 08/11/2024     HISTORY: Abdominal pain. Diminished urinary output.     Spiral images were obtained from the lung bases to the symphysis pubis.  No intravenous or oral contrast was given.     Small left and very minimal right pleural effusions are seen with some  minimal bibasilar atelectasis.     Liver appears unremarkable. There are multiple gallstones. No  gallbladder inflammatory changes are seen. Pancreas is atrophic. Spleen  and adrenals appear unremarkable.     There is mild-to-moderate bilateral hydronephrosis with mild ureteral  dilatation. This finding is also seen on the 08/04/2024 study. There is  bilateral " perinephric stranding. There is bilateral periureteral  stranding as well. There is an approximately 4.1 cm low-density right  renal lesion presumably a cyst on this unenhanced scan. No intrarenal  stones or ureteral stones are seen.     There is some aortic calcification. Rodriguez catheter is seen in the  urinary bladder. There is mild urinary bladder wall thickening.  Moderately severe prostate gland enlargement is seen. Small left  inguinal hernia seen containing small amount of fluid. There is colonic  diverticulosis.     Impression: 1. Bilateral hydroureteronephrosis. There is also bilateral perinephric  and periureteral stranding. This finding is also seen on the previous  study of 08/04/2024.  2. Low-density right renal cyst.  3. Small pleural effusions with bibasilar atelectasis.  4. Cholelithiasis.  5. Rodriguez catheter is seen in the urinary bladder as well as some air.  There is urinary bladder wall thickening with moderately severe prostate  gland enlargement.  6. No ureteral stones are seen.     Radiation dose reduction techniques were utilized, including automated  exposure control and exposure modulation based on body size.        This report was finalized on 8/11/2024 10:36 PM by Dr. Rajiv Canela M.D on Workstation: [id number]     XR Chest 1 View  Narrative: XR CHEST 1 VW-8/11/2024     HISTORY: Possible pneumonia.     The heart size is within normal limits. Lungs appear clear. There is  some aortic calcification. Bony structures appear unremarkable.     Impression: 1. No acute process        This report was finalized on 8/11/2024 12:23 PM by Dr. Rajiv Canela M.D on Workstation: [id number]       Scheduled Medications  amLODIPine, 5 mg, Oral, Q24H  aspirin, 81 mg, Oral, Daily  atorvastatin, 10 mg, Oral, Daily  cefTRIAXone, 1,000 mg, Intravenous, Q24H  donepezil, 10 mg, Oral, Nightly  heparin (porcine), 5,000 Units, Subcutaneous, Q8H  hydrALAZINE, 25 mg, Oral, Q8H  insulin lispro, 2-7 Units,  Subcutaneous, 4x Daily AC & at Bedtime  memantine, 10 mg, Oral, Daily  sodium chloride, 10 mL, Intravenous, Q12H  tamsulosin, 0.4 mg, Oral, Daily    Infusions  sodium chloride, 100 mL/hr, Last Rate: 100 mL/hr (08/12/24 0256)    Diet  Diet: Cardiac, Diabetic; Healthy Heart (2-3 Na+); Consistent Carbohydrate; Fluid Consistency: Thin (IDDSI 0)         I have personally reviewed:  []  Laboratory   []  Microbiology   []  Radiology   []  EKG/Telemetry   []  Cardiology/Vascular   []  Pathology   []  Records    Assessment/Plan     Active Hospital Problems    Diagnosis  POA    **Acute renal failure [N17.9]  Yes    Type 2 diabetes mellitus with hyperglycemia [E11.65]  Yes    Essential hypertension, benign [I10]  Yes    Dementia without behavioral disturbance [F03.90]  Yes    Obstructive uropathy [N13.9]  Yes    Bilateral hydronephrosis [N13.30]  Yes      Resolved Hospital Problems   No resolved problems to display.       Mr. Ngo is a 78 y.o.  with a history of type 2 diabetes, hyperlipidemia, Alzheimer's dementia, recent admission for euglycemic DKA, NEHEMIAS/obstructive uropathy with bilateral hydronephrosis-discharged with in-dwelling Rodriguez who presents with decreased urine output found to have NEHEMIAS.       NEHEMIAS  Non-anion gap metabolic acidosis  Urinary retention from bladder outlet obstruction with bilateral hydroureteronephrosis  Hematuria- had 3-way irrigation in ED- urine cleared initially but now bloody  Possible UTI- ED unable to get sample prior to starting abx  - renal and urology consulted; renal managing IVF  - bilateral hydroureteronephrosis on Ct similar to CT last week  - ct w/ perinephric/periureteral stranding, procal slightly elevated, WBC 19; cont rocephin, urine cx if able; may be unreliable since patient already received abx  - Cr up this am, small amount of mary-aid colored urine in Rodriguez, recently flushed- no clots per RN; have asked RN to reach out to urology to ask about CBI     Abnormal MRI brain  -  MRI from last admit reviewed-lateral cerebral subdural hygromas pachymeningeal thickening possibly related to chronic subdural hematoma versus hygroma, acute subdural hygroma not excluded; no history of recent head trauma  -Nicolasa consulted- no acute surgical interventions indicated; findings likely incidental - they recommend follow up 1 month in clinic with CT head     Type 2 diabetes  - hold home meds  - continue SSI while admitted     Hypertension  - resume amlodipine and hydralazine     Dementia  - cont donepezil, namenda  - frequent reorientation; high risk for delirium  -MRI from 8/9 with chronic microhemorrhages likely secondary to 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  Full code.  Discussed with patient, spouse, family, and nursing staff.  Anticipated discharge SNF, D        Shruthi Acosta MD  [facility]  08/12/24  10:50 EDT    I wore protective equipment throughout this patient encounter including gloves.  Hand hygiene was performed before donning protective equipment and after removal when leaving the room.         Copied text in this note has been reviewed and is accurate as of 08/12/24.

## 2024-08-12 NOTE — CONSULTS
"NEUROSURGERY CONSULT      Froy Ngo  1945  [id number]    Referring Provider: No referring provider defined for this encounter.  Reason for Consultation: \" Possible subdural hygroma by MRI report\"    Patient Care Team:  Ryan Gutierrez MD as PCP - General (Internal Medicine)    Chief Complaint: Memory dysfunction    Subjective .     History of Present Illness: 78-year-old  male presents to the hospital for decreased urine output with a known diagnosis of BPH and has an indwelling Rodriguez catheter.  Rodriguez catheter was not putting out much output and therefore he was brought to the hospital for reevaluation.  Patient denies any recent trauma.  Patient sitting up talking with family he has no complaint of headache, nausea or vomiting.  Patient's had a recent decline in his mental status with little p.o. intake and confusion therefore workup with CT and MRI were obtained.  Neurosurgery's been asked to comment on the results of the MRI.  Most of his history is provided by his wife and daughter.    Review of Systems  Review of Systems   Unable to perform ROS: Mental status change       History  Past Medical History:   Diagnosis Date    Dementia     Diabetes mellitus     Elevated cholesterol     Prostate disorder     Urinary retention    , No past surgical history on file., No family history on file.,   Social History     Socioeconomic History    Marital status: Single   Tobacco Use    Smoking status: Never     Passive exposure: Never    Smokeless tobacco: Never   Vaping Use    Vaping status: Never Used   Substance and Sexual Activity    Alcohol use: Never    Drug use: Never    Sexual activity: Not Currently     E-cigarette/Vaping    E-cigarette/Vaping Use Never User      E-cigarette/Vaping Substances     E-cigarette/Vaping Devices         ,   Medications Prior to Admission   Medication Sig Dispense Refill Last Dose    amLODIPine (NORVASC) 5 MG tablet Take 1 tablet by mouth Daily. 30 tablet 0     " aspirin 81 MG EC tablet Take 1 tablet by mouth Daily.       atorvastatin (LIPITOR) 10 MG tablet Take 1 tablet by mouth Daily.       donepezil (ARICEPT) 10 MG tablet Take 1 tablet by mouth Every Night.       hydrALAZINE (APRESOLINE) 25 MG tablet Take 1 tablet by mouth Every 8 (Eight) Hours. 90 tablet 0     memantine (NAMENDA) 10 MG tablet Take 1 tablet by mouth Daily.       [START ON 8/16/2024] metFORMIN (GLUCOPHAGE) 1000 MG tablet Take 1 tablet by mouth 2 (Two) Times a Day With Meals. 1.5 tablets in AM; 1 tablet in PM 90 tablet 0     Semaglutide,0.25 or 0.5MG/DOS, (OZEMPIC) 2 MG/1.5ML solution pen-injector Inject 0.25 mg under the skin into the appropriate area as directed 1 (One) Time Per Week.       silodosin (RAPAFLO) 4 MG capsule capsule Take 2 capsules by mouth Daily With Breakfast.       tamsulosin (FLOMAX) 0.4 MG capsule 24 hr capsule Take 1 capsule by mouth Daily. 30 capsule 0    , Scheduled Meds:  amLODIPine, 5 mg, Oral, Q24H  aspirin, 81 mg, Oral, Daily  atorvastatin, 10 mg, Oral, Daily  cefTRIAXone, 1,000 mg, Intravenous, Q24H  donepezil, 10 mg, Oral, Nightly  heparin (porcine), 5,000 Units, Subcutaneous, Q8H  hydrALAZINE, 25 mg, Oral, Q8H  insulin lispro, 2-7 Units, Subcutaneous, 4x Daily AC & at Bedtime  memantine, 10 mg, Oral, Daily  sodium chloride, 10 mL, Intravenous, Q12H  tamsulosin, 0.4 mg, Oral, Daily   , Continuous Infusions:  sodium chloride, 100 mL/hr, Last Rate: 100 mL/hr (08/12/24 0256)   , PRN Meds:    acetaminophen **OR** acetaminophen **OR** acetaminophen    senna-docusate sodium **AND** polyethylene glycol **AND** bisacodyl **AND** bisacodyl    dextrose    dextrose    glucagon (human recombinant)    melatonin    ondansetron ODT **OR** ondansetron    [COMPLETED] Insert Peripheral IV **AND** sodium chloride    sodium chloride    sodium chloride, and Allergies:  Patient has no known allergies.    Tobacco Use: Low Risk  (8/7/2024)    Patient History     Smoking Tobacco Use: Never      Smokeless Tobacco Use: Never     Passive Exposure: Never        Objective     Vital Signs   Temp:  [97.9 °F (36.6 °C)-98.6 °F (37 °C)] 98.4 °F (36.9 °C)  Heart Rate:  [] 103  Resp:  [17-19] 17  BP: (129-169)/(63-83) 142/67  Body mass index is 27.36 kg/m².    Physical Exam    CONSTITUTIONAL: This 78 year old  male appears well developed, well-nourished and in no acute distress.    HEAD & FACE: the head and face are symmetric, normocephalic and atraumatic.    EYES: Inspection of the conjunctivae and lids reveals no swelling, erythema or discharge.  Pupils are round, equal and reactive to light and there is no scleral icterus.    EARS, NOSE, MOUTH & THROAT: On inspection, the ears, nose and oral cavity are within normal limits.    NECK: The neck is supple and symmetric. The trachea is midline with no masses.    PULMONARY: Respiratory effort is normal with no increased work of breathing or signs of respiratory distress.    CARDIOVASCULAR: Pedal pulses are +2/4 bilaterally. Examination of the extremities shows no edema or varicosities.    MUSCULOSKELETAL: Gait and station are not assessed. The spine has normal alignment and range of motion,    SKIN: The skin is warm, dry and intact    NEUROLOGIC:    Cranial Nerves 2 through 12 grossly intact  Normal motor strength noted without ulnar drift. Muscle bulk and tone are normal.  Sensory exam is normal to fine touch to confrontational testing bilaterally.  Superficial/Primitive Reflexes: primitive reflexes were absent.  No coordination deficit observed.  Cortical function is difficult to assess his family answers most difficult questions for him and he seemed did not recall that we had talked about certain subjects as he brought them back up later in conversation as if we had not spoke about the. Speech is fluent.    PSYCHIATRIC: Oriented to person and place only. Patient's mood and affect are normal with a slightly blunted affect.      Results Review:   I  reviewed the patient's new clinical results.    Images:    I personally reviewed the images from the following radiographic studies.    CT scan of the head done on August 4, 2024 reveals no acute intracranial hemorrhage.  There is atrophy noted and microangiopathic changes to the deep white matter.  No midline shift or mass effect.    MRI of the brain done on August 9, 2024 at [facility] reveals mildly prominent diffuse pachymeningeal thickening noted supratentorially.  There are subtle areas of lateral cerebral hemispheric subdural hygromas measuring no more than 4 to 5 mm in maximum diameter lateral to both frontal lobes.  Changes associated with possible amyloid angiopathy is also noted in the right parietal and occipital regions.    Lab Results (last 24 hours)       Procedure Component Value Units Date/Time    CBC & Differential [[id number]]  (Abnormal) Collected: 08/11/24 1001    Specimen: Blood Updated: 08/11/24 1011    Narrative:      The following orders were created for panel order CBC & Differential.  Procedure                               Abnormality         Status                     ---------                               -----------         ------                     CBC Auto Differential[[id number]]        Abnormal            Final result                 Please view results for these tests on the individual orders.    Comprehensive Metabolic Panel [[id number]]  (Abnormal) Collected: 08/11/24 1001    Specimen: Blood Updated: 08/11/24 1036     Glucose 150 mg/dL      BUN 59 mg/dL      Creatinine 5.84 mg/dL      Sodium 132 mmol/L      Potassium 4.3 mmol/L      Comment: Slight hemolysis detected by analyzer. Result may be falsely elevated.        Chloride 99 mmol/L      CO2 18.0 mmol/L      Calcium 8.3 mg/dL      Total Protein 6.1 g/dL      Albumin 2.6 g/dL      ALT (SGPT) 16 U/L      AST (SGOT) 16 U/L      Comment: Slight hemolysis detected by analyzer. Result may be falsely elevated.        Alkaline  "Phosphatase 108 U/L      Total Bilirubin 0.8 mg/dL      Globulin 3.5 gm/dL      A/G Ratio 0.7 g/dL      BUN/Creatinine Ratio 10.1     Anion Gap 15.0 mmol/L      eGFR 9.2 mL/min/1.73      Comment: <15 Indicative of kidney failure       Narrative:      GFR Normal >60  Chronic Kidney Disease <60  Kidney Failure <15    The GFR formula is only valid for adults with stable renal function between ages 18 and 70.    CBC Auto Differential [[id number]]  (Abnormal) Collected: 08/11/24 1001    Specimen: Blood Updated: 08/11/24 1011     WBC 19.87 10*3/mm3      RBC 3.92 10*6/mm3      Hemoglobin 11.8 g/dL      Hematocrit 35.4 %      MCV 90.3 fL      MCH 30.1 pg      MCHC 33.3 g/dL      RDW 12.0 %      RDW-SD 39.0 fl      MPV 8.7 fL      Platelets 279 10*3/mm3      Neutrophil % 87.0 %      Lymphocyte % 3.1 %      Monocyte % 7.8 %      Eosinophil % 0.8 %      Basophil % 0.2 %      Immature Grans % 1.1 %      Neutrophils, Absolute 17.32 10*3/mm3      Lymphocytes, Absolute 0.62 10*3/mm3      Monocytes, Absolute 1.54 10*3/mm3      Eosinophils, Absolute 0.15 10*3/mm3      Basophils, Absolute 0.03 10*3/mm3      Immature Grans, Absolute 0.21 10*3/mm3      nRBC 0.0 /100 WBC     Procalcitonin [[id number]]  (Abnormal) Collected: 08/11/24 1001    Specimen: Blood Updated: 08/11/24 1158     Procalcitonin 0.34 ng/mL     Narrative:      As a Marker for Sepsis (Non-Neonates):    1. <0.5 ng/mL represents a low risk of severe sepsis and/or septic shock.  2. >2 ng/mL represents a high risk of severe sepsis and/or septic shock.    As a Marker for Lower Respiratory Tract Infections that require antibiotic therapy:    PCT on Admission    Antibiotic Therapy       6-12 Hrs later    >0.5                Strongly Recommended  >0.25 - <0.5        Recommended   0.1 - 0.25          Discouraged              Remeasure/reassess PCT  <0.1                Strongly Discouraged     Remeasure/reassess PCT    As 28 day mortality risk marker: \"Change in Procalcitonin " "Result\" (>80% or <=80%) if Day 0 (or Day 1) and Day 4 values are available. Refer to http://[email or web address]-pct-[email or web address]    Change in PCT <=80%  A decrease of PCT levels below or equal to 80% defines a positive change in PCT test result representing a higher risk for 28-day all-cause mortality of patients diagnosed with severe sepsis for septic shock.    Change in PCT >80%  A decrease of PCT levels of more than 80% defines a negative change in PCT result representing a lower risk for 28-day all-cause mortality of patients diagnosed with severe sepsis or septic shock.       Lipase [[id number]]  (Abnormal) Collected: 08/11/24 1001    Specimen: Blood Updated: 08/11/24 1147     Lipase 96 U/L     Uric Acid [[id number]]  (Abnormal) Collected: 08/11/24 1001    Specimen: Blood Updated: 08/11/24 1857     Uric Acid 8.1 mg/dL     Blood Culture - Blood, Arm, Left [[id number]] Collected: 08/11/24 1313    Specimen: Blood from Arm, Left Updated: 08/11/24 1322    Blood Culture - Blood, Arm, Left [[id number]] Collected: 08/11/24 1313    Specimen: Blood from Arm, Left Updated: 08/11/24 1322    Lactic Acid, Plasma [[id number]]  (Normal) Collected: 08/11/24 1313    Specimen: Blood Updated: 08/11/24 1346     Lactate 0.8 mmol/L     POC Glucose Once [[id number]]  (Abnormal) Collected: 08/11/24 1623    Specimen: Blood Updated: 08/11/24 1625     Glucose 136 mg/dL     POC Glucose Once [[id number]]  (Abnormal) Collected: 08/11/24 2023    Specimen: Blood Updated: 08/11/24 2024     Glucose 148 mg/dL     Sodium, Urine, Random - Indwelling Urethral Catheter [[id number]] Collected: 08/12/24 0615    Specimen: Urine from Indwelling Urethral Catheter Updated: 08/12/24 0655     Sodium, Urine 145 mmol/L     Narrative:      Reference intervals for random urine have not been established.  Clinical usage is dependent upon physician's interpretation in combination with other laboratory tests.       Creatinine Urine Random (kidney function) GFR component - " Indwelling Urethral Catheter [[id number]] Collected: 08/12/24 0615    Specimen: Urine from Indwelling Urethral Catheter Updated: 08/12/24 0658     Creatinine, Urine <4.2 mg/dL     Narrative:      Reference intervals for random urine have not been established.  Clinical usage is dependent upon physician's interpretation in combination with other laboratory tests.       Urinalysis With Microscopic If Indicated (No Culture) - Indwelling Urethral Catheter [[id number]]  (Abnormal) Collected: 08/12/24 0616    Specimen: Urine from Indwelling Urethral Catheter Updated: 08/12/24 0654     Color, UA Red     Comment: Any Substance that causes an abnormal urine color can alter the accuracy of the chemical reactions.        Appearance, UA Turbid     pH, UA 7.5     Specific Gravity, UA 1.014     Glucose, UA Negative     Ketones, UA Negative     Bilirubin, UA Moderate (2+)     Blood, UA Moderate (2+)     Protein, UA >=300 mg/dL (3+)     Leuk Esterase, UA Large (3+)     Nitrite, UA Positive     Urobilinogen, UA 0.2 E.U./dL    Urinalysis, Microscopic Only - Indwelling Urethral Catheter [[id number]]  (Abnormal) Collected: 08/12/24 0616    Specimen: Urine from Indwelling Urethral Catheter Updated: 08/12/24 0741     RBC, UA Too Numerous to Count /HPF      WBC, UA       Unable to determine due to loaded field     /HPF     Bacteria, UA       Unable to determine due to loaded field     /HPF     Squamous Epithelial Cells, UA       Unable to determine due to loaded field     /HPF     Hyaline Casts, UA       Unable to determine due to loaded field     /LPF     Methodology Manual Light Microscopy    POC Glucose Once [[id number]]  (Normal) Collected: 08/12/24 0619    Specimen: Blood Updated: 08/12/24 0620     Glucose 127 mg/dL     Magnesium [[id number]]  (Abnormal) Collected: 08/12/24 0750    Specimen: Blood Updated: 08/12/24 0839     Magnesium 1.5 mg/dL     CBC (No Diff) [[id number]]  (Abnormal) Collected: 08/12/24 0750    Specimen: Blood Updated:  08/12/24 0820     WBC 18.57 10*3/mm3      RBC 3.66 10*6/mm3      Hemoglobin 11.1 g/dL      Hematocrit 33.5 %      MCV 91.5 fL      MCH 30.3 pg      MCHC 33.1 g/dL      RDW 11.9 %      RDW-SD 39.8 fl      MPV 9.0 fL      Platelets 312 10*3/mm3     Renal Function Panel [[id number]]  (Abnormal) Collected: 08/12/24 0750    Specimen: Blood Updated: 08/12/24 0843     Glucose 130 mg/dL      BUN 63 mg/dL      Creatinine 7.56 mg/dL      Sodium 131 mmol/L      Potassium 4.4 mmol/L      Chloride 99 mmol/L      CO2 14.5 mmol/L      Calcium 8.1 mg/dL      Albumin 2.4 g/dL      Phosphorus 7.1 mg/dL      Anion Gap 17.5 mmol/L      BUN/Creatinine Ratio 8.3     eGFR 6.7 mL/min/1.73      Comment: <15 Indicative of kidney failure       Narrative:      GFR Normal >60  Chronic Kidney Disease <60  Kidney Failure <15    The GFR formula is only valid for adults with stable renal function between ages 18 and 70.            Assessment & Plan       Acute renal failure    Obstructive uropathy    Bilateral hydronephrosis    Type 2 diabetes mellitus with hyperglycemia    Essential hypertension, benign    Dementia without behavioral disturbance      The MRI reveals subtle findings that are not evident on the CT scan of the head and therefore would not be considered surgically significant.  Likely the changes are the result of the patient's atrophy and compensatory extra-axial space associated with that atrophy.  There is no evidence of acute hemorrhage on CT scan of the head and therefore no role for acute neurosurgical intervention or additional imaging at this time.  Due to the findings on the MRI it is reasonable to repeat a CT scan of the head in 1 month to make sure he does not develop any progressive changes related to the dural thickening and extra-axial fluid spaces.    Patient and his family has a desire to pursue a comprehensive dementia evaluation and his family is in contact with Dr. Nghia Butterfield, who is a family friend, to set them up  with possible evaluation in the [place] area or possibly even [place] once the patient is medically stable to travel.    No role for any acute neurosurgical intervention and the findings seen on MRI are likely incidental and asymptomatic in nature.  We will be happy to follow him from the neurosurgical perspective in the office in 1 month with a repeat CT scan of the head without contrast.    I discussed the patient's findings and my recommendations with patient and family    Dino Henry MD  08/12/24  09:51 EDT

## 2024-08-12 NOTE — PROGRESS NOTES
[facility] Progress Note      Patient Name: Froy Ngo  : 1945  MRN: [id number]  Primary Care Physician:  Ryan Gutierrez MD  Date of admission: 2024    Subjective     Interval History:   F/U NEHEMIAS vs NEHEMIAS/CKD    Laying in bed.  Family in room  Denies n/v/d  No uremic symptoms.     Review of Systems:   14 point review of systems is otherwise negative except for mentioned above on HPI     Objective     Vitals:   Temp:  [97.9 °F (36.6 °C)-98.6 °F (37 °C)] 98.4 °F (36.9 °C)  Heart Rate:  [] 103  Resp:  [17-19] 17  BP: (129-169)/(63-83) 142/67    Intake/Output Summary (Last 24 hours) at 2024 1015  Last data filed at 2024 1319  Gross per 24 hour   Intake 240 ml   Output 525 ml   Net -285 ml       Physical Exam:    Constitutional: Awake, alert, NAD, chronically ill, frail  HEENT: Sclera anicteric, no conjunctival injection  Neck: Supple, no JVD  Respiratory: Clear to auscultation bilaterally, nonlabored on RA  Cardiovascular: RRR, no rub  Gastrointestinal: BS +, soft, nontender and nondistended  : No palpable bladder; Rodriguez catheter anchored  Musculoskeletal: No significant edema, no clubbing or cyanosis  Psychiatric: Appropriate affect, cooperative  Neurologic: No asterixis, moving all extremities, normal speech   Skin: Warm and dry        Scheduled Meds:     amLODIPine, 5 mg, Oral, Q24H  aspirin, 81 mg, Oral, Daily  atorvastatin, 10 mg, Oral, Daily  cefTRIAXone, 1,000 mg, Intravenous, Q24H  donepezil, 10 mg, Oral, Nightly  heparin (porcine), 5,000 Units, Subcutaneous, Q8H  hydrALAZINE, 25 mg, Oral, Q8H  insulin lispro, 2-7 Units, Subcutaneous, 4x Daily AC & at Bedtime  memantine, 10 mg, Oral, Daily  sodium chloride, 10 mL, Intravenous, Q12H  tamsulosin, 0.4 mg, Oral, Daily      IV Meds:   sodium chloride, 100 mL/hr, Last Rate: 100 mL/hr (24 0256)        Results Reviewed:   I have personally reviewed the results from the time of this admission  to 8/12/2024 10:15 EDT     Results from last 7 days   Lab Units 08/12/24  0750 08/11/24  1001 08/09/24  0325   SODIUM mmol/L 131* 132* 135*   POTASSIUM mmol/L 4.4 4.3 3.7   CHLORIDE mmol/L 99 99 105   CO2 mmol/L 14.5* 18.0* 21.3*   BUN mg/dL 63* 59* 37*   CREATININE mg/dL 7.56* 5.84* 2.22*   CALCIUM mg/dL 8.1* 8.3* 7.9*   BILIRUBIN mg/dL  --  0.8  --    ALK PHOS U/L  --  108  --    ALT (SGPT) U/L  --  16  --    AST (SGOT) U/L  --  16  --    GLUCOSE mg/dL 130* 150* 126*       Estimated Creatinine Clearance: 10.1 mL/min (A) (by C-G formula based on SCr of 7.56 mg/dL (H)).    Results from last 7 days   Lab Units 08/12/24  0750 08/09/24  0325 08/08/24  0313 08/07/24  0227 08/07/24  0227 08/06/24  0444   MAGNESIUM mg/dL 1.5*  --   --   --  1.6 2.1   PHOSPHORUS mg/dL 7.1* 3.5 3.6   < > 4.1  --     < > = values in this interval not displayed.       Results from last 7 days   Lab Units 08/11/24  1001   URIC ACID mg/dL 8.1*       Results from last 7 days   Lab Units 08/12/24  0750 08/11/24  1001 08/07/24  0227 08/06/24  0444   WBC 10*3/mm3 18.57* 19.87* 8.17 9.00   HEMOGLOBIN g/dL 11.1* 11.8* 10.7* 10.6*   PLATELETS 10*3/mm3 312 279 268 246             Assessment / Plan     ASSESSMENT:  1.  NEHEMIAS with unknonw baseline creatinine likely secondary to severe obstructive uropathy earlier this month with substantial though not complete recovery (SCR 2.2 upon discharge 8/9).  Wonder if CT (8/11) showing bilateral hydronephrosis is simply a chronic finding, rather than indicating any acute finding.  Current NEHEMIAS may actually represent just prerenal azotemia from lackluster appetite since leaving the hospital.  Looks dry; likely NAGMA.  Urine studies unreliable given catheter malfunction.   2.  Obstructive uropathy with significant BPH:  Suspicion is for atonic bladder; Urology consulted  3.  Alzheimer's dementia  4.  Anemia  5.  Hypertension  6.  Leukocytosis  7.  DM2:  apparently was receiving metformin at the nursing  home    PLAN:  Creatinine progressively worse.  Reviewed urology note who recommended bladder irrigation and possible double-J stent placement tomorrow if creatinine does not improve  Creatinine is up today to 8.26.  There is no acute indication for dialysis at the time being.  Discussed with patient briefly in the presence of his family the possible need for dialysis if creatinine continues to worsen.  No uremic symptoms.  Will check serology for completion  Will continue to monitor.    Thank you for involving us in the care of Froy Ngo.  Please feel free to call with any questions.    Jessica Aragon MD   08/12/24  10:15 EDT    [facility]  [phone]    Please note that portions of this note were completed with a voice recognition program.

## 2024-08-12 NOTE — PROGRESS NOTES
Non-billing note covering for Dr. Gutierrez    Pt admitted yesterday from [place] due to no urine output from catheter.  Patient had just been discharged from the hospital last week when he was treated for acidosis related to obstructive renal failure.  ER changed out catheter with no output.  Bladder scan without distended bladder. CT scan showed bilateral hydronephrosis similar to CT scan from previous admit.  He did have elevated white count as well as some evidence of inflammation near the ureters on CT scan.  He was started on Rocephin empirically since there was no urine output to send urinalysis or urine culture.  He was seen by nephrology and was thought that he may be prerenal and was continued on IV fluids at 100 cc an hour overnight.  He had a very small amount of bloody urine this morning.  He refused a.m. labs.  I spoke with wife and daughter giving them an update.  Blood pressure, heart rate, blood sugars remained stable.  Concern is that we are not seeing return of some renal function.  Stressed the importance of him allowing for blood draws in order to monitor.  Urology is going to see today.  Await a.m. labs.  Continues on Rocephin empirically.  Blood cultures pending.  Small amount of urine sent this morning.  Will continue to follow to keep family informed.  Wife currently at bedside.  Spoke with daughter via phone.

## 2024-08-13 ENCOUNTER — ANESTHESIA (OUTPATIENT)
Dept: PERIOP | Facility: HOSPITAL | Age: 79
End: 2024-08-13
Payer: COMMERCIAL

## 2024-08-13 ENCOUNTER — TELEPHONE (OUTPATIENT)
Dept: NEUROSURGERY | Facility: CLINIC | Age: 79
End: 2024-08-13
Payer: COMMERCIAL

## 2024-08-13 ENCOUNTER — APPOINTMENT (OUTPATIENT)
Dept: GENERAL RADIOLOGY | Facility: HOSPITAL | Age: 79
End: 2024-08-13
Payer: COMMERCIAL

## 2024-08-13 ENCOUNTER — ANESTHESIA EVENT (OUTPATIENT)
Dept: PERIOP | Facility: HOSPITAL | Age: 79
End: 2024-08-13
Payer: COMMERCIAL

## 2024-08-13 DIAGNOSIS — R41.3 MEMORY DYSFUNCTION: Primary | ICD-10-CM

## 2024-08-13 LAB
ALBUMIN SERPL-MCNC: 2.3 G/DL (ref 3.5–5.2)
ALBUMIN SERPL-MCNC: 2.6 G/DL (ref 3.5–5.2)
ANA SER QL: NEGATIVE
ANION GAP SERPL CALCULATED.3IONS-SCNC: 15.1 MMOL/L (ref 5–15)
ANION GAP SERPL CALCULATED.3IONS-SCNC: 18.8 MMOL/L (ref 5–15)
BUN SERPL-MCNC: 66 MG/DL (ref 8–23)
BUN SERPL-MCNC: 71 MG/DL (ref 8–23)
BUN/CREAT SERPL: 7.5 (ref 7–25)
BUN/CREAT SERPL: 8.6 (ref 7–25)
C-ANCA TITR SER IF: NORMAL TITER
C3 SERPL-MCNC: 139 MG/DL (ref 82–167)
C4 SERPL-MCNC: 28 MG/DL (ref 12–38)
CALCIUM SPEC-SCNC: 7.9 MG/DL (ref 8.6–10.5)
CALCIUM SPEC-SCNC: 8.4 MG/DL (ref 8.6–10.5)
CHLORIDE SERPL-SCNC: 102 MMOL/L (ref 98–107)
CHLORIDE SERPL-SCNC: 103 MMOL/L (ref 98–107)
CO2 SERPL-SCNC: 12.2 MMOL/L (ref 22–29)
CO2 SERPL-SCNC: 13.9 MMOL/L (ref 22–29)
CREAT SERPL-MCNC: 8.23 MG/DL (ref 0.76–1.27)
CREAT SERPL-MCNC: 8.77 MG/DL (ref 0.76–1.27)
DEPRECATED RDW RBC AUTO: 40.5 FL (ref 37–54)
EGFRCR SERPLBLD CKD-EPI 2021: 5.6 ML/MIN/1.73
EGFRCR SERPLBLD CKD-EPI 2021: 6.1 ML/MIN/1.73
ERYTHROCYTE [DISTWIDTH] IN BLOOD BY AUTOMATED COUNT: 12.1 % (ref 12.3–15.4)
GLUCOSE BLDC GLUCOMTR-MCNC: 132 MG/DL (ref 70–130)
GLUCOSE BLDC GLUCOMTR-MCNC: 148 MG/DL (ref 70–130)
GLUCOSE BLDC GLUCOMTR-MCNC: 189 MG/DL (ref 70–130)
GLUCOSE BLDC GLUCOMTR-MCNC: 202 MG/DL (ref 70–130)
GLUCOSE SERPL-MCNC: 137 MG/DL (ref 65–99)
GLUCOSE SERPL-MCNC: 184 MG/DL (ref 65–99)
HCT VFR BLD AUTO: 32.1 % (ref 37.5–51)
HGB BLD-MCNC: 10.7 G/DL (ref 13–17.7)
KAPPA LC FREE SER-MCNC: 99.5 MG/L (ref 3.3–19.4)
KAPPA LC FREE/LAMBDA FREE SER: 1.5 {RATIO} (ref 0.26–1.65)
LAMBDA LC FREE SERPL-MCNC: 66.3 MG/L (ref 5.7–26.3)
MAGNESIUM SERPL-MCNC: 1.7 MG/DL (ref 1.6–2.4)
MCH RBC QN AUTO: 30.7 PG (ref 26.6–33)
MCHC RBC AUTO-ENTMCNC: 33.3 G/DL (ref 31.5–35.7)
MCV RBC AUTO: 92 FL (ref 79–97)
MYELOPEROXIDASE AB SER IA-ACNC: <0.2 UNITS (ref 0–0.9)
P-ANCA ATYPICAL TITR SER IF: NORMAL TITER
P-ANCA TITR SER IF: NORMAL TITER
PHOSPHATE SERPL-MCNC: 8.2 MG/DL (ref 2.5–4.5)
PHOSPHATE SERPL-MCNC: 8.3 MG/DL (ref 2.5–4.5)
PLATELET # BLD AUTO: 330 10*3/MM3 (ref 140–450)
PMV BLD AUTO: 8.8 FL (ref 6–12)
POTASSIUM SERPL-SCNC: 4.8 MMOL/L (ref 3.5–5.2)
POTASSIUM SERPL-SCNC: 5 MMOL/L (ref 3.5–5.2)
PROTEINASE3 AB SER IA-ACNC: <0.2 UNITS (ref 0–0.9)
RBC # BLD AUTO: 3.49 10*6/MM3 (ref 4.14–5.8)
RHEUMATOID FACT SERPL-ACNC: 12.2 IU/ML
SODIUM SERPL-SCNC: 132 MMOL/L (ref 136–145)
SODIUM SERPL-SCNC: 133 MMOL/L (ref 136–145)
WBC NRBC COR # BLD AUTO: 19.3 10*3/MM3 (ref 3.4–10.8)

## 2024-08-13 PROCEDURE — BT141ZZ FLUOROSCOPY OF KIDNEYS, URETERS AND BLADDER USING LOW OSMOLAR CONTRAST: ICD-10-PCS | Performed by: UROLOGY

## 2024-08-13 PROCEDURE — 63710000001 INSULIN LISPRO (HUMAN) PER 5 UNITS: Performed by: UROLOGY

## 2024-08-13 PROCEDURE — 83735 ASSAY OF MAGNESIUM: CPT | Performed by: STUDENT IN AN ORGANIZED HEALTH CARE EDUCATION/TRAINING PROGRAM

## 2024-08-13 PROCEDURE — 82948 REAGENT STRIP/BLOOD GLUCOSE: CPT

## 2024-08-13 PROCEDURE — C1769 GUIDE WIRE: HCPCS | Performed by: UROLOGY

## 2024-08-13 PROCEDURE — 0 IOTHALAMATE 60 % SOLUTION: Performed by: UROLOGY

## 2024-08-13 PROCEDURE — 80069 RENAL FUNCTION PANEL: CPT | Performed by: STUDENT IN AN ORGANIZED HEALTH CARE EDUCATION/TRAINING PROGRAM

## 2024-08-13 PROCEDURE — 25010000002 ONDANSETRON PER 1 MG: Performed by: NURSE ANESTHETIST, CERTIFIED REGISTERED

## 2024-08-13 PROCEDURE — 63710000001 ONDANSETRON ODT 4 MG TABLET DISPERSIBLE: Performed by: STUDENT IN AN ORGANIZED HEALTH CARE EDUCATION/TRAINING PROGRAM

## 2024-08-13 PROCEDURE — 85027 COMPLETE CBC AUTOMATED: CPT | Performed by: STUDENT IN AN ORGANIZED HEALTH CARE EDUCATION/TRAINING PROGRAM

## 2024-08-13 PROCEDURE — 74420 UROGRAPHY RTRGR +-KUB: CPT

## 2024-08-13 PROCEDURE — 99223 1ST HOSP IP/OBS HIGH 75: CPT | Performed by: STUDENT IN AN ORGANIZED HEALTH CARE EDUCATION/TRAINING PROGRAM

## 2024-08-13 PROCEDURE — 80069 RENAL FUNCTION PANEL: CPT | Performed by: HOSPITALIST

## 2024-08-13 PROCEDURE — C2617 STENT, NON-COR, TEM W/O DEL: HCPCS | Performed by: UROLOGY

## 2024-08-13 PROCEDURE — 0T788DZ DILATION OF BILATERAL URETERS WITH INTRALUMINAL DEVICE, VIA NATURAL OR ARTIFICIAL OPENING ENDOSCOPIC: ICD-10-PCS | Performed by: UROLOGY

## 2024-08-13 PROCEDURE — 25010000002 ONDANSETRON PER 1 MG: Performed by: STUDENT IN AN ORGANIZED HEALTH CARE EDUCATION/TRAINING PROGRAM

## 2024-08-13 PROCEDURE — C1758 CATHETER, URETERAL: HCPCS | Performed by: UROLOGY

## 2024-08-13 PROCEDURE — 25010000002 MIDAZOLAM PER 1 MG: Performed by: ANESTHESIOLOGY

## 2024-08-13 PROCEDURE — 25010000002 CEFTRIAXONE PER 250 MG: Performed by: STUDENT IN AN ORGANIZED HEALTH CARE EDUCATION/TRAINING PROGRAM

## 2024-08-13 PROCEDURE — 25010000002 PROPOFOL 200 MG/20ML EMULSION: Performed by: NURSE ANESTHETIST, CERTIFIED REGISTERED

## 2024-08-13 PROCEDURE — 25010000002 HYDROMORPHONE PER 4 MG: Performed by: NURSE ANESTHETIST, CERTIFIED REGISTERED

## 2024-08-13 PROCEDURE — 36415 COLL VENOUS BLD VENIPUNCTURE: CPT | Performed by: STUDENT IN AN ORGANIZED HEALTH CARE EDUCATION/TRAINING PROGRAM

## 2024-08-13 DEVICE — URETERAL STENT
Type: IMPLANTABLE DEVICE | Site: URETER | Status: FUNCTIONAL
Brand: CONTOUR™

## 2024-08-13 RX ORDER — MAGNESIUM HYDROXIDE 1200 MG/15ML
LIQUID ORAL AS NEEDED
Status: DISCONTINUED | OUTPATIENT
Start: 2024-08-13 | End: 2024-08-13 | Stop reason: HOSPADM

## 2024-08-13 RX ORDER — FAMOTIDINE 10 MG/ML
20 INJECTION, SOLUTION INTRAVENOUS ONCE
Status: COMPLETED | OUTPATIENT
Start: 2024-08-13 | End: 2024-08-13

## 2024-08-13 RX ORDER — IPRATROPIUM BROMIDE AND ALBUTEROL SULFATE 2.5; .5 MG/3ML; MG/3ML
3 SOLUTION RESPIRATORY (INHALATION) ONCE AS NEEDED
Status: DISCONTINUED | OUTPATIENT
Start: 2024-08-13 | End: 2024-08-13 | Stop reason: HOSPADM

## 2024-08-13 RX ORDER — LIDOCAINE HYDROCHLORIDE 20 MG/ML
JELLY TOPICAL AS NEEDED
Status: DISCONTINUED | OUTPATIENT
Start: 2024-08-13 | End: 2024-08-13 | Stop reason: HOSPADM

## 2024-08-13 RX ORDER — HYDROCODONE BITARTRATE AND ACETAMINOPHEN 5; 325 MG/1; MG/1
1 TABLET ORAL ONCE AS NEEDED
Status: DISCONTINUED | OUTPATIENT
Start: 2024-08-13 | End: 2024-08-13 | Stop reason: HOSPADM

## 2024-08-13 RX ORDER — SODIUM CHLORIDE 9 MG/ML
9 INJECTION, SOLUTION INTRAVENOUS ONCE
Status: CANCELLED | OUTPATIENT
Start: 2024-08-13

## 2024-08-13 RX ORDER — DROPERIDOL 2.5 MG/ML
0.62 INJECTION, SOLUTION INTRAMUSCULAR; INTRAVENOUS
Status: DISCONTINUED | OUTPATIENT
Start: 2024-08-13 | End: 2024-08-13 | Stop reason: HOSPADM

## 2024-08-13 RX ORDER — SODIUM CHLORIDE 0.9 % (FLUSH) 0.9 %
3-10 SYRINGE (ML) INJECTION AS NEEDED
Status: DISCONTINUED | OUTPATIENT
Start: 2024-08-13 | End: 2024-08-13 | Stop reason: HOSPADM

## 2024-08-13 RX ORDER — FENTANYL CITRATE 50 UG/ML
50 INJECTION, SOLUTION INTRAMUSCULAR; INTRAVENOUS ONCE AS NEEDED
Status: DISCONTINUED | OUTPATIENT
Start: 2024-08-13 | End: 2024-08-13 | Stop reason: HOSPADM

## 2024-08-13 RX ORDER — LABETALOL HYDROCHLORIDE 5 MG/ML
5 INJECTION, SOLUTION INTRAVENOUS
Status: DISCONTINUED | OUTPATIENT
Start: 2024-08-13 | End: 2024-08-13 | Stop reason: HOSPADM

## 2024-08-13 RX ORDER — SODIUM CHLORIDE 0.9 % (FLUSH) 0.9 %
3 SYRINGE (ML) INJECTION EVERY 12 HOURS SCHEDULED
Status: DISCONTINUED | OUTPATIENT
Start: 2024-08-13 | End: 2024-08-13 | Stop reason: HOSPADM

## 2024-08-13 RX ORDER — PROMETHAZINE HYDROCHLORIDE 25 MG/1
25 SUPPOSITORY RECTAL ONCE AS NEEDED
Status: DISCONTINUED | OUTPATIENT
Start: 2024-08-13 | End: 2024-08-13 | Stop reason: HOSPADM

## 2024-08-13 RX ORDER — NALOXONE HCL 0.4 MG/ML
0.2 VIAL (ML) INJECTION AS NEEDED
Status: DISCONTINUED | OUTPATIENT
Start: 2024-08-13 | End: 2024-08-13 | Stop reason: HOSPADM

## 2024-08-13 RX ORDER — PROMETHAZINE HYDROCHLORIDE 25 MG/1
25 TABLET ORAL ONCE AS NEEDED
Status: DISCONTINUED | OUTPATIENT
Start: 2024-08-13 | End: 2024-08-13 | Stop reason: HOSPADM

## 2024-08-13 RX ORDER — ONDANSETRON 2 MG/ML
4 INJECTION INTRAMUSCULAR; INTRAVENOUS ONCE AS NEEDED
Status: DISCONTINUED | OUTPATIENT
Start: 2024-08-13 | End: 2024-08-13 | Stop reason: HOSPADM

## 2024-08-13 RX ORDER — PHENYLEPHRINE HCL IN 0.9% NACL 1 MG/10 ML
SYRINGE (ML) INTRAVENOUS AS NEEDED
Status: DISCONTINUED | OUTPATIENT
Start: 2024-08-13 | End: 2024-08-13 | Stop reason: SURG

## 2024-08-13 RX ORDER — DIPHENHYDRAMINE HYDROCHLORIDE 50 MG/ML
12.5 INJECTION INTRAMUSCULAR; INTRAVENOUS
Status: DISCONTINUED | OUTPATIENT
Start: 2024-08-13 | End: 2024-08-13 | Stop reason: HOSPADM

## 2024-08-13 RX ORDER — SODIUM CHLORIDE, SODIUM LACTATE, POTASSIUM CHLORIDE, CALCIUM CHLORIDE 600; 310; 30; 20 MG/100ML; MG/100ML; MG/100ML; MG/100ML
9 INJECTION, SOLUTION INTRAVENOUS CONTINUOUS
Status: DISCONTINUED | OUTPATIENT
Start: 2024-08-13 | End: 2024-08-13

## 2024-08-13 RX ORDER — ONDANSETRON 2 MG/ML
INJECTION INTRAMUSCULAR; INTRAVENOUS AS NEEDED
Status: DISCONTINUED | OUTPATIENT
Start: 2024-08-13 | End: 2024-08-13 | Stop reason: SURG

## 2024-08-13 RX ORDER — FLUMAZENIL 0.1 MG/ML
0.2 INJECTION INTRAVENOUS AS NEEDED
Status: DISCONTINUED | OUTPATIENT
Start: 2024-08-13 | End: 2024-08-13 | Stop reason: HOSPADM

## 2024-08-13 RX ORDER — LIDOCAINE HYDROCHLORIDE 20 MG/ML
INJECTION, SOLUTION INFILTRATION; PERINEURAL AS NEEDED
Status: DISCONTINUED | OUTPATIENT
Start: 2024-08-13 | End: 2024-08-13 | Stop reason: SURG

## 2024-08-13 RX ORDER — MIDAZOLAM HYDROCHLORIDE 1 MG/ML
0.5 INJECTION INTRAMUSCULAR; INTRAVENOUS
Status: DISCONTINUED | OUTPATIENT
Start: 2024-08-13 | End: 2024-08-13 | Stop reason: HOSPADM

## 2024-08-13 RX ORDER — PROPOFOL 10 MG/ML
INJECTION, EMULSION INTRAVENOUS AS NEEDED
Status: DISCONTINUED | OUTPATIENT
Start: 2024-08-13 | End: 2024-08-13 | Stop reason: SURG

## 2024-08-13 RX ORDER — HYDROMORPHONE HYDROCHLORIDE 1 MG/ML
0.25 INJECTION, SOLUTION INTRAMUSCULAR; INTRAVENOUS; SUBCUTANEOUS
Status: DISCONTINUED | OUTPATIENT
Start: 2024-08-13 | End: 2024-08-13 | Stop reason: HOSPADM

## 2024-08-13 RX ORDER — SODIUM CHLORIDE 9 MG/ML
INJECTION, SOLUTION INTRAVENOUS CONTINUOUS PRN
Status: DISCONTINUED | OUTPATIENT
Start: 2024-08-13 | End: 2024-08-13 | Stop reason: SURG

## 2024-08-13 RX ORDER — EPHEDRINE SULFATE 50 MG/ML
5 INJECTION, SOLUTION INTRAVENOUS ONCE AS NEEDED
Status: DISCONTINUED | OUTPATIENT
Start: 2024-08-13 | End: 2024-08-13 | Stop reason: HOSPADM

## 2024-08-13 RX ORDER — HYDROCODONE BITARTRATE AND ACETAMINOPHEN 7.5; 325 MG/1; MG/1
1 TABLET ORAL EVERY 4 HOURS PRN
Status: DISCONTINUED | OUTPATIENT
Start: 2024-08-13 | End: 2024-08-13 | Stop reason: HOSPADM

## 2024-08-13 RX ORDER — HYDRALAZINE HYDROCHLORIDE 20 MG/ML
5 INJECTION INTRAMUSCULAR; INTRAVENOUS
Status: DISCONTINUED | OUTPATIENT
Start: 2024-08-13 | End: 2024-08-13 | Stop reason: HOSPADM

## 2024-08-13 RX ORDER — FENTANYL CITRATE 50 UG/ML
25 INJECTION, SOLUTION INTRAMUSCULAR; INTRAVENOUS
Status: DISCONTINUED | OUTPATIENT
Start: 2024-08-13 | End: 2024-08-13 | Stop reason: HOSPADM

## 2024-08-13 RX ORDER — SEVELAMER CARBONATE 800 MG/1
800 TABLET, FILM COATED ORAL
Status: DISCONTINUED | OUTPATIENT
Start: 2024-08-13 | End: 2024-08-15

## 2024-08-13 RX ORDER — LIDOCAINE HYDROCHLORIDE 10 MG/ML
0.5 INJECTION, SOLUTION INFILTRATION; PERINEURAL ONCE AS NEEDED
Status: DISCONTINUED | OUTPATIENT
Start: 2024-08-13 | End: 2024-08-13 | Stop reason: HOSPADM

## 2024-08-13 RX ADMIN — SODIUM BICARBONATE 150 MEQ: 84 INJECTION, SOLUTION INTRAVENOUS at 20:15

## 2024-08-13 RX ADMIN — Medication 100 MCG: at 12:40

## 2024-08-13 RX ADMIN — SODIUM CHLORIDE: 9 INJECTION, SOLUTION INTRAVENOUS at 12:30

## 2024-08-13 RX ADMIN — ASPIRIN 81 MG: 81 TABLET, COATED ORAL at 08:28

## 2024-08-13 RX ADMIN — LIDOCAINE HYDROCHLORIDE 60 MG: 20 INJECTION, SOLUTION INFILTRATION; PERINEURAL at 12:35

## 2024-08-13 RX ADMIN — INSULIN LISPRO 2 UNITS: 100 INJECTION, SOLUTION INTRAVENOUS; SUBCUTANEOUS at 16:42

## 2024-08-13 RX ADMIN — AMLODIPINE BESYLATE 5 MG: 5 TABLET ORAL at 08:28

## 2024-08-13 RX ADMIN — Medication 100 MCG: at 12:51

## 2024-08-13 RX ADMIN — ONDANSETRON 4 MG: 2 INJECTION INTRAMUSCULAR; INTRAVENOUS at 13:03

## 2024-08-13 RX ADMIN — Medication 10 ML: at 20:58

## 2024-08-13 RX ADMIN — Medication 9 ML: at 12:00

## 2024-08-13 RX ADMIN — SEVELAMER CARBONATE 800 MG: 800 TABLET, FILM COATED ORAL at 20:58

## 2024-08-13 RX ADMIN — HYDRALAZINE HYDROCHLORIDE 25 MG: 25 TABLET ORAL at 16:41

## 2024-08-13 RX ADMIN — ATORVASTATIN CALCIUM 10 MG: 20 TABLET, FILM COATED ORAL at 08:28

## 2024-08-13 RX ADMIN — MIDAZOLAM 0.5 MG: 1 INJECTION INTRAMUSCULAR; INTRAVENOUS at 11:58

## 2024-08-13 RX ADMIN — Medication 10 ML: at 09:32

## 2024-08-13 RX ADMIN — TAMSULOSIN HYDROCHLORIDE 0.4 MG: 0.4 CAPSULE ORAL at 08:28

## 2024-08-13 RX ADMIN — ONDANSETRON 4 MG: 2 INJECTION INTRAMUSCULAR; INTRAVENOUS at 15:00

## 2024-08-13 RX ADMIN — CEFTRIAXONE SODIUM 1000 MG: 1 INJECTION, POWDER, FOR SOLUTION INTRAMUSCULAR; INTRAVENOUS at 12:06

## 2024-08-13 RX ADMIN — PROPOFOL 140 MG: 10 INJECTION, EMULSION INTRAVENOUS at 12:35

## 2024-08-13 RX ADMIN — MEMANTINE HYDROCHLORIDE 10 MG: 10 TABLET, FILM COATED ORAL at 08:28

## 2024-08-13 RX ADMIN — ONDANSETRON 4 MG: 4 TABLET, ORALLY DISINTEGRATING ORAL at 10:04

## 2024-08-13 RX ADMIN — HYDROMORPHONE HYDROCHLORIDE 0.25 MG: 1 INJECTION, SOLUTION INTRAMUSCULAR; INTRAVENOUS; SUBCUTANEOUS at 14:01

## 2024-08-13 RX ADMIN — HYDRALAZINE HYDROCHLORIDE 25 MG: 25 TABLET ORAL at 20:58

## 2024-08-13 RX ADMIN — HYDROMORPHONE HYDROCHLORIDE 0.25 MG: 1 INJECTION, SOLUTION INTRAMUSCULAR; INTRAVENOUS; SUBCUTANEOUS at 13:54

## 2024-08-13 RX ADMIN — INSULIN LISPRO 3 UNITS: 100 INJECTION, SOLUTION INTRAVENOUS; SUBCUTANEOUS at 21:06

## 2024-08-13 RX ADMIN — FAMOTIDINE 20 MG: 10 INJECTION INTRAVENOUS at 11:58

## 2024-08-13 RX ADMIN — DONEPEZIL HYDROCHLORIDE 10 MG: 10 TABLET, FILM COATED ORAL at 20:58

## 2024-08-13 NOTE — PROGRESS NOTES
[facility] Progress [facility]    08/13/24 08:24 EDT        Creatinine up.  Will plan for cystoscopy and attempted bilateral stent placement today.

## 2024-08-13 NOTE — PLAN OF CARE
Goal Outcome Evaluation:      Pt is an 77 yo male who was admitted 08/11/2024 for acute renal failure.  Pt is A/O to self, disoriented to time, place and situation,  PWD, PMSx4, PERRL, - JVD, trachea midline, = rise and fall of chest wall with respirations, CEBBS although diminished, pt free from complaints at the present time.  Pt on CBI due clots and decreased UOP.  Pt was taken around 1100 to OR for cystotomy and stents.  Pt was placed in restraints due to his confusion and for his own safety.  Restraint documentation done.  Orders placed by attending.  Pt returned to the floor without the restraints.  Pt states he is not in pain, did have an episode of N/V see MAR.  Shift otherwise uneventful.  Worked on education and care plan goals.  Safety rounds done every two hours, sooner if needed.

## 2024-08-13 NOTE — TELEPHONE ENCOUNTER
08/13/24 IN BASKET MESSAGE REQUEST TO BOOK CT HEAD W/O CONTRAST AND HOSPITAL OFFICE FOLLOW UP.  PT IS SCHEDULED FOR CT HEAD W/O CONTRAST ON 09/13/24 AT [facility] AT [address].  PT IS SCHEDULED FOR HOSPITAL FOLLOW UP WITH DR SÁNCHEZ ON 10/11/24 AT 08:30 A.M.  I MAILED APPOINTMENT REMINDERS FOR BOTH SERVICES TO ADDRESS ON FILE.  PT IS INPT 524-1    ++HUB: OK TO RESCHEDULE/COMMUNICATION, IF NEEDED. THANK YOU+++

## 2024-08-13 NOTE — PROGRESS NOTES
Name: Froy Ngo ADMIT: 2024   : 1945  PCP: Ryan Gutierrez MD    MRN: [id number] LOS: 2 days   AGE/SEX: 78 y.o. male  ROOM: [facility] Main OR/MAIN OR     Subjective   Subjective   In bed, primary RN, , and additional pre-op personnel in the room. Patient is confused, disoriented, trying to fight with staff, pulling at lines and tubes. He required soft wrist restrains for his and staff safety. Planning on procedure with urology today for which his wife consented.        Objective   Objective   Vital Signs  Temp:  [98.1 °F (36.7 °C)-98.4 °F (36.9 °C)] 98.4 °F (36.9 °C)  Heart Rate:  [] 104  Resp:  [17-18] 17  BP: (116-144)/(59-70) 139/70  SpO2:  [95 %-96 %] 95 %  on   ;   Device (Oxygen Therapy): room air  Body mass index is 27.36 kg/m².  Physical Exam  Constitutional:       General: He is not in acute distress.     Comments: Yelling, confused, pulling at lines and tubes   HENT:      Head: Normocephalic and atraumatic.   Cardiovascular:      Rate and Rhythm: Normal rate. Tachycardia present.      Comments: On telemetry  Pulmonary:      Effort: Pulmonary effort is normal. No respiratory distress.   Genitourinary:     Comments: Rodriguez in place with light yellow urine  Skin:     General: Skin is warm and dry.   Neurological:      Mental Status: He is disoriented.      Comments: Oriented but forgetful   Psychiatric:         Mood and Affect: Affect is angry.         Behavior: Behavior is uncooperative and agitated.         Judgment: Judgment is inappropriate.         Results Review     I reviewed the patient's new clinical results.  Results from last 7 days   Lab Units 24  0229 24  0750 24  1001 24  0227   WBC 10*3/mm3 19.30* 18.57* 19.87* 8.17   HEMOGLOBIN g/dL 10.7* 11.1* 11.8* 10.7*   PLATELETS 10*3/mm3 330 312 279 268     Results from last 7 days   Lab Units 24  0229 24  1506 24  0750 24  1001   SODIUM mmol/L 132* 132* 131* 132*  "  POTASSIUM mmol/L 4.8 4.5 4.4 4.3   CHLORIDE mmol/L 103 100 99 99   CO2 mmol/L 13.9* 14.2* 14.5* 18.0*   BUN mg/dL 66* 67* 63* 59*   CREATININE mg/dL 8.77* 8.26* 7.56* 5.84*   GLUCOSE mg/dL 137* 129* 130* 150*   Estimated Creatinine Clearance: 8.7 mL/min (A) (by C-G formula based on SCr of 8.77 mg/dL (H)).  Results from last 7 days   Lab Units 08/13/24  0229 08/12/24  1506 08/12/24  0750 08/11/24  1001   ALBUMIN g/dL 2.3* 2.4* 2.4* 2.6*   BILIRUBIN mg/dL  --   --   --  0.8   ALK PHOS U/L  --   --   --  108   AST (SGOT) U/L  --   --   --  16   ALT (SGPT) U/L  --   --   --  16     Results from last 7 days   Lab Units 08/13/24  0229 08/12/24  1506 08/12/24  0750 08/11/24  1001 08/09/24  0325 08/08/24  0313 08/07/24  0227   CALCIUM mg/dL 7.9* 7.7* 8.1* 8.3* 7.9*   < > 7.8*   ALBUMIN g/dL 2.3* 2.4* 2.4* 2.6* 2.5*   < > 2.3*   MAGNESIUM mg/dL 1.7  --  1.5*  --   --   --  1.6   PHOSPHORUS mg/dL 8.2* 7.1* 7.1*  --  3.5   < > 4.1    < > = values in this interval not displayed.     Results from last 7 days   Lab Units 08/11/24  1313 08/11/24  1001   PROCALCITONIN ng/mL  --  0.34*   LACTATE mmol/L 0.8  --    No results found for: \"COVID19\"  Glucose   Date/Time Value Ref Range Status   08/13/2024 0608 132 (H) 70 - 130 mg/dL Final   08/12/2024 2114 130 70 - 130 mg/dL Final   08/12/2024 1615 132 (H) 70 - 130 mg/dL Final   08/12/2024 1051 168 (H) 70 - 130 mg/dL Final   08/12/2024 0619 127 70 - 130 mg/dL Final   08/11/2024 2023 148 (H) 70 - 130 mg/dL Final   08/11/2024 1623 136 (H) 70 - 130 mg/dL Final       CT Abdomen Pelvis Without Contrast  Narrative: CT OF THE ABDOMEN AND PELVIS WITHOUT CONTRAST 08/11/2024     HISTORY: Abdominal pain. Diminished urinary output.     Spiral images were obtained from the lung bases to the symphysis pubis.  No intravenous or oral contrast was given.     Small left and very minimal right pleural effusions are seen with some  minimal bibasilar atelectasis.     Liver appears unremarkable. There are " multiple gallstones. No  gallbladder inflammatory changes are seen. Pancreas is atrophic. Spleen  and adrenals appear unremarkable.     There is mild-to-moderate bilateral hydronephrosis with mild ureteral  dilatation. This finding is also seen on the 08/04/2024 study. There is  bilateral perinephric stranding. There is bilateral periureteral  stranding as well. There is an approximately 4.1 cm low-density right  renal lesion presumably a cyst on this unenhanced scan. No intrarenal  stones or ureteral stones are seen.     There is some aortic calcification. Rodriguez catheter is seen in the  urinary bladder. There is mild urinary bladder wall thickening.  Moderately severe prostate gland enlargement is seen. Small left  inguinal hernia seen containing small amount of fluid. There is colonic  diverticulosis.     Impression: 1. Bilateral hydroureteronephrosis. There is also bilateral perinephric  and periureteral stranding. This finding is also seen on the previous  study of 08/04/2024.  2. Low-density right renal cyst.  3. Small pleural effusions with bibasilar atelectasis.  4. Cholelithiasis.  5. Rodriguez catheter is seen in the urinary bladder as well as some air.  There is urinary bladder wall thickening with moderately severe prostate  gland enlargement.  6. No ureteral stones are seen.     Radiation dose reduction techniques were utilized, including automated  exposure control and exposure modulation based on body size.        This report was finalized on 8/11/2024 10:36 PM by Dr. Rajiv Canela M.D on Workstation: [id number]     XR Chest 1 View  Narrative: XR CHEST 1 VW-8/11/2024     HISTORY: Possible pneumonia.     The heart size is within normal limits. Lungs appear clear. There is  some aortic calcification. Bony structures appear unremarkable.     Impression: 1. No acute process        This report was finalized on 8/11/2024 12:23 PM by Dr. Rajiv Canela M.D on Workstation: [id number]       Scheduled  Medications  amLODIPine, 5 mg, Oral, Q24H  [Transfer Hold] aspirin, 81 mg, Oral, Daily  [Transfer Hold] atorvastatin, 10 mg, Oral, Daily  cefTRIAXone, 1,000 mg, Intravenous, Q24H  [Transfer Hold] donepezil, 10 mg, Oral, Nightly  hydrALAZINE, 25 mg, Oral, Q8H  [Transfer Hold] insulin lispro, 2-7 Units, Subcutaneous, 4x Daily AC & at Bedtime  [Transfer Hold] memantine, 10 mg, Oral, Daily  [Transfer Hold] sodium chloride, 10 mL, Intravenous, Q12H  [Transfer Hold] tamsulosin, 0.4 mg, Oral, Daily    Infusions  sodium bicarbonate, 150 mEq    Diet  NPO Diet NPO Type: Strict NPO         I have personally reviewed:  [x]  Laboratory   []  Microbiology   []  Radiology   [x]  EKG/Telemetry   []  Cardiology/Vascular   []  Pathology   []  Records    Assessment/Plan     Active Hospital Problems    Diagnosis  POA    **Acute renal failure [N17.9]  Yes    Type 2 diabetes mellitus with hyperglycemia [E11.65]  Yes    Essential hypertension, benign [I10]  Yes    Dementia without behavioral disturbance [F03.90]  Yes    Obstructive uropathy [N13.9]  Yes    Bilateral hydronephrosis [N13.30]  Yes      Resolved Hospital Problems   No resolved problems to display.       Mr. Ngo is a 78 y.o.  with a history of type 2 diabetes, hyperlipidemia, Alzheimer's dementia, recent admission for euglycemic DKA, NEHEMIAS/obstructive uropathy with bilateral hydronephrosis-discharged with in-dwelling Rodriguez who presents with decreased urine output found to have NEHEMIAS.       NEHEMIAS  Non-anion gap metabolic acidosis  Urinary retention from bladder outlet obstruction with bilateral hydroureteronephrosis  Hematuria- had 3-way irrigation in ED- urine clear today  Possible UTI- ED unable to get sample prior to starting abx  - renal and urology consulted; renal managing IVF  - bilateral hydroureteronephrosis on Ct similar to CT last week  - ct w/ perinephric/periureteral stranding, procal slightly elevated, WBC 19; cont rocephin, urine cx if able; may be unreliable  since patient already received abx  - Cr continues to rise, urology planning stent today in the OR  - Nephrology note reviewed, possible need for HD if renal function continues to decline     Leukocytosis  - appreciate ID seeing patient  -Leukocytosis possibly related to urinary obstruction, no obvious infection however urine culture may have been affected by antibiotics prior to collection, given patient having a urologic procedure they recommend 7-day course of Rocephin, can transition to cefpodoxime if ready for discharge before completing    Abnormal MRI brain  - MRI from last admit reviewed-lateral cerebral subdural hygromas pachymeningeal thickening possibly related to chronic subdural hematoma versus hygroma, acute subdural hygroma not excluded; no history of recent head trauma  -Nicolasa consulted- no acute surgical interventions indicated; findings likely incidental - they recommend follow up 1 month in clinic with CT head     Type 2 diabetes  - hold home meds  - continue SSI while admitted     Hypertension  - resume amlodipine and hydralazine     Dementia  - cont donepezil, namenda  - frequent reorientation; high risk for delirium  -MRI from 8/9 with chronic microhemorrhages likely secondary to 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  Full code.  Discussed with patient, nursing staff, and private caregiver .  Anticipated discharge SNF, D        Shruthi Acosta MD  [facility]  08/13/24  11:25 EDT    I wore protective equipment throughout this patient encounter including gloves.  Hand hygiene was performed before donning protective equipment and after removal when leaving the room.         Copied text in this note has been reviewed and is accurate as of 08/13/24.

## 2024-08-13 NOTE — PROGRESS NOTES
[facility] Progress Note      Patient Name: Froy Ngo  : 1945  MRN: [id number]  Primary Care Physician:  Ryan Gutierrez MD  Date of admission: 2024    Subjective     Interval History:   F/U NEHEMIAS vs NEHEMIAS/CKD    Laying in bed.  Denies any nausea or vomiting.  Ready to go to the OR for bilateral double-J placement.  Denies any uremic symptoms.  Review of Systems:   14 point review of systems is otherwise negative except for mentioned above on HPI     Objective     Vitals:   Temp:  [97.5 °F (36.4 °C)-98.4 °F (36.9 °C)] 97.5 °F (36.4 °C)  Heart Rate:  [] 93  Resp:  [16-18] 16  BP: ()/(54-86) 105/86  Flow (L/min):  [2] 2    Intake/Output Summary (Last 24 hours) at 2024 1623  Last data filed at 2024 0644  Gross per 24 hour   Intake 118 ml   Output 175 ml   Net -57 ml       Physical Exam:    Constitutional: Awake, alert, NAD, chronically ill, frail  HEENT: Sclera anicteric, no conjunctival injection  Neck: Supple, no JVD  Respiratory: Clear to auscultation bilaterally, nonlabored on RA  Cardiovascular: RRR, no rub  Gastrointestinal: BS +, soft, nontender and nondistended  : No palpable bladder; Rodriguez catheter anchored  Musculoskeletal: No significant edema, no clubbing or cyanosis  Psychiatric: Appropriate affect, cooperative  Neurologic: No asterixis, moving all extremities, normal speech   Skin: Warm and dry        Scheduled Meds:     amLODIPine, 5 mg, Oral, Q24H  aspirin, 81 mg, Oral, Daily  atorvastatin, 10 mg, Oral, Daily  cefTRIAXone, 1,000 mg, Intravenous, Q24H  donepezil, 10 mg, Oral, Nightly  hydrALAZINE, 25 mg, Oral, Q8H  insulin lispro, 2-7 Units, Subcutaneous, 4x Daily AC & at Bedtime  memantine, 10 mg, Oral, Daily  sodium chloride, 10 mL, Intravenous, Q12H  tamsulosin, 0.4 mg, Oral, Daily      IV Meds:   sodium bicarbonate, 150 mEq        Results Reviewed:   I have personally reviewed the results from the time of this admission to  8/13/2024 16:23 EDT     Results from last 7 days   Lab Units 08/13/24  0229 08/12/24  1506 08/12/24  0750 08/11/24  1001   SODIUM mmol/L 132* 132* 131* 132*   POTASSIUM mmol/L 4.8 4.5 4.4 4.3   CHLORIDE mmol/L 103 100 99 99   CO2 mmol/L 13.9* 14.2* 14.5* 18.0*   BUN mg/dL 66* 67* 63* 59*   CREATININE mg/dL 8.77* 8.26* 7.56* 5.84*   CALCIUM mg/dL 7.9* 7.7* 8.1* 8.3*   BILIRUBIN mg/dL  --   --   --  0.8   ALK PHOS U/L  --   --   --  108   ALT (SGPT) U/L  --   --   --  16   AST (SGOT) U/L  --   --   --  16   GLUCOSE mg/dL 137* 129* 130* 150*       Estimated Creatinine Clearance: 8.7 mL/min (A) (by C-G formula based on SCr of 8.77 mg/dL (H)).    Results from last 7 days   Lab Units 08/13/24  0229 08/12/24  1506 08/12/24  0750 08/08/24  0313 08/07/24  0227   MAGNESIUM mg/dL 1.7  --  1.5*  --  1.6   PHOSPHORUS mg/dL 8.2* 7.1* 7.1*   < > 4.1    < > = values in this interval not displayed.       Results from last 7 days   Lab Units 08/11/24  1001   URIC ACID mg/dL 8.1*       Results from last 7 days   Lab Units 08/13/24  0229 08/12/24  0750 08/11/24  1001 08/07/24  0227   WBC 10*3/mm3 19.30* 18.57* 19.87* 8.17   HEMOGLOBIN g/dL 10.7* 11.1* 11.8* 10.7*   PLATELETS 10*3/mm3 330 312 279 268             Assessment / Plan     ASSESSMENT:  1.  NEHEMIAS with unknonw baseline creatinine likely secondary to severe obstructive uropathy earlier this month with substantial though not complete recovery (SCR 2.2 upon discharge 8/9).  Wonder if CT (8/11) showing bilateral hydronephrosis is simply a chronic finding, rather than indicating any acute finding.  Current NEHEMIAS may actually represent just prerenal azotemia from lackluster appetite since leaving the hospital.  Looks dry; likely NAGMA.  Urine studies unreliable given catheter malfunction.   2.  Obstructive uropathy with significant BPH:  Suspicion is for atonic bladder; Urology consulted  3.  Alzheimer's dementia  4.  Anemia  5.  Hypertension  6.  Leukocytosis  7.  DM2:  apparently was  receiving metformin at the nursing home  8. High anion gap metabolic acidosis secondary to acute kidney injury    PLAN:  Kidney function trending down.  Plan noted by urology for possible double-J stenting today hopefully this will help his kidneys.  No acute indication for dialysis today.  Will continue to assess the need for dialysis on daily basis  Serologies are pending  Switch IV fluid to IV bicarbonate given metabolic acidosis  Start Renvela for hyperphosphatemia  Continue to hold metformin given acute kidney injury metabolic acidosis    Thank you for involving us in the care of Froy OLIVIA IqbalCraig.  Please feel free to call with any questions.    Jessica Aragon MD   08/13/24  16:23 EDT    [facility]  [phone]    Please note that portions of this note were completed with a voice recognition program.

## 2024-08-13 NOTE — CONSULTS
"Referring Provider: No ref. provider found  Reason for Consultation:     persistent leukocytosis     Chief Complaint   Patient presents with    Urinary Retention         Subjective   History of present illness: Patient is an 78-year-old male with past medical history of dementia, type 2 diabetes and BPH who presents with decreased urine output.  ID consulted for \"persistent leukocytosis\".    Patient today reports that he still feels as though he is unable to pee.  Denies any fevers or chills.  Family at bedside who assist with history.  Denies any current burning with urination.  States he is not currently having abdominal pain.    On presentation patient is afebrile with worsening creatinine now peaking at 8.77.  Leukocytosis has been stable at 19 with normal lactate and procalcitonin of 0.34 in the setting of renal dysfunction.  He has been maintained on ceftriaxone.  Blood cultures have been no growth to date.  Urine culture has been no growth.  Imaging of the abdomen shows bilateral stranding surrounding the kidneys, ureters and bladder.  Enlarged prostate also noted.  Plans for ureteral stenting today by urology.    Past Medical History:   Diagnosis Date    Dementia     Diabetes mellitus     Elevated cholesterol     Prostate disorder     Urinary retention        History reviewed. No pertinent surgical history.    family history is not on file.     reports that he has never smoked. He has never been exposed to tobacco smoke. He has never used smokeless tobacco. He reports that he does not drink alcohol and does not use drugs.     No Known Allergies    Medication:  Antibiotics:  Anti-Infectives (From admission, onward)      Ordered     Dose/Rate Route Frequency Start Stop    08/11/24 1519  cefTRIAXone (ROCEPHIN) 1,000 mg in sodium chloride 0.9 % 100 mL MBP        Ordering Provider: Shruthi Acosta MD    1,000 mg  200 mL/hr over 30 Minutes Intravenous Every 24 Hours 08/12/24 1300 08/16/24 1259    08/11/24 1149 " " cefTRIAXone (ROCEPHIN) 1,000 mg in sodium chloride 0.9 % 100 mL MBP        Ordering Provider: Fatuma Paredes MD    1,000 mg  200 mL/hr over 30 Minutes Intravenous Once 08/11/24 1205 08/11/24 1354              Objective     Physical Exam:   Vital Signs   Temp:  [98.1 °F (36.7 °C)-98.4 °F (36.9 °C)] 98.4 °F (36.9 °C)  Heart Rate:  [] 104  Resp:  [17-18] 17  BP: (116-144)/(59-70) 139/70    GENERAL: Awake and alert, in no acute distress.   HEENT: Oropharynx is clear. Hearing is grossly normal.   EYES: PERRL. No conjunctival injection. No lid lag.   LUNGS: Normal work of breathing  SKIN: Warm and dry without cutaneous eruptions in exposed areas  PSYCHIATRIC: Confused    Results Review:   I reviewed the patient's new clinical results.  I reviewed the patient's new imaging results and agree with the interpretation.  I reviewed the patient's other test results and agree with the interpretation    Lab Results   Component Value Date    WBC 19.30 (H) 08/13/2024    HGB 10.7 (L) 08/13/2024    HCT 32.1 (L) 08/13/2024    MCV 92.0 08/13/2024     08/13/2024       No results found for: \"VANCOPEAK\", \"VANCOTROUGH\", \"VANCORANDOM\"    Lab Results   Component Value Date    GLUCOSE 137 (H) 08/13/2024    BUN 66 (H) 08/13/2024    CREATININE 8.77 (H) 08/13/2024    BCR 7.5 08/13/2024    CO2 13.9 (L) 08/13/2024    CALCIUM 7.9 (L) 08/13/2024    ALBUMIN 2.3 (L) 08/13/2024    AST 16 08/11/2024    ALT 16 08/11/2024         Estimated Creatinine Clearance: 8.7 mL/min (A) (by C-G formula based on SCr of 8.77 mg/dL (H)).      Microbiology:  8/4 urine culture no growth  8/4 blood culture no growth  8/11 blood cultures no growth to date    Radiology:  8/11 CT abdomen and pelvis report reviewed with bilateral hydronephrosis.  Noted bilateral perinephric and ureteral stranding.  Thickening of the bladder and severe prostate gland enlargement.    8/9 MRI head report reviewed with mild chronic small vessel ischemic changes and several chronic " microhemorrhages.  Pachymeningeal thickening noted supratentorially.    8/4 chest x-ray reviewed by me with no acute infiltrate.    Assessment     #Leukocytosis  #NEHEMIAS  #Bilateral hydronephrosis  #BPH    Leukocytosis may be related to ongoing urinary obstruction with plans for stenting today.  Cultures have all been negative and imaging of the abdomen reassuring for lack of obvious infection.  Urine culture has been negative but did receive antibiotics prior to sampling of the urine so unclear if this may affected results.  Chest and head imaging have also been unrevealing.  Lactate has been negative and procalcitonin is underwhelming in the setting of his worsening renal failure.    Since patient is having urologic procedure would continue ceftriaxone 2 g daily and complete out 7-day course for complicated urinary tract infection.  If discharged could be transition to cefpodoxime 200 mg twice daily.      Thank you for allowing me to be involved in the care of this patient. Infectious diseases will sign off at this time with antibiotics plan in place, but please call me at [phone] if any further ID questions or new ID concerns.

## 2024-08-13 NOTE — ANESTHESIA PREPROCEDURE EVALUATION
Anesthesia Evaluation     Patient summary reviewed and Nursing notes reviewed   NPO Solid Status: > 8 hours  NPO Liquid Status: > 2 hours           Airway   Mallampati: II  TM distance: <3 FB  Neck ROM: full  Possible difficult intubation  Dental - normal exam     Pulmonary - normal exam    breath sounds clear to auscultation  Cardiovascular     ECG reviewed  Rhythm: regular  Rate: abnormal    (+) hypertension 2 medications or greater, hyperlipidemia    PE comment: ST/rate 106    Neuro/Psych  (+) psychiatric history, dementia  GI/Hepatic/Renal/Endo    (+) renal disease- ARF, diabetes mellitus type 2    ROS Comment: Bilateral hydronephrosis    Musculoskeletal     Abdominal  - normal exam   Substance History      OB/GYN          Other   blood dyscrasia anemia,       Other Comment: Hgb 10.7                Anesthesia Plan    ASA 3     general     (Probable LMA/may want CMAC for intubation if GETA)  intravenous induction     Anesthetic plan, risks, benefits, and alternatives have been provided, discussed and informed consent has been obtained with: patient.      CODE STATUS:    Code Status (Patient has no pulse and is not breathing): CPR (Attempt to Resuscitate)  Medical Interventions (Patient has pulse or is breathing): Full Support

## 2024-08-13 NOTE — TELEPHONE ENCOUNTER
----- Message from Dino Henry sent at 8/12/2024 10:02 AM EDT -----  Regarding: Follow-up CT head in 1 month  Patient needs outpatient clinic appointment for follow-up CT scan of the head without contrast in 1 month

## 2024-08-13 NOTE — ANESTHESIA PROCEDURE NOTES
Airway  Urgency: elective    Date/Time: 8/13/2024 12:38 PM  Airway not difficult    General Information and Staff    Patient location during procedure: OR  Anesthesiologist: Martinez Gordon MD  CRNA/CAA: Katalina Soto CRNA    Indications and Patient Condition  Indications for airway management: airway protection    Preoxygenated: yes  MILS not maintained throughout  Mask difficulty assessment: 1 - vent by mask    Final Airway Details  Final airway type: supraglottic airway      Successful airway: classic  Size 4     Number of attempts at approach: 1  Assessment: lips, teeth, and gum same as pre-op    Additional Comments  Preoxygenation FEO2 >85, SIVI, LMA placed with ease, teeth/lips as preop. Secured and placement confirmed.

## 2024-08-13 NOTE — ANESTHESIA POSTPROCEDURE EVALUATION
"Patient: Froy Ngo    Procedure Summary       Date: 08/13/24 Room / Location: [facility] OR  / [facility] MAIN OR    Anesthesia Start: 1229 Anesthesia Stop: 1311    Procedure: BILATERAL CYSTOSCOPY URETERAL CATHETER/STENT INSERTION WITH BILATERAL RETROGRADES (Bilateral) Diagnosis:       Bilateral hydronephrosis      (Bilateral hydronephrosis [N13.30])    Surgeons: Antoni Melendez MD Provider: Martinez Gordon MD    Anesthesia Type: general ASA Status: 3            Anesthesia Type: general    Vitals  Vitals Value Taken Time   /59 08/13/24 1415   Temp 36.5 °C (97.7 °F) 08/13/24 1310   Pulse 88 08/13/24 1415   Resp 16 08/13/24 1415   SpO2 92 % 08/13/24 1415           Post Anesthesia Care and Evaluation    Patient location during evaluation: bedside  Patient participation: complete - patient participated  Level of consciousness: awake  Pain management: adequate    Airway patency: patent  Anesthetic complications: No anesthetic complications    Cardiovascular status: acceptable  Respiratory status: acceptable  Hydration status: acceptable    Comments: */86   Pulse 93   Temp 36.4 °C (97.5 °F)   Resp 16   Ht 182.9 cm (72\")   Wt 91.5 kg (201 lb 11.5 oz)   SpO2 95%   BMI 27.36 kg/m²       "

## 2024-08-13 NOTE — PLAN OF CARE
Goal Outcome Evaluation:      Pt confused, RA. VSS. No acute changes during shift. Caregiver currently at bedside. CBI continued at slow rate. Creatinine continuing to trend upwards, 8.77 this morning. Possible plan for stent placement today according to Dr. Melendez's (Urology) recent note. Plan of care ongoing.

## 2024-08-13 NOTE — OP NOTE
CYSTOSCOPY URETERAL CATHETER/STENT INSERTION  Procedure Note    Froy Ngo  8/13/2024    Pre-op Diagnosis:   Bilateral hydronephrosis [N13.30]    Post-op Diagnosis:     Post-Op Diagnosis Codes:     * Bilateral hydronephrosis [N13.30]    Procedure(s):  BILATERAL CYSTOSCOPY URETERAL CATHETER/STENT INSERTION WITH BILATERAL RETROGRADES    Surgeon(s):  Antoni Melendez MD    Anesthesia: General    Staff:   Circulator: Kristi Barnes RN  Scrub Person: Jennifer Bloom    Estimated Blood Loss: minimal    Specimens:                * No orders in the log *      Drains:   Continuous Bladder Irrigation Triple-lumen 22 Fr (Active)       [REMOVED] Urethral Catheter Latex;Coude 16 Fr. (Removed)   Daily Indications Acute Urinary Retention 08/09/24 1351   Site Assessment Skin intact;Clean 08/09/24 0800   Collection Container Standard drainage bag 08/09/24 1351   Securement Method Leg strap 08/09/24 1351   Catheter care complete Yes 08/09/24 0642   Irrigation/Instillation Indication other (see comments) 08/11/24 1034   Irrigation Ease no resistance met 08/09/24 0800   Intake (mL) 240 mL 08/11/24 1319   Output (mL) 0 mL 08/11/24 1034       [REMOVED] Urethral Catheter Silicone 22 Fr. (Removed)       [REMOVED] Continuous Bladder Irrigation Triple-lumen Other (Comment) (Removed)   Daily Indications Acute Urinary Retention 08/13/24 0800   Site Assessment Skin intact;Clean 08/13/24 0800   Collection Container Standard drainage bag 08/13/24 0800   Securement Method Leg strap 08/13/24 0800   Catheter care complete Yes 08/12/24 1800   Patient Tolerance of Continuous Bladder Irrigation Tolerating well 08/13/24 0800   Rate Slow 08/13/24 0800   Irrigant Normal saline 08/13/24 0800   CBI Irrigation Intake (mL) 800 mL 08/13/24 0644   CBI Rodriguez Output (mL) 850 mL 08/13/24 0644   CBI Net Output (mL) 50 mL 08/13/24 0644       Findings: Severe bilateral ureteral obstruction with J hooking.  Old blood emanating from the right  orifice.  Significant lateral lobe hyperplasia.  No median lobe noted.  Moderate trabeculation of bladder.    Complications: None apparent    Indications: 78-year-old gentleman patient I have not seen for some time now with a history of BPH and elevated PSA.  Negative biopsies in the past now presents for acute renal insufficiency.  Had a catheter placed last week was sent to rehab and then got worse and became more confused brought in noted to have renal insufficiency.  His creatinine is worsening.  His CT scan shows no clots within the lumen the bladder it has a good seeded Rodriguez but he has bilateral hydro.  He now presents for bilateral stent placement.  Unfortunately with bladder drainage she has not improved.    Procedure: Patient was taken the operative suite after informed sent was obtained.  Given general anesthesia.  Placed in comfortable supine in lithotomy position.  Prepped and draped in sterile fashion.  Surgical timeout was performed.  His previous catheter had been removed.  Cystoscopy was performed.  Urethra was normal.  No stricture disease noted.  He had significant lateral lobe hyperplasia of his prostate.  Moderate trabeculation of bladder but not severe.  He had old clots in his bladder which I irrigated out.  I carefully examine the bladder I was able to identify the trigone and I found the left orifice.  A Pollick 5 Maltese catheter was placed.  There is significant J hooking then with the aid of a sensor wire able to able to get around the J-hook and up the ureter.  A retrograde pyelogram confirmed the lumen of the ureter which showed dilated ureter all the way the pelvis.  A guidewire was passed up we straighten the ureter out.  A 6 Maltese by 26 cm stent was placed the left side without a tether.  Similar fashion I found the right orifice but there was a lot of blood emanating from the orifice it was old clots it could have been just stuck to the orifice.  With the aid of the Pollick catheter  and guidewire was able to get through this J hooking up in the ureter and a retrograde showed and confirmed we are in the lumen of the ureter with dilation of the right collecting system.  There appeared to be some clots in the lumen of the pelvis and filling defects.  We are not any to position the scope that so I passed up the guidewire straighten the ureter out and I placed a 6 Kyrgyz by 6 cm double-J stent to the right side without a tether.  A 22 Kyrgyz three-way hematuria catheter was placed.  I left and on continuous irrigation at a very slow rate we will wean this down so we can monitor his urine output.  He was taken to recovery.  I talked to his family.      Antoni Melendez MD     Date: 8/13/2024  Time: 13:03 EDT

## 2024-08-14 LAB
ALBUMIN SERPL ELPH-MCNC: 2 G/DL (ref 2.9–4.4)
ALBUMIN SERPL-MCNC: 2.2 G/DL (ref 3.5–5.2)
ALBUMIN/GLOB SERPL: 0.7 {RATIO} (ref 0.7–1.7)
ALPHA1 GLOB SERPL ELPH-MCNC: 0.5 G/DL (ref 0–0.4)
ALPHA2 GLOB SERPL ELPH-MCNC: 0.9 G/DL (ref 0.4–1)
ANION GAP SERPL CALCULATED.3IONS-SCNC: 13.1 MMOL/L (ref 5–15)
B-GLOBULIN SERPL ELPH-MCNC: 0.7 G/DL (ref 0.7–1.3)
BUN SERPL-MCNC: 65 MG/DL (ref 8–23)
BUN/CREAT SERPL: 10.2 (ref 7–25)
CALCIUM SPEC-SCNC: 7.8 MG/DL (ref 8.6–10.5)
CHLORIDE SERPL-SCNC: 104 MMOL/L (ref 98–107)
CO2 SERPL-SCNC: 18.9 MMOL/L (ref 22–29)
CREAT SERPL-MCNC: 6.4 MG/DL (ref 0.76–1.27)
DEPRECATED RDW RBC AUTO: 39.1 FL (ref 37–54)
EGFRCR SERPLBLD CKD-EPI 2021: 8.2 ML/MIN/1.73
ERYTHROCYTE [DISTWIDTH] IN BLOOD BY AUTOMATED COUNT: 11.9 % (ref 12.3–15.4)
GAMMA GLOB SERPL ELPH-MCNC: 0.9 G/DL (ref 0.4–1.8)
GLOBULIN SER CALC-MCNC: 3 G/DL (ref 2.2–3.9)
GLUCOSE BLDC GLUCOMTR-MCNC: 140 MG/DL (ref 70–130)
GLUCOSE BLDC GLUCOMTR-MCNC: 169 MG/DL (ref 70–130)
GLUCOSE BLDC GLUCOMTR-MCNC: 176 MG/DL (ref 70–130)
GLUCOSE BLDC GLUCOMTR-MCNC: 222 MG/DL (ref 70–130)
GLUCOSE SERPL-MCNC: 159 MG/DL (ref 65–99)
HCT VFR BLD AUTO: 29.8 % (ref 37.5–51)
HGB BLD-MCNC: 10 G/DL (ref 13–17.7)
LABORATORY COMMENT REPORT: ABNORMAL
M PROTEIN SERPL ELPH-MCNC: ABNORMAL G/DL
MAGNESIUM SERPL-MCNC: 1.9 MG/DL (ref 1.6–2.4)
MCH RBC QN AUTO: 30.3 PG (ref 26.6–33)
MCHC RBC AUTO-ENTMCNC: 33.6 G/DL (ref 31.5–35.7)
MCV RBC AUTO: 90.3 FL (ref 79–97)
PHOSPHATE SERPL-MCNC: 5.9 MG/DL (ref 2.5–4.5)
PLATELET # BLD AUTO: 369 10*3/MM3 (ref 140–450)
PMV BLD AUTO: 9 FL (ref 6–12)
POTASSIUM SERPL-SCNC: 4.2 MMOL/L (ref 3.5–5.2)
PROT PATTERN SERPL ELPH-IMP: ABNORMAL
PROT SERPL-MCNC: 5 G/DL (ref 6–8.5)
RBC # BLD AUTO: 3.3 10*6/MM3 (ref 4.14–5.8)
SODIUM SERPL-SCNC: 136 MMOL/L (ref 136–145)
WBC NRBC COR # BLD AUTO: 10.7 10*3/MM3 (ref 3.4–10.8)

## 2024-08-14 PROCEDURE — 36415 COLL VENOUS BLD VENIPUNCTURE: CPT | Performed by: UROLOGY

## 2024-08-14 PROCEDURE — 82948 REAGENT STRIP/BLOOD GLUCOSE: CPT

## 2024-08-14 PROCEDURE — 83735 ASSAY OF MAGNESIUM: CPT | Performed by: UROLOGY

## 2024-08-14 PROCEDURE — 0 DEXTROSE 5 % SOLUTION 1,000 ML FLEX CONT: Performed by: HOSPITALIST

## 2024-08-14 PROCEDURE — 85027 COMPLETE CBC AUTOMATED: CPT | Performed by: UROLOGY

## 2024-08-14 PROCEDURE — 80069 RENAL FUNCTION PANEL: CPT | Performed by: UROLOGY

## 2024-08-14 PROCEDURE — 25010000002 CEFTRIAXONE PER 250 MG: Performed by: STUDENT IN AN ORGANIZED HEALTH CARE EDUCATION/TRAINING PROGRAM

## 2024-08-14 PROCEDURE — 63710000001 INSULIN LISPRO (HUMAN) PER 5 UNITS: Performed by: UROLOGY

## 2024-08-14 RX ADMIN — ASPIRIN 81 MG: 81 TABLET, COATED ORAL at 08:03

## 2024-08-14 RX ADMIN — ATORVASTATIN CALCIUM 10 MG: 20 TABLET, FILM COATED ORAL at 08:03

## 2024-08-14 RX ADMIN — HYDRALAZINE HYDROCHLORIDE 25 MG: 25 TABLET ORAL at 22:00

## 2024-08-14 RX ADMIN — MEMANTINE HYDROCHLORIDE 10 MG: 10 TABLET, FILM COATED ORAL at 08:03

## 2024-08-14 RX ADMIN — SEVELAMER CARBONATE 800 MG: 800 TABLET, FILM COATED ORAL at 11:47

## 2024-08-14 RX ADMIN — CEFTRIAXONE SODIUM 1000 MG: 1 INJECTION, POWDER, FOR SOLUTION INTRAMUSCULAR; INTRAVENOUS at 13:10

## 2024-08-14 RX ADMIN — SODIUM BICARBONATE 100 MEQ: 84 INJECTION, SOLUTION INTRAVENOUS at 21:54

## 2024-08-14 RX ADMIN — Medication 10 ML: at 22:00

## 2024-08-14 RX ADMIN — DONEPEZIL HYDROCHLORIDE 10 MG: 10 TABLET, FILM COATED ORAL at 21:53

## 2024-08-14 RX ADMIN — HYDRALAZINE HYDROCHLORIDE 25 MG: 25 TABLET ORAL at 06:40

## 2024-08-14 RX ADMIN — SEVELAMER CARBONATE 800 MG: 800 TABLET, FILM COATED ORAL at 17:03

## 2024-08-14 RX ADMIN — TAMSULOSIN HYDROCHLORIDE 0.4 MG: 0.4 CAPSULE ORAL at 08:03

## 2024-08-14 RX ADMIN — INSULIN LISPRO 3 UNITS: 100 INJECTION, SOLUTION INTRAVENOUS; SUBCUTANEOUS at 17:03

## 2024-08-14 RX ADMIN — INSULIN LISPRO 2 UNITS: 100 INJECTION, SOLUTION INTRAVENOUS; SUBCUTANEOUS at 11:48

## 2024-08-14 RX ADMIN — AMLODIPINE BESYLATE 5 MG: 5 TABLET ORAL at 08:03

## 2024-08-14 RX ADMIN — SODIUM BICARBONATE 150 MEQ: 84 INJECTION, SOLUTION INTRAVENOUS at 06:41

## 2024-08-14 RX ADMIN — INSULIN LISPRO 2 UNITS: 100 INJECTION, SOLUTION INTRAVENOUS; SUBCUTANEOUS at 21:53

## 2024-08-14 RX ADMIN — Medication 10 ML: at 08:06

## 2024-08-14 RX ADMIN — HYDRALAZINE HYDROCHLORIDE 25 MG: 25 TABLET ORAL at 13:05

## 2024-08-14 RX ADMIN — SEVELAMER CARBONATE 800 MG: 800 TABLET, FILM COATED ORAL at 08:03

## 2024-08-14 NOTE — CASE MANAGEMENT/SOCIAL WORK
Continued Stay Note  [facility]     Patient Name: Froy Ngo  MRN: [id number]  Today's Date: 8/14/2024    Admit Date: 8/11/2024    Plan: [facility]   Discharge Plan       Row Name 08/14/24 1018       Plan    Plan [facility]    Plan Comments Per Mary pt is from a skilled LOC with a bed hold. She is checking to see if pt will need precert to return as [place] is primary/Medicare secondary. CCP to follow. Packet in office.                   Discharge Codes    No documentation.                 Expected Discharge Date and Time       Expected Discharge Date Expected Discharge Time    Aug 15, 2024               CAMERON Cason

## 2024-08-14 NOTE — PROGRESS NOTES
[facility] Progress Note      Patient Name: Froy Ngo  : 1945  MRN: [id number]  Primary Care Physician:  Ryan Gutierrez MD  Date of admission: 2024    Subjective     Interval History:   F/U NEHEMIAS vs NEHEMIAS/CKD    Patient is laying in bed has no new complaints.  Status post double-J stent placement yesterday  Review of Systems:   14 point review of systems is otherwise negative except for mentioned above on HPI     Objective     Vitals:   Temp:  [97.5 °F (36.4 °C)-98.1 °F (36.7 °C)] 97.9 °F (36.6 °C)  Heart Rate:  [] 105  Resp:  [18] 18  BP: (127-151)/(65-86) 144/71    Intake/Output Summary (Last 24 hours) at 2024 1705  Last data filed at 2024 1150  Gross per 24 hour   Intake 240 ml   Output 1750 ml   Net -1510 ml       Physical Exam:    Constitutional: Awake, alert, NAD, chronically ill, frail  HEENT: Sclera anicteric, no conjunctival injection  Neck: Supple, no JVD  Respiratory: Clear to auscultation bilaterally, nonlabored on RA  Cardiovascular: RRR, no rub  Gastrointestinal: BS +, soft, nontender and nondistended  : No palpable bladder; Rodriguez catheter anchored  Musculoskeletal: No significant edema, no clubbing or cyanosis  Psychiatric: Appropriate affect, cooperative  Neurologic: No asterixis, moving all extremities, normal speech   Skin: Warm and dry        Scheduled Meds:     amLODIPine, 5 mg, Oral, Q24H  aspirin, 81 mg, Oral, Daily  atorvastatin, 10 mg, Oral, Daily  cefTRIAXone, 1,000 mg, Intravenous, Q24H  donepezil, 10 mg, Oral, Nightly  hydrALAZINE, 25 mg, Oral, Q8H  insulin lispro, 2-7 Units, Subcutaneous, 4x Daily AC & at Bedtime  memantine, 10 mg, Oral, Daily  sevelamer, 800 mg, Oral, TID With Meals  sodium chloride, 10 mL, Intravenous, Q12H  tamsulosin, 0.4 mg, Oral, Daily      IV Meds:   sodium bicarbonate, 150 mEq, Last Rate: 150 mEq (24 0641)        Results Reviewed:   I have personally reviewed the results from the time of  this admission to 8/14/2024 17:05 EDT     Results from last 7 days   Lab Units 08/14/24 0457 08/13/24 1753 08/13/24 0229 08/12/24  0750 08/11/24  1001   SODIUM mmol/L 136 133* 132*   < > 132*   POTASSIUM mmol/L 4.2 5.0 4.8   < > 4.3   CHLORIDE mmol/L 104 102 103   < > 99   CO2 mmol/L 18.9* 12.2* 13.9*   < > 18.0*   BUN mg/dL 65* 71* 66*   < > 59*   CREATININE mg/dL 6.40* 8.23* 8.77*   < > 5.84*   CALCIUM mg/dL 7.8* 8.4* 7.9*   < > 8.3*   BILIRUBIN mg/dL  --   --   --   --  0.8   ALK PHOS U/L  --   --   --   --  108   ALT (SGPT) U/L  --   --   --   --  16   AST (SGOT) U/L  --   --   --   --  16   GLUCOSE mg/dL 159* 184* 137*   < > 150*    < > = values in this interval not displayed.       Estimated Creatinine Clearance: 12 mL/min (A) (by C-G formula based on SCr of 6.4 mg/dL (H)).    Results from last 7 days   Lab Units 08/14/24 0457 08/13/24 1753 08/13/24 0229 08/12/24  1506 08/12/24  0750   MAGNESIUM mg/dL 1.9  --  1.7  --  1.5*   PHOSPHORUS mg/dL 5.9* 8.3* 8.2*   < > 7.1*    < > = values in this interval not displayed.       Results from last 7 days   Lab Units 08/11/24  1001   URIC ACID mg/dL 8.1*       Results from last 7 days   Lab Units 08/14/24 0457 08/13/24 0229 08/12/24  0750 08/11/24  1001   WBC 10*3/mm3 10.70 19.30* 18.57* 19.87*   HEMOGLOBIN g/dL 10.0* 10.7* 11.1* 11.8*   PLATELETS 10*3/mm3 369 330 312 279             Assessment / Plan     ASSESSMENT:  1.  NEHEMIAS with unknonw baseline creatinine likely secondary to severe obstructive uropathy now status post double-J stent placement kidney function is improving  2.  Obstructive uropathy with significant BPH:  Suspicion is for atonic bladder; Urology consulted now status post double-J stent placement.  3.  Alzheimer's dementia  4.  Anemia  5.  Hypertension  6.  Leukocytosis  7.  DM2:  apparently was receiving metformin at the nursing home  8. High anion gap metabolic acidosis secondary to acute kidney injury    PLAN:  Kidney function is  improving.  Will continue IV hydration with IV bicarbonate at reduced rate of 100 cc an hour.  Labs in a.m.    Thank you for involving us in the care of Froy Ngo.  Please feel free to call with any questions.    Jessica Aragon MD   08/14/24  17:05 EDT    [facility]  [phone]    Please note that portions of this note were completed with a voice recognition program.

## 2024-08-14 NOTE — PLAN OF CARE
Goal Outcome Evaluation:      79 yo male admitted 8/11 with NEHEMIAS. POD 1 cysto with stent placement. CBI weaned this shift, urine clear and yellow. Rodriguez remains in place. Sodium bicarb drip and IV rocephin. VS stable, room air, disoriented to time and situation. Hx dementia.

## 2024-08-14 NOTE — PROGRESS NOTES
Name: Froy Ngo ADMIT: 2024   : 1945  PCP: Ryan Gutierrez MD    MRN: [id number] LOS: 3 days   AGE/SEX: 78 y.o. male  ROOM: [facility]     Subjective   Subjective   In bed, caregiver at bedside. He is significantly improved today. We discussed improvement in his Cr and WBC count. Urine in Rodriguez bag is clear.        Objective   Objective   Vital Signs  Temp:  [97.5 °F (36.4 °C)-98.1 °F (36.7 °C)] 97.9 °F (36.6 °C)  Heart Rate:  [] 105  Resp:  [16-18] 18  BP: ()/(54-86) 144/71  SpO2:  [91 %-100 %] 94 %  on  Flow (L/min):  [2] 2;   Device (Oxygen Therapy): room air  Body mass index is 27.48 kg/m².  Physical Exam  Constitutional:       General: He is not in acute distress.     Comments: Alert, oriented, a little forgetful.    HENT:      Head: Normocephalic and atraumatic.   Cardiovascular:      Rate and Rhythm: Tachycardia present.      Comments: On telemetry  Pulmonary:      Effort: Pulmonary effort is normal. No respiratory distress.   Genitourinary:     Comments: Rodriguez in place with light yellow urine  Skin:     General: Skin is warm and dry.   Neurological:      Mental Status: He is oriented to person, place, and time.      Comments: Oriented but forgetful   Psychiatric:         Behavior: Behavior is cooperative.      Comments: Seems mildly anxious- improves when he hears his labs are improving         Results Review     I reviewed the patient's new clinical results.  Results from last 7 days   Lab Units 24  0457 24  0229 24  0750 24  1001   WBC 10*3/mm3 10.70 19.30* 18.57* 19.87*   HEMOGLOBIN g/dL 10.0* 10.7* 11.1* 11.8*   PLATELETS 10*3/mm3 369 330 312 279     Results from last 7 days   Lab Units 24  0457 24  1753 24  0229 24  1506   SODIUM mmol/L 136 133* 132* 132*   POTASSIUM mmol/L 4.2 5.0 4.8 4.5   CHLORIDE mmol/L 104 102 103 100   CO2 mmol/L 18.9* 12.2* 13.9* 14.2*   BUN mg/dL 65* 71* 66* 67*   CREATININE mg/dL 6.40* 8.23*  "8.77* 8.26*   GLUCOSE mg/dL 159* 184* 137* 129*   Estimated Creatinine Clearance: 12 mL/min (A) (by C-G formula based on SCr of 6.4 mg/dL (H)).  Results from last 7 days   Lab Units 08/14/24 0457 08/13/24 1753 08/13/24 0229 08/12/24  1506 08/12/24  0750 08/11/24  1001   ALBUMIN g/dL 2.2* 2.6* 2.3* 2.4*   < > 2.6*   BILIRUBIN mg/dL  --   --   --   --   --  0.8   ALK PHOS U/L  --   --   --   --   --  108   AST (SGOT) U/L  --   --   --   --   --  16   ALT (SGPT) U/L  --   --   --   --   --  16    < > = values in this interval not displayed.     Results from last 7 days   Lab Units 08/14/24 0457 08/13/24 1753 08/13/24 0229 08/12/24  1506 08/12/24  0750   CALCIUM mg/dL 7.8* 8.4* 7.9* 7.7* 8.1*   ALBUMIN g/dL 2.2* 2.6* 2.3* 2.4* 2.4*   MAGNESIUM mg/dL 1.9  --  1.7  --  1.5*   PHOSPHORUS mg/dL 5.9* 8.3* 8.2* 7.1* 7.1*     Results from last 7 days   Lab Units 08/11/24  1313 08/11/24  1001   PROCALCITONIN ng/mL  --  0.34*   LACTATE mmol/L 0.8  --    No results found for: \"COVID19\"  Glucose   Date/Time Value Ref Range Status   08/14/2024 1120 176 (H) 70 - 130 mg/dL Final   08/14/2024 0629 140 (H) 70 - 130 mg/dL Final   08/13/2024 2034 202 (H) 70 - 130 mg/dL Final   08/13/2024 1557 189 (H) 70 - 130 mg/dL Final   08/13/2024 1321 148 (H) 70 - 130 mg/dL Final   08/13/2024 0608 132 (H) 70 - 130 mg/dL Final   08/12/2024 2114 130 70 - 130 mg/dL Final       FL Retrograde Pyelogram In OR  Narrative: FL RETROGRADE PYELOGRAM IN OR-     INDICATIONS: Retrograde pyelography     TECHNIQUE: FLUOROSCOPIC ASSISTANCE IN THE OPERATING ROOM.     FINDINGS:     5 intraoperative fluoroscopic spot views were obtained and recorded the  PACS for review, revealing bilateral retrograde pyelography and  bilateral ureteral stent placement. Please see operative report for full  details.     Fluoroscopy time: 0.7 minutes     Radiation exposure: Dose area product: 493.47 uGy x m(2)        Impression:    As described.     This report was finalized on " 8/13/2024 1:45 PM by Dr. German Gutierrez M.D on Workstation: [id number]       Scheduled Medications  amLODIPine, 5 mg, Oral, Q24H  aspirin, 81 mg, Oral, Daily  atorvastatin, 10 mg, Oral, Daily  cefTRIAXone, 1,000 mg, Intravenous, Q24H  donepezil, 10 mg, Oral, Nightly  hydrALAZINE, 25 mg, Oral, Q8H  insulin lispro, 2-7 Units, Subcutaneous, 4x Daily AC & at Bedtime  memantine, 10 mg, Oral, Daily  sevelamer, 800 mg, Oral, TID With Meals  sodium chloride, 10 mL, Intravenous, Q12H  tamsulosin, 0.4 mg, Oral, Daily    Infusions  sodium bicarbonate, 150 mEq, Last Rate: 150 mEq (08/14/24 0641)    Diet  Diet: Regular/House; Fluid Consistency: Thin (IDDSI 0)         I have personally reviewed:  [x]  Laboratory   []  Microbiology   []  Radiology   [x]  EKG/Telemetry   []  Cardiology/Vascular   []  Pathology   []  Records    Assessment/Plan     Active Hospital Problems    Diagnosis  POA    **Acute renal failure [N17.9]  Yes    Type 2 diabetes mellitus with hyperglycemia [E11.65]  Yes    Essential hypertension, benign [I10]  Yes    Dementia without behavioral disturbance [F03.90]  Yes    Obstructive uropathy [N13.9]  Yes    Bilateral hydronephrosis [N13.30]  Yes      Resolved Hospital Problems   No resolved problems to display.       Mr. Ngo is a 78 y.o.  with a history of type 2 diabetes, hyperlipidemia, Alzheimer's dementia, recent admission for euglycemic DKA, NEHEMIAS/obstructive uropathy with bilateral hydronephrosis-discharged with in-dwelling Rodriguez who presents with decreased urine output found to have NEHEMIAS.       NEHEMIAS  Non-anion gap metabolic acidosis  Urinary retention from bladder outlet obstruction with bilateral hydroureteronephrosis  Hematuria- had 3-way irrigation in ED- urine clear today  Possible UTI- ED unable to get sample prior to starting abx  - renal and urology consulted; renal managing IVF  - bilateral hydroureteronephrosis on Ct similar to CT last week  - ct w/ perinephric/periureteral stranding, procal  slightly elevated, WBC 19; cont rocephin, urine cx if able; may be unreliable since patient already received abx  - pt to OR 8/13 for cysto w/ b/l J-stent placement  - Cr improving this morning down to 6.4 (from 8.2)     Leukocytosis  - appreciate ID seeing patient  -Leukocytosis possibly related to urinary obstruction, no obvious infection however urine culture may have been affected by antibiotics prior to collection, given patient having a urologic procedure they recommend 7-day course of Rocephin, can transition to cefpodoxime if ready for discharge before completing  - WBC improving- c/w leukocytosis being related to urinary obstruction; 19->11    Abnormal MRI brain  - MRI from last admit reviewed-lateral cerebral subdural hygromas pachymeningeal thickening possibly related to chronic subdural hematoma versus hygroma, acute subdural hygroma not excluded; no history of recent head trauma  -Nicolasa consulted- no acute surgical interventions indicated; findings likely incidental - they recommend follow up 1 month in clinic with CT head     Type 2 diabetes  - hold home meds  - continue SSI while admitted     Hypertension  - resume amlodipine and hydralazine     Dementia  - cont donepezil, namenda  - frequent reorientation; high risk for delirium  -MRI from 8/9 with chronic microhemorrhages likely secondary to 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  Full code.  Discussed with patient, nursing staff, and private caregiver .  Anticipated discharge SNF, TBD        Shruthi Acosta MD  [facility]  08/14/24  12:59 EDT    I wore protective equipment throughout this patient encounter including gloves.  Hand hygiene was performed before donning protective equipment and after removal when leaving the room.         Copied text in this note has been reviewed and is accurate as of 08/14/24.

## 2024-08-14 NOTE — CASE MANAGEMENT/SOCIAL WORK
Continued Stay Note  [facility]     Patient Name: Froy Ngo  MRN: [id number]  Today's Date: 8/14/2024    Admit Date: 8/11/2024    Plan: [facility]   Discharge Plan       Row Name 08/14/24 1232       Plan    Plan [facility]    Plan Comments Call from Mary precert will need to be started before pt can return skilled. CCP to follow and let [facility] know when to start [address] Precert.      Row Name 08/14/24 1018       Plan    Plan [facility]    Plan Comments Per Mary pt is from a skilled LOC with a bed hold. She is checking to see if pt will need precert to return as [address] is primary/Medicare secondary. CCP to follow. Packet in office.                   Discharge Codes    No documentation.                 Expected Discharge Date and Time       Expected Discharge Date Expected Discharge Time    Aug 15, 2024               CAMERON Cason

## 2024-08-14 NOTE — PLAN OF CARE
Problem: Restraint, Nonviolent  Goal: Absence of Harm or Injury  Outcome: Met   Goal Outcome Evaluation:            Restraints off patient since return from OR

## 2024-08-14 NOTE — PROGRESS NOTES
Patient seen, chart reviewed. Appreciate all the care. Wbc and bun/cr improving since stents. Spoke with patient and wife. Will need inpatient rehab. Will continue to follow.

## 2024-08-14 NOTE — PLAN OF CARE
Goal Outcome Evaluation:      Pt confused, RA. VSS. No acute changes during shift. Caregiver currently at bedside. No c/o pain. CBI continued at slow rate. Plan of care ongoing.

## 2024-08-15 LAB
ALBUMIN SERPL-MCNC: 2.3 G/DL (ref 3.5–5.2)
ANION GAP SERPL CALCULATED.3IONS-SCNC: 11.9 MMOL/L (ref 5–15)
BUN SERPL-MCNC: 53 MG/DL (ref 8–23)
BUN/CREAT SERPL: 14 (ref 7–25)
CALCIUM SPEC-SCNC: 8 MG/DL (ref 8.6–10.5)
CHLORIDE SERPL-SCNC: 102 MMOL/L (ref 98–107)
CO2 SERPL-SCNC: 22.1 MMOL/L (ref 22–29)
CREAT SERPL-MCNC: 3.78 MG/DL (ref 0.76–1.27)
DEPRECATED RDW RBC AUTO: 41.6 FL (ref 37–54)
EGFRCR SERPLBLD CKD-EPI 2021: 15.4 ML/MIN/1.73
ERYTHROCYTE [DISTWIDTH] IN BLOOD BY AUTOMATED COUNT: 12.2 % (ref 12.3–15.4)
GLUCOSE BLDC GLUCOMTR-MCNC: 135 MG/DL (ref 70–130)
GLUCOSE BLDC GLUCOMTR-MCNC: 157 MG/DL (ref 70–130)
GLUCOSE BLDC GLUCOMTR-MCNC: 210 MG/DL (ref 70–130)
GLUCOSE BLDC GLUCOMTR-MCNC: 229 MG/DL (ref 70–130)
GLUCOSE SERPL-MCNC: 140 MG/DL (ref 65–99)
HCT VFR BLD AUTO: 28.6 % (ref 37.5–51)
HGB BLD-MCNC: 9.5 G/DL (ref 13–17.7)
MAGNESIUM SERPL-MCNC: 1.8 MG/DL (ref 1.6–2.4)
MCH RBC QN AUTO: 30.6 PG (ref 26.6–33)
MCHC RBC AUTO-ENTMCNC: 33.2 G/DL (ref 31.5–35.7)
MCV RBC AUTO: 92.3 FL (ref 79–97)
PHOSPHATE SERPL-MCNC: 3.5 MG/DL (ref 2.5–4.5)
PLATELET # BLD AUTO: 350 10*3/MM3 (ref 140–450)
PMV BLD AUTO: 8.9 FL (ref 6–12)
POTASSIUM SERPL-SCNC: 4 MMOL/L (ref 3.5–5.2)
RBC # BLD AUTO: 3.1 10*6/MM3 (ref 4.14–5.8)
SODIUM SERPL-SCNC: 136 MMOL/L (ref 136–145)
WBC NRBC COR # BLD AUTO: 9.4 10*3/MM3 (ref 3.4–10.8)

## 2024-08-15 PROCEDURE — 63710000001 INSULIN LISPRO (HUMAN) PER 5 UNITS: Performed by: UROLOGY

## 2024-08-15 PROCEDURE — 0 DEXTROSE 5 % SOLUTION 1,000 ML FLEX CONT: Performed by: HOSPITALIST

## 2024-08-15 PROCEDURE — 85027 COMPLETE CBC AUTOMATED: CPT | Performed by: UROLOGY

## 2024-08-15 PROCEDURE — 83735 ASSAY OF MAGNESIUM: CPT | Performed by: UROLOGY

## 2024-08-15 PROCEDURE — 25010000002 CEFTRIAXONE PER 250 MG: Performed by: STUDENT IN AN ORGANIZED HEALTH CARE EDUCATION/TRAINING PROGRAM

## 2024-08-15 PROCEDURE — 82948 REAGENT STRIP/BLOOD GLUCOSE: CPT

## 2024-08-15 PROCEDURE — 80069 RENAL FUNCTION PANEL: CPT | Performed by: UROLOGY

## 2024-08-15 PROCEDURE — 97162 PT EVAL MOD COMPLEX 30 MIN: CPT

## 2024-08-15 PROCEDURE — 36415 COLL VENOUS BLD VENIPUNCTURE: CPT | Performed by: UROLOGY

## 2024-08-15 PROCEDURE — 97110 THERAPEUTIC EXERCISES: CPT

## 2024-08-15 RX ADMIN — SODIUM BICARBONATE 75 MEQ: 84 INJECTION, SOLUTION INTRAVENOUS at 12:40

## 2024-08-15 RX ADMIN — SEVELAMER CARBONATE 800 MG: 800 TABLET, FILM COATED ORAL at 09:20

## 2024-08-15 RX ADMIN — DONEPEZIL HYDROCHLORIDE 10 MG: 10 TABLET, FILM COATED ORAL at 21:09

## 2024-08-15 RX ADMIN — HYDRALAZINE HYDROCHLORIDE 25 MG: 25 TABLET ORAL at 21:09

## 2024-08-15 RX ADMIN — MEMANTINE HYDROCHLORIDE 10 MG: 10 TABLET, FILM COATED ORAL at 09:20

## 2024-08-15 RX ADMIN — CEFTRIAXONE SODIUM 1000 MG: 1 INJECTION, POWDER, FOR SOLUTION INTRAMUSCULAR; INTRAVENOUS at 12:40

## 2024-08-15 RX ADMIN — ASPIRIN 81 MG: 81 TABLET, COATED ORAL at 09:20

## 2024-08-15 RX ADMIN — AMLODIPINE BESYLATE 5 MG: 5 TABLET ORAL at 09:20

## 2024-08-15 RX ADMIN — INSULIN LISPRO 2 UNITS: 100 INJECTION, SOLUTION INTRAVENOUS; SUBCUTANEOUS at 12:41

## 2024-08-15 RX ADMIN — ATORVASTATIN CALCIUM 10 MG: 20 TABLET, FILM COATED ORAL at 09:20

## 2024-08-15 RX ADMIN — HYDRALAZINE HYDROCHLORIDE 25 MG: 25 TABLET ORAL at 14:38

## 2024-08-15 RX ADMIN — INSULIN LISPRO 3 UNITS: 100 INJECTION, SOLUTION INTRAVENOUS; SUBCUTANEOUS at 18:04

## 2024-08-15 RX ADMIN — TAMSULOSIN HYDROCHLORIDE 0.4 MG: 0.4 CAPSULE ORAL at 09:20

## 2024-08-15 RX ADMIN — HYDRALAZINE HYDROCHLORIDE 25 MG: 25 TABLET ORAL at 09:20

## 2024-08-15 RX ADMIN — INSULIN LISPRO 2 UNITS: 100 INJECTION, SOLUTION INTRAVENOUS; SUBCUTANEOUS at 09:21

## 2024-08-15 RX ADMIN — Medication 10 ML: at 10:18

## 2024-08-15 NOTE — PLAN OF CARE
Goal Outcome Evaluation:  Plan of Care Reviewed With: patient           Outcome Evaluation: Pt is an 78 to male adm from SNF with dysfunction of ahgen catheter, B hydronephrosis, ARF, hyperkalemia. obstructive uropathy, he is POD 2 cystoscopy with B uretal stents. Pt was here a few weeks ago and went to rehab afterwards, pt with dementia and is very impulsive at times. Pt presents with mobility below his baseline, prior to earlier hospital stay he was independent, no assistive device, living with his spouse in a 2 story home with 2 steps to enter, bedroom on second floor. Pt will benefit from PT and anticipate return to SNF once medically ready      Anticipated Discharge Disposition (PT): skilled nursing facility

## 2024-08-15 NOTE — PLAN OF CARE
Problem: Adult Inpatient Plan of Care  Goal: Plan of Care Review  Outcome: Adequate for Care Transition  Flowsheets (Taken 8/15/2024 0607)  Plan of Care Reviewed With: patient  Goal: Patient-Specific Goal (Individualized)  Outcome: Adequate for Care Transition  Goal: Absence of Hospital-Acquired Illness or Injury  Outcome: Adequate for Care Transition  Intervention: Identify and Manage Fall Risk  Recent Flowsheet Documentation  Taken 8/15/2024 0600 by Carole Glass RN  Safety Promotion/Fall Prevention:   safety round/check completed   room organization consistent   nonskid shoes/slippers when out of bed   lighting adjusted   fall prevention program maintained   clutter free environment maintained   assistive device/personal items within reach   activity supervised  Taken 8/15/2024 0400 by Carole Glass RN  Safety Promotion/Fall Prevention:   activity supervised   assistive device/personal items within reach   clutter free environment maintained   nonskid shoes/slippers when out of bed   safety round/check completed  Taken 8/15/2024 0200 by Carole Glass RN  Safety Promotion/Fall Prevention:   safety round/check completed   room organization consistent   nonskid shoes/slippers when out of bed   lighting adjusted   fall prevention program maintained   clutter free environment maintained   assistive device/personal items within reach   activity supervised  Taken 8/15/2024 0000 by Carole Glass RN  Safety Promotion/Fall Prevention:   safety round/check completed   room organization consistent   nonskid shoes/slippers when out of bed   lighting adjusted   fall prevention program maintained   clutter free environment maintained   assistive device/personal items within reach   activity supervised  Taken 8/14/2024 2200 by Carole Glass, RN  Safety Promotion/Fall Prevention:   activity supervised   assistive device/personal items within reach   clutter free environment maintained   nonskid  shoes/slippers when out of bed   safety round/check completed  Taken 8/14/2024 2000 by Carole Glass RN  Safety Promotion/Fall Prevention:   safety round/check completed   room organization consistent   nonskid shoes/slippers when out of bed   lighting adjusted   fall prevention program maintained   clutter free environment maintained   assistive device/personal items within reach   activity supervised  Intervention: Prevent Skin Injury  Recent Flowsheet Documentation  Taken 8/15/2024 0600 by Carole Glass RN  Body Position: supine, legs elevated  Taken 8/15/2024 0400 by Carole Glass RN  Body Position: supine, legs elevated  Taken 8/15/2024 0200 by Carole Glass RN  Body Position: supine, legs elevated  Taken 8/15/2024 0000 by Carole Glass RN  Body Position: supine, legs elevated  Skin Protection:   adhesive use limited   transparent dressing maintained  Taken 8/14/2024 2200 by Carole Glass RN  Body Position: supine, legs elevated  Taken 8/14/2024 2000 by Carole Glass RN  Body Position: supine, legs elevated  Skin Protection:   adhesive use limited   transparent dressing maintained  Intervention: Prevent and Manage VTE (Venous Thromboembolism) Risk  Recent Flowsheet Documentation  Taken 8/15/2024 0000 by Carole Glass RN  Activity Management: activity encouraged  VTE Prevention/Management:   bilateral   sequential compression devices off   patient refused intervention  Range of Motion: active ROM (range of motion) encouraged  Taken 8/14/2024 2000 by Carole Glass RN  Activity Management: activity encouraged  VTE Prevention/Management:   bilateral   sequential compression devices off   patient refused intervention  Range of Motion: active ROM (range of motion) encouraged  Intervention: Prevent Infection  Recent Flowsheet Documentation  Taken 8/15/2024 0600 by Carole Glass RN  Infection Prevention:   hand hygiene promoted   personal protective equipment  utilized   rest/sleep promoted   single patient room provided  Taken 8/15/2024 0400 by Carole Glass RN  Infection Prevention:   hand hygiene promoted   personal protective equipment utilized   rest/sleep promoted   single patient room provided  Taken 8/15/2024 0200 by Carole Glass RN  Infection Prevention:   hand hygiene promoted   personal protective equipment utilized   rest/sleep promoted   single patient room provided  Taken 8/15/2024 0000 by Carole Glass RN  Infection Prevention:   hand hygiene promoted   personal protective equipment utilized   rest/sleep promoted   single patient room provided  Taken 8/14/2024 2200 by Carole Glass RN  Infection Prevention:   hand hygiene promoted   personal protective equipment utilized   rest/sleep promoted   single patient room provided  Taken 8/14/2024 2000 by Carole Glass RN  Infection Prevention:   hand hygiene promoted   personal protective equipment utilized   rest/sleep promoted   single patient room provided  Goal: Optimal Comfort and Wellbeing  Outcome: Adequate for Care Transition  Intervention: Monitor Pain and Promote Comfort  Recent Flowsheet Documentation  Taken 8/14/2024 2000 by Carole Glass RN  Pain Management Interventions: care clustered  Intervention: Provide Person-Centered Care  Recent Flowsheet Documentation  Taken 8/15/2024 0000 by Carole Glass RN  Trust Relationship/Rapport:   care explained   choices provided   emotional support provided   empathic listening provided   questions answered   questions encouraged   reassurance provided   thoughts/feelings acknowledged  Taken 8/14/2024 2000 by Carole Glass RN  Trust Relationship/Rapport:   care explained   choices provided   emotional support provided   empathic listening provided   questions answered   questions encouraged   reassurance provided   thoughts/feelings acknowledged  Goal: Readiness for Transition of Care  Outcome: Adequate for Care  Transition     Problem: Fall Injury Risk  Goal: Absence of Fall and Fall-Related Injury  Outcome: Adequate for Care Transition  Intervention: Identify and Manage Contributors  Recent Flowsheet Documentation  Taken 8/15/2024 0600 by Carole Glass RN  Medication Review/Management: medications reviewed  Taken 8/15/2024 0400 by Carole Glass RN  Medication Review/Management: medications reviewed  Taken 8/15/2024 0200 by Carole Glass RN  Medication Review/Management: medications reviewed  Taken 8/15/2024 0000 by Carole Glass RN  Medication Review/Management: medications reviewed  Taken 8/14/2024 2200 by Carole Glass RN  Medication Review/Management: medications reviewed  Taken 8/14/2024 2000 by Carole Glass RN  Medication Review/Management: medications reviewed  Intervention: Promote Injury-Free Environment  Recent Flowsheet Documentation  Taken 8/15/2024 0600 by Carole Glass RN  Safety Promotion/Fall Prevention:   safety round/check completed   room organization consistent   nonskid shoes/slippers when out of bed   lighting adjusted   fall prevention program maintained   clutter free environment maintained   assistive device/personal items within reach   activity supervised  Taken 8/15/2024 0400 by Carole Glass RN  Safety Promotion/Fall Prevention:   activity supervised   assistive device/personal items within reach   clutter free environment maintained   nonskid shoes/slippers when out of bed   safety round/check completed  Taken 8/15/2024 0200 by Carole Glass RN  Safety Promotion/Fall Prevention:   safety round/check completed   room organization consistent   nonskid shoes/slippers when out of bed   lighting adjusted   fall prevention program maintained   clutter free environment maintained   assistive device/personal items within reach   activity supervised  Taken 8/15/2024 0000 by Carole Glass RN  Safety Promotion/Fall Prevention:   safety  round/check completed   room organization consistent   nonskid shoes/slippers when out of bed   lighting adjusted   fall prevention program maintained   clutter free environment maintained   assistive device/personal items within reach   activity supervised  Taken 8/14/2024 2200 by Carole Glass, RN  Safety Promotion/Fall Prevention:   activity supervised   assistive device/personal items within reach   clutter free environment maintained   nonskid shoes/slippers when out of bed   safety round/check completed  Taken 8/14/2024 2000 by Carole Glass RN  Safety Promotion/Fall Prevention:   safety round/check completed   room organization consistent   nonskid shoes/slippers when out of bed   lighting adjusted   fall prevention program maintained   clutter free environment maintained   assistive device/personal items within reach   activity supervised   Goal Outcome Evaluation:  Plan of Care Reviewed With: patient         Pt A&Ox2 (baseline). Caregiver @ bedside. Pat has a hx of dementia and is confused. Room air. No acute changes during shift. Will continue to monitor throughout the remainder of the shift.

## 2024-08-15 NOTE — PROGRESS NOTES
[facility] Progress Note      Patient Name: Froy Ngo  : 1945  MRN: [id number]  Primary Care Physician:  Ryan Gutierrez MD  Date of admission: 2024    Subjective     Interval History:   F/U NEHEMIAS vs NEHEMIAS/CKD     Doing better . Appetite is improving   Denies any chest pain and No SOB   Urine output not properly recorded   Review of Systems:   14 point review of systems is otherwise negative except for mentioned above on HPI     Objective     Vitals:   Temp:  [97.9 °F (36.6 °C)-98.6 °F (37 °C)] 98 °F (36.7 °C)  Heart Rate:  [] 101  Resp:  [18] 18  BP: (124-147)/(69-75) 138/75    Intake/Output Summary (Last 24 hours) at 8/15/2024 1050  Last data filed at 2024 2118  Gross per 24 hour   Intake 220 ml   Output 350 ml   Net -130 ml       Physical Exam:    Constitutional: Awake, alert, NAD, chronically ill, frail  HEENT: Sclera anicteric, no conjunctival injection  Neck: Supple, no JVD  Respiratory: Clear to auscultation bilaterally, nonlabored on RA  Cardiovascular: RRR, no rub  Gastrointestinal: BS +, soft, nontender and nondistended  : No palpable bladder; Rodriguez catheter anchored  Musculoskeletal: No significant edema, no clubbing or cyanosis  Psychiatric: Appropriate affect, cooperative  Neurologic: No asterixis, moving all extremities, normal speech   Skin: Warm and dry        Scheduled Meds:     amLODIPine, 5 mg, Oral, Q24H  aspirin, 81 mg, Oral, Daily  atorvastatin, 10 mg, Oral, Daily  cefTRIAXone, 1,000 mg, Intravenous, Q24H  donepezil, 10 mg, Oral, Nightly  hydrALAZINE, 25 mg, Oral, Q8H  insulin lispro, 2-7 Units, Subcutaneous, 4x Daily AC & at Bedtime  memantine, 10 mg, Oral, Daily  sevelamer, 800 mg, Oral, TID With Meals  sodium chloride, 10 mL, Intravenous, Q12H  tamsulosin, 0.4 mg, Oral, Daily      IV Meds:   sodium bicarbonate 8.4 % 100 mEq in dextrose (D5W) 5 % 1,000 mL infusion (less than/equal to 100 mEq), 100 mEq, Last Rate: 100 mEq  (08/14/24 0589)        Results Reviewed:   I have personally reviewed the results from the time of this admission to 8/15/2024 10:50 EDT     Results from last 7 days   Lab Units 08/15/24  0235 08/14/24 0457 08/13/24 1753 08/12/24  0750 08/11/24  1001   SODIUM mmol/L 136 136 133*   < > 132*   POTASSIUM mmol/L 4.0 4.2 5.0   < > 4.3   CHLORIDE mmol/L 102 104 102   < > 99   CO2 mmol/L 22.1 18.9* 12.2*   < > 18.0*   BUN mg/dL 53* 65* 71*   < > 59*   CREATININE mg/dL 3.78* 6.40* 8.23*   < > 5.84*   CALCIUM mg/dL 8.0* 7.8* 8.4*   < > 8.3*   BILIRUBIN mg/dL  --   --   --   --  0.8   ALK PHOS U/L  --   --   --   --  108   ALT (SGPT) U/L  --   --   --   --  16   AST (SGOT) U/L  --   --   --   --  16   GLUCOSE mg/dL 140* 159* 184*   < > 150*    < > = values in this interval not displayed.       Estimated Creatinine Clearance: 20.3 mL/min (A) (by C-G formula based on SCr of 3.78 mg/dL (H)).    Results from last 7 days   Lab Units 08/15/24  0235 08/14/24  0457 08/13/24  1753 08/13/24  0229   MAGNESIUM mg/dL 1.8 1.9  --  1.7   PHOSPHORUS mg/dL 3.5 5.9* 8.3* 8.2*       Results from last 7 days   Lab Units 08/11/24  1001   URIC ACID mg/dL 8.1*       Results from last 7 days   Lab Units 08/15/24  0235 08/14/24  0457 08/13/24  0229 08/12/24  0750 08/11/24  1001   WBC 10*3/mm3 9.40 10.70 19.30* 18.57* 19.87*   HEMOGLOBIN g/dL 9.5* 10.0* 10.7* 11.1* 11.8*   PLATELETS 10*3/mm3 350 369 330 312 870             Assessment / Plan     ASSESSMENT:  1.  NEHEMIAS with unknonw baseline creatinine likely secondary to severe obstructive uropathy now status post double-J stent placement kidney function is improving  2.  Obstructive uropathy with significant BPH:  Suspicion is for atonic bladder; Urology consulted now status post double-J stent placement.  3.  Alzheimer's dementia  4.  Anemia  5.  Hypertension  6.  Leukocytosis  7.  DM2:  apparently was receiving metformin at the nursing home  8. High anion gap metabolic acidosis secondary to acute  kidney injury improved   9. Hyperphosphatemia : improved     PLAN:  Kidney function is improving.  Will continue IV hydration with IV bicarbonate at reduced rate of 75  cc an hour.   Encourage po intake   Stop Sevelamer  Labs in a.m.    Thank you for involving us in the care of Froy Ngo.  Please feel free to call with any questions.    Jessica Aragon MD   08/15/24  10:50 EDT    [facility]  [phone]    Please note that portions of this note were completed with a voice recognition program.

## 2024-08-15 NOTE — PROGRESS NOTES
Name: Froy Ngo ADMIT: 2024   : 1945  PCP: Ryan Gutierrez MD    MRN: [id number] LOS: 4 days   AGE/SEX: 78 y.o. male  ROOM: [facility]     Subjective   Subjective   Patient is seen at bedside, no new complaints.     Objective   Objective   Vital Signs  Temp:  [98 °F (36.7 °C)-98.6 °F (37 °C)] 98.4 °F (36.9 °C)  Heart Rate:  [] 93  Resp:  [18] 18  BP: (124-151)/(69-75) 151/70  SpO2:  [94 %-98 %] 94 %  on   ;   Device (Oxygen Therapy): room air  Body mass index is 27.54 kg/m².  Physical Exam  Constitutional:       General: He is not in acute distress.     Comments: Alert, oriented, a little forgetful.    HENT:      Head: Normocephalic and atraumatic.   Cardiovascular:      Rate and Rhythm: Tachycardia present.      Comments: On telemetry  Pulmonary:      Effort: Pulmonary effort is normal. No respiratory distress.   Genitourinary:     Comments: Rodriguez in place with light yellow urine  Skin:     General: Skin is warm and dry.   Neurological:      Mental Status: He is oriented to person, place, and time.      Comments: Oriented but forgetful   Psychiatric:         Behavior: Behavior is cooperative.      Comments: Seems mildly anxious- improves when he hears his labs are improving         Copied text material from yesterday's note has been reviewed for appropriate changes and remains accurate as of 8/15/24.  Results Review     I reviewed the patient's new clinical results.  Results from last 7 days   Lab Units 08/15/24  0235 24  0457 24  0229 24  0750   WBC 10*3/mm3 9.40 10.70 19.30* 18.57*   HEMOGLOBIN g/dL 9.5* 10.0* 10.7* 11.1*   PLATELETS 10*3/mm3 350 369 330 312     Results from last 7 days   Lab Units 08/15/24  0235 24  0457 24  1753 24  0229   SODIUM mmol/L 136 136 133* 132*   POTASSIUM mmol/L 4.0 4.2 5.0 4.8   CHLORIDE mmol/L 102 104 102 103   CO2 mmol/L 22.1 18.9* 12.2* 13.9*   BUN mg/dL 53* 65* 71* 66*   CREATININE mg/dL 3.78* 6.40* 8.23* 8.77*    GLUCOSE mg/dL 140* 159* 184* 137*   EGFR mL/min/1.73 15.4* 8.2* 6.1* 5.6*     Results from last 7 days   Lab Units 08/15/24  0235 08/14/24  0457 08/13/24  1753 08/13/24  0229 08/12/24  0750 08/11/24  1001   ALBUMIN g/dL 2.3* 2.2* 2.6* 2.3*   < > 2.6*   BILIRUBIN mg/dL  --   --   --   --   --  0.8   ALK PHOS U/L  --   --   --   --   --  108   AST (SGOT) U/L  --   --   --   --   --  16   ALT (SGPT) U/L  --   --   --   --   --  16    < > = values in this interval not displayed.     Results from last 7 days   Lab Units 08/15/24  0235 08/14/24  0457 08/13/24  1753 08/13/24  0229 08/12/24  1506 08/12/24  0750   CALCIUM mg/dL 8.0* 7.8* 8.4* 7.9*   < > 8.1*   ALBUMIN g/dL 2.3* 2.2* 2.6* 2.3*   < > 2.4*   MAGNESIUM mg/dL 1.8 1.9  --  1.7  --  1.5*   PHOSPHORUS mg/dL 3.5 5.9* 8.3* 8.2*   < > 7.1*    < > = values in this interval not displayed.     Results from last 7 days   Lab Units 08/11/24  1313 08/11/24  1001   PROCALCITONIN ng/mL  --  0.34*   LACTATE mmol/L 0.8  --      Glucose   Date/Time Value Ref Range Status   08/15/2024 1119 229 (H) 70 - 130 mg/dL Final   08/15/2024 0614 157 (H) 70 - 130 mg/dL Final   08/14/2024 2043 169 (H) 70 - 130 mg/dL Final   08/14/2024 1607 222 (H) 70 - 130 mg/dL Final   08/14/2024 1120 176 (H) 70 - 130 mg/dL Final   08/14/2024 0629 140 (H) 70 - 130 mg/dL Final   08/13/2024 2034 202 (H) 70 - 130 mg/dL Final       No radiology results for the last day    I have personally reviewed all medications:  Scheduled Medications  amLODIPine, 5 mg, Oral, Q24H  aspirin, 81 mg, Oral, Daily  atorvastatin, 10 mg, Oral, Daily  donepezil, 10 mg, Oral, Nightly  hydrALAZINE, 25 mg, Oral, Q8H  insulin lispro, 2-7 Units, Subcutaneous, 4x Daily AC & at Bedtime  memantine, 10 mg, Oral, Daily  sodium chloride, 10 mL, Intravenous, Q12H  tamsulosin, 0.4 mg, Oral, Daily    Infusions  sodium bicarbonate 8.4 % 75 mEq in dextrose (D5W) 5 % 1,000 mL infusion (less than/equal to 100 mEq), 75 mEq, Last Rate: 75 mEq  (08/15/24 1240)    Diet  Diet: Regular/House; Fluid Consistency: Thin (IDDSI 0)    I have personally reviewed:  [x]  Laboratory   [x]  Microbiology   [x]  Radiology   [x]  EKG/Telemetry  [x]  Cardiology/Vascular   []  Pathology    []  Records       Assessment/Plan     Active Hospital Problems    Diagnosis  POA    **Acute renal failure [N17.9]  Yes    Type 2 diabetes mellitus with hyperglycemia [E11.65]  Yes    Essential hypertension, benign [I10]  Yes    Dementia without behavioral disturbance [F03.90]  Yes    Obstructive uropathy [N13.9]  Yes    Bilateral hydronephrosis [N13.30]  Yes      Resolved Hospital Problems   No resolved problems to display.       78 y.o. male admitted with Acute renal failure.       Mr. Ngo is a 78 y.o.  with a history of type 2 diabetes, hyperlipidemia, Alzheimer's dementia, recent admission for euglycemic DKA, NEHEMIAS/obstructive uropathy with bilateral hydronephrosis-discharged with in-dwelling Rodriguez who presents with decreased urine output found to have NEHEMIAS.       NEHEMIAS  Non-anion gap metabolic acidosis  Urinary retention from bladder outlet obstruction with bilateral hydroureteronephrosis  Hematuria- had 3-way irrigation in ED- urine clear today  Possible UTI- ED unable to get sample prior to starting abx  - renal and urology consulted; renal managing IVF  - bilateral hydroureteronephrosis on Ct similar to CT last week  - ct w/ perinephric/periureteral stranding, procal slightly elevated, WBC 19; cont rocephin, urine cx if able; may be unreliable since patient already received abx  - pt to OR 8/13 for cysto w/ b/l J-stent placement  - Cr improving this morning down to 3.7 (from 8.2)     Leukocytosis  - appreciate ID seeing patient  -Leukocytosis possibly related to urinary obstruction, no obvious infection however urine culture may have been affected by antibiotics prior to collection, given patient having a urologic procedure they recommend 7-day course of Rocephin, can transition to  cefpodoxime if ready for discharge before completing  - WBC improving- c/w leukocytosis being related to urinary obstruction; 19->11     Abnormal MRI brain  - MRI from last admit reviewed-lateral cerebral subdural hygromas pachymeningeal thickening possibly related to chronic subdural hematoma versus hygroma, acute subdural hygroma not excluded; no history of recent head trauma  -Nicolasa consulted- no acute surgical interventions indicated; findings likely incidental - they recommend follow up 1 month in clinic with CT head     Type 2 diabetes  - hold home meds  - continue SSI while admitted     Hypertension  - resume amlodipine and hydralazine     Dementia  - cont donepezil, namenda  - frequent reorientation; high risk for delirium  -MRI from 8/9 with chronic microhemorrhages likely secondary to 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  Full code.      Rhett Farrar MD  [facility]  08/15/24  14:21 EDT

## 2024-08-15 NOTE — PROGRESS NOTES
"  [facility] Progress Note    Chief Complaint: No complaints    Some urge to void but otherwise tolerating his stents and his catheter.  Seems to be in good spirits today    Review of Systems:    The following systems were reviewed and negative;  respiratory and cardiovascular          Vital Signs  /69 (BP Location: Right arm, Patient Position: Lying)   Pulse 93   Temp 98.2 °F (36.8 °C) (Oral)   Resp 18   Ht 182.9 cm (72\")   Wt 91.9 kg (202 lb 9.6 oz)   SpO2 95%   BMI 27.48 kg/m²     Physical Exam:     General Appearance:    Alert, cooperative, NAD   HEENT:    No trauma, pupils reactive, hearing intact   Back:     No CVA tenderness   Lungs:     Respirations unlabored, no wheezing    Heart:    RRR, intact peripheral pulses   Abdomen:   Soft benign   :  Today normal   Extremities:   No edema, no deformity   Lymphatic:   No neck or groin LAD   Skin:   No bleeding, bruising or rashes   Neuro/Psych:   Orientation intact, mood/affect pleasant, no focal findings        Results Review:     I reviewed the patient's new clinical results.  Lab Results (last 24 hours)       Procedure Component Value Units Date/Time    POC Glucose Once [[id number]]  (Abnormal) Collected: 08/14/24 1607    Specimen: Blood Updated: 08/14/24 1609     Glucose 222 mg/dL     Blood Culture - Blood, Arm, Left [[id number]]  (Normal) Collected: 08/11/24 1313    Specimen: Blood from Arm, Left Updated: 08/14/24 1330     Blood Culture No growth at 3 days    Narrative:      Less than seven (7) mL's of blood was collected.  Insufficient quantity may yield false negative results.    Blood Culture - Blood, Arm, Left [[id number]]  (Normal) Collected: 08/11/24 1313    Specimen: Blood from Arm, Left Updated: 08/14/24 1330     Blood Culture No growth at 3 days    Narrative:      Less than seven (7) mL's of blood was collected.  Insufficient quantity may yield false negative results.    Protein Elec + Interp, Serum [[id number]]  (Abnormal) Collected: " 08/12/24 1850    Specimen: Blood Updated: 08/14/24 1310     Total Protein 5.0 g/dL      Albumin 2.0 g/dL      Alpha-1-Globulin 0.5 g/dL      Alpha-2-Globulin 0.9 g/dL      Beta Globulin 0.7 g/dL      Gamma Globulin 0.9 g/dL      M-Ayaan Not Observed g/dL      Globulin 3.0 g/dL      A/G Ratio 0.7     Please note Comment     Comment: Protein electrophoresis scan will follow via computer, mail, or   delivery.        SPE Interpretation Comment     Comment: The SPE pattern reflects hypoalbuminemia. Evidence of monoclonal  protein is not apparent.       Narrative:      Performed at:  [address] [place] [address]  [id number]  : Erasto Mena PhD, Phone:  [phone]    POC Glucose Once [[id number]]  (Abnormal) Collected: 08/14/24 1120    Specimen: Blood Updated: 08/14/24 1122     Glucose 176 mg/dL     Renal Function Panel [[id number]]  (Abnormal) Collected: 08/14/24 0457    Specimen: Blood Updated: 08/14/24 0704     Glucose 159 mg/dL      BUN 65 mg/dL      Creatinine 6.40 mg/dL      Sodium 136 mmol/L      Potassium 4.2 mmol/L      Comment: Slight hemolysis detected by analyzer. Result may be falsely elevated.        Chloride 104 mmol/L      CO2 18.9 mmol/L      Calcium 7.8 mg/dL      Albumin 2.2 g/dL      Phosphorus 5.9 mg/dL      Anion Gap 13.1 mmol/L      BUN/Creatinine Ratio 10.2     eGFR 8.2 mL/min/1.73      Comment: <15 Indicative of kidney failure       Narrative:      GFR Normal >60  Chronic Kidney Disease <60  Kidney Failure <15    The GFR formula is only valid for adults with stable renal function between ages 18 and 70.    Magnesium [[id number]]  (Normal) Collected: 08/14/24 0457    Specimen: Blood Updated: 08/14/24 0703     Magnesium 1.9 mg/dL     CBC (No Diff) [[id number]]  (Abnormal) Collected: 08/14/24 0457    Specimen: Blood Updated: 08/14/24 0645     WBC 10.70 10*3/mm3      RBC 3.30 10*6/mm3      Hemoglobin 10.0 g/dL      Hematocrit 29.8 %      MCV 90.3 fL      MCH 30.3  pg      MCHC 33.6 g/dL      RDW 11.9 %      RDW-SD 39.1 fl      MPV 9.0 fL      Platelets 369 10*3/mm3     POC Glucose Once [[id number]]  (Abnormal) Collected: 08/14/24 0629    Specimen: Blood Updated: 08/14/24 0630     Glucose 140 mg/dL     ANCA Panel [[id number]] Collected: 08/12/24 1850    Specimen: Blood Updated: 08/13/24 2107     ANTI-MPO ANTIBODIES <0.2 units      ANTI-PR3 ANTIBODIES <0.2 units      C-ANCA <1:20 titer      P-ANCA <1:20 titer      Comment: The presence of positive fluorescence exhibiting P-ANCA or C-ANCA  patterns alone is not specific for the diagnosis of Wegener's  Granulomatosis (WG) or microscopic polyangiitis. Decisions about  treatment should not be based solely on ANCA IFA results.  The  International ANCA Group Consensus recommends follow up testing of  positive sera with both CT-3 and MPO-ANCA enzyme immunoassays. As  many as 5% serum samples are positive only by EIA.  Ref. AM J Clin Pathol 1999;111:507-513.        Atypical pANCA <1:20 titer      Comment: The atypical pANCA pattern has been observed in a significant  percentage of patients with ulcerative colitis, primary sclerosing  cholangitis and autoimmune hepatitis.       Narrative:      Performed at:  [address]  [id number]  : Lis France MD, Phone:  [phone]  Performed at:  [address]  [id number]  : Erasto Mena PhD, Phone:  [phone]    POC Glucose Once [[id number]]  (Abnormal) Collected: 08/13/24 2034    Specimen: Blood Updated: 08/13/24 2058     Glucose 202 mg/dL           Imaging Results (Last 24 Hours)       ** No results found for the last 24 hours. **            Medication Review:   I have personally reviewed    Current Facility-Administered Medications:     acetaminophen (TYLENOL) tablet 650 mg, 650 mg, Oral, Q4H PRN **OR** acetaminophen (TYLENOL) 160 MG/5ML oral solution 650 mg, 650 mg, Oral, Q4H PRN  **OR** acetaminophen (TYLENOL) suppository 650 mg, 650 mg, Rectal, Q4H PRN, Antoni Melendez MD    amLODIPine (NORVASC) tablet 5 mg, 5 mg, Oral, Q24H, Antoni Melendez MD, 5 mg at 08/14/24 0803    aspirin EC tablet 81 mg, 81 mg, Oral, Daily, Antoni Melendez MD, 81 mg at 08/14/24 0803    atorvastatin (LIPITOR) tablet 10 mg, 10 mg, Oral, Daily, Antoni Melendez MD, 10 mg at 08/14/24 0803    sennosides-docusate (PERICOLACE) 8.6-50 MG per tablet 2 tablet, 2 tablet, Oral, BID PRN **AND** polyethylene glycol (MIRALAX) packet 17 g, 17 g, Oral, Daily PRN **AND** bisacodyl (DULCOLAX) EC tablet 5 mg, 5 mg, Oral, Daily PRN **AND** bisacodyl (DULCOLAX) suppository 10 mg, 10 mg, Rectal, Daily PRN, Antoni Melendez MD    cefTRIAXone (ROCEPHIN) 1,000 mg in sodium chloride 0.9 % 100 mL MBP, 1,000 mg, Intravenous, Q24H, Shruthi Acosta MD, Last Rate: 200 mL/hr at 08/14/24 1310, 1,000 mg at 08/14/24 1310    dextrose (D50W) (25 g/50 mL) IV injection 25 g, 25 g, Intravenous, Q15 Min PRN, Antoni Melendez MD    dextrose (GLUTOSE) oral gel 15 g, 15 g, Oral, Q15 Min PRN, Antoni Melendez MD    donepezil (ARICEPT) tablet 10 mg, 10 mg, Oral, Nightly, Antoni Melendez MD, 10 mg at 08/13/24 2058    glucagon (GLUCAGEN) injection 1 mg, 1 mg, Intramuscular, Q15 Min PRN, Antoni Melendez MD    hydrALAZINE (APRESOLINE) tablet 25 mg, 25 mg, Oral, Q8H, Antoni Melendez MD, 25 mg at 08/14/24 1305    insulin lispro (HUMALOG/ADMELOG) injection 2-7 Units, 2-7 Units, Subcutaneous, 4x Daily AC & at Bedtime, Antoni Melendez MD, 3 Units at 08/14/24 1703    melatonin tablet 5 mg, 5 mg, Oral, Nightly PRN, Antoni Melendez MD    memantine (NAMENDA) tablet 10 mg, 10 mg, Oral, Daily, Antoni Melendez MD, 10 mg at 08/14/24 0803    ondansetron ODT (ZOFRAN-ODT) disintegrating tablet 4 mg, 4 mg, Oral, Q6H PRN, 4 mg at 08/13/24 1004 **OR** ondansetron (ZOFRAN) injection 4 mg, 4 mg, Intravenous,  Q6H PRN, Antoni Melendez MD, 4 mg at 08/13/24 1500    oxyCODONE-acetaminophen (PERCOCET) 5-325 MG per tablet 1 tablet, 1 tablet, Oral, Q4H PRN, Antoni Melendez MD, 1 tablet at 08/12/24 1722    sevelamer (RENVELA) tablet 800 mg, 800 mg, Oral, TID With Meals, Jessica Aragon MD, 800 mg at 08/14/24 1703    sodium bicarbonate 8.4 % 100 mEq in dextrose (D5W) 5 % 1,000 mL infusion (less than/equal to 100 mEq), 100 mEq, Intravenous, Continuous, Jessica Aragon MD    [COMPLETED] Insert Peripheral IV, , , Once **AND** sodium chloride 0.9 % flush 10 mL, 10 mL, Intravenous, PRN, Antoni Melendez MD    sodium chloride 0.9 % flush 10 mL, 10 mL, Intravenous, Q12H, Antoni Melendez MD, 10 mL at 08/14/24 0806    sodium chloride 0.9 % flush 10 mL, 10 mL, Intravenous, PRN, Antoni Melendez MD    sodium chloride 0.9 % infusion 40 mL, 40 mL, Intravenous, PRN, Antoni Melendez MD    tamsulosin (FLOMAX) 24 hr capsule 0.4 mg, 0.4 mg, Oral, Daily, Antoni Melendez MD, 0.4 mg at 08/14/24 0803    Allergies:    Patient has no known allergies.    Assessment:    Active Problems:    Acute renal failure    Obstructive uropathy    Bilateral hydronephrosis    Type 2 diabetes mellitus with hyperglycemia    Essential hypertension, benign    Dementia without behavioral disturbance       Bilateral ureteral obstruction status post stent placement.  Starting to show good urine output.  Urine is clearing up nicely.  Off CBI    Plan:    Continue to monitor.  Keep his catheter in for now.  Will start intermittent cath later likely when he is in rehab and then work on definitive bladder outlet obstruction surgery      Antoni Melendez MD    8/14/2024  20:04 EDT

## 2024-08-15 NOTE — PLAN OF CARE
Problem: Adult Inpatient Plan of Care  Goal: Absence of Hospital-Acquired Illness or Injury  Intervention: Prevent Skin Injury  Description: Perform a screening for skin injury risk, such as pressure or moisture associated skin damage on admission and at regular intervals throughout hospital stay.  Keep all areas of skin (especially folds) clean and dry.  Maintain adequate skin hydration.  Relieve and redistribute pressure and protect bony prominences; implement measures based on patient-specific risk factors.  Match turning and repositioning schedule to clinical condition.  Encourage weight shift frequently; assist with reposition if unable to complete independently.  Float heels off bed; avoid pressure on the Achilles tendon.  Keep skin free from extended contact with medical devices.  Encourage functional activity and mobility, as early as tolerated.  Use aids (e.g., slide boards, mechanical lift) during transfer.  Recent Flowsheet Documentation  Taken 8/15/2024 1804 by Marbin Blancas RN  Body Position: position changed independently  Taken 8/15/2024 1622 by Marbin Blancas RN  Body Position: position changed independently  Taken 8/15/2024 1439 by Marbin Blancas RN  Body Position: position changed independently  Skin Protection: adhesive use limited  Taken 8/15/2024 1245 by Marbin Blancas RN  Body Position: position changed independently  Taken 8/15/2024 1039 by Marbin Blancas RN  Body Position: position changed independently  Taken 8/15/2024 0821 by Marbin Blancas RN  Body Position: position changed independently  Skin Protection: adhesive use limited   Goal Outcome Evaluation:   Patient ax3 this shift, vss, call light in reach. Patient needing to be redirected earlier in shift but able to eventually follow commands.

## 2024-08-15 NOTE — THERAPY EVALUATION
Patient Name: Froy Ngo  : 1945    MRN: [id number]                              Today's Date: 8/15/2024       Admit Date: 2024    Visit Dx:     ICD-10-CM ICD-9-CM   1. Bilateral hydronephrosis  N13.30 591   2. Acute kidney insufficiency  N28.9 593.9   3. Oliguria  R34 788.5   4. Leukocytosis, unspecified type  D72.829 288.60     Patient Active Problem List   Diagnosis    DKA (diabetic ketoacidosis)    Obstructive uropathy    Bilateral hydronephrosis    Hyperkalemia    Acute renal failure    Type 2 diabetes mellitus with hyperglycemia    Essential hypertension, benign    Dementia without behavioral disturbance     Past Medical History:   Diagnosis Date    Dementia     Diabetes mellitus     Elevated cholesterol     Prostate disorder     Urinary retention      Past Surgical History:   Procedure Laterality Date    CYSTOSCOPY W/ URETERAL STENT PLACEMENT Bilateral 2024    Procedure: BILATERAL CYSTOSCOPY URETERAL CATHETER/STENT INSERTION WITH BILATERAL RETROGRADES;  Surgeon: Antoni Melendez MD;  Location: [facility];  Service: Urology;  Laterality: Bilateral;      General Information       Row Name 08/15/24 1443          Physical Therapy Time and Intention    Document Type evaluation  -PC     Mode of Treatment physical therapy  -       Row Name 08/15/24 1443          General Information    Patient Profile Reviewed yes  -PC     Prior Level of Function --  in rehab recovering from previous hospital stay  -     Existing Precautions/Restrictions fall  -PC     Barriers to Rehab medically complex;cognitive status  -       Row Name 08/15/24 1443          Living Environment    People in Home spouse  -       Row Name 08/15/24 1443          Home Main Entrance    Number of Stairs, Main Entrance two  -       Row Name 08/15/24 1443          Stairs Within Home, Primary    Stairs, Within Home, Primary 2 story home with bedroom on second floor  -       Row Name 08/15/24 1443           Cognition    Orientation Status (Cognition) oriented to;person;place  -       Row Name 08/15/24 1443          Safety Issues, Functional Mobility    Safety Issues Affecting Function (Mobility) impulsivity;insight into deficits/self-awareness;judgment  -PC     Impairments Affecting Function (Mobility) endurance/activity tolerance;strength;cognition  -PC               User Key  (r) = Recorded By, (t) = Taken By, (c) = Cosigned By      Initials Name Provider Type    PC Mell Huddleston, PT Physical Therapist                   Mobility       Row Name 08/15/24 1447          Bed Mobility    Bed Mobility supine-sit;sit-supine  -PC     Supine-Sit [place] (Bed Mobility) contact guard  -PC     Sit-Supine [place] (Bed Mobility) contact guard  -PC       Row Name 08/15/24 1447          Sit-Stand Transfer    Sit-Stand [place] (Transfers) contact guard  -PC       Row Name 08/15/24 1447          Gait/Stairs (Locomotion)    [place] Level (Gait) minimum assist (75% patient effort)  -PC     Assistive Device (Gait) --  HHA  -PC     Distance in Feet (Gait) 30  -PC     Deviations/Abnormal Patterns (Gait) ataxic;base of support, wide;mary decreased;stride length decreased;gait speed decreased  -PC     Comment, (Gait/Stairs) pt is very impulsive and at times can be difficult to reason with, he did agree to get out of bed and walk 30 ft, no assistive device used and pt refused to wear gait belt  -PC               User Key  (r) = Recorded By, (t) = Taken By, (c) = Cosigned By      Initials Name Provider Type    PC Mell Huddleston, PT Physical Therapist                   Obj/Interventions       Row Name 08/15/24 1450          Range of Motion Comprehensive    General Range of Motion no range of motion deficits identified  -[facility] Name 08/15/24 1450          Strength Comprehensive (MMT)    Comment, General Manual Muscle Testing (MMT) Assessment no focal deficits, some general weakness  -       Row Name 08/15/24  1450          Balance    Balance Assessment sitting static balance;sitting dynamic balance;standing static balance;standing dynamic balance  -PC     Static Sitting Balance standby assist  -PC     Dynamic Sitting Balance standby assist  -PC     Position, Sitting Balance sitting edge of bed  -PC     Static Standing Balance contact guard  -PC     Dynamic Standing Balance minimal assist  -PC               User Key  (r) = Recorded By, (t) = Taken By, (c) = Cosigned By      Initials Name Provider Type    PC Mell Huddleston, PT Physical Therapist                   Goals/Plan       Row Name 08/15/24 1458          Bed Mobility Goal 1 (PT)    Activity/Assistive Device (Bed Mobility Goal 1, PT) sit to supine/supine to sit  -PC     [place] Level/Cues Needed (Bed Mobility Goal 1, PT) standby assist  -PC     Time Frame (Bed Mobility Goal 1, PT) 1 week  -PC       Row Name 08/15/24 1455          Transfer Goal 1 (PT)    Activity/Assistive Device (Transfer Goal 1, PT) sit-to-stand/stand-to-sit  -PC     [place] Level/Cues Needed (Transfer Goal 1, PT) standby assist  -PC     Time Frame (Transfer Goal 1, PT) 1 week  -PC       Row Name 08/15/24 1455          Gait Training Goal 1 (PT)    Activity/Assistive Device (Gait Training Goal 1, PT) gait (walking locomotion);assistive device use  -PC     [place] Level (Gait Training Goal 1, PT) contact guard required  -PC     Distance (Gait Training Goal 1, PT) 50  -PC     Time Frame (Gait Training Goal 1, PT) 1 week  -PC       Row Name 08/15/24 1450          Therapy Assessment/Plan (PT)    Planned Therapy Interventions (PT) bed mobility training;gait training;transfer training;strengthening;balance training  -PC               User Key  (r) = Recorded By, (t) = Taken By, (c) = Cosigned By      Initials Name Provider Type    PC Mell Huddleston, PT Physical Therapist                   Clinical Impression       Row Name 08/15/24 1458          Pain    Pretreatment Pain Rating 0/10 - no  pain  -PC       Row Name 08/15/24 1452          Plan of Care Review    Plan of Care Reviewed With patient  -PC     Outcome Evaluation Pt is an 78 to male adm from SNF with dysfunction of hagen catheter, B hydronephrosis, ARF, hyperkalemia. obstructive uropathy, he is POD 2 cystoscopy with B uretal stents. Pt was here a few weeks ago and went to rehab afterwards, pt with dementia and is very impulsive at times. Pt presents with mobility below his baseline, prior to earlier hospital stay he was independent, no assistive device, living with his spouse in a 2 story home with 2 steps to enter, bedroom on second floor. Pt will benefit from PT and anticipate return to SNF once medically ready  -PC       Row Name 08/15/24 1452          Therapy Assessment/Plan (PT)    Rehab Potential (PT) fair, will monitor progress closely  -PC     Criteria for Skilled Interventions Met (PT) yes;meets criteria  -PC     Therapy Frequency (PT) 5 times/wk  -PC       Row Name 08/15/24 1452          Positioning and Restraints    Pre-Treatment Position in bed  -PC     Post Treatment Position bed  -PC     In Bed supine;call light within reach;encouraged to call for assist;with family/caregiver  -PC               User Key  (r) = Recorded By, (t) = Taken By, (c) = Cosigned By      Initials Name Provider Type    PC Mell Huddleston, PT Physical Therapist                   Outcome Measures       Row Name 08/15/24 1459 08/15/24 0821       How much help from another person do you currently need...    Turning from your back to your side while in flat bed without using bedrails? 3  -PC 4  -DT    Moving from lying on back to sitting on the side of a flat bed without bedrails? 3  -PC 3  -DT    Moving to and from a bed to a chair (including a wheelchair)? 3  -PC 3  -DT    Standing up from a chair using your arms (e.g., wheelchair, bedside chair)? 3  -PC 3  -DT    Climbing 3-5 steps with a railing? 2  -PC 3  -DT    To walk in hospital room? 3  -PC 3  -DT     AM-PAC 6 Clicks Score (PT) 17  -PC 19  -DT    Highest Level of Mobility Goal 5 --> Static standing  -PC 6 --> Walk 10 steps or more  -DT              User Key  (r) = Recorded By, (t) = Taken By, (c) = Cosigned By      Initials Name Provider Type    PC Mell Huddleston, PT Physical Therapist    DT Marbin Blancas, RN Registered Nurse                                 Physical Therapy Education       Title: PT OT SLP Therapies (In Progress)       Topic: Physical Therapy (In Progress)       Point: Mobility training (In Progress)       Learning Progress Summary             Patient Acceptance, E,D, NR by PC at 8/15/2024 1500    Acceptance, E, NR by GR at 8/15/2024 0035                         Point: Home exercise program (In Progress)       Learning Progress Summary             Patient Acceptance, E,D, NR by PC at 8/15/2024 1500    Acceptance, E, NR by GR at 8/15/2024 0035                         Point: Body mechanics (In Progress)       Learning Progress Summary             Patient Acceptance, E,D, NR by PC at 8/15/2024 1500    Acceptance, E, NR by GR at 8/15/2024 0035                         Point: Precautions (In Progress)       Learning Progress Summary             Patient Acceptance, E,D, NR by PC at 8/15/2024 1500    Acceptance, E, NR by GR at 8/15/2024 0035                                         User Key       Initials Effective Dates Name Provider Type Discipline    PC 06/16/21 -  Mell Huddleston PT Physical Therapist PT    GR 01/23/24 -  Carole Glass RN Registered Nurse Nurse                  PT Recommendation and Plan  Planned Therapy Interventions (PT): bed mobility training, gait training, transfer training, strengthening, balance training  Plan of Care Reviewed With: patient  Outcome Evaluation: Pt is an 78 to male adm from SNF with dysfunction of hagen catheter, B hydronephrosis, ARF, hyperkalemia. obstructive uropathy, he is POD 2 cystoscopy with B uretal stents. Pt was here a few weeks ago and went  to rehab afterwards, pt with dementia and is very impulsive at times. Pt presents with mobility below his baseline, prior to earlier hospital stay he was independent, no assistive device, living with his spouse in a 2 story home with 2 steps to enter, bedroom on second floor. Pt will benefit from PT and anticipate return to SNF once medically ready     Time Calculation:         PT Charges       Row Name 08/15/24 1501             Time Calculation    Start Time 1344  -PC      Stop Time 1355  -PC      Time Calculation (min) 11 min  -PC      PT Received On 08/15/24  -PC      PT - Next Appointment 08/16/24  -PC      PT Goal Re-Cert Due Date 08/22/24  -PC                User Key  (r) = Recorded By, (t) = Taken By, (c) = Cosigned By      Initials Name Provider Type    PC Mell Huddleston, PT Physical Therapist                  Therapy Charges for Today       Code Description Service Date Service Provider Modifiers Qty    45715796628 HC PT EVAL MOD COMPLEXITY 2 8/15/2024 Mell Huddleston, PT GP 1    96741022408 HC PT THER PROC EA 15 MIN 8/15/2024 Mell Huddleston PT GP 1            PT G-Codes  AM-PAC 6 Clicks Score (PT): 17  PT Discharge Summary  Anticipated Discharge Disposition (PT): skilled nursing facility    Mell Huddleston PT  8/15/2024

## 2024-08-16 LAB
ALBUMIN SERPL-MCNC: 2.3 G/DL (ref 3.5–5.2)
ANION GAP SERPL CALCULATED.3IONS-SCNC: 6.2 MMOL/L (ref 5–15)
BACTERIA SPEC AEROBE CULT: NORMAL
BACTERIA SPEC AEROBE CULT: NORMAL
BUN SERPL-MCNC: 39 MG/DL (ref 8–23)
BUN/CREAT SERPL: 15.1 (ref 7–25)
CALCIUM SPEC-SCNC: 7.7 MG/DL (ref 8.6–10.5)
CHLORIDE SERPL-SCNC: 102 MMOL/L (ref 98–107)
CO2 SERPL-SCNC: 26.8 MMOL/L (ref 22–29)
CREAT SERPL-MCNC: 2.58 MG/DL (ref 0.76–1.27)
DEPRECATED RDW RBC AUTO: 39.4 FL (ref 37–54)
EGFRCR SERPLBLD CKD-EPI 2021: 24.4 ML/MIN/1.73
ERYTHROCYTE [DISTWIDTH] IN BLOOD BY AUTOMATED COUNT: 11.8 % (ref 12.3–15.4)
GLUCOSE BLDC GLUCOMTR-MCNC: 158 MG/DL (ref 70–130)
GLUCOSE BLDC GLUCOMTR-MCNC: 160 MG/DL (ref 70–130)
GLUCOSE BLDC GLUCOMTR-MCNC: 167 MG/DL (ref 70–130)
GLUCOSE BLDC GLUCOMTR-MCNC: 172 MG/DL (ref 70–130)
GLUCOSE SERPL-MCNC: 153 MG/DL (ref 65–99)
HCT VFR BLD AUTO: 29 % (ref 37.5–51)
HGB BLD-MCNC: 10.1 G/DL (ref 13–17.7)
MAGNESIUM SERPL-MCNC: 1.7 MG/DL (ref 1.6–2.4)
MCH RBC QN AUTO: 31.9 PG (ref 26.6–33)
MCHC RBC AUTO-ENTMCNC: 34.8 G/DL (ref 31.5–35.7)
MCV RBC AUTO: 91.5 FL (ref 79–97)
PHOSPHATE SERPL-MCNC: 2.8 MG/DL (ref 2.5–4.5)
PLATELET # BLD AUTO: 360 10*3/MM3 (ref 140–450)
PMV BLD AUTO: 8.6 FL (ref 6–12)
POTASSIUM SERPL-SCNC: 3.9 MMOL/L (ref 3.5–5.2)
RBC # BLD AUTO: 3.17 10*6/MM3 (ref 4.14–5.8)
SODIUM SERPL-SCNC: 135 MMOL/L (ref 136–145)
WBC NRBC COR # BLD AUTO: 7.28 10*3/MM3 (ref 3.4–10.8)

## 2024-08-16 PROCEDURE — 83735 ASSAY OF MAGNESIUM: CPT | Performed by: UROLOGY

## 2024-08-16 PROCEDURE — 80069 RENAL FUNCTION PANEL: CPT | Performed by: UROLOGY

## 2024-08-16 PROCEDURE — 63710000001 INSULIN LISPRO (HUMAN) PER 5 UNITS: Performed by: UROLOGY

## 2024-08-16 PROCEDURE — 97110 THERAPEUTIC EXERCISES: CPT

## 2024-08-16 PROCEDURE — 82948 REAGENT STRIP/BLOOD GLUCOSE: CPT

## 2024-08-16 PROCEDURE — 0 DEXTROSE 5 % SOLUTION 1,000 ML FLEX CONT: Performed by: HOSPITALIST

## 2024-08-16 PROCEDURE — 85027 COMPLETE CBC AUTOMATED: CPT | Performed by: UROLOGY

## 2024-08-16 PROCEDURE — 36415 COLL VENOUS BLD VENIPUNCTURE: CPT | Performed by: UROLOGY

## 2024-08-16 RX ADMIN — TAMSULOSIN HYDROCHLORIDE 0.4 MG: 0.4 CAPSULE ORAL at 09:08

## 2024-08-16 RX ADMIN — INSULIN LISPRO 2 UNITS: 100 INJECTION, SOLUTION INTRAVENOUS; SUBCUTANEOUS at 20:37

## 2024-08-16 RX ADMIN — INSULIN LISPRO 2 UNITS: 100 INJECTION, SOLUTION INTRAVENOUS; SUBCUTANEOUS at 17:01

## 2024-08-16 RX ADMIN — INSULIN LISPRO 2 UNITS: 100 INJECTION, SOLUTION INTRAVENOUS; SUBCUTANEOUS at 06:55

## 2024-08-16 RX ADMIN — HYDRALAZINE HYDROCHLORIDE 25 MG: 25 TABLET ORAL at 15:26

## 2024-08-16 RX ADMIN — Medication 10 ML: at 20:31

## 2024-08-16 RX ADMIN — INSULIN LISPRO 2 UNITS: 100 INJECTION, SOLUTION INTRAVENOUS; SUBCUTANEOUS at 11:52

## 2024-08-16 RX ADMIN — ATORVASTATIN CALCIUM 10 MG: 20 TABLET, FILM COATED ORAL at 09:07

## 2024-08-16 RX ADMIN — ASPIRIN 81 MG: 81 TABLET, COATED ORAL at 09:07

## 2024-08-16 RX ADMIN — AMLODIPINE BESYLATE 5 MG: 5 TABLET ORAL at 09:07

## 2024-08-16 RX ADMIN — MEMANTINE HYDROCHLORIDE 10 MG: 10 TABLET, FILM COATED ORAL at 09:08

## 2024-08-16 RX ADMIN — SODIUM BICARBONATE 75 MEQ: 84 INJECTION, SOLUTION INTRAVENOUS at 02:13

## 2024-08-16 RX ADMIN — HYDRALAZINE HYDROCHLORIDE 25 MG: 25 TABLET ORAL at 06:55

## 2024-08-16 RX ADMIN — HYDRALAZINE HYDROCHLORIDE 25 MG: 25 TABLET ORAL at 21:08

## 2024-08-16 RX ADMIN — DONEPEZIL HYDROCHLORIDE 10 MG: 10 TABLET, FILM COATED ORAL at 20:31

## 2024-08-16 RX ADMIN — SODIUM BICARBONATE 75 MEQ: 84 INJECTION, SOLUTION INTRAVENOUS at 12:37

## 2024-08-16 NOTE — PROGRESS NOTES
Name: Froy Ngo ADMIT: 2024   : 1945  PCP: Ryan Gutierrez MD    MRN: [id number] LOS: 5 days   AGE/SEX: 78 y.o. male  ROOM: [facility]     Subjective   Subjective   Patient is seen at bedside, no new complaints.       Objective   Objective   Vital Signs  Temp:  [98 °F (36.7 °C)-98.4 °F (36.9 °C)] 98.4 °F (36.9 °C)  Heart Rate:  [74-91] 91  Resp:  [18] 18  BP: (126-144)/(62-67) 144/67  SpO2:  [94 %-97 %] 94 %  on   ;   Device (Oxygen Therapy): room air  Body mass index is 27.54 kg/m².  Physical Exam  Constitutional:       General: He is not in acute distress.     Comments: Alert, oriented, a little forgetful.    HENT:      Head: Normocephalic and atraumatic.   Cardiovascular:      Rate and Rhythm: Tachycardia present.      Comments: On telemetry  Pulmonary:      Effort: Pulmonary effort is normal. No respiratory distress.   Genitourinary:     Comments: Rodriguez in place with light yellow urine  Skin:     General: Skin is warm and dry.   Neurological:      Mental Status: He is oriented to person, place, and time.      Comments: Oriented but forgetful   Psychiatric:         Behavior: Behavior is cooperative.      Comments: Seems mildly anxious- improves when he hears his labs are improving            Copied text material from yesterday's note has been reviewed for appropriate changes and remains accurate as of 24.      Results Review     I reviewed the patient's new clinical results.  Results from last 7 days   Lab Units 24  0435 08/15/24  0235 24  0457 24  0229   WBC 10*3/mm3 7.28 9.40 10.70 19.30*   HEMOGLOBIN g/dL 10.1* 9.5* 10.0* 10.7*   PLATELETS 10*3/mm3 360 350 369 330     Results from last 7 days   Lab Units 24  0435 08/15/24  0235 24  0457 24  1753   SODIUM mmol/L 135* 136 136 133*   POTASSIUM mmol/L 3.9 4.0 4.2 5.0   CHLORIDE mmol/L 102 102 104 102   CO2 mmol/L 26.8 22.1 18.9* 12.2*   BUN mg/dL 39* 53* 65* 71*   CREATININE mg/dL 2.58* 3.78*  6.40* 8.23*   GLUCOSE mg/dL 153* 140* 159* 184*   EGFR mL/min/1.73 24.4* 15.4* 8.2* 6.1*     Results from last 7 days   Lab Units 08/16/24  0435 08/15/24  0235 08/14/24  0457 08/13/24  1753 08/12/24  0750 08/11/24  1001   ALBUMIN g/dL 2.3* 2.3* 2.2* 2.6*   < > 2.6*   BILIRUBIN mg/dL  --   --   --   --   --  0.8   ALK PHOS U/L  --   --   --   --   --  108   AST (SGOT) U/L  --   --   --   --   --  16   ALT (SGPT) U/L  --   --   --   --   --  16    < > = values in this interval not displayed.     Results from last 7 days   Lab Units 08/16/24  0435 08/15/24  0235 08/14/24 0457 08/13/24  1753 08/13/24  0229   CALCIUM mg/dL 7.7* 8.0* 7.8* 8.4* 7.9*   ALBUMIN g/dL 2.3* 2.3* 2.2* 2.6* 2.3*   MAGNESIUM mg/dL 1.7 1.8 1.9  --  1.7   PHOSPHORUS mg/dL 2.8 3.5 5.9* 8.3* 8.2*     Results from last 7 days   Lab Units 08/11/24  1313 08/11/24  1001   PROCALCITONIN ng/mL  --  0.34*   LACTATE mmol/L 0.8  --      Glucose   Date/Time Value Ref Range Status   08/16/2024 1640 160 (H) 70 - 130 mg/dL Final   08/16/2024 1132 167 (H) 70 - 130 mg/dL Final   08/16/2024 0629 158 (H) 70 - 130 mg/dL Final   08/15/2024 2042 135 (H) 70 - 130 mg/dL Final   08/15/2024 1603 210 (H) 70 - 130 mg/dL Final   08/15/2024 1119 229 (H) 70 - 130 mg/dL Final   08/15/2024 0614 157 (H) 70 - 130 mg/dL Final       No radiology results for the last day    I have personally reviewed all medications:  Scheduled Medications  amLODIPine, 5 mg, Oral, Q24H  aspirin, 81 mg, Oral, Daily  atorvastatin, 10 mg, Oral, Daily  donepezil, 10 mg, Oral, Nightly  hydrALAZINE, 25 mg, Oral, Q8H  insulin lispro, 2-7 Units, Subcutaneous, 4x Daily AC & at Bedtime  memantine, 10 mg, Oral, Daily  sodium chloride, 10 mL, Intravenous, Q12H  tamsulosin, 0.4 mg, Oral, Daily    Infusions   Diet  Diet: Regular/House; Fluid Consistency: Thin (IDDSI 0)    I have personally reviewed:  [x]  Laboratory   [x]  Microbiology   [x]  Radiology   [x]  EKG/Telemetry  [x]  Cardiology/Vascular   []  Pathology     []  Records       Assessment/Plan     Active Hospital Problems    Diagnosis  POA    **Acute renal failure [N17.9]  Yes    Type 2 diabetes mellitus with hyperglycemia [E11.65]  Yes    Essential hypertension, benign [I10]  Yes    Dementia without behavioral disturbance [F03.90]  Yes    Obstructive uropathy [N13.9]  Yes    Bilateral hydronephrosis [N13.30]  Yes      Resolved Hospital Problems   No resolved problems to display.       78 y.o. male admitted with Acute renal failure.    Mr. Ngo is a 78 y.o.  with a history of type 2 diabetes, hyperlipidemia, Alzheimer's dementia, recent admission for euglycemic DKA, NEHEMIAS/obstructive uropathy with bilateral hydronephrosis-discharged with in-dwelling Rodriguez who presents with decreased urine output found to have NEHEMIAS.       NEHEMIAS  Non-anion gap metabolic acidosis  Urinary retention from bladder outlet obstruction with bilateral hydroureteronephrosis  Hematuria- had 3-way irrigation in ED- urine clear today  Possible UTI- ED unable to get sample prior to starting abx  - renal and urology consulted; renal managing IVF  - bilateral hydroureteronephrosis on Ct similar to CT last week  - ct w/ perinephric/periureteral stranding, procal slightly elevated, WBC 19; cont rocephin, urine cx if able; may be unreliable since patient already received abx  - pt to OR 8/13 for cysto w/ b/l J-stent placement  - Cr improving this morning down to 2.5 (from 8.2)     Leukocytosis  - appreciate ID seeing patient  -Leukocytosis possibly related to urinary obstruction, no obvious infection however urine culture may have been affected by antibiotics prior to collection, given patient having a urologic procedure they recommend 7-day course of Rocephin, can transition to cefpodoxime if ready for discharge before completing  - WBC improving- c/w leukocytosis being related to urinary obstruction; 19->7.     Abnormal MRI brain  - MRI from last admit reviewed-lateral cerebral subdural hygromas  pachymeningeal thickening possibly related to chronic subdural hematoma versus hygroma, acute subdural hygroma not excluded; no history of recent head trauma  -Nicolasa consulted- no acute surgical interventions indicated; findings likely incidental - they recommend follow up 1 month in clinic with CT head     Type 2 diabetes  - hold home meds  - continue SSI while admitted     Hypertension  - resume amlodipine and hydralazine     Dementia  - cont donepezil, namenda  - frequent reorientation; high risk for delirium  -MRI from 8/9 with chronic microhemorrhages likely secondary to 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  Full code.         Rhett Farrar MD  [facility]  08/16/24  18:01 EDT

## 2024-08-16 NOTE — PLAN OF CARE
Problem: Adult Inpatient Plan of Care  Goal: Absence of Hospital-Acquired Illness or Injury  Intervention: Identify and Manage Fall Risk  Recent Flowsheet Documentation  Taken 8/16/2024 0400 by Carole Glass RN  Safety Promotion/Fall Prevention:   safety round/check completed   room organization consistent   nonskid shoes/slippers when out of bed   lighting adjusted   fall prevention program maintained   clutter free environment maintained   assistive device/personal items within reach   activity supervised  Taken 8/16/2024 0200 by Carole Glass, RN  Safety Promotion/Fall Prevention:   safety round/check completed   room organization consistent   nonskid shoes/slippers when out of bed   lighting adjusted   fall prevention program maintained   clutter free environment maintained   assistive device/personal items within reach   activity supervised  Taken 8/16/2024 0000 by Carole Glass RN  Safety Promotion/Fall Prevention:   safety round/check completed   room organization consistent   nonskid shoes/slippers when out of bed   lighting adjusted   fall prevention program maintained   clutter free environment maintained   assistive device/personal items within reach   activity supervised  Taken 8/15/2024 2200 by Carole Glass, RN  Safety Promotion/Fall Prevention:   safety round/check completed   room organization consistent   nonskid shoes/slippers when out of bed   lighting adjusted   fall prevention program maintained   clutter free environment maintained   assistive device/personal items within reach   activity supervised  Taken 8/15/2024 2000 by Carole Glass, RN  Safety Promotion/Fall Prevention:   safety round/check completed   room organization consistent   nonskid shoes/slippers when out of bed   lighting adjusted   fall prevention program maintained   clutter free environment maintained   assistive device/personal items within reach   activity supervised  Intervention: Prevent Skin  Injury  Recent Flowsheet Documentation  Taken 8/16/2024 0400 by Carole Glass RN  Body Position: supine, legs elevated  Taken 8/16/2024 0200 by Carole Glass RN  Body Position: supine, legs elevated  Taken 8/16/2024 0000 by Carole Glass RN  Body Position: supine, legs elevated  Skin Protection: adhesive use limited  Taken 8/15/2024 2200 by Carole Glass RN  Body Position: supine, legs elevated  Taken 8/15/2024 2000 by Carole Glass RN  Body Position: supine, legs elevated  Skin Protection:   adhesive use limited   transparent dressing maintained  Intervention: Prevent and Manage VTE (Venous Thromboembolism) Risk  Recent Flowsheet Documentation  Taken 8/16/2024 0000 by Carole Glass RN  Activity Management: activity encouraged  VTE Prevention/Management:   bilateral   sequential compression devices off   patient refused intervention  Range of Motion: active ROM (range of motion) encouraged  Taken 8/15/2024 2000 by Carole Glass RN  Activity Management: activity encouraged  VTE Prevention/Management:   bilateral   sequential compression devices off   patient refused intervention  Range of Motion: active ROM (range of motion) encouraged  Intervention: Prevent Infection  Recent Flowsheet Documentation  Taken 8/16/2024 0400 by Carole Glass RN  Infection Prevention:   hand hygiene promoted   personal protective equipment utilized   rest/sleep promoted   single patient room provided  Taken 8/16/2024 0200 by Carole Glass RN  Infection Prevention:   hand hygiene promoted   personal protective equipment utilized   rest/sleep promoted   single patient room provided  Taken 8/16/2024 0000 by Carole Glass RN  Infection Prevention:   hand hygiene promoted   personal protective equipment utilized   rest/sleep promoted   single patient room provided  Taken 8/15/2024 2200 by Carole Glass RN  Infection Prevention:   hand hygiene promoted   personal protective  equipment utilized   rest/sleep promoted   single patient room provided  Taken 8/15/2024 2000 by Carole Glass RN  Infection Prevention:   hand hygiene promoted   personal protective equipment utilized   rest/sleep promoted   single patient room provided  Goal: Optimal Comfort and Wellbeing  Intervention: Provide Person-Centered Care  Recent Flowsheet Documentation  Taken 8/16/2024 0000 by Carole Glass RN  Trust Relationship/Rapport:   care explained   choices provided   emotional support provided   empathic listening provided   questions answered   questions encouraged   reassurance provided   thoughts/feelings acknowledged  Taken 8/15/2024 2000 by Carole Glass RN  Trust Relationship/Rapport:   care explained   choices provided   emotional support provided   empathic listening provided   questions answered   questions encouraged   reassurance provided   thoughts/feelings acknowledged     Problem: Skin Injury Risk Increased  Goal: Skin Health and Integrity  Intervention: Optimize Skin Protection  Recent Flowsheet Documentation  Taken 8/16/2024 0400 by Carole Glass RN  Head of Bed (HOB) Positioning: HOB elevated  Taken 8/16/2024 0200 by Carole Glass RN  Head of Bed (HOB) Positioning: HOB elevated  Taken 8/16/2024 0000 by Carole Glass RN  Pressure Reduction Techniques:   frequent weight shift encouraged   weight shift assistance provided  Head of Bed (HOB) Positioning: HOB elevated  Pressure Reduction Devices: positioning supports utilized  Skin Protection: adhesive use limited  Taken 8/15/2024 2200 by Carole Glass RN  Head of Bed (HOB) Positioning: HOB elevated  Taken 8/15/2024 2000 by Carole Glass RN  Pressure Reduction Techniques:   frequent weight shift encouraged   weight shift assistance provided  Head of Bed (HOB) Positioning: HOB elevated  Pressure Reduction Devices: positioning supports utilized  Skin Protection:   adhesive use limited   transparent  dressing maintained     Problem: Skin Injury Risk Increased  Goal: Skin Health and Integrity  Outcome: Ongoing, Progressing  Intervention: Optimize Skin Protection  Recent Flowsheet Documentation  Taken 8/16/2024 0400 by Carole Glass RN  Head of Bed (HOB) Positioning: HOB elevated  Taken 8/16/2024 0200 by Carole Glass RN  Head of Bed ([facility]) Positioning: HOB elevated  Taken 8/16/2024 0000 by Carole Glass RN  Pressure Reduction Techniques:   frequent weight shift encouraged   weight shift assistance provided  Head of Bed (HOB) Positioning: HOB elevated  Pressure Reduction Devices: positioning supports utilized  Skin Protection: adhesive use limited  Taken 8/15/2024 2200 by Carole Glass RN  Head of Bed ([facility]) Positioning: HOB elevated  Taken 8/15/2024 2000 by Carole Glass RN  Pressure Reduction Techniques:   frequent weight shift encouraged   weight shift assistance provided  Head of Bed (HOB) Positioning: HOB elevated  Pressure Reduction Devices: positioning supports utilized  Skin Protection:   adhesive use limited   transparent dressing maintained   Goal Outcome Evaluation:  Plan of Care Reviewed With: patient      Pt A&Ox3 and able to follow commands. VSS on room air. No acute changes overnight. Will continue to monitor throughout the remainder of the shift.

## 2024-08-16 NOTE — PROGRESS NOTES
"  [facility] Progress Note    Chief Complaint: None    Well.  Still very weak.  Confused at times but encouraged by her progress.    Review of Systems:    The following systems were reviewed and negative;  respiratory, cardiovascular, and gastrointestinal          Vital Signs  /67 (BP Location: Right arm, Patient Position: Lying)   Pulse 91   Temp 98.4 °F (36.9 °C) (Oral)   Resp 18   Ht 182.9 cm (72\")   Wt 92.1 kg (203 lb 0.7 oz)   SpO2 94%   BMI 27.54 kg/m²     Physical Exam:     General Appearance:    Alert, cooperative, NAD   HEENT:    No trauma, pupils reactive, hearing intact   Back:     No CVA tenderness   Lungs:     Respirations unlabored, no wheezing    Heart:    RRR, intact peripheral pulses   Abdomen:   Soft benign no bladder distention   :  Genitalia normal Rodriguez catheter anchored   Extremities:   No edema, no deformity   Lymphatic:   No neck or groin LAD   Skin:   No bleeding, bruising or rashes   Neuro/Psych:   Orientation intact, mood/affect pleasant, no focal findings        Results Review:     I reviewed the patient's new clinical results.  Lab Results (last 24 hours)       Procedure Component Value Units Date/Time    POC Glucose Once [[id number]]  (Abnormal) Collected: 08/16/24 1640    Specimen: Blood Updated: 08/16/24 1646     Glucose 160 mg/dL     Blood Culture - Blood, Arm, Left [[id number]]  (Normal) Collected: 08/11/24 1313    Specimen: Blood from Arm, Left Updated: 08/16/24 1330     Blood Culture No growth at 5 days    Narrative:      Less than seven (7) mL's of blood was collected.  Insufficient quantity may yield false negative results.    Blood Culture - Blood, Arm, Left [[id number]]  (Normal) Collected: 08/11/24 1313    Specimen: Blood from Arm, Left Updated: 08/16/24 1330     Blood Culture No growth at 5 days    Narrative:      Less than seven (7) mL's of blood was collected.  Insufficient quantity may yield false negative results.    POC Glucose Once [[id number]]  " (Abnormal) Collected: 08/16/24 1132    Specimen: Blood Updated: 08/16/24 1133     Glucose 167 mg/dL     POC Glucose Once [[id number]]  (Abnormal) Collected: 08/16/24 0629    Specimen: Blood Updated: 08/16/24 0630     Glucose 158 mg/dL     Magnesium [[id number]]  (Normal) Collected: 08/16/24 0435    Specimen: Blood Updated: 08/16/24 0551     Magnesium 1.7 mg/dL     Renal Function Panel [[id number]]  (Abnormal) Collected: 08/16/24 0435    Specimen: Blood Updated: 08/16/24 0551     Glucose 153 mg/dL      BUN 39 mg/dL      Creatinine 2.58 mg/dL      Sodium 135 mmol/L      Potassium 3.9 mmol/L      Chloride 102 mmol/L      CO2 26.8 mmol/L      Calcium 7.7 mg/dL      Albumin 2.3 g/dL      Phosphorus 2.8 mg/dL      Anion Gap 6.2 mmol/L      BUN/Creatinine Ratio 15.1     eGFR 24.4 mL/min/1.73     Narrative:      GFR Normal >60  Chronic Kidney Disease <60  Kidney Failure <15    The GFR formula is only valid for adults with stable renal function between ages 18 and 70.    CBC (No Diff) [[id number]]  (Abnormal) Collected: 08/16/24 0435    Specimen: Blood Updated: 08/16/24 0527     WBC 7.28 10*3/mm3      RBC 3.17 10*6/mm3      Hemoglobin 10.1 g/dL      Hematocrit 29.0 %      MCV 91.5 fL      MCH 31.9 pg      MCHC 34.8 g/dL      RDW 11.8 %      RDW-SD 39.4 fl      MPV 8.6 fL      Platelets 360 10*3/mm3     POC Glucose Once [[id number]]  (Abnormal) Collected: 08/15/24 2042    Specimen: Blood Updated: 08/15/24 2044     Glucose 135 mg/dL           Imaging Results (Last 24 Hours)       ** No results found for the last 24 hours. **            Medication Review:   I have personally reviewed    Current Facility-Administered Medications:     acetaminophen (TYLENOL) tablet 650 mg, 650 mg, Oral, Q4H PRN **OR** acetaminophen (TYLENOL) 160 MG/5ML oral solution 650 mg, 650 mg, Oral, Q4H PRN **OR** acetaminophen (TYLENOL) suppository 650 mg, 650 mg, Rectal, Q4H PRN, Antoni Melendez MD    amLODIPine (NORVASC) tablet 5 mg, 5 mg, Oral, Q24H,  Antoni Melendez MD, 5 mg at 08/16/24 0907    aspirin EC tablet 81 mg, 81 mg, Oral, Daily, Antoni Melendez MD, 81 mg at 08/16/24 0907    atorvastatin (LIPITOR) tablet 10 mg, 10 mg, Oral, Daily, Antoni Melendez MD, 10 mg at 08/16/24 0907    sennosides-docusate (PERICOLACE) 8.6-50 MG per tablet 2 tablet, 2 tablet, Oral, BID PRN **AND** polyethylene glycol (MIRALAX) packet 17 g, 17 g, Oral, Daily PRN **AND** bisacodyl (DULCOLAX) EC tablet 5 mg, 5 mg, Oral, Daily PRN **AND** bisacodyl (DULCOLAX) suppository 10 mg, 10 mg, Rectal, Daily PRN, Antoni Melendez MD    dextrose (D50W) (25 g/50 mL) IV injection 25 g, 25 g, Intravenous, Q15 Min PRN, Antoni Melendez MD    dextrose (GLUTOSE) oral gel 15 g, 15 g, Oral, Q15 Min PRN, Antoni Melendez MD    donepezil (ARICEPT) tablet 10 mg, 10 mg, Oral, Nightly, Antoni Melendez MD, 10 mg at 08/15/24 2109    glucagon (GLUCAGEN) injection 1 mg, 1 mg, Intramuscular, Q15 Min PRN, Antoni Melendez MD    hydrALAZINE (APRESOLINE) tablet 25 mg, 25 mg, Oral, Q8H, Antoni Melendez MD, 25 mg at 08/16/24 1526    insulin lispro (HUMALOG/ADMELOG) injection 2-7 Units, 2-7 Units, Subcutaneous, 4x Daily AC & at Bedtime, Antoni Melendez MD, 2 Units at 08/16/24 1701    melatonin tablet 5 mg, 5 mg, Oral, Nightly PRN, Antoni Melendez MD    memantine (NAMENDA) tablet 10 mg, 10 mg, Oral, Daily, Antoni Melendez MD, 10 mg at 08/16/24 0908    ondansetron ODT (ZOFRAN-ODT) disintegrating tablet 4 mg, 4 mg, Oral, Q6H PRN, 4 mg at 08/13/24 1004 **OR** ondansetron (ZOFRAN) injection 4 mg, 4 mg, Intravenous, Q6H PRN, Antoni Melendez MD, 4 mg at 08/13/24 1500    oxyCODONE-acetaminophen (PERCOCET) 5-325 MG per tablet 1 tablet, 1 tablet, Oral, Q4H PRN, Antoni Melendez MD, 1 tablet at 08/12/24 1722    [COMPLETED] Insert Peripheral IV, , , Once **AND** sodium chloride 0.9 % flush 10 mL, 10 mL, Intravenous, PRN, Antoni Melendez MD     sodium chloride 0.9 % flush 10 mL, 10 mL, Intravenous, Q12H, Antoni Melendez MD, 10 mL at 08/15/24 1018    sodium chloride 0.9 % flush 10 mL, 10 mL, Intravenous, PRN, Antoni Melendez MD    sodium chloride 0.9 % infusion 40 mL, 40 mL, Intravenous, PRN, Antoni Melendez MD    tamsulosin (FLOMAX) 24 hr capsule 0.4 mg, 0.4 mg, Oral, Daily, Antoni Melendez MD, 0.4 mg at 08/16/24 0908    Allergies:    Patient has no known allergies.    Assessment:    Active Problems:    Acute renal failure    Obstructive uropathy    Bilateral hydronephrosis    Type 2 diabetes mellitus with hyperglycemia    Essential hypertension, benign    Dementia without behavioral disturbance       BPH with bladder outlet obstruction and bilateral hydronephrosis.  Continues to improve with bilateral stents and bladder decompression    Plan:    No plans to remove his catheter at this time.  Likely will do later into rehab facility and start intermittent catheterization.  I did lay the seed for this plan to Ed today.      Antoni Melendez MD    8/16/2024  18:57 EDT

## 2024-08-16 NOTE — PLAN OF CARE
Goal Outcome Evaluation:  Plan of Care Reviewed With: patient        Progress: improving  Outcome Evaluation: Pt tolerated treatment well this date. Required SBA for bed mobility and to stand. Pt ambulated 20ft x2 w/ SBA-CGA, seated rest break between. Pt completed a few seated LE exercises as well and encouraged pt to ambulate w/ nsg.      Anticipated Discharge Disposition (PT): skilled nursing facility

## 2024-08-16 NOTE — PLAN OF CARE
Problem: Adult Inpatient Plan of Care  Goal: Absence of Hospital-Acquired Illness or Injury  Intervention: Identify and Manage Fall Risk  Recent Flowsheet Documentation  Taken 8/16/2024 1600 by Ana Melendez RN  Safety Promotion/Fall Prevention: safety round/check completed  Taken 8/16/2024 1400 by Ana Melendez RN  Safety Promotion/Fall Prevention: safety round/check completed  Taken 8/16/2024 1200 by Ana Melendez RN  Safety Promotion/Fall Prevention: safety round/check completed  Taken 8/16/2024 1000 by Ana Melendez RN  Safety Promotion/Fall Prevention: safety round/check completed  Taken 8/16/2024 0800 by Ana Melendez RN  Safety Promotion/Fall Prevention: safety round/check completed  Intervention: Prevent Skin Injury  Recent Flowsheet Documentation  Taken 8/16/2024 1600 by Ana Melendez RN  Body Position: position changed independently  Taken 8/16/2024 1400 by Ana Melendez RN  Body Position: position changed independently  Taken 8/16/2024 1200 by Ana Melendez RN  Body Position: position changed independently  Taken 8/16/2024 1000 by Ana Melendez RN  Body Position: position changed independently  Taken 8/16/2024 0800 by Ana Melendez RN  Body Position: position changed independently  Intervention: Prevent and Manage VTE (Venous Thromboembolism) Risk  Recent Flowsheet Documentation  Taken 8/16/2024 1600 by Ana Melendez RN  Activity Management: activity encouraged  Taken 8/16/2024 1400 by Ana Melendez RN  Activity Management: activity encouraged  Taken 8/16/2024 1200 by Ana Melendez RN  Activity Management: activity encouraged  Taken 8/16/2024 1000 by Ana Melendez RN  Activity Management: activity encouraged  Taken 8/16/2024 0800 by Ana Melendez RN  Activity Management: activity encouraged  VTE Prevention/Management:   bilateral   sequential compression devices off   patient refused intervention  Range of Motion: active ROM (range of motion) encouraged  Intervention:  Prevent Infection  Recent Flowsheet Documentation  Taken 8/16/2024 1600 by Ana Melendez RN  Infection Prevention: hand hygiene promoted  Taken 8/16/2024 1400 by Ana Melendez RN  Infection Prevention: hand hygiene promoted  Taken 8/16/2024 1200 by Ana Melendez RN  Infection Prevention: hand hygiene promoted  Taken 8/16/2024 1000 by Aan Melendez RN  Infection Prevention: hand hygiene promoted  Taken 8/16/2024 0800 by Ana Melendez RN  Infection Prevention: hand hygiene promoted  Goal: Optimal Comfort and Wellbeing  Intervention: Provide Person-Centered Care  Recent Flowsheet Documentation  Taken 8/16/2024 0800 by Ana Melendez RN  Trust Relationship/Rapport:   care explained   choices provided   Goal Outcome Evaluation:

## 2024-08-16 NOTE — THERAPY TREATMENT NOTE
Patient Name: Froy Ngo  : 1945    MRN: [id number]                              Today's Date: 2024       Admit Date: 2024    Visit Dx:     ICD-10-CM ICD-9-CM   1. Bilateral hydronephrosis  N13.30 591   2. Acute kidney insufficiency  N28.9 593.9   3. Oliguria  R34 788.5   4. Leukocytosis, unspecified type  D72.829 288.60     Patient Active Problem List   Diagnosis    DKA (diabetic ketoacidosis)    Obstructive uropathy    Bilateral hydronephrosis    Hyperkalemia    Acute renal failure    Type 2 diabetes mellitus with hyperglycemia    Essential hypertension, benign    Dementia without behavioral disturbance     Past Medical History:   Diagnosis Date    Dementia     Diabetes mellitus     Elevated cholesterol     Prostate disorder     Urinary retention      Past Surgical History:   Procedure Laterality Date    CYSTOSCOPY W/ URETERAL STENT PLACEMENT Bilateral 2024    Procedure: BILATERAL CYSTOSCOPY URETERAL CATHETER/STENT INSERTION WITH BILATERAL RETROGRADES;  Surgeon: Antoni Melendez MD;  Location: [facility];  Service: Urology;  Laterality: Bilateral;      General Information       Row Name 24 1543          Physical Therapy Time and Intention    Document Type therapy note (daily note)  -     Mode of Treatment physical therapy  -       Row Name 24 1543          General Information    Existing Precautions/Restrictions fall  -       Row Name 24 1543          Cognition    Orientation Status (Cognition) oriented x 3  -               User Key  (r) = Recorded By, (t) = Taken By, (c) = Cosigned By      Initials Name Provider Type     Bhargavi Moon PTA Physical Therapist Assistant                   Mobility       Row Name 24 1543          Bed Mobility    Bed Mobility supine-sit;sit-supine  -     Supine-Sit [place] (Bed Mobility) standby assist  -     Sit-Supine [place] (Bed Mobility) standby assist  -     Assistive Device (Bed  Mobility) bed rails;head of bed elevated  -[facility] Name 08/16/24 1543          Sit-Stand Transfer    Sit-Stand [place] (Transfers) standby assist  -SM       Row Name 08/16/24 1543          Gait/Stairs (Locomotion)    [place] Level (Gait) standby assist;contact guard  -     Patient was able to Ambulate yes  -     Distance in Feet (Gait) 40  20ft x2  -SM     Deviations/Abnormal Patterns (Gait) mary decreased;stride length decreased  -     Bilateral Gait Deviations forward flexed posture  -               User Key  (r) = Recorded By, (t) = Taken By, (c) = Cosigned By      Initials Name Provider Type    Bhargavi Layne PTA Physical Therapist Assistant                   Obj/Interventions       [facility] Name 08/16/24 1548          Motor Skills    Therapeutic Exercise --  seated AP, LAQ, marches x10 reps  -               User Key  (r) = Recorded By, (t) = Taken By, (c) = Cosigned By      Initials Name Provider Type    Bhargavi Layne PTA Physical Therapist Assistant                   Goals/Plan    No documentation.                  Clinical Impression       Row Name 08/16/24 1549          Pain    Pretreatment Pain Rating 0/10 - no pain  -SM     Posttreatment Pain Rating 0/10 - no pain  -SM       Row Name 08/16/24 1549          Positioning and Restraints    Pre-Treatment Position in bed  -     Post Treatment Position bed  -     In Bed supine;call light within reach;encouraged to call for assist;exit alarm on;with family/caregiver  -               User Key  (r) = Recorded By, (t) = Taken By, (c) = Cosigned By      Initials Name Provider Type    Bhargavi Layne PTA Physical Therapist Assistant                   Outcome Measures       [facility] Name 08/16/24 1550          How much help from another person do you currently need...    Turning from your back to your side while in flat bed without using bedrails? 4  -SM     Moving from lying on back to sitting on the side of a flat bed  without bedrails? 4  -SM     Moving to and from a bed to a chair (including a wheelchair)? 3  -SM     Standing up from a chair using your arms (e.g., wheelchair, bedside chair)? 3  -SM     Climbing 3-5 steps with a railing? 3  -SM     To walk in hospital room? 3  -SM     AM-PAC 6 Clicks Score (PT) 20  -SM     Highest Level of Mobility Goal 6 --> Walk 10 steps or more  -       Row Name 08/16/24 1550          Functional Assessment    Outcome Measure Options AM-PAC 6 Clicks Basic Mobility (PT)  -               User Key  (r) = Recorded By, (t) = Taken By, (c) = Cosigned By      Initials Name Provider Type     Bhargavi Moon, PTA Physical Therapist Assistant                                 Physical Therapy Education       Title: PT OT SLP Therapies (In Progress)       Topic: Physical Therapy (In Progress)       Point: Mobility training (In Progress)       Learning Progress Summary             Patient Acceptance, E,D, NR by  at 8/16/2024 1551    Acceptance, E,D, NR by  at 8/15/2024 1500    Acceptance, E, NR by GR at 8/15/2024 0035                         Point: Home exercise program (In Progress)       Learning Progress Summary             Patient Acceptance, E,D, NR by  at 8/16/2024 1551    Acceptance, E,D, NR by PC at 8/15/2024 1500    Acceptance, E, NR by GR at 8/15/2024 0035                         Point: Body mechanics (In Progress)       Learning Progress Summary             Patient Acceptance, E,D, NR by  at 8/16/2024 1551    Acceptance, E,D, NR by PC at 8/15/2024 1500    Acceptance, E, NR by GR at 8/15/2024 0035                         Point: Precautions (In Progress)       Learning Progress Summary             Patient Acceptance, E,D, NR by  at 8/16/2024 1551    Acceptance, E,D, NR by PC at 8/15/2024 1500    Acceptance, E, NR by GR at 8/15/2024 0035                                         User Key       Initials Effective Dates Name Provider Type Discipline     06/16/21 -  Mell Huddleston  PT Physical Therapist PT     03/07/18 -  Bhargavi Moon PTA Physical Therapist Assistant PT     01/23/24 -  Carole Glass, RN Registered Nurse Nurse                  PT Recommendation and Plan     Plan of Care Reviewed With: patient  Progress: improving  Outcome Evaluation: Pt tolerated treatment well this date. Required SBA for bed mobility and to stand. Pt ambulated 20ft x2 w/ SBA-CGA, seated rest break between. Pt completed a few seated LE exercises as well and encouraged pt to ambulate w/ nsg.     Time Calculation:         PT Charges       Row Name 08/16/24 1554             Time Calculation    Start Time 1334  -      Stop Time 1347  -      Time Calculation (min) 13 min  -      PT Received On 08/16/24  -      PT - Next Appointment 08/19/24  -                User Key  (r) = Recorded By, (t) = Taken By, (c) = Cosigned By      Initials Name Provider Type     Bhargavi Moon PTA Physical Therapist Assistant                  Therapy Charges for Today       Code Description Service Date Service Provider Modifiers Qty    22060286840 HC PT THER PROC EA 15 MIN 8/16/2024 Bhargavi Moon PTA GP 1            PT G-Codes  Outcome Measure Options: AM-PAC 6 Clicks Basic Mobility (PT)  AM-PAC 6 Clicks Score (PT): 20  PT Discharge Summary  Anticipated Discharge Disposition (PT): skilled nursing facility    Bhargavi Moon PTA  8/16/2024

## 2024-08-16 NOTE — CASE MANAGEMENT/SOCIAL WORK
Continued Stay Note  [facility]     Patient Name: Froy Ngo  MRN: [id number]  Today's Date: 8/16/2024    Admit Date: 8/11/2024    Plan: Will need pre-cert prior to d/c to [facility]   Discharge Plan       Row Name 08/16/24 0959       Plan    Plan Will need pre-cert prior to d/c to [facility]    Patient/Family in Agreement with Plan yes    Plan Comments Spoke with patient and caregiver at bedside. Introduced self. Patient's discharge plan is to [facility]. Emailed Mary/ [facility]eth[facility] to have start pre-cert. Spoke with wife via outbound call, voiced understanding and agreeable to current discharge plan.                   Discharge Codes    No documentation.                 Expected Discharge Date and Time       Expected Discharge Date Expected Discharge Time    Aug 19, 2024               Marii Chavez RN

## 2024-08-17 LAB
ALBUMIN SERPL-MCNC: 2.4 G/DL (ref 3.5–5.2)
ANION GAP SERPL CALCULATED.3IONS-SCNC: 6.3 MMOL/L (ref 5–15)
BUN SERPL-MCNC: 34 MG/DL (ref 8–23)
BUN/CREAT SERPL: 15.7 (ref 7–25)
CALCIUM SPEC-SCNC: 7.9 MG/DL (ref 8.6–10.5)
CHLORIDE SERPL-SCNC: 103 MMOL/L (ref 98–107)
CO2 SERPL-SCNC: 28.7 MMOL/L (ref 22–29)
CREAT SERPL-MCNC: 2.17 MG/DL (ref 0.76–1.27)
DEPRECATED RDW RBC AUTO: 39.1 FL (ref 37–54)
EGFRCR SERPLBLD CKD-EPI 2021: 30 ML/MIN/1.73
ERYTHROCYTE [DISTWIDTH] IN BLOOD BY AUTOMATED COUNT: 11.9 % (ref 12.3–15.4)
GLUCOSE BLDC GLUCOMTR-MCNC: 125 MG/DL (ref 70–130)
GLUCOSE BLDC GLUCOMTR-MCNC: 136 MG/DL (ref 70–130)
GLUCOSE BLDC GLUCOMTR-MCNC: 179 MG/DL (ref 70–130)
GLUCOSE BLDC GLUCOMTR-MCNC: 184 MG/DL (ref 70–130)
GLUCOSE SERPL-MCNC: 131 MG/DL (ref 65–99)
HCT VFR BLD AUTO: 30.3 % (ref 37.5–51)
HGB BLD-MCNC: 10.1 G/DL (ref 13–17.7)
MAGNESIUM SERPL-MCNC: 1.6 MG/DL (ref 1.6–2.4)
MCH RBC QN AUTO: 30.3 PG (ref 26.6–33)
MCHC RBC AUTO-ENTMCNC: 33.3 G/DL (ref 31.5–35.7)
MCV RBC AUTO: 91 FL (ref 79–97)
PHOSPHATE SERPL-MCNC: 3.2 MG/DL (ref 2.5–4.5)
PLATELET # BLD AUTO: 344 10*3/MM3 (ref 140–450)
PMV BLD AUTO: 8.3 FL (ref 6–12)
POTASSIUM SERPL-SCNC: 3.9 MMOL/L (ref 3.5–5.2)
RBC # BLD AUTO: 3.33 10*6/MM3 (ref 4.14–5.8)
SODIUM SERPL-SCNC: 138 MMOL/L (ref 136–145)
WBC NRBC COR # BLD AUTO: 6.46 10*3/MM3 (ref 3.4–10.8)

## 2024-08-17 PROCEDURE — 82948 REAGENT STRIP/BLOOD GLUCOSE: CPT

## 2024-08-17 PROCEDURE — 85027 COMPLETE CBC AUTOMATED: CPT | Performed by: UROLOGY

## 2024-08-17 PROCEDURE — 63710000001 INSULIN LISPRO (HUMAN) PER 5 UNITS: Performed by: UROLOGY

## 2024-08-17 PROCEDURE — 83735 ASSAY OF MAGNESIUM: CPT | Performed by: UROLOGY

## 2024-08-17 PROCEDURE — 80069 RENAL FUNCTION PANEL: CPT | Performed by: UROLOGY

## 2024-08-17 RX ADMIN — HYDRALAZINE HYDROCHLORIDE 25 MG: 25 TABLET ORAL at 21:00

## 2024-08-17 RX ADMIN — Medication 10 ML: at 21:15

## 2024-08-17 RX ADMIN — Medication 10 ML: at 10:01

## 2024-08-17 RX ADMIN — INSULIN LISPRO 2 UNITS: 100 INJECTION, SOLUTION INTRAVENOUS; SUBCUTANEOUS at 20:58

## 2024-08-17 RX ADMIN — MEMANTINE HYDROCHLORIDE 10 MG: 10 TABLET, FILM COATED ORAL at 10:02

## 2024-08-17 RX ADMIN — DONEPEZIL HYDROCHLORIDE 10 MG: 10 TABLET, FILM COATED ORAL at 20:58

## 2024-08-17 RX ADMIN — AMLODIPINE BESYLATE 5 MG: 5 TABLET ORAL at 10:00

## 2024-08-17 RX ADMIN — INSULIN LISPRO 2 UNITS: 100 INJECTION, SOLUTION INTRAVENOUS; SUBCUTANEOUS at 12:14

## 2024-08-17 RX ADMIN — HYDRALAZINE HYDROCHLORIDE 25 MG: 25 TABLET ORAL at 16:43

## 2024-08-17 RX ADMIN — ATORVASTATIN CALCIUM 10 MG: 20 TABLET, FILM COATED ORAL at 10:00

## 2024-08-17 RX ADMIN — ASPIRIN 81 MG: 81 TABLET, COATED ORAL at 10:00

## 2024-08-17 RX ADMIN — HYDRALAZINE HYDROCHLORIDE 25 MG: 25 TABLET ORAL at 05:50

## 2024-08-17 RX ADMIN — TAMSULOSIN HYDROCHLORIDE 0.4 MG: 0.4 CAPSULE ORAL at 10:00

## 2024-08-17 NOTE — CASE MANAGEMENT/SOCIAL WORK
Continued Stay Note  [facility]     Patient Name: Froy Ngo  MRN: [id number]  Today's Date: 8/17/2024    Admit Date: 8/11/2024    Plan: [facility] pre cert approved   Discharge Plan       Row Name 08/17/24 0928       Plan    Plan [facility] pre cert approved    Patient/Family in Agreement with Plan yes    Plan Comments Per Jolanta/Letha, patient's pre cert approved and valid 8/17-8/21. Aida MOREL CCP                   Discharge Codes    No documentation.                 Expected Discharge Date and Time       Expected Discharge Date Expected Discharge Time    Aug 18, 2024               Aida Elizalde RN

## 2024-08-17 NOTE — NURSING NOTE
During AM report urine was dark pink. Later in shift had return of hematuria with clots present; manually irrigated with large clot produced. Reported to Urology, Dr. Clinton, verbal order to restart CBI & manual irrigation q four hours PRN with sterile water.

## 2024-08-17 NOTE — PROGRESS NOTES
[facility]\"" Progress Note      Patient Name: Froy Ngo  : 1945  MRN: [id number]  Primary Care Physician:  Ryan Gutierrez MD  Date of admission: 2024    Subjective     Interval History:   F/U NEHEMIAS vs NEHEMIAS/CKD    Doing better today.  No new complaint.  Had mild hematuria.  Creatinine trending down  Urine output 2500 cc last 24 hours  Review of Systems:   14 point review of systems is otherwise negative except for mentioned above on HPI     Objective     Vitals:   Temp:  [98.1 °F (36.7 °C)-98.4 °F (36.9 °C)] 98.4 °F (36.9 °C)  Heart Rate:  [80-91] 83  Resp:  [18] 18  BP: (134-149)/(62-73) 134/68    Intake/Output Summary (Last 24 hours) at 2024 1232  Last data filed at 2024 2115  Gross per 24 hour   Intake 210 ml   Output 2500 ml   Net -2290 ml       Physical Exam:    Constitutional: Awake, alert, NAD, chronically ill, frail  HEENT: Sclera anicteric, no conjunctival injection  Neck: Supple, no JVD  Respiratory: Clear to auscultation bilaterally, nonlabored on RA  Cardiovascular: RRR, no rub  Gastrointestinal: BS +, soft, nontender and nondistended  : No palpable bladder; Rodriguez catheter anchored  Musculoskeletal: No significant edema, no clubbing or cyanosis  Psychiatric: Appropriate affect, cooperative  Neurologic: No asterixis, moving all extremities, normal speech   Skin: Warm and dry        Scheduled Meds:     amLODIPine, 5 mg, Oral, Q24H  aspirin, 81 mg, Oral, Daily  atorvastatin, 10 mg, Oral, Daily  donepezil, 10 mg, Oral, Nightly  hydrALAZINE, 25 mg, Oral, Q8H  insulin lispro, 2-7 Units, Subcutaneous, 4x Daily AC & at Bedtime  memantine, 10 mg, Oral, Daily  sodium chloride, 10 mL, Intravenous, Q12H  tamsulosin, 0.4 mg, Oral, Daily      IV Meds:          Results Reviewed:   I have personally reviewed the results from the time of this admission to 2024 12:32 EDT     Results from last 7 days   Lab Units 24  0315 24  1251  08/15/24  0235 08/12/24  0750 08/11/24  1001   SODIUM mmol/L 138 135* 136   < > 132*   POTASSIUM mmol/L 3.9 3.9 4.0   < > 4.3   CHLORIDE mmol/L 103 102 102   < > 99   CO2 mmol/L 28.7 26.8 22.1   < > 18.0*   BUN mg/dL 34* 39* 53*   < > 59*   CREATININE mg/dL 2.17* 2.58* 3.78*   < > 5.84*   CALCIUM mg/dL 7.9* 7.7* 8.0*   < > 8.3*   BILIRUBIN mg/dL  --   --   --   --  0.8   ALK PHOS U/L  --   --   --   --  108   ALT (SGPT) U/L  --   --   --   --  16   AST (SGOT) U/L  --   --   --   --  16   GLUCOSE mg/dL 131* 153* 140*   < > 150*    < > = values in this interval not displayed.       Estimated Creatinine Clearance: 34.8 mL/min (A) (by C-G formula based on SCr of 2.17 mg/dL (H)).    Results from last 7 days   Lab Units 08/17/24  0315 08/16/24  0435 08/15/24  0235   MAGNESIUM mg/dL 1.6 1.7 1.8   PHOSPHORUS mg/dL 3.2 2.8 3.5       Results from last 7 days   Lab Units 08/11/24  1001   URIC ACID mg/dL 8.1*       Results from last 7 days   Lab Units 08/17/24  0315 08/16/24  0435 08/15/24  0235 08/14/24  0457 08/13/24  0229   WBC 10*3/mm3 6.46 7.28 9.40 10.70 19.30*   HEMOGLOBIN g/dL 10.1* 10.1* 9.5* 10.0* 10.7*   PLATELETS 10*3/mm3 344 360 350 369 330             Assessment / Plan     ASSESSMENT:  1.  NEHEMIAS with unknonw baseline creatinine  secondary to severe obstructive uropathy now status post double-J stent placement. kidney function is improving  2.  Obstructive uropathy with significant BPH:  Suspicion is for atonic bladder; Urology consulted now status post double-J stent placement.  3.  Alzheimer's dementia  4.  Anemia  5.  Hypertension  6.  Leukocytosis  7.  DM2:  apparently was receiving metformin at the nursing home  8. High anion gap metabolic acidosis secondary to acute kidney injury  resolved   9. Hyperphosphatemia : resolved     PLAN:  Kidney function continues to improve  Noted plan to discharge to rehab  Encourage p.o. intake and avoid NSAIDs  If discharged arrange follow-up in nephrology clinic in 1 to 2  weeks    Thank you for involving us in the care of Froy Ngo.  Please feel free to call with any questions.    Jessica Aragon MD   08/17/24  12:32 EDT    [facility]  [phone]    Please note that portions of this note were completed with a voice recognition program.

## 2024-08-17 NOTE — PROGRESS NOTES
[facility] Progress Note      Patient Name: Froy Ngo  : 1945  MRN: [id number]  Primary Care Physician:  Ryan Gutierrez MD  Date of admission: 2024    Subjective     Interval History:   F/U NEHEMIAS vs NEHEMIAS/CKD     Doing better . Appetite is improving   Denies any chest pain and No SOB   Doing well overall  Review of Systems:   14 point review of systems is otherwise negative except for mentioned above on HPI     Objective     Vitals:   Temp:  [98.1 °F (36.7 °C)-98.4 °F (36.9 °C)] 98.4 °F (36.9 °C)  Heart Rate:  [80-91] 83  Resp:  [18] 18  BP: (134-149)/(62-73) 134/68    Intake/Output Summary (Last 24 hours) at 2024 1231  Last data filed at 2024 2115  Gross per 24 hour   Intake 210 ml   Output 2500 ml   Net -2290 ml       Physical Exam:    Constitutional: Awake, alert, NAD, chronically ill, frail  HEENT: Sclera anicteric, no conjunctival injection  Neck: Supple, no JVD  Respiratory: Clear to auscultation bilaterally, nonlabored on RA  Cardiovascular: RRR, no rub  Gastrointestinal: BS +, soft, nontender and nondistended  : No palpable bladder; Rodriguez catheter anchored  Musculoskeletal: No significant edema, no clubbing or cyanosis  Psychiatric: Appropriate affect, cooperative  Neurologic: No asterixis, moving all extremities, normal speech   Skin: Warm and dry        Scheduled Meds:     amLODIPine, 5 mg, Oral, Q24H  aspirin, 81 mg, Oral, Daily  atorvastatin, 10 mg, Oral, Daily  donepezil, 10 mg, Oral, Nightly  hydrALAZINE, 25 mg, Oral, Q8H  insulin lispro, 2-7 Units, Subcutaneous, 4x Daily AC & at Bedtime  memantine, 10 mg, Oral, Daily  sodium chloride, 10 mL, Intravenous, Q12H  tamsulosin, 0.4 mg, Oral, Daily      IV Meds:          Results Reviewed:   I have personally reviewed the results from the time of this admission to 2024 12:31 EDT     Results from last 7 days   Lab Units 24  0315 24  0435 08/15/24  0235 24  0751  08/11/24  1001   SODIUM mmol/L 138 135* 136   < > 132*   POTASSIUM mmol/L 3.9 3.9 4.0   < > 4.3   CHLORIDE mmol/L 103 102 102   < > 99   CO2 mmol/L 28.7 26.8 22.1   < > 18.0*   BUN mg/dL 34* 39* 53*   < > 59*   CREATININE mg/dL 2.17* 2.58* 3.78*   < > 5.84*   CALCIUM mg/dL 7.9* 7.7* 8.0*   < > 8.3*   BILIRUBIN mg/dL  --   --   --   --  0.8   ALK PHOS U/L  --   --   --   --  108   ALT (SGPT) U/L  --   --   --   --  16   AST (SGOT) U/L  --   --   --   --  16   GLUCOSE mg/dL 131* 153* 140*   < > 150*    < > = values in this interval not displayed.       Estimated Creatinine Clearance: 34.8 mL/min (A) (by C-G formula based on SCr of 2.17 mg/dL (H)).    Results from last 7 days   Lab Units 08/17/24  0315 08/16/24  0435 08/15/24  0235   MAGNESIUM mg/dL 1.6 1.7 1.8   PHOSPHORUS mg/dL 3.2 2.8 3.5       Results from last 7 days   Lab Units 08/11/24  1001   URIC ACID mg/dL 8.1*       Results from last 7 days   Lab Units 08/17/24  0315 08/16/24  0435 08/15/24  0235 08/14/24  0457 08/13/24  0229   WBC 10*3/mm3 6.46 7.28 9.40 10.70 19.30*   HEMOGLOBIN g/dL 10.1* 10.1* 9.5* 10.0* 10.7*   PLATELETS 10*3/mm3 344 360 350 369 330             Assessment / Plan     ASSESSMENT:  1.  NEHEMIAS with unknonw baseline creatinine likely secondary to severe obstructive uropathy now status post double-J stent placement kidney function is improving  2.  Obstructive uropathy with significant BPH:  Suspicion is for atonic bladder; Urology consulted now status post double-J stent placement.  3.  Alzheimer's dementia  4.  Anemia  5.  Hypertension  6.  Leukocytosis  7.  DM2:  apparently was receiving metformin at the nursing home  8. High anion gap metabolic acidosis secondary to acute kidney injury improved   9. Hyperphosphatemia : improved     PLAN:  Kidney function improving  Labs in a.m.    Thank you for involving us in the care of Froy Ngo.  Please feel free to call with any questions.    Jessica Aragon MD   08/17/24  12:31  AJIT    [facility]  [phone]    Please note that portions of this note were completed with a voice recognition program.

## 2024-08-17 NOTE — PROGRESS NOTES
Name: Froy Ngo ADMIT: 2024   : 1945  PCP: Ryan Gutierrez MD    MRN: [id number] LOS: 6 days   AGE/SEX: 78 y.o. male  ROOM: [facility]     Subjective   Subjective   Resting in bed, daughter at bedside.  He has some mild hematuria which has started.  RN reached out to urology who recommend resuming CBI.       Objective   Objective   Vital Signs  Temp:  [98.1 °F (36.7 °C)-98.4 °F (36.9 °C)] 98.4 °F (36.9 °C)  Heart Rate:  [80-91] 83  Resp:  [18] 18  BP: (134-149)/(62-73) 134/68  SpO2:  [94 %-98 %] 98 %  on   ;   Device (Oxygen Therapy): room air  Body mass index is 27.09 kg/m².  Physical Exam  Constitutional:       General: He is not in acute distress.     Appearance: He is ill-appearing. He is not toxic-appearing.   Cardiovascular:      Rate and Rhythm: Normal rate and regular rhythm.   Pulmonary:      Effort: Pulmonary effort is normal. No respiratory distress.      Breath sounds: Normal breath sounds. No wheezing.   Abdominal:      General: Abdomen is flat.      Palpations: Abdomen is soft.      Tenderness: There is no abdominal tenderness.   Musculoskeletal:         General: No tenderness.      Right lower leg: No edema.      Left lower leg: No edema.   Skin:     General: Skin is warm and dry.   Neurological:      General: No focal deficit present.      Mental Status: He is alert and oriented to person, place, and time. Mental status is at baseline.      Motor: No weakness.            Copied text material from yesterday's note has been reviewed for appropriate changes and remains accurate as of 24  .      Results Review     I reviewed the patient's new clinical results.  Results from last 7 days   Lab Units 08/17/24  0315 08/16/24  0435 08/15/24  0235 24  0457   WBC 10*3/mm3 6.46 7.28 9.40 10.70   HEMOGLOBIN g/dL 10.1* 10.1* 9.5* 10.0*   PLATELETS 10*3/mm3 344 360 350 369     Results from last 7 days   Lab Units 245 08/15/24  0235 24  0452    SODIUM mmol/L 138 135* 136 136   POTASSIUM mmol/L 3.9 3.9 4.0 4.2   CHLORIDE mmol/L 103 102 102 104   CO2 mmol/L 28.7 26.8 22.1 18.9*   BUN mg/dL 34* 39* 53* 65*   CREATININE mg/dL 2.17* 2.58* 3.78* 6.40*   GLUCOSE mg/dL 131* 153* 140* 159*   EGFR mL/min/1.73 30.0* 24.4* 15.4* 8.2*     Results from last 7 days   Lab Units 08/17/24 0315 08/16/24 0435 08/15/24  0235 08/14/24  0457 08/12/24  0750 08/11/24  1001   ALBUMIN g/dL 2.4* 2.3* 2.3* 2.2*   < > 2.6*   BILIRUBIN mg/dL  --   --   --   --   --  0.8   ALK PHOS U/L  --   --   --   --   --  108   AST (SGOT) U/L  --   --   --   --   --  16   ALT (SGPT) U/L  --   --   --   --   --  16    < > = values in this interval not displayed.     Results from last 7 days   Lab Units 08/17/24  0315 08/16/24  0435 08/15/24  0235 08/14/24  0457   CALCIUM mg/dL 7.9* 7.7* 8.0* 7.8*   ALBUMIN g/dL 2.4* 2.3* 2.3* 2.2*   MAGNESIUM mg/dL 1.6 1.7 1.8 1.9   PHOSPHORUS mg/dL 3.2 2.8 3.5 5.9*     Results from last 7 days   Lab Units 08/11/24  1313 08/11/24  1001   PROCALCITONIN ng/mL  --  0.34*   LACTATE mmol/L 0.8  --      Glucose   Date/Time Value Ref Range Status   08/17/2024 1055 184 (H) 70 - 130 mg/dL Final   08/17/2024 0554 125 70 - 130 mg/dL Final   08/16/2024 2013 172 (H) 70 - 130 mg/dL Final   08/16/2024 1640 160 (H) 70 - 130 mg/dL Final   08/16/2024 1132 167 (H) 70 - 130 mg/dL Final   08/16/2024 0629 158 (H) 70 - 130 mg/dL Final   08/15/2024 2042 135 (H) 70 - 130 mg/dL Final       No radiology results for the last day    I have personally reviewed all medications:  Scheduled Medications  amLODIPine, 5 mg, Oral, Q24H  aspirin, 81 mg, Oral, Daily  atorvastatin, 10 mg, Oral, Daily  donepezil, 10 mg, Oral, Nightly  hydrALAZINE, 25 mg, Oral, Q8H  insulin lispro, 2-7 Units, Subcutaneous, 4x Daily AC & at Bedtime  memantine, 10 mg, Oral, Daily  sodium chloride, 10 mL, Intravenous, Q12H  tamsulosin, 0.4 mg, Oral, Daily    Infusions   Diet  Diet: Regular/House; Fluid Consistency: Thin  (IDDSI 0)    I have personally reviewed:  [x]  Laboratory   [x]  Microbiology   [x]  Radiology   [x]  EKG/Telemetry  [x]  Cardiology/Vascular   []  Pathology    []  Records       Assessment/Plan     Active Hospital Problems    Diagnosis  POA    **Acute renal failure [N17.9]  Yes    Type 2 diabetes mellitus with hyperglycemia [E11.65]  Yes    Essential hypertension, benign [I10]  Yes    Dementia without behavioral disturbance [F03.90]  Yes    Obstructive uropathy [N13.9]  Yes    Bilateral hydronephrosis [N13.30]  Yes      Resolved Hospital Problems   No resolved problems to display.       78 y.o. male admitted with Acute renal failure.    Mr. Ngo is a 78 y.o.  with a history of type 2 diabetes, hyperlipidemia, Alzheimer's dementia, recent admission for euglycemic DKA, NEHEMIAS/obstructive uropathy with bilateral hydronephrosis-discharged with in-dwelling Rodriguez who presents with decreased urine output found to have NEHEMIAS.       NEHEMIAS  Non-anion gap metabolic acidosis  Urinary retention from bladder outlet obstruction with bilateral hydroureteronephrosis  Hematuria  Possible UTI- ED unable to get sample prior to starting abx  - renal and urology consulted; renal managing IVF  - bilateral hydroureteronephrosis on Ct similar to CT last week  - ct w/ perinephric/periureteral stranding, procal slightly elevated, WBC 19; cont rocephin, urine cx if able; may be unreliable since patient already received abx  - pt to OR 8/13 for cysto w/ b/l J-stent placement  - Cr improving this morning down to 2.1 (from 8.2)  -Urology recommending CBI for hematuria     Leukocytosis  - appreciate ID seeing patient  -Leukocytosis possibly related to urinary obstruction, no obvious infection however urine culture may have been affected by antibiotics prior to collection, given patient having a urologic procedure they recommend 7-day course of Rocephin, can transition to cefpodoxime if ready for discharge before completing  - WBC improving- c/w  leukocytosis being related to urinary obstruction; 19->7.     Abnormal MRI brain  - MRI from last admit reviewed-lateral cerebral subdural hygromas pachymeningeal thickening possibly related to chronic subdural hematoma versus hygroma, acute subdural hygroma not excluded; no history of recent head trauma  -Nicolasa consulted- no acute surgical interventions indicated; findings likely incidental - they recommend follow up 1 month in clinic with CT head     Type 2 diabetes  - hold home meds  - continue SSI while admitted     Hypertension  - resume amlodipine and hydralazine     Dementia  - cont donepezil, namenda  - frequent reorientation; high risk for delirium  -MRI from 8/9 with chronic microhemorrhages likely secondary to 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  Full code.         Shruthi Acosta MD  [facility]  08/17/24  13:22 EDT

## 2024-08-17 NOTE — PLAN OF CARE
Goal Outcome Evaluation:      No acute changes for this shift. On R.A. with caregiver at bedside. Plan of care ongoing. Patient expressed no concern at the moment. Call light within reach

## 2024-08-18 LAB
ALBUMIN SERPL-MCNC: 2.8 G/DL (ref 3.5–5.2)
ANION GAP SERPL CALCULATED.3IONS-SCNC: 9 MMOL/L (ref 5–15)
BUN SERPL-MCNC: 28 MG/DL (ref 8–23)
BUN/CREAT SERPL: 14.6 (ref 7–25)
CALCIUM SPEC-SCNC: 8.3 MG/DL (ref 8.6–10.5)
CHLORIDE SERPL-SCNC: 103 MMOL/L (ref 98–107)
CO2 SERPL-SCNC: 28 MMOL/L (ref 22–29)
CREAT SERPL-MCNC: 1.92 MG/DL (ref 0.76–1.27)
DEPRECATED RDW RBC AUTO: 37.8 FL (ref 37–54)
EGFRCR SERPLBLD CKD-EPI 2021: 34.8 ML/MIN/1.73
ERYTHROCYTE [DISTWIDTH] IN BLOOD BY AUTOMATED COUNT: 11.8 % (ref 12.3–15.4)
GLUCOSE BLDC GLUCOMTR-MCNC: 134 MG/DL (ref 70–130)
GLUCOSE BLDC GLUCOMTR-MCNC: 176 MG/DL (ref 70–130)
GLUCOSE BLDC GLUCOMTR-MCNC: 197 MG/DL (ref 70–130)
GLUCOSE BLDC GLUCOMTR-MCNC: 228 MG/DL (ref 70–130)
GLUCOSE SERPL-MCNC: 114 MG/DL (ref 65–99)
HCT VFR BLD AUTO: 30.8 % (ref 37.5–51)
HGB BLD-MCNC: 10.5 G/DL (ref 13–17.7)
MAGNESIUM SERPL-MCNC: 1.6 MG/DL (ref 1.6–2.4)
MCH RBC QN AUTO: 30.5 PG (ref 26.6–33)
MCHC RBC AUTO-ENTMCNC: 34.1 G/DL (ref 31.5–35.7)
MCV RBC AUTO: 89.5 FL (ref 79–97)
PHOSPHATE SERPL-MCNC: 2.9 MG/DL (ref 2.5–4.5)
PLATELET # BLD AUTO: 349 10*3/MM3 (ref 140–450)
PMV BLD AUTO: 8.7 FL (ref 6–12)
POTASSIUM SERPL-SCNC: 3.8 MMOL/L (ref 3.5–5.2)
RBC # BLD AUTO: 3.44 10*6/MM3 (ref 4.14–5.8)
SODIUM SERPL-SCNC: 140 MMOL/L (ref 136–145)
WBC NRBC COR # BLD AUTO: 6.22 10*3/MM3 (ref 3.4–10.8)

## 2024-08-18 PROCEDURE — 85027 COMPLETE CBC AUTOMATED: CPT | Performed by: UROLOGY

## 2024-08-18 PROCEDURE — 83735 ASSAY OF MAGNESIUM: CPT | Performed by: UROLOGY

## 2024-08-18 PROCEDURE — 36415 COLL VENOUS BLD VENIPUNCTURE: CPT | Performed by: UROLOGY

## 2024-08-18 PROCEDURE — 80069 RENAL FUNCTION PANEL: CPT | Performed by: UROLOGY

## 2024-08-18 PROCEDURE — 63710000001 INSULIN LISPRO (HUMAN) PER 5 UNITS: Performed by: UROLOGY

## 2024-08-18 PROCEDURE — 82948 REAGENT STRIP/BLOOD GLUCOSE: CPT

## 2024-08-18 RX ADMIN — HYDRALAZINE HYDROCHLORIDE 25 MG: 25 TABLET ORAL at 14:06

## 2024-08-18 RX ADMIN — Medication 10 ML: at 21:07

## 2024-08-18 RX ADMIN — DONEPEZIL HYDROCHLORIDE 10 MG: 10 TABLET, FILM COATED ORAL at 21:06

## 2024-08-18 RX ADMIN — MEMANTINE HYDROCHLORIDE 10 MG: 10 TABLET, FILM COATED ORAL at 09:11

## 2024-08-18 RX ADMIN — AMLODIPINE BESYLATE 5 MG: 5 TABLET ORAL at 09:11

## 2024-08-18 RX ADMIN — ATORVASTATIN CALCIUM 10 MG: 20 TABLET, FILM COATED ORAL at 09:11

## 2024-08-18 RX ADMIN — INSULIN LISPRO 4 UNITS: 100 INJECTION, SOLUTION INTRAVENOUS; SUBCUTANEOUS at 16:42

## 2024-08-18 RX ADMIN — ASPIRIN 81 MG: 81 TABLET, COATED ORAL at 09:11

## 2024-08-18 RX ADMIN — HYDRALAZINE HYDROCHLORIDE 25 MG: 25 TABLET ORAL at 06:37

## 2024-08-18 RX ADMIN — HYDRALAZINE HYDROCHLORIDE 25 MG: 25 TABLET ORAL at 21:06

## 2024-08-18 RX ADMIN — INSULIN LISPRO 2 UNITS: 100 INJECTION, SOLUTION INTRAVENOUS; SUBCUTANEOUS at 21:06

## 2024-08-18 RX ADMIN — TAMSULOSIN HYDROCHLORIDE 0.4 MG: 0.4 CAPSULE ORAL at 09:11

## 2024-08-18 RX ADMIN — Medication 10 ML: at 09:11

## 2024-08-18 NOTE — PLAN OF CARE
Goal Outcome Evaluation:      Currently on CBI. No acute changes for this shift. Pt expressed no other concerns as of the moment. Plan of care ongoing. Call light within reach.

## 2024-08-18 NOTE — PROGRESS NOTES
[facility] Progress Note      Patient Name: Froy Ngo  : 1945  MRN: [id number]  Primary Care Physician:  Ryan Gutierrez MD  Date of admission: 2024    Subjective     Interval History:   F/U NEHEMIAS vs NEHEMIAS/CKD    Doing better today.  Had hematuria yesterday and now is back on CBI   Doing well overall.  Denies any nausea or vomiting   Denies any chest pain or shortness of breath.  Review of Systems:   14 point review of systems is otherwise negative except for mentioned above on HPI     Objective     Vitals:   Temp:  [97.9 °F (36.6 °C)-98.1 °F (36.7 °C)] 98.1 °F (36.7 °C)  Heart Rate:  [] 100  Resp:  [18] 18  BP: (127-153)/(66-78) 143/69    Intake/Output Summary (Last 24 hours) at 2024 0949  Last data filed at 2024 0730  Gross per 24 hour   Intake 720 ml   Output 100 ml   Net 620 ml       Physical Exam:    Constitutional: Awake, alert, NAD, chronically ill, frail  HEENT: Sclera anicteric, no conjunctival injection  Neck: Supple, no JVD  Respiratory: Clear to auscultation bilaterally, nonlabored on RA  Cardiovascular: RRR, no rub  Gastrointestinal: BS +, soft, nontender and nondistended  : No palpable bladder; Rodriguez catheter anchored  Musculoskeletal: No significant edema, no clubbing or cyanosis  Psychiatric: Appropriate affect, cooperative  Neurologic: No asterixis, moving all extremities, normal speech   Skin: Warm and dry        Scheduled Meds:     amLODIPine, 5 mg, Oral, Q24H  aspirin, 81 mg, Oral, Daily  atorvastatin, 10 mg, Oral, Daily  donepezil, 10 mg, Oral, Nightly  hydrALAZINE, 25 mg, Oral, Q8H  insulin lispro, 2-7 Units, Subcutaneous, 4x Daily AC & at Bedtime  memantine, 10 mg, Oral, Daily  sodium chloride, 10 mL, Intravenous, Q12H  tamsulosin, 0.4 mg, Oral, Daily      IV Meds:          Results Reviewed:   I have personally reviewed the results from the time of this admission to 2024 09:49 EDT     Results from last 7 days   Lab  Units 08/18/24 0428 08/17/24 0315 08/16/24  0435 08/12/24  0750 08/11/24  1001   SODIUM mmol/L 140 138 135*   < > 132*   POTASSIUM mmol/L 3.8 3.9 3.9   < > 4.3   CHLORIDE mmol/L 103 103 102   < > 99   CO2 mmol/L 28.0 28.7 26.8   < > 18.0*   BUN mg/dL 28* 34* 39*   < > 59*   CREATININE mg/dL 1.92* 2.17* 2.58*   < > 5.84*   CALCIUM mg/dL 8.3* 7.9* 7.7*   < > 8.3*   BILIRUBIN mg/dL  --   --   --   --  0.8   ALK PHOS U/L  --   --   --   --  108   ALT (SGPT) U/L  --   --   --   --  16   AST (SGOT) U/L  --   --   --   --  16   GLUCOSE mg/dL 114* 131* 153*   < > 150*    < > = values in this interval not displayed.       Estimated Creatinine Clearance: 38.6 mL/min (A) (by C-G formula based on SCr of 1.92 mg/dL (H)).    Results from last 7 days   Lab Units 08/18/24 0428 08/17/24 0315 08/16/24  0435   MAGNESIUM mg/dL 1.6 1.6 1.7   PHOSPHORUS mg/dL 2.9 3.2 2.8       Results from last 7 days   Lab Units 08/11/24  1001   URIC ACID mg/dL 8.1*       Results from last 7 days   Lab Units 08/18/24 0428 08/17/24 0315 08/16/24  0435 08/15/24  0235 08/14/24  0457   WBC 10*3/mm3 6.22 6.46 7.28 9.40 10.70   HEMOGLOBIN g/dL 10.5* 10.1* 10.1* 9.5* 10.0*   PLATELETS 10*3/mm3 349 344 360 350 369             Assessment / Plan     ASSESSMENT:  1.  NEHEMIAS with unknonw baseline creatinine  secondary to severe obstructive uropathy now status post double-J stent placement. kidney function is improving  2.  Obstructive uropathy with significant BPH:  Suspicion is for atonic bladder; Urology consulted now status post double-J stent placement.  3.  Alzheimer's dementia  4.  Anemia  5.  Hypertension  6.  Leukocytosis  7.  DM2:  apparently was receiving metformin at the nursing home  8. High anion gap metabolic acidosis secondary to acute kidney injury  resolved   9. Hyperphosphatemia : resolved     PLAN:  Kidney function continues to improve  Noted plan to discharge to rehab  Encourage p.o. intake and avoid NSAIDs  If discharged arrange follow-up in  nephrology clinic in 1 to 2 weeks    Thank you for involving us in the care of Froy Ngo.  Please feel free to call with any questions.    Jessica Aragon MD   08/18/24  09:49 EDT    [facility]  [phone]    Please note that portions of this note were completed with a voice recognition program.

## 2024-08-18 NOTE — PROGRESS NOTES
Name: Froy Ngo ADMIT: 2024   : 1945  PCP: Ryan Gutierrez MD    MRN: [id number] LOS: 7 days   AGE/SEX: 78 y.o. male  ROOM: [facility]     Subjective   Subjective   Resting in bed, caregiver at bedside.  Hematuria noted yesterday so he was started on CBI. Urine is now clear with CBI running at a trickle.        Objective   Objective   Vital Signs  Temp:  [97.9 °F (36.6 °C)-98.1 °F (36.7 °C)] 98.1 °F (36.7 °C)  Heart Rate:  [] 100  Resp:  [18] 18  BP: (127-153)/(66-78) 143/69  SpO2:  [96 %-98 %] 96 %  on   ;   Device (Oxygen Therapy): room air  Body mass index is 26.58 kg/m².  Physical Exam  Constitutional:       General: He is not in acute distress.     Appearance: He is ill-appearing. He is not toxic-appearing.   Cardiovascular:      Rate and Rhythm: Normal rate and regular rhythm.   Pulmonary:      Effort: Pulmonary effort is normal. No respiratory distress.      Breath sounds: Normal breath sounds. No wheezing.   Abdominal:      General: Abdomen is flat.      Palpations: Abdomen is soft.      Tenderness: There is no abdominal tenderness.   Musculoskeletal:         General: No tenderness.      Right lower leg: No edema.      Left lower leg: No edema.   Skin:     General: Skin is warm and dry.   Neurological:      General: No focal deficit present.      Mental Status: He is alert and oriented to person, place, and time. Mental status is at baseline.      Motor: No weakness.            Copied text material from yesterday's note has been reviewed for appropriate changes and remains accurate as of 24  .      Results Review     I reviewed the patient's new clinical results.  Results from last 7 days   Lab Units 24  0435 08/15/24  0235   WBC 10*3/mm3 6.22 6.46 7.28 9.40   HEMOGLOBIN g/dL 10.5* 10.1* 10.1* 9.5*   PLATELETS 10*3/mm3 349 344 360 350     Results from last 7 days   Lab Units 24  0315 24  0435 08/15/24  0235    SODIUM mmol/L 140 138 135* 136   POTASSIUM mmol/L 3.8 3.9 3.9 4.0   CHLORIDE mmol/L 103 103 102 102   CO2 mmol/L 28.0 28.7 26.8 22.1   BUN mg/dL 28* 34* 39* 53*   CREATININE mg/dL 1.92* 2.17* 2.58* 3.78*   GLUCOSE mg/dL 114* 131* 153* 140*   EGFR mL/min/1.73 34.8* 30.0* 24.4* 15.4*     Results from last 7 days   Lab Units 08/18/24 0428 08/17/24 0315 08/16/24  0435 08/15/24  0235   ALBUMIN g/dL 2.8* 2.4* 2.3* 2.3*     Results from last 7 days   Lab Units 08/18/24 0428 08/17/24 0315 08/16/24 0435 08/15/24  0235   CALCIUM mg/dL 8.3* 7.9* 7.7* 8.0*   ALBUMIN g/dL 2.8* 2.4* 2.3* 2.3*   MAGNESIUM mg/dL 1.6 1.6 1.7 1.8   PHOSPHORUS mg/dL 2.9 3.2 2.8 3.5     Results from last 7 days   Lab Units 08/11/24  1313   LACTATE mmol/L 0.8     Glucose   Date/Time Value Ref Range Status   08/18/2024 0626 134 (H) 70 - 130 mg/dL Final   08/17/2024 2052 179 (H) 70 - 130 mg/dL Final   08/17/2024 1628 136 (H) 70 - 130 mg/dL Final   08/17/2024 1055 184 (H) 70 - 130 mg/dL Final   08/17/2024 0554 125 70 - 130 mg/dL Final   08/16/2024 2013 172 (H) 70 - 130 mg/dL Final   08/16/2024 1640 160 (H) 70 - 130 mg/dL Final       No radiology results for the last day    I have personally reviewed all medications:  Scheduled Medications  amLODIPine, 5 mg, Oral, Q24H  aspirin, 81 mg, Oral, Daily  atorvastatin, 10 mg, Oral, Daily  donepezil, 10 mg, Oral, Nightly  hydrALAZINE, 25 mg, Oral, Q8H  insulin lispro, 2-7 Units, Subcutaneous, 4x Daily AC & at Bedtime  memantine, 10 mg, Oral, Daily  sodium chloride, 10 mL, Intravenous, Q12H  tamsulosin, 0.4 mg, Oral, Daily    Infusions   Diet  Diet: Regular/House; Fluid Consistency: Thin (IDDSI 0)    I have personally reviewed:  [x]  Laboratory   [x]  Microbiology   [x]  Radiology   [x]  EKG/Telemetry  [x]  Cardiology/Vascular   []  Pathology    []  Records       Assessment/Plan     Active Hospital Problems    Diagnosis  POA    **Acute renal failure [N17.9]  Yes    Type 2 diabetes mellitus with hyperglycemia  [E11.65]  Yes    Essential hypertension, benign [I10]  Yes    Dementia without behavioral disturbance [F03.90]  Yes    Obstructive uropathy [N13.9]  Yes    Bilateral hydronephrosis [N13.30]  Yes      Resolved Hospital Problems   No resolved problems to display.       78 y.o. male admitted with Acute renal failure.    Mr. Ngo is a 78 y.o.  with a history of type 2 diabetes, hyperlipidemia, Alzheimer's dementia, recent admission for euglycemic DKA, NEHEMIAS/obstructive uropathy with bilateral hydronephrosis-discharged with in-dwelling Rodriguez who presents with decreased urine output found to have NEHEMIAS.       NEHEMIAS  Non-anion gap metabolic acidosis  Urinary retention from bladder outlet obstruction with bilateral hydroureteronephrosis  Hematuria  Possible UTI- ED unable to get sample prior to starting abx  - renal and urology consulted; renal managing IVF  - bilateral hydroureteronephrosis on Ct similar to CT last week  - ct w/ perinephric/periureteral stranding, procal slightly elevated, WBC 19; cont rocephin, urine cx if able; may be unreliable since patient already received abx  - pt to OR 8/13 for cysto w/ b/l J-stent placement  - Cr improving this morning down to 1.9 (from 8.2)  -Urology recommending CBI for hematuria; urine has cleared      Leukocytosis  - appreciate ID seeing patient  -Leukocytosis possibly related to urinary obstruction, no obvious infection however urine culture may have been affected by antibiotics prior to collection, given patient having a urologic procedure they recommend 7-day course of Rocephin, can transition to cefpodoxime if ready for discharge before completing  - WBC improving- c/w leukocytosis being related to urinary obstruction; 19->7.     Abnormal MRI brain  - MRI from last admit reviewed-lateral cerebral subdural hygromas pachymeningeal thickening possibly related to chronic subdural hematoma versus hygroma, acute subdural hygroma not excluded; no history of recent head  trauma  -Nicolasa consulted- no acute surgical interventions indicated; findings likely incidental - they recommend follow up 1 month in clinic with CT head     Type 2 diabetes  - hold home meds  - continue SSI while admitted     Hypertension  - resume amlodipine and hydralazine     Dementia  - cont donepezil, namenda  - frequent reorientation; high risk for delirium  -MRI from 8/9 with chronic microhemorrhages likely secondary to 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  Full code.         Shruthi Acosta MD  [facility]  08/18/24  10:12 EDT

## 2024-08-18 NOTE — PROGRESS NOTES
"  [facility] Progress Note    Chief Complaint: None    Doing well.  CBI had to resume on 8/17/2024 due to hematuria. CBI stopped this am has urine had cleared.  At time of visit, hematuria has returned and here are clots observed in urinary collection bag as well.  Caregiver states hematuria started when pt was out of bed earlier.  Hematuria continues at this time.      Review of Systems:    The following systems were reviewed and negative;  respiratory, cardiovascular, and gastrointestinal          Vital Signs  /69 (BP Location: Right arm, Patient Position: Lying)   Pulse 100   Temp 98.1 °F (36.7 °C) (Oral)   Resp 18   Ht 182.9 cm (72\")   Wt 88.9 kg (195 lb 15.8 oz)   SpO2 96%   BMI 26.58 kg/m²     Physical Exam:     General Appearance:    Alert, cooperative, NAD, laying in bed.     HEENT:    No trauma, pupils reactive, hearing intact   Back:     No CVA tenderness   Lungs:     Respirations unlabored, no wheezing    Heart:    RRR, intact peripheral pulses   Abdomen:     Soft no bladder distention   :   Genitalia normal.  3-way hagen catheter anchored.     Extremities:   No edema, no deformity   Lymphatic:   No neck or groin LAD   Skin:   No bleeding, bruising or rashes   Neuro/Psych:   Orientation intact, mood/affect pleasant, no focal findings        Results Review:     I reviewed the patient's new clinical results.  Lab Results (last 24 hours)       Procedure Component Value Units Date/Time    POC Glucose Once [[id number]]  (Abnormal) Collected: 08/18/24 1130    Specimen: Blood Updated: 08/18/24 1131     Glucose 176 mg/dL     Renal Function Panel [[id number]]  (Abnormal) Collected: 08/18/24 0428    Specimen: Blood Updated: 08/18/24 0720     Glucose 114 mg/dL      BUN 28 mg/dL      Creatinine 1.92 mg/dL      Sodium 140 mmol/L      Potassium 3.8 mmol/L      Chloride 103 mmol/L      CO2 28.0 mmol/L      Calcium 8.3 mg/dL      Albumin 2.8 g/dL      Phosphorus 2.9 mg/dL      Anion Gap 9.0 mmol/L      " BUN/Creatinine Ratio 14.6     eGFR 34.8 mL/min/1.73     Narrative:      GFR Normal >60  Chronic Kidney Disease <60  Kidney Failure <15    The GFR formula is only valid for adults with stable renal function between ages 18 and 70.    Magnesium [[id number]]  (Normal) Collected: 08/18/24 0428    Specimen: Blood Updated: 08/18/24 0720     Magnesium 1.6 mg/dL     CBC (No Diff) [[id number]]  (Abnormal) Collected: 08/18/24 0428    Specimen: Blood Updated: 08/18/24 0701     WBC 6.22 10*3/mm3      RBC 3.44 10*6/mm3      Hemoglobin 10.5 g/dL      Hematocrit 30.8 %      MCV 89.5 fL      MCH 30.5 pg      MCHC 34.1 g/dL      RDW 11.8 %      RDW-SD 37.8 fl      MPV 8.7 fL      Platelets 349 10*3/mm3     POC Glucose Once [[id number]]  (Abnormal) Collected: 08/18/24 0626    Specimen: Blood Updated: 08/18/24 0628     Glucose 134 mg/dL     POC Glucose Once [[id number]]  (Abnormal) Collected: 08/17/24 2052    Specimen: Blood Updated: 08/17/24 2053     Glucose 179 mg/dL     POC Glucose Once [[id number]]  (Abnormal) Collected: 08/17/24 1628    Specimen: Blood Updated: 08/17/24 1629     Glucose 136 mg/dL           Imaging Results (Last 24 Hours)       ** No results found for the last 24 hours. **            Medication Review:   I have personally reviewed    Current Facility-Administered Medications:     acetaminophen (TYLENOL) tablet 650 mg, 650 mg, Oral, Q4H PRN **OR** acetaminophen (TYLENOL) 160 MG/5ML oral solution 650 mg, 650 mg, Oral, Q4H PRN **OR** acetaminophen (TYLENOL) suppository 650 mg, 650 mg, Rectal, Q4H PRN, Antoni Melendez MD    amLODIPine (NORVASC) tablet 5 mg, 5 mg, Oral, Q24H, Antoni Melendez MD, 5 mg at 08/18/24 0911    aspirin EC tablet 81 mg, 81 mg, Oral, Daily, Antoni Melendez MD, 81 mg at 08/18/24 0911    atorvastatin (LIPITOR) tablet 10 mg, 10 mg, Oral, Daily, Antoni Melendez MD, 10 mg at 08/18/24 0911    sennosides-docusate (PERICOLACE) 8.6-50 MG per tablet 2 tablet, 2 tablet, Oral, BID PRN  **AND** polyethylene glycol (MIRALAX) packet 17 g, 17 g, Oral, Daily PRN **AND** bisacodyl (DULCOLAX) EC tablet 5 mg, 5 mg, Oral, Daily PRN **AND** bisacodyl (DULCOLAX) suppository 10 mg, 10 mg, Rectal, Daily PRN, Antoni Melendez MD    dextrose (D50W) (25 g/50 mL) IV injection 25 g, 25 g, Intravenous, Q15 Min PRN, Antoni Melendez MD    dextrose (GLUTOSE) oral gel 15 g, 15 g, Oral, Q15 Min PRN, Antoni Melendez MD    donepezil (ARICEPT) tablet 10 mg, 10 mg, Oral, Nightly, Antoni Melendez MD, 10 mg at 08/17/24 2058    glucagon (GLUCAGEN) injection 1 mg, 1 mg, Intramuscular, Q15 Min PRN, Antoni Melendez MD    hydrALAZINE (APRESOLINE) tablet 25 mg, 25 mg, Oral, Q8H, Antoni Melendez MD, 25 mg at 08/18/24 0637    insulin lispro (HUMALOG/ADMELOG) injection 2-7 Units, 2-7 Units, Subcutaneous, 4x Daily AC & at Bedtime, Antoni Melendez MD, 2 Units at 08/17/24 2058    melatonin tablet 5 mg, 5 mg, Oral, Nightly PRN, Antoni Melendez MD    memantine (NAMENDA) tablet 10 mg, 10 mg, Oral, Daily, Antoni Melendez MD, 10 mg at 08/18/24 0911    ondansetron ODT (ZOFRAN-ODT) disintegrating tablet 4 mg, 4 mg, Oral, Q6H PRN, 4 mg at 08/13/24 1004 **OR** ondansetron (ZOFRAN) injection 4 mg, 4 mg, Intravenous, Q6H PRN, Antoni Melendez MD, 4 mg at 08/13/24 1500    [COMPLETED] Insert Peripheral IV, , , Once **AND** sodium chloride 0.9 % flush 10 mL, 10 mL, Intravenous, PRN, Antoni Melendez MD    sodium chloride 0.9 % flush 10 mL, 10 mL, Intravenous, Q12H, Antoni Melendez MD, 10 mL at 08/18/24 0911    sodium chloride 0.9 % flush 10 mL, 10 mL, Intravenous, PRN, Antoni Melendez MD    sodium chloride 0.9 % infusion 40 mL, 40 mL, Intravenous, PRN, Antoni Melendez MD    tamsulosin (FLOMAX) 24 hr capsule 0.4 mg, 0.4 mg, Oral, Daily, Antoni Melendez MD, 0.4 mg at 08/18/24 0911    Allergies:    Patient has no known allergies.    Assessment:    Active Problems:     Acute renal failure    Obstructive uropathy    Bilateral hydronephrosis    Type 2 diabetes mellitus with hyperglycemia    Essential hypertension, benign    Dementia without behavioral disturbance       BPH with bladder outlet obstruction and bilateral hydronephrosis.  Continues to improve with bilateral stents and bladder decompression    Plan:  Continue CBI due to hematuria, clots present.   Wean when urine clears   Will be discharged with catheter in place.    Prior plans is to remove catheter later in rehab facility and start intermittent catheterization.      Zeina Staples, APRN    8/18/2024  11:33 EDT

## 2024-08-18 NOTE — PLAN OF CARE
Problem: Adult Inpatient Plan of Care  Goal: Plan of Care Review  Recent Flowsheet Documentation  Taken 8/18/2024 1645 by Cristiana Vasquez RN  Outcome Evaluation: VSS, CBI, Assist X1, private sitter at bedside, ACHS, No c/o pain or soa. Room air, Makes needs known, Bed alarm on, Call light within reach  Goal: Absence of Hospital-Acquired Illness or Injury  Intervention: Identify and Manage Fall Risk  Recent Flowsheet Documentation  Taken 8/18/2024 1600 by Cristiana Vasquez RN  Safety Promotion/Fall Prevention:   safety round/check completed   nonskid shoes/slippers when out of bed   fall prevention program maintained  Taken 8/18/2024 1410 by Cristiana Vasquez RN  Safety Promotion/Fall Prevention:   safety round/check completed   nonskid shoes/slippers when out of bed   fall prevention program maintained  Taken 8/18/2024 1000 by Cristiana Vasquez RN  Safety Promotion/Fall Prevention:   safety round/check completed   fall prevention program maintained  Taken 8/18/2024 0800 by Cristiana Vasquez RN  Safety Promotion/Fall Prevention:   safety round/check completed   nonskid shoes/slippers when out of bed   fall prevention program maintained  Intervention: Prevent Skin Injury  Recent Flowsheet Documentation  Taken 8/18/2024 1600 by Cristiana Vasquez RN  Body Position:   right   tilted  Taken 8/18/2024 1410 by Cristiana Vasquez RN  Body Position: supine, legs elevated  Taken 8/18/2024 1200 by Cristiana Vasquez RN  Body Position: position changed independently  Taken 8/18/2024 1000 by Cristiana Vasquez RN  Body Position:   position changed independently   right  Taken 8/18/2024 0800 by Cristiana Vasquez RN  Body Position: left  Intervention: Prevent and Manage VTE (Venous Thromboembolism) Risk  Recent Flowsheet Documentation  Taken 8/18/2024 1600 by Cristiana Vasquez RN  Activity Management: activity encouraged  Taken 8/18/2024 1410 by Cristiana Vasquez RN  Activity Management: activity encouraged  Taken 8/18/2024 1200  by rCistiana Vasquez, RN  Activity Management: activity encouraged  Taken 8/18/2024 1000 by Cristiana Vasquez, RN  Activity Management: activity encouraged  Taken 8/18/2024 0800 by Cristiana Vasquez, RN  Activity Management: activity encouraged  Goal: Optimal Comfort and Wellbeing  Intervention: Provide Person-Centered Care  Recent Flowsheet Documentation  Taken 8/18/2024 0800 by Cristiana Vasquez, MARIA TERESA  Trust Relationship/Rapport:   choices provided   care explained   Goal Outcome Evaluation:              Outcome Evaluation: VSS, CBI, Assist X1, private sitter at bedside, ACHS, No c/o pain or soa. Room air, Makes needs known, Bed alarm on, Call light within reach

## 2024-08-19 LAB
ALBUMIN SERPL-MCNC: 2.7 G/DL (ref 3.5–5.2)
ANION GAP SERPL CALCULATED.3IONS-SCNC: 8.7 MMOL/L (ref 5–15)
BUN SERPL-MCNC: 25 MG/DL (ref 8–23)
BUN/CREAT SERPL: 13.7 (ref 7–25)
CALCIUM SPEC-SCNC: 8.2 MG/DL (ref 8.6–10.5)
CHLORIDE SERPL-SCNC: 104 MMOL/L (ref 98–107)
CO2 SERPL-SCNC: 25.3 MMOL/L (ref 22–29)
CREAT SERPL-MCNC: 1.82 MG/DL (ref 0.76–1.27)
DEPRECATED RDW RBC AUTO: 38 FL (ref 37–54)
EGFRCR SERPLBLD CKD-EPI 2021: 37.1 ML/MIN/1.73
ERYTHROCYTE [DISTWIDTH] IN BLOOD BY AUTOMATED COUNT: 11.7 % (ref 12.3–15.4)
GLUCOSE BLDC GLUCOMTR-MCNC: 116 MG/DL (ref 70–130)
GLUCOSE BLDC GLUCOMTR-MCNC: 145 MG/DL (ref 70–130)
GLUCOSE BLDC GLUCOMTR-MCNC: 163 MG/DL (ref 70–130)
GLUCOSE BLDC GLUCOMTR-MCNC: 171 MG/DL (ref 70–130)
GLUCOSE SERPL-MCNC: 122 MG/DL (ref 65–99)
HCT VFR BLD AUTO: 30.3 % (ref 37.5–51)
HGB BLD-MCNC: 10.1 G/DL (ref 13–17.7)
MAGNESIUM SERPL-MCNC: 1.6 MG/DL (ref 1.6–2.4)
MCH RBC QN AUTO: 30 PG (ref 26.6–33)
MCHC RBC AUTO-ENTMCNC: 33.3 G/DL (ref 31.5–35.7)
MCV RBC AUTO: 89.9 FL (ref 79–97)
PHOSPHATE SERPL-MCNC: 3.3 MG/DL (ref 2.5–4.5)
PLATELET # BLD AUTO: 327 10*3/MM3 (ref 140–450)
PMV BLD AUTO: 8.3 FL (ref 6–12)
POTASSIUM SERPL-SCNC: 3.9 MMOL/L (ref 3.5–5.2)
RBC # BLD AUTO: 3.37 10*6/MM3 (ref 4.14–5.8)
SODIUM SERPL-SCNC: 138 MMOL/L (ref 136–145)
WBC NRBC COR # BLD AUTO: 6.62 10*3/MM3 (ref 3.4–10.8)

## 2024-08-19 PROCEDURE — 83735 ASSAY OF MAGNESIUM: CPT | Performed by: UROLOGY

## 2024-08-19 PROCEDURE — 97116 GAIT TRAINING THERAPY: CPT

## 2024-08-19 PROCEDURE — 85027 COMPLETE CBC AUTOMATED: CPT | Performed by: UROLOGY

## 2024-08-19 PROCEDURE — 63710000001 INSULIN LISPRO (HUMAN) PER 5 UNITS: Performed by: UROLOGY

## 2024-08-19 PROCEDURE — 82948 REAGENT STRIP/BLOOD GLUCOSE: CPT

## 2024-08-19 PROCEDURE — 80069 RENAL FUNCTION PANEL: CPT | Performed by: UROLOGY

## 2024-08-19 RX ADMIN — Medication 10 ML: at 21:08

## 2024-08-19 RX ADMIN — ASPIRIN 81 MG: 81 TABLET, COATED ORAL at 08:44

## 2024-08-19 RX ADMIN — TAMSULOSIN HYDROCHLORIDE 0.4 MG: 0.4 CAPSULE ORAL at 08:44

## 2024-08-19 RX ADMIN — INSULIN LISPRO 2 UNITS: 100 INJECTION, SOLUTION INTRAVENOUS; SUBCUTANEOUS at 11:53

## 2024-08-19 RX ADMIN — Medication 10 ML: at 08:45

## 2024-08-19 RX ADMIN — DONEPEZIL HYDROCHLORIDE 10 MG: 10 TABLET, FILM COATED ORAL at 21:08

## 2024-08-19 RX ADMIN — HYDRALAZINE HYDROCHLORIDE 25 MG: 25 TABLET ORAL at 13:22

## 2024-08-19 RX ADMIN — HYDRALAZINE HYDROCHLORIDE 25 MG: 25 TABLET ORAL at 06:36

## 2024-08-19 RX ADMIN — AMLODIPINE BESYLATE 5 MG: 5 TABLET ORAL at 08:45

## 2024-08-19 RX ADMIN — HYDRALAZINE HYDROCHLORIDE 25 MG: 25 TABLET ORAL at 21:08

## 2024-08-19 RX ADMIN — ATORVASTATIN CALCIUM 10 MG: 20 TABLET, FILM COATED ORAL at 08:44

## 2024-08-19 RX ADMIN — MEMANTINE HYDROCHLORIDE 10 MG: 10 TABLET, FILM COATED ORAL at 08:44

## 2024-08-19 RX ADMIN — INSULIN LISPRO 2 UNITS: 100 INJECTION, SOLUTION INTRAVENOUS; SUBCUTANEOUS at 21:08

## 2024-08-19 NOTE — PLAN OF CARE
Problem: Adult Inpatient Plan of Care  Goal: Plan of Care Review  Recent Flowsheet Documentation  Taken 8/19/2024 1508 by Cristiana Vasquez RN  Outcome Evaluation: VSS, Private sitter @ bedside,CBI cont.ACHS, assist x1 , Room air. No c/o pain or SOA. Bed alarm on. Call light within reach. [facility]  Goal: Absence of Hospital-Acquired Illness or Injury  Intervention: Identify and Manage Fall Risk  Recent Flowsheet Documentation  Taken 8/19/2024 1400 by Cristiana Vaqsuez RN  Safety Promotion/Fall Prevention:   safety round/check completed   nonskid shoes/slippers when out of bed  Taken 8/19/2024 1200 by Cristiana Vasquez RN  Safety Promotion/Fall Prevention:   safety round/check completed   nonskid shoes/slippers when out of bed   fall prevention program maintained  Taken 8/19/2024 1000 by Cristiana Vasquez RN  Safety Promotion/Fall Prevention:   nonskid shoes/slippers when out of bed   safety round/check completed   fall prevention program maintained  Taken 8/19/2024 0800 by Cristiana Vasquez RN  Safety Promotion/Fall Prevention:   safety round/check completed   nonskid shoes/slippers when out of bed   fall prevention program maintained  Intervention: Prevent Skin Injury  Recent Flowsheet Documentation  Taken 8/19/2024 1400 by Cristiana Vasquez RN  Body Position: position changed independently  Taken 8/19/2024 1200 by Cristiana Vasquez RN  Body Position: position changed independently  Taken 8/19/2024 1000 by Cristiana Vasquez RN  Body Position: position changed independently  Taken 8/19/2024 0800 by Cristiana Vasquez RN  Body Position:   position changed independently   left  Intervention: Prevent and Manage VTE (Venous Thromboembolism) Risk  Recent Flowsheet Documentation  Taken 8/19/2024 1400 by Cristiana Vasquez RN  Activity Management: activity encouraged  Taken 8/19/2024 1200 by Cristiana Vasquez RN  Activity Management: activity encouraged  Taken 8/19/2024 1000 by Cristiana Vasquez RN  Activity Management:  activity encouraged  Taken 8/19/2024 0800 by Cristiana Vasquez, RN  Activity Management: activity encouraged  Goal: Optimal Comfort and Wellbeing  Intervention: Provide Person-Centered Care  Recent Flowsheet Documentation  Taken 8/19/2024 1400 by Cristiana Vasquez, MARIA TERESA  Trust Relationship/Rapport:   choices provided   care explained  Taken 8/19/2024 0800 by Cristiana Vasquez, MARIA TERESA  Trust Relationship/Rapport:   choices provided   care explained   Goal Outcome Evaluation:              Outcome Evaluation: VSS, Private sitter @ bedside,CBI cont.ACHS, assist x1 , Room air. No c/o pain or SOA. Bed alarm on. Call light within reach. TM

## 2024-08-19 NOTE — PROGRESS NOTES
"  [facility] Progress Note    Chief Complaint: Blood in the urine    More bleeding in the last night.  His irrigation is going but it is slow rate.    Review of Systems:    The following systems were reviewed and negative;  respiratory and cardiovascular          Vital Signs  /70 (BP Location: Right arm, Patient Position: Lying)   Pulse 92   Temp 98.1 °F (36.7 °C) (Oral)   Resp 18   Ht 182.9 cm (72\")   Wt 88.9 kg (195 lb 15.8 oz)   SpO2 98%   BMI 26.58 kg/m²     Physical Exam:     General Appearance:    Alert, cooperative, NAD   HEENT:    No trauma, pupils reactive, hearing intact   Back:     No CVA tenderness   Lungs:     Respirations unlabored, no wheezing    Heart:    RRR, intact peripheral pulses   Abdomen:   Soft benign   :  Genitalia normal   Extremities:   No edema, no deformity   Lymphatic:   No neck or groin LAD   Skin:   No bleeding, bruising or rashes   Neuro/Psych:   Orientation intact, mood/affect pleasant, no focal findings        Results Review:     I reviewed the patient's new clinical results.  Lab Results (last 24 hours)       Procedure Component Value Units Date/Time    POC Glucose Once [[id number]]  (Abnormal) Collected: 08/19/24 1044    Specimen: Blood Updated: 08/19/24 1045     Glucose 171 mg/dL     POC Glucose Once [[id number]]  (Normal) Collected: 08/19/24 0555    Specimen: Blood Updated: 08/19/24 0559     Glucose 116 mg/dL     Magnesium [[id number]]  (Normal) Collected: 08/19/24 0350    Specimen: Blood Updated: 08/19/24 0447     Magnesium 1.6 mg/dL     Renal Function Panel [[id number]]  (Abnormal) Collected: 08/19/24 0350    Specimen: Blood Updated: 08/19/24 0447     Glucose 122 mg/dL      BUN 25 mg/dL      Creatinine 1.82 mg/dL      Sodium 138 mmol/L      Potassium 3.9 mmol/L      Chloride 104 mmol/L      CO2 25.3 mmol/L      Calcium 8.2 mg/dL      Albumin 2.7 g/dL      Phosphorus 3.3 mg/dL      Anion Gap 8.7 mmol/L      BUN/Creatinine Ratio 13.7     eGFR 37.1 mL/min/1.73     " Narrative:      GFR Normal >60  Chronic Kidney Disease <60  Kidney Failure <15    The GFR formula is only valid for adults with stable renal function between ages 18 and 70.    CBC (No Diff) [[id number]]  (Abnormal) Collected: 08/19/24 0350    Specimen: Blood Updated: 08/19/24 0427     WBC 6.62 10*3/mm3      RBC 3.37 10*6/mm3      Hemoglobin 10.1 g/dL      Hematocrit 30.3 %      MCV 89.9 fL      MCH 30.0 pg      MCHC 33.3 g/dL      RDW 11.7 %      RDW-SD 38.0 fl      MPV 8.3 fL      Platelets 327 10*3/mm3     POC Glucose Once [[id number]]  (Abnormal) Collected: 08/18/24 2055    Specimen: Blood Updated: 08/18/24 2057     Glucose 197 mg/dL     POC Glucose Once [[id number]]  (Abnormal) Collected: 08/18/24 1635    Specimen: Blood Updated: 08/18/24 1636     Glucose 228 mg/dL           Imaging Results (Last 24 Hours)       ** No results found for the last 24 hours. **            Medication Review:   I have personally reviewed    Current Facility-Administered Medications:     acetaminophen (TYLENOL) tablet 650 mg, 650 mg, Oral, Q4H PRN **OR** acetaminophen (TYLENOL) 160 MG/5ML oral solution 650 mg, 650 mg, Oral, Q4H PRN **OR** acetaminophen (TYLENOL) suppository 650 mg, 650 mg, Rectal, Q4H PRN, Antoni Melendez MD    amLODIPine (NORVASC) tablet 5 mg, 5 mg, Oral, Q24H, Antoni Melendez MD, 5 mg at 08/19/24 0845    aspirin EC tablet 81 mg, 81 mg, Oral, Daily, Antoni Melendez MD, 81 mg at 08/19/24 0844    atorvastatin (LIPITOR) tablet 10 mg, 10 mg, Oral, Daily, Antoni Melendez MD, 10 mg at 08/19/24 0844    sennosides-docusate (PERICOLACE) 8.6-50 MG per tablet 2 tablet, 2 tablet, Oral, BID PRN **AND** polyethylene glycol (MIRALAX) packet 17 g, 17 g, Oral, Daily PRN **AND** bisacodyl (DULCOLAX) EC tablet 5 mg, 5 mg, Oral, Daily PRN **AND** bisacodyl (DULCOLAX) suppository 10 mg, 10 mg, Rectal, Daily PRN, Antoni Melendez MD    dextrose (D50W) (25 g/50 mL) IV injection 25 g, 25 g, Intravenous, Q15 Min  PRN, Antoni Melendez MD    dextrose (GLUTOSE) oral gel 15 g, 15 g, Oral, Q15 Min PRN, Antoni Melendez MD    donepezil (ARICEPT) tablet 10 mg, 10 mg, Oral, Nightly, Antoni Melendez MD, 10 mg at 08/18/24 2106    glucagon (GLUCAGEN) injection 1 mg, 1 mg, Intramuscular, Q15 Min PRN, Antoni Melendez MD    hydrALAZINE (APRESOLINE) tablet 25 mg, 25 mg, Oral, Q8H, Antoni Melendez MD, 25 mg at 08/19/24 0636    insulin lispro (HUMALOG/ADMELOG) injection 2-7 Units, 2-7 Units, Subcutaneous, 4x Daily AC & at Bedtime, Antoni Melendez MD, 2 Units at 08/19/24 1153    melatonin tablet 5 mg, 5 mg, Oral, Nightly PRN, Antoni Melendez MD    memantine (NAMENDA) tablet 10 mg, 10 mg, Oral, Daily, Antoni Melendez MD, 10 mg at 08/19/24 0844    ondansetron ODT (ZOFRAN-ODT) disintegrating tablet 4 mg, 4 mg, Oral, Q6H PRN, 4 mg at 08/13/24 1004 **OR** ondansetron (ZOFRAN) injection 4 mg, 4 mg, Intravenous, Q6H PRN, Antoni Melendez MD, 4 mg at 08/13/24 1500    [COMPLETED] Insert Peripheral IV, , , Once **AND** sodium chloride 0.9 % flush 10 mL, 10 mL, Intravenous, PRN, Antoni Melendez MD    sodium chloride 0.9 % flush 10 mL, 10 mL, Intravenous, Q12H, Antoni Melendez MD, 10 mL at 08/19/24 0845    sodium chloride 0.9 % flush 10 mL, 10 mL, Intravenous, PRN, Antoni Melendez MD    sodium chloride 0.9 % infusion 40 mL, 40 mL, Intravenous, PRN, Antoni Melendez MD    tamsulosin (FLOMAX) 24 hr capsule 0.4 mg, 0.4 mg, Oral, Daily, Antoni Melendez MD, 0.4 mg at 08/19/24 4891    Allergies:    Patient has no known allergies.    Assessment:    Active Problems:    Acute renal failure    Obstructive uropathy    Bilateral hydronephrosis    Type 2 diabetes mellitus with hyperglycemia    Essential hypertension, benign    Dementia without behavioral disturbance       Urinary retention with bilateral hydronephrosis status post bilateral stent placement  Hematuria secondary to  prostatic bleeding    Plan:    Continue to wean down his irrigation.  Once his irrigation is off I am good with him going to rehab with of catheter.      Antoni Melendez MD    8/19/2024  12:37 EDT

## 2024-08-19 NOTE — CASE MANAGEMENT/SOCIAL WORK
Continued Stay Note  [facility]     Patient Name: Froy Ngo  MRN: [id number]  Today's Date: 8/19/2024    Admit Date: 8/11/2024    Plan: [facility] pre-cert good through 8/21   Discharge Plan       Row Name 08/19/24 1425       Plan    Plan [facility] pre-cert good through 8/21    Plan Comments Spoke with patient and caregiver at bedside. Introduced self. Patient's plan is to return to [facility]; patient currently has CBI. Spoke with [facility]/ [place] patient pre-cert approved patient should be able to return once medically cleared.                   Discharge Codes    No documentation.                 Expected Discharge Date and Time       Expected Discharge Date Expected Discharge Time    Aug 18, 2024               Marii Chavez RN

## 2024-08-19 NOTE — THERAPY TREATMENT NOTE
Patient Name: Froy Ngo  : 1945    MRN: [id number]                              Today's Date: 2024       Admit Date: 2024    Visit Dx:     ICD-10-CM ICD-9-CM   1. Bilateral hydronephrosis  N13.30 591   2. Acute kidney insufficiency  N28.9 593.9   3. Oliguria  R34 788.5   4. Leukocytosis, unspecified type  D72.829 288.60     Patient Active Problem List   Diagnosis    DKA (diabetic ketoacidosis)    Obstructive uropathy    Bilateral hydronephrosis    Hyperkalemia    Acute renal failure    Type 2 diabetes mellitus with hyperglycemia    Essential hypertension, benign    Dementia without behavioral disturbance     Past Medical History:   Diagnosis Date    Dementia     Diabetes mellitus     Elevated cholesterol     Prostate disorder     Urinary retention      Past Surgical History:   Procedure Laterality Date    CYSTOSCOPY W/ URETERAL STENT PLACEMENT Bilateral 2024    Procedure: BILATERAL CYSTOSCOPY URETERAL CATHETER/STENT INSERTION WITH BILATERAL RETROGRADES;  Surgeon: Antoni Melendez MD;  Location: [facility];  Service: Urology;  Laterality: Bilateral;      General Information       Row Name 24          Physical Therapy Time and Intention    Document Type therapy note (daily note)  -     Mode of Treatment physical therapy  -       Row Name 24          General Information    Existing Precautions/Restrictions fall  -       Row Name 24          Cognition    Orientation Status (Cognition) oriented x 3  -               User Key  (r) = Recorded By, (t) = Taken By, (c) = Cosigned By      Initials Name Provider Type     Bhargavi Moon PTA Physical Therapist Assistant                   Mobility       Row Name 248          Bed Mobility    Bed Mobility supine-sit;sit-supine  -     Supine-Sit [place] (Bed Mobility) supervision  -     Sit-Supine [place] (Bed Mobility) supervision  -     Assistive Device (Bed  Mobility) bed rails;head of bed elevated  -SM       Row Name 08/19/24 1648          Sit-Stand Transfer    Sit-Stand [place] (Transfers) standby assist  -       Row Name 08/19/24 1648          Gait/Stairs (Locomotion)    [place] Level (Gait) standby assist  -SM     Patient was able to Ambulate yes  -SM     Distance in Feet (Gait) 200  -SM     Deviations/Abnormal Patterns (Gait) mary decreased;stride length decreased  -SM     Bilateral Gait Deviations forward flexed posture  -               User Key  (r) = Recorded By, (t) = Taken By, (c) = Cosigned By      Initials Name Provider Type    Bhargavi Layne PTA Physical Therapist Assistant                   Obj/Interventions    No documentation.                  Goals/Plan    No documentation.                  Clinical Impression       Row Name 08/19/24 1649          Pain    Pretreatment Pain Rating 0/10 - no pain  -     Posttreatment Pain Rating 0/10 - no pain  -[facility] Name 08/19/24 1649          Positioning and Restraints    Pre-Treatment Position in bed  -     Post Treatment Position bed  -     In Bed supine;call light within reach;encouraged to call for assist;with family/caregiver  -               User Key  (r) = Recorded By, (t) = Taken By, (c) = Cosigned By      Initials Name Provider Type    Bhargavi Layne PTA Physical Therapist Assistant                   Outcome Measures       Row Name 08/19/24 1649 08/19/24 1400       How much help from another person do you currently need...    Turning from your back to your side while in flat bed without using bedrails? 4  -SM 4  -MM    Moving from lying on back to sitting on the side of a flat bed without bedrails? 4  -SM 4  -MM    Moving to and from a bed to a chair (including a wheelchair)? 3  -SM 3  -MM    Standing up from a chair using your arms (e.g., wheelchair, bedside chair)? 4  -SM 3  -MM    Climbing 3-5 steps with a railing? 3  -SM 3  -MM    To walk in hospital room?  3  -SM 3  -MM    AM-PAC 6 Clicks Score (PT) 21  -SM 20  -MM    Highest Level of Mobility Goal 6 --> Walk 10 steps or more  -SM 6 --> Walk 10 steps or more  -MM      Row Name 08/19/24 0800          How much help from another person do you currently need...    Turning from your back to your side while in flat bed without using bedrails? 4  -MM     Moving from lying on back to sitting on the side of a flat bed without bedrails? 4  -MM     Moving to and from a bed to a chair (including a wheelchair)? 3  -MM     Standing up from a chair using your arms (e.g., wheelchair, bedside chair)? 3  -MM     Climbing 3-5 steps with a railing? 3  -MM     To walk in hospital room? 3  -MM     AM-PAC 6 Clicks Score (PT) 20  -MM     Highest Level of Mobility Goal 6 --> Walk 10 steps or more  -MM       Row Name 08/19/24 1649          Functional Assessment    Outcome Measure Options AM-PAC 6 Clicks Basic Mobility (PT)  -               User Key  (r) = Recorded By, (t) = Taken By, (c) = Cosigned By      Initials Name Provider Type    Bhargavi Layne PTA Physical Therapist Assistant    Cristiana Dee RN Registered Nurse                                 Physical Therapy Education       Title: PT OT SLP Therapies (In Progress)       Topic: Physical Therapy (In Progress)       Point: Mobility training (In Progress)       Learning Progress Summary             Patient Acceptance, E,D, NR by  at 8/19/2024 1649    Acceptance, E,D, NR by  at 8/16/2024 1551    Acceptance, E,D, NR by  at 8/15/2024 1500    Acceptance, E, NR by  at 8/15/2024 0035                         Point: Home exercise program (In Progress)       Learning Progress Summary             Patient Acceptance, E,D, NR by  at 8/19/2024 1649    Acceptance, E,D, NR by  at 8/16/2024 1551    Acceptance, E,D, NR by  at 8/15/2024 1500    Acceptance, E, NR by  at 8/15/2024 0035                         Point: Body mechanics (In Progress)       Learning Progress  Summary             Patient Acceptance, E,D, NR by  at 8/19/2024 1649    Acceptance, E,D, NR by  at 8/16/2024 1551    Acceptance, E,D, NR by  at 8/15/2024 1500    Acceptance, E, NR by  at 8/15/2024 0035                         Point: Precautions (In Progress)       Learning Progress Summary             Patient Acceptance, E,D, NR by  at 8/19/2024 1649    Acceptance, E,D, NR by  at 8/16/2024 1551    Acceptance, E,D, NR by  at 8/15/2024 1500    Acceptance, E, NR by GR at 8/15/2024 0035                                         User Key       Initials Effective Dates Name Provider Type Discipline     06/16/21 -  Mell Huddleston PT Physical Therapist PT     03/07/18 -  Bhargavi Moon PTA Physical Therapist Assistant PT     01/23/24 -  Carole Glass, RN Registered Nurse Nurse                  PT Recommendation and Plan     Plan of Care Reviewed With: patient  Progress: improving  Outcome Evaluation: Pt tolerated treatment well this date. Required SV for bed mobility, then SBA to stand. Pt ambulated 200ft w/ no AD, and w/ SBA only. Pt declined exercises today, though was encouraged to attempt later w/ caregiver and to ambulate as well.     Time Calculation:         PT Charges       Row Name 08/19/24 1651             Time Calculation    Start Time 1533  -      Stop Time 1544  -      Time Calculation (min) 11 min  -      PT Received On 08/19/24  -      PT - Next Appointment 08/20/24  -                User Key  (r) = Recorded By, (t) = Taken By, (c) = Cosigned By      Initials Name Provider Type     Bhargavi Moon PTA Physical Therapist Assistant                  Therapy Charges for Today       Code Description Service Date Service Provider Modifiers Qty    [id number] HC GAIT TRAINING EA 15 MIN 8/19/2024 Bhargavi Moon PTA GP 1            PT G-Codes  Outcome Measure Options: AM-PAC 6 Clicks Basic Mobility (PT)  AM-PAC 6 Clicks Score (PT): 21  PT Discharge  Summary  Anticipated Discharge Disposition (PT): home with home health    Bhargavi Moon, PTA  8/19/2024

## 2024-08-19 NOTE — PLAN OF CARE
Goal Outcome Evaluation:           Progress: improving       No acute events for this shift. Patient expressed no concerns as of the present. Call light within reach. Still on CBI.

## 2024-08-19 NOTE — PROGRESS NOTES
Name: Froy Ngo ADMIT: 2024   : 1945  PCP: Ryan Gutierrez MD    MRN: [id number] LOS: 8 days   AGE/SEX: 78 y.o. male  ROOM: [facility]     Subjective   Subjective   Resting in bed, caregiver at bedside.  CBI continues for hematuria.  Discussed with wife via speaker phone.  Patient feels okay, hopeful to get out of the hospital soon.  Understanding about why he needs to stay.       Objective   Objective   Vital Signs  Temp:  [98.1 °F (36.7 °C)-98.4 °F (36.9 °C)] 98.1 °F (36.7 °C)  Heart Rate:  [84-92] 92  Resp:  [18] 18  BP: (127-138)/(65-71) 136/70  SpO2:  [96 %-98 %] 98 %  on   ;   Device (Oxygen Therapy): room air  Body mass index is 26.58 kg/m².  Physical Exam  Constitutional:       General: He is not in acute distress.     Appearance: He is ill-appearing. He is not toxic-appearing.   Cardiovascular:      Rate and Rhythm: Normal rate and regular rhythm.   Pulmonary:      Effort: Pulmonary effort is normal. No respiratory distress.      Breath sounds: Normal breath sounds. No wheezing.   Abdominal:      General: Abdomen is flat.      Palpations: Abdomen is soft.      Tenderness: There is no abdominal tenderness.   Musculoskeletal:         General: No tenderness.      Right lower leg: No edema.      Left lower leg: No edema.   Skin:     General: Skin is warm and dry.   Neurological:      General: No focal deficit present.      Mental Status: He is alert and oriented to person, place, and time. Mental status is at baseline.      Motor: No weakness.            Copied text material from yesterday's note has been reviewed for appropriate changes and remains accurate as of 24  .      Results Review     I reviewed the patient's new clinical results.  Results from last 7 days   Lab Units 24  0350 24  0428 24  0315 24  0435   WBC 10*3/mm3 6.62 6.22 6.46 7.28   HEMOGLOBIN g/dL 10.1* 10.5* 10.1* 10.1*   PLATELETS 10*3/mm3 327 349 344 360     Results from last 7 days    Lab Units 08/19/24  0350 08/18/24  0428 08/17/24 0315 08/16/24  0435   SODIUM mmol/L 138 140 138 135*   POTASSIUM mmol/L 3.9 3.8 3.9 3.9   CHLORIDE mmol/L 104 103 103 102   CO2 mmol/L 25.3 28.0 28.7 26.8   BUN mg/dL 25* 28* 34* 39*   CREATININE mg/dL 1.82* 1.92* 2.17* 2.58*   GLUCOSE mg/dL 122* 114* 131* 153*   EGFR mL/min/1.73 37.1* 34.8* 30.0* 24.4*     Results from last 7 days   Lab Units 08/19/24  0350 08/18/24 0428 08/17/24 0315 08/16/24  0435   ALBUMIN g/dL 2.7* 2.8* 2.4* 2.3*     Results from last 7 days   Lab Units 08/19/24  0350 08/18/24 0428 08/17/24 0315 08/16/24  0435   CALCIUM mg/dL 8.2* 8.3* 7.9* 7.7*   ALBUMIN g/dL 2.7* 2.8* 2.4* 2.3*   MAGNESIUM mg/dL 1.6 1.6 1.6 1.7   PHOSPHORUS mg/dL 3.3 2.9 3.2 2.8           Glucose   Date/Time Value Ref Range Status   08/19/2024 0555 116 70 - 130 mg/dL Final   08/18/2024 2055 197 (H) 70 - 130 mg/dL Final   08/18/2024 1635 228 (H) 70 - 130 mg/dL Final   08/18/2024 1130 176 (H) 70 - 130 mg/dL Final   08/18/2024 0626 134 (H) 70 - 130 mg/dL Final   08/17/2024 2052 179 (H) 70 - 130 mg/dL Final   08/17/2024 1628 136 (H) 70 - 130 mg/dL Final       No radiology results for the last day    I have personally reviewed all medications:  Scheduled Medications  amLODIPine, 5 mg, Oral, Q24H  aspirin, 81 mg, Oral, Daily  atorvastatin, 10 mg, Oral, Daily  donepezil, 10 mg, Oral, Nightly  hydrALAZINE, 25 mg, Oral, Q8H  insulin lispro, 2-7 Units, Subcutaneous, 4x Daily AC & at Bedtime  memantine, 10 mg, Oral, Daily  sodium chloride, 10 mL, Intravenous, Q12H  tamsulosin, 0.4 mg, Oral, Daily    Infusions   Diet  Diet: Regular/House; Fluid Consistency: Thin (IDDSI 0)    I have personally reviewed:  [x]  Laboratory   [x]  Microbiology   [x]  Radiology   [x]  EKG/Telemetry  [x]  Cardiology/Vascular   []  Pathology    []  Records       Assessment/Plan     Active Hospital Problems    Diagnosis  POA    **Acute renal failure [N17.9]  Yes    Type 2 diabetes mellitus with hyperglycemia  [E11.65]  Yes    Essential hypertension, benign [I10]  Yes    Dementia without behavioral disturbance [F03.90]  Yes    Obstructive uropathy [N13.9]  Yes    Bilateral hydronephrosis [N13.30]  Yes      Resolved Hospital Problems   No resolved problems to display.       78 y.o. male admitted with Acute renal failure.    Mr. Ngo is a 78 y.o.  with a history of type 2 diabetes, hyperlipidemia, Alzheimer's dementia, recent admission for euglycemic DKA, NEHEMIAS/obstructive uropathy with bilateral hydronephrosis-discharged with in-dwelling [place] who presents with decreased urine output found to have NEHEMIAS.       NEHEMIAS  Non-anion gap metabolic acidosis  Urinary retention from bladder outlet obstruction with bilateral hydroureteronephrosis  Hematuria  Possible UTI- ED unable to get sample prior to starting abx  - renal and urology consulted; renal managing IVF  - bilateral hydroureteronephrosis on Ct similar to CT last week  - ct w/ perinephric/periureteral stranding, procal slightly elevated, WBC 19; cont rocephin, urine cx if able; may be unreliable since patient already received abx  - pt to OR 8/13 for cysto w/ b/l J-stent placement  - Cr improving this morning down to 1.8 (from 8.2)  -Urology recommending CBI for hematuria; CBI continues     Leukocytosis  - appreciate ID seeing patient  -Leukocytosis possibly related to urinary obstruction, no obvious infection however urine culture may have been affected by antibiotics prior to collection, given patient having a urologic procedure they recommend 7-day course of Rocephin, -completed Rocephin  - WBC improving- c/w leukocytosis being related to urinary obstruction; 19->7.     Abnormal MRI brain  - MRI from last admit reviewed-lateral cerebral subdural hygromas pachymeningeal thickening possibly related to chronic subdural hematoma versus hygroma, acute subdural hygroma not excluded; no history of recent head trauma  -Nicolasa consulted- no acute surgical interventions indicated;  findings likely incidental - they recommend follow up 1 month in clinic with CT head     Type 2 diabetes  - hold home meds  - continue SSI while admitted     Hypertension  - resume amlodipine and hydralazine     Dementia  - cont donepezil, namenda  - frequent reorientation; high risk for delirium  -MRI from 8/9 with chronic microhemorrhages likely secondary to 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  Full code.         Shruthi Acosta MD  [facility]  08/19/24  10:38 EDT

## 2024-08-19 NOTE — PLAN OF CARE
Goal Outcome Evaluation:  Plan of Care Reviewed With: patient        Progress: improving  Outcome Evaluation: Pt tolerated treatment well this date. Required SV for bed mobility, then SBA to stand. Pt ambulated 200ft w/ no AD, and w/ SBA only. Pt declined exercises today, though was encouraged to attempt later w/ caregiver and to ambulate as well.      Anticipated Discharge Disposition (PT): home with home health

## 2024-08-20 ENCOUNTER — READMISSION MANAGEMENT (OUTPATIENT)
Dept: CALL CENTER | Facility: HOSPITAL | Age: 79
End: 2024-08-20
Payer: COMMERCIAL

## 2024-08-20 VITALS
HEART RATE: 90 BPM | DIASTOLIC BLOOD PRESSURE: 74 MMHG | RESPIRATION RATE: 8 BRPM | BODY MASS INDEX: 26.31 KG/M2 | OXYGEN SATURATION: 97 % | HEIGHT: 72 IN | TEMPERATURE: 98.1 F | SYSTOLIC BLOOD PRESSURE: 134 MMHG | WEIGHT: 194.22 LBS

## 2024-08-20 PROBLEM — R93.0 ABNORMAL MRI OF HEAD: Status: ACTIVE | Noted: 2024-08-20

## 2024-08-20 PROBLEM — N39.0 UTI (URINARY TRACT INFECTION), BACTERIAL: Status: ACTIVE | Noted: 2024-08-20

## 2024-08-20 PROBLEM — A49.9 UTI (URINARY TRACT INFECTION), BACTERIAL: Status: ACTIVE | Noted: 2024-08-20

## 2024-08-20 LAB
ALBUMIN SERPL-MCNC: 2.7 G/DL (ref 3.5–5.2)
ANION GAP SERPL CALCULATED.3IONS-SCNC: 7.4 MMOL/L (ref 5–15)
BUN SERPL-MCNC: 26 MG/DL (ref 8–23)
BUN/CREAT SERPL: 14.5 (ref 7–25)
CALCIUM SPEC-SCNC: 8.3 MG/DL (ref 8.6–10.5)
CHLORIDE SERPL-SCNC: 103 MMOL/L (ref 98–107)
CO2 SERPL-SCNC: 25.6 MMOL/L (ref 22–29)
CREAT SERPL-MCNC: 1.79 MG/DL (ref 0.76–1.27)
DEPRECATED RDW RBC AUTO: 39.6 FL (ref 37–54)
EGFRCR SERPLBLD CKD-EPI 2021: 37.8 ML/MIN/1.73
ERYTHROCYTE [DISTWIDTH] IN BLOOD BY AUTOMATED COUNT: 12.1 % (ref 12.3–15.4)
GLUCOSE BLDC GLUCOMTR-MCNC: 123 MG/DL (ref 70–130)
GLUCOSE BLDC GLUCOMTR-MCNC: 146 MG/DL (ref 70–130)
GLUCOSE SERPL-MCNC: 121 MG/DL (ref 65–99)
HCT VFR BLD AUTO: 30.2 % (ref 37.5–51)
HGB BLD-MCNC: 10 G/DL (ref 13–17.7)
MAGNESIUM SERPL-MCNC: 1.5 MG/DL (ref 1.6–2.4)
MCH RBC QN AUTO: 30.1 PG (ref 26.6–33)
MCHC RBC AUTO-ENTMCNC: 33.1 G/DL (ref 31.5–35.7)
MCV RBC AUTO: 91 FL (ref 79–97)
PHOSPHATE SERPL-MCNC: 3.5 MG/DL (ref 2.5–4.5)
PLATELET # BLD AUTO: 291 10*3/MM3 (ref 140–450)
PMV BLD AUTO: 8.4 FL (ref 6–12)
POTASSIUM SERPL-SCNC: 3.7 MMOL/L (ref 3.5–5.2)
RBC # BLD AUTO: 3.32 10*6/MM3 (ref 4.14–5.8)
SODIUM SERPL-SCNC: 136 MMOL/L (ref 136–145)
WBC NRBC COR # BLD AUTO: 6.27 10*3/MM3 (ref 3.4–10.8)

## 2024-08-20 PROCEDURE — 82948 REAGENT STRIP/BLOOD GLUCOSE: CPT

## 2024-08-20 PROCEDURE — 85027 COMPLETE CBC AUTOMATED: CPT | Performed by: UROLOGY

## 2024-08-20 PROCEDURE — 83735 ASSAY OF MAGNESIUM: CPT | Performed by: UROLOGY

## 2024-08-20 PROCEDURE — 25010000002 MAGNESIUM SULFATE 2 GM/50ML SOLUTION: Performed by: HOSPITALIST

## 2024-08-20 PROCEDURE — 36415 COLL VENOUS BLD VENIPUNCTURE: CPT | Performed by: UROLOGY

## 2024-08-20 PROCEDURE — 80069 RENAL FUNCTION PANEL: CPT | Performed by: UROLOGY

## 2024-08-20 RX ORDER — MAGNESIUM SULFATE HEPTAHYDRATE 40 MG/ML
2 INJECTION, SOLUTION INTRAVENOUS ONCE
Status: COMPLETED | OUTPATIENT
Start: 2024-08-20 | End: 2024-08-20

## 2024-08-20 RX ORDER — INSULIN LISPRO 100 [IU]/ML
2-7 INJECTION, SOLUTION INTRAVENOUS; SUBCUTANEOUS
Start: 2024-08-20

## 2024-08-20 RX ORDER — MAGNESIUM SULFATE HEPTAHYDRATE 40 MG/ML
2 INJECTION, SOLUTION INTRAVENOUS ONCE
Status: DISCONTINUED | OUTPATIENT
Start: 2024-08-20 | End: 2024-08-20

## 2024-08-20 RX ADMIN — Medication 10 ML: at 09:00

## 2024-08-20 RX ADMIN — ASPIRIN 81 MG: 81 TABLET, COATED ORAL at 09:10

## 2024-08-20 RX ADMIN — TAMSULOSIN HYDROCHLORIDE 0.4 MG: 0.4 CAPSULE ORAL at 09:10

## 2024-08-20 RX ADMIN — AMLODIPINE BESYLATE 5 MG: 5 TABLET ORAL at 09:10

## 2024-08-20 RX ADMIN — MEMANTINE HYDROCHLORIDE 10 MG: 10 TABLET, FILM COATED ORAL at 09:10

## 2024-08-20 RX ADMIN — ATORVASTATIN CALCIUM 10 MG: 20 TABLET, FILM COATED ORAL at 09:10

## 2024-08-20 RX ADMIN — MAGNESIUM SULFATE HEPTAHYDRATE 2 G: 40 INJECTION, SOLUTION INTRAVENOUS at 09:10

## 2024-08-20 RX ADMIN — HYDRALAZINE HYDROCHLORIDE 25 MG: 25 TABLET ORAL at 06:26

## 2024-08-20 NOTE — CASE MANAGEMENT/SOCIAL WORK
Continued Stay Note  [facility]     Patient Name: Froy Ngo  MRN: [id number]  Today's Date: 8/20/2024    Admit Date: 8/11/2024    Plan: Letha Lam via [facility] at 1430   Discharge Plan       Row Name 08/20/24 1317       Plan    Plan Venangosamantha Lam via [facility] at 1430    Plan Comments Patient stretcher scheduled for 1430 through [facility]. Left message for spouse to update with discharge time.                   Discharge Codes    No documentation.                 Expected Discharge Date and Time       Expected Discharge Date Expected Discharge Time    Aug 20, 2024               Marii Chavez RN

## 2024-08-20 NOTE — PROGRESS NOTES
"   LOS: 9 days   Patient Care Team:  Ryan Gutierrez MD as PCP - General (Internal Medicine)      Subjective   Interval History: CBI stopped with no problems with hematuria overnight.     Objective     ROS   12 POINT NEG ROS PERTINENT IN HPI      Vital Signs  Temp:  [97.9 °F (36.6 °C)-98.1 °F (36.7 °C)] 97.9 °F (36.6 °C)  Heart Rate:  [71-88] 71  Resp:  [18] 18  BP: (128-157)/(60-88) 131/60      Intake/Output Summary (Last 24 hours) at 8/20/2024 0808  Last data filed at 8/20/2024 0748  Gross per 24 hour   Intake 810 ml   Output 800 ml   Net 10 ml       Flowsheet Rows      Flowsheet Row First Filed Value   Admission Height 182.9 cm (72\") Documented at 08/11/2024 2359   Admission Weight 91.6 kg (201 lb 15.1 oz) Documented at 08/11/2024 1604            Physical Exam:     General appearance: alert, cooperative, oriented  Rodriguez intact, urine yellow and clear with CBI clamped.        Lab Results (all)       Procedure Component Value Units Date/Time    POC Glucose Once [[id number]]  (Normal) Collected: 08/20/24 0553    Specimen: Blood Updated: 08/20/24 0607     Glucose 123 mg/dL     Magnesium [[id number]]  (Abnormal) Collected: 08/20/24 0441    Specimen: Blood Updated: 08/20/24 0601     Magnesium 1.5 mg/dL     Renal Function Panel [[id number]]  (Abnormal) Collected: 08/20/24 0441    Specimen: Blood Updated: 08/20/24 0601     Glucose 121 mg/dL      BUN 26 mg/dL      Creatinine 1.79 mg/dL      Sodium 136 mmol/L      Potassium 3.7 mmol/L      Chloride 103 mmol/L      CO2 25.6 mmol/L      Calcium 8.3 mg/dL      Albumin 2.7 g/dL      Phosphorus 3.5 mg/dL      Anion Gap 7.4 mmol/L      BUN/Creatinine Ratio 14.5     eGFR 37.8 mL/min/1.73     Narrative:      GFR Normal >60  Chronic Kidney Disease <60  Kidney Failure <15    The GFR formula is only valid for adults with stable renal function between ages 18 and 70.    CBC (No Diff) [[id number]]  (Abnormal) Collected: 08/20/24 0441    Specimen: Blood Updated: 08/20/24 0542     WBC " 6.27 10*3/mm3      RBC 3.32 10*6/mm3      Hemoglobin 10.0 g/dL      Hematocrit 30.2 %      MCV 91.0 fL      MCH 30.1 pg      MCHC 33.1 g/dL      RDW 12.1 %      RDW-SD 39.6 fl      MPV 8.4 fL      Platelets 291 10*3/mm3     POC Glucose Once [[id number]]  (Abnormal) Collected: 08/19/24 2012    Specimen: Blood Updated: 08/19/24 2025     Glucose 163 mg/dL     POC Glucose Once [[id number]]  (Abnormal) Collected: 08/19/24 1548    Specimen: Blood Updated: 08/19/24 1550     Glucose 145 mg/dL     POC Glucose Once [[id number]]  (Abnormal) Collected: 08/19/24 1044    Specimen: Blood Updated: 08/19/24 1045     Glucose 171 mg/dL     POC Glucose Once [[id number]]  (Normal) Collected: 08/19/24 0555    Specimen: Blood Updated: 08/19/24 0559     Glucose 116 mg/dL     Magnesium [[id number]]  (Normal) Collected: 08/19/24 0350    Specimen: Blood Updated: 08/19/24 0447     Magnesium 1.6 mg/dL     Renal Function Panel [[id number]]  (Abnormal) Collected: 08/19/24 0350    Specimen: Blood Updated: 08/19/24 0447     Glucose 122 mg/dL      BUN 25 mg/dL      Creatinine 1.82 mg/dL      Sodium 138 mmol/L      Potassium 3.9 mmol/L      Chloride 104 mmol/L      CO2 25.3 mmol/L      Calcium 8.2 mg/dL      Albumin 2.7 g/dL      Phosphorus 3.3 mg/dL      Anion Gap 8.7 mmol/L      BUN/Creatinine Ratio 13.7     eGFR 37.1 mL/min/1.73     Narrative:      GFR Normal >60  Chronic Kidney Disease <60  Kidney Failure <15    The GFR formula is only valid for adults with stable renal function between ages 18 and 70.    CBC (No Diff) [[id number]]  (Abnormal) Collected: 08/19/24 0350    Specimen: Blood Updated: 08/19/24 0427     WBC 6.62 10*3/mm3      RBC 3.37 10*6/mm3      Hemoglobin 10.1 g/dL      Hematocrit 30.3 %      MCV 89.9 fL      MCH 30.0 pg      MCHC 33.3 g/dL      RDW 11.7 %      RDW-SD 38.0 fl      MPV 8.3 fL      Platelets 327 10*3/mm3     POC Glucose Once [[id number]]  (Abnormal) Collected: 08/18/24 2055    Specimen: Blood Updated: 08/18/24 2057      Glucose 197 mg/dL     POC Glucose Once [[id number]]  (Abnormal) Collected: 08/18/24 1635    Specimen: Blood Updated: 08/18/24 1636     Glucose 228 mg/dL     POC Glucose Once [[id number]]  (Abnormal) Collected: 08/18/24 1130    Specimen: Blood Updated: 08/18/24 1131     Glucose 176 mg/dL     Renal Function Panel [[id number]]  (Abnormal) Collected: 08/18/24 0428    Specimen: Blood Updated: 08/18/24 0720     Glucose 114 mg/dL      BUN 28 mg/dL      Creatinine 1.92 mg/dL      Sodium 140 mmol/L      Potassium 3.8 mmol/L      Chloride 103 mmol/L      CO2 28.0 mmol/L      Calcium 8.3 mg/dL      Albumin 2.8 g/dL      Phosphorus 2.9 mg/dL      Anion Gap 9.0 mmol/L      BUN/Creatinine Ratio 14.6     eGFR 34.8 mL/min/1.73     Narrative:      GFR Normal >60  Chronic Kidney Disease <60  Kidney Failure <15    The GFR formula is only valid for adults with stable renal function between ages 18 and 70.    Magnesium [[id number]]  (Normal) Collected: 08/18/24 0428    Specimen: Blood Updated: 08/18/24 0720     Magnesium 1.6 mg/dL     CBC (No Diff) [[id number]]  (Abnormal) Collected: 08/18/24 0428    Specimen: Blood Updated: 08/18/24 0701     WBC 6.22 10*3/mm3      RBC 3.44 10*6/mm3      Hemoglobin 10.5 g/dL      Hematocrit 30.8 %      MCV 89.5 fL      MCH 30.5 pg      MCHC 34.1 g/dL      RDW 11.8 %      RDW-SD 37.8 fl      MPV 8.7 fL      Platelets 349 10*3/mm3     POC Glucose Once [[id number]]  (Abnormal) Collected: 08/18/24 0626    Specimen: Blood Updated: 08/18/24 0628     Glucose 134 mg/dL     POC Glucose Once [[id number]]  (Abnormal) Collected: 08/17/24 2052    Specimen: Blood Updated: 08/17/24 2053     Glucose 179 mg/dL     POC Glucose Once [[id number]]  (Abnormal) Collected: 08/17/24 1628    Specimen: Blood Updated: 08/17/24 1629     Glucose 136 mg/dL     POC Glucose Once [[id number]]  (Abnormal) Collected: 08/17/24 1055    Specimen: Blood Updated: 08/17/24 1057     Glucose 184 mg/dL     POC Glucose Once [[id number]]  (Normal) Collected:  08/17/24 0554    Specimen: Blood Updated: 08/17/24 0556     Glucose 125 mg/dL     Magnesium [[id number]]  (Normal) Collected: 08/17/24 0315    Specimen: Blood Updated: 08/17/24 0435     Magnesium 1.6 mg/dL     Renal Function Panel [[id number]]  (Abnormal) Collected: 08/17/24 0315    Specimen: Blood Updated: 08/17/24 0435     Glucose 131 mg/dL      BUN 34 mg/dL      Creatinine 2.17 mg/dL      Sodium 138 mmol/L      Potassium 3.9 mmol/L      Chloride 103 mmol/L      CO2 28.7 mmol/L      Calcium 7.9 mg/dL      Albumin 2.4 g/dL      Phosphorus 3.2 mg/dL      Anion Gap 6.3 mmol/L      BUN/Creatinine Ratio 15.7     eGFR 30.0 mL/min/1.73     Narrative:      GFR Normal >60  Chronic Kidney Disease <60  Kidney Failure <15    The GFR formula is only valid for adults with stable renal function between ages 18 and 70.    CBC (No Diff) [[id number]]  (Abnormal) Collected: 08/17/24 0315    Specimen: Blood Updated: 08/17/24 0414     WBC 6.46 10*3/mm3      RBC 3.33 10*6/mm3      Hemoglobin 10.1 g/dL      Hematocrit 30.3 %      MCV 91.0 fL      MCH 30.3 pg      MCHC 33.3 g/dL      RDW 11.9 %      RDW-SD 39.1 fl      MPV 8.3 fL      Platelets 344 10*3/mm3     POC Glucose Once [[id number]]  (Abnormal) Collected: 08/16/24 2013    Specimen: Blood Updated: 08/16/24 2015     Glucose 172 mg/dL     POC Glucose Once [[id number]]  (Abnormal) Collected: 08/16/24 1640    Specimen: Blood Updated: 08/16/24 1646     Glucose 160 mg/dL     Blood Culture - Blood, Arm, Left [[id number]]  (Normal) Collected: 08/11/24 1313    Specimen: Blood from Arm, Left Updated: 08/16/24 1330     Blood Culture No growth at 5 days    Narrative:      Less than seven (7) mL's of blood was collected.  Insufficient quantity may yield false negative results.    Blood Culture - Blood, Arm, Left [[id number]]  (Normal) Collected: 08/11/24 1313    Specimen: Blood from Arm, Left Updated: 08/16/24 0782     Blood Culture No growth at 5 days    Narrative:      Less than seven (7) mL's of  blood was collected.  Insufficient quantity may yield false negative results.    POC Glucose Once [[id number]]  (Abnormal) Collected: 08/16/24 1132    Specimen: Blood Updated: 08/16/24 1133     Glucose 167 mg/dL     POC Glucose Once [[id number]]  (Abnormal) Collected: 08/16/24 0629    Specimen: Blood Updated: 08/16/24 0630     Glucose 158 mg/dL     Magnesium [[id number]]  (Normal) Collected: 08/16/24 0435    Specimen: Blood Updated: 08/16/24 0551     Magnesium 1.7 mg/dL     Renal Function Panel [[id number]]  (Abnormal) Collected: 08/16/24 0435    Specimen: Blood Updated: 08/16/24 0551     Glucose 153 mg/dL      BUN 39 mg/dL      Creatinine 2.58 mg/dL      Sodium 135 mmol/L      Potassium 3.9 mmol/L      Chloride 102 mmol/L      CO2 26.8 mmol/L      Calcium 7.7 mg/dL      Albumin 2.3 g/dL      Phosphorus 2.8 mg/dL      Anion Gap 6.2 mmol/L      BUN/Creatinine Ratio 15.1     eGFR 24.4 mL/min/1.73     Narrative:      GFR Normal >60  Chronic Kidney Disease <60  Kidney Failure <15    The GFR formula is only valid for adults with stable renal function between ages 18 and 70.    CBC (No Diff) [[id number]]  (Abnormal) Collected: 08/16/24 0435    Specimen: Blood Updated: 08/16/24 0527     WBC 7.28 10*3/mm3      RBC 3.17 10*6/mm3      Hemoglobin 10.1 g/dL      Hematocrit 29.0 %      MCV 91.5 fL      MCH 31.9 pg      MCHC 34.8 g/dL      RDW 11.8 %      RDW-SD 39.4 fl      MPV 8.6 fL      Platelets 360 10*3/mm3     POC Glucose Once [[id number]]  (Abnormal) Collected: 08/15/24 2042    Specimen: Blood Updated: 08/15/24 2044     Glucose 135 mg/dL     POC Glucose Once [[id number]]  (Abnormal) Collected: 08/15/24 1603    Specimen: Blood Updated: 08/15/24 1604     Glucose 210 mg/dL     POC Glucose Once [[id number]]  (Abnormal) Collected: 08/15/24 1119    Specimen: Blood Updated: 08/15/24 1130     Glucose 229 mg/dL     Renal Function Panel [[id number]]  (Abnormal) Collected: 08/15/24 0235    Specimen: Blood Updated: 08/15/24 0636     Glucose  140 mg/dL      BUN 53 mg/dL      Creatinine 3.78 mg/dL      Sodium 136 mmol/L      Potassium 4.0 mmol/L      Chloride 102 mmol/L      CO2 22.1 mmol/L      Calcium 8.0 mg/dL      Albumin 2.3 g/dL      Phosphorus 3.5 mg/dL      Anion Gap 11.9 mmol/L      BUN/Creatinine Ratio 14.0     eGFR 15.4 mL/min/1.73     Narrative:      GFR Normal >60  Chronic Kidney Disease <60  Kidney Failure <15    The GFR formula is only valid for adults with stable renal function between ages 18 and 70.    Magnesium [[id number]]  (Normal) Collected: 08/15/24 0235    Specimen: Blood Updated: 08/15/24 0634     Magnesium 1.8 mg/dL     POC Glucose Once [[id number]]  (Abnormal) Collected: 08/15/24 0614    Specimen: Blood Updated: 08/15/24 0616     Glucose 157 mg/dL     CBC (No Diff) [[id number]]  (Abnormal) Collected: 08/15/24 0235    Specimen: Blood Updated: 08/15/24 0344     WBC 9.40 10*3/mm3      RBC 3.10 10*6/mm3      Hemoglobin 9.5 g/dL      Hematocrit 28.6 %      MCV 92.3 fL      MCH 30.6 pg      MCHC 33.2 g/dL      RDW 12.2 %      RDW-SD 41.6 fl      MPV 8.9 fL      Platelets 350 10*3/mm3     POC Glucose Once [[id number]]  (Abnormal) Collected: 08/14/24 2043    Specimen: Blood Updated: 08/14/24 2044     Glucose 169 mg/dL     POC Glucose Once [[id number]]  (Abnormal) Collected: 08/14/24 1607    Specimen: Blood Updated: 08/14/24 1609     Glucose 222 mg/dL     Protein Elec + Interp, Serum [[id number]]  (Abnormal) Collected: 08/12/24 1850    Specimen: Blood Updated: 08/14/24 1310     Total Protein 5.0 g/dL      Albumin 2.0 g/dL      Alpha-1-Globulin 0.5 g/dL      Alpha-2-Globulin 0.9 g/dL      Beta Globulin 0.7 g/dL      Gamma Globulin 0.9 g/dL      M-Ayaan Not Observed g/dL      Globulin 3.0 g/dL      A/G Ratio 0.7     Please note Comment     Comment: Protein electrophoresis scan will follow via computer, mail, or   delivery.        SPE Interpretation Comment     Comment: The SPE pattern reflects hypoalbuminemia. Evidence of  monoclonal  protein is not apparent.       Narrative:      Performed at:  [address] [place] [address]  [id number]  : Erasto Mena PhD, Phone:  [phone]    POC Glucose Once [[id number]]  (Abnormal) Collected: 08/14/24 1120    Specimen: Blood Updated: 08/14/24 1122     Glucose 176 mg/dL     Renal Function Panel [[id number]]  (Abnormal) Collected: 08/14/24 0457    Specimen: Blood Updated: 08/14/24 0704     Glucose 159 mg/dL      BUN 65 mg/dL      Creatinine 6.40 mg/dL      Sodium 136 mmol/L      Potassium 4.2 mmol/L      Comment: Slight hemolysis detected by analyzer. Result may be falsely elevated.        Chloride 104 mmol/L      CO2 18.9 mmol/L      Calcium 7.8 mg/dL      Albumin 2.2 g/dL      Phosphorus 5.9 mg/dL      Anion Gap 13.1 mmol/L      BUN/Creatinine Ratio 10.2     eGFR 8.2 mL/min/1.73      Comment: <15 Indicative of kidney failure       Narrative:      GFR Normal >60  Chronic Kidney Disease <60  Kidney Failure <15    The GFR formula is only valid for adults with stable renal function between ages 18 and 70.    Magnesium [[id number]]  (Normal) Collected: 08/14/24 0457    Specimen: Blood Updated: 08/14/24 0703     Magnesium 1.9 mg/dL     CBC (No Diff) [[id number]]  (Abnormal) Collected: 08/14/24 0457    Specimen: Blood Updated: 08/14/24 0645     WBC 10.70 10*3/mm3      RBC 3.30 10*6/mm3      Hemoglobin 10.0 g/dL      Hematocrit 29.8 %      MCV 90.3 fL      MCH 30.3 pg      MCHC 33.6 g/dL      RDW 11.9 %      RDW-SD 39.1 fl      MPV 9.0 fL      Platelets 369 10*3/mm3     POC Glucose Once [[id number]]  (Abnormal) Collected: 08/14/24 0629    Specimen: Blood Updated: 08/14/24 0630     Glucose 140 mg/dL     ANCA Panel [[id number]] Collected: 08/12/24 1850    Specimen: Blood Updated: 08/13/24 2107     ANTI-MPO ANTIBODIES <0.2 units      ANTI-PR3 ANTIBODIES <0.2 units      C-ANCA <1:20 titer      P-ANCA <1:20 titer      Comment: The presence of positive fluorescence exhibiting  P-ANCA or C-ANCA  patterns alone is not specific for the diagnosis of Wegener's  Granulomatosis (WG) or microscopic polyangiitis. Decisions about  treatment should not be based solely on ANCA IFA results.  The  International ANCA Group Consensus recommends follow up testing of  positive sera with both OR-3 and MPO-ANCA enzyme immunoassays. As  many as 5% serum samples are positive only by EIA.  Ref. AM J Clin Pathol 1999;111:507-513.        Atypical pANCA <1:20 titer      Comment: The atypical pANCA pattern has been observed in a significant  percentage of patients with ulcerative colitis, primary sclerosing  cholangitis and autoimmune hepatitis.       Narrative:      Performed at:  01 - [facility]  [id number]  : Lis France MD, Phone:  [phone]  Performed at:  02 - [facility]  [id number]  : Erasto Mena PhD, Phone:  [phone]    POC Glucose Once [[id number]]  (Abnormal) Collected: 08/13/24 2034    Specimen: Blood Updated: 08/13/24 2058     Glucose 202 mg/dL     Renal Function Panel [[id number]]  (Abnormal) Collected: 08/13/24 1753    Specimen: Blood Updated: 08/13/24 1835     Glucose 184 mg/dL      BUN 71 mg/dL      Creatinine 8.23 mg/dL      Sodium 133 mmol/L      Potassium 5.0 mmol/L      Chloride 102 mmol/L      CO2 12.2 mmol/L      Calcium 8.4 mg/dL      Albumin 2.6 g/dL      Phosphorus 8.3 mg/dL      Anion Gap 18.8 mmol/L      BUN/Creatinine Ratio 8.6     eGFR 6.1 mL/min/1.73      Comment: <15 Indicative of kidney failure       Narrative:      GFR Normal >60  Chronic Kidney Disease <60  Kidney Failure <15    The GFR formula is only valid for adults with stable renal function between ages 18 and 70.    C3+C4+TALIA+RA [[id number]] Collected: 08/12/24 1850    Specimen: Blood Updated: 08/13/24 1612     C3 Complement 139 mg/dL      C4 Complement 28 mg/dL      TLAIA Direct Negative     RA Latex Turbid 12.2 IU/mL      Narrative:      Performed at:  [address] [place] [address]  [id number]  : Erasto Mena PhD, Phone:  [phone]    POC Glucose Once [[id number]]  (Abnormal) Collected: 08/13/24 1557    Specimen: Blood Updated: 08/13/24 1558     Glucose 189 mg/dL     Immunoglobulin Free LT Chains Blood [[id number]]  (Abnormal) Collected: 08/12/24 1850    Specimen: Blood Updated: 08/13/24 1412     Free Light Chain, Kappa 99.5 mg/L      Free Lambda Light Chains 66.3 mg/L      Kappa/Lambda Ratio 1.50    Narrative:      Performed at:  [address] [place] [address]  [id number]  : Erasto Mena PhD, Phone:  [phone]    POC Glucose Once [[id number]]  (Abnormal) Collected: 08/13/24 1321    Specimen: Blood Updated: 08/13/24 1322     Glucose 148 mg/dL     POC Glucose Once [[id number]]  (Abnormal) Collected: 08/13/24 0608    Specimen: Blood Updated: 08/13/24 0610     Glucose 132 mg/dL     Magnesium [[id number]]  (Normal) Collected: 08/13/24 0229    Specimen: Blood Updated: 08/13/24 0311     Magnesium 1.7 mg/dL     Renal Function Panel [[id number]]  (Abnormal) Collected: 08/13/24 0229    Specimen: Blood Updated: 08/13/24 0311     Glucose 137 mg/dL      BUN 66 mg/dL      Creatinine 8.77 mg/dL      Sodium 132 mmol/L      Potassium 4.8 mmol/L      Chloride 103 mmol/L      CO2 13.9 mmol/L      Calcium 7.9 mg/dL      Albumin 2.3 g/dL      Phosphorus 8.2 mg/dL      Anion Gap 15.1 mmol/L      BUN/Creatinine Ratio 7.5     eGFR 5.6 mL/min/1.73      Comment: <15 Indicative of kidney failure       Narrative:      GFR Normal >60  Chronic Kidney Disease <60  Kidney Failure <15    The GFR formula is only valid for adults with stable renal function between ages 18 and 70.    CBC (No Diff) [[id number]]  (Abnormal) Collected: 08/13/24 0229    Specimen: Blood Updated: 08/13/24 0252     WBC 19.30 10*3/mm3      RBC 3.49 10*6/mm3      Hemoglobin 10.7 g/dL      Hematocrit 32.1 %      MCV 92.0 fL       MCH 30.7 pg      MCHC 33.3 g/dL      RDW 12.1 %      RDW-SD 40.5 fl      MPV 8.8 fL      Platelets 330 10*3/mm3     POC Glucose Once [[id number]]  (Normal) Collected: 08/12/24 2114    Specimen: Blood Updated: 08/12/24 2115     Glucose 130 mg/dL     Renal Function Panel [[id number]]  (Abnormal) Collected: 08/12/24 1506    Specimen: Blood Updated: 08/12/24 1648     Glucose 129 mg/dL      BUN 67 mg/dL      Creatinine 8.26 mg/dL      Sodium 132 mmol/L      Potassium 4.5 mmol/L      Chloride 100 mmol/L      CO2 14.2 mmol/L      Calcium 7.7 mg/dL      Albumin 2.4 g/dL      Phosphorus 7.1 mg/dL      Anion Gap 17.8 mmol/L      BUN/Creatinine Ratio 8.1     eGFR 6.0 mL/min/1.73      Comment: <15 Indicative of kidney failure       Narrative:      GFR Normal >60  Chronic Kidney Disease <60  Kidney Failure <15    The GFR formula is only valid for adults with stable renal function between ages 18 and 70.    POC Glucose Once [[id number]]  (Abnormal) Collected: 08/12/24 1615    Specimen: Blood Updated: 08/12/24 1617     Glucose 132 mg/dL     POC Glucose Once [[id number]]  (Abnormal) Collected: 08/12/24 1051    Specimen: Blood Updated: 08/12/24 1052     Glucose 168 mg/dL     Renal Function Panel [[id number]]  (Abnormal) Collected: 08/12/24 0750    Specimen: Blood Updated: 08/12/24 0843     Glucose 130 mg/dL      BUN 63 mg/dL      Creatinine 7.56 mg/dL      Sodium 131 mmol/L      Potassium 4.4 mmol/L      Chloride 99 mmol/L      CO2 14.5 mmol/L      Calcium 8.1 mg/dL      Albumin 2.4 g/dL      Phosphorus 7.1 mg/dL      Anion Gap 17.5 mmol/L      BUN/Creatinine Ratio 8.3     eGFR 6.7 mL/min/1.73      Comment: <15 Indicative of kidney failure       Narrative:      GFR Normal >60  Chronic Kidney Disease <60  Kidney Failure <15    The GFR formula is only valid for adults with stable renal function between ages 18 and 70.    Magnesium [[id number]]  (Abnormal) Collected: 08/12/24 0750    Specimen: Blood Updated: 08/12/24 0839     Magnesium 1.5  mg/dL     CBC (No Diff) [[id number]]  (Abnormal) Collected: 08/12/24 0750    Specimen: Blood Updated: 08/12/24 0820     WBC 18.57 10*3/mm3      RBC 3.66 10*6/mm3      Hemoglobin 11.1 g/dL      Hematocrit 33.5 %      MCV 91.5 fL      MCH 30.3 pg      MCHC 33.1 g/dL      RDW 11.9 %      RDW-SD 39.8 fl      MPV 9.0 fL      Platelets 312 10*3/mm3     Urinalysis, Microscopic Only - Indwelling Urethral Catheter [[id number]]  (Abnormal) Collected: 08/12/24 0616    Specimen: Urine from Indwelling Urethral Catheter Updated: 08/12/24 0741     RBC, UA Too Numerous to Count /HPF      WBC, UA       Unable to determine due to loaded field     /HPF     Bacteria, UA       Unable to determine due to loaded field     /HPF     Squamous Epithelial Cells, UA       Unable to determine due to loaded field     /HPF     Hyaline Casts, UA       Unable to determine due to loaded field     /LPF     Methodology Manual Light Microscopy    Creatinine Urine Random (kidney function) GFR component - Indwelling Urethral Catheter [[id number]] Collected: 08/12/24 0615    Specimen: Urine from Indwelling Urethral Catheter Updated: 08/12/24 0658     Creatinine, Urine <4.2 mg/dL     Narrative:      Reference intervals for random urine have not been established.  Clinical usage is dependent upon physician's interpretation in combination with other laboratory tests.       Sodium, Urine, Random - Indwelling Urethral Catheter [[id number]] Collected: 08/12/24 0615    Specimen: Urine from Indwelling Urethral Catheter Updated: 08/12/24 0655     Sodium, Urine 145 mmol/L     Narrative:      Reference intervals for random urine have not been established.  Clinical usage is dependent upon physician's interpretation in combination with other laboratory tests.       Urinalysis With Microscopic If Indicated (No Culture) - Indwelling Urethral Catheter [[id number]]  (Abnormal) Collected: 08/12/24 0616    Specimen: Urine from Indwelling Urethral Catheter Updated: 08/12/24 0654  "    Color, UA Red     Comment: Any Substance that causes an abnormal urine color can alter the accuracy of the chemical reactions.        Appearance, UA Turbid     pH, UA 7.5     Specific Gravity, UA 1.014     Glucose, UA Negative     Ketones, UA Negative     Bilirubin, UA Moderate (2+)     Blood, UA Moderate (2+)     Protein, UA >=300 mg/dL (3+)     Leuk Esterase, UA Large (3+)     Nitrite, UA Positive     Urobilinogen, UA 0.2 E.U./dL    POC Glucose Once [[id number]]  (Normal) Collected: 08/12/24 0619    Specimen: Blood Updated: 08/12/24 0620     Glucose 127 mg/dL     POC Glucose Once [[id number]]  (Abnormal) Collected: 08/11/24 2023    Specimen: Blood Updated: 08/11/24 2024     Glucose 148 mg/dL     Uric Acid [[id number]]  (Abnormal) Collected: 08/11/24 1001    Specimen: Blood Updated: 08/11/24 1857     Uric Acid 8.1 mg/dL     POC Glucose Once [[id number]]  (Abnormal) Collected: 08/11/24 1623    Specimen: Blood Updated: 08/11/24 1625     Glucose 136 mg/dL     Lactic Acid, Plasma [[id number]]  (Normal) Collected: 08/11/24 1313    Specimen: Blood Updated: 08/11/24 1346     Lactate 0.8 mmol/L     Procalcitonin [[id number]]  (Abnormal) Collected: 08/11/24 1001    Specimen: Blood Updated: 08/11/24 1158     Procalcitonin 0.34 ng/mL     Narrative:      As a Marker for Sepsis (Non-Neonates):    1. <0.5 ng/mL represents a low risk of severe sepsis and/or septic shock.  2. >2 ng/mL represents a high risk of severe sepsis and/or septic shock.    As a Marker for Lower Respiratory Tract Infections that require antibiotic therapy:    PCT on Admission    Antibiotic Therapy       6-12 Hrs later    >0.5                Strongly Recommended  >0.25 - <0.5        Recommended   0.1 - 0.25          Discouraged              Remeasure/reassess PCT  <0.1                Strongly Discouraged     Remeasure/reassess PCT    As 28 day mortality risk marker: \"Change in Procalcitonin Result\" (>80% or <=80%) if Day 0 (or Day 1) and Day 4 values are " available. Refer to http://[email or web address]-pct-calculator.[email or web address]    Change in PCT <=80%  A decrease of PCT levels below or equal to 80% defines a positive change in PCT test result representing a higher risk for 28-day all-cause mortality of patients diagnosed with severe sepsis for septic shock.    Change in PCT >80%  A decrease of PCT levels of more than 80% defines a negative change in PCT result representing a lower risk for 28-day all-cause mortality of patients diagnosed with severe sepsis or septic shock.       Lipase [[id number]]  (Abnormal) Collected: 08/11/24 1001    Specimen: Blood Updated: 08/11/24 1147     Lipase 96 U/L     Comprehensive Metabolic Panel [[id number]]  (Abnormal) Collected: 08/11/24 1001    Specimen: Blood Updated: 08/11/24 1036     Glucose 150 mg/dL      BUN 59 mg/dL      Creatinine 5.84 mg/dL      Sodium 132 mmol/L      Potassium 4.3 mmol/L      Comment: Slight hemolysis detected by analyzer. Result may be falsely elevated.        Chloride 99 mmol/L      CO2 18.0 mmol/L      Calcium 8.3 mg/dL      Total Protein 6.1 g/dL      Albumin 2.6 g/dL      ALT (SGPT) 16 U/L      AST (SGOT) 16 U/L      Comment: Slight hemolysis detected by analyzer. Result may be falsely elevated.        Alkaline Phosphatase 108 U/L      Total Bilirubin 0.8 mg/dL      Globulin 3.5 gm/dL      A/G Ratio 0.7 g/dL      BUN/Creatinine Ratio 10.1     Anion Gap 15.0 mmol/L      eGFR 9.2 mL/min/1.73      Comment: <15 Indicative of kidney failure       Narrative:      GFR Normal >60  Chronic Kidney Disease <60  Kidney Failure <15    The GFR formula is only valid for adults with stable renal function between ages 18 and 70.    [place] Draw [[id number]] Collected: 08/11/24 1001    Specimen: Blood Updated: 08/11/24 1015    Narrative:      The following orders were created for panel order [facility] Draw.  Procedure                               Abnormality         Status                     ---------                                -----------         ------                     Green Top (Gel)[[id number]]                                  Final result               Lavender Top[[id number]]                                     Final result               Gold Top - SST[[id number]]                                   Final result               Light Blue Top[[id number]]                                   Final result                 Please view results for these tests on the individual orders.    Green Top (Gel) [[id number]] Collected: 08/11/24 1001    Specimen: Blood Updated: 08/11/24 1015     Extra Tube Hold for add-ons.     Comment: Auto resulted.       Lavender Top [[id number]] Collected: 08/11/24 1001    Specimen: Blood Updated: 08/11/24 1015     Extra Tube hold for add-on     Comment: Auto resulted       Gold Top - SST [[id number]] Collected: 08/11/24 1001    Specimen: Blood Updated: 08/11/24 1015     Extra Tube Hold for add-ons.     Comment: Auto resulted.       Light Blue Top [[id number]] Collected: 08/11/24 1001    Specimen: Blood Updated: 08/11/24 1015     Extra Tube Hold for add-ons.     Comment: Auto resulted       CBC & Differential [[id number]]  (Abnormal) Collected: 08/11/24 1001    Specimen: Blood Updated: 08/11/24 1011    Narrative:      The following orders were created for panel order CBC & Differential.  Procedure                               Abnormality         Status                     ---------                               -----------         ------                     CBC Auto Differential[[id number]]        Abnormal            Final result                 Please view results for these tests on the individual orders.    CBC Auto Differential [[id number]]  (Abnormal) Collected: 08/11/24 1001    Specimen: Blood Updated: 08/11/24 1011     WBC 19.87 10*3/mm3      RBC 3.92 10*6/mm3      Hemoglobin 11.8 g/dL      Hematocrit 35.4 %      MCV 90.3 fL      MCH 30.1 pg      MCHC 33.3 g/dL      RDW 12.0 %      RDW-SD 39.0 fl      MPV 8.7 fL       Platelets 279 10*3/mm3      Neutrophil % 87.0 %      Lymphocyte % 3.1 %      Monocyte % 7.8 %      Eosinophil % 0.8 %      Basophil % 0.2 %      Immature Grans % 1.1 %      Neutrophils, Absolute 17.32 10*3/mm3      Lymphocytes, Absolute 0.62 10*3/mm3      Monocytes, Absolute 1.54 10*3/mm3      Eosinophils, Absolute 0.15 10*3/mm3      Basophils, Absolute 0.03 10*3/mm3      Immature Grans, Absolute 0.21 10*3/mm3      nRBC 0.0 /100 WBC             Imaging Results (All)       Procedure Component Value Units Date/Time    FL Retrograde Pyelogram In OR [[id number]] Collected: 08/13/24 1344     Updated: 08/13/24 1348    Narrative:      FL RETROGRADE PYELOGRAM IN OR-     INDICATIONS: Retrograde pyelography     TECHNIQUE: FLUOROSCOPIC ASSISTANCE IN THE OPERATING ROOM.     FINDINGS:     5 intraoperative fluoroscopic spot views were obtained and recorded the  PACS for review, revealing bilateral retrograde pyelography and  bilateral ureteral stent placement. Please see operative report for full  details.     Fluoroscopy time: 0.7 minutes     Radiation exposure: Dose area product: 493.47 uGy x m(2)          Impression:         As described.     This report was finalized on 8/13/2024 1:45 PM by Dr. German Gutierrez M.D on Workstation: [id number]       CT Abdomen Pelvis Without Contrast [[id number]] Collected: 08/11/24 1321     Updated: 08/11/24 2239    Narrative:      CT OF THE ABDOMEN AND PELVIS WITHOUT CONTRAST 08/11/2024     HISTORY: Abdominal pain. Diminished urinary output.     Spiral images were obtained from the lung bases to the symphysis pubis.  No intravenous or oral contrast was given.     Small left and very minimal right pleural effusions are seen with some  minimal bibasilar atelectasis.     Liver appears unremarkable. There are multiple gallstones. No  gallbladder inflammatory changes are seen. Pancreas is atrophic. Spleen  and adrenals appear unremarkable.     There is mild-to-moderate bilateral hydronephrosis with  mild ureteral  dilatation. This finding is also seen on the 08/04/2024 study. There is  bilateral perinephric stranding. There is bilateral periureteral  stranding as well. There is an approximately 4.1 cm low-density right  renal lesion presumably a cyst on this unenhanced scan. No intrarenal  stones or ureteral stones are seen.     There is some aortic calcification. Rodriguez catheter is seen in the  urinary bladder. There is mild urinary bladder wall thickening.  Moderately severe prostate gland enlargement is seen. Small left  inguinal hernia seen containing small amount of fluid. There is colonic  diverticulosis.       Impression:      1. Bilateral hydroureteronephrosis. There is also bilateral perinephric  and periureteral stranding. This finding is also seen on the previous  study of 08/04/2024.  2. Low-density right renal cyst.  3. Small pleural effusions with bibasilar atelectasis.  4. Cholelithiasis.  5. Rodriguez catheter is seen in the urinary bladder as well as some air.  There is urinary bladder wall thickening with moderately severe prostate  gland enlargement.  6. No ureteral stones are seen.     Radiation dose reduction techniques were utilized, including automated  exposure control and exposure modulation based on body size.        This report was finalized on 8/11/2024 10:36 PM by Dr. Rajiv Canela M.D on Workstation: [id number]       XR Chest 1 View [[id number]] Collected: 08/11/24 1221     Updated: 08/11/24 1227    Narrative:      XR CHEST 1 VW-8/11/2024     HISTORY: Possible pneumonia.     The heart size is within normal limits. Lungs appear clear. There is  some aortic calcification. Bony structures appear unremarkable.       Impression:      1. No acute process        This report was finalized on 8/11/2024 12:23 PM by Dr. Rajiv Canela M.D on Workstation: [id number]               Medication Review:           Assessment & Plan         Acute renal failure    Obstructive uropathy     Bilateral hydronephrosis    Type 2 diabetes mellitus with hyperglycemia    Essential hypertension, benign    Dementia without behavioral disturbance        Plan   - continue indwelling hagen  - continue Flomax  - safe for discharge to rehab from urology standpoint  - we will arrange follow up with Dr. Melendez in 1-2 weeks to discuss future BPH management.    Ryan Gastelum Jr., MD  08/20/24  08:08 EDT

## 2024-08-20 NOTE — PROGRESS NOTES
[facility] Progress Note      Patient Name: Froy Ngo  : 1945  MRN: [id number]  Primary Care Physician:  Ryan Gutierrez MD  Date of admission: 2024    Subjective     Interval History:   F/U NEHEMIAS vs NEHEMIAS/CKD    No new complaints noted today.  Afebrile.  Denies any nausea or vomiting.  No fever or chills appetite seems to be better  Review of Systems:   14 point review of systems is otherwise negative except for mentioned above on HPI     Objective     Vitals:   Temp:  [97.9 °F (36.6 °C)-98.1 °F (36.7 °C)] 97.9 °F (36.6 °C)  Heart Rate:  [71-88] 71  Resp:  [18] 18  BP: (128-157)/(60-88) 131/60    Intake/Output Summary (Last 24 hours) at 2024 0901  Last data filed at 2024 0748  Gross per 24 hour   Intake 570 ml   Output 800 ml   Net -230 ml       Physical Exam:    Constitutional: Awake, alert, NAD, chronically ill, frail  HEENT: Sclera anicteric, no conjunctival injection  Neck: Supple, no JVD  Respiratory: Clear to auscultation bilaterally, nonlabored on RA  Cardiovascular: RRR, no rub  Gastrointestinal: BS +, soft, nontender and nondistended  : No palpable bladder; Rodriguez catheter anchored  Musculoskeletal: Mild edema noted bilateral extremities, no clubbing or cyanosis  Psychiatric: Appropriate affect, cooperative  Neurologic: No asterixis, moving all extremities, normal speech   Skin: Warm and dry        Scheduled Meds:     amLODIPine, 5 mg, Oral, Q24H  aspirin, 81 mg, Oral, Daily  atorvastatin, 10 mg, Oral, Daily  donepezil, 10 mg, Oral, Nightly  hydrALAZINE, 25 mg, Oral, Q8H  insulin lispro, 2-7 Units, Subcutaneous, 4x Daily AC & at Bedtime  magnesium sulfate, 2 g, Intravenous, Once  memantine, 10 mg, Oral, Daily  sodium chloride, 10 mL, Intravenous, Q12H  tamsulosin, 0.4 mg, Oral, Daily      IV Meds:          Results Reviewed:   I have personally reviewed the results from the time of this admission to 2024 09:01 EDT     Results from last 7  days   Lab Units 08/20/24  0441 08/19/24  0350 08/18/24  0428   SODIUM mmol/L 136 138 140   POTASSIUM mmol/L 3.7 3.9 3.8   CHLORIDE mmol/L 103 104 103   CO2 mmol/L 25.6 25.3 28.0   BUN mg/dL 26* 25* 28*   CREATININE mg/dL 1.79* 1.82* 1.92*   CALCIUM mg/dL 8.3* 8.2* 8.3*   GLUCOSE mg/dL 121* 122* 114*       Estimated Creatinine Clearance: 41 mL/min (A) (by C-G formula based on SCr of 1.79 mg/dL (H)).    Results from last 7 days   Lab Units 08/20/24 0441 08/19/24  0350 08/18/24  0428   MAGNESIUM mg/dL 1.5* 1.6 1.6   PHOSPHORUS mg/dL 3.5 3.3 2.9               Results from last 7 days   Lab Units 08/20/24  0441 08/19/24  0350 08/18/24  0428 08/17/24  0315 08/16/24  0435   WBC 10*3/mm3 6.27 6.62 6.22 6.46 7.28   HEMOGLOBIN g/dL 10.0* 10.1* 10.5* 10.1* 10.1*   PLATELETS 10*3/mm3 291 327 349 344 360             Assessment / Plan     ASSESSMENT:  1.  NEHEMIAS with unknonw baseline creatinine  secondary to severe obstructive uropathy now status post double-J stent placement. kidney function is improving.  2.  Obstructive uropathy with significant BPH:  Suspicion is for atonic bladder; Urology consulted now status post double-J stent placement.  3.  Alzheimer's dementia  4.  Anemia  5.  Hypertension: Blood pressure is controlled  6.  Leukocytosis  7.  DM2:  apparently was receiving metformin at the nursing home  8. High anion gap metabolic acidosis secondary to acute kidney injury  resolved   9. Hyperphosphatemia : resolved   10.  Hypomagnesemia    PLAN:  Replace magnesium  Okay to discharge home from nephrology standpoint.  Will arrange follow-up in nephrology clinic in 1 to 2 weeks    Thank you for involving us in the care of Froy Ngo.  Please feel free to call with any questions.    Jessica Aragon MD   08/20/24  09:01 EDT    [facility]  [phone]    Please note that portions of this note were completed with a voice recognition program.

## 2024-08-20 NOTE — PLAN OF CARE
Goal Outcome Evaluation:      Pt AOx3 (confused to time), VSS, RA. Pt frequently repeats same questions. No acute changes this shift. No family/caregiver at bedside currently. Assist x1. CBI stopped for total of 6 hrs during shift. UOP progressed from yellow clear to pink/red in this time frame, no clots noted. UOP net total 500 mL during that time. CBI restarted at 0530, very slow rate. Plan of care ongoing.

## 2024-08-20 NOTE — DISCHARGE SUMMARY
Patient Name: Froy Ngo  : 1945  MRN: [id number]    Date of Admission: 2024  Date of Discharge:  2024  Primary Care Physician: Ryan Gutierrez MD      Chief Complaint:   Urinary Retention      Discharge Diagnoses     Active Hospital Problems    Diagnosis  POA    **Acute renal failure [N17.9]  Yes    Type 2 diabetes mellitus with hyperglycemia [E11.65]  Yes    Essential hypertension, benign [I10]  Yes    Dementia without behavioral disturbance [F03.90]  Yes    Obstructive uropathy [N13.9]  Yes    Bilateral hydronephrosis [N13.30]  Yes      Resolved Hospital Problems   No resolved problems to display.        Hospital Course     Mr. Ngo is a 78 y.o. male with a history of type 2 diabetes, hyperlipidemia, Alzheimer's dementia, recent admission (-) for euglycemic DKA, NEHEMIAS/obstructive uropathy with bilateral hydronephrosis-discharged with in-dwelling Rodriguez who presented to [facility] initially complaining of decreased urinary output.  Please see the admitting history and physical for further details.  He was found to have NEHEMIAS and was admitted to the hospital for further evaluation and treatment.  There was also concern on admission for possible UTI and he was initiated on ceftriaxone.  Urology and nephrology consulted on admission.  He was initially started on IV fluids however the patient's creatinine continued to trend upwards.  Urology recommended/took the patient to the OR on  for bilateral J-stents.  Renal function significantly improved after placement of stents.  Urine culture remained no growth however infectious disease recommended a 7-day course of antibiotics for treatment of complicated UTI as the patient had received antibiotics prior to urine culture being collected.  He has completed his course of antibiotics while admitted.  The patient was noted to have intermittent hematuria which cleared with irrigation.  Urology and nephrology have  "cleared him for discharge but he will need to follow-up with both services in the next 1 to 2 weeks.  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  Per neurosurgery, changes are likely a result of patient's atrophy and \"compensatory extra-axial space associated with that atrophy.\"  They do recommend a CT scan in 1 month for follow-up.  Patient has also been instructed to follow-up with his PCP for a posthospital follow-up visit.    Day of Discharge     Subjective:  Resting in bed, sitter/caregiver at bedside.  He feels great today and is eager for discharge to rehab.    Physical Exam:  Temp:  [97.9 °F (36.6 °C)-98.1 °F (36.7 °C)] 97.9 °F (36.6 °C)  Heart Rate:  [71-88] 71  Resp:  [18] 18  BP: (128-157)/(60-88) 131/60  Body mass index is 26.34 kg/m².  Physical Exam  Constitutional:       General: He is not in acute distress.     Appearance: He is ill-appearing (Chronically). He is not toxic-appearing.   Cardiovascular:      Rate and Rhythm: Normal rate and regular rhythm.   Pulmonary:      Effort: Pulmonary effort is normal. No respiratory distress.   Genitourinary:     Comments: Rodriguez in place  Musculoskeletal:         General: No tenderness.      Right lower leg: No edema.      Left lower leg: No edema.   Skin:     General: Skin is warm and dry.   Neurological:      General: No focal deficit present.      Mental Status: He is alert and oriented to person, place, and time. Mental status is at baseline.      Motor: No weakness.         Consultants     Consult Orders (all) (From admission, onward)       Start     Ordered    08/13/24 0755  Inpatient Infectious Diseases Consult  Once        Specialty:  Infectious Diseases  Provider:  Edgar Rodriguez MD    08/13/24 0754    08/11/24 1631  Inpatient Neurosurgery Consult  Once        Specialty:  Neurosurgery  Provider:  Dino Henry MD    08/11/24 " 1644    08/11/24 1517  Inpatient Urology Consult  Once        Specialty:  Urology  Provider:  Lj Catalan MD    08/11/24 1516    08/11/24 1152  Inpatient Nephrology Consult  Once        Specialty:  Nephrology  Provider:  Gigi Engel MD    08/11/24 1151    08/11/24 1112  [facility] (on-call MD unless specified) Details  Once        Specialty:  Hospitalist  Provider:  Ritesh Farrell MD    08/11/24 1111                  Procedures     Imaging Results (All)       Procedure Component Value Units Date/Time    FL Retrograde Pyelogram In OR [[id number]] Collected: 08/13/24 1344     Updated: 08/13/24 1348    Narrative:      FL RETROGRADE PYELOGRAM IN OR-     INDICATIONS: Retrograde pyelography     TECHNIQUE: FLUOROSCOPIC ASSISTANCE IN THE OPERATING ROOM.     FINDINGS:     5 intraoperative fluoroscopic spot views were obtained and recorded the  PACS for review, revealing bilateral retrograde pyelography and  bilateral ureteral stent placement. Please see operative report for full  details.     Fluoroscopy time: 0.7 minutes     Radiation exposure: Dose area product: 493.47 uGy x m(2)          Impression:         As described.     This report was finalized on 8/13/2024 1:45 PM by Dr. German Gutierrez M.D on Workstation: [id number]       CT Abdomen Pelvis Without Contrast [[id number]] Collected: 08/11/24 1321     Updated: 08/11/24 2239    Narrative:      CT OF THE ABDOMEN AND PELVIS WITHOUT CONTRAST 08/11/2024     HISTORY: Abdominal pain. Diminished urinary output.     Spiral images were obtained from the lung bases to the symphysis pubis.  No intravenous or oral contrast was given.     Small left and very minimal right pleural effusions are seen with some  minimal bibasilar atelectasis.     Liver appears unremarkable. There are multiple gallstones. No  gallbladder inflammatory changes are seen. Pancreas is atrophic. Spleen  and adrenals appear unremarkable.     There is mild-to-moderate bilateral hydronephrosis  with mild ureteral  dilatation. This finding is also seen on the 08/04/2024 study. There is  bilateral perinephric stranding. There is bilateral periureteral  stranding as well. There is an approximately 4.1 cm low-density right  renal lesion presumably a cyst on this unenhanced scan. No intrarenal  stones or ureteral stones are seen.     There is some aortic calcification. Rodriguez catheter is seen in the  urinary bladder. There is mild urinary bladder wall thickening.  Moderately severe prostate gland enlargement is seen. Small left  inguinal hernia seen containing small amount of fluid. There is colonic  diverticulosis.       Impression:      1. Bilateral hydroureteronephrosis. There is also bilateral perinephric  and periureteral stranding. This finding is also seen on the previous  study of 08/04/2024.  2. Low-density right renal cyst.  3. Small pleural effusions with bibasilar atelectasis.  4. Cholelithiasis.  5. Rodriguez catheter is seen in the urinary bladder as well as some air.  There is urinary bladder wall thickening with moderately severe prostate  gland enlargement.  6. No ureteral stones are seen.     Radiation dose reduction techniques were utilized, including automated  exposure control and exposure modulation based on body size.        This report was finalized on 8/11/2024 10:36 PM by Dr. Rajiv Canela M.D on Workstation: [id number]       XR Chest 1 View [[id number]] Collected: 08/11/24 1221     Updated: 08/11/24 1227    Narrative:      XR CHEST 1 VW-8/11/2024     HISTORY: Possible pneumonia.     The heart size is within normal limits. Lungs appear clear. There is  some aortic calcification. Bony structures appear unremarkable.       Impression:      1. No acute process        This report was finalized on 8/11/2024 12:23 PM by Dr. Rajiv Canela M.D on Workstation: [id number]               Pertinent Labs     Results from last 7 days   Lab Units 08/20/24  0441 08/19/24  0350 08/18/24  0424  "08/17/24  0315   WBC 10*3/mm3 6.27 6.62 6.22 6.46   HEMOGLOBIN g/dL 10.0* 10.1* 10.5* 10.1*   PLATELETS 10*3/mm3 291 327 349 344     Results from last 7 days   Lab Units 08/20/24 0441 08/19/24  0350 08/18/24 0428 08/17/24  0315   SODIUM mmol/L 136 138 140 138   POTASSIUM mmol/L 3.7 3.9 3.8 3.9   CHLORIDE mmol/L 103 104 103 103   CO2 mmol/L 25.6 25.3 28.0 28.7   BUN mg/dL 26* 25* 28* 34*   CREATININE mg/dL 1.79* 1.82* 1.92* 2.17*   GLUCOSE mg/dL 121* 122* 114* 131*   Estimated Creatinine Clearance: 41 mL/min (A) (by C-G formula based on SCr of 1.79 mg/dL (H)).  Results from last 7 days   Lab Units 08/20/24 0441 08/19/24  0350 08/18/24 0428 08/17/24  0315   ALBUMIN g/dL 2.7* 2.7* 2.8* 2.4*     Results from last 7 days   Lab Units 08/20/24 0441 08/19/24  0350 08/18/24 0428 08/17/24  0315   CALCIUM mg/dL 8.3* 8.2* 8.3* 7.9*   ALBUMIN g/dL 2.7* 2.7* 2.8* 2.4*   MAGNESIUM mg/dL 1.5* 1.6 1.6 1.6   PHOSPHORUS mg/dL 3.5 3.3 2.9 3.2               Invalid input(s): \"LDLCALC\"        Test Results Pending at Discharge       Discharge Details        Discharge Medications        New Medications        Instructions Start Date   insulin lispro 100 UNIT/ML injection  Commonly known as: HUMALOG/ADMELOG   2-7 Units, Subcutaneous, 4 Times Daily Before Meals & Nightly             Continue These Medications        Instructions Start Date   amLODIPine 5 MG tablet  Commonly known as: NORVASC   5 mg, Oral, Every 24 Hours Scheduled      aspirin 81 MG EC tablet   81 mg, Oral, Daily      atorvastatin 10 MG tablet  Commonly known as: LIPITOR   10 mg, Oral, Daily      donepezil 10 MG tablet  Commonly known as: ARICEPT   10 mg, Oral, Nightly      hydrALAZINE 25 MG tablet  Commonly known as: APRESOLINE   25 mg, Oral, Every 8 Hours Scheduled      memantine 10 MG tablet  Commonly known as: NAMENDA   10 mg, Oral, Daily      tamsulosin 0.4 MG capsule 24 hr capsule  Commonly known as: FLOMAX   0.4 mg, Oral, Daily             Stop These Medications  "     metFORMIN 1000 MG tablet  Commonly known as: GLUCOPHAGE     Semaglutide(0.25 or 0.5MG/DOS) 2 MG/1.5ML solution pen-injector  Commonly known as: OZEMPIC     silodosin 4 MG capsule capsule  Commonly known as: RAPAFLO              No Known Allergies      Discharge Disposition:  Home or Self Care    Discharge Diet:  Diet Order   Procedures    Diet: Regular/House; Fluid Consistency: Thin (IDDSI 0)       Discharge Activity:   Activity Instructions       Activity as Tolerated              CODE STATUS:    Code Status and Medical Interventions: CPR (Attempt to Resuscitate); Full Support   Ordered at: 08/11/24 1627     Code Status (Patient has no pulse and is not breathing):    CPR (Attempt to Resuscitate)     Medical Interventions (Patient has pulse or is breathing):    Full Support       Future Appointments   Date Time Provider Department Center   9/13/2024  1:45 PM [facility] CT 3  [facility]   10/11/2024  8:30 AM Dino Henry MD [facility]     Additional Instructions for the Follow-ups that You Need to Schedule       Discharge Follow-up with PCP   As directed       Currently Documented PCP:    Ryan Gutierrez MD    PCP Phone Number:    [phone]     Follow Up Details: post-hospital follow up        Discharge Follow-up with Specified Provider: 1-2 week follow up with nephrology; 1-2 week follow up with urology.   As directed      To: 1-2 week follow up with nephrology; 1-2 week follow up with urology.               Contact information for follow-up providers       Ryan Gutierrez MD .    Specialty: Internal Medicine  Why: post-hospital follow up  Contact information:  [address] [place] [address] [phone]                       Contact information for after-discharge care       Destination       [facility] .    Service: Skilled Nursing  Contact information:  [address] [facility] [address]  [phone]                                   Additional Instructions for the  Follow-ups that You Need to Schedule       Discharge Follow-up with PCP   As directed       Currently Documented PCP:    Ryan Gutierrez MD    PCP Phone Number:    [phone]     Follow Up Details: post-hospital follow up        Discharge Follow-up with Specified Provider: 1-2 week follow up with nephrology; 1-2 week follow up with urology.   As directed      To: 1-2 week follow up with nephrology; 1-2 week follow up with urology.            Time Spent on Discharge:  I spent greater than 30 minutes on this discharge activity which included: face-to-face encounter with the patient, reviewing the data in the system, coordination of the care with the nursing staff as well as consultants, documentation, and entering orders.       Shruthi Acosta MD  [facility]  08/20/24  12:00 EDT

## 2024-08-21 NOTE — CASE MANAGEMENT/SOCIAL WORK
Case Management Discharge Note      Final Note: [facility] via [facility] stretcher    Provided Post Acute Provider List?: N/A  N/A Provider List Comment: Patient at [facility]    Selected Continued Care - Discharged on 8/20/2024 Admission date: 8/11/2024 - Discharge disposition: Home or Self Care      Destination Coordination complete.      Service Provider Selected Services Address Phone Fax Patient Preferred    [facility] [address] [id number] [phone] --              Durable Medical Equipment    No services have been selected for the patient.                Dialysis/Infusion    No services have been selected for the patient.                Home Medical Care    No services have been selected for the patient.                Therapy    No services have been selected for the patient.                Community Resources    No services have been selected for the patient.                Community & DME    No services have been selected for the patient.                    Selected Continued Care - Prior Encounters Includes continued care and service providers with selected services from prior encounters from 5/13/2024 to 8/20/2024      Discharged on 8/9/2024 Admission date: 8/4/2024 - Discharge disposition: Skilled Nursing Facility (DC - External)      Destination       Service Provider Selected Services Address Phone Fax Patient Preferred    [facility] [address] [facility] [place] [address] [phone] --                               Final Discharge Disposition Code: 03 - skilled nursing facility (SNF)

## 2024-08-21 NOTE — OUTREACH NOTE
Prep Survey      Flowsheet Row Responses   [facility] facility patient discharged from? [facility]   Is LACE score < 7 ? No   Eligibility Not Eligible   What are the reasons patient is not eligible? Subacute Care Center  [[facility]]   Does the patient have one of the following disease processes/diagnoses(primary or secondary)? Other   Prep survey completed? Yes            JUAN DANIEL OMER - Registered Nurse

## 2024-08-21 NOTE — PAYOR COMM NOTE
"Froy Ngo (78 y.o. Male)     PLEASE SEE ATTACHED FOR DC NOTICE    REF #  [id number]     THANK YOU  ELIER NELSON RN/ DEPT  [facility]   [phone]  [phone]        Date of Birth   1945    Social Security Number       Address   [address] [place], [place] [address]    Home Phone   [phone]    MRN   [id number]       Pentecostalism   Unknown    Marital Status   Single                            Admission Date   8/11/24    Admission Type   Emergency    Admitting Provider   Shruthi Acosta MD    Attending Provider       Department, Room/Bed   [address], N524/1       Discharge Date   8/20/2024    Discharge Disposition   Home or Self Care    Discharge Destination                                 Attending Provider: (none)   Allergies: No Known Allergies    Isolation: None   Infection: None   Code Status: Prior    Ht: 182.9 cm (72\")   Wt: 88.1 kg (194 lb 3.6 oz)    Admission Cmt: None   Principal Problem: Acute renal failure [N17.9]                   Active Insurance as of 8/11/2024       Primary Coverage       Payor Plan Insurance Group Employer/Plan Group    ANTHEM BLUE CROSS ANTHEM BLUE CROSS BLUE SHIELD PPO [id number]       Payor Plan Address Payor Plan Phone Number Payor Plan Fax Number Effective Dates    [address] [phone]  4/1/2019 - None Entered    [facility] [id number]         Subscriber Name Subscriber Birth Date Member ID       FROY NGO 1945 [id number]               Secondary Coverage       Payor Plan Insurance Group Employer/Plan Group    MEDICARE MEDICARE A ONLY        Payor Plan Address Payor Plan Phone Number Payor Plan Fax Number Effective Dates    [address] [phone]  6/1/2009 - None Entered    [facility] [id number]         Subscriber Name Subscriber Birth Date Member ID       FROY NGO 1945 [id number]                     Emergency Contacts        (Rel.) Home Phone Work " Phone Mobile Phone    VASU TURNER (Spouse) [phone] -- [phone]    vasu braswell (Daughter) [phone] -- --              [place]: [facility] [id number]  Tax ID [id number]     Discharge Summary        Shruthi Acosta MD at 24 1159              Patient Name: Froy Turner  : 1945  MRN: [id number]    Date of Admission: 2024  Date of Discharge:  2024  Primary Care Physician: Ryan Gutierrez MD      Chief Complaint:   Urinary Retention      Discharge Diagnoses     Active Hospital Problems    Diagnosis  POA    **Acute renal failure [N17.9]  Yes    Type 2 diabetes mellitus with hyperglycemia [E11.65]  Yes    Essential hypertension, benign [I10]  Yes    Dementia without behavioral disturbance [F03.90]  Yes    Obstructive uropathy [N13.9]  Yes    Bilateral hydronephrosis [N13.30]  Yes      Resolved Hospital Problems   No resolved problems to display.        Hospital Course     Mr. Turner is a 78 y.o. male with a history of type 2 diabetes, hyperlipidemia, Alzheimer's dementia, recent admission (-) for euglycemic DKA, NEHEMIAS/obstructive uropathy with bilateral hydronephrosis-discharged with in-dwelling Rodriguez who [facility] complaining of decreased urinary output.  Please see the admitting history and physical for further details.  He was found to have NEHEMIAS and was admitted to the hospital for further evaluation and treatment.  There was also concern on admission for possible UTI and he was initiated on ceftriaxone.  Urology and nephrology consulted on admission.  He was initially started on IV fluids however the patient's creatinine continued to trend upwards.  Urology recommended/took the patient to the OR on  for bilateral J-stents.  Renal function significantly improved after placement of stents.  Urine culture remained no growth however infectious disease recommended a 7-day course of antibiotics for treatment of  "complicated UTI as the patient had received antibiotics prior to urine culture being collected.  He has completed his course of antibiotics while admitted.  The patient was noted to have intermittent hematuria which cleared with irrigation.  Urology and nephrology have cleared him for discharge but he will need to follow-up with both services in the next 1 to 2 weeks.  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  Per neurosurgery, changes are likely a result of patient's atrophy and \"compensatory extra-axial space associated with that atrophy.\"  They do recommend a CT scan in 1 month for follow-up.  Patient has also been instructed to follow-up with his PCP for a posthospital follow-up visit.    Day of Discharge     Subjective:  Resting in bed, sitter/caregiver at bedside.  He feels great today and is eager for discharge to rehab.    Physical Exam:  Temp:  [97.9 °F (36.6 °C)-98.1 °F (36.7 °C)] 97.9 °F (36.6 °C)  Heart Rate:  [71-88] 71  Resp:  [18] 18  BP: (128-157)/(60-88) 131/60  Body mass index is 26.34 kg/m².  Physical Exam  Constitutional:       General: He is not in acute distress.     Appearance: He is ill-appearing (Chronically). He is not toxic-appearing.   Cardiovascular:      Rate and Rhythm: Normal rate and regular rhythm.   Pulmonary:      Effort: Pulmonary effort is normal. No respiratory distress.   Genitourinary:     Comments: Rodriguez in place  Musculoskeletal:         General: No tenderness.      Right lower leg: No edema.      Left lower leg: No edema.   Skin:     General: Skin is warm and dry.   Neurological:      General: No focal deficit present.      Mental Status: He is alert and oriented to person, place, and time. Mental status is at baseline.      Motor: No weakness.         Consultants     Consult Orders (all) (From admission, onward)       Start     Ordered    08/13/24 0755  Inpatient " Infectious Diseases Consult  Once        Specialty:  Infectious Diseases  Provider:  Edgar Rodriguez MD    08/13/24 0754    08/11/24 1631  Inpatient Neurosurgery Consult  Once        Specialty:  Neurosurgery  Provider:  Dino Henry MD    08/11/24 1644    08/11/24 1517  Inpatient Urology Consult  Once        Specialty:  Urology  Provider:  Lj Catalan MD    08/11/24 1516    08/11/24 1152  Inpatient Nephrology Consult  Once        Specialty:  Nephrology  Provider:  Gigi Engel MD    08/11/24 1151    08/11/24 1112  [facility] (on-call MD unless specified) Details  Once        Specialty:  Hospitalist  Provider:  Ritesh Farrell MD    08/11/24 1111                  Procedures     Imaging Results (All)       Procedure Component Value Units Date/Time    FL Retrograde Pyelogram In OR [[id number]] Collected: 08/13/24 1344     Updated: 08/13/24 1348    Narrative:      FL RETROGRADE PYELOGRAM IN OR-     INDICATIONS: Retrograde pyelography     TECHNIQUE: FLUOROSCOPIC ASSISTANCE IN THE OPERATING ROOM.     FINDINGS:     5 intraoperative fluoroscopic spot views were obtained and recorded the  PACS for review, revealing bilateral retrograde pyelography and  bilateral ureteral stent placement. Please see operative report for full  details.     Fluoroscopy time: 0.7 minutes     Radiation exposure: Dose area product: 493.47 uGy x m(2)          Impression:         As described.     This report was finalized on 8/13/2024 1:45 PM by Dr. German Gutierrez M.D on Workstation: [id number]       CT Abdomen Pelvis Without Contrast [[id number]] Collected: 08/11/24 1321     Updated: 08/11/24 2239    Narrative:      CT OF THE ABDOMEN AND PELVIS WITHOUT CONTRAST 08/11/2024     HISTORY: Abdominal pain. Diminished urinary output.     Spiral images were obtained from the lung bases to the symphysis pubis.  No intravenous or oral contrast was given.     Small left and very minimal right pleural effusions are seen  with some  minimal bibasilar atelectasis.     Liver appears unremarkable. There are multiple gallstones. No  gallbladder inflammatory changes are seen. Pancreas is atrophic. Spleen  and adrenals appear unremarkable.     There is mild-to-moderate bilateral hydronephrosis with mild ureteral  dilatation. This finding is also seen on the 08/04/2024 study. There is  bilateral perinephric stranding. There is bilateral periureteral  stranding as well. There is an approximately 4.1 cm low-density right  renal lesion presumably a cyst on this unenhanced scan. No intrarenal  stones or ureteral stones are seen.     There is some aortic calcification. Rodriguez catheter is seen in the  urinary bladder. There is mild urinary bladder wall thickening.  Moderately severe prostate gland enlargement is seen. Small left  inguinal hernia seen containing small amount of fluid. There is colonic  diverticulosis.       Impression:      1. Bilateral hydroureteronephrosis. There is also bilateral perinephric  and periureteral stranding. This finding is also seen on the previous  study of 08/04/2024.  2. Low-density right renal cyst.  3. Small pleural effusions with bibasilar atelectasis.  4. Cholelithiasis.  5. Rodriguez catheter is seen in the urinary bladder as well as some air.  There is urinary bladder wall thickening with moderately severe prostate  gland enlargement.  6. No ureteral stones are seen.     Radiation dose reduction techniques were utilized, including automated  exposure control and exposure modulation based on body size.        This report was finalized on 8/11/2024 10:36 PM by Dr. Rajiv Canela M.D on Workstation: [id number]       XR Chest 1 View [[id number]] Collected: 08/11/24 1221     Updated: 08/11/24 1227    Narrative:      XR CHEST 1 VW-8/11/2024     HISTORY: Possible pneumonia.     The heart size is within normal limits. Lungs appear clear. There is  some aortic calcification. Bony structures appear unremarkable.     "   Impression:      1. No acute process        This report was finalized on 8/11/2024 12:23 PM by Dr. Rajiv Canela M.D on Workstation: [id number]               Pertinent Labs     Results from last 7 days   Lab Units 08/20/24 0441 08/19/24  0350 08/18/24 0428 08/17/24  0315   WBC 10*3/mm3 6.27 6.62 6.22 6.46   HEMOGLOBIN g/dL 10.0* 10.1* 10.5* 10.1*   PLATELETS 10*3/mm3 291 327 349 344     Results from last 7 days   Lab Units 08/20/24 0441 08/19/24  0350 08/18/24 0428 08/17/24  0315   SODIUM mmol/L 136 138 140 138   POTASSIUM mmol/L 3.7 3.9 3.8 3.9   CHLORIDE mmol/L 103 104 103 103   CO2 mmol/L 25.6 25.3 28.0 28.7   BUN mg/dL 26* 25* 28* 34*   CREATININE mg/dL 1.79* 1.82* 1.92* 2.17*   GLUCOSE mg/dL 121* 122* 114* 131*   Estimated Creatinine Clearance: 41 mL/min (A) (by C-G formula based on SCr of 1.79 mg/dL (H)).  Results from last 7 days   Lab Units 08/20/24 0441 08/19/24  0350 08/18/24 0428 08/17/24  0315   ALBUMIN g/dL 2.7* 2.7* 2.8* 2.4*     Results from last 7 days   Lab Units 08/20/24 0441 08/19/24  0350 08/18/24 0428 08/17/24  0315   CALCIUM mg/dL 8.3* 8.2* 8.3* 7.9*   ALBUMIN g/dL 2.7* 2.7* 2.8* 2.4*   MAGNESIUM mg/dL 1.5* 1.6 1.6 1.6   PHOSPHORUS mg/dL 3.5 3.3 2.9 3.2               Invalid input(s): \"LDLCALC\"        Test Results Pending at Discharge       Discharge Details        Discharge Medications        New Medications        Instructions Start Date   insulin lispro 100 UNIT/ML injection  Commonly known as: HUMALOG/ADMELOG   2-7 Units, Subcutaneous, 4 Times Daily Before Meals & Nightly             Continue These Medications        Instructions Start Date   amLODIPine 5 MG tablet  Commonly known as: NORVASC   5 mg, Oral, Every 24 Hours Scheduled      aspirin 81 MG EC tablet   81 mg, Oral, Daily      atorvastatin 10 MG tablet  Commonly known as: LIPITOR   10 mg, Oral, Daily      donepezil 10 MG tablet  Commonly known as: ARICEPT   10 mg, Oral, Nightly      hydrALAZINE 25 MG " tablet  Commonly known as: APRESOLINE   25 mg, Oral, Every 8 Hours Scheduled      memantine 10 MG tablet  Commonly known as: NAMENDA   10 mg, Oral, Daily      tamsulosin 0.4 MG capsule 24 hr capsule  Commonly known as: FLOMAX   0.4 mg, Oral, Daily             Stop These Medications      metFORMIN 1000 MG tablet  Commonly known as: GLUCOPHAGE     Semaglutide(0.25 or 0.5MG/DOS) 2 MG/1.5ML solution pen-injector  Commonly known as: OZEMPIC     silodosin 4 MG capsule capsule  Commonly known as: RAPAFLO              No Known Allergies      Discharge Disposition:  Home or Self Care    Discharge Diet:  Diet Order   Procedures    Diet: Regular/House; Fluid Consistency: Thin (IDDSI 0)       Discharge Activity:   Activity Instructions       Activity as Tolerated              CODE STATUS:    Code Status and Medical Interventions: CPR (Attempt to Resuscitate); Full Support   Ordered at: 08/11/24 1627     Code Status (Patient has no pulse and is not breathing):    CPR (Attempt to Resuscitate)     Medical Interventions (Patient has pulse or is breathing):    Full Support       Future Appointments   Date Time Provider Department Center   9/13/2024  1:45 PM [facility] CT 3  [facility]   10/11/2024  8:30 AM Dino Henry MD [facility]     Additional Instructions for the Follow-ups that You Need to Schedule       Discharge Follow-up with PCP   As directed       Currently Documented PCP:    Ryan Gutierrez MD    PCP Phone Number:    [phone]     Follow Up Details: post-hospital follow up        Discharge Follow-up with Specified Provider: 1-2 week follow up with nephrology; 1-2 week follow up with urology.   As directed      To: 1-2 week follow up with nephrology; 1-2 week follow up with urology.               Contact information for follow-up providers       Ryan Gutierrez MD .    Specialty: Internal Medicine  Why: post-hospital follow up  Contact information:  Critical access hospital[address] [facility]  [address]  [phone]                       Contact information for after-discharge care       Destination       [facility] .    Service: Skilled Nursing  Contact information:  [address] [address] [phone]                                   Additional Instructions for the Follow-ups that You Need to Schedule       Discharge Follow-up with PCP   As directed       Currently Documented PCP:    Ryan Gutierrez MD    PCP Phone Number:    [phone]     Follow Up Details: post-hospital follow up        Discharge Follow-up with Specified Provider: 1-2 week follow up with nephrology; 1-2 week follow up with urology.   As directed      To: 1-2 week follow up with nephrology; 1-2 week follow up with urology.            Time Spent on Discharge:  I spent greater than 30 minutes on this discharge activity which included: face-to-face encounter with the patient, reviewing the data in the system, coordination of the care with the nursing staff as well as consultants, documentation, and entering orders.       Shruthi Acosta MD  [facility]  08/20/24  12:00 EDT                Electronically signed by Shruthi Acosta MD at 08/20/24 1218       Discharge Order (From admission, onward)       Start     Ordered    08/20/24 1156  Discharge patient  Once        Expected Discharge Date: 08/20/24   Discharge Disposition: Home or Self Care   Physician of Record for Attribution - Please select from Treatment Team: SHRUTHI ACOSTA [[id number]]   Review needed by CMO to determine Physician of Record: No      Question Answer Comment   Physician of Record for Attribution - Please select from Treatment Team SHRUTHI ACOSTA    Review needed by CMO to determine Physician of Record No        08/20/24 1159

## 2024-08-30 ENCOUNTER — DOCUMENTATION (OUTPATIENT)
Dept: HOME HEALTH SERVICES | Facility: HOME HEALTHCARE | Age: 79
End: 2024-08-30
Payer: MEDICARE

## 2024-08-30 ENCOUNTER — TRANSCRIBE ORDERS (OUTPATIENT)
Dept: HOME HEALTH SERVICES | Facility: HOME HEALTHCARE | Age: 79
End: 2024-08-30
Payer: MEDICARE

## 2024-08-30 ENCOUNTER — HOME HEALTH ADMISSION (OUTPATIENT)
Dept: HOME HEALTH SERVICES | Facility: HOME HEALTHCARE | Age: 79
End: 2024-08-30
Payer: MEDICARE

## 2024-08-30 DIAGNOSIS — N13.9 ACUTE UNILATERAL OBSTRUCTIVE UROPATHY: Primary | ICD-10-CM

## 2024-08-30 NOTE — PROGRESS NOTES
Received referral for home health from [facility].  Met with the patient and his wife Lo on 8/30 and coordinated the patient's home health.  Confirmed with Ivone on 8/30 that Dr. Ryan Gutierrez will follow the patient for home health.

## 2024-09-01 ENCOUNTER — HOME CARE VISIT (OUTPATIENT)
Dept: HOME HEALTH SERVICES | Facility: HOME HEALTHCARE | Age: 79
End: 2024-09-01
Payer: MEDICARE

## 2024-09-01 PROCEDURE — G0299 HHS/HOSPICE OF RN EA 15 MIN: HCPCS

## 2024-09-02 NOTE — HOME HEALTH
SOC Note: Patient is A&Ox4. Does have a history of alzheimer's dementia. Vitals assessed to be WNL. Lungs are diminished in the right base. No edema seen. bowels are moving well. Catheter in place attached to bedside drainage bag. Reporting to nurse that it is uncomfortable to have leg bag strapped to thigh. I have ordered catheter supplies through [facility] including extension tubing. I instructed on how to change to leg bag and when to empty leg bag. I have also encourage him to wear a velcro leg strap to keep catheter in place. He has some tenderness and trauma from catheter. Noted to have tea colored urine with 1 blood clot in line. States MD is aware of color of urine. Reports having plenty of output of urine. Caregiver has informed nurse that there is a chance that urination will not return to normal and there will be some sort of intervention needed for urination in the future.   Appears BUN and Creatinine increasing   8/20 - BUN 26; CREATININE 1.79   8/30 - BUN 37; CREATININE 2.29   labs (CBC, Creatinine, RFP) to be drawn again on 9/3 with results to nephrology - Dr. Aragon     Needs 2 wk f/u with PCP   nephro on Thursday   9/10 urology [facility]   CT on 9/13     Home Health ordered for: disciplines PT, OT, SN 3W1     Plans to return to work soon and asking about getting set up with outpatient therapy.     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 and a hagen was placed   He was discharged to [facility] home   Returned to ED on 8/11 after catheter was flushed and was reported to have no blood clots and no urine output seen in ED   Patient hospitalized from 8/11-8/20 for obstructive uropathy, NEHEMIAS, HTN, DM2, dementia, bilat hydronephrosis with stents placed on 8/13   Renal functions improved after  "stents placed   Treated with 7 day course of abt for a complicated UTI   Intermittent hematuria which cleared with irrigation   Patient went back to [place] rehab     Focus of Care: SN needed for teaching on how to properly care for catheter and educate on disease process of obstructive uropathy     Patient's goal(s): \"to get to outpatient therapy\"     Current Functional status/mobility/DME: Not using any assistive device. Has a walker but reports that this is not needed.     HB status/Living Arrangements: Lives at home with spouse. Has a caregiver with him at all times. Plans to return to work soon and asking about getting set up with outpatient therapy.     Skin Integrity/wound status: NA     Code Status: CPR     Fall Risk/Safety concerns: moderate falls risk     Educated on Emergency Plan, steps to take prior to going to the ER and when to Call [facility].     Medication issues/Concerns: His caregiver has also informed the nurse that the medications of amlodipine, hydralazine, insulin were stopped at rehab and flomax was changed to proscar     Additional Problems/Concerns: NA     SDOH Barriers (i.e. caregiver concerns, social isolation, transportation, food insecurity, environment, income etc.)/Need for MSW: NA"

## 2024-09-03 ENCOUNTER — LAB REQUISITION (OUTPATIENT)
Dept: LAB | Facility: HOSPITAL | Age: 79
End: 2024-09-03
Payer: COMMERCIAL

## 2024-09-03 ENCOUNTER — HOME CARE VISIT (OUTPATIENT)
Dept: HOME HEALTH SERVICES | Facility: HOME HEALTHCARE | Age: 79
End: 2024-09-03
Payer: MEDICARE

## 2024-09-03 VITALS
TEMPERATURE: 97.1 F | DIASTOLIC BLOOD PRESSURE: 62 MMHG | RESPIRATION RATE: 18 BRPM | BODY MASS INDEX: 26.34 KG/M2 | HEIGHT: 72 IN | HEART RATE: 59 BPM | SYSTOLIC BLOOD PRESSURE: 142 MMHG | OXYGEN SATURATION: 97 %

## 2024-09-03 DIAGNOSIS — N13.9 OBSTRUCTIVE AND REFLUX UROPATHY, UNSPECIFIED: ICD-10-CM

## 2024-09-03 LAB
ALBUMIN SERPL-MCNC: 4 G/DL (ref 3.5–5.2)
ANION GAP SERPL CALCULATED.3IONS-SCNC: 10.1 MMOL/L (ref 5–15)
BUN SERPL-MCNC: 38 MG/DL (ref 8–23)
BUN/CREAT SERPL: 18.7 (ref 7–25)
CALCIUM SPEC-SCNC: 9.1 MG/DL (ref 8.6–10.5)
CHLORIDE SERPL-SCNC: 107 MMOL/L (ref 98–107)
CO2 SERPL-SCNC: 20.9 MMOL/L (ref 22–29)
CREAT SERPL-MCNC: 2.03 MG/DL (ref 0.76–1.27)
CREAT SERPL-MCNC: 2.03 MG/DL (ref 0.76–1.27)
DEPRECATED RDW RBC AUTO: 41.7 FL (ref 37–54)
EGFRCR SERPLBLD CKD-EPI 2021: 32.5 ML/MIN/1.73
EGFRCR SERPLBLD CKD-EPI 2021: 32.5 ML/MIN/1.73
ERYTHROCYTE [DISTWIDTH] IN BLOOD BY AUTOMATED COUNT: 12.8 % (ref 12.3–15.4)
GLUCOSE SERPL-MCNC: 126 MG/DL (ref 65–99)
HCT VFR BLD AUTO: 29.9 % (ref 37.5–51)
HGB BLD-MCNC: 10.2 G/DL (ref 13–17.7)
MCH RBC QN AUTO: 30.7 PG (ref 26.6–33)
MCHC RBC AUTO-ENTMCNC: 34.1 G/DL (ref 31.5–35.7)
MCV RBC AUTO: 90.1 FL (ref 79–97)
PHOSPHATE SERPL-MCNC: 3.8 MG/DL (ref 2.5–4.5)
PLATELET # BLD AUTO: 303 10*3/MM3 (ref 140–450)
PMV BLD AUTO: 9.7 FL (ref 6–12)
POTASSIUM SERPL-SCNC: 4.6 MMOL/L (ref 3.5–5.2)
RBC # BLD AUTO: 3.32 10*6/MM3 (ref 4.14–5.8)
SODIUM SERPL-SCNC: 138 MMOL/L (ref 136–145)
WBC NRBC COR # BLD AUTO: 11.28 10*3/MM3 (ref 3.4–10.8)

## 2024-09-03 PROCEDURE — G0299 HHS/HOSPICE OF RN EA 15 MIN: HCPCS

## 2024-09-03 PROCEDURE — G0151 HHCP-SERV OF PT,EA 15 MIN: HCPCS

## 2024-09-03 PROCEDURE — 82565 ASSAY OF CREATININE: CPT | Performed by: HOSPITALIST

## 2024-09-03 PROCEDURE — 80069 RENAL FUNCTION PANEL: CPT | Performed by: HOSPITALIST

## 2024-09-03 PROCEDURE — 85027 COMPLETE CBC AUTOMATED: CPT | Performed by: HOSPITALIST

## 2024-09-04 VITALS
HEART RATE: 80 BPM | OXYGEN SATURATION: 98 % | DIASTOLIC BLOOD PRESSURE: 70 MMHG | RESPIRATION RATE: 16 BRPM | SYSTOLIC BLOOD PRESSURE: 146 MMHG

## 2024-09-04 VITALS
HEART RATE: 84 BPM | DIASTOLIC BLOOD PRESSURE: 68 MMHG | TEMPERATURE: 98.3 F | OXYGEN SATURATION: 98 % | SYSTOLIC BLOOD PRESSURE: 150 MMHG

## 2024-09-04 NOTE — HOME HEALTH
Patient had complicated UTI and was at the hospital from 8/11 to 8/20 with a urinary catheter.  He went to [place] rehab for over a week and then home.  He presents today with a paid caregiver present, catheter with dark, bloody urine present.  The admitting nurse also noted this and evidently his doctors are aware (our nurse is coming out today to assess and draw blood).  He lives with his wife in a large home, bedroom on the second floor.  He states PLOF is complete independence, still working in a law firm and I believe still driving.  Chart has listed Alzhiemer's dementia.    Assessment:  From a PT standpoint, he was able to get up and walk around his home (including the stairs) with no AD and no LOB noted.  He had good ROM and tested strength.  He was able to hold himself steady with perturbations today.  His caregiver reports walking with him outside in the neighborhood around 1 mile daily.  No further need for home PT.   I was going to cancel OT but the caregiver reports he is having a hard time managing the catheter with LE dressing.  He is keeping the catheter tube above his underwear.  This may not be optial positioning of the catheter line and skilled nursing was advised so she could further assess if needed.

## 2024-09-06 ENCOUNTER — HOME CARE VISIT (OUTPATIENT)
Dept: HOME HEALTH SERVICES | Facility: HOME HEALTHCARE | Age: 79
End: 2024-09-06
Payer: MEDICARE

## 2024-09-06 VITALS
OXYGEN SATURATION: 100 % | RESPIRATION RATE: 18 BRPM | DIASTOLIC BLOOD PRESSURE: 60 MMHG | SYSTOLIC BLOOD PRESSURE: 120 MMHG | HEART RATE: 72 BPM | TEMPERATURE: 97.1 F

## 2024-09-06 VITALS
SYSTOLIC BLOOD PRESSURE: 118 MMHG | HEART RATE: 70 BPM | RESPIRATION RATE: 17 BRPM | TEMPERATURE: 97.5 F | OXYGEN SATURATION: 99 % | DIASTOLIC BLOOD PRESSURE: 58 MMHG

## 2024-09-06 PROCEDURE — G0493 RN CARE EA 15 MIN HH/HOSPICE: HCPCS

## 2024-09-06 PROCEDURE — G0152 HHCP-SERV OF OT,EA 15 MIN: HCPCS

## 2024-09-06 NOTE — Clinical Note
OT Eval only. No skill for OT to address. CG assisting pt in areas of need and pt has improved in his ability to manage catheter in dressing.

## 2024-09-07 NOTE — HOME HEALTH
CURRENT SITUATION: Pt is an 78-year-old male with a past medical history of BPH, diabetes mellitus, type II , hyperlipidemia, and Alzheimer's disease.   He presented to the emergency department on 8/4/2024 with complaints of generalized weakness and inability to ambulate. He was admitted to  on 9/01/24 after hospitalization and ASHLEE at [place] for obstructive uropathy, bilat hydronephrosis with stents placed. Patient now w/a catheter in place.   SOCIAL & ENVIRONMENTAL SITUATION: Pt lives w/spouse and has CG assistance via private pay person. Home is spacious and he is able to access all areas of it w/o having any mobility issues at this time.   INTERVENTIONS: OT Eval only w/ADL training, home safety check, and monitor vitals including SPO2 via pulse-oximeter.   ASSESSMENT: Pt is not in need of skilled OT at this time. This is an OT eval only.

## 2024-09-10 ENCOUNTER — HOME CARE VISIT (OUTPATIENT)
Dept: HOME HEALTH SERVICES | Facility: HOME HEALTHCARE | Age: 79
End: 2024-09-10
Payer: MEDICARE

## 2024-09-10 VITALS
SYSTOLIC BLOOD PRESSURE: 130 MMHG | OXYGEN SATURATION: 100 % | HEART RATE: 71 BPM | RESPIRATION RATE: 18 BRPM | DIASTOLIC BLOOD PRESSURE: 70 MMHG

## 2024-09-10 PROCEDURE — G0493 RN CARE EA 15 MIN HH/HOSPICE: HCPCS

## 2024-09-13 ENCOUNTER — HOME CARE VISIT (OUTPATIENT)
Dept: HOME HEALTH SERVICES | Facility: HOME HEALTHCARE | Age: 79
End: 2024-09-13
Payer: COMMERCIAL

## 2024-09-13 ENCOUNTER — HOSPITAL ENCOUNTER (OUTPATIENT)
Dept: CT IMAGING | Facility: HOSPITAL | Age: 79
Discharge: HOME | End: 2024-09-13
Admitting: NEUROLOGICAL SURGERY
Payer: COMMERCIAL

## 2024-09-13 VITALS
DIASTOLIC BLOOD PRESSURE: 64 MMHG | HEART RATE: 69 BPM | RESPIRATION RATE: 16 BRPM | SYSTOLIC BLOOD PRESSURE: 120 MMHG | OXYGEN SATURATION: 99 %

## 2024-09-13 DIAGNOSIS — R41.3 MEMORY DYSFUNCTION: ICD-10-CM

## 2024-09-13 PROCEDURE — 70450 CT HEAD/BRAIN W/O DYE: CPT

## 2024-09-13 PROCEDURE — G0493 RN CARE EA 15 MIN HH/HOSPICE: HCPCS

## 2024-09-18 ENCOUNTER — HOME CARE VISIT (OUTPATIENT)
Dept: HOME HEALTH SERVICES | Facility: HOME HEALTHCARE | Age: 79
End: 2024-09-18
Payer: MEDICARE

## 2024-09-18 VITALS
DIASTOLIC BLOOD PRESSURE: 74 MMHG | SYSTOLIC BLOOD PRESSURE: 140 MMHG | HEART RATE: 79 BPM | OXYGEN SATURATION: 100 % | RESPIRATION RATE: 18 BRPM

## 2024-09-18 PROCEDURE — G0493 RN CARE EA 15 MIN HH/HOSPICE: HCPCS

## 2024-09-25 ENCOUNTER — HOME CARE VISIT (OUTPATIENT)
Dept: HOME HEALTH SERVICES | Facility: HOME HEALTHCARE | Age: 79
End: 2024-09-25
Payer: MEDICARE

## 2024-09-25 VITALS
HEART RATE: 70 BPM | RESPIRATION RATE: 18 BRPM | SYSTOLIC BLOOD PRESSURE: 112 MMHG | DIASTOLIC BLOOD PRESSURE: 80 MMHG | OXYGEN SATURATION: 100 %

## 2024-09-25 PROCEDURE — G0493 RN CARE EA 15 MIN HH/HOSPICE: HCPCS

## 2024-09-26 NOTE — PROGRESS NOTES
"Subjective   History of Present Illness: Froy Ngo is a 78 y.o. male is here today for follow-up with a new head CT after being seen in the [facility].    Today, Mr. Ngo reports no headaches, nausea or vomiting.  He is going to get neuropsych testing in January which he thinks to the [facility].  He said no issues once he recovered from his acute hospitalization and bladder issues.    History of Present Illness    Tobacco Use: Low Risk  (10/11/2024)    Patient History     Smoking Tobacco Use: Never     Smokeless Tobacco Use: Never     Passive Exposure: Never        The following portions of the patient's history were reviewed and updated as appropriate: allergies, current medications, past family history, past medical history, past social history, past surgical history and problem list.    Review of Systems    Objective     Vitals:    10/11/24 0824   BP: 130/70   Cuff Size: Adult   Pulse: 71   Temp: 96.6 °F (35.9 °C)   SpO2: 98%   Weight: 88 kg (194 lb)   Height: 182.9 cm (72\")     Body mass index is 26.31 kg/m².      Physical Exam  Neurological Exam    Physical Exam:    CONSTITUTIONAL:  appears well developed, well-nourished and in no acute distress.    NECK: the neck is supple and symmetric. The trachea is midline with no masses.      PULMONARY: Respiratory effort is normal with no increased work of breathing or signs of respiratory distress.    CARDIOVASCULAR: Pedal pulses are +2/4 bilaterally. Examination of the extremities shows no edema or varicosities.    MUSCULOSKELETAL: Gait normal    SKIN: The skin is warm, dry and intact.      NEUROLOGIC:   Normal motor strength noted without ulnar drift. Muscle bulk and tone are normal.  Sensory exam is normal to fine touch  Cortical function is intact and without deficits. Speech is normal.    PSYCHIATRIC: oriented to person, place and time. Patient's mood and affect are normal.    Assessment & Plan   Independent Review of Radiographic Studies: "      I personally reviewed the images from the following studies.    CT head 9/13/2024 at [facility] reveals no change when compared to prior CT.  The concern of questionable 4 to 5 mm maximum thickness subdural hygromas seen on the MRI do not reveal any expansion or threat on the current CT as the current CT does not suggest the presence of subdural hygromas.    Medical Decision Making:      The CT scan shows no significant development of a subdural hygroma since his recent hospitalization.  I wanted to check a CT scan on a delayed basis just to make sure there was not a subacute to acute process that would develop into subdural hygromas.  The MRI official interpretation was some thickening of the dura that could suggest either prior injury or old hemorrhage but certainly there is no active process.  The MRI also suggested that he might have amyloid angiopathy and the family was concerned about some memory issues that are aggravated when he gets a medical illness.  He is going to pursue that neuropsych testing at the first of the year.    Since there is no role for any neurosurgical intervention we remain available if questions should arise.    Return for any new or recurrent neurosurgical problems.    Diagnoses and all orders for this visit:    1. Abnormal MRI of head (Primary)             Dino Henry MD FACS FAANS  Neurological Surgery

## 2024-10-03 ENCOUNTER — HOME CARE VISIT (OUTPATIENT)
Dept: HOME HEALTH SERVICES | Facility: HOME HEALTHCARE | Age: 79
End: 2024-10-03
Payer: MEDICARE

## 2024-10-03 VITALS
SYSTOLIC BLOOD PRESSURE: 142 MMHG | RESPIRATION RATE: 18 BRPM | TEMPERATURE: 98 F | HEART RATE: 76 BPM | DIASTOLIC BLOOD PRESSURE: 80 MMHG | OXYGEN SATURATION: 98 %

## 2024-10-03 PROCEDURE — G0493 RN CARE EA 15 MIN HH/HOSPICE: HCPCS

## 2024-10-11 ENCOUNTER — OFFICE VISIT (OUTPATIENT)
Dept: NEUROSURGERY | Facility: CLINIC | Age: 79
End: 2024-10-11
Payer: COMMERCIAL

## 2024-10-11 ENCOUNTER — HOME CARE VISIT (OUTPATIENT)
Dept: HOME HEALTH SERVICES | Facility: HOME HEALTHCARE | Age: 79
End: 2024-10-11
Payer: MEDICARE

## 2024-10-11 VITALS
HEIGHT: 72 IN | HEART RATE: 71 BPM | DIASTOLIC BLOOD PRESSURE: 70 MMHG | WEIGHT: 194 LBS | BODY MASS INDEX: 26.28 KG/M2 | OXYGEN SATURATION: 98 % | SYSTOLIC BLOOD PRESSURE: 130 MMHG | TEMPERATURE: 96.6 F

## 2024-10-11 VITALS
DIASTOLIC BLOOD PRESSURE: 70 MMHG | RESPIRATION RATE: 18 BRPM | OXYGEN SATURATION: 98 % | HEART RATE: 57 BPM | SYSTOLIC BLOOD PRESSURE: 140 MMHG | TEMPERATURE: 96.6 F

## 2024-10-11 DIAGNOSIS — R93.0 ABNORMAL MRI OF HEAD: Primary | ICD-10-CM

## 2024-10-11 PROCEDURE — G0493 RN CARE EA 15 MIN HH/HOSPICE: HCPCS

## 2024-10-16 ENCOUNTER — HOME CARE VISIT (OUTPATIENT)
Dept: HOME HEALTH SERVICES | Facility: HOME HEALTHCARE | Age: 79
End: 2024-10-16
Payer: COMMERCIAL

## 2024-10-17 ENCOUNTER — HOME CARE VISIT (OUTPATIENT)
Dept: HOME HEALTH SERVICES | Facility: HOME HEALTHCARE | Age: 79
End: 2024-10-17
Payer: COMMERCIAL

## 2024-10-23 ENCOUNTER — HOME CARE VISIT (OUTPATIENT)
Dept: HOME HEALTH SERVICES | Facility: HOME HEALTHCARE | Age: 79
End: 2024-10-23
Payer: COMMERCIAL

## 2024-10-23 VITALS
RESPIRATION RATE: 18 BRPM | HEART RATE: 81 BPM | TEMPERATURE: 97.2 F | DIASTOLIC BLOOD PRESSURE: 80 MMHG | OXYGEN SATURATION: 99 % | SYSTOLIC BLOOD PRESSURE: 144 MMHG

## 2024-10-23 PROCEDURE — G0493 RN CARE EA 15 MIN HH/HOSPICE: HCPCS

## 2025-02-04 ENCOUNTER — APPOINTMENT (OUTPATIENT)
Dept: CT IMAGING | Facility: HOSPITAL | Age: 80
End: 2025-02-04
Payer: COMMERCIAL

## 2025-02-04 ENCOUNTER — HOSPITAL ENCOUNTER (INPATIENT)
Facility: HOSPITAL | Age: 80
LOS: 9 days | Discharge: HOME | End: 2025-02-13
Attending: EMERGENCY MEDICINE | Admitting: INTERNAL MEDICINE
Payer: COMMERCIAL

## 2025-02-04 DIAGNOSIS — K85.90 ACUTE PANCREATITIS, UNSPECIFIED COMPLICATION STATUS, UNSPECIFIED PANCREATITIS TYPE: ICD-10-CM

## 2025-02-04 DIAGNOSIS — K85.10 GALLSTONE PANCREATITIS: ICD-10-CM

## 2025-02-04 DIAGNOSIS — R65.20 SEVERE SEPSIS: ICD-10-CM

## 2025-02-04 DIAGNOSIS — Z78.9 DECREASED ACTIVITIES OF DAILY LIVING (ADL): ICD-10-CM

## 2025-02-04 DIAGNOSIS — N39.0 ACUTE UTI: ICD-10-CM

## 2025-02-04 DIAGNOSIS — N17.9 ACUTE RENAL FAILURE, UNSPECIFIED ACUTE RENAL FAILURE TYPE: Primary | ICD-10-CM

## 2025-02-04 DIAGNOSIS — A41.9 SEVERE SEPSIS: ICD-10-CM

## 2025-02-04 LAB
ALBUMIN SERPL-MCNC: 3.5 G/DL (ref 3.5–5.2)
ALBUMIN/GLOB SERPL: 0.8 G/DL
ALP SERPL-CCNC: 219 U/L (ref 39–117)
ALT SERPL W P-5'-P-CCNC: 78 U/L (ref 1–41)
ANION GAP SERPL CALCULATED.3IONS-SCNC: 16.8 MMOL/L (ref 5–15)
AST SERPL-CCNC: 63 U/L (ref 1–40)
B-OH-BUTYR SERPL-SCNC: 0.21 MMOL/L (ref 0.02–0.27)
BACTERIA UR QL AUTO: ABNORMAL /HPF
BASOPHILS # BLD AUTO: 0.01 10*3/MM3 (ref 0–0.2)
BASOPHILS NFR BLD AUTO: 0.1 % (ref 0–1.5)
BILIRUB SERPL-MCNC: 0.7 MG/DL (ref 0–1.2)
BILIRUB UR QL STRIP: NEGATIVE
BUN SERPL-MCNC: 70 MG/DL (ref 8–23)
BUN/CREAT SERPL: 13.6 (ref 7–25)
CALCIUM SPEC-SCNC: 9.3 MG/DL (ref 8.6–10.5)
CHLORIDE SERPL-SCNC: 93 MMOL/L (ref 98–107)
CLARITY UR: ABNORMAL
CO2 SERPL-SCNC: 13.2 MMOL/L (ref 22–29)
COLOR UR: YELLOW
CREAT SERPL-MCNC: 5.16 MG/DL (ref 0.76–1.27)
CREAT UR-MCNC: 109.4 MG/DL
D-LACTATE SERPL-SCNC: 1.8 MMOL/L (ref 0.5–2)
DEPRECATED RDW RBC AUTO: 44 FL (ref 37–54)
EGFRCR SERPLBLD CKD-EPI 2021: 10.6 ML/MIN/1.73
EOSINOPHIL # BLD AUTO: 0.14 10*3/MM3 (ref 0–0.4)
EOSINOPHIL NFR BLD AUTO: 0.9 % (ref 0.3–6.2)
ERYTHROCYTE [DISTWIDTH] IN BLOOD BY AUTOMATED COUNT: 14.6 % (ref 12.3–15.4)
GLOBULIN UR ELPH-MCNC: 4.5 GM/DL
GLUCOSE BLDC GLUCOMTR-MCNC: 225 MG/DL (ref 70–130)
GLUCOSE BLDC GLUCOMTR-MCNC: 248 MG/DL (ref 70–130)
GLUCOSE SERPL-MCNC: 277 MG/DL (ref 65–99)
GLUCOSE UR STRIP-MCNC: NEGATIVE MG/DL
HCT VFR BLD AUTO: 32.7 % (ref 37.5–51)
HGB BLD-MCNC: 11 G/DL (ref 13–17.7)
HGB UR QL STRIP.AUTO: ABNORMAL
HOLD SPECIMEN: NORMAL
HOLD SPECIMEN: NORMAL
HYALINE CASTS UR QL AUTO: ABNORMAL /LPF
IMM GRANULOCYTES # BLD AUTO: 0.08 10*3/MM3 (ref 0–0.05)
IMM GRANULOCYTES NFR BLD AUTO: 0.5 % (ref 0–0.5)
KETONES UR QL STRIP: NEGATIVE
LEUKOCYTE ESTERASE UR QL STRIP.AUTO: ABNORMAL
LIPASE SERPL-CCNC: 2610 U/L (ref 13–60)
LYMPHOCYTES # BLD AUTO: 0.41 10*3/MM3 (ref 0.7–3.1)
LYMPHOCYTES NFR BLD AUTO: 2.6 % (ref 19.6–45.3)
MAGNESIUM SERPL-MCNC: 2 MG/DL (ref 1.6–2.4)
MCH RBC QN AUTO: 28.3 PG (ref 26.6–33)
MCHC RBC AUTO-ENTMCNC: 33.6 G/DL (ref 31.5–35.7)
MCV RBC AUTO: 84.1 FL (ref 79–97)
MONOCYTES # BLD AUTO: 0.79 10*3/MM3 (ref 0.1–0.9)
MONOCYTES NFR BLD AUTO: 5.1 % (ref 5–12)
NEUTROPHILS NFR BLD AUTO: 14.05 10*3/MM3 (ref 1.7–7)
NEUTROPHILS NFR BLD AUTO: 90.8 % (ref 42.7–76)
NITRITE UR QL STRIP: POSITIVE
NRBC BLD AUTO-RTO: 0 /100 WBC (ref 0–0.2)
OSMOLALITY SERPL: 299 MOSM/KG (ref 280–301)
OSMOLALITY UR: 359 MOSM/KG
PH UR STRIP.AUTO: 6.5 [PH] (ref 5–8)
PHOSPHATE SERPL-MCNC: 3.8 MG/DL (ref 2.5–4.5)
PLATELET # BLD AUTO: 262 10*3/MM3 (ref 140–450)
PMV BLD AUTO: 9.7 FL (ref 6–12)
POTASSIUM SERPL-SCNC: 5.1 MMOL/L (ref 3.5–5.2)
PROT ?TM UR-MCNC: 113.5 MG/DL
PROT SERPL-MCNC: 8 G/DL (ref 6–8.5)
PROT UR QL STRIP: ABNORMAL
PROT/CREAT UR: 1037.5 MG/G CREA (ref 0–200)
RBC # BLD AUTO: 3.89 10*6/MM3 (ref 4.14–5.8)
RBC # UR STRIP: ABNORMAL /HPF
REF LAB TEST METHOD: ABNORMAL
SODIUM SERPL-SCNC: 123 MMOL/L (ref 136–145)
SODIUM UR-SCNC: 26 MMOL/L
SP GR UR STRIP: 1.02 (ref 1–1.03)
SQUAMOUS #/AREA URNS HPF: ABNORMAL /HPF
UROBILINOGEN UR QL STRIP: ABNORMAL
WBC # UR STRIP: ABNORMAL /HPF
WBC NRBC COR # BLD AUTO: 15.48 10*3/MM3 (ref 3.4–10.8)
WHOLE BLOOD HOLD COAG: NORMAL
WHOLE BLOOD HOLD SPECIMEN: NORMAL

## 2025-02-04 PROCEDURE — 87040 BLOOD CULTURE FOR BACTERIA: CPT | Performed by: EMERGENCY MEDICINE

## 2025-02-04 PROCEDURE — 83935 ASSAY OF URINE OSMOLALITY: CPT

## 2025-02-04 PROCEDURE — 83690 ASSAY OF LIPASE: CPT | Performed by: EMERGENCY MEDICINE

## 2025-02-04 PROCEDURE — 36415 COLL VENOUS BLD VENIPUNCTURE: CPT

## 2025-02-04 PROCEDURE — 82948 REAGENT STRIP/BLOOD GLUCOSE: CPT

## 2025-02-04 PROCEDURE — 25010000002 HEPARIN (PORCINE) PER 1000 UNITS: Performed by: INTERNAL MEDICINE

## 2025-02-04 PROCEDURE — 85025 COMPLETE CBC W/AUTO DIFF WBC: CPT | Performed by: EMERGENCY MEDICINE

## 2025-02-04 PROCEDURE — 84100 ASSAY OF PHOSPHORUS: CPT | Performed by: EMERGENCY MEDICINE

## 2025-02-04 PROCEDURE — 83735 ASSAY OF MAGNESIUM: CPT | Performed by: EMERGENCY MEDICINE

## 2025-02-04 PROCEDURE — 25810000003 SODIUM CHLORIDE 0.9 % SOLUTION: Performed by: EMERGENCY MEDICINE

## 2025-02-04 PROCEDURE — 25010000003 DEXTROSE 5 % SOLUTION: Performed by: INTERNAL MEDICINE

## 2025-02-04 PROCEDURE — 87086 URINE CULTURE/COLONY COUNT: CPT | Performed by: EMERGENCY MEDICINE

## 2025-02-04 PROCEDURE — 82570 ASSAY OF URINE CREATININE: CPT

## 2025-02-04 PROCEDURE — 80053 COMPREHEN METABOLIC PANEL: CPT | Performed by: EMERGENCY MEDICINE

## 2025-02-04 PROCEDURE — 84156 ASSAY OF PROTEIN URINE: CPT

## 2025-02-04 PROCEDURE — 81001 URINALYSIS AUTO W/SCOPE: CPT | Performed by: EMERGENCY MEDICINE

## 2025-02-04 PROCEDURE — 99285 EMERGENCY DEPT VISIT HI MDM: CPT

## 2025-02-04 PROCEDURE — 74176 CT ABD & PELVIS W/O CONTRAST: CPT

## 2025-02-04 PROCEDURE — 82010 KETONE BODYS QUAN: CPT

## 2025-02-04 PROCEDURE — 83930 ASSAY OF BLOOD OSMOLALITY: CPT

## 2025-02-04 PROCEDURE — 25010000002 CEFTRIAXONE PER 250 MG: Performed by: EMERGENCY MEDICINE

## 2025-02-04 PROCEDURE — 84300 ASSAY OF URINE SODIUM: CPT

## 2025-02-04 PROCEDURE — 83605 ASSAY OF LACTIC ACID: CPT | Performed by: EMERGENCY MEDICINE

## 2025-02-04 RX ORDER — MEMANTINE HYDROCHLORIDE 10 MG/1
10 TABLET ORAL 2 TIMES DAILY
Status: CANCELLED | OUTPATIENT
Start: 2025-02-04

## 2025-02-04 RX ORDER — ATORVASTATIN CALCIUM 20 MG/1
10 TABLET, FILM COATED ORAL DAILY
Status: DISCONTINUED | OUTPATIENT
Start: 2025-02-05 | End: 2025-02-13 | Stop reason: HOSPADM

## 2025-02-04 RX ORDER — HEPARIN SODIUM 5000 [USP'U]/ML
5000 INJECTION, SOLUTION INTRAVENOUS; SUBCUTANEOUS EVERY 12 HOURS SCHEDULED
Status: DISCONTINUED | OUTPATIENT
Start: 2025-02-04 | End: 2025-02-13 | Stop reason: HOSPADM

## 2025-02-04 RX ORDER — MIDODRINE HYDROCHLORIDE 5 MG/1
5 TABLET ORAL
Status: DISCONTINUED | OUTPATIENT
Start: 2025-02-04 | End: 2025-02-05

## 2025-02-04 RX ORDER — DEXTROSE MONOHYDRATE 25 G/50ML
25 INJECTION, SOLUTION INTRAVENOUS
Status: DISCONTINUED | OUTPATIENT
Start: 2025-02-04 | End: 2025-02-13 | Stop reason: HOSPADM

## 2025-02-04 RX ORDER — SODIUM CHLORIDE 0.9 % (FLUSH) 0.9 %
10 SYRINGE (ML) INJECTION EVERY 12 HOURS SCHEDULED
Status: DISCONTINUED | OUTPATIENT
Start: 2025-02-04 | End: 2025-02-13 | Stop reason: HOSPADM

## 2025-02-04 RX ORDER — DONEPEZIL HYDROCHLORIDE 10 MG/1
10 TABLET, FILM COATED ORAL NIGHTLY
Status: CANCELLED | OUTPATIENT
Start: 2025-02-04

## 2025-02-04 RX ORDER — BISACODYL 5 MG/1
5 TABLET, DELAYED RELEASE ORAL DAILY PRN
Status: DISCONTINUED | OUTPATIENT
Start: 2025-02-04 | End: 2025-02-13 | Stop reason: HOSPADM

## 2025-02-04 RX ORDER — ASPIRIN 81 MG/1
81 TABLET ORAL DAILY
Status: CANCELLED | OUTPATIENT
Start: 2025-02-04

## 2025-02-04 RX ORDER — ENOXAPARIN SODIUM 100 MG/ML
30 INJECTION SUBCUTANEOUS DAILY
Status: DISCONTINUED | OUTPATIENT
Start: 2025-02-05 | End: 2025-02-04

## 2025-02-04 RX ORDER — FINASTERIDE 5 MG/1
5 TABLET, FILM COATED ORAL DAILY
Status: CANCELLED | OUTPATIENT
Start: 2025-02-04

## 2025-02-04 RX ORDER — SODIUM CHLORIDE 9 MG/ML
100 INJECTION, SOLUTION INTRAVENOUS CONTINUOUS
Status: DISCONTINUED | OUTPATIENT
Start: 2025-02-04 | End: 2025-02-04

## 2025-02-04 RX ORDER — SODIUM CHLORIDE 9 MG/ML
40 INJECTION, SOLUTION INTRAVENOUS AS NEEDED
Status: DISCONTINUED | OUTPATIENT
Start: 2025-02-04 | End: 2025-02-13 | Stop reason: HOSPADM

## 2025-02-04 RX ORDER — POLYETHYLENE GLYCOL 3350 17 G/17G
17 POWDER, FOR SOLUTION ORAL DAILY PRN
Status: DISCONTINUED | OUTPATIENT
Start: 2025-02-04 | End: 2025-02-13 | Stop reason: HOSPADM

## 2025-02-04 RX ORDER — IBUPROFEN 600 MG/1
1 TABLET ORAL
Status: DISCONTINUED | OUTPATIENT
Start: 2025-02-04 | End: 2025-02-08

## 2025-02-04 RX ORDER — ONDANSETRON 2 MG/ML
4 INJECTION INTRAMUSCULAR; INTRAVENOUS EVERY 6 HOURS PRN
Status: DISCONTINUED | OUTPATIENT
Start: 2025-02-04 | End: 2025-02-13 | Stop reason: HOSPADM

## 2025-02-04 RX ORDER — ONDANSETRON 4 MG/1
4 TABLET, ORALLY DISINTEGRATING ORAL EVERY 6 HOURS PRN
Status: DISCONTINUED | OUTPATIENT
Start: 2025-02-04 | End: 2025-02-13 | Stop reason: HOSPADM

## 2025-02-04 RX ORDER — NITROGLYCERIN 0.4 MG/1
0.4 TABLET SUBLINGUAL
Status: DISCONTINUED | OUTPATIENT
Start: 2025-02-04 | End: 2025-02-13 | Stop reason: HOSPADM

## 2025-02-04 RX ORDER — SODIUM CHLORIDE 0.9 % (FLUSH) 0.9 %
10 SYRINGE (ML) INJECTION AS NEEDED
Status: DISCONTINUED | OUTPATIENT
Start: 2025-02-04 | End: 2025-02-13 | Stop reason: HOSPADM

## 2025-02-04 RX ORDER — FERROUS SULFATE 325(65) MG
325 TABLET ORAL
COMMUNITY
End: 2025-02-13 | Stop reason: HOSPADM

## 2025-02-04 RX ORDER — FINASTERIDE 5 MG/1
5 TABLET, FILM COATED ORAL DAILY
Status: DISCONTINUED | OUTPATIENT
Start: 2025-02-05 | End: 2025-02-13 | Stop reason: HOSPADM

## 2025-02-04 RX ORDER — NICOTINE POLACRILEX 4 MG
15 LOZENGE BUCCAL
Status: DISCONTINUED | OUTPATIENT
Start: 2025-02-04 | End: 2025-02-13 | Stop reason: HOSPADM

## 2025-02-04 RX ORDER — AMOXICILLIN 250 MG
2 CAPSULE ORAL 2 TIMES DAILY PRN
Status: DISCONTINUED | OUTPATIENT
Start: 2025-02-04 | End: 2025-02-13 | Stop reason: HOSPADM

## 2025-02-04 RX ORDER — FERROUS SULFATE 325(65) MG
325 TABLET ORAL
Status: DISCONTINUED | OUTPATIENT
Start: 2025-02-05 | End: 2025-02-13 | Stop reason: HOSPADM

## 2025-02-04 RX ORDER — MEMANTINE HYDROCHLORIDE 5 MG/1
5 TABLET ORAL DAILY
Status: DISCONTINUED | OUTPATIENT
Start: 2025-02-05 | End: 2025-02-13 | Stop reason: HOSPADM

## 2025-02-04 RX ORDER — ATORVASTATIN CALCIUM 20 MG/1
10 TABLET, FILM COATED ORAL DAILY
Status: CANCELLED | OUTPATIENT
Start: 2025-02-04

## 2025-02-04 RX ORDER — DONEPEZIL HYDROCHLORIDE 10 MG/1
10 TABLET, FILM COATED ORAL NIGHTLY
Status: DISCONTINUED | OUTPATIENT
Start: 2025-02-04 | End: 2025-02-13 | Stop reason: HOSPADM

## 2025-02-04 RX ORDER — BISACODYL 10 MG
10 SUPPOSITORY, RECTAL RECTAL DAILY PRN
Status: DISCONTINUED | OUTPATIENT
Start: 2025-02-04 | End: 2025-02-13 | Stop reason: HOSPADM

## 2025-02-04 RX ORDER — TAMSULOSIN HYDROCHLORIDE 0.4 MG/1
0.4 CAPSULE ORAL DAILY
Status: CANCELLED | OUTPATIENT
Start: 2025-02-04

## 2025-02-04 RX ADMIN — SODIUM CHLORIDE 1000 ML: 9 INJECTION, SOLUTION INTRAVENOUS at 14:04

## 2025-02-04 RX ADMIN — SODIUM BICARBONATE 50 MEQ: 84 INJECTION INTRAVENOUS at 14:02

## 2025-02-04 RX ADMIN — HEPARIN SODIUM 5000 UNITS: 5000 INJECTION INTRAVENOUS; SUBCUTANEOUS at 22:57

## 2025-02-04 RX ADMIN — SODIUM BICARBONATE 150 MEQ: 84 INJECTION, SOLUTION INTRAVENOUS at 16:49

## 2025-02-04 RX ADMIN — CEFTRIAXONE SODIUM 1000 MG: 1 INJECTION, POWDER, FOR SOLUTION INTRAMUSCULAR; INTRAVENOUS at 15:53

## 2025-02-04 RX ADMIN — Medication 10 ML: at 22:57

## 2025-02-04 NOTE — H&P
[facility]   HISTORY AND PHYSICAL    Patient Name: Froy Ngo  : 1945  MRN: [id number]  Primary Care Physician:  Ryan Gutierrez MD  Date of admission: 2025    Subjective   Subjective     Chief Complaint: Fever    History of Present Illness    79-year-old male who is a patient of Dr. Gutierrez's.  Wife called me yesterday noting patient had episode of nausea and vomiting starting on Friday night and then was really weak on Saturday and .  Had temperatures over 100 degrees at home.  He's been on ciprofloxacin And Bactrim over the past few weeks although his urine cultures only grew out mixed urogenital frank.  He was to follow-up with urology because he has urinary stent and that was scheduled for late February.  Patient had been hospitalized in  with urinary retention and renal failure with a creatinine of 17.  He slowly improved with treatment and stenting and had been doing fairly well as an outpatient per the wife.    Because of his acute illness I asked them to see me yesterday in the office.  At that time he was afebrile but clammy in appearance.  I was worried about his renal function because of the nausea and vomiting he had on Friday and So I drew blood work on him.  Blood work came back this morning showing creatinine up to 4.2 and sodium low at 126.  Wife reported he was not taking in very much intake and I advised them to go immediately to the emergency room.  In the ER his sodium was 123 and his creatinine was 5.1.  He was quite acidotic and nephrology Has already been consulted and he's been started on IV fluids followed by a bicarbonate drip. He did  have an elevated white count and a temperature over 99.  Blood pressure is running low one hundreds.  Lactate levels are normal.  Wife notes he looks and feels better than he did yesterday.  He voices no complaints in the emergency room today and specifically says he has no abdominal pain.    Review of Systems     Personal  History     Past Medical History:   Diagnosis Date    Dementia     Diabetes mellitus     Elevated cholesterol     Prostate disorder     Urinary retention        Past Surgical History:   Procedure Laterality Date    CYSTOSCOPY W/ URETERAL STENT PLACEMENT Bilateral 8/13/2024    Procedure: BILATERAL CYSTOSCOPY URETERAL CATHETER/STENT INSERTION WITH BILATERAL RETROGRADES;  Surgeon: Antoni Melendez MD;  Location: [facility];  Service: Urology;  Laterality: Bilateral;       Family History: family history is not on file. Otherwise pertinent FHx was reviewed and not pertinent to current issue.    Social History:  reports that he has never smoked. He has never been exposed to tobacco smoke. He has never used smokeless tobacco. He reports that he does not drink alcohol and does not use drugs.    Home Medications:  amLODIPine, aspirin, atorvastatin, donepezil, finasteride, hydrALAZINE, insulin lispro, memantine, and tamsulosin    Allergies:  No Known Allergies    Objective    Objective     Vitals:   Temp:  [99.3 °F (37.4 °C)] 99.3 °F (37.4 °C)  Heart Rate:  [] 92  Resp:  [18] 18  BP: ()/(57-71) 93/64    Physical Exam    General: NAD. Alert and oriented to person and place. Very talkative  Heent: NCAT  Heart: RRR  Lungs: CTA  Abd; Soft BS+ non tender to deep palpation,  grant's sign negative  Ext: no edema      Result Review      Recent Results (from the past 24 hours)   Green Top (Gel)    Collection Time: 02/04/25 12:02 PM   Result Value Ref Range    Extra Tube Hold for add-ons.    Lavender Top    Collection Time: 02/04/25 12:02 PM   Result Value Ref Range    Extra Tube hold for add-on    Gold Top - SST    Collection Time: 02/04/25 12:02 PM   Result Value Ref Range    Extra Tube Hold for add-ons.    Light Blue Top    Collection Time: 02/04/25 12:02 PM   Result Value Ref Range    Extra Tube Hold for add-ons.    Comprehensive Metabolic Panel    Collection Time: 02/04/25 12:02 PM    Specimen: Arm, Left;  Blood   Result Value Ref Range    Glucose 277 (H) 65 - 99 mg/dL    BUN 70 (H) 8 - 23 mg/dL    Creatinine 5.16 (H) 0.76 - 1.27 mg/dL    Sodium 123 (L) 136 - 145 mmol/L    Potassium 5.1 3.5 - 5.2 mmol/L    Chloride 93 (L) 98 - 107 mmol/L    CO2 13.2 (L) 22.0 - 29.0 mmol/L    Calcium 9.3 8.6 - 10.5 mg/dL    Total Protein 8.0 6.0 - 8.5 g/dL    Albumin 3.5 3.5 - 5.2 g/dL    ALT (SGPT) 78 (H) 1 - 41 U/L    AST (SGOT) 63 (H) 1 - 40 U/L    Alkaline Phosphatase 219 (H) 39 - 117 U/L    Total Bilirubin 0.7 0.0 - 1.2 mg/dL    Globulin 4.5 gm/dL    A/G Ratio 0.8 g/dL    BUN/Creatinine Ratio 13.6 7.0 - 25.0    Anion Gap 16.8 (H) 5.0 - 15.0 mmol/L    eGFR 10.6 (L) >60.0 mL/min/1.73   Magnesium    Collection Time: 02/04/25 12:02 PM    Specimen: Arm, Left; Blood   Result Value Ref Range    Magnesium 2.0 1.6 - 2.4 mg/dL   Phosphorus    Collection Time: 02/04/25 12:02 PM    Specimen: Arm, Left; Blood   Result Value Ref Range    Phosphorus 3.8 2.5 - 4.5 mg/dL   CBC Auto Differential    Collection Time: 02/04/25 12:02 PM    Specimen: Arm, Left; Blood   Result Value Ref Range    WBC 15.48 (H) 3.40 - 10.80 10*3/mm3    RBC 3.89 (L) 4.14 - 5.80 10*6/mm3    Hemoglobin 11.0 (L) 13.0 - 17.7 g/dL    Hematocrit 32.7 (L) 37.5 - 51.0 %    MCV 84.1 79.0 - 97.0 fL    MCH 28.3 26.6 - 33.0 pg    MCHC 33.6 31.5 - 35.7 g/dL    RDW 14.6 12.3 - 15.4 %    RDW-SD 44.0 37.0 - 54.0 fl    MPV 9.7 6.0 - 12.0 fL    Platelets 262 140 - 450 10*3/mm3    Neutrophil % 90.8 (H) 42.7 - 76.0 %    Lymphocyte % 2.6 (L) 19.6 - 45.3 %    Monocyte % 5.1 5.0 - 12.0 %    Eosinophil % 0.9 0.3 - 6.2 %    Basophil % 0.1 0.0 - 1.5 %    Immature Grans % 0.5 0.0 - 0.5 %    Neutrophils, Absolute 14.05 (H) 1.70 - 7.00 10*3/mm3    Lymphocytes, Absolute 0.41 (L) 0.70 - 3.10 10*3/mm3    Monocytes, Absolute 0.79 0.10 - 0.90 10*3/mm3    Eosinophils, Absolute 0.14 0.00 - 0.40 10*3/mm3    Basophils, Absolute 0.01 0.00 - 0.20 10*3/mm3    Immature Grans, Absolute 0.08 (H) 0.00 - 0.05 10*3/mm3     nRBC 0.0 0.0 - 0.2 /100 WBC   Osmolality, Serum    Collection Time: 02/04/25 12:02 PM    Specimen: Arm, Left; Blood   Result Value Ref Range    Osmolality 299 280 - 301 mOsm/kg   Beta Hydroxybutyrate Quantitative    Collection Time: 02/04/25 12:02 PM    Specimen: Arm, Left; Blood   Result Value Ref Range    Beta-Hydroxybutyrate Quant 0.210 0.020 - 0.270 mmol/L   Lipase    Collection Time: 02/04/25 12:02 PM    Specimen: Arm, Left; Blood   Result Value Ref Range    Lipase 2,610 (H) 13 - 60 U/L   Urinalysis With Culture If Indicated - Urine, Clean Catch    Collection Time: 02/04/25  1:38 PM    Specimen: Urine, Clean Catch   Result Value Ref Range    Color, UA Yellow Yellow, Straw    Appearance, UA Cloudy (A) Clear    pH, UA 6.5 5.0 - 8.0    Specific Gravity, UA 1.016 1.005 - 1.030    Glucose, UA Negative Negative    Ketones, UA Negative Negative    Bilirubin, UA Negative Negative    Blood, UA Moderate (2+) (A) Negative    Protein,  mg/dL (2+) (A) Negative    Leuk Esterase, UA Large (3+) (A) Negative    Nitrite, UA Positive (A) Negative    Urobilinogen, UA 1.0 E.U./dL 0.2 - 1.0 E.U./dL   Urinalysis, Microscopic Only - Urine, Clean Catch    Collection Time: 02/04/25  1:38 PM    Specimen: Urine, Clean Catch   Result Value Ref Range    RBC, UA Too Numerous to Count (A) None Seen, 0-2 /HPF    WBC, UA Too Numerous to Count (A) None Seen, 0-2 /HPF    Bacteria, UA None Seen None Seen /HPF    Squamous Epithelial Cells, UA 0-2 None Seen, 0-2 /HPF    Hyaline Casts, UA 7-12 None Seen /LPF    Methodology Automated Microscopy    Osmolality, Urine - Urine, Clean Catch    Collection Time: 02/04/25  1:38 PM    Specimen: Urine, Clean Catch   Result Value Ref Range    Osmolality, Urine 359 mOsm/kg   Protein / Creatinine Ratio, Urine - Urine, Clean Catch    Collection Time: 02/04/25  1:38 PM    Specimen: Urine, Clean Catch   Result Value Ref Range    Protein/Creatinine Ratio, Urine 1,037.5 (H) 0.0 - 200.0 mg/G Crea    Creatinine,  Urine 109.4 mg/dL    Total Protein, Urine 113.5 mg/dL   Sodium, Urine, Random - Urine, Clean Catch    Collection Time: 02/04/25  1:38 PM    Specimen: Urine, Clean Catch   Result Value Ref Range    Sodium, Urine 26 mmol/L     Imaging Results (Last 24 Hours)       Procedure Component Value Units Date/Time    CT Abdomen Pelvis Without Contrast [[id number]] Collected: 02/04/25 1412     Updated: 02/04/25 1433    Narrative:      CT ABDOMEN PELVIS WO CONTRAST-     DATE OF EXAM: 2/4/2025 1:44 PM     INDICATION: UTI with bilateral ureteral stents, elevated WBC, elevated  Cr.     COMPARISON: CT 8/11/2024 and 8/4/2024.     TECHNIQUE: Multiple contiguous axial images were acquired through the  abdomen and pelvis without the intravenous administration of contrast  per reformatted coronal and sagittal sequences were also reviewed.  Radiation dose reduction techniques were utilized, including automated  exposure control and exposure modulation based on body size.     FINDINGS:  Respiratory motion limits evaluation of the lung bases. Multifocal  bibasilar subsegmental atelectasis and/scarring. Partially imaged severe  calcified coronary artery disease. Relative hyperdensity of the  interventricular septum suggest anemia.     Cholelithiasis without specific CT evidence of acute cholecystitis. The  liver and spleen are unremarkable. Moderate peripancreatic fat  stranding. Nonspecific small cystic low-density lesion in the pancreatic  body, measuring up to 2 cm craniocaudal (coronal series 3 image 51).  Mild likely benign nodular thickening of the left adrenal gland. The  right adrenal gland is unremarkable in limited noncontrast CT  appearance. Bilateral ureteral stents in place. Both kidneys are  otherwise unremarkable in limited noncontrast CT appearance. The urinary  bladder is nondistended. Urinary bladder wall thickening may be  accentuated by underdistention but could reflect sequela of chronic  bladder outlet obstruction. Small  right-sided urinary bladder  diverticulum. Enlarged prostate gland, measuring 6 cm in transverse  diameter, with mild mass effect on the floor the urinary bladder.     Mild diffuse gastric wall thickening is likely accentuated by under  distention. Mild colorectal stool. Colonic diverticula, without CT  evidence of diverticulitis. No bowel obstruction. The appendix is  normal.     No free fluid in the abdomen or pelvis. No free intraperitoneal air. No  pathologically enlarged lymph nodes in the abdomen or pelvis. Stable  soft tissue attenuation in the left inguinal canal.     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 Similar-appearing lumbar levoscoliosis and  multilevel lumbar spondylosis. No acute osseous abnormality or  concerning osseous lesion.       Impression:         1. Moderate peripancreatic fat stranding, which is nonspecific but could  reflect pancreatitis. Recommend correlating with amylase and lipase.  2. Nonspecific 2 cm low-density lesion in the body of the pancreas,  which could be further evaluated with MRI if clinically indicated.  3. Bilateral ureteral stents in place.  4. Enlarged prostate gland with urinary bladder wall thickening that  could be accentuated by underdistention but could reflect sequela of  chronic bladder outlet obstruction and/or cystitis. Urinary bladder  diverticulum indicating sequela of chronic bladder outlet obstruction.  Correlate with urinalysis.     This report was finalized on 2/4/2025 2:30 PM by Dami Ramos MD on  Workstation: [id number]               Result Review:  I have personally reviewed the results from the time of this admission to 2/4/2025 15:25 EST and agree with these findings:  []  Laboratory list / accordion  []  Microbiology  []  Radiology  []  EKG/Telemetry   []  Cardiology/Vascular   []  Pathology  []  Old records  []  Other:        Assessment & Plan   Assessment / Plan     Brief Patient Summary:  Froy  Jeni is a 79 y.o. male who has acute renal failure and pancreatitis.    Active Hospital Problems:  Active Hospital Problems    Diagnosis     **ARF (acute renal failure)      Plan:   Acute renal failure-looks to be largely prerenal given nausea and vomiting on Friday night with poor intake along with third spacing from pancreatitis.  CT of the abdomen did not show obstructive uropathy.  He did receive Rocephin in the emergency room for probable urinary tract infection.  I will continue that while we are waiting for cultures.  Pancreatitis-lipase is over 2000 but interestingly he has a benign abdominal exam.  I wonder whether he passed a gallstone on Friday night and that was the episode of nausea and vomiting he had.  He does not drink alcohol.  He has no right upper quadrant tenderness now but I want to watch him closely and get a ultrasound of the right upper quadrant.  I am keeping him n.p.o. tonight.  If white count is elevating her temperatures going up further may need to consider covering abdominal source for infection by changing to Zosyn.  He currently looks good and has a benign abdominal exam so I am going to see his ultrasound first   urinary infection-await culture results.  Currently receiving Rocephin  History of urinary outlet obstruction-has stents in place CT scan does not show any obstructive issue currently.  There are some question whether he is supposed to have the stents removed with urology.  Would like to get on top of his current issues and then we can ask urology whether they want to see him while he is here.  Elevated blood sugars-he has elevated blood sugars and was receiving some low-dose Lantus on last admission.  He is currently n.p.o. because of his pancreatitis.  I will have him covered with low-dose insulin for high blood sugars  Dementia-family is at bedside and currently seems quite oriented.  Will get him back on his home medicines as soon as possible.  Family is going to  have her caregiver by his side during his hospitalization which should help with orientation  History of hypertension-holding blood pressure medicines due to current blood pressures  Hyperlipidemia-will resume medications once taking p.o.        VTE Prophylaxis:  Lovenox        CODE STATUS: Will address CODE STATUS with family     Admission Status:  I believe this patient meets inpatient status.    Ritesh Farrell MD

## 2025-02-04 NOTE — CONSULTS
[facility] Consult Note      Patient Name: Froy Ngo  : 1945  MRN: [id number]  Primary Care Physician:  Ryan Gutierrez MD  Referring Physician: No ref. provider found  Date of admission: 2025    Subjective     Reason for Consult: NEHEMIAS on CKD    HPI:   Froy Ngo is a 79 y.o. male with CKD 3B (baseline SCr 1.7-2.0, followed by Dr. Kenji Simental of our group), dimension, type II DM, hx of obstructive uropathy s/p ureteral stent placement (2024) and recent prostate resection (2024 per family), presented to the hospital today for abnormal labs.    In the ER, sodium 123, SCR 5.16, BUN 70, CO2 13, gap 17; UA positive for nitrites, 2+ protein, and TNTC RBC & WBC. Patient was given IV bicarb push and rocephin. Currently receiving IL NS bolus. CT abd/pel pending.    Per family, patient was treated for UTI for the past month w 3 rounds of abx (cipro is the last regimen); hagen was removed 3-4 weeks ago s/p prostate resection, denies urinary retention since that time; Had nausea and vomiting , followed by fever, chills, and weakness in the next 2 days; been hydrating adequately per wife; denies further UTI symptoms.    Review of Systems:   14 point review of systems is otherwise negative except for mentioned above on HPI    Personal History     Past Medical History:   Diagnosis Date    Dementia     Diabetes mellitus     Elevated cholesterol     Prostate disorder     Urinary retention        Past Surgical History:   Procedure Laterality Date    CYSTOSCOPY W/ URETERAL STENT PLACEMENT Bilateral 2024    Procedure: BILATERAL CYSTOSCOPY URETERAL CATHETER/STENT INSERTION WITH BILATERAL RETROGRADES;  Surgeon: Antoni Melendez MD;  Location: [facility];  Service: Urology;  Laterality: Bilateral;       Family History: family history is not on file.    Social History:  reports that he has never smoked. He has never been exposed to tobacco smoke. He has  never used smokeless tobacco. He reports that he does not drink alcohol and does not use drugs.    Home Medications:  Prior to Admission medications    Medication Sig Start Date End Date Taking? Authorizing Provider   amLODIPine (NORVASC) 5 MG tablet Take 1 tablet by mouth Daily. 8/10/24   Gurvinder Farrell MD   aspirin 81 MG EC tablet Take 1 tablet by mouth Daily. 8/4/24   Araseli Price MD   atorvastatin (LIPITOR) 10 MG tablet Take 1 tablet by mouth Daily. Indications: High Amount of Fats in the Blood 8/4/24   Araseli Price MD   donepezil (ARICEPT) 10 MG tablet Take 1 tablet by mouth Every Night. Indications: Alzheimer's Disease 10/4/23 10/3/24  Araseli Price MD   finasteride (Proscar) 5 MG tablet Take 1 tablet by mouth Daily. Indications: Benign Enlargement of Prostate 9/1/24   Araseli Price MD   hydrALAZINE (APRESOLINE) 25 MG tablet Take 1 tablet by mouth Every 8 (Eight) Hours. 8/9/24   Gurvinder Farrell MD   insulin lispro (HUMALOG/ADMELOG) 100 UNIT/ML injection Inject 2-7 Units under the skin into the appropriate area as directed 4 (Four) Times a Day Before Meals & at Bedtime. 8/20/24   Shruthi Acosta MD   memantine (NAMENDA) 10 MG tablet Take 1 tablet by mouth Daily. Indications: Alzheimer's Disease 11/6/23 11/5/24  Araseli Price MD   tamsulosin (FLOMAX) 0.4 MG capsule 24 hr capsule Take 1 capsule by mouth Daily. 8/10/24   Gurvinder Farrell MD       Allergies:  No Known Allergies    Objective     Vitals:   Temp:  [99.3 °F (37.4 °C)] 99.3 °F (37.4 °C)  Heart Rate:  [] 92  Resp:  [18] 18  BP: ()/(57-71) 93/64  No intake or output data in the 24 hours ending 02/04/25 1443    Physical Exam:   Constitutional: Awake, alert, no acute distress.  HEENT: Sclera anicteric, no conjunctival injection  Neck: Supple, no JVD  Respiratory: Clear to auscultation bilaterally, nonlabored respiration on RA  Cardiovascular: tachycardiac, RR, no murmurs, no rubs  Gastrointestinal:  Positive bowel sounds, abdomen is soft, nontender and nondistended  : No palpable bladder  Musculoskeletal: No edema, no clubbing or cyanosis  Psychiatric: Appropriate affect, cooperative  Neurologic: moves all extremities  Skin: Warm and dry       Scheduled Meds:     cefTRIAXone, 1,000 mg, Intravenous, Once  midodrine, 5 mg, Oral, TID AC  sodium chloride, 1,000 mL, Intravenous, Once      IV Meds:   sodium chloride, 100 mL/hr        Results Reviewed:   I have personally reviewed the results from the time of this admission to 2/4/2025 14:43 EST     Lab Results   Component Value Date    GLUCOSE 277 (H) 02/04/2025    CALCIUM 9.3 02/04/2025     (L) 02/04/2025    K 5.1 02/04/2025    CO2 13.2 (L) 02/04/2025    CL 93 (L) 02/04/2025    BUN 70 (H) 02/04/2025    CREATININE 5.16 (H) 02/04/2025    BCR 13.6 02/04/2025    ANIONGAP 16.8 (H) 02/04/2025      Lab Results   Component Value Date    MG 2.0 02/04/2025    PHOS 3.8 02/04/2025    ALBUMIN 3.5 02/04/2025           Assessment / Plan       * No active hospital problems. *      ASSESSMENT:   NEHEMIAS on CKD 3B, baseline Scr 1.7-2.0, secondary to prior AKIs d/t postobstructive uropathy, and longstanding hypertensive/diabetic nephrosclerosis.  Acute etiology is likely multifactorial: UTI followed by  hypotension limiting renal perfusion . CT abdomen/pelvis did not reveal hydro; slightly hypovolemic, receiving IL NS bolus now  Acute on chronic hyponatremia, sodium 126 corrected for BG, likely multifactorial: excessive PO fluid intake, poor solute intake, and NEHEMIAS on CKD   AGMA, secondary to NEHEMIAS on CKD , need to rule out lactic acidosis and ketosis started sodium bicarbonate drip and follow work up   Hx post obstructive uropathy, s/p  bilateral ureteral stent placement 8/30/2024, s/p prostate resection 12/13/2024, Rodriguez was removed 3 to 4 weeks ago per family report. CT abd/pel suggested enlarged prostate with possible cystitis / Prostatitis  due to recurrent UTI ?   UTI, failed  outpatient management, on IV Rocephin, will follow cultures , possible prostatitis due to recurrent antibiotics  . Previous urine and blood cultures negative   Type II DM with CKD, managed by primary team  Hypotension  will hold amlodipine  and hydralazine , continue IVF , will order cortisol   Transaminases from hypotension , managed by primary team  Dementia, on namenda at home    PLAN:  Agree with IV NS bolus, followed by continuous sodium bicarb drip/ infusion  to replaced volume and Hco3 deficit at 100 ml/hr   Hold home antihypertensives (Norvasc and hydralazine), order  midodrine 5 mg TID x 3 doses   I review his CT abdomen ( report not finalized ) bilateral ureteral stent w/o hydronephrosis . Bilateral renal atrophy . Enlarged prostate .  . No evidence of urinary retention   Check serum osm, uric acid, lactic acid, and beta hydroxybutyrate, cortisol   Check Fanny, UPCR, and urine osm  Surveillance labs    Spoke w wife by phone and caregiver at bedside     Thank you for involving us in the care of Froy Ngo.  Please feel free to call with any questions.    Tad Benoit MD  02/04/25  14:43 RUST    [facility]  [phone]      Please note that portions of this note were completed with a voice recognition program.

## 2025-02-04 NOTE — ED PROVIDER NOTES
EMERGENCY DEPARTMENT ENCOUNTER  Room Number:  09/09  PCP: Ryan Gutierrez MD  Independent Historians: Patient and Family  Date of encounter:  2/4/2025  Patient Care Team:  Ryan Gutierrez MD as PCP - General (Internal Medicine)     HPI:  Chief Complaint: had concerns including Abnormal Lab (Kidney functions ).     A complete HPI/ROS/PMH/PSH/SH/FH are unobtainable due to: None    Chronic or social conditions impacting patient care (Social Determinants of Health): None      Context: Froy Ngo is a 79 y.o. male with a medical history of diabetes, ARF, dementia who presents to the ED c/o acute lab work abnormality.  Patient has been dealing with a urinary tract infection over the past month.  He is currently on his second round of antibiotics.  He also has [facility]  He presents today for abnormal lab work.  He cannot report exactly with this lab values are but they believe they are related to his kidneys.  He denies any change in urinary output, urinary color, hematuria or dysuria.  No nausea, vomiting, fever, chills, abdominal pain or flank pain.    Review of prior external notes (non-ED) -and- Review of prior external test results outside of this encounter:  Reviewed the nephrology clinic note from 12/10/2014.  Patient was evaluated posthospitalization for acute renal failure secondary to obstruction.  Discharge creatinine of 1.79.    PAST MEDICAL HISTORY  Active Ambulatory Problems     Diagnosis Date Noted    DKA (diabetic ketoacidosis) 08/04/2024    Obstructive uropathy 08/07/2024    Bilateral hydronephrosis 08/07/2024    Hyperkalemia 08/07/2024    Acute renal failure 08/11/2024    Type 2 diabetes mellitus with hyperglycemia 08/11/2024    Essential hypertension, benign 08/11/2024    Dementia without behavioral disturbance 08/11/2024    UTI (urinary tract infection), bacterial 08/20/2024    Abnormal MRI of head 08/20/2024     Resolved Ambulatory Problems      Diagnosis Date Noted    No Resolved Ambulatory Problems     Past Medical History:   Diagnosis Date    Dementia     Diabetes mellitus     Elevated cholesterol     Prostate disorder     Urinary retention        PAST SURGICAL HISTORY  Past Surgical History:   Procedure Laterality Date    CYSTOSCOPY W/ URETERAL STENT PLACEMENT Bilateral 8/13/2024    Procedure: BILATERAL CYSTOSCOPY URETERAL CATHETER/STENT INSERTION WITH BILATERAL RETROGRADES;  Surgeon: Antoni Melendez MD;  Location: [facility];  Service: Urology;  Laterality: Bilateral;       FAMILY HISTORY  No family history on file.    SOCIAL HISTORY  Social History     Socioeconomic History    Marital status: Single   Tobacco Use    Smoking status: Never     Passive exposure: Never    Smokeless tobacco: Never   Vaping Use    Vaping status: Never Used   Substance and Sexual Activity    Alcohol use: Never    Drug use: Never    Sexual activity: Not Currently       ALLERGIES  Patient has no known allergies.    REVIEW OF SYSTEMS  Review of Systems  Included in HPI  All systems reviewed and negative except for those discussed in HPI.    PHYSICAL EXAM    I have reviewed the triage vital signs and nursing notes.    ED Triage Vitals   Temp Heart Rate Resp BP SpO2   02/04/25 1137 02/04/25 1137 02/04/25 1137 02/04/25 1149 02/04/25 1137   99.3 °F (37.4 °C) (!) 121 18 99/61 97 %      Temp src Heart Rate Source Patient Position BP Location FiO2 (%)   02/04/25 1137 02/04/25 1137 -- -- --   Tympanic Monitor          Physical Exam  Vitals and nursing note reviewed.   Constitutional:       General: He is not in acute distress.     Appearance: Normal appearance. He is not ill-appearing, toxic-appearing or diaphoretic.   HENT:      Head: Normocephalic and atraumatic.      Nose: Nose normal.      Mouth/Throat:      Mouth: Mucous membranes are moist. Mucous membranes are dry.   Eyes:      Extraocular Movements: Extraocular movements intact.      Conjunctiva/sclera: Conjunctivae  normal.      Pupils: Pupils are equal, round, and reactive to light.   Cardiovascular:      Rate and Rhythm: Normal rate and regular rhythm.      Pulses: Normal pulses.      Heart sounds: Normal heart sounds. No murmur heard.     No friction rub. No gallop.   Pulmonary:      Effort: Pulmonary effort is normal. No respiratory distress.      Breath sounds: Normal breath sounds. No stridor. No wheezing, rhonchi or rales.   Abdominal:      General: Abdomen is flat. There is no distension.      Palpations: Abdomen is soft.      Tenderness: There is no abdominal tenderness. There is no right CVA tenderness, left CVA tenderness, guarding or rebound.   Musculoskeletal:      Cervical back: Normal range of motion and neck supple.   Skin:     General: Skin is warm and dry.      Capillary Refill: Capillary refill takes less than 2 seconds.   Neurological:      General: No focal deficit present.      Mental Status: He is alert and oriented to person, place, and time. Mental status is at baseline.   Psychiatric:         Mood and Affect: Mood normal.         Behavior: Behavior normal.         Thought Content: Thought content normal.         Judgment: Judgment normal.         LAB RESULTS  Recent Results (from the past 24 hours)   Green Top (Gel)    Collection Time: 02/04/25 12:02 PM   Result Value Ref Range    Extra Tube Hold for add-ons.    Lavender Top    Collection Time: 02/04/25 12:02 PM   Result Value Ref Range    Extra Tube hold for add-on    Gold Top - SST    Collection Time: 02/04/25 12:02 PM   Result Value Ref Range    Extra Tube Hold for add-ons.    Light Blue Top    Collection Time: 02/04/25 12:02 PM   Result Value Ref Range    Extra Tube Hold for add-ons.    Comprehensive Metabolic Panel    Collection Time: 02/04/25 12:02 PM    Specimen: Arm, Left; Blood   Result Value Ref Range    Glucose 277 (H) 65 - 99 mg/dL    BUN 70 (H) 8 - 23 mg/dL    Creatinine 5.16 (H) 0.76 - 1.27 mg/dL    Sodium 123 (L) 136 - 145 mmol/L     Potassium 5.1 3.5 - 5.2 mmol/L    Chloride 93 (L) 98 - 107 mmol/L    CO2 13.2 (L) 22.0 - 29.0 mmol/L    Calcium 9.3 8.6 - 10.5 mg/dL    Total Protein 8.0 6.0 - 8.5 g/dL    Albumin 3.5 3.5 - 5.2 g/dL    ALT (SGPT) 78 (H) 1 - 41 U/L    AST (SGOT) 63 (H) 1 - 40 U/L    Alkaline Phosphatase 219 (H) 39 - 117 U/L    Total Bilirubin 0.7 0.0 - 1.2 mg/dL    Globulin 4.5 gm/dL    A/G Ratio 0.8 g/dL    BUN/Creatinine Ratio 13.6 7.0 - 25.0    Anion Gap 16.8 (H) 5.0 - 15.0 mmol/L    eGFR 10.6 (L) >60.0 mL/min/1.73   Magnesium    Collection Time: 02/04/25 12:02 PM    Specimen: Arm, Left; Blood   Result Value Ref Range    Magnesium 2.0 1.6 - 2.4 mg/dL   Phosphorus    Collection Time: 02/04/25 12:02 PM    Specimen: Arm, Left; Blood   Result Value Ref Range    Phosphorus 3.8 2.5 - 4.5 mg/dL   CBC Auto Differential    Collection Time: 02/04/25 12:02 PM    Specimen: Arm, Left; Blood   Result Value Ref Range    WBC 15.48 (H) 3.40 - 10.80 10*3/mm3    RBC 3.89 (L) 4.14 - 5.80 10*6/mm3    Hemoglobin 11.0 (L) 13.0 - 17.7 g/dL    Hematocrit 32.7 (L) 37.5 - 51.0 %    MCV 84.1 79.0 - 97.0 fL    MCH 28.3 26.6 - 33.0 pg    MCHC 33.6 31.5 - 35.7 g/dL    RDW 14.6 12.3 - 15.4 %    RDW-SD 44.0 37.0 - 54.0 fl    MPV 9.7 6.0 - 12.0 fL    Platelets 262 140 - 450 10*3/mm3    Neutrophil % 90.8 (H) 42.7 - 76.0 %    Lymphocyte % 2.6 (L) 19.6 - 45.3 %    Monocyte % 5.1 5.0 - 12.0 %    Eosinophil % 0.9 0.3 - 6.2 %    Basophil % 0.1 0.0 - 1.5 %    Immature Grans % 0.5 0.0 - 0.5 %    Neutrophils, Absolute 14.05 (H) 1.70 - 7.00 10*3/mm3    Lymphocytes, Absolute 0.41 (L) 0.70 - 3.10 10*3/mm3    Monocytes, Absolute 0.79 0.10 - 0.90 10*3/mm3    Eosinophils, Absolute 0.14 0.00 - 0.40 10*3/mm3    Basophils, Absolute 0.01 0.00 - 0.20 10*3/mm3    Immature Grans, Absolute 0.08 (H) 0.00 - 0.05 10*3/mm3    nRBC 0.0 0.0 - 0.2 /100 WBC   Osmolality, Serum    Collection Time: 02/04/25 12:02 PM    Specimen: Arm, Left; Blood   Result Value Ref Range    Osmolality 299 280 -  301 mOsm/kg   Lipase    Collection Time: 02/04/25 12:02 PM    Specimen: Arm, Left; Blood   Result Value Ref Range    Lipase 2,610 (H) 13 - 60 U/L   Urinalysis With Culture If Indicated - Urine, Clean Catch    Collection Time: 02/04/25  1:38 PM    Specimen: Urine, Clean Catch   Result Value Ref Range    Color, UA Yellow Yellow, Straw    Appearance, UA Cloudy (A) Clear    pH, UA 6.5 5.0 - 8.0    Specific Gravity, UA 1.016 1.005 - 1.030    Glucose, UA Negative Negative    Ketones, UA Negative Negative    Bilirubin, UA Negative Negative    Blood, UA Moderate (2+) (A) Negative    Protein,  mg/dL (2+) (A) Negative    Leuk Esterase, UA Large (3+) (A) Negative    Nitrite, UA Positive (A) Negative    Urobilinogen, UA 1.0 E.U./dL 0.2 - 1.0 E.U./dL   Urinalysis, Microscopic Only - Urine, Clean Catch    Collection Time: 02/04/25  1:38 PM    Specimen: Urine, Clean Catch   Result Value Ref Range    RBC, UA Too Numerous to Count (A) None Seen, 0-2 /HPF    WBC, UA Too Numerous to Count (A) None Seen, 0-2 /HPF    Bacteria, UA None Seen None Seen /HPF    Squamous Epithelial Cells, UA 0-2 None Seen, 0-2 /HPF    Hyaline Casts, UA 7-12 None Seen /LPF    Methodology Automated Microscopy        RADIOLOGY  CT Abdomen Pelvis Without Contrast    Result Date: 2/4/2025  CT ABDOMEN PELVIS WO CONTRAST-  DATE OF EXAM: 2/4/2025 1:44 PM  INDICATION: UTI with bilateral ureteral stents, elevated WBC, elevated Cr.  COMPARISON: CT 8/11/2024 and 8/4/2024.  TECHNIQUE: Multiple contiguous axial images were acquired through the abdomen and pelvis without the intravenous administration of contrast per reformatted coronal and sagittal sequences were also reviewed. Radiation dose reduction techniques were utilized, including automated exposure control and exposure modulation based on body size.  FINDINGS: Respiratory motion limits evaluation of the lung bases. Multifocal bibasilar subsegmental atelectasis and/scarring. Partially imaged severe calcified  coronary artery disease. Relative hyperdensity of the interventricular septum suggest anemia.  Cholelithiasis without specific CT evidence of acute cholecystitis. The liver and spleen are unremarkable. Moderate peripancreatic fat stranding. Nonspecific small cystic low-density lesion in the pancreatic body, measuring up to 2 cm craniocaudal (coronal series 3 image 51). Mild likely benign nodular thickening of the left adrenal gland. The right adrenal gland is unremarkable in limited noncontrast CT appearance. Bilateral ureteral stents in place. Both kidneys are otherwise unremarkable in limited noncontrast CT appearance. The urinary bladder is nondistended. Urinary bladder wall thickening may be accentuated by underdistention but could reflect sequela of chronic bladder outlet obstruction. Small right-sided urinary bladder diverticulum. Enlarged prostate gland, measuring 6 cm in transverse diameter, with mild mass effect on the floor the urinary bladder.  Mild diffuse gastric wall thickening is likely accentuated by under distention. Mild colorectal stool. Colonic diverticula, without CT evidence of diverticulitis. No bowel obstruction. The appendix is normal.  No free fluid in the abdomen or pelvis. No free intraperitoneal air. No pathologically enlarged lymph nodes in the abdomen or pelvis. Stable soft tissue attenuation in the left inguinal canal.  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 Similar-appearing lumbar levoscoliosis and multilevel lumbar spondylosis. No acute osseous abnormality or concerning osseous lesion.       1. Moderate peripancreatic fat stranding, which is nonspecific but could reflect pancreatitis. Recommend correlating with amylase and lipase. 2. Nonspecific 2 cm low-density lesion in the body of the pancreas, which could be further evaluated with MRI if clinically indicated. 3. Bilateral ureteral stents in place. 4. Enlarged prostate gland with  urinary bladder wall thickening that could be accentuated by underdistention but could reflect sequela of chronic bladder outlet obstruction and/or cystitis. Urinary bladder diverticulum indicating sequela of chronic bladder outlet obstruction. Correlate with urinalysis.  This report was finalized on 2/4/2025 2:30 PM by Dami Ramos MD on Workstation: [id number]       MEDICATIONS GIVEN IN ER  Medications   cefTRIAXone (ROCEPHIN) 1,000 mg in sodium chloride 0.9 % 100 mL MBP (has no administration in time range)   midodrine (PROAMATINE) tablet 5 mg (has no administration in time range)   sodium bicarbonate 150 mEq/1000 mL D5W infusion (has no administration in time range)   sodium chloride 0.9 % bolus 1,000 mL (1,000 mL Intravenous New Bag 2/4/25 1404)   sodium bicarbonate injection 8.4% 50 mEq (50 mEq Intravenous Given 2/4/25 1402)       ORDERS PLACED DURING THIS VISIT:  Orders Placed This Encounter   Procedures    Urine Culture - Urine,    Blood Culture - Blood,    Blood Culture - Blood,    CT Abdomen Pelvis Without Contrast    [place] Draw    Comprehensive Metabolic Panel    Urinalysis With Culture If Indicated - Urine, Clean Catch    Magnesium    Phosphorus    CBC Auto Differential    Urinalysis, Microscopic Only - Urine, Clean Catch    Lactic Acid, Plasma    Osmolality, Serum    Osmolality, Urine - Urine, Clean Catch    Protein / Creatinine Ratio, Urine -    Sodium, Urine, Random -    Comprehensive Metabolic Panel    Magnesium    Phosphorus    Uric Acid    Beta Hydroxybutyrate Quantitative    Lipase    Cortisol    Nephrology (on -call MD unless specified)    Family Medicine Consult    Inpatient Nephrology Consult    Inpatient Admission    Green Top (Gel)    Lavender Top    Gold Top - SST    Light Blue Top    CBC & Differential    CBC & Differential       OUTPATIENT MEDICATION MANAGEMENT:  Current Facility-Administered Medications Ordered in Epic   Medication Dose Route Frequency Provider Last Rate Last Admin     cefTRIAXone (ROCEPHIN) 1,000 mg in sodium chloride 0.9 % 100 mL MBP  1,000 mg Intravenous Once Reggie Alcazar MD        midodrine (PROAMATINE) tablet 5 mg  5 mg Oral TID AC Tad Benoit MD        sodium bicarbonate 150 mEq/1000 mL D5W infusion  150 mEq Intravenous Continuous Tad Benoit MD         Current Outpatient Medications Ordered in Epic   Medication Sig Dispense Refill    amLODIPine (NORVASC) 5 MG tablet Take 1 tablet by mouth Daily. 30 tablet 0    aspirin 81 MG EC tablet Take 1 tablet by mouth Daily.      atorvastatin (LIPITOR) 10 MG tablet Take 1 tablet by mouth Daily. Indications: High Amount of Fats in the Blood      donepezil (ARICEPT) 10 MG tablet Take 1 tablet by mouth Every Night. Indications: Alzheimer's Disease      finasteride (Proscar) 5 MG tablet Take 1 tablet by mouth Daily. Indications: Benign Enlargement of Prostate      hydrALAZINE (APRESOLINE) 25 MG tablet Take 1 tablet by mouth Every 8 (Eight) Hours. 90 tablet 0    insulin lispro (HUMALOG/ADMELOG) 100 UNIT/ML injection Inject 2-7 Units under the skin into the appropriate area as directed 4 (Four) Times a Day Before Meals & at Bedtime.      memantine (NAMENDA) 10 MG tablet Take 1 tablet by mouth Daily. Indications: Alzheimer's Disease      tamsulosin (FLOMAX) 0.4 MG capsule 24 hr capsule Take 1 capsule by mouth Daily. 30 capsule 0       PROCEDURES  Procedures    PROGRESS, DATA ANALYSIS, CONSULTS, AND MEDICAL DECISION MAKING  All labs have been independently interpreted by me.  All radiology studies have been reviewed by me. All EKG's have been independently viewed and interpreted by me.  Discussion below represents my analysis of pertinent findings related to patient's condition, differential diagnosis, treatment plan and final disposition.    Differential diagnosis includes but is not limited to NEHEMIAS, electrolyte derangement, UTI.    Clinical Scores:                   ED Course as of 02/04/25 1506   Tue Feb 04, 2025   130  Creatinine(!): 5.16 [AB]   1304 WBC(!): 15.48 [AB]   1307 Anion Gap(!): 16.8 [AB]   1308 WBC(!): 15.48 [AB]   1322 Updated patient and family on results and indication for admission.  They are agreeable.  Patient has not yet been able to provide a urine sample.  Informed of upcoming CT imaging. [AB]   1330 Patient is taking ciprofloxacin at home. [AB]   1339 Phone call with Dr. Benoit with Nephrology.  Discussed the patient, relevant history, exam, diagnostics, ED findings/progress, and concerns. They agree to consult.  [AB]   1400 Nitrite, UA(!): Positive [AB]   1401 Patient has 1 prior urine culture from August of last year that had no growth.  Rocephin ordered for  UTI. [AB]   1406 CT Abdomen Pelvis Without Contrast  My independent interpretation of the imaging study is some stranding around the right kidney, no free air, no bowel obstruction [AB]   1447 Phone call with Dr. Farrell (on call for Dr. Mckenna).  Discussed the patient, relevant history, exam, diagnostics, ED findings/progress, and concerns. They agree to admit the patient to telemetry. Care assumed by the admitting physician at this time. Lipase pending at time of admission.  [AB]   1506 Lipase(!): 2,610 [AB]      ED Course User Index  [AB] Reggie Alcazar MD             AS OF 15:06 EST VITALS:    BP - 93/64  HR - 92  TEMP - 99.3 °F (37.4 °C) (Tympanic)  O2 SATS - 98%    COMPLEXITY OF CARE  The patient requires admission.      DIAGNOSIS  Final diagnoses:   Acute renal failure, unspecified acute renal failure type   Acute UTI   Severe sepsis   Acute pancreatitis, unspecified complication status, unspecified pancreatitis type         DISPOSITION  ED Disposition       ED Disposition   Decision to Admit    Condition   --    Comment   Level of Care: Telemetry [5]   Diagnosis: ARF (acute renal failure) [415513]   Admitting Physician: TOMAS FARRELL [[id number]]   Attending Physician: TOMAS FARRELL [[id number]]   Certification: I Certify That Inpatient Hospital  Services Are Medically Necessary For Greater Than 2 Midnights                  Please note that portions of this document were completed with a voice recognition program.    Note Disclaimer: At [facility], we believe that sharing information builds trust and better relationships. You are receiving this note because you recently visited [facility]. It is possible you will see health information before a provider has talked with you about it. This kind of information can be easy to misunderstand. To help you fully understand what it means for your health, we urge you to discuss this note with your provider.         Reggie Aclazar MD  02/04/25 7220       Reggie Alcazar MD  02/04/25 [id number]

## 2025-02-04 NOTE — PLAN OF CARE
Goal Outcome Evaluation:  Plan of Care Reviewed With: patient, spouse, other (see comments) (caregiver)           Outcome Evaluation: Pt admitted to unit with wife and caretaker at bedside.  Pt has dementia with alzheimers and will not stop talking and thinks he has to be funny at all times.  VSS, RA,NSR.  NPO for pancreatitis and US of Gallbladder and may not be done until after midnight at somepoint.  Sodium Bicarb running in IV.  No c/o pain or nausea since at the hospital.

## 2025-02-04 NOTE — ED NOTES
..Nursing report ED to floor  Froy Ngo  79 y.o.  male    HPI :  HPI  Stated Reason for Visit: abnormal kidney functions, pt also reports some nausea and vomting last night but denies symptoms at this time  History Obtained From: patient    Chief Complaint  Chief Complaint   Patient presents with    Abnormal Lab     Kidney functions        Admitting doctor:   Ritesh Farrell MD    Admitting diagnosis:   The primary encounter diagnosis was Acute renal failure, unspecified acute renal failure type. Diagnoses of Acute UTI, Severe sepsis, and Acute pancreatitis, unspecified complication status, unspecified pancreatitis type were also pertinent to this visit.    Code status:   Current Code Status       Date Active Code Status Order ID Comments User Context       Prior            Allergies:   Patient has no known allergies.    Isolation:   No active isolations    Intake and Output    Intake/Output Summary (Last 24 hours) at 2/4/2025 1603  Last data filed at 2/4/2025 1530  Gross per 24 hour   Intake 1000 ml   Output --   Net 1000 ml       Weight:   There were no vitals filed for this visit.    Most recent vitals:   Vitals:    02/04/25 1239 02/04/25 1301 02/04/25 1401 02/04/25 1531   BP:  100/57 93/64 93/61   Pulse: 113 101 92 82   Resp:       Temp:       TempSrc:       SpO2: 98% 95% 98% 96%       Active LDAs/IV Access:   Lines, Drains & Airways       Active LDAs       Name Placement date Placement time Site Days    Peripheral IV 02/04/25 1344 Right Antecubital 02/04/25  1344  Antecubital  less than 1    Continuous Bladder Irrigation Triple-lumen 22 Fr 08/13/24  1259  Triple-lumen  175                    Labs (abnormal labs have a star):   Labs Reviewed   COMPREHENSIVE METABOLIC PANEL - Abnormal; Notable for the following components:       Result Value    Glucose 277 (*)     BUN 70 (*)     Creatinine 5.16 (*)     Sodium 123 (*)     Chloride 93 (*)     CO2 13.2 (*)     ALT (SGPT) 78 (*)     AST (SGOT) 63 (*)      Alkaline Phosphatase 219 (*)     Anion Gap 16.8 (*)     eGFR 10.6 (*)     All other components within normal limits    Narrative:     GFR Categories in Chronic Kidney Disease (CKD)      GFR Category          GFR (mL/min/1.73)    Interpretation  G1                     90 or greater         Normal or high (1)  G2                      60-89                Mild decrease (1)  G3a                   45-59                Mild to moderate decrease  G3b                   30-44                Moderate to severe decrease  G4                    15-29                Severe decrease  G5                    14 or less           Kidney failure          (1)In the absence of evidence of kidney disease, neither GFR category G1 or G2 fulfill the criteria for CKD.    eGFR calculation 2021 CKD-EPI creatinine equation, which does not include race as a factor   URINALYSIS W/ CULTURE IF INDICATED - Abnormal; Notable for the following components:    Appearance, UA Cloudy (*)     Blood, UA Moderate (2+) (*)     Protein,  mg/dL (2+) (*)     Leuk Esterase, UA Large (3+) (*)     Nitrite, UA Positive (*)     All other components within normal limits    Narrative:     In absence of clinical symptoms, the presence of pyuria, bacteria, and/or nitrites on the urinalysis result does not correlate with infection.   CBC WITH AUTO DIFFERENTIAL - Abnormal; Notable for the following components:    WBC 15.48 (*)     RBC 3.89 (*)     Hemoglobin 11.0 (*)     Hematocrit 32.7 (*)     Neutrophil % 90.8 (*)     Lymphocyte % 2.6 (*)     Neutrophils, Absolute 14.05 (*)     Lymphocytes, Absolute 0.41 (*)     Immature Grans, Absolute 0.08 (*)     All other components within normal limits   URINALYSIS, MICROSCOPIC ONLY - Abnormal; Notable for the following components:    RBC, UA Too Numerous to Count (*)     WBC, UA Too Numerous to Count (*)     All other components within normal limits   PROTEIN / CREATININE RATIO, URINE - Abnormal; Notable for the following  components:    Protein/Creatinine Ratio, Urine 1,037.5 (*)     All other components within normal limits   LIPASE - Abnormal; Notable for the following components:    Lipase 2,610 (*)     All other components within normal limits   MAGNESIUM - Normal   PHOSPHORUS - Normal   OSMOLALITY, SERUM - Normal   BETA HYDROXYBUTYRATE QUANTITATIVE - Normal    Narrative:     In the assessment of possible diabetic ketoacidosis, the test should be interpreted along with other clinical and laboratory findings.  A level greater than 1 mmol/L should require further evaluation and levels of more than 3 mmol/L require immediate medical review.   URINE CULTURE   BLOOD CULTURE   BLOOD CULTURE   RAINBOW DRAW    Narrative:     The following orders were created for panel order [place] Draw.  Procedure                               Abnormality         Status                     ---------                               -----------         ------                     Green Top (Gel)[[id number]]                                  Final result               Lavender Top[[id number]]                                     Final result               Gold Top - SST[[id number]]                                   Final result               Light Blue Top[[id number]]                                   Final result                 Please view results for these tests on the individual orders.   OSMOLALITY, URINE    Narrative:     Osmo Normal Reference Ranges:    Random:  mOsm/kg H2O, depending on fluid intake.  Random: >850 mOsm/kg H20, after 12 hour fluid restriction.    24 Hour: 300-900 mOsm/kg H2O.   SODIUM, URINE, RANDOM    Narrative:     Reference intervals for random urine have not been established.  Clinical usage is dependent upon physician's interpretation in combination with other laboratory tests.      LACTIC ACID, PLASMA   GREEN TOP   LAVENDER TOP   GOLD TOP - SST   LIGHT BLUE TOP   CBC AND DIFFERENTIAL    Narrative:     The following orders were  created for panel order CBC & Differential.  Procedure                               Abnormality         Status                     ---------                               -----------         ------                     CBC Auto Differential[[id number]]        Abnormal            Final result                 Please view results for these tests on the individual orders.       EKG:   No orders to display       Meds given in ED:   Medications   cefTRIAXone (ROCEPHIN) 1,000 mg in sodium chloride 0.9 % 100 mL MBP (1,000 mg Intravenous New Bag 2/4/25 1553)   midodrine (PROAMATINE) tablet 5 mg (has no administration in time range)   sodium bicarbonate 150 mEq/1000 mL D5W infusion (has no administration in time range)   sodium chloride 0.9 % bolus 1,000 mL (0 mL Intravenous Stopped 2/4/25 1530)   sodium bicarbonate injection 8.4% 50 mEq (50 mEq Intravenous Given 2/4/25 1402)       Imaging results:  CT Abdomen Pelvis Without Contrast    Result Date: 2/4/2025   1. Moderate peripancreatic fat stranding, which is nonspecific but could reflect pancreatitis. Recommend correlating with amylase and lipase. 2. Nonspecific 2 cm low-density lesion in the body of the pancreas, which could be further evaluated with MRI if clinically indicated. 3. Bilateral ureteral stents in place. 4. Enlarged prostate gland with urinary bladder wall thickening that could be accentuated by underdistention but could reflect sequela of chronic bladder outlet obstruction and/or cystitis. Urinary bladder diverticulum indicating sequela of chronic bladder outlet obstruction. Correlate with urinalysis.  This report was finalized on 2/4/2025 2:30 PM by Dami Ramos MD on Workstation: [id number]       Ambulatory status:   - up with assist x1    Social issues:   Social History     Socioeconomic History    Marital status: Single   Tobacco Use    Smoking status: Never     Passive exposure: Never    Smokeless tobacco: Never   Vaping Use    Vaping status: Never Used    Substance and Sexual Activity    Alcohol use: Never    Drug use: Never    Sexual activity: Not Currently       Peripheral Neurovascular  Peripheral Neurovascular (Adult)  Peripheral Neurovascular WDL: WDL    Neuro Cognitive  Neuro Cognitive (Adult)  Cognitive/Neuro/Behavioral WDL: orientation, .WDL except, speech (intemrittent confusion and frequent questions)  Orientation: oriented x 4  Speech: clear, logical    Learning  Learning Assessment  Learning Readiness and Ability: no barriers identified    Respiratory  Respiratory WDL  Respiratory WDL: WDL, rhythm/pattern  Rhythm/Pattern, Respiratory: depth regular, pattern regular, unlabored    Abdominal Pain       Pain Assessments  Pain (Adult)  (0-10) Pain Rating: Rest: 0  (0-10) Pain Rating: Activity: 0    NIH Stroke Scale       Mary Landaverde RN  02/04/25 16:03 EST

## 2025-02-05 ENCOUNTER — APPOINTMENT (OUTPATIENT)
Dept: ULTRASOUND IMAGING | Facility: HOSPITAL | Age: 80
End: 2025-02-05
Payer: COMMERCIAL

## 2025-02-05 LAB
ALBUMIN SERPL-MCNC: 3 G/DL (ref 3.5–5.2)
ALBUMIN/GLOB SERPL: 0.9 G/DL
ALP SERPL-CCNC: 184 U/L (ref 39–117)
ALT SERPL W P-5'-P-CCNC: 54 U/L (ref 1–41)
ANION GAP SERPL CALCULATED.3IONS-SCNC: 13.4 MMOL/L (ref 5–15)
AST SERPL-CCNC: 34 U/L (ref 1–40)
BASOPHILS # BLD AUTO: 0.02 10*3/MM3 (ref 0–0.2)
BASOPHILS NFR BLD AUTO: 0.2 % (ref 0–1.5)
BILIRUB SERPL-MCNC: 0.5 MG/DL (ref 0–1.2)
BUN SERPL-MCNC: 80 MG/DL (ref 8–23)
BUN/CREAT SERPL: 16.8 (ref 7–25)
CALCIUM SPEC-SCNC: 8.4 MG/DL (ref 8.6–10.5)
CHLORIDE SERPL-SCNC: 98 MMOL/L (ref 98–107)
CO2 SERPL-SCNC: 21.6 MMOL/L (ref 22–29)
CORTIS SERPL-MCNC: 19.2 MCG/DL
CREAT SERPL-MCNC: 4.76 MG/DL (ref 0.76–1.27)
DEPRECATED RDW RBC AUTO: 47.2 FL (ref 37–54)
EGFRCR SERPLBLD CKD-EPI 2021: 11.6 ML/MIN/1.73
EOSINOPHIL # BLD AUTO: 0.15 10*3/MM3 (ref 0–0.4)
EOSINOPHIL NFR BLD AUTO: 1.6 % (ref 0.3–6.2)
ERYTHROCYTE [DISTWIDTH] IN BLOOD BY AUTOMATED COUNT: 15.2 % (ref 12.3–15.4)
GLOBULIN UR ELPH-MCNC: 3.4 GM/DL
GLUCOSE BLDC GLUCOMTR-MCNC: 156 MG/DL (ref 70–130)
GLUCOSE BLDC GLUCOMTR-MCNC: 199 MG/DL (ref 70–130)
GLUCOSE BLDC GLUCOMTR-MCNC: 228 MG/DL (ref 70–130)
GLUCOSE BLDC GLUCOMTR-MCNC: 234 MG/DL (ref 70–130)
GLUCOSE SERPL-MCNC: 226 MG/DL (ref 65–99)
HCT VFR BLD AUTO: 27.6 % (ref 37.5–51)
HGB BLD-MCNC: 8.9 G/DL (ref 13–17.7)
IMM GRANULOCYTES # BLD AUTO: 0.06 10*3/MM3 (ref 0–0.05)
IMM GRANULOCYTES NFR BLD AUTO: 0.6 % (ref 0–0.5)
LIPASE SERPL-CCNC: 1603 U/L (ref 13–60)
LYMPHOCYTES # BLD AUTO: 0.5 10*3/MM3 (ref 0.7–3.1)
LYMPHOCYTES NFR BLD AUTO: 5.2 % (ref 19.6–45.3)
MAGNESIUM SERPL-MCNC: 2 MG/DL (ref 1.6–2.4)
MCH RBC QN AUTO: 27.6 PG (ref 26.6–33)
MCHC RBC AUTO-ENTMCNC: 32.2 G/DL (ref 31.5–35.7)
MCV RBC AUTO: 85.4 FL (ref 79–97)
MONOCYTES # BLD AUTO: 0.6 10*3/MM3 (ref 0.1–0.9)
MONOCYTES NFR BLD AUTO: 6.3 % (ref 5–12)
NEUTROPHILS NFR BLD AUTO: 8.2 10*3/MM3 (ref 1.7–7)
NEUTROPHILS NFR BLD AUTO: 86.1 % (ref 42.7–76)
NRBC BLD AUTO-RTO: 0 /100 WBC (ref 0–0.2)
PHOSPHATE SERPL-MCNC: 4.1 MG/DL (ref 2.5–4.5)
PLATELET # BLD AUTO: 162 10*3/MM3 (ref 140–450)
PMV BLD AUTO: 9.8 FL (ref 6–12)
POTASSIUM SERPL-SCNC: 4.1 MMOL/L (ref 3.5–5.2)
PROT SERPL-MCNC: 6.4 G/DL (ref 6–8.5)
RBC # BLD AUTO: 3.23 10*6/MM3 (ref 4.14–5.8)
SODIUM SERPL-SCNC: 133 MMOL/L (ref 136–145)
URATE SERPL-MCNC: 9 MG/DL (ref 3.4–7)
WBC NRBC COR # BLD AUTO: 9.53 10*3/MM3 (ref 3.4–10.8)

## 2025-02-05 PROCEDURE — 76705 ECHO EXAM OF ABDOMEN: CPT

## 2025-02-05 PROCEDURE — 82533 TOTAL CORTISOL: CPT | Performed by: INTERNAL MEDICINE

## 2025-02-05 PROCEDURE — 85025 COMPLETE CBC W/AUTO DIFF WBC: CPT

## 2025-02-05 PROCEDURE — 25010000002 CEFTRIAXONE PER 250 MG: Performed by: INTERNAL MEDICINE

## 2025-02-05 PROCEDURE — 25010000003 DEXTROSE 5 % SOLUTION: Performed by: INTERNAL MEDICINE

## 2025-02-05 PROCEDURE — 97530 THERAPEUTIC ACTIVITIES: CPT

## 2025-02-05 PROCEDURE — 63710000001 INSULIN REGULAR HUMAN PER 5 UNITS: Performed by: INTERNAL MEDICINE

## 2025-02-05 PROCEDURE — 84100 ASSAY OF PHOSPHORUS: CPT | Performed by: INTERNAL MEDICINE

## 2025-02-05 PROCEDURE — 83690 ASSAY OF LIPASE: CPT | Performed by: INTERNAL MEDICINE

## 2025-02-05 PROCEDURE — 82948 REAGENT STRIP/BLOOD GLUCOSE: CPT

## 2025-02-05 PROCEDURE — 97162 PT EVAL MOD COMPLEX 30 MIN: CPT

## 2025-02-05 PROCEDURE — 97166 OT EVAL MOD COMPLEX 45 MIN: CPT

## 2025-02-05 PROCEDURE — 25010000002 HEPARIN (PORCINE) PER 1000 UNITS: Performed by: INTERNAL MEDICINE

## 2025-02-05 PROCEDURE — 84550 ASSAY OF BLOOD/URIC ACID: CPT | Performed by: INTERNAL MEDICINE

## 2025-02-05 PROCEDURE — 83735 ASSAY OF MAGNESIUM: CPT | Performed by: INTERNAL MEDICINE

## 2025-02-05 PROCEDURE — 80053 COMPREHEN METABOLIC PANEL: CPT | Performed by: INTERNAL MEDICINE

## 2025-02-05 PROCEDURE — 99222 1ST HOSP IP/OBS MODERATE 55: CPT | Performed by: SURGERY

## 2025-02-05 RX ORDER — MIDODRINE HYDROCHLORIDE 5 MG/1
10 TABLET ORAL
Status: COMPLETED | OUTPATIENT
Start: 2025-02-05 | End: 2025-02-06

## 2025-02-05 RX ORDER — TAMSULOSIN HYDROCHLORIDE 0.4 MG/1
0.4 CAPSULE ORAL DAILY
Status: DISCONTINUED | OUTPATIENT
Start: 2025-02-05 | End: 2025-02-13 | Stop reason: HOSPADM

## 2025-02-05 RX ORDER — SODIUM CHLORIDE 9 MG/ML
75 INJECTION, SOLUTION INTRAVENOUS CONTINUOUS
Status: ACTIVE | OUTPATIENT
Start: 2025-02-05 | End: 2025-02-06

## 2025-02-05 RX ORDER — MIDODRINE HYDROCHLORIDE 5 MG/1
10 TABLET ORAL
Status: DISCONTINUED | OUTPATIENT
Start: 2025-02-05 | End: 2025-02-05

## 2025-02-05 RX ADMIN — TAMSULOSIN HYDROCHLORIDE 0.4 MG: 0.4 CAPSULE ORAL at 14:20

## 2025-02-05 RX ADMIN — HEPARIN SODIUM 5000 UNITS: 5000 INJECTION INTRAVENOUS; SUBCUTANEOUS at 09:31

## 2025-02-05 RX ADMIN — MIDODRINE HYDROCHLORIDE 10 MG: 5 TABLET ORAL at 17:29

## 2025-02-05 RX ADMIN — Medication 10 ML: at 20:29

## 2025-02-05 RX ADMIN — MIDODRINE HYDROCHLORIDE 10 MG: 5 TABLET ORAL at 11:49

## 2025-02-05 RX ADMIN — SODIUM BICARBONATE 150 MEQ: 84 INJECTION, SOLUTION INTRAVENOUS at 04:34

## 2025-02-05 RX ADMIN — SODIUM CHLORIDE 75 ML/HR: 9 INJECTION, SOLUTION INTRAVENOUS at 09:31

## 2025-02-05 RX ADMIN — HEPARIN SODIUM 5000 UNITS: 5000 INJECTION INTRAVENOUS; SUBCUTANEOUS at 20:29

## 2025-02-05 RX ADMIN — DONEPEZIL HYDROCHLORIDE 10 MG: 10 TABLET, FILM COATED ORAL at 20:29

## 2025-02-05 RX ADMIN — CEFTRIAXONE SODIUM 1000 MG: 1 INJECTION, POWDER, FOR SOLUTION INTRAMUSCULAR; INTRAVENOUS at 14:10

## 2025-02-05 RX ADMIN — INSULIN HUMAN 3 UNITS: 100 INJECTION, SOLUTION PARENTERAL at 17:30

## 2025-02-05 RX ADMIN — Medication 10 ML: at 09:31

## 2025-02-05 RX ADMIN — MIDODRINE HYDROCHLORIDE 10 MG: 5 TABLET ORAL at 09:31

## 2025-02-05 NOTE — PAYOR COMM NOTE
"Froy Ngo (79 y.o. Male)        PLEASE SEE ATTACHED FOR INPT AUTH.     REF#[id number]    PLEASE CALL  OR  [phone]    THANK YOU    GEMINI GROVE LPN CCP   Date of Birth   1945    Social Security Number       Address   [address] [place] [address]    Home Phone   [phone]    MRN   [id number]       Jain   None    Marital Status   Single                            Admission Date   2/4/25    Admission Type   Emergency    Admitting Provider   Ritesh Farrell MD    Attending Provider   Ritesh Farrell MD    Department, Room/Bed   [address], E655/1       Discharge Date       Discharge Disposition       Discharge Destination                                 Attending Provider: Ritesh Farrell MD    Allergies: No Known Allergies    Isolation: None   Infection: None   Code Status: CPR    Ht: 182.9 cm (72\")   Wt: 88 kg (194 lb)    Admission Cmt: None   Principal Problem: ARF (acute renal failure) [N17.9]                   Active Insurance as of 2/4/2025       Primary Coverage       Payor Plan Insurance Group Employer/Plan Group    ANTHEM BLUE CROSS ANTHEM BLUE CROSS BLUE SHIELD PPO [id number]       Payor Plan Address Payor Plan Phone Number Payor Plan Fax Number Effective Dates    [address] [phone]  4/1/2019 - None Entered    [facility] [address]         Subscriber Name Subscriber Birth Date Member ID       FROY NGO 1945 [id number]               Secondary Coverage       Payor Plan Insurance Group Employer/Plan Group    MEDICARE MEDICARE A ONLY        Payor Plan Address Payor Plan Phone Number Payor Plan Fax Number Effective Dates    [address] [phone]  6/1/2009 - None Entered    [facility] [address]         Subscriber Name Subscriber Birth Date Member ID       FROY NGO 1945 [id number]                     Emergency Contacts        (Rel.) Home Phone Work Phone Mobile Phone    " VASU TURNER (Spouse) [phone] -- [phone]    vasu braswell (Daughter) [phone] -- --    CARLAROSMERY GUEVARA (Friend) -- -- [phone]              [place]: [facility] [id number]  Tax ID [id number]     History & Physical        Ritesh Farrell MD at 25 1525          [facility]   HISTORY AND PHYSICAL    Patient Name: Froy Turner  : 1945  MRN: [id number]  Primary Care Physician:  Ryan Gutierrez MD  Date of admission: 2025    Subjective   Subjective     Chief Complaint: Fever    History of Present Illness    79-year-old male who is a patient of Dr. Gutierrez's.  Wife called me yesterday noting patient had episode of nausea and vomiting starting on Friday night and then was really weak on Saturday and .  Had temperatures over 100 degrees at home.  He's been on ciprofloxacin And Bactrim over the past few weeks although his urine cultures only grew out mixed urogenital frank.  He was to follow-up with urology because he has urinary stent and that was scheduled for late February.  Patient had been hospitalized in  with urinary retention and renal failure with a creatinine of 17.  He slowly improved with treatment and stenting and had been doing fairly well as an outpatient per the wife.    Because of his acute illness I asked them to see me yesterday in the office.  At that time he was afebrile but clammy in appearance.  I was worried about his renal function because of the nausea and vomiting he had on Friday and So I drew blood work on him.  Blood work came back this morning showing creatinine up to 4.2 and sodium low at 126.  Wife reported he was not taking in very much intake and I advised them to go immediately to the emergency room.  In the ER his sodium was 123 and his creatinine was 5.1.  He was quite acidotic and nephrology Has already been consulted and he's been started on IV fluids followed by a bicarbonate drip. He did  have an elevated white count and a  temperature over 99.  Blood pressure is running low one hundreds.  Lactate levels are normal.  Wife notes he looks and feels better than he did yesterday.  He voices no complaints in the emergency room today and specifically says he has no abdominal pain.    Review of Systems     Personal History     Past Medical History:   Diagnosis Date    Dementia     Diabetes mellitus     Elevated cholesterol     Prostate disorder     Urinary retention        Past Surgical History:   Procedure Laterality Date    CYSTOSCOPY W/ URETERAL STENT PLACEMENT Bilateral 8/13/2024    Procedure: BILATERAL CYSTOSCOPY URETERAL CATHETER/STENT INSERTION WITH BILATERAL RETROGRADES;  Surgeon: Antoni Melendez MD;  [facility];  Service: Urology;  Laterality: Bilateral;       Family History: family history is not on file. Otherwise pertinent FHx was reviewed and not pertinent to current issue.    Social History:  reports that he has never smoked. He has never been exposed to tobacco smoke. He has never used smokeless tobacco. He reports that he does not drink alcohol and does not use drugs.    Home Medications:  amLODIPine, aspirin, atorvastatin, donepezil, finasteride, hydrALAZINE, insulin lispro, memantine, and tamsulosin    Allergies:  No Known Allergies    Objective    Objective     Vitals:   Temp:  [99.3 °F (37.4 °C)] 99.3 °F (37.4 °C)  Heart Rate:  [] 92  Resp:  [18] 18  BP: ()/(57-71) 93/64    Physical Exam    General: NAD. Alert and oriented to person and place. Very talkative  Heent: NCAT  Heart: RRR  Lungs: CTA  Abd; Soft BS+ non tender to deep palpation,  grant's sign negative  Ext: no edema      Result Review      Recent Results (from the past 24 hours)   Green Top (Gel)    Collection Time: 02/04/25 12:02 PM   Result Value Ref Range    Extra Tube Hold for add-ons.    Lavender Top    Collection Time: 02/04/25 12:02 PM   Result Value Ref Range    Extra Tube hold for add-on    Gold Top - SST    Collection  Time: 02/04/25 12:02 PM   Result Value Ref Range    Extra Tube Hold for add-ons.    Light Blue Top    Collection Time: 02/04/25 12:02 PM   Result Value Ref Range    Extra Tube Hold for add-ons.    Comprehensive Metabolic Panel    Collection Time: 02/04/25 12:02 PM    Specimen: Arm, Left; Blood   Result Value Ref Range    Glucose 277 (H) 65 - 99 mg/dL    BUN 70 (H) 8 - 23 mg/dL    Creatinine 5.16 (H) 0.76 - 1.27 mg/dL    Sodium 123 (L) 136 - 145 mmol/L    Potassium 5.1 3.5 - 5.2 mmol/L    Chloride 93 (L) 98 - 107 mmol/L    CO2 13.2 (L) 22.0 - 29.0 mmol/L    Calcium 9.3 8.6 - 10.5 mg/dL    Total Protein 8.0 6.0 - 8.5 g/dL    Albumin 3.5 3.5 - 5.2 g/dL    ALT (SGPT) 78 (H) 1 - 41 U/L    AST (SGOT) 63 (H) 1 - 40 U/L    Alkaline Phosphatase 219 (H) 39 - 117 U/L    Total Bilirubin 0.7 0.0 - 1.2 mg/dL    Globulin 4.5 gm/dL    A/G Ratio 0.8 g/dL    BUN/Creatinine Ratio 13.6 7.0 - 25.0    Anion Gap 16.8 (H) 5.0 - 15.0 mmol/L    eGFR 10.6 (L) >60.0 mL/min/1.73   Magnesium    Collection Time: 02/04/25 12:02 PM    Specimen: Arm, Left; Blood   Result Value Ref Range    Magnesium 2.0 1.6 - 2.4 mg/dL   Phosphorus    Collection Time: 02/04/25 12:02 PM    Specimen: Arm, Left; Blood   Result Value Ref Range    Phosphorus 3.8 2.5 - 4.5 mg/dL   CBC Auto Differential    Collection Time: 02/04/25 12:02 PM    Specimen: Arm, Left; Blood   Result Value Ref Range    WBC 15.48 (H) 3.40 - 10.80 10*3/mm3    RBC 3.89 (L) 4.14 - 5.80 10*6/mm3    Hemoglobin 11.0 (L) 13.0 - 17.7 g/dL    Hematocrit 32.7 (L) 37.5 - 51.0 %    MCV 84.1 79.0 - 97.0 fL    MCH 28.3 26.6 - 33.0 pg    MCHC 33.6 31.5 - 35.7 g/dL    RDW 14.6 12.3 - 15.4 %    RDW-SD 44.0 37.0 - 54.0 fl    MPV 9.7 6.0 - 12.0 fL    Platelets 262 140 - 450 10*3/mm3    Neutrophil % 90.8 (H) 42.7 - 76.0 %    Lymphocyte % 2.6 (L) 19.6 - 45.3 %    Monocyte % 5.1 5.0 - 12.0 %    Eosinophil % 0.9 0.3 - 6.2 %    Basophil % 0.1 0.0 - 1.5 %    Immature Grans % 0.5 0.0 - 0.5 %    Neutrophils, Absolute  14.05 (H) 1.70 - 7.00 10*3/mm3    Lymphocytes, Absolute 0.41 (L) 0.70 - 3.10 10*3/mm3    Monocytes, Absolute 0.79 0.10 - 0.90 10*3/mm3    Eosinophils, Absolute 0.14 0.00 - 0.40 10*3/mm3    Basophils, Absolute 0.01 0.00 - 0.20 10*3/mm3    Immature Grans, Absolute 0.08 (H) 0.00 - 0.05 10*3/mm3    nRBC 0.0 0.0 - 0.2 /100 WBC   Osmolality, Serum    Collection Time: 02/04/25 12:02 PM    Specimen: Arm, Left; Blood   Result Value Ref Range    Osmolality 299 280 - 301 mOsm/kg   Beta Hydroxybutyrate Quantitative    Collection Time: 02/04/25 12:02 PM    Specimen: Arm, Left; Blood   Result Value Ref Range    Beta-Hydroxybutyrate Quant 0.210 0.020 - 0.270 mmol/L   Lipase    Collection Time: 02/04/25 12:02 PM    Specimen: Arm, Left; Blood   Result Value Ref Range    Lipase 2,610 (H) 13 - 60 U/L   Urinalysis With Culture If Indicated - Urine, Clean Catch    Collection Time: 02/04/25  1:38 PM    Specimen: Urine, Clean Catch   Result Value Ref Range    Color, UA Yellow Yellow, Straw    Appearance, UA Cloudy (A) Clear    pH, UA 6.5 5.0 - 8.0    Specific Gravity, UA 1.016 1.005 - 1.030    Glucose, UA Negative Negative    Ketones, UA Negative Negative    Bilirubin, UA Negative Negative    Blood, UA Moderate (2+) (A) Negative    Protein,  mg/dL (2+) (A) Negative    Leuk Esterase, UA Large (3+) (A) Negative    Nitrite, UA Positive (A) Negative    Urobilinogen, UA 1.0 E.U./dL 0.2 - 1.0 E.U./dL   Urinalysis, Microscopic Only - Urine, Clean Catch    Collection Time: 02/04/25  1:38 PM    Specimen: Urine, Clean Catch   Result Value Ref Range    RBC, UA Too Numerous to Count (A) None Seen, 0-2 /HPF    WBC, UA Too Numerous to Count (A) None Seen, 0-2 /HPF    Bacteria, UA None Seen None Seen /HPF    Squamous Epithelial Cells, UA 0-2 None Seen, 0-2 /HPF    Hyaline Casts, UA 7-12 None Seen /LPF    Methodology Automated Microscopy    Osmolality, Urine - Urine, Clean Catch    Collection Time: 02/04/25  1:38 PM    Specimen: Urine, Clean Catch    Result Value Ref Range    Osmolality, Urine 359 mOsm/kg   Protein / Creatinine Ratio, Urine - Urine, Clean Catch    Collection Time: 02/04/25  1:38 PM    Specimen: Urine, Clean Catch   Result Value Ref Range    Protein/Creatinine Ratio, Urine 1,037.5 (H) 0.0 - 200.0 mg/G Crea    Creatinine, Urine 109.4 mg/dL    Total Protein, Urine 113.5 mg/dL   Sodium, Urine, Random - Urine, Clean Catch    Collection Time: 02/04/25  1:38 PM    Specimen: Urine, Clean Catch   Result Value Ref Range    Sodium, Urine 26 mmol/L     Imaging Results (Last 24 Hours)       Procedure Component Value Units Date/Time    CT Abdomen Pelvis Without Contrast [[id number]] Collected: 02/04/25 1412     Updated: 02/04/25 1433    Narrative:      CT ABDOMEN PELVIS WO CONTRAST-     DATE OF EXAM: 2/4/2025 1:44 PM     INDICATION: UTI with bilateral ureteral stents, elevated WBC, elevated  Cr.     COMPARISON: CT 8/11/2024 and 8/4/2024.     TECHNIQUE: Multiple contiguous axial images were acquired through the  abdomen and pelvis without the intravenous administration of contrast  per reformatted coronal and sagittal sequences were also reviewed.  Radiation dose reduction techniques were utilized, including automated  exposure control and exposure modulation based on body size.     FINDINGS:  Respiratory motion limits evaluation of the lung bases. Multifocal  bibasilar subsegmental atelectasis and/scarring. Partially imaged severe  calcified coronary artery disease. Relative hyperdensity of the  interventricular septum suggest anemia.     Cholelithiasis without specific CT evidence of acute cholecystitis. The  liver and spleen are unremarkable. Moderate peripancreatic fat  stranding. Nonspecific small cystic low-density lesion in the pancreatic  body, measuring up to 2 cm craniocaudal (coronal series 3 image 51).  Mild likely benign nodular thickening of the left adrenal gland. The  right adrenal gland is unremarkable in limited noncontrast CT  appearance.  Bilateral ureteral stents in place. Both kidneys are  otherwise unremarkable in limited noncontrast CT appearance. The urinary  bladder is nondistended. Urinary bladder wall thickening may be  accentuated by underdistention but could reflect sequela of chronic  bladder outlet obstruction. Small right-sided urinary bladder  diverticulum. Enlarged prostate gland, measuring 6 cm in transverse  diameter, with mild mass effect on the floor the urinary bladder.     Mild diffuse gastric wall thickening is likely accentuated by under  distention. Mild colorectal stool. Colonic diverticula, without CT  evidence of diverticulitis. No bowel obstruction. The appendix is  normal.     No free fluid in the abdomen or pelvis. No free intraperitoneal air. No  pathologically enlarged lymph nodes in the abdomen or pelvis. Stable  soft tissue attenuation in the left inguinal canal.     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 Similar-appearing lumbar levoscoliosis and  multilevel lumbar spondylosis. No acute osseous abnormality or  concerning osseous lesion.       Impression:         1. Moderate peripancreatic fat stranding, which is nonspecific but could  reflect pancreatitis. Recommend correlating with amylase and lipase.  2. Nonspecific 2 cm low-density lesion in the body of the pancreas,  which could be further evaluated with MRI if clinically indicated.  3. Bilateral ureteral stents in place.  4. Enlarged prostate gland with urinary bladder wall thickening that  could be accentuated by underdistention but could reflect sequela of  chronic bladder outlet obstruction and/or cystitis. Urinary bladder  diverticulum indicating sequela of chronic bladder outlet obstruction.  Correlate with urinalysis.     This report was finalized on 2/4/2025 2:30 PM by Dami Ramos MD on  Workstation: [id number]               Result Review:  I have personally reviewed the results from the time of this  admission to 2/4/2025 15:25 EST and agree with these findings:  []  Laboratory list / accordion  []  Microbiology  []  Radiology  []  EKG/Telemetry   []  Cardiology/Vascular   []  Pathology  []  Old records  []  Other:        Assessment & Plan   Assessment / Plan     Brief Patient Summary:  Froy Ngo is a 79 y.o. male who has acute renal failure and pancreatitis.    Active Hospital Problems:  Active Hospital Problems    Diagnosis     **ARF (acute renal failure)      Plan:   Acute renal failure-looks to be largely prerenal given nausea and vomiting on Friday night with poor intake along with third spacing from pancreatitis.  CT of the abdomen did not show obstructive uropathy.  He did receive Rocephin in the emergency room for probable urinary tract infection.  I will continue that while we are waiting for cultures.  Pancreatitis-lipase is over 2000 but interestingly he has a benign abdominal exam.  I wonder whether he passed a gallstone on Friday night and that was the episode of nausea and vomiting he had.  He does not drink alcohol.  He has no right upper quadrant tenderness now but I want to watch him closely and get a ultrasound of the right upper quadrant.  I am keeping him n.p.o. tonight.  If white count is elevating her temperatures going up further may need to consider covering abdominal source for infection by changing to Zosyn.  He currently looks good and has a benign abdominal exam so I am going to see his ultrasound first   urinary infection-await culture results.  Currently receiving Rocephin  History of urinary outlet obstruction-has stents in place CT scan does not show any obstructive issue currently.  There are some question whether he is supposed to have the stents removed with urology.  Would like to get on top of his current issues and then we can ask urology whether they want to see him while he is here.  Elevated blood sugars-he has elevated blood sugars and was receiving some low-dose  Lantus on last admission.  He is currently n.p.o. because of his pancreatitis.  I will have him covered with low-dose insulin for high blood sugars  Dementia-family is at bedside and currently seems quite oriented.  Will get him back on his home medicines as soon as possible.  Family is going to have her caregiver by his side during his hospitalization which should help with orientation  History of hypertension-holding blood pressure medicines due to current blood pressures  Hyperlipidemia-will resume medications once taking p.o.        VTE Prophylaxis:  Lovenox        CODE STATUS: Will address CODE STATUS with family     Admission Status:  I believe this patient meets inpatient status.    Ritesh Farrell MD    Electronically signed by Ritesh Farrell MD at 02/04/25 1727          Emergency Department Notes        Mary Landaverde RN at 02/04/25 1603          ..Nursing report ED to floor  Froy Crawfordck  79 y.o.  male    HPI :  HPI  Stated Reason for Visit: abnormal kidney functions, pt also reports some nausea and vomting last night but denies symptoms at this time  History Obtained From: patient    Chief Complaint  Chief Complaint   Patient presents with    Abnormal Lab     Kidney functions        Admitting doctor:   Ritesh Farrell MD    Admitting diagnosis:   The primary encounter diagnosis was Acute renal failure, unspecified acute renal failure type. Diagnoses of Acute UTI, Severe sepsis, and Acute pancreatitis, unspecified complication status, unspecified pancreatitis type were also pertinent to this visit.    Code status:   Current Code Status       Date Active Code Status Order ID Comments User Context       Prior            Allergies:   Patient has no known allergies.    Isolation:   No active isolations    Intake and Output    Intake/Output Summary (Last 24 hours) at 2/4/2025 1603  Last data filed at 2/4/2025 1530  Gross per 24 hour   Intake 1000 ml   Output --   Net 1000 ml       Weight:   There  were no vitals filed for this visit.    Most recent vitals:   Vitals:    02/04/25 1239 02/04/25 1301 02/04/25 1401 02/04/25 1531   BP:  100/57 93/64 93/61   Pulse: 113 101 92 82   Resp:       Temp:       TempSrc:       SpO2: 98% 95% 98% 96%       Active LDAs/IV Access:   Lines, Drains & Airways       Active LDAs       Name Placement date Placement time Site Days    Peripheral IV 02/04/25 1344 Right Antecubital 02/04/25  1344  Antecubital  less than 1    Continuous Bladder Irrigation Triple-lumen 22 Fr 08/13/24  1259  Triple-lumen  175                    Labs (abnormal labs have a star):   Labs Reviewed   COMPREHENSIVE METABOLIC PANEL - Abnormal; Notable for the following components:       Result Value    Glucose 277 (*)     BUN 70 (*)     Creatinine 5.16 (*)     Sodium 123 (*)     Chloride 93 (*)     CO2 13.2 (*)     ALT (SGPT) 78 (*)     AST (SGOT) 63 (*)     Alkaline Phosphatase 219 (*)     Anion Gap 16.8 (*)     eGFR 10.6 (*)     All other components within normal limits    Narrative:     GFR Categories in Chronic Kidney Disease (CKD)      GFR Category          GFR (mL/min/1.73)    Interpretation  G1                     90 or greater         Normal or high (1)  G2                      60-89                Mild decrease (1)  G3a                   45-59                Mild to moderate decrease  G3b                   30-44                Moderate to severe decrease  G4                    15-29                Severe decrease  G5                    14 or less           Kidney failure          (1)In the absence of evidence of kidney disease, neither GFR category G1 or G2 fulfill the criteria for CKD.    eGFR calculation 2021 CKD-EPI creatinine equation, which does not include race as a factor   URINALYSIS W/ CULTURE IF INDICATED - Abnormal; Notable for the following components:    Appearance, UA Cloudy (*)     Blood, UA Moderate (2+) (*)     Protein,  mg/dL (2+) (*)     Leuk Esterase, UA Large (3+) (*)      Nitrite, UA Positive (*)     All other components within normal limits    Narrative:     In absence of clinical symptoms, the presence of pyuria, bacteria, and/or nitrites on the urinalysis result does not correlate with infection.   CBC WITH AUTO DIFFERENTIAL - Abnormal; Notable for the following components:    WBC 15.48 (*)     RBC 3.89 (*)     Hemoglobin 11.0 (*)     Hematocrit 32.7 (*)     Neutrophil % 90.8 (*)     Lymphocyte % 2.6 (*)     Neutrophils, Absolute 14.05 (*)     Lymphocytes, Absolute 0.41 (*)     Immature Grans, Absolute 0.08 (*)     All other components within normal limits   URINALYSIS, MICROSCOPIC ONLY - Abnormal; Notable for the following components:    RBC, UA Too Numerous to Count (*)     WBC, UA Too Numerous to Count (*)     All other components within normal limits   PROTEIN / CREATININE RATIO, URINE - Abnormal; Notable for the following components:    Protein/Creatinine Ratio, Urine 1,037.5 (*)     All other components within normal limits   LIPASE - Abnormal; Notable for the following components:    Lipase 2,610 (*)     All other components within normal limits   MAGNESIUM - Normal   PHOSPHORUS - Normal   OSMOLALITY, SERUM - Normal   BETA HYDROXYBUTYRATE QUANTITATIVE - Normal    Narrative:     In the assessment of possible diabetic ketoacidosis, the test should be interpreted along with other clinical and laboratory findings.  A level greater than 1 mmol/L should require further evaluation and levels of more than 3 mmol/L require immediate medical review.   URINE CULTURE   BLOOD CULTURE   BLOOD CULTURE   RAINBOW DRAW    Narrative:     The following orders were created for panel order [place] Draw.  Procedure                               Abnormality         Status                     ---------                               -----------         ------                     Green Top (Gel)[[id number]]                                  Final result               Lavender Top[[id number]]                                      Final result               Gold Top - SST[[id number]]                                   Final result               Light Blue Top[[id number]]                                   Final result                 Please view results for these tests on the individual orders.   OSMOLALITY, URINE    Narrative:     Osmo Normal Reference Ranges:    Random:  mOsm/kg H2O, depending on fluid intake.  Random: >850 mOsm/kg H20, after 12 hour fluid restriction.    24 Hour: 300-900 mOsm/kg H2O.   SODIUM, URINE, RANDOM    Narrative:     Reference intervals for random urine have not been established.  Clinical usage is dependent upon physician's interpretation in combination with other laboratory tests.      LACTIC ACID, PLASMA   GREEN TOP   LAVENDER TOP   GOLD TOP - SST   LIGHT BLUE TOP   CBC AND DIFFERENTIAL    Narrative:     The following orders were created for panel order CBC & Differential.  Procedure                               Abnormality         Status                     ---------                               -----------         ------                     CBC Auto Differential[[id number]]        Abnormal            Final result                 Please view results for these tests on the individual orders.       EKG:   No orders to display       Meds given in ED:   Medications   cefTRIAXone (ROCEPHIN) 1,000 mg in sodium chloride 0.9 % 100 mL MBP (1,000 mg Intravenous New Bag 2/4/25 1553)   midodrine (PROAMATINE) tablet 5 mg (has no administration in time range)   sodium bicarbonate 150 mEq/1000 mL D5W infusion (has no administration in time range)   sodium chloride 0.9 % bolus 1,000 mL (0 mL Intravenous Stopped 2/4/25 1530)   sodium bicarbonate injection 8.4% 50 mEq (50 mEq Intravenous Given 2/4/25 1402)       Imaging results:  CT Abdomen Pelvis Without Contrast    Result Date: 2/4/2025   1. Moderate peripancreatic fat stranding, which is nonspecific but could reflect pancreatitis. Recommend  correlating with amylase and lipase. 2. Nonspecific 2 cm low-density lesion in the body of the pancreas, which could be further evaluated with MRI if clinically indicated. 3. Bilateral ureteral stents in place. 4. Enlarged prostate gland with urinary bladder wall thickening that could be accentuated by underdistention but could reflect sequela of chronic bladder outlet obstruction and/or cystitis. Urinary bladder diverticulum indicating sequela of chronic bladder outlet obstruction. Correlate with urinalysis.  This report was finalized on 2/4/2025 2:30 PM by Dami Ramos MD on Workstation: [id number]       Ambulatory status:   - up with assist x1    Social issues:   Social History     Socioeconomic History    Marital status: Single   Tobacco Use    Smoking status: Never     Passive exposure: Never    Smokeless tobacco: Never   Vaping Use    Vaping status: Never Used   Substance and Sexual Activity    Alcohol use: Never    Drug use: Never    Sexual activity: Not Currently       Peripheral Neurovascular  Peripheral Neurovascular (Adult)  Peripheral Neurovascular WDL: WDL    Neuro Cognitive  Neuro Cognitive (Adult)  Cognitive/Neuro/Behavioral WDL: orientation, .WDL except, speech (intemrittent confusion and frequent questions)  Orientation: oriented x 4  Speech: clear, logical    Learning  Learning Assessment  Learning Readiness and Ability: no barriers identified    Respiratory  Respiratory WDL  Respiratory WDL: WDL, rhythm/pattern  Rhythm/Pattern, Respiratory: depth regular, pattern regular, unlabored    Abdominal Pain       Pain Assessments  Pain (Adult)  (0-10) Pain Rating: Rest: 0  (0-10) Pain Rating: Activity: 0    NIH Stroke Scale       aMry Landaverde RN  02/04/25 16:03 EST      Electronically signed by Mary Landaverde RN at 02/04/25 4155       Reggie Alcazar MD at 02/04/25 1224           EMERGENCY DEPARTMENT ENCOUNTER  Room Number:  09/09  PCP: Ryan Gutierrez MD  Independent Historians: Patient and  Family  Date of encounter:  2/4/2025  Patient Care Team:  Ryan Gutierrez MD as PCP - General (Internal Medicine)     HPI:  Chief Complaint: had concerns including Abnormal Lab (Kidney functions ).     A complete HPI/ROS/PMH/PSH/SH/FH are unobtainable due to: None    Chronic or social conditions impacting patient care (Social Determinants of Health): None      Context: Froy Ngo is a 79 y.o. male with a medical history of diabetes, ARF, dementia who presents to the ED c/o acute lab work abnormality.  Patient has been dealing with a urinary tract infection over the past month.  He is currently on his second round of antibiotics.  He also [facility]  He presents today for abnormal lab work.  He cannot report exactly with this lab values are but they believe they are related to his kidneys.  He denies any change in urinary output, urinary color, hematuria or dysuria.  No nausea, vomiting, fever, chills, abdominal pain or flank pain.    Review of prior external notes (non-ED) -and- Review of prior external test results outside of this encounter:  Reviewed the nephrology clinic note from 12/10/2014.  Patient was evaluated posthospitalization for acute renal failure secondary to obstruction.  Discharge creatinine of 1.79.    PAST MEDICAL HISTORY  Active Ambulatory Problems     Diagnosis Date Noted    DKA (diabetic ketoacidosis) 08/04/2024    Obstructive uropathy 08/07/2024    Bilateral hydronephrosis 08/07/2024    Hyperkalemia 08/07/2024    Acute renal failure 08/11/2024    Type 2 diabetes mellitus with hyperglycemia 08/11/2024    Essential hypertension, benign 08/11/2024    Dementia without behavioral disturbance 08/11/2024    UTI (urinary tract infection), bacterial 08/20/2024    Abnormal MRI of head 08/20/2024     Resolved Ambulatory Problems     Diagnosis Date Noted    No Resolved Ambulatory Problems     Past Medical History:   Diagnosis Date    Dementia      Diabetes mellitus     Elevated cholesterol     Prostate disorder     Urinary retention        PAST SURGICAL HISTORY  Past Surgical History:   Procedure Laterality Date    CYSTOSCOPY W/ URETERAL STENT PLACEMENT Bilateral 8/13/2024    Procedure: BILATERAL CYSTOSCOPY URETERAL CATHETER/STENT INSERTION WITH BILATERAL RETROGRADES;  Surgeon: Antoni Melendez MD;  Location: [facility];  Service: Urology;  Laterality: Bilateral;       FAMILY HISTORY  No family history on file.    SOCIAL HISTORY  Social History     Socioeconomic History    Marital status: Single   Tobacco Use    Smoking status: Never     Passive exposure: Never    Smokeless tobacco: Never   Vaping Use    Vaping status: Never Used   Substance and Sexual Activity    Alcohol use: Never    Drug use: Never    Sexual activity: Not Currently       ALLERGIES  Patient has no known allergies.    REVIEW OF SYSTEMS  Review of Systems  Included in HPI  All systems reviewed and negative except for those discussed in HPI.    PHYSICAL EXAM    I have reviewed the triage vital signs and nursing notes.    ED Triage Vitals   Temp Heart Rate Resp BP SpO2   02/04/25 1137 02/04/25 1137 02/04/25 1137 02/04/25 1149 02/04/25 1137   99.3 °F (37.4 °C) (!) 121 18 99/61 97 %      Temp src Heart Rate Source Patient Position BP Location FiO2 (%)   02/04/25 1137 02/04/25 1137 -- -- --   Tympanic Monitor          Physical Exam  Vitals and nursing note reviewed.   Constitutional:       General: He is not in acute distress.     Appearance: Normal appearance. He is not ill-appearing, toxic-appearing or diaphoretic.   HENT:      Head: Normocephalic and atraumatic.      Nose: Nose normal.      Mouth/Throat:      Mouth: Mucous membranes are moist. Mucous membranes are dry.   Eyes:      Extraocular Movements: Extraocular movements intact.      Conjunctiva/sclera: Conjunctivae normal.      Pupils: Pupils are equal, round, and reactive to light.   Cardiovascular:      Rate and Rhythm: Normal  rate and regular rhythm.      Pulses: Normal pulses.      Heart sounds: Normal heart sounds. No murmur heard.     No friction rub. No gallop.   Pulmonary:      Effort: Pulmonary effort is normal. No respiratory distress.      Breath sounds: Normal breath sounds. No stridor. No wheezing, rhonchi or rales.   Abdominal:      General: Abdomen is flat. There is no distension.      Palpations: Abdomen is soft.      Tenderness: There is no abdominal tenderness. There is no right CVA tenderness, left CVA tenderness, guarding or rebound.   Musculoskeletal:      Cervical back: Normal range of motion and neck supple.   Skin:     General: Skin is warm and dry.      Capillary Refill: Capillary refill takes less than 2 seconds.   Neurological:      General: No focal deficit present.      Mental Status: He is alert and oriented to person, place, and time. Mental status is at baseline.   Psychiatric:         Mood and Affect: Mood normal.         Behavior: Behavior normal.         Thought Content: Thought content normal.         Judgment: Judgment normal.         LAB RESULTS  Recent Results (from the past 24 hours)   Green Top (Gel)    Collection Time: 02/04/25 12:02 PM   Result Value Ref Range    Extra Tube Hold for add-ons.    Lavender Top    Collection Time: 02/04/25 12:02 PM   Result Value Ref Range    Extra Tube hold for add-on    Gold Top - SST    Collection Time: 02/04/25 12:02 PM   Result Value Ref Range    Extra Tube Hold for add-ons.    Light Blue Top    Collection Time: 02/04/25 12:02 PM   Result Value Ref Range    Extra Tube Hold for add-ons.    Comprehensive Metabolic Panel    Collection Time: 02/04/25 12:02 PM    Specimen: Arm, Left; Blood   Result Value Ref Range    Glucose 277 (H) 65 - 99 mg/dL    BUN 70 (H) 8 - 23 mg/dL    Creatinine 5.16 (H) 0.76 - 1.27 mg/dL    Sodium 123 (L) 136 - 145 mmol/L    Potassium 5.1 3.5 - 5.2 mmol/L    Chloride 93 (L) 98 - 107 mmol/L    CO2 13.2 (L) 22.0 - 29.0 mmol/L    Calcium 9.3 8.6  - 10.5 mg/dL    Total Protein 8.0 6.0 - 8.5 g/dL    Albumin 3.5 3.5 - 5.2 g/dL    ALT (SGPT) 78 (H) 1 - 41 U/L    AST (SGOT) 63 (H) 1 - 40 U/L    Alkaline Phosphatase 219 (H) 39 - 117 U/L    Total Bilirubin 0.7 0.0 - 1.2 mg/dL    Globulin 4.5 gm/dL    A/G Ratio 0.8 g/dL    BUN/Creatinine Ratio 13.6 7.0 - 25.0    Anion Gap 16.8 (H) 5.0 - 15.0 mmol/L    eGFR 10.6 (L) >60.0 mL/min/1.73   Magnesium    Collection Time: 02/04/25 12:02 PM    Specimen: Arm, Left; Blood   Result Value Ref Range    Magnesium 2.0 1.6 - 2.4 mg/dL   Phosphorus    Collection Time: 02/04/25 12:02 PM    Specimen: Arm, Left; Blood   Result Value Ref Range    Phosphorus 3.8 2.5 - 4.5 mg/dL   CBC Auto Differential    Collection Time: 02/04/25 12:02 PM    Specimen: Arm, Left; Blood   Result Value Ref Range    WBC 15.48 (H) 3.40 - 10.80 10*3/mm3    RBC 3.89 (L) 4.14 - 5.80 10*6/mm3    Hemoglobin 11.0 (L) 13.0 - 17.7 g/dL    Hematocrit 32.7 (L) 37.5 - 51.0 %    MCV 84.1 79.0 - 97.0 fL    MCH 28.3 26.6 - 33.0 pg    MCHC 33.6 31.5 - 35.7 g/dL    RDW 14.6 12.3 - 15.4 %    RDW-SD 44.0 37.0 - 54.0 fl    MPV 9.7 6.0 - 12.0 fL    Platelets 262 140 - 450 10*3/mm3    Neutrophil % 90.8 (H) 42.7 - 76.0 %    Lymphocyte % 2.6 (L) 19.6 - 45.3 %    Monocyte % 5.1 5.0 - 12.0 %    Eosinophil % 0.9 0.3 - 6.2 %    Basophil % 0.1 0.0 - 1.5 %    Immature Grans % 0.5 0.0 - 0.5 %    Neutrophils, Absolute 14.05 (H) 1.70 - 7.00 10*3/mm3    Lymphocytes, Absolute 0.41 (L) 0.70 - 3.10 10*3/mm3    Monocytes, Absolute 0.79 0.10 - 0.90 10*3/mm3    Eosinophils, Absolute 0.14 0.00 - 0.40 10*3/mm3    Basophils, Absolute 0.01 0.00 - 0.20 10*3/mm3    Immature Grans, Absolute 0.08 (H) 0.00 - 0.05 10*3/mm3    nRBC 0.0 0.0 - 0.2 /100 WBC   Osmolality, Serum    Collection Time: 02/04/25 12:02 PM    Specimen: Arm, Left; Blood   Result Value Ref Range    Osmolality 299 280 - 301 mOsm/kg   Lipase    Collection Time: 02/04/25 12:02 PM    Specimen: Arm, Left; Blood   Result Value Ref Range     Lipase 2,610 (H) 13 - 60 U/L   Urinalysis With Culture If Indicated - Urine, Clean Catch    Collection Time: 02/04/25  1:38 PM    Specimen: Urine, Clean Catch   Result Value Ref Range    Color, UA Yellow Yellow, Straw    Appearance, UA Cloudy (A) Clear    pH, UA 6.5 5.0 - 8.0    Specific Gravity, UA 1.016 1.005 - 1.030    Glucose, UA Negative Negative    Ketones, UA Negative Negative    Bilirubin, UA Negative Negative    Blood, UA Moderate (2+) (A) Negative    Protein,  mg/dL (2+) (A) Negative    Leuk Esterase, UA Large (3+) (A) Negative    Nitrite, UA Positive (A) Negative    Urobilinogen, UA 1.0 E.U./dL 0.2 - 1.0 E.U./dL   Urinalysis, Microscopic Only - Urine, Clean Catch    Collection Time: 02/04/25  1:38 PM    Specimen: Urine, Clean Catch   Result Value Ref Range    RBC, UA Too Numerous to Count (A) None Seen, 0-2 /HPF    WBC, UA Too Numerous to Count (A) None Seen, 0-2 /HPF    Bacteria, UA None Seen None Seen /HPF    Squamous Epithelial Cells, UA 0-2 None Seen, 0-2 /HPF    Hyaline Casts, UA 7-12 None Seen /LPF    Methodology Automated Microscopy        RADIOLOGY  CT Abdomen Pelvis Without Contrast    Result Date: 2/4/2025  CT ABDOMEN PELVIS WO CONTRAST-  DATE OF EXAM: 2/4/2025 1:44 PM  INDICATION: UTI with bilateral ureteral stents, elevated WBC, elevated Cr.  COMPARISON: CT 8/11/2024 and 8/4/2024.  TECHNIQUE: Multiple contiguous axial images were acquired through the abdomen and pelvis without the intravenous administration of contrast per reformatted coronal and sagittal sequences were also reviewed. Radiation dose reduction techniques were utilized, including automated exposure control and exposure modulation based on body size.  FINDINGS: Respiratory motion limits evaluation of the lung bases. Multifocal bibasilar subsegmental atelectasis and/scarring. Partially imaged severe calcified coronary artery disease. Relative hyperdensity of the interventricular septum suggest anemia.  Cholelithiasis without  specific CT evidence of acute cholecystitis. The liver and spleen are unremarkable. Moderate peripancreatic fat stranding. Nonspecific small cystic low-density lesion in the pancreatic body, measuring up to 2 cm craniocaudal (coronal series 3 image 51). Mild likely benign nodular thickening of the left adrenal gland. The right adrenal gland is unremarkable in limited noncontrast CT appearance. Bilateral ureteral stents in place. Both kidneys are otherwise unremarkable in limited noncontrast CT appearance. The urinary bladder is nondistended. Urinary bladder wall thickening may be accentuated by underdistention but could reflect sequela of chronic bladder outlet obstruction. Small right-sided urinary bladder diverticulum. Enlarged prostate gland, measuring 6 cm in transverse diameter, with mild mass effect on the floor the urinary bladder.  Mild diffuse gastric wall thickening is likely accentuated by under distention. Mild colorectal stool. Colonic diverticula, without CT evidence of diverticulitis. No bowel obstruction. The appendix is normal.  No free fluid in the abdomen or pelvis. No free intraperitoneal air. No pathologically enlarged lymph nodes in the abdomen or pelvis. Stable soft tissue attenuation in the left inguinal canal.  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 Similar-appearing lumbar levoscoliosis and multilevel lumbar spondylosis. No acute osseous abnormality or concerning osseous lesion.       1. Moderate peripancreatic fat stranding, which is nonspecific but could reflect pancreatitis. Recommend correlating with amylase and lipase. 2. Nonspecific 2 cm low-density lesion in the body of the pancreas, which could be further evaluated with MRI if clinically indicated. 3. Bilateral ureteral stents in place. 4. Enlarged prostate gland with urinary bladder wall thickening that could be accentuated by underdistention but could reflect sequela of chronic  bladder outlet obstruction and/or cystitis. Urinary bladder diverticulum indicating sequela of chronic bladder outlet obstruction. Correlate with urinalysis.  This report was finalized on 2/4/2025 2:30 PM by Dami Ramos MD on Workstation: [id number]       MEDICATIONS GIVEN IN ER  Medications   cefTRIAXone (ROCEPHIN) 1,000 mg in sodium chloride 0.9 % 100 mL MBP (has no administration in time range)   midodrine (PROAMATINE) tablet 5 mg (has no administration in time range)   sodium bicarbonate 150 mEq/1000 mL D5W infusion (has no administration in time range)   sodium chloride 0.9 % bolus 1,000 mL (1,000 mL Intravenous New Bag 2/4/25 1404)   sodium bicarbonate injection 8.4% 50 mEq (50 mEq Intravenous Given 2/4/25 1402)       ORDERS PLACED DURING THIS VISIT:  Orders Placed This Encounter   Procedures    Urine Culture - Urine,    Blood Culture - Blood,    Blood Culture - Blood,    CT Abdomen Pelvis Without Contrast    [place] Draw    Comprehensive Metabolic Panel    Urinalysis With Culture If Indicated - Urine, Clean Catch    Magnesium    Phosphorus    CBC Auto Differential    Urinalysis, Microscopic Only - Urine, Clean Catch    Lactic Acid, Plasma    Osmolality, Serum    Osmolality, Urine - Urine, Clean Catch    Protein / Creatinine Ratio, Urine -    Sodium, Urine, Random -    Comprehensive Metabolic Panel    Magnesium    Phosphorus    Uric Acid    Beta Hydroxybutyrate Quantitative    Lipase    Cortisol    Nephrology (on -call MD unless specified)    Family Medicine Consult    Inpatient Nephrology Consult    Inpatient Admission    Green Top (Gel)    Lavender Top    Gold Top - SST    Light Blue Top    CBC & Differential    CBC & Differential       OUTPATIENT MEDICATION MANAGEMENT:  Current Facility-Administered Medications Ordered in Epic   Medication Dose Route Frequency Provider Last Rate Last Admin    cefTRIAXone (ROCEPHIN) 1,000 mg in sodium chloride 0.9 % 100 mL MBP  1,000 mg Intravenous Once Reggie Alcazar MD         midodrine (PROAMATINE) tablet 5 mg  5 mg Oral TID AC Tad Benoit MD        sodium bicarbonate 150 mEq/1000 mL D5W infusion  150 mEq Intravenous Continuous Tad Benoit MD         Current Outpatient Medications Ordered in Epic   Medication Sig Dispense Refill    amLODIPine (NORVASC) 5 MG tablet Take 1 tablet by mouth Daily. 30 tablet 0    aspirin 81 MG EC tablet Take 1 tablet by mouth Daily.      atorvastatin (LIPITOR) 10 MG tablet Take 1 tablet by mouth Daily. Indications: High Amount of Fats in the Blood      donepezil (ARICEPT) 10 MG tablet Take 1 tablet by mouth Every Night. Indications: Alzheimer's Disease      finasteride (Proscar) 5 MG tablet Take 1 tablet by mouth Daily. Indications: Benign Enlargement of Prostate      hydrALAZINE (APRESOLINE) 25 MG tablet Take 1 tablet by mouth Every 8 (Eight) Hours. 90 tablet 0    insulin lispro (HUMALOG/ADMELOG) 100 UNIT/ML injection Inject 2-7 Units under the skin into the appropriate area as directed 4 (Four) Times a Day Before Meals & at Bedtime.      memantine (NAMENDA) 10 MG tablet Take 1 tablet by mouth Daily. Indications: Alzheimer's Disease      tamsulosin (FLOMAX) 0.4 MG capsule 24 hr capsule Take 1 capsule by mouth Daily. 30 capsule 0       PROCEDURES  Procedures    PROGRESS, DATA ANALYSIS, CONSULTS, AND MEDICAL DECISION MAKING  All labs have been independently interpreted by me.  All radiology studies have been reviewed by me. All EKG's have been independently viewed and interpreted by me.  Discussion below represents my analysis of pertinent findings related to patient's condition, differential diagnosis, treatment plan and final disposition.    Differential diagnosis includes but is not limited to NEHEMIAS, electrolyte derangement, UTI.    Clinical Scores:                   ED Course as of 02/04/25 1506   Tue Feb 04, 2025   1304 Creatinine(!): 5.16 [AB]   1304 WBC(!): 15.48 [AB]   1307 Anion Gap(!): 16.8 [AB]   1308 WBC(!): 15.48 [AB]   1322  Updated patient and family on results and indication for admission.  They are agreeable.  Patient has not yet been able to provide a urine sample.  Informed of upcoming CT imaging. [AB]   1330 Patient is taking ciprofloxacin at home. [AB]   1339 Phone call with Dr. Benoit with Nephrology.  Discussed the patient, relevant history, exam, diagnostics, ED findings/progress, and concerns. They agree to consult.  [AB]   1400 Nitrite, UA(!): Positive [AB]   1401 Patient has 1 prior urine culture from August of last year that had no growth.  Rocephin ordered for  UTI. [AB]   1406 CT Abdomen Pelvis Without Contrast  My independent interpretation of the imaging study is some stranding around the right kidney, no free air, no bowel obstruction [AB]   1447 Phone call with Dr. Farrell (on call for Dr. Mckenna).  Discussed the patient, relevant history, exam, diagnostics, ED findings/progress, and concerns. They agree to admit the patient to telemetry. Care assumed by the admitting physician at this time. Lipase pending at time of admission.  [AB]   1506 Lipase(!): 2,610 [AB]      ED Course User Index  [AB] Reggie Alcazar MD             AS OF 15:06 EST VITALS:    BP - 93/64  HR - 92  TEMP - 99.3 °F (37.4 °C) (Tympanic)  O2 SATS - 98%    COMPLEXITY OF CARE  The patient requires admission.      DIAGNOSIS  Final diagnoses:   Acute renal failure, unspecified acute renal failure type   Acute UTI   Severe sepsis   Acute pancreatitis, unspecified complication status, unspecified pancreatitis type         DISPOSITION  ED Disposition       ED Disposition   Decision to Admit    Condition   --    Comment   Level of Care: Telemetry [5]   Diagnosis: ARF (acute renal failure) [276360]   Admitting Physician: TOMAS FARRELL [[id number]]   Attending Physician: TOMAS FARRELL [[id number]]   Certification: I Certify That Inpatient Hospital Services Are Medically Necessary For Greater Than 2 Midnights                  Please note that portions of this  document were completed with a voice recognition program.    Note Disclaimer: At [facility], we believe that sharing information builds trust and better relationships. You are receiving this note because you recently visited [facility]. It is possible you will see health information before a provider has talked with you about it. This kind of information can be easy to misunderstand. To help you fully understand what it means for your health, we urge you to discuss this note with your provider.         Reggie Alcazar MD  02/04/25 1449       Reggie Alcazar MD  02/04/25 1506      Electronically signed by Reggie Alcazar MD at 02/04/25 1506       Oxygen Therapy (since admission)       Date/Time SpO2 Device (Oxygen Therapy) Flow (L/min) (Oxygen Therapy) Oxygen Concentration (%) ETCO2 (mmHg)    02/05/25 0845 -- room air -- -- --    02/05/25 0820 99 room air -- -- --    02/05/25 0007 91 room air -- -- --    02/04/25 2035 99 room air -- -- --    02/04/25 2007 -- room air -- -- --    02/04/25 1738 100 room air -- -- --    02/04/25 1647 100 -- -- -- --    02/04/25 1646 98 -- -- -- --    02/04/25 1630 99 -- -- -- --    02/04/25 1531 96 -- -- -- --    02/04/25 1401 98 -- -- -- --    02/04/25 1301 95 -- -- -- --    02/04/25 1239 98 -- -- -- --    02/04/25 1137 97 room air -- -- --          Intake & Output (last 2 days)         02/03 0701 02/04 0700 02/04 0701 02/05 0700 02/05 0701 02/06 0700    IV Piggyback  1100     Total Intake(mL/kg)  1100 (12.5)     Urine (mL/kg/hr)  300 300 (0.7)    Total Output  300 300    Net  +800 -300           Urine Unmeasured Occurrence  2 x           Lines, Drains & Airways       Active LDAs       Name Placement date Placement time Site Days    Peripheral IV 02/04/25 1344 Right Antecubital 02/04/25  1344  Antecubital  less than 1              Inactive LDAs       Name Placement date Placement time Removal date Removal time Site Days    [REMOVED] Continuous Bladder Irrigation  Triple-lumen 22 Fr 08/13/24  1259  02/04/25  1756  Triple-lumen  175                  Medication Administration Report for Froy Ngo as of 2/4/25 through 2/5/25     Legend:    Given Hold Not Given Due Canceled Entry Other Actions    Time Time (Time) Time Time-Action         Discontinued     Completed     Future     MAR Hold     Linked             Medications 02/04/25 02/05/25      sennosides-docusate (PERICOLACE) 8.6-50 MG per tablet 2 tablet  Dose: 2 tablet  Freq: 2 Times Daily PRN Route: PO  PRN Reason: Constipation  Start: 02/04/25 2108     Admin Instructions:   Start bowel management regimen if patient has not had a bowel movement after 12 hours.          And   polyethylene glycol (MIRALAX) packet 17 g  Dose: 17 g  Freq: Daily PRN Route: PO  PRN Reason: Constipation  PRN Comment: Use if senna-docusate is ineffective  Start: 02/04/25 2108     Admin Instructions:   Use if no bowel movement after 12 hours. Mix in 6-8 ounces of water.  Use 4-8 ounces of water, tea, or juice for each 17 gram dose.          And   bisacodyl (DULCOLAX) EC tablet 5 mg  Dose: 5 mg  Freq: Daily PRN Route: PO  PRN Reason: Constipation  PRN Comment: Use if polyethylene glycol is ineffective  Start: 02/04/25 2108     Admin Instructions:   Use if no bowel movement after 12 hours.  Swallow whole. Do not crush, split, or chew tablet.          And   bisacodyl (DULCOLAX) suppository 10 mg  Dose: 10 mg  Freq: Daily PRN Route: RE  PRN Reason: Constipation  PRN Comment: Use if bisacodyl oral is ineffective  Start: 02/04/25 2108     Admin Instructions:   Use if no bowel movement after 12 hours.  Hold for diarrhea          dextrose (D50W) (25 g/50 mL) IV injection 25 g  Dose: 25 g  Freq: Every 15 Minutes PRN Route: IV  PRN Reason: Low Blood Sugar  PRN Comment: Blood Sugar Less Than 70  Start: 02/04/25 2108     Admin Instructions:   Blood sugar less than 70; patient has IV access - Unresponsive, NPO or Unable To Safely Swallow          dextrose  (GLUTOSE) oral gel 15 g  Dose: 15 g  Freq: Every 15 Minutes PRN Route: PO  PRN Reason: Low Blood Sugar  PRN Comment: Blood sugar less than 70  Start: 02/04/25 2108     Admin Instructions:   BS<70, Patient Alert, Is not NPO, Can safely swallow.          glucagon (GLUCAGEN) injection 1 mg  Dose: 1 mg  Freq: Every 15 Minutes PRN Route: IM  PRN Reason: Low Blood Sugar  PRN Comment: Blood Glucose Less Than 70  Start: 02/04/25 2108     Admin Instructions:   Blood Glucose Less Than 70 - Patient Without IV Access - Unresponsive, NPO or Unable To Safely Swallow  Reconstitute powder for injection by adding 1 mL of -supplied sterile diluent or sterile water for injection to a vial containing 1 mg of the drug, to provide solutions containing 1 mg/mL. Shake vial gently to dissolve.          nitroglycerin (NITROSTAT) SL tablet 0.4 mg  Dose: 0.4 mg  Freq: Every 5 Minutes PRN Route: SL  PRN Reason: Chest Pain  Start: 02/04/25 2108     Admin Instructions:   If Pain Unrelieved After 3 Doses Notify MD  May administer up to 3 doses per episode. Hold if SBP less than 100.           ondansetron ODT (ZOFRAN-ODT) disintegrating tablet 4 mg  Dose: 4 mg  Freq: Every 6 Hours PRN Route: PO  PRN Reasons: Nausea,Vomiting  Start: 02/04/25 2108     Admin Instructions:   If BOTH ondansetron (ZOFRAN) and promethazine (PHENERGAN) are ordered use ondansetron first and THEN promethazine IF ondansetron is ineffective.  Place on tongue and allow to dissolve.          Or   ondansetron (ZOFRAN) injection 4 mg  Dose: 4 mg  Freq: Every 6 Hours PRN Route: IV  PRN Reasons: Nausea,Vomiting  Start: 02/04/25 2108     Admin Instructions:   If BOTH ondansetron (ZOFRAN) and promethazine (PHENERGAN) are ordered use ondansetron first and THEN promethazine IF ondansetron is ineffective.          sodium chloride 0.9 % flush 10 mL  Dose: 10 mL  Freq: As Needed Route: IV  PRN Reason: Line Care  Start: 02/04/25 2108          sodium chloride 0.9 % infusion 40  mL  Dose: 40 mL  Freq: As Needed Route: IV  PRN Reason: Line Care  Start: 25     Admin Instructions:   Following administration of an IV intermittent medication, flush line with 40mL NS at 100mL/hr.                      Blood Administration Record (From admission, onward)      None          Operative/Procedure Notes (all)    No notes of this type exist for this encounter.          Physician Progress Notes (all)        Tad Benoit MD at 25 0910              Nephrology [facility] Progress Note      Patient Name: Froy Ngo  : 1945  MRN: [id number]  Primary Care Physician:  Ryan Gutierrez MD  Date of admission: 2025    Subjective     Interval History:   Followup on NEHEMIAS on CKD    Doing well, no chest pain, dyspnea, or N/V/D  UOP not fully quantified, been urinating per staff    Review of Systems:   As noted above    Objective     Vitals:   Temp:  [97.7 °F (36.5 °C)-99.7 °F (37.6 °C)] 99.7 °F (37.6 °C)  Heart Rate:  [] 93  Resp:  [18] 18  BP: ()/(48-72) 116/69    Intake/Output Summary (Last 24 hours) at 2025 1021  Last data filed at 2025 0320  Gross per 24 hour   Intake 1100 ml   Output 250 ml   Net 850 ml       Physical Exam:    Expand All Collapse All       Nephrology [facility] Consult Note       Patient Name: Froy Ngo  : 1945  MRN: [id number]  Primary Care Physician:  Ryan Gutierrez MD  Referring Physician: No ref. provider found  Date of admission: 2025     Subjective      Reason for Consult: NEHEMIAS on CKD     HPI:   Froy Ngo is a 79 y.o. male with CKD 3B (baseline SCr 1.7-2.0, followed by Dr. Kenji Simental of our group), dimension, type II DM, hx of obstructive uropathy s/p ureteral stent placement (2024) and recent prostate resection (2024 per family), presented to the hospital today for abnormal labs.     In the ER, sodium 123, SCR 5.16, BUN 70, CO2 13, gap 17; UA positive for  nitrites, 2+ protein, and TNTC RBC & WBC. Patient was given IV bicarb push and rocephin. Currently receiving IL NS bolus. CT abd/pel pending.     Per family, patient was treated for UTI for the past month w 3 rounds of abx (cipro is the last regimen); hagen was removed 3-4 weeks ago s/p prostate resection, denies urinary retention since that time; Had nausea and vomiting Sunday, followed by fever, chills, and weakness in the next 2 days; been hydrating adequately per wife; denies further UTI symptoms.     Review of Systems:   14 point review of systems is otherwise negative except for mentioned above on HPI     Personal History      Medical History        Past Medical History:   Diagnosis Date    Dementia      Diabetes mellitus      Elevated cholesterol      Prostate disorder      Urinary retention              Surgical History         Past Surgical History:   Procedure Laterality Date    CYSTOSCOPY W/ URETERAL STENT PLACEMENT Bilateral 8/13/2024     Procedure: BILATERAL CYSTOSCOPY URETERAL CATHETER/STENT INSERTION WITH BILATERAL RETROGRADES;  Surgeon: Antoni Melendez MD;  Location: [facility];  Service: Urology;  Laterality: Bilateral;            Family History: family history is not on file.     Social History:  reports that he has never smoked. He has never been exposed to tobacco smoke. He has never used smokeless tobacco. He reports that he does not drink alcohol and does not use drugs.     Home Medications:          Prior to Admission medications    Medication Sig Start Date End Date Taking? Authorizing Provider   amLODIPine (NORVASC) 5 MG tablet Take 1 tablet by mouth Daily. 8/10/24     Gurvinder Farrell MD   aspirin 81 MG EC tablet Take 1 tablet by mouth Daily. 8/4/24     ProviderAraseli MD   atorvastatin (LIPITOR) 10 MG tablet Take 1 tablet by mouth Daily. Indications: High Amount of Fats in the Blood 8/4/24     ProviderAraseli MD   donepezil (ARICEPT) 10 MG tablet Take 1 tablet by mouth  Every Night. Indications: Alzheimer's Disease 10/4/23 10/3/24   Araseli Price MD   finasteride (Proscar) 5 MG tablet Take 1 tablet by mouth Daily. Indications: Benign Enlargement of Prostate 9/1/24     Araseli Price MD   hydrALAZINE (APRESOLINE) 25 MG tablet Take 1 tablet by mouth Every 8 (Eight) Hours. 8/9/24     Gurvinder Farrell MD   insulin lispro (HUMALOG/ADMELOG) 100 UNIT/ML injection Inject 2-7 Units under the skin into the appropriate area as directed 4 (Four) Times a Day Before Meals & at Bedtime. 8/20/24     Shruthi Acosta MD   memantine (NAMENDA) 10 MG tablet Take 1 tablet by mouth Daily. Indications: Alzheimer's Disease 11/6/23 11/5/24   Araseli Price MD   tamsulosin (FLOMAX) 0.4 MG capsule 24 hr capsule Take 1 capsule by mouth Daily. 8/10/24     Gurvinder Farrell MD         Allergies:  Allergies   No Known Allergies        Objective      Vitals:   Temp:  [99.3 °F (37.4 °C)] 99.3 °F (37.4 °C)  Heart Rate:  [] 92  Resp:  [18] 18  BP: ()/(57-71) 93/64  No intake or output data in the 24 hours ending 02/04/25 1443     Physical Exam:   Constitutional: Awake, alert, no acute distress.  HEENT: Sclera anicteric, no conjunctival injection  Neck: Supple, no JVD  Respiratory: Clear to auscultation bilaterally, nonlabored respiration on RA  Cardiovascular: tachycardiac, RR, no murmurs, no rubs  Gastrointestinal: Positive bowel sounds, abdomen is soft, nontender and nondistended  : No palpable bladder  Musculoskeletal: No edema, no clubbing or cyanosis  Psychiatric: Appropriate affect, cooperative  Neurologic: moves all extremities  Skin: Warm and dry              Scheduled Meds:     [Held by provider] atorvastatin, 10 mg, Oral, Daily  cefTRIAXone, 1,000 mg, Intravenous, Q24H  [Held by provider] donepezil, 10 mg, Oral, Nightly  [Held by provider] ferrous sulfate, 325 mg, Oral, Daily With Breakfast  [Held by provider] finasteride, 5 mg, Oral, Daily  heparin (porcine), 5,000 Units,  Subcutaneous, Q12H  insulin regular, 2-7 Units, Subcutaneous, Q6H  [Held by provider] memantine, 5 mg, Oral, Daily  midodrine, 10 mg, Oral, TID AC  sodium chloride, 10 mL, Intravenous, Q12H      IV Meds:   sodium chloride, 75 mL/hr, Last Rate: 75 mL/hr (02/05/25 0931)        Results Reviewed:   I have personally reviewed the results from the time of this admission to 2/5/2025 10:21 EST     Results from last 7 days   Lab Units 02/05/25  0555 02/04/25  1202   SODIUM mmol/L 133* 123*   POTASSIUM mmol/L 4.1 5.1   CHLORIDE mmol/L 98 93*   CO2 mmol/L 21.6* 13.2*   BUN mg/dL 80* 70*   CREATININE mg/dL 4.76* 5.16*   CALCIUM mg/dL 8.4* 9.3   BILIRUBIN mg/dL 0.5 0.7   ALK PHOS U/L 184* 219*   ALT (SGPT) U/L 54* 78*   AST (SGOT) U/L 34 63*   GLUCOSE mg/dL 226* 277*       Estimated Creatinine Clearance: 15.1 mL/min (A) (by C-G formula based on SCr of 4.76 mg/dL (H)).    Results from last 7 days   Lab Units 02/05/25  0555 02/04/25  1202   MAGNESIUM mg/dL 2.0 2.0   PHOSPHORUS mg/dL 4.1 3.8       Results from last 7 days   Lab Units 02/05/25  0555   URIC ACID mg/dL 9.0*       Results from last 7 days   Lab Units 02/05/25  0555 02/04/25  1202   WBC 10*3/mm3 9.53 15.48*   HEMOGLOBIN g/dL 8.9* 11.0*   PLATELETS 10*3/mm3 162 262             Assessment / Plan     ASSESSMENT:   NEHEMIAS on CKD 3B, baseline Scr 1.7-2.0, secondary to prior AKIs d/t postobstructive uropathy, and longstanding hypertensive/diabetic nephrosclerosis.  Acute etiology is likely multifactorial: UTI followed by  hypotension limiting renal perfusion . CT abdomen/pelvis did not reveal hydro; slightly hypovolemic  Acute on chronic hyponatremia, likely multifactorial: excessive PO fluid intake, poor solute intake, and NEHEMIAS on CKD, improved  AGMA, secondary to NEHEMIAS on CKD, received bicarb gtt, improved  Hx post obstructive uropathy, s/p  bilateral ureteral stent placement 8/30/2024, s/p prostate resection 12/13/2024, Rodriguez was removed 3 to 4 weeks ago per family report. CT  "abd/pel suggested enlarged prostate with possible cystitis / Prostatitis  due to recurrent UTI ?   UTI, failed outpatient management, on IV Rocephin, will follow cultures , possible prostatitis due to recurrent antibiotics. Previous urine and blood cultures negative   Type II DM with CKD, managed by primary team  Hypotension, will hold amlodipin and hydralazine. Cortisol did not suggest adrenal insufficiency   Transaminases from hypotension, managed by primary team  Dementia, on namenda at home  Pancreatitis/elevated lipase, managed by primary team    PLAN:  Switch bicarb gtt to IV NS @75ml/hr  Quantify UOP    Thank you for involving us in the care of Froy Ngo.  Please feel free to call with any questions.    Tad Benoit MD  02/05/25  10:21 [facility]    [facility]  [phone]    Please note that portions of this note were completed with a voice recognition program.    Electronically signed by Tad Benoit MD at 02/05/25 1022       Ritesh Farrell MD at 02/05/25 0793          Subjective: Restless overnight.  Complains of dry mouth.  Denies any abdominal pain.  Denies any shortness of air.  Producing urine.  Patient just returned from gallbladder ultrasound.  Wife is at bedside.    Subjective:  Vitals:    02/04/25 1738 02/04/25 2035 02/05/25 0007 02/05/25 0341   BP: 115/66 121/72 103/56    BP Location: Left arm Left arm Left arm    Patient Position: Lying Lying Lying    Pulse: 94 94 108 98   Resp: 18 18 18    Temp: 97.7 °F (36.5 °C) 98.7 °F (37.1 °C) 99.7 °F (37.6 °C)    TempSrc: Oral Oral Oral    SpO2: 100% 99% 91%    Weight: 88 kg (194 lb)      Height: 182.9 cm (72\")            Gen: Alert and oriented to person and place, poor insight into current condition  Heart: Regular  Lungs: Clear bilaterally  Abd: Soft, nontender nondistended bowel sounds are present  Ext: No edema    Recent Results (from the past 24 hours)   Green Top (Gel)    Collection Time: 02/04/25 12:02 " PM   Result Value Ref Range    Extra Tube Hold for add-ons.    Lavender Top    Collection Time: 02/04/25 12:02 PM   Result Value Ref Range    Extra Tube hold for add-on    Gold Top - SST    Collection Time: 02/04/25 12:02 PM   Result Value Ref Range    Extra Tube Hold for add-ons.    Light Blue Top    Collection Time: 02/04/25 12:02 PM   Result Value Ref Range    Extra Tube Hold for add-ons.    Comprehensive Metabolic Panel    Collection Time: 02/04/25 12:02 PM    Specimen: Arm, Left; Blood   Result Value Ref Range    Glucose 277 (H) 65 - 99 mg/dL    BUN 70 (H) 8 - 23 mg/dL    Creatinine 5.16 (H) 0.76 - 1.27 mg/dL    Sodium 123 (L) 136 - 145 mmol/L    Potassium 5.1 3.5 - 5.2 mmol/L    Chloride 93 (L) 98 - 107 mmol/L    CO2 13.2 (L) 22.0 - 29.0 mmol/L    Calcium 9.3 8.6 - 10.5 mg/dL    Total Protein 8.0 6.0 - 8.5 g/dL    Albumin 3.5 3.5 - 5.2 g/dL    ALT (SGPT) 78 (H) 1 - 41 U/L    AST (SGOT) 63 (H) 1 - 40 U/L    Alkaline Phosphatase 219 (H) 39 - 117 U/L    Total Bilirubin 0.7 0.0 - 1.2 mg/dL    Globulin 4.5 gm/dL    A/G Ratio 0.8 g/dL    BUN/Creatinine Ratio 13.6 7.0 - 25.0    Anion Gap 16.8 (H) 5.0 - 15.0 mmol/L    eGFR 10.6 (L) >60.0 mL/min/1.73   Magnesium    Collection Time: 02/04/25 12:02 PM    Specimen: Arm, Left; Blood   Result Value Ref Range    Magnesium 2.0 1.6 - 2.4 mg/dL   Phosphorus    Collection Time: 02/04/25 12:02 PM    Specimen: Arm, Left; Blood   Result Value Ref Range    Phosphorus 3.8 2.5 - 4.5 mg/dL   CBC Auto Differential    Collection Time: 02/04/25 12:02 PM    Specimen: Arm, Left; Blood   Result Value Ref Range    WBC 15.48 (H) 3.40 - 10.80 10*3/mm3    RBC 3.89 (L) 4.14 - 5.80 10*6/mm3    Hemoglobin 11.0 (L) 13.0 - 17.7 g/dL    Hematocrit 32.7 (L) 37.5 - 51.0 %    MCV 84.1 79.0 - 97.0 fL    MCH 28.3 26.6 - 33.0 pg    MCHC 33.6 31.5 - 35.7 g/dL    RDW 14.6 12.3 - 15.4 %    RDW-SD 44.0 37.0 - 54.0 fl    MPV 9.7 6.0 - 12.0 fL    Platelets 262 140 - 450 10*3/mm3    Neutrophil % 90.8 (H) 42.7 -  76.0 %    Lymphocyte % 2.6 (L) 19.6 - 45.3 %    Monocyte % 5.1 5.0 - 12.0 %    Eosinophil % 0.9 0.3 - 6.2 %    Basophil % 0.1 0.0 - 1.5 %    Immature Grans % 0.5 0.0 - 0.5 %    Neutrophils, Absolute 14.05 (H) 1.70 - 7.00 10*3/mm3    Lymphocytes, Absolute 0.41 (L) 0.70 - 3.10 10*3/mm3    Monocytes, Absolute 0.79 0.10 - 0.90 10*3/mm3    Eosinophils, Absolute 0.14 0.00 - 0.40 10*3/mm3    Basophils, Absolute 0.01 0.00 - 0.20 10*3/mm3    Immature Grans, Absolute 0.08 (H) 0.00 - 0.05 10*3/mm3    nRBC 0.0 0.0 - 0.2 /100 WBC   Osmolality, Serum    Collection Time: 02/04/25 12:02 PM    Specimen: Arm, Left; Blood   Result Value Ref Range    Osmolality 299 280 - 301 mOsm/kg   Beta Hydroxybutyrate Quantitative    Collection Time: 02/04/25 12:02 PM    Specimen: Arm, Left; Blood   Result Value Ref Range    Beta-Hydroxybutyrate Quant 0.210 0.020 - 0.270 mmol/L   Lipase    Collection Time: 02/04/25 12:02 PM    Specimen: Arm, Left; Blood   Result Value Ref Range    Lipase 2,610 (H) 13 - 60 U/L   Urinalysis With Culture If Indicated - Urine, Clean Catch    Collection Time: 02/04/25  1:38 PM    Specimen: Urine, Clean Catch   Result Value Ref Range    Color, UA Yellow Yellow, Straw    Appearance, UA Cloudy (A) Clear    pH, UA 6.5 5.0 - 8.0    Specific Gravity, UA 1.016 1.005 - 1.030    Glucose, UA Negative Negative    Ketones, UA Negative Negative    Bilirubin, UA Negative Negative    Blood, UA Moderate (2+) (A) Negative    Protein,  mg/dL (2+) (A) Negative    Leuk Esterase, UA Large (3+) (A) Negative    Nitrite, UA Positive (A) Negative    Urobilinogen, UA 1.0 E.U./dL 0.2 - 1.0 E.U./dL   Urinalysis, Microscopic Only - Urine, Clean Catch    Collection Time: 02/04/25  1:38 PM    Specimen: Urine, Clean Catch   Result Value Ref Range    RBC, UA Too Numerous to Count (A) None Seen, 0-2 /HPF    WBC, UA Too Numerous to Count (A) None Seen, 0-2 /HPF    Bacteria, UA None Seen None Seen /HPF    Squamous Epithelial Cells, UA 0-2 None Seen,  0-2 /HPF    Hyaline Casts, UA 7-12 None Seen /LPF    Methodology Automated Microscopy    Osmolality, Urine - Urine, Clean Catch    Collection Time: 02/04/25  1:38 PM    Specimen: Urine, Clean Catch   Result Value Ref Range    Osmolality, Urine 359 mOsm/kg   Protein / Creatinine Ratio, Urine - Urine, Clean Catch    Collection Time: 02/04/25  1:38 PM    Specimen: Urine, Clean Catch   Result Value Ref Range    Protein/Creatinine Ratio, Urine 1,037.5 (H) 0.0 - 200.0 mg/G Crea    Creatinine, Urine 109.4 mg/dL    Total Protein, Urine 113.5 mg/dL   Sodium, Urine, Random - Urine, Clean Catch    Collection Time: 02/04/25  1:38 PM    Specimen: Urine, Clean Catch   Result Value Ref Range    Sodium, Urine 26 mmol/L   Lactic Acid, Plasma    Collection Time: 02/04/25  3:52 PM    Specimen: Blood   Result Value Ref Range    Lactate 1.8 0.5 - 2.0 mmol/L   POC Glucose Once    Collection Time: 02/04/25  5:55 PM    Specimen: Blood   Result Value Ref Range    Glucose 225 (H) 70 - 130 mg/dL   POC Glucose Once    Collection Time: 02/04/25  9:41 PM    Specimen: Blood   Result Value Ref Range    Glucose 248 (H) 70 - 130 mg/dL   Comprehensive Metabolic Panel    Collection Time: 02/05/25  5:55 AM    Specimen: Blood   Result Value Ref Range    Glucose 226 (H) 65 - 99 mg/dL    BUN 80 (H) 8 - 23 mg/dL    Creatinine 4.76 (H) 0.76 - 1.27 mg/dL    Sodium 133 (L) 136 - 145 mmol/L    Potassium 4.1 3.5 - 5.2 mmol/L    Chloride 98 98 - 107 mmol/L    CO2 21.6 (L) 22.0 - 29.0 mmol/L    Calcium 8.4 (L) 8.6 - 10.5 mg/dL    Total Protein 6.4 6.0 - 8.5 g/dL    Albumin 3.0 (L) 3.5 - 5.2 g/dL    ALT (SGPT) 54 (H) 1 - 41 U/L    AST (SGOT) 34 1 - 40 U/L    Alkaline Phosphatase 184 (H) 39 - 117 U/L    Total Bilirubin 0.5 0.0 - 1.2 mg/dL    Globulin 3.4 gm/dL    A/G Ratio 0.9 g/dL    BUN/Creatinine Ratio 16.8 7.0 - 25.0    Anion Gap 13.4 5.0 - 15.0 mmol/L    eGFR 11.6 (L) >60.0 mL/min/1.73   Magnesium    Collection Time: 02/05/25  5:55 AM    Specimen: Blood    Result Value Ref Range    Magnesium 2.0 1.6 - 2.4 mg/dL   Phosphorus    Collection Time: 02/05/25  5:55 AM    Specimen: Blood   Result Value Ref Range    Phosphorus 4.1 2.5 - 4.5 mg/dL   Uric Acid    Collection Time: 02/05/25  5:55 AM    Specimen: Blood   Result Value Ref Range    Uric Acid 9.0 (H) 3.4 - 7.0 mg/dL   CBC Auto Differential    Collection Time: 02/05/25  5:55 AM    Specimen: Blood   Result Value Ref Range    WBC 9.53 3.40 - 10.80 10*3/mm3    RBC 3.23 (L) 4.14 - 5.80 10*6/mm3    Hemoglobin 8.9 (L) 13.0 - 17.7 g/dL    Hematocrit 27.6 (L) 37.5 - 51.0 %    MCV 85.4 79.0 - 97.0 fL    MCH 27.6 26.6 - 33.0 pg    MCHC 32.2 31.5 - 35.7 g/dL    RDW 15.2 12.3 - 15.4 %    RDW-SD 47.2 37.0 - 54.0 fl    MPV 9.8 6.0 - 12.0 fL    Platelets 162 140 - 450 10*3/mm3    Neutrophil % 86.1 (H) 42.7 - 76.0 %    Lymphocyte % 5.2 (L) 19.6 - 45.3 %    Monocyte % 6.3 5.0 - 12.0 %    Eosinophil % 1.6 0.3 - 6.2 %    Basophil % 0.2 0.0 - 1.5 %    Immature Grans % 0.6 (H) 0.0 - 0.5 %    Neutrophils, Absolute 8.20 (H) 1.70 - 7.00 10*3/mm3    Lymphocytes, Absolute 0.50 (L) 0.70 - 3.10 10*3/mm3    Monocytes, Absolute 0.60 0.10 - 0.90 10*3/mm3    Eosinophils, Absolute 0.15 0.00 - 0.40 10*3/mm3    Basophils, Absolute 0.02 0.00 - 0.20 10*3/mm3    Immature Grans, Absolute 0.06 (H) 0.00 - 0.05 10*3/mm3    nRBC 0.0 0.0 - 0.2 /100 WBC   Cortisol    Collection Time: 02/05/25  5:55 AM    Specimen: Blood   Result Value Ref Range    Cortisol 19.20   mcg/dL   Lipase    Collection Time: 02/05/25  5:55 AM    Specimen: Blood   Result Value Ref Range    Lipase 1,603 (H) 13 - 60 U/L   POC Glucose Once    Collection Time: 02/05/25  6:28 AM    Specimen: Blood   Result Value Ref Range    Glucose 228 (H) 70 - 130 mg/dL     A/P  Acute renal failure with acidosis-acidosis improving.  Creatinine trending down with IV fluids.  Nephrology continuing to follow.  Likely largely prerenal on top of his underlying kidney disease.  Does not have a history of urinary  outlet obstruction and currently has stents in place.  Will resume tamsulosin as soon as possible.  Pancreatitis-lipase trending down.  Poor renal clearance is likely contributed to high lipase levels.  Has denied any abdominal pain.  CT scan showed gallstones without obvious cholecystitis but checking ultrasound as I wonder whether he passed the stone on Friday with the nausea and vomiting that he was having.  Keeping him n.p.o. for now until we see gallbladder ultrasound.  UTI-cultures pending.  Temperature staying down.  White count improved.  Continue Rocephin until cultures come back.  Anemia-likely largely secondary to renal failure with a dilutional drop after IV fluids.  No sign of active bleeding.  Will check iron studies etc.  Continue to monitor blood count.    Dementia-some confusion but oriented to person and place.  Will resume meds as takes po  Diabetes-continue low-dose insulin for correction while n.p.o.  Hyponatremia-improving with IV fluids  8.   Lovenox for DVT prophylaxis.    Electronically signed by Ritesh Farrell MD at 25 [phone]          Consult Notes (all)        Tad Benoit MD at 25 1405        Consult Orders    1. Nephrology (on -call MD unless specified) [[id number]] ordered by Reggie Alcazar MD at 25 1319                   [facility] Consult Note      Patient Name: Froy Ngo  : 1945  MRN: [id number]  Primary Care Physician:  Ryan Gutierrez MD  Referring Physician: No ref. provider found  Date of admission: 2025    Subjective     Reason for Consult: NEHEMIAS on CKD    HPI:   Froy Ngo is a 79 y.o. male with CKD 3B (baseline SCr 1.7-2.0, followed by Dr. Kenji Simental of our group), dimension, type II DM, hx of obstructive uropathy s/p ureteral stent placement (2024) and recent prostate resection (2024 per family), presented to the hospital today for abnormal labs.    In the ER, sodium 123, SCR 5.16, BUN  70, CO2 13, gap 17; UA positive for nitrites, 2+ protein, and TNTC RBC & WBC. Patient was given IV bicarb push and rocephin. Currently receiving IL NS bolus. CT abd/pel pending.    Per family, patient was treated for UTI for the past month w 3 rounds of abx (cipro is the last regimen); hagen was removed 3-4 weeks ago s/p prostate resection, denies urinary retention since that time; Had nausea and vomiting Sunday, followed by fever, chills, and weakness in the next 2 days; been hydrating adequately per wife; denies further UTI symptoms.    Review of Systems:   14 point review of systems is otherwise negative except for mentioned above on HPI    Personal History     Past Medical History:   Diagnosis Date    Dementia     Diabetes mellitus     Elevated cholesterol     Prostate disorder     Urinary retention        Past Surgical History:   Procedure Laterality Date    CYSTOSCOPY W/ URETERAL STENT PLACEMENT Bilateral 8/13/2024    Procedure: BILATERAL CYSTOSCOPY URETERAL CATHETER/STENT INSERTION WITH BILATERAL RETROGRADES;  Surgeon: Antoni Melendez MD;  [facility];  Service: Urology;  Laterality: Bilateral;       Family History: family history is not on file.    Social History:  reports that he has never smoked. He has never been exposed to tobacco smoke. He has never used smokeless tobacco. He reports that he does not drink alcohol and does not use drugs.    Home Medications:  Prior to Admission medications    Medication Sig Start Date End Date Taking? Authorizing Provider   amLODIPine (NORVASC) 5 MG tablet Take 1 tablet by mouth Daily. 8/10/24   Gurvinder Farrell MD   aspirin 81 MG EC tablet Take 1 tablet by mouth Daily. 8/4/24   Araseli Price MD   atorvastatin (LIPITOR) 10 MG tablet Take 1 tablet by mouth Daily. Indications: High Amount of Fats in the Blood 8/4/24   Araseli Price MD   donepezil (ARICEPT) 10 MG tablet Take 1 tablet by mouth Every Night. Indications: Alzheimer's Disease  10/4/23 10/3/24  Araseli Price MD   finasteride (Proscar) 5 MG tablet Take 1 tablet by mouth Daily. Indications: Benign Enlargement of Prostate 9/1/24   Araseli Price MD   hydrALAZINE (APRESOLINE) 25 MG tablet Take 1 tablet by mouth Every 8 (Eight) Hours. 8/9/24   Gurvinder Farrell MD   insulin lispro (HUMALOG/ADMELOG) 100 UNIT/ML injection Inject 2-7 Units under the skin into the appropriate area as directed 4 (Four) Times a Day Before Meals & at Bedtime. 8/20/24   Shruthi Acosta MD   memantine (NAMENDA) 10 MG tablet Take 1 tablet by mouth Daily. Indications: Alzheimer's Disease 11/6/23 11/5/24  Araseli Price MD   tamsulosin (FLOMAX) 0.4 MG capsule 24 hr capsule Take 1 capsule by mouth Daily. 8/10/24   Gurvinder Farrell MD       Allergies:  No Known Allergies    Objective     Vitals:   Temp:  [99.3 °F (37.4 °C)] 99.3 °F (37.4 °C)  Heart Rate:  [] 92  Resp:  [18] 18  BP: ()/(57-71) 93/64  No intake or output data in the 24 hours ending 02/04/25 1443    Physical Exam:   Constitutional: Awake, alert, no acute distress.  HEENT: Sclera anicteric, no conjunctival injection  Neck: Supple, no JVD  Respiratory: Clear to auscultation bilaterally, nonlabored respiration on RA  Cardiovascular: tachycardiac, RR, no murmurs, no rubs  Gastrointestinal: Positive bowel sounds, abdomen is soft, nontender and nondistended  : No palpable bladder  Musculoskeletal: No edema, no clubbing or cyanosis  Psychiatric: Appropriate affect, cooperative  Neurologic: moves all extremities  Skin: Warm and dry       Scheduled Meds:     cefTRIAXone, 1,000 mg, Intravenous, Once  midodrine, 5 mg, Oral, TID AC  sodium chloride, 1,000 mL, Intravenous, Once      IV Meds:   sodium chloride, 100 mL/hr        Results Reviewed:   I have personally reviewed the results from the time of this admission to 2/4/2025 14:43 EST     Lab Results   Component Value Date    GLUCOSE 277 (H) 02/04/2025    CALCIUM 9.3 02/04/2025      (L) 02/04/2025    K 5.1 02/04/2025    CO2 13.2 (L) 02/04/2025    CL 93 (L) 02/04/2025    BUN 70 (H) 02/04/2025    CREATININE 5.16 (H) 02/04/2025    BCR 13.6 02/04/2025    ANIONGAP 16.8 (H) 02/04/2025      Lab Results   Component Value Date    MG 2.0 02/04/2025    PHOS 3.8 02/04/2025    ALBUMIN 3.5 02/04/2025           Assessment / Plan       * No active hospital problems. *      ASSESSMENT:   NEHEMIAS on CKD 3B, baseline Scr 1.7-2.0, secondary to prior AKIs d/t postobstructive uropathy, and longstanding hypertensive/diabetic nephrosclerosis.  Acute etiology is likely multifactorial: UTI followed by  hypotension limiting renal perfusion . CT abdomen/pelvis did not reveal hydro; slightly hypovolemic, receiving IL NS bolus now  Acute on chronic hyponatremia, sodium 126 corrected for BG, likely multifactorial: excessive PO fluid intake, poor solute intake, and NEHEMIAS on CKD   AGMA, secondary to NEHEMIAS on CKD , need to rule out lactic acidosis and ketosis started sodium bicarbonate drip and follow work up   Hx post obstructive uropathy, s/p  bilateral ureteral stent placement 8/30/2024, s/p prostate resection 12/13/2024, Rodriguez was removed 3 to 4 weeks ago per family report. CT abd/pel suggested enlarged prostate with possible cystitis / Prostatitis  due to recurrent UTI ?   UTI, failed outpatient management, on IV Rocephin, will follow cultures , possible prostatitis due to recurrent antibiotics  . Previous urine and blood cultures negative   Type II DM with CKD, managed by primary team  Hypotension  will hold amlodipine  and hydralazine , continue IVF , will order cortisol   Transaminases from hypotension , managed by primary team  Dementia, on namenda at home    PLAN:  Agree with IV NS bolus, followed by continuous sodium bicarb drip/ infusion  to replaced volume and Hco3 deficit at 100 ml/hr   Hold home antihypertensives (Norvasc and hydralazine), order  midodrine 5 mg TID x 3 doses   I review his CT abdomen ( report not  finalized ) bilateral ureteral stent w/o hydronephrosis . Bilateral renal atrophy . Enlarged prostate .  . No evidence of urinary retention   Check serum osm, uric acid, lactic acid, and beta hydroxybutyrate, cortisol   Check Fanny, UPCR, and urine osm  Surveillance labs    Spoke w wife by phone and caregiver at bedside     Thank you for involving us in the care of Froy Ngo.  Please feel free to call with any questions.    Tad Benoit MD  02/04/25  14:43 [facility]    [facility]  [phone]      Please note that portions of this note were completed with a voice recognition program.    Electronically signed by Tad Benoit MD at 02/04/25 5414

## 2025-02-05 NOTE — CASE MANAGEMENT/SOCIAL WORK
Discharge Planning Assessment  [facility]     Patient Name: Froy Ngo  MRN: [id number]  Today's Date: 2/5/2025    Admit Date: 2/4/2025    Plan: Plan home with spouse and private caregivers.  GRISELDA Perez RN   Discharge Needs Assessment       Row Name 02/05/25 0848       Living Environment    People in Home spouse    Name(s) of People in Home Spouse  ( Lo Ngo [phone])    Current Living Arrangements home    Potentially Unsafe Housing Conditions none    In the past 12 months has the electric, gas, oil, or water company threatened to shut off services in your home? No    Primary Care Provided by self    Provides Primary Care For no one    Family Caregiver if Needed spouse    Family Caregiver Names Spouse ( Lo Ngo [phone]) and private caregivers    Quality of Family Relationships involved;supportive;helpful    Able to Return to Prior Arrangements yes       Resource/Environmental Concerns    Resource/Environmental Concerns none    Transportation Concerns none       Transportation Needs    In the past 12 months, has lack of transportation kept you from medical appointments or from getting medications? no    In the past 12 months, has lack of transportation kept you from meetings, work, or from getting things needed for daily living? No       Food Insecurity    Within the past 12 months, you worried that your food would run out before you got the money to buy more. Never true       Transition Planning    Patient/Family Anticipates Transition to home with family    Patient/Family Anticipated Services at Transition none    Transportation Anticipated family or friend will provide       Discharge Needs Assessment    Readmission Within the Last 30 Days no previous admission in last 30 days    Equipment Currently Used at Home cane, straight;grab bar;shower chair;walker, rolling    Concerns to be Addressed no discharge needs identified;denies needs/concerns at this time    Anticipated Changes  Related to Illness none    Equipment Needed After Discharge cane, straight;grab bar, tub/shower;shower chair;walker, rolling                   Discharge Plan       Row Name 02/05/25 0841       Plan    Plan Plan home with spouse and private caregivers.  GRISELDA Perez RN    Patient/Family in Agreement with Plan yes    Plan Comments FACE SHEET VERIFIED/ IM LETTER SIGNED.  Spoke with pt and pt's spouse ( Lo) at bedside. Pt's PCP is Dr. Ryan Gutierrez.  Pt lives with his spouse  ( Lo Ngo [phone]) in a two story house.  Pt is independent with ADLs. Pt has a cane, grab bar, shower chair, and rolling walker for home use if needed.  Pt gets his prescriptions at [facility]  ([facility]  Pt denies any issues affording medications. Pt is not current with .  Pt has been in [facility] for skilled care.  Pt has private caregivers as needed.  Pt denies any discharge needs. Plan home with spouse and private caregivers.  GRISELDA Perez RN                  Continued Care and Services - Admitted Since 2/4/2025    No active coordination exists for this encounter.       Expected Discharge Date and Time       Expected Discharge Date Expected Discharge Time    Feb 10, 2025            Demographic Summary       Row Name 02/05/25 0840       General Information    Admission Type inpatient    Arrived From emergency department    Required Notices Provided Important Message from Medicare    Referral Source admission list    Reason for Consult discharge planning    Preferred Language English                   Functional Status       Row Name 02/05/25 0885       Functional Status    Usual Activity Tolerance moderate    Current Activity Tolerance moderate       Functional Status, IADL    Medications independent    Meal Preparation assistive person    Housekeeping assistive person    Laundry assistive person    Shopping assistive person       Mental Status    General Appearance WDL WDL                    Psychosocial    No documentation.                  Abuse/Neglect    No documentation.                  Legal    No documentation.                  Substance Abuse    No documentation.                  Patient Forms    No documentation.                     Deloris Perez RN

## 2025-02-05 NOTE — PLAN OF CARE
Goal Outcome Evaluation:  Plan of Care Reviewed With: patient        Progress: no change  Outcome Evaluation: Patient is 79 year old male presenting to [facility] with complaints of nausea and vomiting with associated weakness and fever. Sent by PCP for abnormal labs consistent with acute renal failure and pancreatitis. Patient PMH significant for dementia, DM. At baseline, caregiver reports patient is independent with ADLs and caregiver assist with IADLs (driving patient to work, picking up laundry, etc.). Patient does not use a device for functional mobility. Today, patient requires CGA with bed mobility and cueing for pacing, as patient is impulsive. patient SBA seated EOB, dons socks provided s/u. Min A STS with use of rolling walker. Patient completes functional mobility in hallway, min A with use of of RW. Patient requires assist with RW management, as patient frequently running RW into stationary objects. Patient returns self to supine, SBA at end of session. Patient demonstrates deficits related to safety awareness, tolerance, balance, transfers and mobility that impede patient independence with  ADLs and mobility. Recommend home with HH at time of discharge.    Anticipated Discharge Disposition (OT): home with home health, home with assist

## 2025-02-05 NOTE — CONSULTS
Colorectal & General Surgery  Consultation    Patient: Froy Ngo  YOB: 1945  MRN: [id number]      Assessment  Froy Ngo is a 79 y.o. male with suspected gallstone pancreatitis.  He was admitted with acute renal failure with significant electrolyte abnormalities and also found to have severely elevated lipase in the 2000's.  It is since decreased.  While he is asymptomatic, he did have an episode of nausea and vomiting early this weekend.  I suspect that he had an episode of pancreatitis that was most likely gallstone pancreatitis given that he does not drink any significant amount of alcohol and has cholelithiasis on ultrasound today.  I discussed with him, his wife, and Dr. Farrell.  We discussed proceeding with laparoscopic cholecystectomy with intraoperative cholangiogram prior to discharge, once his pancreatitis has had time to truly come down and his renal function is appropriate again.  For now, okay for clear liquid diet from my standpoint.  I will continue to follow closely and we will plan for surgery prior to discharge.    History of Present Illness   Froy Ngo is a 79 y.o. male who presents to the hospital with significant electrolyte abnormalities and acute kidney failure.  He was also found to have pancreatitis.  Today, he denies any significant abdominal pain of any kind and he says he is tolerating diet relatively well.  His wife reports that over the past few days, he has had very little appetite and he had an episode of nausea and vomiting over the weekend.    Past Medical History   Past Medical History:   Diagnosis Date    Dementia     Diabetes mellitus     Elevated cholesterol     Prostate disorder     Urinary retention         Past Surgical History   Past Surgical History:   Procedure Laterality Date    CYSTOSCOPY W/ URETERAL STENT PLACEMENT Bilateral 8/13/2024    Procedure: BILATERAL CYSTOSCOPY URETERAL CATHETER/STENT INSERTION WITH BILATERAL RETROGRADES;   Surgeon: Antoni Melendez MD;  Location: [facility] OR;  Service: Urology;  Laterality: Bilateral;       Social History  Social History     Socioeconomic History    Marital status: Single   Tobacco Use    Smoking status: Never     Passive exposure: Never    Smokeless tobacco: Never   Vaping Use    Vaping status: Never Used   Substance and Sexual Activity    Alcohol use: Never    Drug use: Never    Sexual activity: Not Currently       Family History  History reviewed. No pertinent family history.    Review of Systems  Negative except as documented in the HPI.     Allergies  No Known Allergies    Medications    Current Facility-Administered Medications:     [Held by provider] atorvastatin (LIPITOR) tablet 10 mg, 10 mg, Oral, Daily, Ritesh Farrell MD    sennosides-docusate (PERICOLACE) 8.6-50 MG per tablet 2 tablet, 2 tablet, Oral, BID PRN **AND** polyethylene glycol (MIRALAX) packet 17 g, 17 g, Oral, Daily PRN **AND** bisacodyl (DULCOLAX) EC tablet 5 mg, 5 mg, Oral, Daily PRN **AND** bisacodyl (DULCOLAX) suppository 10 mg, 10 mg, Rectal, Daily PRN, Ritesh Farrell MD    cefTRIAXone (ROCEPHIN) 1,000 mg in sodium chloride 0.9 % 100 mL MBP, 1,000 mg, Intravenous, Q24H, Ritesh Frarell MD    dextrose (D50W) (25 g/50 mL) IV injection 25 g, 25 g, Intravenous, Q15 Min PRN, Ritesh Farrell MD    dextrose (GLUTOSE) oral gel 15 g, 15 g, Oral, Q15 Min PRN, Ritesh Farrell MD    [Held by provider] donepezil (ARICEPT) tablet 10 mg, 10 mg, Oral, Nightly, Ritesh Farrell MD    [Held by provider] ferrous sulfate tablet 325 mg, 325 mg, Oral, Daily With Breakfast, Ritesh Farrell MD    [Held by provider] finasteride (PROSCAR) tablet 5 mg, 5 mg, Oral, Daily, Ritesh Farrell MD    glucagon (GLUCAGEN) injection 1 mg, 1 mg, Intramuscular, Q15 Min PRN, Ritesh Farrell MD    heparin (porcine) 5000 UNIT/ML injection 5,000 Units, 5,000 Units, Subcutaneous, Q12H, Ritesh Farrell MD, 5,000 Units at 02/05/25 0931     insulin regular (humuLIN R,novoLIN R) injection 2-7 Units, 2-7 Units, Subcutaneous, Q6H, Ritesh Farrell MD    [Held by provider] memantine (NAMENDA) tablet 5 mg, 5 mg, Oral, Daily, Ritesh Farrell MD    midodrine (PROAMATINE) tablet 10 mg, 10 mg, Oral, TID AC, Tad Benoit MD, 10 mg at 02/05/25 1149    nitroglycerin (NITROSTAT) SL tablet 0.4 mg, 0.4 mg, Sublingual, Q5 Min PRN, Ritesh Farrell MD    ondansetron ODT (ZOFRAN-ODT) disintegrating tablet 4 mg, 4 mg, Oral, Q6H PRN **OR** ondansetron (ZOFRAN) injection 4 mg, 4 mg, Intravenous, Q6H PRN, Ritesh Farrell MD    sodium chloride 0.9 % flush 10 mL, 10 mL, Intravenous, Q12H, Ritesh Farrell MD, 10 mL at 02/05/25 0931    sodium chloride 0.9 % flush 10 mL, 10 mL, Intravenous, PRN, Ritesh Farrell MD    sodium chloride 0.9 % infusion 40 mL, 40 mL, Intravenous, PRN, Ritesh Farrell MD    sodium chloride 0.9 % infusion, 75 mL/hr, Intravenous, Continuous, Roberto Cantor APRN, Last Rate: 75 mL/hr at 02/05/25 0931, 75 mL/hr at 02/05/25 0931    Vital Signs  Vitals:    02/05/25 1148   BP: 108/63   Pulse: 84   Resp: 16   Temp:    SpO2: 98%          Physical Exam  Constitutional: Resting comfortably, no acute distress  Neck: Supple, trachea midline  Respiratory: No increased work of breathing, Symmetric excursion  Cardiovascular: Well pefursed, no jugular venous distention evident   Abdominal: Soft, nontender, nondistended  Lymphatics: No cervical or suprascapular adenopathy  Skin: Warm, dry, no rash on visualized skin surfaces  Musculoskeletal: Symmetric strength, no obvious gross abnormalities  Psychiatric: Alert and oriented ×3, normal affect     Laboratory Results  I have personally reviewed CBC with Ocie 9, Humoryl 8.9, plates 162.  Lipase 1603 from the 2000's.  CMP with creatinine 4.76, bicarb 21, potassium 4.1, sodium 133, albumin 3.0, AST 34, ALT 54, total bilirubin 0.5, alkaline phosphatase 184.    Radiology  I have personally reviewed the  gallbladder demonstrates relatively normal-appearing pancreas.  Cholelithiasis with minimal changes to the gallbladder itself.         Finesse Bryant MD  Colorectal & General Surgery  [facility]    [address], [address]  [place], [place], [address]  P: [phone]  F: [phone]

## 2025-02-05 NOTE — PROGRESS NOTES
"Subjective: Restless overnight.  Complains of dry mouth.  Denies any abdominal pain.  Denies any shortness of air.  Producing urine.  Patient just returned from gallbladder ultrasound.  Wife is at bedside.    Subjective:  Vitals:    02/04/25 1738 02/04/25 2035 02/05/25 0007 02/05/25 0341   BP: 115/66 121/72 103/56    BP Location: Left arm Left arm Left arm    Patient Position: Lying Lying Lying    Pulse: 94 94 108 98   Resp: 18 18 18    Temp: 97.7 °F (36.5 °C) 98.7 °F (37.1 °C) 99.7 °F (37.6 °C)    TempSrc: Oral Oral Oral    SpO2: 100% 99% 91%    Weight: 88 kg (194 lb)      Height: 182.9 cm (72\")            Gen: Alert and oriented to person and place, poor insight into current condition  Heart: Regular  Lungs: Clear bilaterally  Abd: Soft, nontender nondistended bowel sounds are present  Ext: No edema    Recent Results (from the past 24 hours)   Green Top (Gel)    Collection Time: 02/04/25 12:02 PM   Result Value Ref Range    Extra Tube Hold for add-ons.    Lavender Top    Collection Time: 02/04/25 12:02 PM   Result Value Ref Range    Extra Tube hold for add-on    Gold Top - SST    Collection Time: 02/04/25 12:02 PM   Result Value Ref Range    Extra Tube Hold for add-ons.    Light Blue Top    Collection Time: 02/04/25 12:02 PM   Result Value Ref Range    Extra Tube Hold for add-ons.    Comprehensive Metabolic Panel    Collection Time: 02/04/25 12:02 PM    Specimen: Arm, Left; Blood   Result Value Ref Range    Glucose 277 (H) 65 - 99 mg/dL    BUN 70 (H) 8 - 23 mg/dL    Creatinine 5.16 (H) 0.76 - 1.27 mg/dL    Sodium 123 (L) 136 - 145 mmol/L    Potassium 5.1 3.5 - 5.2 mmol/L    Chloride 93 (L) 98 - 107 mmol/L    CO2 13.2 (L) 22.0 - 29.0 mmol/L    Calcium 9.3 8.6 - 10.5 mg/dL    Total Protein 8.0 6.0 - 8.5 g/dL    Albumin 3.5 3.5 - 5.2 g/dL    ALT (SGPT) 78 (H) 1 - 41 U/L    AST (SGOT) 63 (H) 1 - 40 U/L    Alkaline Phosphatase 219 (H) 39 - 117 U/L    Total Bilirubin 0.7 0.0 - 1.2 mg/dL    Globulin 4.5 gm/dL    A/G " Ratio 0.8 g/dL    BUN/Creatinine Ratio 13.6 7.0 - 25.0    Anion Gap 16.8 (H) 5.0 - 15.0 mmol/L    eGFR 10.6 (L) >60.0 mL/min/1.73   Magnesium    Collection Time: 02/04/25 12:02 PM    Specimen: Arm, Left; Blood   Result Value Ref Range    Magnesium 2.0 1.6 - 2.4 mg/dL   Phosphorus    Collection Time: 02/04/25 12:02 PM    Specimen: Arm, Left; Blood   Result Value Ref Range    Phosphorus 3.8 2.5 - 4.5 mg/dL   CBC Auto Differential    Collection Time: 02/04/25 12:02 PM    Specimen: Arm, Left; Blood   Result Value Ref Range    WBC 15.48 (H) 3.40 - 10.80 10*3/mm3    RBC 3.89 (L) 4.14 - 5.80 10*6/mm3    Hemoglobin 11.0 (L) 13.0 - 17.7 g/dL    Hematocrit 32.7 (L) 37.5 - 51.0 %    MCV 84.1 79.0 - 97.0 fL    MCH 28.3 26.6 - 33.0 pg    MCHC 33.6 31.5 - 35.7 g/dL    RDW 14.6 12.3 - 15.4 %    RDW-SD 44.0 37.0 - 54.0 fl    MPV 9.7 6.0 - 12.0 fL    Platelets 262 140 - 450 10*3/mm3    Neutrophil % 90.8 (H) 42.7 - 76.0 %    Lymphocyte % 2.6 (L) 19.6 - 45.3 %    Monocyte % 5.1 5.0 - 12.0 %    Eosinophil % 0.9 0.3 - 6.2 %    Basophil % 0.1 0.0 - 1.5 %    Immature Grans % 0.5 0.0 - 0.5 %    Neutrophils, Absolute 14.05 (H) 1.70 - 7.00 10*3/mm3    Lymphocytes, Absolute 0.41 (L) 0.70 - 3.10 10*3/mm3    Monocytes, Absolute 0.79 0.10 - 0.90 10*3/mm3    Eosinophils, Absolute 0.14 0.00 - 0.40 10*3/mm3    Basophils, Absolute 0.01 0.00 - 0.20 10*3/mm3    Immature Grans, Absolute 0.08 (H) 0.00 - 0.05 10*3/mm3    nRBC 0.0 0.0 - 0.2 /100 WBC   Osmolality, Serum    Collection Time: 02/04/25 12:02 PM    Specimen: Arm, Left; Blood   Result Value Ref Range    Osmolality 299 280 - 301 mOsm/kg   Beta Hydroxybutyrate Quantitative    Collection Time: 02/04/25 12:02 PM    Specimen: Arm, Left; Blood   Result Value Ref Range    Beta-Hydroxybutyrate Quant 0.210 0.020 - 0.270 mmol/L   Lipase    Collection Time: 02/04/25 12:02 PM    Specimen: Arm, Left; Blood   Result Value Ref Range    Lipase 2,610 (H) 13 - 60 U/L   Urinalysis With Culture If Indicated -  Urine, Clean Catch    Collection Time: 02/04/25  1:38 PM    Specimen: Urine, Clean Catch   Result Value Ref Range    Color, UA Yellow Yellow, Straw    Appearance, UA Cloudy (A) Clear    pH, UA 6.5 5.0 - 8.0    Specific Gravity, UA 1.016 1.005 - 1.030    Glucose, UA Negative Negative    Ketones, UA Negative Negative    Bilirubin, UA Negative Negative    Blood, UA Moderate (2+) (A) Negative    Protein,  mg/dL (2+) (A) Negative    Leuk Esterase, UA Large (3+) (A) Negative    Nitrite, UA Positive (A) Negative    Urobilinogen, UA 1.0 E.U./dL 0.2 - 1.0 E.U./dL   Urinalysis, Microscopic Only - Urine, Clean Catch    Collection Time: 02/04/25  1:38 PM    Specimen: Urine, Clean Catch   Result Value Ref Range    RBC, UA Too Numerous to Count (A) None Seen, 0-2 /HPF    WBC, UA Too Numerous to Count (A) None Seen, 0-2 /HPF    Bacteria, UA None Seen None Seen /HPF    Squamous Epithelial Cells, UA 0-2 None Seen, 0-2 /HPF    Hyaline Casts, UA 7-12 None Seen /LPF    Methodology Automated Microscopy    Osmolality, Urine - Urine, Clean Catch    Collection Time: 02/04/25  1:38 PM    Specimen: Urine, Clean Catch   Result Value Ref Range    Osmolality, Urine 359 mOsm/kg   Protein / Creatinine Ratio, Urine - Urine, Clean Catch    Collection Time: 02/04/25  1:38 PM    Specimen: Urine, Clean Catch   Result Value Ref Range    Protein/Creatinine Ratio, Urine 1,037.5 (H) 0.0 - 200.0 mg/G Crea    Creatinine, Urine 109.4 mg/dL    Total Protein, Urine 113.5 mg/dL   Sodium, Urine, Random - Urine, Clean Catch    Collection Time: 02/04/25  1:38 PM    Specimen: Urine, Clean Catch   Result Value Ref Range    Sodium, Urine 26 mmol/L   Lactic Acid, Plasma    Collection Time: 02/04/25  3:52 PM    Specimen: Blood   Result Value Ref Range    Lactate 1.8 0.5 - 2.0 mmol/L   POC Glucose Once    Collection Time: 02/04/25  5:55 PM    Specimen: Blood   Result Value Ref Range    Glucose 225 (H) 70 - 130 mg/dL   POC Glucose Once    Collection Time: 02/04/25   9:41 PM    Specimen: Blood   Result Value Ref Range    Glucose 248 (H) 70 - 130 mg/dL   Comprehensive Metabolic Panel    Collection Time: 02/05/25  5:55 AM    Specimen: Blood   Result Value Ref Range    Glucose 226 (H) 65 - 99 mg/dL    BUN 80 (H) 8 - 23 mg/dL    Creatinine 4.76 (H) 0.76 - 1.27 mg/dL    Sodium 133 (L) 136 - 145 mmol/L    Potassium 4.1 3.5 - 5.2 mmol/L    Chloride 98 98 - 107 mmol/L    CO2 21.6 (L) 22.0 - 29.0 mmol/L    Calcium 8.4 (L) 8.6 - 10.5 mg/dL    Total Protein 6.4 6.0 - 8.5 g/dL    Albumin 3.0 (L) 3.5 - 5.2 g/dL    ALT (SGPT) 54 (H) 1 - 41 U/L    AST (SGOT) 34 1 - 40 U/L    Alkaline Phosphatase 184 (H) 39 - 117 U/L    Total Bilirubin 0.5 0.0 - 1.2 mg/dL    Globulin 3.4 gm/dL    A/G Ratio 0.9 g/dL    BUN/Creatinine Ratio 16.8 7.0 - 25.0    Anion Gap 13.4 5.0 - 15.0 mmol/L    eGFR 11.6 (L) >60.0 mL/min/1.73   Magnesium    Collection Time: 02/05/25  5:55 AM    Specimen: Blood   Result Value Ref Range    Magnesium 2.0 1.6 - 2.4 mg/dL   Phosphorus    Collection Time: 02/05/25  5:55 AM    Specimen: Blood   Result Value Ref Range    Phosphorus 4.1 2.5 - 4.5 mg/dL   Uric Acid    Collection Time: 02/05/25  5:55 AM    Specimen: Blood   Result Value Ref Range    Uric Acid 9.0 (H) 3.4 - 7.0 mg/dL   CBC Auto Differential    Collection Time: 02/05/25  5:55 AM    Specimen: Blood   Result Value Ref Range    WBC 9.53 3.40 - 10.80 10*3/mm3    RBC 3.23 (L) 4.14 - 5.80 10*6/mm3    Hemoglobin 8.9 (L) 13.0 - 17.7 g/dL    Hematocrit 27.6 (L) 37.5 - 51.0 %    MCV 85.4 79.0 - 97.0 fL    MCH 27.6 26.6 - 33.0 pg    MCHC 32.2 31.5 - 35.7 g/dL    RDW 15.2 12.3 - 15.4 %    RDW-SD 47.2 37.0 - 54.0 fl    MPV 9.8 6.0 - 12.0 fL    Platelets 162 140 - 450 10*3/mm3    Neutrophil % 86.1 (H) 42.7 - 76.0 %    Lymphocyte % 5.2 (L) 19.6 - 45.3 %    Monocyte % 6.3 5.0 - 12.0 %    Eosinophil % 1.6 0.3 - 6.2 %    Basophil % 0.2 0.0 - 1.5 %    Immature Grans % 0.6 (H) 0.0 - 0.5 %    Neutrophils, Absolute 8.20 (H) 1.70 - 7.00 10*3/mm3     Lymphocytes, Absolute 0.50 (L) 0.70 - 3.10 10*3/mm3    Monocytes, Absolute 0.60 0.10 - 0.90 10*3/mm3    Eosinophils, Absolute 0.15 0.00 - 0.40 10*3/mm3    Basophils, Absolute 0.02 0.00 - 0.20 10*3/mm3    Immature Grans, Absolute 0.06 (H) 0.00 - 0.05 10*3/mm3    nRBC 0.0 0.0 - 0.2 /100 WBC   Cortisol    Collection Time: 02/05/25  5:55 AM    Specimen: Blood   Result Value Ref Range    Cortisol 19.20   mcg/dL   Lipase    Collection Time: 02/05/25  5:55 AM    Specimen: Blood   Result Value Ref Range    Lipase 1,603 (H) 13 - 60 U/L   POC Glucose Once    Collection Time: 02/05/25  6:28 AM    Specimen: Blood   Result Value Ref Range    Glucose 228 (H) 70 - 130 mg/dL     A/P  Acute renal failure with acidosis-acidosis improving.  Creatinine trending down with IV fluids.  Nephrology continuing to follow.  Likely largely prerenal on top of his underlying kidney disease.  Does not have a history of urinary outlet obstruction and currently has stents in place.  Will resume tamsulosin as soon as possible.  Pancreatitis-lipase trending down.  Poor renal clearance is likely contributed to high lipase levels.  Has denied any abdominal pain.  CT scan showed gallstones without obvious cholecystitis but checking ultrasound as I wonder whether he passed the stone on Friday with the nausea and vomiting that he was having.  Keeping him n.p.o. for now until we see gallbladder ultrasound.  UTI-cultures pending.  Temperature staying down.  White count improved.  Continue Rocephin until cultures come back.  Anemia-likely largely secondary to renal failure with a dilutional drop after IV fluids.  No sign of active bleeding.  Will check iron studies etc.  Continue to monitor blood count.    Dementia-some confusion but oriented to person and place.  Will resume meds as takes po  Diabetes-continue low-dose insulin for correction while n.p.o.  Hyponatremia-improving with IV fluids  8.   Lovenox for DVT prophylaxis.

## 2025-02-05 NOTE — CASE MANAGEMENT/SOCIAL WORK
Continued Stay Note  [facility]     Patient Name: Froy Ngo  MRN: [id number]  Today's Date: 2/5/2025    Admit Date: 2/4/2025    Plan: Plan home with spouse and private caregivers.  GRISELDA Perez RN   Discharge Plan       Row Name 02/05/25 0850       Plan    Plan Plan home with spouse and private caregivers.  GRISELDA Perez RN    Patient/Family in Agreement with Plan yes    Plan Comments FACE SHEET VERIFIED/ IM LETTER SIGNED.  Spoke with pt and pt's spouse ( Lo) at bedside. Pt's PCP is Dr. Ryan Gutierrez.  Pt lives with his spouse  ( Lo Ngo [phone]) in a two story house.  Pt is independent with ADLs. Pt has a cane, grab bar, shower chair, and rolling walker for home use if needed.  Pt gets his prescriptions at [facility]  ([facility]).  Pt denies any issues affording medications. Pt is not current with .  Pt has been in [facility] for skilled care.  Pt has private caregivers as needed.  Pt denies any discharge needs. Plan home with spouse and private caregivers.  GRISELDA Perez RN                   Discharge Codes    No documentation.                 Expected Discharge Date and Time       Expected Discharge Date Expected Discharge Time    Feb 10, 2025               Deloris Perez RN

## 2025-02-05 NOTE — PLAN OF CARE
Goal Outcome Evaluation:         Patient awake most of the night. Alert with some confusion.Patient very anxious.Remains NPO.US gallbladder pending.Sodium bicarb gtt.VSS.Room air.No n/v this shift.Private caregiver at bedside.Plan of care ongoing

## 2025-02-05 NOTE — THERAPY EVALUATION
Patient Name: Froy Ngo  : 1945    MRN: [id number]                              Today's Date: 2025       Admit Date: 2025    Visit Dx:     ICD-10-CM ICD-9-CM   1. Acute renal failure, unspecified acute renal failure type  N17.9 584.9   2. Acute UTI  N39.0 599.0   3. Severe sepsis  A41.9 038.9    R65.20 995.92   4. Acute pancreatitis, unspecified complication status, unspecified pancreatitis type  K85.90 577.0   5. Decreased activities of daily living (ADL)  Z78.9 V49.89     Patient Active Problem List   Diagnosis    DKA (diabetic ketoacidosis)    Obstructive uropathy    Bilateral hydronephrosis    Hyperkalemia    Acute renal failure    Type 2 diabetes mellitus with hyperglycemia    Essential hypertension, benign    Dementia without behavioral disturbance    UTI (urinary tract infection), bacterial    Abnormal MRI of head    ARF (acute renal failure)     Past Medical History:   Diagnosis Date    Dementia     Diabetes mellitus     Elevated cholesterol     Prostate disorder     Urinary retention      Past Surgical History:   Procedure Laterality Date    CYSTOSCOPY W/ URETERAL STENT PLACEMENT Bilateral 2024    Procedure: BILATERAL CYSTOSCOPY URETERAL CATHETER/STENT INSERTION WITH BILATERAL RETROGRADES;  Surgeon: Antoni Melendez MD;  Location: [facility];  Service: Urology;  Laterality: Bilateral;      General Information       Row Name 25 1545          Physical Therapy Time and Intention    Document Type evaluation  -MG     Mode of Treatment co-treatment;physical therapy;occupational therapy;other (see comments)  -MG       Row Name 25 1545          General Information    Patient Profile Reviewed yes  -MG     Prior Level of Function independent:  Patient is independent with ADLs at baseline. Hired caregiver 3x/week for 3-6hrs/day to assist with IADLs (driving, laundry, etc). Patient is employed as a . No device for mobility.  -MG     Existing  Precautions/Restrictions fall  -MG     Barriers to Rehab cognitive status  -MG       Row Name 02/05/25 1545          Living Environment    People in Home spouse  Caregiver 3 days/week for 3-6hrs/day.  -MG       Row Name 02/05/25 1545          Home Main Entrance    Number of Stairs, Main Entrance none  -MG       Row Name 02/05/25 1545          Stairs Within Home, Primary    Number of Stairs, Within Home, Primary none  -MG       Row Name 02/05/25 1545          Cognition    Orientation Status (Cognition) oriented to;person;place  Pleasantly confused. Caregiver present and provides PLOF. Easily distractable.  -MG       Row Name 02/05/25 1545          Safety Issues/Impairments Affecting Functional Mobility    Safety Issues Affecting Function (Mobility) awareness of need for assistance;impulsivity;insight into deficits/self-awareness;judgment;safety precaution awareness  -MG     Impairments Affecting Function (Mobility) balance;cognition;strength  -MG     Comment, Safety Issues/Impairments (Mobility) Co treatment medically appropriate and necessary due to patient acuity level, activity tolerance and safety of patient and staff. Evaluation established to achieve all goals in POC. Gait belt and non-skid socks donned.  -MG               User Key  (r) = Recorded By, (t) = Taken By, (c) = Cosigned By      Initials Name Provider Type    MG Brook Pelayo, PT Physical Therapist                   Mobility       Row Name 02/05/25 1547          Bed Mobility    Supine-Sit [place] (Bed Mobility) contact guard  -MG     Sit-Supine [place] (Bed Mobility) standby assist  -MG     Assistive Device (Bed Mobility) head of bed elevated  -MG     Comment, (Bed Mobility) Impulsively attempts to return to supine on return to room. Required multiple cues to remain sitting EOB.  -MG       Row Name 02/05/25 1547          Sit-Stand Transfer    Sit-Stand [place] (Transfers) minimum assist (75% patient effort);1 person assist;verbal cues   -MG     Assistive Device (Sit-Stand Transfers) walker, front-wheeled  -MG     Comment, (Sit-Stand Transfer) Education/cueing provided for hand placement and sequencing. Pt did not follow cueing or have carryover of education.  -MG       Row Name 02/05/25 1547          Gait/Stairs (Locomotion)    [place] Level (Gait) contact guard;minimum assist (75% patient effort);verbal cues  -MG     Assistive Device (Gait) walker, front-wheeled  -MG     Distance in Feet (Gait) 150  -MG     Deviations/Abnormal Patterns (Gait) base of support, narrow  -MG     Bilateral Gait Deviations forward flexed posture  -MG     Comment, (Gait/Stairs) Cues for upright posture, to slow down and to stay within RW. Also required cues for attention to task. No overt LOB. Mild unsteadiness when distracted. No dizziness or SOB.  -MG               User Key  (r) = Recorded By, (t) = Taken By, (c) = Cosigned By      Initials Name Provider Type    MG Brook Pelayo, PT Physical Therapist                   Obj/Interventions       Row Name 02/05/25 1550          Range of Motion Comprehensive    General Range of Motion bilateral lower extremity ROM WNL  -MG       Row Name 02/05/25 1550          Strength Comprehensive (MMT)    General Manual Muscle Testing (MMT) Assessment lower extremity strength deficits identified  -MG     Comment, General Manual Muscle Testing (MMT) Assessment BLEs grossly 4+/5.  -MG       Row Name 02/05/25 1550          Balance    Dynamic Sitting Balance standby assist  -MG     Position, Sitting Balance sitting edge of bed  -MG     Dynamic Standing Balance minimal assist  -MG     Position/Device Used, Standing Balance supported;walker, front-wheeled  -MG     Comment, Balance Pt requires CG/Brien for mobility with RW due to being impulsive, quick and having decreased safety awareness, which impacts his balance and falls risk.  -MG       Row Name 02/05/25 1550          Sensory Assessment (Somatosensory)    Sensory Assessment  (Somatosensory) sensation intact  -MG               User Key  (r) = Recorded By, (t) = Taken By, (c) = Cosigned By      Initials Name Provider Type    Brook Nguyen, PT Physical Therapist                   Goals/Plan       Row Name 02/05/25 1556          Bed Mobility Goal 1 (PT)    Activity/Assistive Device (Bed Mobility Goal 1, PT) bed mobility activities, all  -MG     [place] Level/Cues Needed (Bed Mobility Goal 1, PT) supervision required  -MG     Time Frame (Bed Mobility Goal 1, PT) 1 week  -MG       Row Name 02/05/25 1556          Transfer Goal 1 (PT)    Activity/Assistive Device (Transfer Goal 1, PT) transfers, all  -MG     [place] Level/Cues Needed (Transfer Goal 1, PT) standby assist  -MG     Time Frame (Transfer Goal 1, PT) 1 week  -MG       Row Name 02/05/25 1556          Gait Training Goal 1 (PT)    Activity/Assistive Device (Gait Training Goal 1, PT) gait (walking locomotion)  -MG     [place] Level (Gait Training Goal 1, PT) standby assist  -MG     Distance (Gait Training Goal 1, PT) 150  -MG     Time Frame (Gait Training Goal 1, PT) 1 week  -MG       Row Name 02/05/25 1556          Therapy Assessment/Plan (PT)    Planned Therapy Interventions (PT) balance training;bed mobility training;gait training;home exercise program;patient/family education;stretching;strengthening;neuromuscular re-education;ROM (range of motion);postural re-education;transfer training  -MG               User Key  (r) = Recorded By, (t) = Taken By, (c) = Cosigned By      Initials Name Provider Type    Brook Nguyen, PT Physical Therapist                   Clinical Impression       Row Name 02/05/25 1551          Pain    Pretreatment Pain Rating 0/10 - no pain  -MG     Posttreatment Pain Rating 0/10 - no pain  -MG       Row Name 02/05/25 1551          Plan of Care Review    Plan of Care Reviewed With patient;caregiver;spouse  -MG     Progress no change  -MG     Outcome Evaluation Pt is a 80 y/o M with h/o DM  and dementia who presented to [facility] on 2/4/2025 from his PCP for abnormal labs consistent with acute renal failure and pancreatitis, and with c/o N/V, weakness and fever. Pts caregiver present on arrival and provides PLOF, stating pt is (I) without AD, they own a cane and RW, pt and his spouse live in a home without steps, and the caregiver assists pt with IADLs 3 days/week for 3-6hrs at a time. Today, pt is alert and oriented to self and place only, is impulsive, quick and has impaired safety awareness. Pt was CGA/SBA for bed mobility, Brien for STS to RW, SBA for sitting balance while EOB, SV to don his socks while EOB, and CG/Brien for ambulation of 150' with RW. During ambulation pt required frequent cues for attention to task, posture, pacing and to stay inside RW. No overt LOB, but was unsteady. No c/o dizziness or SOB. Pt will benefit from skilled PT services to address his impaired strength, mobility and balance. ANDREWS w/ RN. Rec home with HH and assist at PR.  -MG       Row Name 02/05/25 1551          Therapy Assessment/Plan (PT)    Criteria for Skilled Interventions Met (PT) yes;meets criteria  -MG       Row Name 02/05/25 1551          Positioning and Restraints    Pre-Treatment Position in bed  -MG     Post Treatment Position bed  -MG     In Bed notified nsg;supine;fowlers;call light within reach;encouraged to call for assist;exit alarm on;with family/caregiver;side rails up x3  -MG               User Key  (r) = Recorded By, (t) = Taken By, (c) = Cosigned By      Initials Name Provider Type    MG Brook Pelayo, PT Physical Therapist                   Outcome Measures       Row Name 02/05/25 1556 02/05/25 0882       How much help from another person do you currently need...    Turning from your back to your side while in flat bed without using bedrails? 4  -MG 4  -JK    Moving from lying on back to sitting on the side of a flat bed without bedrails? 4  -MG 4  -JK    Moving to and from a bed to a chair  (including a wheelchair)? 3  -MG 4  -JK    Standing up from a chair using your arms (e.g., wheelchair, bedside chair)? 3  -MG 3  -JK    Climbing 3-5 steps with a railing? 3  -MG 3  -JK    To walk in hospital room? 3  -MG 3  -JK    AM-PAC 6 Clicks Score (PT) 20  -MG 21  -JK      Row Name 02/05/25 1549          Functional Assessment    Outcome Measure Options AM-PAC 6 Clicks Daily Activity (OT)  -ES               User Key  (r) = Recorded By, (t) = Taken By, (c) = Cosigned By      Initials Name Provider Type     Brook Pelayo, PT Physical Therapist    Radha Graham, OTR/L, CSRS Occupational Therapist    Erika Morris RN Registered Nurse                                 Physical Therapy Education       Title: PT OT SLP Therapies (In Progress)       Topic: Physical Therapy (In Progress)       Point: Mobility training (Not Started)       Learner Progress:  Not documented in this visit.              Point: Home exercise program (Not Started)       Learner Progress:  Not documented in this visit.              Point: Body mechanics (Done)       Learning Progress Summary            Patient Acceptance, E,D, NR,VU by  at 2/5/2025 1557                      Point: Precautions (Done)       Learning Progress Summary            Patient Acceptance, E,D, NR,VU by  at 2/5/2025 1557                                      User Key       Initials Effective Dates Name Provider Type [facility] 05/24/22 -  Brook Pelayo, PT Physical Therapist PT                  PT Recommendation and Plan  Planned Therapy Interventions (PT): balance training, bed mobility training, gait training, home exercise program, patient/family education, stretching, strengthening, neuromuscular re-education, ROM (range of motion), postural re-education, transfer training  Progress: no change  Outcome Evaluation: Pt is a 80 y/o M with h/o DM and dementia who presented to [facility] on 2/4/2025 from his PCP for abnormal labs consistent with acute renal  failure and pancreatitis, and with c/o N/V, weakness and fever. Pts caregiver present on arrival and provides PLOF, stating pt is (I) without AD, they own a cane and RW, pt and his spouse live in a home without steps, and the caregiver assists pt with IADLs 3 days/week for 3-6hrs at a time. Today, pt is alert and oriented to self and place only, is impulsive, quick and has impaired safety awareness. Pt was CGA/SBA for bed mobility, Brien for STS to RW, SBA for sitting balance while EOB, SV to don his socks while EOB, and CG/Brien for ambulation of 150' with RW. During ambulation pt required frequent cues for attention to task, posture, pacing and to stay inside RW. No overt LOB, but was unsteady. No c/o dizziness or SOB. Pt will benefit from skilled PT services to address his impaired strength, mobility and balance. SBAR w/ RN. Rec home with  and assist at [place].     Time Calculation:         PT Charges       Row Name 02/05/25 1557             Time Calculation    Start Time 1424  -MG      Stop Time 1438  -MG      Time Calculation (min) 14 min  -MG      PT Received On 02/05/25  -MG      PT - Next Appointment 02/07/25  -MG      PT Goal Re-Cert Due Date 02/19/25  -MG         Time Calculation- PT    Total Timed Code Minutes- PT 9 minute(s)  -MG                User Key  (r) = Recorded By, (t) = Taken By, (c) = Cosigned By      Initials Name Provider Type    MG Brook Pelayo, PT Physical Therapist                  Therapy Charges for Today       Code Description Service Date Service Provider Modifiers Qty    54461130355 HC PT EVAL MOD COMPLEXITY 3 2/5/2025 Brook Pelayo, PT GP 1    02471816049 HC PT THERAPEUTIC ACT EA 15 MIN 2/5/2025 Brook Pelayo, PT GP 1            PT G-Codes  Outcome Measure Options: AM-PAC 6 Clicks Daily Activity (OT)  AM-PAC 6 Clicks Score (PT): 20  AM-PAC 6 Clicks Score (OT): 21  PT Discharge Summary  Anticipated Discharge Disposition (PT): home with home health, home with assist    Brook Pelayo  PT  2/5/2025

## 2025-02-05 NOTE — PLAN OF CARE
Goal Outcome Evaluation:  Plan of Care Reviewed With: patient, caregiver, spouse        Progress: no change  Outcome Evaluation: Pt is a 80 y/o M with h/o DM and dementia who presented to [facility] on 2/4/2025 from his PCP for abnormal labs consistent with acute renal failure and pancreatitis, and with c/o N/V, weakness and fever. Pts caregiver present on arrival and provides PLOF, stating pt is (I) without AD, they own a cane and RW, pt and his spouse live in a home without steps, and the caregiver assists pt with IADLs 3 days/week for 3-6hrs at a time. Today, pt is alert and oriented to self and place only, is impulsive, quick and has impaired safety awareness. Pt was CGA/SBA for bed mobility, Brien for STS to RW, SBA for sitting balance while EOB, SV to don his socks while EOB, and CG/Brien for ambulation of 150' with RW. During ambulation pt required frequent cues for attention to task, posture, pacing and to stay inside RW. No overt LOB, but was unsteady. No c/o dizziness or SOB. Pt will benefit from skilled PT services to address his impaired strength, mobility and balance. ANDREWS w/ RN. Rec home with  and assist at [place].    Anticipated Discharge Disposition (PT): home with home health, home with assist

## 2025-02-05 NOTE — PROGRESS NOTES
[facility] Progress Note      Patient Name: Froy Ngo  : 1945  MRN: [id number]  Primary Care Physician:  Ryan Gutierrez MD  Date of admission: 2025    Subjective     Interval History:   Followup on NEHEMIAS on CKD    Doing well, no chest pain, dyspnea, or N/V/D  UOP not fully quantified, been urinating per staff    Review of Systems:   As noted above        Personal History      Medical History        Past Medical History:   Diagnosis Date    Dementia      Diabetes mellitus      Elevated cholesterol      Prostate disorder      Urinary retention              Surgical History         Past Surgical History:   Procedure Laterality Date    CYSTOSCOPY W/ URETERAL STENT PLACEMENT Bilateral 2024     Procedure: BILATERAL CYSTOSCOPY URETERAL CATHETER/STENT INSERTION WITH BILATERAL RETROGRADES;  Surgeon: Antoni Melendez MD;  Location: [facility];  Service: Urology;  Laterality: Bilateral;            Family History: family history is not on file.     Social History:  reports that he has never smoked. He has never been exposed to tobacco smoke. He has never used smokeless tobacco. He reports that he does not drink alcohol and does not use drugs.     Home Medications:          Prior to Admission medications    Medication Sig Start Date End Date Taking? Authorizing Provider   amLODIPine (NORVASC) 5 MG tablet Take 1 tablet by mouth Daily. 8/10/24     Gurvinder Farrell MD   aspirin 81 MG EC tablet Take 1 tablet by mouth Daily. 24     Araseli Price MD   atorvastatin (LIPITOR) 10 MG tablet Take 1 tablet by mouth Daily. Indications: High Amount of Fats in the Blood 24     Araseli Price MD   donepezil (ARICEPT) 10 MG tablet Take 1 tablet by mouth Every Night. Indications: Alzheimer's Disease 10/4/23 10/3/24   Araseli Price MD   finasteride (Proscar) 5 MG tablet Take 1 tablet by mouth Daily. Indications: Benign Enlargement of Prostate 24      Provider, MD Araseli   hydrALAZINE (APRESOLINE) 25 MG tablet Take 1 tablet by mouth Every 8 (Eight) Hours. 8/9/24     Gurvinder Farrell MD   insulin lispro (HUMALOG/ADMELOG) 100 UNIT/ML injection Inject 2-7 Units under the skin into the appropriate area as directed 4 (Four) Times a Day Before Meals & at Bedtime. 8/20/24     Shruthi Acosta MD   memantine (NAMENDA) 10 MG tablet Take 1 tablet by mouth Daily. Indications: Alzheimer's Disease 11/6/23 11/5/24   ProviderAraseli MD   tamsulosin (FLOMAX) 0.4 MG capsule 24 hr capsule Take 1 capsule by mouth Daily. 8/10/24     Gurvinder Farrell MD         Allergies:  Allergies   No Known Allergies        Objective      Vitals:   Temp:  [99.3 °F (37.4 °C)] 99.3 °F (37.4 °C)  Heart Rate:  [] 92  Resp:  [18] 18  BP: ()/(57-71) 93/64  No intake or output data in the 24 hours ending 02/04/25 1443     Physical Exam:   Constitutional: Awake, alert, no acute distress.  HEENT: Sclera anicteric, no conjunctival injection  Neck: Supple, no JVD  Respiratory: Clear to auscultation bilaterally, nonlabored respiration on RA  Cardiovascular: tachycardiac, RR, no murmurs, no rubs  Gastrointestinal: Positive bowel sounds, abdomen is soft, nontender and nondistended  : No palpable bladder  Musculoskeletal: No edema, no clubbing or cyanosis  Psychiatric: Appropriate affect, cooperative  Neurologic: moves all extremities  Skin: Warm and dry              Scheduled Meds:     [Held by provider] atorvastatin, 10 mg, Oral, Daily  cefTRIAXone, 1,000 mg, Intravenous, Q24H  [Held by provider] donepezil, 10 mg, Oral, Nightly  [Held by provider] ferrous sulfate, 325 mg, Oral, Daily With Breakfast  [Held by provider] finasteride, 5 mg, Oral, Daily  heparin (porcine), 5,000 Units, Subcutaneous, Q12H  insulin regular, 2-7 Units, Subcutaneous, Q6H  [Held by provider] memantine, 5 mg, Oral, Daily  midodrine, 10 mg, Oral, TID AC  sodium chloride, 10 mL, Intravenous, Q12H      IV Meds:    sodium chloride, 75 mL/hr, Last Rate: 75 mL/hr (02/05/25 0931)        Results Reviewed:   I have personally reviewed the results from the time of this admission to 2/5/2025 10:21 EST     Results from last 7 days   Lab Units 02/05/25  0555 02/04/25  1202   SODIUM mmol/L 133* 123*   POTASSIUM mmol/L 4.1 5.1   CHLORIDE mmol/L 98 93*   CO2 mmol/L 21.6* 13.2*   BUN mg/dL 80* 70*   CREATININE mg/dL 4.76* 5.16*   CALCIUM mg/dL 8.4* 9.3   BILIRUBIN mg/dL 0.5 0.7   ALK PHOS U/L 184* 219*   ALT (SGPT) U/L 54* 78*   AST (SGOT) U/L 34 63*   GLUCOSE mg/dL 226* 277*       Estimated Creatinine Clearance: 15.1 mL/min (A) (by C-G formula based on SCr of 4.76 mg/dL (H)).    Results from last 7 days   Lab Units 02/05/25  0555 02/04/25  1202   MAGNESIUM mg/dL 2.0 2.0   PHOSPHORUS mg/dL 4.1 3.8       Results from last 7 days   Lab Units 02/05/25  0555   URIC ACID mg/dL 9.0*       Results from last 7 days   Lab Units 02/05/25  0555 02/04/25  1202   WBC 10*3/mm3 9.53 15.48*   HEMOGLOBIN g/dL 8.9* 11.0*   PLATELETS 10*3/mm3 162 262             Assessment / Plan     ASSESSMENT:   NEHEMIAS on CKD 3B, baseline Scr 1.7-2.0, secondary to prior AKIs d/t postobstructive uropathy, and longstanding hypertensive/diabetic nephrosclerosis.  Acute etiology is likely multifactorial: UTI followed by  hypotension limiting renal perfusion . CT abdomen/pelvis did not reveal hydro; slightly hypovolemic  Acute on chronic hyponatremia, likely multifactorial: excessive PO fluid intake, poor solute intake, and NEHEMIAS on CKD, improved  AGMA, secondary to NEHEMIAS on CKD, received bicarb gtt, improved  Hx post obstructive uropathy, s/p  bilateral ureteral stent placement 8/30/2024, s/p prostate resection 12/13/2024, Rodriguez was removed 3 to 4 weeks ago per family report. CT abd/pel suggested enlarged prostate with possible cystitis / Prostatitis  due to recurrent UTI ?   UTI, failed outpatient management, on IV Rocephin, will follow cultures , possible prostatitis due to  recurrent antibiotics. Previous urine and blood cultures negative   Type II DM with CKD, managed by primary team  Hypotension, will hold amlodipin and hydralazine. Cortisol did not suggest adrenal insufficiency   Transaminases from hypotension, managed by primary team  Dementia, on namenda at home  Pancreatitis/elevated lipase, managed by primary team    PLAN:  Switch bicarb gtt to IV NS @75ml/hr  Quantify UOP    Thank you for involving us in the care of Froy Ngo.  Please feel free to call with any questions.    Tad Benoit MD  02/05/25  10:21 [facility]    [facility]  [phone]    Please note that portions of this note were completed with a voice recognition program.

## 2025-02-05 NOTE — THERAPY EVALUATION
Patient Name: Froy Ngo  : 1945    MRN: [id number]                              Today's Date: 2025       Admit Date: 2025    Visit Dx:     ICD-10-CM ICD-9-CM   1. Acute renal failure, unspecified acute renal failure type  N17.9 584.9   2. Acute UTI  N39.0 599.0   3. Severe sepsis  A41.9 038.9    R65.20 995.92   4. Acute pancreatitis, unspecified complication status, unspecified pancreatitis type  K85.90 577.0   5. Decreased activities of daily living (ADL)  Z78.9 V49.89     Patient Active Problem List   Diagnosis    DKA (diabetic ketoacidosis)    Obstructive uropathy    Bilateral hydronephrosis    Hyperkalemia    Acute renal failure    Type 2 diabetes mellitus with hyperglycemia    Essential hypertension, benign    Dementia without behavioral disturbance    UTI (urinary tract infection), bacterial    Abnormal MRI of head    ARF (acute renal failure)     Past Medical History:   Diagnosis Date    Dementia     Diabetes mellitus     Elevated cholesterol     Prostate disorder     Urinary retention      Past Surgical History:   Procedure Laterality Date    CYSTOSCOPY W/ URETERAL STENT PLACEMENT Bilateral 2024    Procedure: BILATERAL CYSTOSCOPY URETERAL CATHETER/STENT INSERTION WITH BILATERAL RETROGRADES;  Surgeon: Antoni Melendez MD;  Location: [facility];  Service: Urology;  Laterality: Bilateral;      General Information       Row Name 25 1531          OT Time and Intention    Subjective Information no complaints  -ES     Document Type evaluation  -ES     Mode of Treatment co-treatment;physical therapy;occupational therapy  Patient presents with deficits impacting both mobility and functional independence in ADLs. The patient has multifaceted rehabilitation needs that require the expertise and skilled hands of both physical and occupational therapy.  -ES     Patient Effort good  -ES       Row Name 25 1535          General Information    Patient Profile Reviewed yes   -ES     Prior Level of Function independent:;ADL's;all household mobility;community mobility;driving  Patient is independent with ADLs at baseline. Hired caregiver 3x/week to assist with IADLs (driving, laundry, etc). Patient is employed as a . No device for mobility.  -ES     Existing Precautions/Restrictions fall  -ES     Barriers to Rehab cognitive status  -ES       Row Name 02/05/25 1531          Living Environment    People in Home spouse  caregiver 3x a week for several hours a day  -ES       Row Name 02/05/25 1531          Home Main Entrance    Number of Stairs, Main Entrance none  -ES       Row Name 02/05/25 1531          Stairs Within Home, Primary    Number of Stairs, Within Home, Primary none  -ES       Row Name 02/05/25 1531          Cognition    Orientation Status (Cognition) oriented to;person;place  patient is pleasantly confused, caregiver present to assist with prior level reporting.  -ES       Row Name 02/05/25 1531          Safety Issues/Impairments Affecting Functional Mobility    Safety Issues Affecting Function (Mobility) impulsivity;awareness of need for assistance;insight into deficits/self-awareness;judgment  -ES     Impairments Affecting Function (Mobility) balance;cognition;strength  -ES               User Key  (r) = Recorded By, (t) = Taken By, (c) = Cosigned By      Initials Name Provider Type    ES Radha Zhou, DANICAR/L, CSRS Occupational Therapist                     Mobility/ADL's       Row Name 02/05/25 1535          Bed Mobility    Bed Mobility supine-sit;sit-supine  -ES     Supine-Sit [place] (Bed Mobility) contact guard;1 person assist  -ES     Sit-Supine [place] (Bed Mobility) standby assist;1 person assist  -ES     Comment, (Bed Mobility) patient is impulsive to lay back down once seated edge of bed, frequent cueing to remain edge of bed provided.  -ES       Row Name 02/05/25 1535          Transfers    Transfers sit-stand transfer;stand-sit transfer  -ES      Comment, (Transfers) cueing provided prior to sit to stand for hand placement. no carryover noted. Min A with use of RW.  -ES       Row Name 02/05/25 1535          Sit-Stand Transfer    Sit-Stand [place] (Transfers) minimum assist (75% patient effort);1 person assist  -ES     Assistive Device (Sit-Stand Transfers) walker, front-wheeled  -ES       Row Name 02/05/25 1535          Functional Mobility    Functional Mobility- Ind. Level minimum assist (75% patient effort);1 person  -ES     Functional Mobility- Device walker, front-wheeled  -ES     Functional Mobility- Comment patient performs functional mobility in hallway, min A with use of rolling walker. Assist provided for RW management, as patient frequently attempting to run RW into stationary items. Patient is impulsive with mobility, impacting patient safety.  -ES       Row Name 02/05/25 1535          Activities of Daily Living    BADL Assessment/Intervention lower body dressing  -ES       Row Name 02/05/25 1535          Lower Body Dressing Assessment/Training    [place]enn[place] Level (Lower Body Dressing) lower body dressing skills;don;socks;standby assist  -ES     Position (Lower Body Dressing) edge of bed sitting  -ES               User Key  (r) = Recorded By, (t) = Taken By, (c) = Cosigned By      Initials Name Provider Type    Radha Graham, OTR/L, CSRS Occupational Therapist                   Obj/Interventions       Row Name 02/05/25 1537          Sensory Assessment (Somatosensory)    Sensory Assessment (Somatosensory) sensation intact  -       Row Name 02/05/25 1537          Vision Assessment/Intervention    Visual Impairment/Limitations WFL  -ES       Row Name 02/05/25 1537          Range of Motion Comprehensive    General Range of Motion bilateral upper extremity ROM WNL  -ES       Row Name 02/05/25 1537          Strength Comprehensive (MMT)    Comment, General Manual Muscle Testing (MMT) Assessment BUEs 4/5  -ES       Row Name 02/05/25 1537           Motor Skills    Motor Skills functional endurance  -ES     Functional Endurance fair  -ES       Row Name 02/05/25 1537          Balance    Balance Assessment sitting dynamic balance;standing dynamic balance  -ES     Dynamic Sitting Balance standby assist  -ES     Position, Sitting Balance unsupported;sitting edge of bed  -ES     Dynamic Standing Balance minimal assist  -ES     Position/Device Used, Standing Balance supported;walker, front-wheeled  -ES     Comment, Balance SBA seated EOB. Min A with RW for functional mobility and transfers. patient is impulsive, impacting patient balance and safety. No LOBs noted.  -ES               User Key  (r) = Recorded By, (t) = Taken By, (c) = Cosigned By      Initials Name Provider Type    ES Abelardo, Radha, OTR/L, CSRS Occupational Therapist                   Goals/Plan       Row Name 02/05/25 1548          Bed Mobility Goal 1 (OT)    Activity/Assistive Device (Bed Mobility Goal 1, OT) bed mobility activities, all  -ES     [place] Level/Cues Needed (Bed Mobility Goal 1, OT) independent  -ES     Time Frame (Bed Mobility Goal 1, OT) short term goal (STG);2 weeks  -ES     Progress/Outcomes (Bed Mobility Goal 1, OT) new goal  -ES       Row Name 02/05/25 1548          Transfer Goal 1 (OT)    Activity/Assistive Device (Transfer Goal 1, OT) transfers, all  -ES     [place] Level/Cues Needed (Transfer Goal 1, OT) modified independence  -ES     Time Frame (Transfer Goal 1, OT) short term goal (STG);2 weeks  -ES     Progress/Outcome (Transfer Goal 1, OT) new goal  -ES       Row Name 02/05/25 1548          Bathing Goal 1 (OT)    Activity/Device (Bathing Goal 1, OT) bathing skills, all  -ES     [place] Level/Cues Needed (Bathing Goal 1, OT) independent  -ES     Time Frame (Bathing Goal 1, OT) short term goal (STG);2 weeks  -ES     Progress/Outcomes (Bathing Goal 1, OT) new goal  -ES       Row Name 02/05/25 1548          Dressing Goal 1 (OT)    Activity/Device (Dressing  Goal 1, OT) dressing skills, all  -ES     [place]/Cues Needed (Dressing Goal 1, OT) independent  -ES     Time Frame (Dressing Goal 1, OT) short term goal (STG);2 weeks  -ES     Progress/Outcome (Dressing Goal 1, OT) new goal  -ES       Row Name 02/05/25 1548          Toileting Goal 1 (OT)    Activity/Device (Toileting Goal 1, OT) toileting skills, all  -ES     [place] Level/Cues Needed (Toileting Goal 1, OT) independent  -ES     Time Frame (Toileting Goal 1, OT) short term goal (STG);2 weeks  -ES     Progress/Outcome (Toileting Goal 1, OT) new goal  -ES       Row Name 02/05/25 1548          Grooming Goal 1 (OT)    Activity/Device (Grooming Goal 1, OT) grooming skills, all  -ES     [place] (Grooming Goal 1, OT) independent  -ES     Time Frame (Grooming Goal 1, OT) short term goal (STG);2 weeks  -ES     Progress/Outcome (Grooming Goal 1, OT) new goal  -ES       Row Name 02/05/25 1544          Problem Specific Goal 1 (OT)    Problem Specific Goal 1 (OT) Patient will demonstrate fair plus graded dynamic standing balance in preperation for independent ADLs and mobility  -ES     Time Frame (Problem Specific Goal 1, OT) short term goal (STG);2 weeks  -ES     Progress/Outcome (Problem Specific Goal 1, OT) new goal  -ES       Row Name 02/05/25 1543          Therapy Assessment/Plan (OT)    Planned Therapy Interventions (OT) activity tolerance training;BADL retraining;functional balance retraining;occupation/activity based interventions;patient/caregiver education/training;strengthening exercise;transfer/mobility retraining  -ES               User Key  (r) = Recorded By, (t) = Taken By, (c) = Cosigned By      Initials Name Provider Type    Radha Graham, OTR/L, CSRS Occupational Therapist                   Clinical Impression       Row Name 02/05/25 0130          Pain Assessment    Pre/Posttreatment Pain Comment patient with  -ES       Row Name 02/05/25 1543          Plan of Care Review    Plan of Care  Reviewed With patient  -ES     Progress no change  -ES     Outcome Evaluation Patient is 79 year old male [facility] with complaints of nausea and vomiting with associated weakness and fever. Sent by PCP for abnormal labs consistent with acute renal failure and pancreatitis. Patient PMH significant for dementia, DM. At baseline, caregiver reports patient is independent with ADLs and caregiver assist with IADLs (driving patient to work, picking up laundry, etc.). Patient does not use a device for functional mobility. Today, patient requires CGA with bed mobility and cueing for pacing, as patient is impulsive. patient SBA seated EOB, dons socks provided s/u. Min A STS with use of rolling walker. Patient completes functional mobility in hallway, min A with use of of RW. Patient requires assist with RW management, as patient frequently running RW into stationary objects. Patient returns self to supine, SBA at end of session. Patient demonstrates deficits related to safety awareness, tolerance, balance, transfers and mobility that impede patient independence with  ADLs and mobility. Recommend home with HH at time of discharge.  -ES       Row Name 02/05/25 1547          Therapy Assessment/Plan (OT)    Rehab Potential (OT) good  -ES     Criteria for Skilled Therapeutic Interventions Met (OT) yes;meets criteria;skilled treatment is necessary  -ES     Therapy Frequency (OT) 3 times/wk  -ES       Row Name 02/05/25 2706          Therapy Plan Review/Discharge Plan (OT)    Anticipated Discharge Disposition (OT) home with home health;home with assist  -ES       Row Name 02/05/25 8403          Positioning and Restraints    Pre-Treatment Position in bed  -ES     Post Treatment Position bed  -ES     In Bed fowlers;call light within reach;encouraged to call for assist;exit alarm on  -ES               User Key  (r) = Recorded By, (t) = Taken By, (c) = Cosigned By      Initials Name Provider Type    ES  Radha Zhou, DANICAR/L, CSRS Occupational Therapist                   Outcome Measures       Row Name 02/05/25 1549          How much help from another is currently needed...    Putting on and taking off regular lower body clothing? 3  -ES     Bathing (including washing, rinsing, and drying) 3  -ES     Toileting (which includes using toilet bed pan or urinal) 3  -ES     Putting on and taking off regular upper body clothing 4  -ES     Taking care of personal grooming (such as brushing teeth) 4  -ES     Eating meals 4  -ES     AM-PAC 6 Clicks Score (OT) 21  -ES       Row Name 02/05/25 0845          How much help from another person do you currently need...    Turning from your back to your side while in flat bed without using bedrails? 4  -JK     Moving from lying on back to sitting on the side of a flat bed without bedrails? 4  -JK     Moving to and from a bed to a chair (including a wheelchair)? 4  -JK     Standing up from a chair using your arms (e.g., wheelchair, bedside chair)? 3  -JK     Climbing 3-5 steps with a railing? 3  -JK     To walk in hospital room? 3  -JK     AM-PAC 6 Clicks Score (PT) 21  -JK       Row Name 02/05/25 1549          Functional Assessment    Outcome Measure Options AM-PAC 6 Clicks Daily Activity (OT)  -ES               User Key  (r) = Recorded By, (t) = Taken By, (c) = Cosigned By      Initials Name Provider Type    ES Radha Zhou, DANICAR/L, CSRS Occupational Therapist    Erika Morris RN Registered Nurse                    Occupational Therapy Education        No education to display                  OT Recommendation and Plan  Planned Therapy Interventions (OT): activity tolerance training, BADL retraining, functional balance retraining, occupation/activity based interventions, patient/caregiver education/training, strengthening exercise, transfer/mobility retraining  Therapy Frequency (OT): 3 times/wk  Plan of Care Review  Plan of Care Reviewed With: patient  Progress: no  change  Outcome Evaluation: Patient is 79 year old male presenting to [facility] with complaints of nausea and vomiting with associated weakness and fever. Sent by PCP for abnormal labs consistent with acute renal failure and pancreatitis. Patient PMH significant for dementia, DM. At baseline, caregiver reports patient is independent with ADLs and caregiver assist with IADLs (driving patient to work, picking up laundry, etc.). Patient does not use a device for functional mobility. Today, patient requires CGA with bed mobility and cueing for pacing, as patient is impulsive. patient SBA seated EOB, dons socks provided s/u. Min A STS with use of rolling walker. Patient completes functional mobility in hallway, min A with use of of RW. Patient requires assist with RW management, as patient frequently running RW into stationary objects. Patient returns self to supine, SBA at end of session. Patient demonstrates deficits related to safety awareness, tolerance, balance, transfers and mobility that impede patient independence with  ADLs and mobility. Recommend home with HH at time of discharge.     Time Calculation:   Evaluation Complexity (OT)  Review Occupational Profile/Medical/Therapy History Complexity: expanded/moderate complexity  Assessment, Occupational Performance/Identification of Deficit Complexity: 3-5 performance deficits  Clinical Decision Making Complexity (OT): detailed assessment/moderate complexity  Overall Complexity of Evaluation (OT): moderate complexity     Time Calculation- OT       Row Name 02/05/25 1549             Time Calculation- OT    OT Start Time 1421  -ES      OT Stop Time 1438  -ES      OT Time Calculation (min) 17 min  -ES      Total Timed Code Minutes- OT 12 minute(s)  -ES      OT Received On 02/05/25  -ES      OT - Next Appointment 02/06/25  -ES      OT Goal Re-Cert Due Date 02/19/25  -ES         Timed Charges    60729 - OT Therapeutic Activity Minutes 12  -ES          Untimed Charges    OT Eval/Re-eval Minutes 5  -ES         Total Minutes    Timed Charges Total Minutes 12  -ES      Untimed Charges Total Minutes 5  -ES       Total Minutes 17  -ES                User Key  (r) = Recorded By, (t) = Taken By, (c) = Cosigned By      Initials Name Provider Type    Radha Graham OTR/L, CSRS Occupational Therapist                  Therapy Charges for Today       Code Description Service Date Service Provider Modifiers Qty    23683136955  OT THERAPEUTIC ACT EA 15 MIN 2/5/2025 Radha Zhou OTR/L, CSRS GO 1    14296063368 HC OT EVAL MOD COMPLEXITY 2 2/5/2025 Radha Zhou OTR/L, CSRS GO 1                 TASHA Petersen/L, CSRS  2/5/2025

## 2025-02-06 LAB
ALBUMIN SERPL-MCNC: 3.1 G/DL (ref 3.5–5.2)
ALBUMIN/GLOB SERPL: 0.9 G/DL
ALP SERPL-CCNC: 232 U/L (ref 39–117)
ALT SERPL W P-5'-P-CCNC: 55 U/L (ref 1–41)
ANION GAP SERPL CALCULATED.3IONS-SCNC: 14.8 MMOL/L (ref 5–15)
AST SERPL-CCNC: 42 U/L (ref 1–40)
BACTERIA SPEC AEROBE CULT: NORMAL
BASOPHILS # BLD AUTO: 0.02 10*3/MM3 (ref 0–0.2)
BASOPHILS NFR BLD AUTO: 0.2 % (ref 0–1.5)
BILIRUB SERPL-MCNC: 0.7 MG/DL (ref 0–1.2)
BUN SERPL-MCNC: 73 MG/DL (ref 8–23)
BUN/CREAT SERPL: 18.3 (ref 7–25)
CALCIUM SPEC-SCNC: 8.4 MG/DL (ref 8.6–10.5)
CHLORIDE SERPL-SCNC: 96 MMOL/L (ref 98–107)
CO2 SERPL-SCNC: 19.2 MMOL/L (ref 22–29)
CREAT SERPL-MCNC: 3.99 MG/DL (ref 0.76–1.27)
DEPRECATED RDW RBC AUTO: 46.1 FL (ref 37–54)
EGFRCR SERPLBLD CKD-EPI 2021: 14.4 ML/MIN/1.73
EOSINOPHIL # BLD AUTO: 0.36 10*3/MM3 (ref 0–0.4)
EOSINOPHIL NFR BLD AUTO: 3.5 % (ref 0.3–6.2)
ERYTHROCYTE [DISTWIDTH] IN BLOOD BY AUTOMATED COUNT: 14.8 % (ref 12.3–15.4)
FOLATE SERPL-MCNC: 6.34 NG/ML (ref 4.78–24.2)
GLOBULIN UR ELPH-MCNC: 3.5 GM/DL
GLUCOSE BLDC GLUCOMTR-MCNC: 182 MG/DL (ref 70–130)
GLUCOSE BLDC GLUCOMTR-MCNC: 237 MG/DL (ref 70–130)
GLUCOSE BLDC GLUCOMTR-MCNC: 254 MG/DL (ref 70–130)
GLUCOSE BLDC GLUCOMTR-MCNC: 275 MG/DL (ref 70–130)
GLUCOSE SERPL-MCNC: 164 MG/DL (ref 65–99)
HCT VFR BLD AUTO: 28 % (ref 37.5–51)
HGB BLD-MCNC: 9.4 G/DL (ref 13–17.7)
IMM GRANULOCYTES # BLD AUTO: 0.07 10*3/MM3 (ref 0–0.05)
IMM GRANULOCYTES NFR BLD AUTO: 0.7 % (ref 0–0.5)
IRON 24H UR-MRATE: 38 MCG/DL (ref 59–158)
IRON SATN MFR SERPL: 18 % (ref 20–50)
LIPASE SERPL-CCNC: 1005 U/L (ref 13–60)
LYMPHOCYTES # BLD AUTO: 0.84 10*3/MM3 (ref 0.7–3.1)
LYMPHOCYTES NFR BLD AUTO: 8.2 % (ref 19.6–45.3)
MAGNESIUM SERPL-MCNC: 2.1 MG/DL (ref 1.6–2.4)
MCH RBC QN AUTO: 28.7 PG (ref 26.6–33)
MCHC RBC AUTO-ENTMCNC: 33.6 G/DL (ref 31.5–35.7)
MCV RBC AUTO: 85.6 FL (ref 79–97)
MONOCYTES # BLD AUTO: 0.78 10*3/MM3 (ref 0.1–0.9)
MONOCYTES NFR BLD AUTO: 7.6 % (ref 5–12)
NEUTROPHILS NFR BLD AUTO: 79.8 % (ref 42.7–76)
NEUTROPHILS NFR BLD AUTO: 8.13 10*3/MM3 (ref 1.7–7)
NRBC BLD AUTO-RTO: 0 /100 WBC (ref 0–0.2)
PLATELET # BLD AUTO: 196 10*3/MM3 (ref 140–450)
PMV BLD AUTO: 9.6 FL (ref 6–12)
POTASSIUM SERPL-SCNC: 4.2 MMOL/L (ref 3.5–5.2)
PROT SERPL-MCNC: 6.6 G/DL (ref 6–8.5)
RBC # BLD AUTO: 3.27 10*6/MM3 (ref 4.14–5.8)
SODIUM SERPL-SCNC: 130 MMOL/L (ref 136–145)
TIBC SERPL-MCNC: 213 MCG/DL (ref 298–536)
TRANSFERRIN SERPL-MCNC: 143 MG/DL (ref 200–360)
VIT B12 BLD-MCNC: 701 PG/ML (ref 211–946)
WBC NRBC COR # BLD AUTO: 10.2 10*3/MM3 (ref 3.4–10.8)

## 2025-02-06 PROCEDURE — 84466 ASSAY OF TRANSFERRIN: CPT | Performed by: INTERNAL MEDICINE

## 2025-02-06 PROCEDURE — 83690 ASSAY OF LIPASE: CPT | Performed by: INTERNAL MEDICINE

## 2025-02-06 PROCEDURE — 25010000002 CEFTRIAXONE PER 250 MG: Performed by: INTERNAL MEDICINE

## 2025-02-06 PROCEDURE — 80053 COMPREHEN METABOLIC PANEL: CPT | Performed by: INTERNAL MEDICINE

## 2025-02-06 PROCEDURE — 25010000002 HEPARIN (PORCINE) PER 1000 UNITS: Performed by: INTERNAL MEDICINE

## 2025-02-06 PROCEDURE — 63710000001 INSULIN REGULAR HUMAN PER 5 UNITS: Performed by: INTERNAL MEDICINE

## 2025-02-06 PROCEDURE — 82607 VITAMIN B-12: CPT | Performed by: INTERNAL MEDICINE

## 2025-02-06 PROCEDURE — 83735 ASSAY OF MAGNESIUM: CPT | Performed by: INTERNAL MEDICINE

## 2025-02-06 PROCEDURE — 83540 ASSAY OF IRON: CPT | Performed by: INTERNAL MEDICINE

## 2025-02-06 PROCEDURE — 82746 ASSAY OF FOLIC ACID SERUM: CPT | Performed by: INTERNAL MEDICINE

## 2025-02-06 PROCEDURE — 82948 REAGENT STRIP/BLOOD GLUCOSE: CPT

## 2025-02-06 PROCEDURE — 99232 SBSQ HOSP IP/OBS MODERATE 35: CPT | Performed by: SURGERY

## 2025-02-06 PROCEDURE — 85025 COMPLETE CBC W/AUTO DIFF WBC: CPT | Performed by: INTERNAL MEDICINE

## 2025-02-06 PROCEDURE — 25810000003 SODIUM CHLORIDE 0.9 % SOLUTION

## 2025-02-06 RX ORDER — ACETAMINOPHEN 325 MG/1
650 TABLET ORAL EVERY 6 HOURS PRN
Status: DISCONTINUED | OUTPATIENT
Start: 2025-02-06 | End: 2025-02-13 | Stop reason: HOSPADM

## 2025-02-06 RX ORDER — OLANZAPINE 10 MG/2ML
5 INJECTION, POWDER, FOR SOLUTION INTRAMUSCULAR EVERY 8 HOURS PRN
Status: DISCONTINUED | OUTPATIENT
Start: 2025-02-06 | End: 2025-02-13 | Stop reason: HOSPADM

## 2025-02-06 RX ORDER — OLANZAPINE 5 MG/1
5 TABLET ORAL 2 TIMES DAILY PRN
Status: DISCONTINUED | OUTPATIENT
Start: 2025-02-06 | End: 2025-02-13 | Stop reason: HOSPADM

## 2025-02-06 RX ORDER — TORSEMIDE 20 MG/1
40 TABLET ORAL DAILY
Status: DISCONTINUED | OUTPATIENT
Start: 2025-02-06 | End: 2025-02-09

## 2025-02-06 RX ADMIN — HEPARIN SODIUM 5000 UNITS: 5000 INJECTION INTRAVENOUS; SUBCUTANEOUS at 08:19

## 2025-02-06 RX ADMIN — ATORVASTATIN CALCIUM 10 MG: 20 TABLET, FILM COATED ORAL at 08:16

## 2025-02-06 RX ADMIN — SODIUM CHLORIDE 75 ML/HR: 9 INJECTION, SOLUTION INTRAVENOUS at 01:00

## 2025-02-06 RX ADMIN — Medication 10 ML: at 20:31

## 2025-02-06 RX ADMIN — OLANZAPINE 5 MG: 5 TABLET, FILM COATED ORAL at 11:57

## 2025-02-06 RX ADMIN — INSULIN HUMAN 4 UNITS: 100 INJECTION, SOLUTION PARENTERAL at 11:57

## 2025-02-06 RX ADMIN — ACETAMINOPHEN 650 MG: 325 TABLET, FILM COATED ORAL at 11:57

## 2025-02-06 RX ADMIN — FINASTERIDE 5 MG: 5 TABLET, FILM COATED ORAL at 08:16

## 2025-02-06 RX ADMIN — MIDODRINE HYDROCHLORIDE 10 MG: 5 TABLET ORAL at 06:43

## 2025-02-06 RX ADMIN — Medication 10 ML: at 08:19

## 2025-02-06 RX ADMIN — TAMSULOSIN HYDROCHLORIDE 0.4 MG: 0.4 CAPSULE ORAL at 08:16

## 2025-02-06 RX ADMIN — ACETAMINOPHEN 650 MG: 325 TABLET, FILM COATED ORAL at 20:30

## 2025-02-06 RX ADMIN — MIDODRINE HYDROCHLORIDE 10 MG: 5 TABLET ORAL at 17:28

## 2025-02-06 RX ADMIN — TORSEMIDE 40 MG: 20 TABLET ORAL at 08:19

## 2025-02-06 RX ADMIN — DONEPEZIL HYDROCHLORIDE 10 MG: 10 TABLET, FILM COATED ORAL at 20:30

## 2025-02-06 RX ADMIN — MEMANTINE HYDROCHLORIDE 5 MG: 5 TABLET, FILM COATED ORAL at 08:16

## 2025-02-06 RX ADMIN — HEPARIN SODIUM 5000 UNITS: 5000 INJECTION INTRAVENOUS; SUBCUTANEOUS at 20:31

## 2025-02-06 RX ADMIN — CEFTRIAXONE SODIUM 1000 MG: 1 INJECTION, POWDER, FOR SOLUTION INTRAMUSCULAR; INTRAVENOUS at 15:10

## 2025-02-06 RX ADMIN — MIDODRINE HYDROCHLORIDE 10 MG: 5 TABLET ORAL at 11:57

## 2025-02-06 RX ADMIN — INSULIN HUMAN 3 UNITS: 100 INJECTION, SOLUTION PARENTERAL at 17:28

## 2025-02-06 NOTE — CONSULTS
FIRST UROLOGY CONSULT      Patient Identification:  NAME:  Froy Ngo  Age:  79 y.o.   Sex:  male   :  1945   MRN:  [id number]     Chief complaint: Abnormal labs    History of present illness:      Pt is a 79 y.o. male with a history of BPH with LUTS s/p PAE 24, elevated PSA, hydronephrosis with bilateral indwelling stents, and chronic urinary retention that presented to the ED on 25 for abnormal labs. Pt was diagnosed and admitted with NEHEMIAS, acute UTI, sepsis and gallstone pancreatitis. .     Urology was consulted for evaluation and treatment of bilateral indwelling stents. Patient currently sees Dr. Melendez with [facility]. He recently underwent a prostatic artery embolization on . He was able to successfully pass a voiding trial on 1/3. His chronic indwelling catheter was able to be removed. He reports that he has been dealing with a UTI for the last several weeks. Has been on two rounds of antibiotics. Surgery has seen and they anticipate laparoscopic cholecystectomy prior to discharge.     In hospital:  -Max temp 99.7, tachycardic and mildly hypotensive on admission, currently AVSS, voiding  -WBC - 10.20 (9.53, 15.48)  -Creat - 3.99 (4.76, 5.16) - Baseline 1.7-2.0  -UA - Large LE, positive nitrites, moderate blood, TNTC RBC, TNTC WBC, no bacteria  -UCx - >100,000 CFU/mL Normal Urogenital Jennie   -Blood culture - No growth    -CT - Bilateral ureteral stents appropriately positioned, no hydronephrosis, prostatomegaly, urinary bladder wall thickening     Asked to see    Past medical history:  Past Medical History:   Diagnosis Date    Dementia     Diabetes mellitus     Elevated cholesterol     Prostate disorder     Urinary retention        Past surgical history:  Past Surgical History:   Procedure Laterality Date    CYSTOSCOPY W/ URETERAL STENT PLACEMENT Bilateral 2024    Procedure: BILATERAL CYSTOSCOPY URETERAL CATHETER/STENT INSERTION WITH BILATERAL RETROGRADES;   Surgeon: Antoni Melendez MD;  Location: [facility];  Service: Urology;  Laterality: Bilateral;       Allergies:  Patient has no known allergies.    Home medications:  Medications Prior to Admission   Medication Sig Dispense Refill Last Dose/Taking    atorvastatin (LIPITOR) 10 MG tablet Take 1 tablet by mouth Daily. Indications: High Amount of Fats in the Blood   2/4/2025    donepezil (ARICEPT) 10 MG tablet Take 1 tablet by mouth Every Night. Indications: Alzheimer's Disease   2/3/2025    ferrous sulfate 325 (65 FE) MG tablet Take 1 tablet by mouth Daily With Breakfast.   Taking    finasteride (Proscar) 5 MG tablet Take 1 tablet by mouth Daily. Indications: Benign Enlargement of Prostate   2/4/2025    memantine (NAMENDA) 10 MG tablet Take 1 tablet by mouth Daily. Indications: Alzheimer's Disease   Taking    metFORMIN (GLUCOPHAGE) 500 MG tablet Take 1 tablet by mouth 2 (Two) Times a Day With Meals.   Taking        Hospital medications:  atorvastatin, 10 mg, Oral, Daily  cefTRIAXone, 1,000 mg, Intravenous, Q24H  donepezil, 10 mg, Oral, Nightly  ferrous sulfate, 325 mg, Oral, Daily With Breakfast  finasteride, 5 mg, Oral, Daily  heparin (porcine), 5,000 Units, Subcutaneous, Q12H  insulin regular, 2-7 Units, Subcutaneous, Q6H  memantine, 5 mg, Oral, Daily  midodrine, 10 mg, Oral, TID AC  sodium chloride, 10 mL, Intravenous, Q12H  tamsulosin, 0.4 mg, Oral, Daily  torsemide, 40 mg, Oral, Daily           senna-docusate sodium **AND** polyethylene glycol **AND** bisacodyl **AND** bisacodyl    dextrose    dextrose    glucagon (human recombinant)    nitroglycerin    ondansetron ODT **OR** ondansetron    sodium chloride    sodium chloride    Family history:  History reviewed. No pertinent family history.    Social history:  Social History     Tobacco Use    Smoking status: Never     Passive exposure: Never    Smokeless tobacco: Never   Vaping Use    Vaping status: Never Used   Substance Use Topics    Alcohol use:  Never    Drug use: Never       Review of systems:      12 point negative except as in HPI    Objective:  TMax 24 hours:   Temp (24hrs), Av.6 °F (37 °C), Min:98 °F (36.7 °C), Max:99.4 °F (37.4 °C)      Vitals Ranges:   Temp:  [98 °F (36.7 °C)-99.4 °F (37.4 °C)] 98 °F (36.7 °C)  Heart Rate:  [84-93] 85  Resp:  [16] 16  BP: (108-136)/() 121/71    Intake/Output Last 3 shifts:  I/O last 3 completed shifts:  In: 2020 [P.O.:1020; I.V.:1000]  Out: 900 [Urine:900]     Physical Exam:    General Appearance:    Alert, cooperative, NAD   Back:     No CVA tenderness   Abdomen:  :      Soft, NDNT    Deferred   Neuro/Psych:   Orientation intact, mood/affect pleasant       Results review:   I reviewed the patient's new clinical results.    Data review:  Lab Results (last 24 hours)       Procedure Component Value Units Date/Time    POC Glucose Once [[id number]]  (Abnormal) Collected: 25 1041    Specimen: Blood Updated: 25 1043     Glucose 275 mg/dL     Urine Culture - Urine, Urine, Clean Catch [[id number]] Collected: 25 1338    Specimen: Urine, Clean Catch Updated: 25 1017     Urine Culture >100,000 CFU/mL Normal Urogenital Jennie    Narrative:      Colonization of the urinary tract without infection is common. Treatment is discouraged unless the patient is symptomatic, pregnant, or undergoing an invasive urologic procedure.    Vitamin B12 [[id number]]  (Normal) Collected: 2554    Specimen: Blood Updated: 25 0702     Vitamin B-12 701 pg/mL     Narrative:      Results may be falsely increased if patient taking Biotin.      Folate [[id number]]  (Normal) Collected: 2554    Specimen: Blood Updated: 25 0702     Folate 6.34 ng/mL     Narrative:      Results may be falsely increased if patient taking Biotin.      Lipase [[id number]]  (Abnormal) Collected: 2554    Specimen: Blood Updated: 2557     Lipase 1,005 U/L     Comprehensive Metabolic Panel [[id number]]   (Abnormal) Collected: 02/06/25 0554    Specimen: Blood Updated: 02/06/25 0650     Glucose 164 mg/dL      BUN 73 mg/dL      Creatinine 3.99 mg/dL      Sodium 130 mmol/L      Potassium 4.2 mmol/L      Chloride 96 mmol/L      CO2 19.2 mmol/L      Calcium 8.4 mg/dL      Total Protein 6.6 g/dL      Albumin 3.1 g/dL      ALT (SGPT) 55 U/L      AST (SGOT) 42 U/L      Alkaline Phosphatase 232 U/L      Total Bilirubin 0.7 mg/dL      Globulin 3.5 gm/dL      A/G Ratio 0.9 g/dL      BUN/Creatinine Ratio 18.3     Anion Gap 14.8 mmol/L      eGFR 14.4 mL/min/1.73     Narrative:      GFR Categories in Chronic Kidney Disease (CKD)      GFR Category          GFR (mL/min/1.73)    Interpretation  G1                     90 or greater         Normal or high (1)  G2                      60-89                Mild decrease (1)  G3a                   45-59                Mild to moderate decrease  G3b                   30-44                Moderate to severe decrease  G4                    15-29                Severe decrease  G5                    14 or less           Kidney failure          (1)In the absence of evidence of kidney disease, neither GFR category G1 or G2 fulfill the criteria for CKD.    eGFR calculation 2021 CKD-EPI creatinine equation, which does not include race as a factor    Magnesium [[id number]]  (Normal) Collected: 02/06/25 0554    Specimen: Blood Updated: 02/06/25 0650     Magnesium 2.1 mg/dL     Iron Profile [[id number]]  (Abnormal) Collected: 02/06/25 0554    Specimen: Blood Updated: 02/06/25 0650     Iron 38 mcg/dL      Iron Saturation (TSAT) 18 %      Transferrin 143 mg/dL      TIBC 213 mcg/dL     POC Glucose Once [[id number]]  (Abnormal) Collected: 02/06/25 0635    Specimen: Blood Updated: 02/06/25 0636     Glucose 182 mg/dL     CBC & Differential [[id number]]  (Abnormal) Collected: 02/06/25 0554    Specimen: Blood Updated: 02/06/25 0626    Narrative:      The following orders were created for panel order CBC &  Differential.  Procedure                               Abnormality         Status                     ---------                               -----------         ------                     CBC Auto Differential[[id number]]        Abnormal            Final result                 Please view results for these tests on the individual orders.    CBC Auto Differential [[id number]]  (Abnormal) Collected: 02/06/25 0554    Specimen: Blood Updated: 02/06/25 0626     WBC 10.20 10*3/mm3      RBC 3.27 10*6/mm3      Hemoglobin 9.4 g/dL      Hematocrit 28.0 %      MCV 85.6 fL      MCH 28.7 pg      MCHC 33.6 g/dL      RDW 14.8 %      RDW-SD 46.1 fl      MPV 9.6 fL      Platelets 196 10*3/mm3      Neutrophil % 79.8 %      Lymphocyte % 8.2 %      Monocyte % 7.6 %      Eosinophil % 3.5 %      Basophil % 0.2 %      Immature Grans % 0.7 %      Neutrophils, Absolute 8.13 10*3/mm3      Lymphocytes, Absolute 0.84 10*3/mm3      Monocytes, Absolute 0.78 10*3/mm3      Eosinophils, Absolute 0.36 10*3/mm3      Basophils, Absolute 0.02 10*3/mm3      Immature Grans, Absolute 0.07 10*3/mm3      nRBC 0.0 /100 WBC     POC Glucose Once [[id number]]  (Abnormal) Collected: 02/05/25 2051    Specimen: Blood Updated: 02/05/25 2053     Glucose 156 mg/dL     Blood Culture - Blood, Arm, Left [[id number]]  (Normal) Collected: 02/04/25 1547    Specimen: Blood from Arm, Left Updated: 02/05/25 1600     Blood Culture No growth at 24 hours    Blood Culture - Blood, Arm, Right [[id number]]  (Normal) Collected: 02/04/25 1548    Specimen: Blood from Arm, Right Updated: 02/05/25 1600     Blood Culture No growth at 24 hours    POC Glucose Once [[id number]]  (Abnormal) Collected: 02/05/25 1558    Specimen: Blood Updated: 02/05/25 1600     Glucose 234 mg/dL     POC Glucose Once [[id number]]  (Abnormal) Collected: 02/05/25 1102    Specimen: Blood Updated: 02/05/25 1103     Glucose 199 mg/dL              Imaging:  Imaging Results (Last 24 Hours)       Procedure Component Value  Units Date/Time    US Gallbladder [[id number]] Collected: 02/05/25 0907     Updated: 02/05/25 1315    Narrative:      US GALLBLADDER-     CLINICAL INFORMATION: Elevated liver enzymes, pancreatitis, gallstones.     Correlation with 02/04/2025 CT of the abdomen.     ULTRASOUND FINDINGS: Only a small portion of the pancreas could be  visualized, much of the pancreas obscured due to gas within the  overlying stomach and bowel. Visualized portion fails to demonstrate  ductal dilatation and the echotexture is within normal limits. No  peripancreatic fluid collection seen.     There are shadowing gallstones within the gallbladder, no  pericholecystic fluid nor biliary duct dilatation, CBD diameter is 4 mm.     Antegrade flow within the main portal vein seen on color Doppler and  spectral analysis. Liver size appears within normal limits. Hyperechoic  solid liver nodule measures 17 mm in maximum diameter. Given its  echotexture, suspect hepatic hemangioma. This can be confirmed with  dedicated liver CT or MRI.     The right kidney is 12.3 cm in length. The cortical echotexture is  within normal limits. No obstructive uropathy or stone. Stent in  position.     No free fluid within the upper abdomen. The remainder is unremarkable.     This report was finalized on 2/5/2025 1:12 PM by Dr. Elliot Najera M.D  on Workstation: [id number]                  Assessment:     BPH with LUTS s/p PAE  Hx hydronephrosis with bilateral indwelling stents  NEHEMIAS    Plan:   - No acute urologic intervention planned for today. CT reviewed. Stents appear to be positioned well and draining. No hydronephrosis. Bladder scans within normal range. Patient was scheduled for come in for stent removal on 2/20. Discussed with Dr. Melendez. Plan will be for possible exchange tomorrow. Will make NPO MN.   - Continue to monitor urine output closely. Bladder scan PRN for suspected urinary retention.   - Urology will continue to follow through stability  Brook WEAVER  HAILY Johnson  02/06/25    Plan reviewed and discussed with Dr. Melendez

## 2025-02-06 NOTE — PAYOR COMM NOTE
"Froy Ngo (79 y.o. Male)      CONTINUED STAY CLINICALS:  REF#  [id number]    UR: [phone]    [facility]: NPI [id number] [facility]# [id number]    KEE BETANCOURT RN,CCP       Date of Birth   1945    Social Security Number       Address   [address] [place] [address]    Home Phone   [phone]    MRN   [id number]       Spiritism   None    Marital Status   Single                            Admission Date   2/4/25    Admission Type   Emergency    Admitting Provider   Ritesh Farrell MD    Attending Provider   Ritesh Farrell MD    Department, Room/Bed   [address], E655/1       Discharge Date       Discharge Disposition       Discharge Destination                                 Attending Provider: Ritesh Farrell MD    Allergies: No Known Allergies    Isolation: None   Infection: None   Code Status: CPR    Ht: 182.9 cm (72\")   Wt: 88 kg (194 lb)    Admission Cmt: None   Principal Problem: ARF (acute renal failure) [N17.9]                   Active Insurance as of 2/4/2025       Primary Coverage       Payor Plan Insurance Group Employer/Plan Group    ANTHEM BLUE CROSS ANTHEM BLUE CROSS BLUE SHIELD PPO [id number]       Payor Plan Address Payor Plan Phone Number Payor Plan Fax Number Effective Dates    [address] [phone]  4/1/2019 - None Entered    [facility] [id number]         Subscriber Name Subscriber Birth Date Member ID       FROY NGO 1945 [id number]               Secondary Coverage       Payor Plan Insurance Group Employer/Plan Group    MEDICARE MEDICARE A ONLY        Payor Plan Address Payor Plan Phone Number Payor Plan Fax Number Effective Dates    [address] [phone]  6/1/2009 - None Entered    [facility] [id number]         Subscriber Name Subscriber Birth Date Member ID       FROY NGO 1945 [id number]                     Emergency Contacts        (Rel.) Home Phone " Work Phone Mobile Phone    ROBERT TURNER (Spouse) [phone] -- [phone]    robert braswell (Daughter) [phone] -- --    ROSMERY KINGSLEY (Friend) -- -- [phone]              Vital Signs (last 2 days)       Date/Time Temp Temp src Pulse Resp BP Patient Position SpO2    02/06/25 0723 98 (36.7) Oral 85 16 121/71 Lying 96    02/06/25 0643 -- -- 89 -- 111/69 -- --    02/06/25 0002 98.7 (37.1) Oral 90 16 113/61 Lying 91    02/05/25 1926 99 (37.2) Oral 93 16 109/50 Lying 96    02/05/25 1731 99.4 (37.4) Oral 90 -- 109/64 Lying 95    02/05/25 1335 98 (36.7) Oral 84 16 136/100 Lying 100    02/05/25 1148 -- -- 84 16 108/63 Lying 98    02/05/25 0820 -- -- 93 -- 116/69 -- 99    02/05/25 0341 -- -- 98 -- -- -- --    02/05/25 0007 99.7 (37.6) Oral 108 18 103/56 Lying 91    02/04/25 2035 98.7 (37.1) Oral 94 18 121/72 Lying 99    02/04/25 1738 97.7 (36.5) Oral 94 18 115/66 Lying 100    02/04/25 1647 -- -- 85 -- -- -- 100    02/04/25 1646 -- -- 88 -- -- -- 98    02/04/25 1631 -- -- -- -- 114/48 -- --    02/04/25 1630 -- -- 88 -- -- -- 99    02/04/25 1531 -- -- 82 -- 93/61 -- 96    02/04/25 1401 -- -- 92 -- 93/64 -- 98    02/04/25 1301 -- -- 101 -- 100/57 -- 95    02/04/25 1239 -- -- 113 -- -- -- 98    02/04/25 1238 -- -- -- -- 115/71 -- --    02/04/25 1149 -- -- -- -- 99/61  -- --    BP: MAP 71 at 02/04/25 1149    02/04/25 1137 99.3 (37.4) Tympanic 121 18 -- -- 97          Oxygen Therapy (last 2 days)       Date/Time SpO2 Device (Oxygen Therapy) Flow (L/min) (Oxygen Therapy) Oxygen Concentration (%) ETCO2 (mmHg)    02/06/25 0815 -- room air -- -- --    02/06/25 0723 96 -- -- -- --    02/06/25 0002 91 room air -- -- --    02/05/25 2000 -- room air -- -- --    02/05/25 1926 96 room air -- -- --    02/05/25 1731 95 room air -- -- --    02/05/25 1445 -- room air -- -- --    02/05/25 1335 100 -- -- -- --    02/05/25 1148 98 room air -- -- --    02/05/25 0845 -- room air -- -- --    02/05/25 0820 99 room air -- -- --     02/05/25 0007 91 room air -- -- --    02/04/25 2035 99 room air -- -- --    02/04/25 2007 -- room air -- -- --    02/04/25 1738 100 room air -- -- --    02/04/25 1647 100 -- -- -- --    02/04/25 1646 98 -- -- -- --    02/04/25 1630 99 -- -- -- --    02/04/25 1531 96 -- -- -- --    02/04/25 1401 98 -- -- -- --    02/04/25 1301 95 -- -- -- --    02/04/25 1239 98 -- -- -- --    02/04/25 1137 97 room air -- -- --          Intake & Output (last 2 days)         02/04 0701 02/05 0700 02/05 0701 02/06 0700 02/06 0701  02/07 0700    P.O.  1020 290    I.V. (mL/kg)  1000 (11.4) 692.5 (7.9)    IV Piggyback 1100      Total Intake(mL/kg) 1100 (12.5) 2020 (23) 982.5 (11.2)    Urine (mL/kg/hr) 300 700 (0.3) 450 (0.9)    Total Output 300 700 450    Net +800 +1320 +532.5           Urine Unmeasured Occurrence 2 x 1 x           Lines, Drains & Airways       Active LDAs       Name Placement date Placement time Site Days    Peripheral IV 02/04/25 1344 Right Antecubital 02/04/25  1344  Antecubital  1             Current Facility-Administered Medications   Medication Dose Route Frequency Provider Last Rate Last Admin    acetaminophen (TYLENOL) tablet 650 mg  650 mg Oral Q6H PRN Ritesh Farrell MD   650 mg at 02/06/25 1157    atorvastatin (LIPITOR) tablet 10 mg  10 mg Oral Daily Ritesh Farrell MD   10 mg at 02/06/25 0816    sennosides-docusate (PERICOLACE) 8.6-50 MG per tablet 2 tablet  2 tablet Oral BID PRN Ritesh Farrell MD        And    polyethylene glycol (MIRALAX) packet 17 g  17 g Oral Daily PRN Ritesh Farrell MD        And    bisacodyl (DULCOLAX) EC tablet 5 mg  5 mg Oral Daily PRN Ritesh Farrell MD        And    bisacodyl (DULCOLAX) suppository 10 mg  10 mg Rectal Daily PRN Ritesh Farrell MD        cefTRIAXone (ROCEPHIN) 1,000 mg in sodium chloride 0.9 % 100 mL MBP  1,000 mg Intravenous Q24H Ritesh Farrell  mL/hr at 02/05/25 1410 1,000 mg at 02/05/25 1410    dextrose (D50W) (25 g/50 mL) IV injection 25 g   25 g Intravenous Q15 Min PRN Ritesh Farrell MD        dextrose (GLUTOSE) oral gel 15 g  15 g Oral Q15 Min PRN Ritesh Farrell MD        donepezil (ARICEPT) tablet 10 mg  10 mg Oral Nightly Ritesh Farrell MD   10 mg at 02/05/25 2029    ferrous sulfate tablet 325 mg  325 mg Oral Daily With Breakfast Ritesh Farrell MD        finasteride (PROSCAR) tablet 5 mg  5 mg Oral Daily Ritesh Farrell MD   5 mg at 02/06/25 0816    glucagon (GLUCAGEN) injection 1 mg  1 mg Intramuscular Q15 Min PRN Ritesh Farrell MD        heparin (porcine) 5000 UNIT/ML injection 5,000 Units  5,000 Units Subcutaneous Q12H Ritesh Farrell MD   5,000 Units at 02/06/25 0819    insulin regular (humuLIN R,novoLIN R) injection 2-7 Units  2-7 Units Subcutaneous Q6H Ritesh Farrell MD   4 Units at 02/06/25 1157    memantine (NAMENDA) tablet 5 mg  5 mg Oral Daily Ritesh Farrell MD   5 mg at 02/06/25 0816    midodrine (PROAMATINE) tablet 10 mg  10 mg Oral TID AC Tad Benoit MD   10 mg at 02/06/25 1157    nitroglycerin (NITROSTAT) SL tablet 0.4 mg  0.4 mg Sublingual Q5 Min PRN Ritesh Farrell MD        OLANZapine (zyPREXA) injection 5 mg  5 mg Intramuscular Q8H PRN Ritesh Farrell MD        OLANZapine (zyPREXA) tablet 5 mg  5 mg Oral BID PRN Ritesh Farrell MD   5 mg at 02/06/25 1157    ondansetron ODT (ZOFRAN-ODT) disintegrating tablet 4 mg  4 mg Oral Q6H PRN Ritesh Farrell MD        Or    ondansetron (ZOFRAN) injection 4 mg  4 mg Intravenous Q6H PRN Ritesh Farrell MD        sodium chloride 0.9 % flush 10 mL  10 mL Intravenous Q12H Ritesh Farrell MD   10 mL at 02/06/25 0819    sodium chloride 0.9 % flush 10 mL  10 mL Intravenous PRN Ritesh Farrell MD        sodium chloride 0.9 % infusion 40 mL  40 mL Intravenous PRN Ritesh Farrell MD        tamsulosin (FLOMAX) 24 hr capsule 0.4 mg  0.4 mg Oral Daily Ritesh Farrell MD   0.4 mg at 02/06/25 0816    torsemide (DEMADEX) tablet 40 mg  40 mg Oral Daily  Tad Benoit MD   40 mg at 02/06/25 0819     Lab Results (last 48 hours)       Procedure Component Value Units Date/Time    POC Glucose Once [[id number]]  (Abnormal) Collected: 02/06/25 1041    Specimen: Blood Updated: 02/06/25 1043     Glucose 275 mg/dL     Urine Culture - Urine, Urine, Clean Catch [[id number]] Collected: 02/04/25 1338    Specimen: Urine, Clean Catch Updated: 02/06/25 1017     Urine Culture >100,000 CFU/mL Normal Urogenital Jennie    Narrative:      Colonization of the urinary tract without infection is common. Treatment is discouraged unless the patient is symptomatic, pregnant, or undergoing an invasive urologic procedure.    Vitamin B12 [[id number]]  (Normal) Collected: 02/06/25 0554    Specimen: Blood Updated: 02/06/25 0702     Vitamin B-12 701 pg/mL     Narrative:      Results may be falsely increased if patient taking Biotin.      Folate [[id number]]  (Normal) Collected: 02/06/25 0554    Specimen: Blood Updated: 02/06/25 0702     Folate 6.34 ng/mL     Narrative:      Results may be falsely increased if patient taking Biotin.      Lipase [[id number]]  (Abnormal) Collected: 02/06/25 0554    Specimen: Blood Updated: 02/06/25 0657     Lipase 1,005 U/L     Comprehensive Metabolic Panel [[id number]]  (Abnormal) Collected: 02/06/25 0554    Specimen: Blood Updated: 02/06/25 0650     Glucose 164 mg/dL      BUN 73 mg/dL      Creatinine 3.99 mg/dL      Sodium 130 mmol/L      Potassium 4.2 mmol/L      Chloride 96 mmol/L      CO2 19.2 mmol/L      Calcium 8.4 mg/dL      Total Protein 6.6 g/dL      Albumin 3.1 g/dL      ALT (SGPT) 55 U/L      AST (SGOT) 42 U/L      Alkaline Phosphatase 232 U/L      Total Bilirubin 0.7 mg/dL      Globulin 3.5 gm/dL      A/G Ratio 0.9 g/dL      BUN/Creatinine Ratio 18.3     Anion Gap 14.8 mmol/L      eGFR 14.4 mL/min/1.73     Narrative:      GFR Categories in Chronic Kidney Disease (CKD)      GFR Category          GFR (mL/min/1.73)    Interpretation  G1                      90 or greater         Normal or high (1)  G2                      60-89                Mild decrease (1)  G3a                   45-59                Mild to moderate decrease  G3b                   30-44                Moderate to severe decrease  G4                    15-29                Severe decrease  G5                    14 or less           Kidney failure          (1)In the absence of evidence of kidney disease, neither GFR category G1 or G2 fulfill the criteria for CKD.    eGFR calculation 2021 CKD-EPI creatinine equation, which does not include race as a factor    Magnesium [[id number]]  (Normal) Collected: 02/06/25 0554    Specimen: Blood Updated: 02/06/25 0650     Magnesium 2.1 mg/dL     Iron Profile [[id number]]  (Abnormal) Collected: 02/06/25 0554    Specimen: Blood Updated: 02/06/25 0650     Iron 38 mcg/dL      Iron Saturation (TSAT) 18 %      Transferrin 143 mg/dL      TIBC 213 mcg/dL     POC Glucose Once [[id number]]  (Abnormal) Collected: 02/06/25 0635    Specimen: Blood Updated: 02/06/25 0636     Glucose 182 mg/dL     CBC & Differential [[id number]]  (Abnormal) Collected: 02/06/25 0554    Specimen: Blood Updated: 02/06/25 0626    Narrative:      The following orders were created for panel order CBC & Differential.  Procedure                               Abnormality         Status                     ---------                               -----------         ------                     CBC Auto Differential[[id number]]        Abnormal            Final result                 Please view results for these tests on the individual orders.    CBC Auto Differential [[id number]]  (Abnormal) Collected: 02/06/25 0554    Specimen: Blood Updated: 02/06/25 0626     WBC 10.20 10*3/mm3      RBC 3.27 10*6/mm3      Hemoglobin 9.4 g/dL      Hematocrit 28.0 %      MCV 85.6 fL      MCH 28.7 pg      MCHC 33.6 g/dL      RDW 14.8 %      RDW-SD 46.1 fl      MPV 9.6 fL      Platelets 196 10*3/mm3      Neutrophil % 79.8 %       Lymphocyte % 8.2 %      Monocyte % 7.6 %      Eosinophil % 3.5 %      Basophil % 0.2 %      Immature Grans % 0.7 %      Neutrophils, Absolute 8.13 10*3/mm3      Lymphocytes, Absolute 0.84 10*3/mm3      Monocytes, Absolute 0.78 10*3/mm3      Eosinophils, Absolute 0.36 10*3/mm3      Basophils, Absolute 0.02 10*3/mm3      Immature Grans, Absolute 0.07 10*3/mm3      nRBC 0.0 /100 WBC     POC Glucose Once [[id number]]  (Abnormal) Collected: 02/05/25 2051    Specimen: Blood Updated: 02/05/25 2053     Glucose 156 mg/dL     Blood Culture - Blood, Arm, Left [[id number]]  (Normal) Collected: 02/04/25 1547    Specimen: Blood from Arm, Left Updated: 02/05/25 1600     Blood Culture No growth at 24 hours    Blood Culture - Blood, Arm, Right [[id number]]  (Normal) Collected: 02/04/25 1548    Specimen: Blood from Arm, Right Updated: 02/05/25 1600     Blood Culture No growth at 24 hours    POC Glucose Once [[id number]]  (Abnormal) Collected: 02/05/25 1558    Specimen: Blood Updated: 02/05/25 1600     Glucose 234 mg/dL     POC Glucose Once [[id number]]  (Abnormal) Collected: 02/05/25 1102    Specimen: Blood Updated: 02/05/25 1103     Glucose 199 mg/dL     Comprehensive Metabolic Panel [[id number]]  (Abnormal) Collected: 02/05/25 0555    Specimen: Blood Updated: 02/05/25 0707     Glucose 226 mg/dL      BUN 80 mg/dL      Creatinine 4.76 mg/dL      Sodium 133 mmol/L      Potassium 4.1 mmol/L      Chloride 98 mmol/L      CO2 21.6 mmol/L      Calcium 8.4 mg/dL      Total Protein 6.4 g/dL      Albumin 3.0 g/dL      ALT (SGPT) 54 U/L      AST (SGOT) 34 U/L      Alkaline Phosphatase 184 U/L      Total Bilirubin 0.5 mg/dL      Globulin 3.4 gm/dL      A/G Ratio 0.9 g/dL      BUN/Creatinine Ratio 16.8     Anion Gap 13.4 mmol/L      eGFR 11.6 mL/min/1.73     Narrative:      GFR Categories in Chronic Kidney Disease (CKD)      GFR Category          GFR (mL/min/1.73)    Interpretation  G1                     90 or greater         Normal or high (1)  G2                       60-89                Mild decrease (1)  G3a                   45-59                Mild to moderate decrease  G3b                   30-44                Moderate to severe decrease  G4                    15-29                Severe decrease  G5                    14 or less           Kidney failure          (1)In the absence of evidence of kidney disease, neither GFR category G1 or G2 fulfill the criteria for CKD.    eGFR calculation 2021 CKD-EPI creatinine equation, which does not include race as a factor    Magnesium [[id number]]  (Normal) Collected: 02/05/25 0555    Specimen: Blood Updated: 02/05/25 0707     Magnesium 2.0 mg/dL     Lipase [[id number]]  (Abnormal) Collected: 02/05/25 0555    Specimen: Blood Updated: 02/05/25 0657     Lipase 1,603 U/L     Cortisol [[id number]] Collected: 02/05/25 0555    Specimen: Blood Updated: 02/05/25 0653     Cortisol 19.20 mcg/dL     Narrative:      Cortisol Reference Ranges:    Cortisol 6AM - 10AM Range: 6.02-18.40 mcg/dl  Cortisol 4PM - 8PM Range: 2.68-10.50 mcg/dl      Results may be falsely increased if patient taking Biotin.      Phosphorus [[id number]]  (Normal) Collected: 02/05/25 0555    Specimen: Blood Updated: 02/05/25 0650     Phosphorus 4.1 mg/dL     Uric Acid [[id number]]  (Abnormal) Collected: 02/05/25 0555    Specimen: Blood Updated: 02/05/25 0650     Uric Acid 9.0 mg/dL     CBC & Differential [[id number]]  (Abnormal) Collected: 02/05/25 0555    Specimen: Blood Updated: 02/05/25 0635    Narrative:      The following orders were created for panel order CBC & Differential.  Procedure                               Abnormality         Status                     ---------                               -----------         ------                     CBC Auto Differential[[id number]]        Abnormal            Final result                 Please view results for these tests on the individual orders.    CBC Auto Differential [[id number]]  (Abnormal) Collected:  02/05/25 0555    Specimen: Blood Updated: 02/05/25 0635     WBC 9.53 10*3/mm3      RBC 3.23 10*6/mm3      Hemoglobin 8.9 g/dL      Hematocrit 27.6 %      MCV 85.4 fL      MCH 27.6 pg      MCHC 32.2 g/dL      RDW 15.2 %      RDW-SD 47.2 fl      MPV 9.8 fL      Platelets 162 10*3/mm3      Neutrophil % 86.1 %      Lymphocyte % 5.2 %      Monocyte % 6.3 %      Eosinophil % 1.6 %      Basophil % 0.2 %      Immature Grans % 0.6 %      Neutrophils, Absolute 8.20 10*3/mm3      Lymphocytes, Absolute 0.50 10*3/mm3      Monocytes, Absolute 0.60 10*3/mm3      Eosinophils, Absolute 0.15 10*3/mm3      Basophils, Absolute 0.02 10*3/mm3      Immature Grans, Absolute 0.06 10*3/mm3      nRBC 0.0 /100 WBC     POC Glucose Once [[id number]]  (Abnormal) Collected: 02/05/25 0628    Specimen: Blood Updated: 02/05/25 0630     Glucose 228 mg/dL     POC Glucose Once [[id number]]  (Abnormal) Collected: 02/04/25 2141    Specimen: Blood Updated: 02/04/25 2142     Glucose 248 mg/dL     POC Glucose Once [[id number]]  (Abnormal) Collected: 02/04/25 1755    Specimen: Blood Updated: 02/04/25 1757     Glucose 225 mg/dL     Lactic Acid, Plasma [[id number]]  (Normal) Collected: 02/04/25 1552    Specimen: Blood Updated: 02/04/25 1622     Lactate 1.8 mmol/L     Osmolality, Urine - Urine, Clean Catch [[id number]] Collected: 02/04/25 1338    Specimen: Urine, Clean Catch Updated: 02/04/25 1531     Osmolality, Urine 359 mOsm/kg     Narrative:      Osmo Normal Reference Ranges:    Random:  mOsm/kg H2O, depending on fluid intake.  Random: >850 mOsm/kg H20, after 12 hour fluid restriction.    24 Hour: 300-900 mOsm/kg H2O.    Sodium, Urine, Random - Urine, Clean Catch [[id number]] Collected: 02/04/25 1338    Specimen: Urine, Clean Catch Updated: 02/04/25 1519     Sodium, Urine 26 mmol/L     Narrative:      Reference intervals for random urine have not been established.  Clinical usage is dependent upon physician's interpretation in combination with other  laboratory tests.       Protein / Creatinine Ratio, Urine - Urine, Clean Catch [[id number]]  (Abnormal) Collected: 02/04/25 1338    Specimen: Urine, Clean Catch Updated: 02/04/25 1519     Protein/Creatinine Ratio, Urine 1,037.5 mg/G Crea      Creatinine, Urine 109.4 mg/dL      Total Protein, Urine 113.5 mg/dL     Beta Hydroxybutyrate Quantitative [[id number]]  (Normal) Collected: 02/04/25 1202    Specimen: Blood from Arm, Left Updated: 02/04/25 1510     Beta-Hydroxybutyrate Quant 0.210 mmol/L     Narrative:      In the assessment of possible diabetic ketoacidosis, the test should be interpreted along with other clinical and laboratory findings.  A level greater than 1 mmol/L should require further evaluation and levels of more than 3 mmol/L require immediate medical review.    Lipase [[id number]]  (Abnormal) Collected: 02/04/25 1202    Specimen: Blood from Arm, Left Updated: 02/04/25 1503     Lipase 2,610 U/L     Osmolality, Serum [[id number]]  (Normal) Collected: 02/04/25 1202    Specimen: Blood from Arm, Left Updated: 02/04/25 1440     Osmolality 299 mOsm/kg     Urinalysis With Culture If Indicated - Urine, Clean Catch [[id number]]  (Abnormal) Collected: 02/04/25 1338    Specimen: Urine, Clean Catch Updated: 02/04/25 1354     Color, UA Yellow     Appearance, UA Cloudy     pH, UA 6.5     Specific Gravity, UA 1.016     Glucose, UA Negative     Ketones, UA Negative     Bilirubin, UA Negative     Blood, UA Moderate (2+)     Protein,  mg/dL (2+)     Leuk Esterase, UA Large (3+)     Nitrite, UA Positive     Urobilinogen, UA 1.0 E.U./dL    Narrative:      In absence of clinical symptoms, the presence of pyuria, bacteria, and/or nitrites on the urinalysis result does not correlate with infection.    Urinalysis, Microscopic Only - Urine, Clean Catch [[id number]]  (Abnormal) Collected: 02/04/25 1338    Specimen: Urine, Clean Catch Updated: 02/04/25 1354     RBC, UA Too Numerous to Count /HPF      WBC, UA Too Numerous to  Count /HPF      Bacteria, UA None Seen /HPF      Squamous Epithelial Cells, UA 0-2 /HPF      Hyaline Casts, UA 7-12 /LPF      Methodology Automated Microscopy          Imaging Results (Last 48 Hours)       Procedure Component Value Units Date/Time    US Gallbladder [[id number]] Collected: 02/05/25 0907     Updated: 02/05/25 1315    Narrative:      US GALLBLADDER-     CLINICAL INFORMATION: Elevated liver enzymes, pancreatitis, gallstones.     Correlation with 02/04/2025 CT of the abdomen.     ULTRASOUND FINDINGS: Only a small portion of the pancreas could be  visualized, much of the pancreas obscured due to gas within the  overlying stomach and bowel. Visualized portion fails to demonstrate  ductal dilatation and the echotexture is within normal limits. No  peripancreatic fluid collection seen.     There are shadowing gallstones within the gallbladder, no  pericholecystic fluid nor biliary duct dilatation, CBD diameter is 4 mm.     Antegrade flow within the main portal vein seen on color Doppler and  spectral analysis. Liver size appears within normal limits. Hyperechoic  solid liver nodule measures 17 mm in maximum diameter. Given its  echotexture, suspect hepatic hemangioma. This can be confirmed with  dedicated liver CT or MRI.     The right kidney is 12.3 cm in length. The cortical echotexture is  within normal limits. No obstructive uropathy or stone. Stent in  position.     No free fluid within the upper abdomen. The remainder is unremarkable.     This report was finalized on 2/5/2025 1:12 PM by Dr. Elliot Najera M.D  on Workstation: [id number]       CT Abdomen Pelvis Without Contrast [[id number]] Collected: 02/04/25 1412     Updated: 02/04/25 1433    Narrative:      CT ABDOMEN PELVIS WO CONTRAST-     DATE OF EXAM: 2/4/2025 1:44 PM     INDICATION: UTI with bilateral ureteral stents, elevated WBC, elevated  Cr.     COMPARISON: CT 8/11/2024 and 8/4/2024.     TECHNIQUE: Multiple contiguous axial images were acquired  through the  abdomen and pelvis without the intravenous administration of contrast  per reformatted coronal and sagittal sequences were also reviewed.  Radiation dose reduction techniques were utilized, including automated  exposure control and exposure modulation based on body size.     FINDINGS:  Respiratory motion limits evaluation of the lung bases. Multifocal  bibasilar subsegmental atelectasis and/scarring. Partially imaged severe  calcified coronary artery disease. Relative hyperdensity of the  interventricular septum suggest anemia.     Cholelithiasis without specific CT evidence of acute cholecystitis. The  liver and spleen are unremarkable. Moderate peripancreatic fat  stranding. Nonspecific small cystic low-density lesion in the pancreatic  body, measuring up to 2 cm craniocaudal (coronal series 3 image 51).  Mild likely benign nodular thickening of the left adrenal gland. The  right adrenal gland is unremarkable in limited noncontrast CT  appearance. Bilateral ureteral stents in place. Both kidneys are  otherwise unremarkable in limited noncontrast CT appearance. The urinary  bladder is nondistended. Urinary bladder wall thickening may be  accentuated by underdistention but could reflect sequela of chronic  bladder outlet obstruction. Small right-sided urinary bladder  diverticulum. Enlarged prostate gland, measuring 6 cm in transverse  diameter, with mild mass effect on the floor the urinary bladder.     Mild diffuse gastric wall thickening is likely accentuated by under  distention. Mild colorectal stool. Colonic diverticula, without CT  evidence of diverticulitis. No bowel obstruction. The appendix is  normal.     No free fluid in the abdomen or pelvis. No free intraperitoneal air. No  pathologically enlarged lymph nodes in the abdomen or pelvis. Stable  soft tissue attenuation in the left inguinal canal.     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  Similar-appearing lumbar levoscoliosis and  multilevel lumbar spondylosis. No acute osseous abnormality or  concerning osseous lesion.       Impression:         1. Moderate peripancreatic fat stranding, which is nonspecific but could  reflect pancreatitis. Recommend correlating with amylase and lipase.  2. Nonspecific 2 cm low-density lesion in the body of the pancreas,  which could be further evaluated with MRI if clinically indicated.  3. Bilateral ureteral stents in place.  4. Enlarged prostate gland with urinary bladder wall thickening that  could be accentuated by underdistention but could reflect sequela of  chronic bladder outlet obstruction and/or cystitis. Urinary bladder  diverticulum indicating sequela of chronic bladder outlet obstruction.  Correlate with urinalysis.     This report was finalized on 2/4/2025 2:30 PM by Dami Ramos MD on  Workstation: [id number]             ECG/EMG Results (last 48 hours)       Procedure Component Value Units Date/Time    Telemetry Scan [[id number]] Resulted: 02/04/25     Updated: 02/04/25 1804    Telemetry Scan [[id number]] Resulted: 02/04/25     Updated: 02/05/25 0107    Telemetry Scan [[id number]] Resulted: 02/04/25     Updated: 02/05/25 0859    Telemetry Scan [[id number]] Resulted: 02/04/25     Updated: 02/06/25 0038    Telemetry Scan [[id number]] Resulted: 02/04/25     Updated: 02/06/25 0658             Physician Progress Notes (last 48 hours)        Vimal Bryant MD at 02/06/25 1036          Colorectal & General Surgery  Progress Note    Patient: Froy Ngo  YOB: 1945  MRN: [id number]      Assessment  Froy Ngo is a 79 y.o. male with gallstone pancreatitis currently admitted with acute renal failure and UTI.    Abdominal exam remains benign.  Okay to advance to full liquid diet from my standpoint.  Anticipate laparoscopic cholecystectomy prior to discharge.  Would like to see for significant improvement in renal function.   Dissipate that we could perform laparoscopic cholecystectomy on Saturday if he continues on his current trajectory.    Subjective  Significant atrophic.  Feels relatively well.  Tolerating liquids.    Objective    Vitals:    25 0723   BP: 121/71   Pulse: 85   Resp: 16   Temp: 98 °F (36.7 °C)   SpO2: 96%       Physical Exam  Constitutional: Well-developed well-nourished, no acute distress  Neck: Supple, trachea midline  Respiratory: No increased work of breathing, Symmetric excursion  Cardiovascular: Well pefursed, no jugular venous distention evident   Abdominal: Soft, non-tender, non-distended  Skin: Warm, dry, no rash on visualized skin surfaces  Psychiatric: Alert and oriented ×3, normal affect     Laboratory Results  I have personally reviewed CBC with Ocie 10, Humoryl 9, 0.26.  Lipase 1000.  CMP with creatinine 2.99, bicarb 22, potassium 3.2, AST 22, ALT 35, total bilirubin 0.7.    Radiology  No new imaging To review         Finesse Bryant MD  Colorectal & General Surgery  [facility]    [address], [address]  [place], [place], [facility]  P: [phone]  F: [phone]       Electronically signed by Vimal Bryant MD at 25 1037       Tad Benoit MD at 25 0838              [facility] Progress Note      Patient Name: Froy Ngo  : 1945  MRN: [id number]  Primary Care Physician:  Ryan Gutierrez MD  Date of admission: 2025    Subjective     Interval History:   Followup on NEHEMIAS on CKD    Overnight no event  Patient n.p.o.  On IV fluids  No chest pain, shortness of breath, palpitations    Review of Systems:   As noted above        Personal History      Medical History        Past Medical History:   Diagnosis Date    Dementia      Diabetes mellitus      Elevated cholesterol      Prostate disorder      Urinary retention              Surgical History         Past Surgical History:   Procedure Laterality Date     CYSTOSCOPY W/ URETERAL STENT PLACEMENT Bilateral 8/13/2024     Procedure: BILATERAL CYSTOSCOPY URETERAL CATHETER/STENT INSERTION WITH BILATERAL RETROGRADES;  Surgeon: Antoni Melendez MD;  Location: [facility]  Service: Urology;  Laterality: Bilateral;            Family History: family history is not on file.     Social History:  reports that he has never smoked. He has never been exposed to tobacco smoke. He has never used smokeless tobacco. He reports that he does not drink alcohol and does not use drugs.     Home Medications:          Prior to Admission medications    Medication Sig Start Date End Date Taking? Authorizing Provider   amLODIPine (NORVASC) 5 MG tablet Take 1 tablet by mouth Daily. 8/10/24     Gurvinder Farrell MD   aspirin 81 MG EC tablet Take 1 tablet by mouth Daily. 8/4/24     Aarseli Price MD   atorvastatin (LIPITOR) 10 MG tablet Take 1 tablet by mouth Daily. Indications: High Amount of Fats in the Blood 8/4/24     Araseli Price MD   donepezil (ARICEPT) 10 MG tablet Take 1 tablet by mouth Every Night. Indications: Alzheimer's Disease 10/4/23 10/3/24   Araseli Price MD   finasteride (Proscar) 5 MG tablet Take 1 tablet by mouth Daily. Indications: Benign Enlargement of Prostate 9/1/24     Araseli Price MD   hydrALAZINE (APRESOLINE) 25 MG tablet Take 1 tablet by mouth Every 8 (Eight) Hours. 8/9/24     Gurvinder Farrell MD   insulin lispro (HUMALOG/ADMELOG) 100 UNIT/ML injection Inject 2-7 Units under the skin into the appropriate area as directed 4 (Four) Times a Day Before Meals & at Bedtime. 8/20/24     Shruthi Acosta MD   memantine (NAMENDA) 10 MG tablet Take 1 tablet by mouth Daily. Indications: Alzheimer's Disease 11/6/23 11/5/24   Araseli Price MD   tamsulosin (FLOMAX) 0.4 MG capsule 24 hr capsule Take 1 capsule by mouth Daily. 8/10/24     Gurvinder Farrell MD         Allergies:  Allergies   No Known Allergies        Objective      Vitals:   Temp:   [99.3 °F (37.4 °C)] 99.3 °F (37.4 °C)  Heart Rate:  [] 92  Resp:  [18] 18  BP: ()/(57-71) 93/64  No intake or output data in the 24 hours ending 02/04/25 1443     Physical Exam:   Constitutional: Awake, alert, no acute distress.  HEENT: Sclera anicteric, no conjunctival injection  Neck: Supple, no JVD  Respiratory: Clear to auscultation bilaterally, nonlabored respiration on RA  Cardiovascular: tachycardiac, RR, no murmurs, no rubs  Gastrointestinal: Positive bowel sounds, abdomen is soft, nontender and nondistended  : No palpable bladder  Musculoskeletal: No edema, no clubbing or cyanosis  Psychiatric: Appropriate affect, cooperative  Neurologic: moves all extremities  Skin: Warm and dry              Scheduled Meds:     atorvastatin, 10 mg, Oral, Daily  cefTRIAXone, 1,000 mg, Intravenous, Q24H  donepezil, 10 mg, Oral, Nightly  ferrous sulfate, 325 mg, Oral, Daily With Breakfast  finasteride, 5 mg, Oral, Daily  heparin (porcine), 5,000 Units, Subcutaneous, Q12H  insulin regular, 2-7 Units, Subcutaneous, Q6H  memantine, 5 mg, Oral, Daily  midodrine, 10 mg, Oral, TID AC  sodium chloride, 10 mL, Intravenous, Q12H  tamsulosin, 0.4 mg, Oral, Daily  torsemide, 40 mg, Oral, Daily      IV Meds:   sodium chloride, 75 mL/hr, Last Rate: 75 mL/hr (02/06/25 0100)        Results Reviewed:   I have personally reviewed the results from the time of this admission to 2/6/2025 08:38 EST     Results from last 7 days   Lab Units 02/06/25  0554 02/05/25  0555 02/04/25  1202   SODIUM mmol/L 130* 133* 123*   POTASSIUM mmol/L 4.2 4.1 5.1   CHLORIDE mmol/L 96* 98 93*   CO2 mmol/L 19.2* 21.6* 13.2*   BUN mg/dL 73* 80* 70*   CREATININE mg/dL 3.99* 4.76* 5.16*   CALCIUM mg/dL 8.4* 8.4* 9.3   BILIRUBIN mg/dL 0.7 0.5 0.7   ALK PHOS U/L 232* 184* 219*   ALT (SGPT) U/L 55* 54* 78*   AST (SGOT) U/L 42* 34 63*   GLUCOSE mg/dL 164* 226* 277*       Estimated Creatinine Clearance: 18.1 mL/min (A) (by C-G formula based on SCr of 3.99 mg/dL  (H)).    Results from last 7 days   Lab Units 02/06/25  0554 02/05/25  0555 02/04/25  1202   MAGNESIUM mg/dL 2.1 2.0 2.0   PHOSPHORUS mg/dL  --  4.1 3.8       Results from last 7 days   Lab Units 02/05/25  0555   URIC ACID mg/dL 9.0*       Results from last 7 days   Lab Units 02/06/25  0554 02/05/25  0555 02/04/25  1202   WBC 10*3/mm3 10.20 9.53 15.48*   HEMOGLOBIN g/dL 9.4* 8.9* 11.0*   PLATELETS 10*3/mm3 196 162 262             Assessment / Plan     ASSESSMENT:   NEHEMIAS on CKD 3B, baseline Scr 1.7-2.0, secondary to prior AKIs d/t postobstructive uropathy, and longstanding hypertensive/diabetic nephrosclerosis.  Acute etiology is likely multifactorial: UTI followed by  hypotension limiting renal perfusion . CT abdomen/pelvis did not reveal hydro; slightly hypovolemic  Acute on chronic hyponatremia, likely multifactorial: excessive PO fluid intake, poor solute intake, and NEHEMIAS on CKD, improved  AGMA, secondary to NEHEMIAS on CKD, received bicarb gtt, improved  Hx post obstructive uropathy, s/p  bilateral ureteral stent placement 8/30/2024, s/p prostate resection 12/13/2024, Rodriguez was removed 3 to 4 weeks ago per family report. CT abd/pel suggested enlarged prostate with possible cystitis / Prostatitis  due to recurrent UTI ?   UTI, failed outpatient management, on IV Rocephin, will follow cultures , possible prostatitis due to recurrent antibiotics. Previous urine and blood cultures negative   Type II DM with CKD, managed by primary team  Hypotension, will hold amlodipin and hydralazine. Cortisol did not suggest adrenal insufficiency   Transaminases from hypotension, managed by primary team  Dementia, on namenda at home  Pancreatitis/elevated lipase, followed closely by general surgery    PLAN:  Renal function continues to improve with medical management  Patient remains n.p.o. once patient is able to initiate oral intake, will discontinue IV fluids  Order single dose of torsemide  Continue surveillance labs and monitor  urine output    Updated wife at bedside    Discussed with nursing staff    Thank you for involving us in the care of Froy Ngo.  Please feel free to call with any questions.    Tad Benoit MD  02/06/25  08:38 EST    [facility]  [phone]    Please note that portions of this note were completed with a voice recognition program.    Electronically signed by Tad Benoit MD at 02/06/25 0849       Ritesh Farrell MD at 02/06/25 9944          Subjective: Tolerating liquids.  Denies any abdominal pain.  Remains confused but apparently at baseline.  Wife is at bedside.  Slept last night.  Participated in physical therapy yesterday.    Objective:  Vitals:    02/05/25 1731 02/05/25 1926 02/06/25 0002 02/06/25 0643   BP: 109/64 109/50 113/61 111/69   BP Location: Left arm Left arm Left arm    Patient Position: Lying Lying Lying    Pulse: 90 93 90 89   Resp:  16 16    Temp: 99.4 °F (37.4 °C) 99 °F (37.2 °C) 98.7 °F (37.1 °C)    TempSrc: Oral Oral Oral    SpO2: 95% 96% 91%    Weight:       Height:           02/05 0700 02/05 0701  02/06 0700 02/06 0701  02/07 0700    P.O.  1020    I.V. (mL/kg)  1000 (11.4)    IV Piggyback 1100     Total Intake(mL/kg) 1100 (12.5) 2020 (23)    Urine (mL/kg/hr) 300 700 (0.3)    Total Output 300 700    Net +800 +1320      Gen Nad  Heart: RRR  Lungs: CTA  Abd: soft ntnd  Ext: no edema    Recent Results (from the past 24 hours)   POC Glucose Once    Collection Time: 02/05/25 11:02 AM    Specimen: Blood   Result Value Ref Range    Glucose 199 (H) 70 - 130 mg/dL   POC Glucose Once    Collection Time: 02/05/25  3:58 PM    Specimen: Blood   Result Value Ref Range    Glucose 234 (H) 70 - 130 mg/dL   POC Glucose Once    Collection Time: 02/05/25  8:51 PM    Specimen: Blood   Result Value Ref Range    Glucose 156 (H) 70 - 130 mg/dL   Comprehensive Metabolic Panel    Collection Time: 02/06/25  5:54 AM    Specimen: Blood   Result Value Ref Range    Glucose  164 (H) 65 - 99 mg/dL    BUN 73 (H) 8 - 23 mg/dL    Creatinine 3.99 (H) 0.76 - 1.27 mg/dL    Sodium 130 (L) 136 - 145 mmol/L    Potassium 4.2 3.5 - 5.2 mmol/L    Chloride 96 (L) 98 - 107 mmol/L    CO2 19.2 (L) 22.0 - 29.0 mmol/L    Calcium 8.4 (L) 8.6 - 10.5 mg/dL    Total Protein 6.6 6.0 - 8.5 g/dL    Albumin 3.1 (L) 3.5 - 5.2 g/dL    ALT (SGPT) 55 (H) 1 - 41 U/L    AST (SGOT) 42 (H) 1 - 40 U/L    Alkaline Phosphatase 232 (H) 39 - 117 U/L    Total Bilirubin 0.7 0.0 - 1.2 mg/dL    Globulin 3.5 gm/dL    A/G Ratio 0.9 g/dL    BUN/Creatinine Ratio 18.3 7.0 - 25.0    Anion Gap 14.8 5.0 - 15.0 mmol/L    eGFR 14.4 (L) >60.0 mL/min/1.73   Lipase    Collection Time: 02/06/25  5:54 AM    Specimen: Blood   Result Value Ref Range    Lipase 1,005 (H) 13 - 60 U/L   Magnesium    Collection Time: 02/06/25  5:54 AM    Specimen: Blood   Result Value Ref Range    Magnesium 2.1 1.6 - 2.4 mg/dL   Iron Profile    Collection Time: 02/06/25  5:54 AM    Specimen: Blood   Result Value Ref Range    Iron 38 (L) 59 - 158 mcg/dL    Iron Saturation (TSAT) 18 (L) 20 - 50 %    Transferrin 143 (L) 200 - 360 mg/dL    TIBC 213 (L) 298 - 536 mcg/dL   Vitamin B12    Collection Time: 02/06/25  5:54 AM    Specimen: Blood   Result Value Ref Range    Vitamin B-12 701 211 - 946 pg/mL   Folate    Collection Time: 02/06/25  5:54 AM    Specimen: Blood   Result Value Ref Range    Folate 6.34 4.78 - 24.20 ng/mL   CBC Auto Differential    Collection Time: 02/06/25  5:54 AM    Specimen: Blood   Result Value Ref Range    WBC 10.20 3.40 - 10.80 10*3/mm3    RBC 3.27 (L) 4.14 - 5.80 10*6/mm3    Hemoglobin 9.4 (L) 13.0 - 17.7 g/dL    Hematocrit 28.0 (L) 37.5 - 51.0 %    MCV 85.6 79.0 - 97.0 fL    MCH 28.7 26.6 - 33.0 pg    MCHC 33.6 31.5 - 35.7 g/dL    RDW 14.8 12.3 - 15.4 %    RDW-SD 46.1 37.0 - 54.0 fl    MPV 9.6 6.0 - 12.0 fL    Platelets 196 140 - 450 10*3/mm3    Neutrophil % 79.8 (H) 42.7 - 76.0 %    Lymphocyte % 8.2 (L) 19.6 - 45.3 %    Monocyte % 7.6 5.0 - 12.0  %    Eosinophil % 3.5 0.3 - 6.2 %    Basophil % 0.2 0.0 - 1.5 %    Immature Grans % 0.7 (H) 0.0 - 0.5 %    Neutrophils, Absolute 8.13 (H) 1.70 - 7.00 10*3/mm3    Lymphocytes, Absolute 0.84 0.70 - 3.10 10*3/mm3    Monocytes, Absolute 0.78 0.10 - 0.90 10*3/mm3    Eosinophils, Absolute 0.36 0.00 - 0.40 10*3/mm3    Basophils, Absolute 0.02 0.00 - 0.20 10*3/mm3    Immature Grans, Absolute 0.07 (H) 0.00 - 0.05 10*3/mm3    nRBC 0.0 0.0 - 0.2 /100 WBC   POC Glucose Once    Collection Time: 25  6:35 AM    Specimen: Blood   Result Value Ref Range    Glucose 182 (H) 70 - 130 mg/dL     A/p  Acute renal failure with metabolic acidosis-improving with IV fluids.  Nephrology following.  IV fluids as per nephrology.  Urinary tract infection-preliminary on blood cultures negative.  Urine culture currently pending.  Remains on Rocephin for now.  Temperature briefly 99.7 yesterday but otherwise afebrile.  White count down to normal has bilateral ureteral stents that apparently are scheduled to be either removed or replaced later this month by urology.  I am going to ask urology to see him while he is here.  Pancreatitis-no pain.  Lipase trending down.  Seen by surgery yesterday who agrees he likely passed a gallstone and is going to need a cholecystectomy once he is improved from a kidney standpoint. Will let surgery decide if ok to advance diet given Lipase still 1000.  Anemia-hemoglobin back up slightly today.  Some iron deficiency based on serum iron and percent saturation that I will resume his oral iron.  Diabetes-I will recheck A1c.  He was not on any blood sugar medicines at home.  Dementia-looks to be at baseline.  Back on home medicines.  Continue DVT prophylaxis with heparin    Electronically signed by Ritesh Farrell MD at 25 0806       Tad Benoit MD at 25 0910              [facility] Progress Note      Patient Name: Froy Ngo  : 1945  MRN:  [id number]  Primary Care Physician:  Ryan Gutierrez MD  Date of admission: 2/4/2025    Subjective     Interval History:   Followup on NEHEMIAS on CKD    Doing well, no chest pain, dyspnea, or N/V/D  UOP not fully quantified, been urinating per staff    Review of Systems:   As noted above        Personal History      Medical History        Past Medical History:   Diagnosis Date    Dementia      Diabetes mellitus      Elevated cholesterol      Prostate disorder      Urinary retention              Surgical History         Past Surgical History:   Procedure Laterality Date    CYSTOSCOPY W/ URETERAL STENT PLACEMENT Bilateral 8/13/2024     Procedure: BILATERAL CYSTOSCOPY URETERAL CATHETER/STENT INSERTION WITH BILATERAL RETROGRADES;  Surgeon: Antoni Melendez MD;  Location: [facility];  Service: Urology;  Laterality: Bilateral;            Family History: family history is not on file.     Social History:  reports that he has never smoked. He has never been exposed to tobacco smoke. He has never used smokeless tobacco. He reports that he does not drink alcohol and does not use drugs.     Home Medications:          Prior to Admission medications    Medication Sig Start Date End Date Taking? Authorizing Provider   amLODIPine (NORVASC) 5 MG tablet Take 1 tablet by mouth Daily. 8/10/24     Gurvinder Farrell MD   aspirin 81 MG EC tablet Take 1 tablet by mouth Daily. 8/4/24     Araseli Price MD   atorvastatin (LIPITOR) 10 MG tablet Take 1 tablet by mouth Daily. Indications: High Amount of Fats in the Blood 8/4/24     Araseli Price MD   donepezil (ARICEPT) 10 MG tablet Take 1 tablet by mouth Every Night. Indications: Alzheimer's Disease 10/4/23 10/3/24   Araseli Price MD   finasteride (Proscar) 5 MG tablet Take 1 tablet by mouth Daily. Indications: Benign Enlargement of Prostate 9/1/24     Araseli Price MD   hydrALAZINE (APRESOLINE) 25 MG tablet Take 1 tablet by mouth Every 8 (Eight) Hours.  8/9/24     Gurvinder Farrell MD   insulin lispro (HUMALOG/ADMELOG) 100 UNIT/ML injection Inject 2-7 Units under the skin into the appropriate area as directed 4 (Four) Times a Day Before Meals & at Bedtime. 8/20/24     Shruthi Acosta MD   memantine (NAMENDA) 10 MG tablet Take 1 tablet by mouth Daily. Indications: Alzheimer's Disease 11/6/23 11/5/24   Provider, MD Araseli   tamsulosin (FLOMAX) 0.4 MG capsule 24 hr capsule Take 1 capsule by mouth Daily. 8/10/24     Gurvinder Farrell MD         Allergies:  Allergies   No Known Allergies        Objective      Vitals:   Temp:  [99.3 °F (37.4 °C)] 99.3 °F (37.4 °C)  Heart Rate:  [] 92  Resp:  [18] 18  BP: ()/(57-71) 93/64  No intake or output data in the 24 hours ending 02/04/25 1443     Physical Exam:   Constitutional: Awake, alert, no acute distress.  HEENT: Sclera anicteric, no conjunctival injection  Neck: Supple, no JVD  Respiratory: Clear to auscultation bilaterally, nonlabored respiration on RA  Cardiovascular: tachycardiac, RR, no murmurs, no rubs  Gastrointestinal: Positive bowel sounds, abdomen is soft, nontender and nondistended  : No palpable bladder  Musculoskeletal: No edema, no clubbing or cyanosis  Psychiatric: Appropriate affect, cooperative  Neurologic: moves all extremities  Skin: Warm and dry              Scheduled Meds:     [Held by provider] atorvastatin, 10 mg, Oral, Daily  cefTRIAXone, 1,000 mg, Intravenous, Q24H  [Held by provider] donepezil, 10 mg, Oral, Nightly  [Held by provider] ferrous sulfate, 325 mg, Oral, Daily With Breakfast  [Held by provider] finasteride, 5 mg, Oral, Daily  heparin (porcine), 5,000 Units, Subcutaneous, Q12H  insulin regular, 2-7 Units, Subcutaneous, Q6H  [Held by provider] memantine, 5 mg, Oral, Daily  midodrine, 10 mg, Oral, TID AC  sodium chloride, 10 mL, Intravenous, Q12H      IV Meds:   sodium chloride, 75 mL/hr, Last Rate: 75 mL/hr (02/05/25 0931)        Results Reviewed:   I have personally reviewed  the results from the time of this admission to 2/5/2025 10:21 EST     Results from last 7 days   Lab Units 02/05/25  0555 02/04/25  1202   SODIUM mmol/L 133* 123*   POTASSIUM mmol/L 4.1 5.1   CHLORIDE mmol/L 98 93*   CO2 mmol/L 21.6* 13.2*   BUN mg/dL 80* 70*   CREATININE mg/dL 4.76* 5.16*   CALCIUM mg/dL 8.4* 9.3   BILIRUBIN mg/dL 0.5 0.7   ALK PHOS U/L 184* 219*   ALT (SGPT) U/L 54* 78*   AST (SGOT) U/L 34 63*   GLUCOSE mg/dL 226* 277*       Estimated Creatinine Clearance: 15.1 mL/min (A) (by C-G formula based on SCr of 4.76 mg/dL (H)).    Results from last 7 days   Lab Units 02/05/25  0555 02/04/25  1202   MAGNESIUM mg/dL 2.0 2.0   PHOSPHORUS mg/dL 4.1 3.8       Results from last 7 days   Lab Units 02/05/25  0555   URIC ACID mg/dL 9.0*       Results from last 7 days   Lab Units 02/05/25  0555 02/04/25  1202   WBC 10*3/mm3 9.53 15.48*   HEMOGLOBIN g/dL 8.9* 11.0*   PLATELETS 10*3/mm3 162 262             Assessment / Plan     ASSESSMENT:   NEHEMIAS on CKD 3B, baseline Scr 1.7-2.0, secondary to prior AKIs d/t postobstructive uropathy, and longstanding hypertensive/diabetic nephrosclerosis.  Acute etiology is likely multifactorial: UTI followed by  hypotension limiting renal perfusion . CT abdomen/pelvis did not reveal hydro; slightly hypovolemic  Acute on chronic hyponatremia, likely multifactorial: excessive PO fluid intake, poor solute intake, and NEHEMIAS on CKD, improved  AGMA, secondary to NEHEMIAS on CKD, received bicarb gtt, improved  Hx post obstructive uropathy, s/p  bilateral ureteral stent placement 8/30/2024, s/p prostate resection 12/13/2024, Rodriguez was removed 3 to 4 weeks ago per family report. CT abd/pel suggested enlarged prostate with possible cystitis / Prostatitis  due to recurrent UTI ?   UTI, failed outpatient management, on IV Rocephin, will follow cultures , possible prostatitis due to recurrent antibiotics. Previous urine and blood cultures negative   Type II DM with CKD, managed by primary  "team  Hypotension, will hold amlodipin and hydralazine. Cortisol did not suggest adrenal insufficiency   Transaminases from hypotension, managed by primary team  Dementia, on namenda at home  Pancreatitis/elevated lipase, managed by primary team    PLAN:  Switch bicarb gtt to IV NS @75ml/hr  Quantify UOP    Thank you for involving us in the care of Froy Ngo.  Please feel free to call with any questions.    Tad Benoit MD  02/05/25  10:21 RUST    [facility]  [phone]    Please note that portions of this note were completed with a voice recognition program.    Electronically signed by Tad Benoit MD at 02/06/25 0704       Ritesh Farrell MD at 02/05/25 0745          Subjective: Restless overnight.  Complains of dry mouth.  Denies any abdominal pain.  Denies any shortness of air.  Producing urine.  Patient just returned from gallbladder ultrasound.  Wife is at bedside.    Subjective:  Vitals:    02/04/25 1738 02/04/25 2035 02/05/25 0007 02/05/25 0341   BP: 115/66 121/72 103/56    BP Location: Left arm Left arm Left arm    Patient Position: Lying Lying Lying    Pulse: 94 94 108 98   Resp: 18 18 18    Temp: 97.7 °F (36.5 °C) 98.7 °F (37.1 °C) 99.7 °F (37.6 °C)    TempSrc: Oral Oral Oral    SpO2: 100% 99% 91%    Weight: 88 kg (194 lb)      Height: 182.9 cm (72\")            Gen: Alert and oriented to person and place, poor insight into current condition  Heart: Regular  Lungs: Clear bilaterally  Abd: Soft, nontender nondistended bowel sounds are present  Ext: No edema    Recent Results (from the past 24 hours)   Green Top (Gel)    Collection Time: 02/04/25 12:02 PM   Result Value Ref Range    Extra Tube Hold for add-ons.    Lavender Top    Collection Time: 02/04/25 12:02 PM   Result Value Ref Range    Extra Tube hold for add-on    Gold Top - SST    Collection Time: 02/04/25 12:02 PM   Result Value Ref Range    Extra Tube Hold for add-ons.    Light Blue Top    " Collection Time: 02/04/25 12:02 PM   Result Value Ref Range    Extra Tube Hold for add-ons.    Comprehensive Metabolic Panel    Collection Time: 02/04/25 12:02 PM    Specimen: Arm, Left; Blood   Result Value Ref Range    Glucose 277 (H) 65 - 99 mg/dL    BUN 70 (H) 8 - 23 mg/dL    Creatinine 5.16 (H) 0.76 - 1.27 mg/dL    Sodium 123 (L) 136 - 145 mmol/L    Potassium 5.1 3.5 - 5.2 mmol/L    Chloride 93 (L) 98 - 107 mmol/L    CO2 13.2 (L) 22.0 - 29.0 mmol/L    Calcium 9.3 8.6 - 10.5 mg/dL    Total Protein 8.0 6.0 - 8.5 g/dL    Albumin 3.5 3.5 - 5.2 g/dL    ALT (SGPT) 78 (H) 1 - 41 U/L    AST (SGOT) 63 (H) 1 - 40 U/L    Alkaline Phosphatase 219 (H) 39 - 117 U/L    Total Bilirubin 0.7 0.0 - 1.2 mg/dL    Globulin 4.5 gm/dL    A/G Ratio 0.8 g/dL    BUN/Creatinine Ratio 13.6 7.0 - 25.0    Anion Gap 16.8 (H) 5.0 - 15.0 mmol/L    eGFR 10.6 (L) >60.0 mL/min/1.73   Magnesium    Collection Time: 02/04/25 12:02 PM    Specimen: Arm, Left; Blood   Result Value Ref Range    Magnesium 2.0 1.6 - 2.4 mg/dL   Phosphorus    Collection Time: 02/04/25 12:02 PM    Specimen: Arm, Left; Blood   Result Value Ref Range    Phosphorus 3.8 2.5 - 4.5 mg/dL   CBC Auto Differential    Collection Time: 02/04/25 12:02 PM    Specimen: Arm, Left; Blood   Result Value Ref Range    WBC 15.48 (H) 3.40 - 10.80 10*3/mm3    RBC 3.89 (L) 4.14 - 5.80 10*6/mm3    Hemoglobin 11.0 (L) 13.0 - 17.7 g/dL    Hematocrit 32.7 (L) 37.5 - 51.0 %    MCV 84.1 79.0 - 97.0 fL    MCH 28.3 26.6 - 33.0 pg    MCHC 33.6 31.5 - 35.7 g/dL    RDW 14.6 12.3 - 15.4 %    RDW-SD 44.0 37.0 - 54.0 fl    MPV 9.7 6.0 - 12.0 fL    Platelets 262 140 - 450 10*3/mm3    Neutrophil % 90.8 (H) 42.7 - 76.0 %    Lymphocyte % 2.6 (L) 19.6 - 45.3 %    Monocyte % 5.1 5.0 - 12.0 %    Eosinophil % 0.9 0.3 - 6.2 %    Basophil % 0.1 0.0 - 1.5 %    Immature Grans % 0.5 0.0 - 0.5 %    Neutrophils, Absolute 14.05 (H) 1.70 - 7.00 10*3/mm3    Lymphocytes, Absolute 0.41 (L) 0.70 - 3.10 10*3/mm3    Monocytes,  Absolute 0.79 0.10 - 0.90 10*3/mm3    Eosinophils, Absolute 0.14 0.00 - 0.40 10*3/mm3    Basophils, Absolute 0.01 0.00 - 0.20 10*3/mm3    Immature Grans, Absolute 0.08 (H) 0.00 - 0.05 10*3/mm3    nRBC 0.0 0.0 - 0.2 /100 WBC   Osmolality, Serum    Collection Time: 02/04/25 12:02 PM    Specimen: Arm, Left; Blood   Result Value Ref Range    Osmolality 299 280 - 301 mOsm/kg   Beta Hydroxybutyrate Quantitative    Collection Time: 02/04/25 12:02 PM    Specimen: Arm, Left; Blood   Result Value Ref Range    Beta-Hydroxybutyrate Quant 0.210 0.020 - 0.270 mmol/L   Lipase    Collection Time: 02/04/25 12:02 PM    Specimen: Arm, Left; Blood   Result Value Ref Range    Lipase 2,610 (H) 13 - 60 U/L   Urinalysis With Culture If Indicated - Urine, Clean Catch    Collection Time: 02/04/25  1:38 PM    Specimen: Urine, Clean Catch   Result Value Ref Range    Color, UA Yellow Yellow, Straw    Appearance, UA Cloudy (A) Clear    pH, UA 6.5 5.0 - 8.0    Specific Gravity, UA 1.016 1.005 - 1.030    Glucose, UA Negative Negative    Ketones, UA Negative Negative    Bilirubin, UA Negative Negative    Blood, UA Moderate (2+) (A) Negative    Protein,  mg/dL (2+) (A) Negative    Leuk Esterase, UA Large (3+) (A) Negative    Nitrite, UA Positive (A) Negative    Urobilinogen, UA 1.0 E.U./dL 0.2 - 1.0 E.U./dL   Urinalysis, Microscopic Only - Urine, Clean Catch    Collection Time: 02/04/25  1:38 PM    Specimen: Urine, Clean Catch   Result Value Ref Range    RBC, UA Too Numerous to Count (A) None Seen, 0-2 /HPF    WBC, UA Too Numerous to Count (A) None Seen, 0-2 /HPF    Bacteria, UA None Seen None Seen /HPF    Squamous Epithelial Cells, UA 0-2 None Seen, 0-2 /HPF    Hyaline Casts, UA 7-12 None Seen /LPF    Methodology Automated Microscopy    Osmolality, Urine - Urine, Clean Catch    Collection Time: 02/04/25  1:38 PM    Specimen: Urine, Clean Catch   Result Value Ref Range    Osmolality, Urine 359 mOsm/kg   Protein / Creatinine Ratio, Urine -  Urine, Clean Catch    Collection Time: 02/04/25  1:38 PM    Specimen: Urine, Clean Catch   Result Value Ref Range    Protein/Creatinine Ratio, Urine 1,037.5 (H) 0.0 - 200.0 mg/G Crea    Creatinine, Urine 109.4 mg/dL    Total Protein, Urine 113.5 mg/dL   Sodium, Urine, Random - Urine, Clean Catch    Collection Time: 02/04/25  1:38 PM    Specimen: Urine, Clean Catch   Result Value Ref Range    Sodium, Urine 26 mmol/L   Lactic Acid, Plasma    Collection Time: 02/04/25  3:52 PM    Specimen: Blood   Result Value Ref Range    Lactate 1.8 0.5 - 2.0 mmol/L   POC Glucose Once    Collection Time: 02/04/25  5:55 PM    Specimen: Blood   Result Value Ref Range    Glucose 225 (H) 70 - 130 mg/dL   POC Glucose Once    Collection Time: 02/04/25  9:41 PM    Specimen: Blood   Result Value Ref Range    Glucose 248 (H) 70 - 130 mg/dL   Comprehensive Metabolic Panel    Collection Time: 02/05/25  5:55 AM    Specimen: Blood   Result Value Ref Range    Glucose 226 (H) 65 - 99 mg/dL    BUN 80 (H) 8 - 23 mg/dL    Creatinine 4.76 (H) 0.76 - 1.27 mg/dL    Sodium 133 (L) 136 - 145 mmol/L    Potassium 4.1 3.5 - 5.2 mmol/L    Chloride 98 98 - 107 mmol/L    CO2 21.6 (L) 22.0 - 29.0 mmol/L    Calcium 8.4 (L) 8.6 - 10.5 mg/dL    Total Protein 6.4 6.0 - 8.5 g/dL    Albumin 3.0 (L) 3.5 - 5.2 g/dL    ALT (SGPT) 54 (H) 1 - 41 U/L    AST (SGOT) 34 1 - 40 U/L    Alkaline Phosphatase 184 (H) 39 - 117 U/L    Total Bilirubin 0.5 0.0 - 1.2 mg/dL    Globulin 3.4 gm/dL    A/G Ratio 0.9 g/dL    BUN/Creatinine Ratio 16.8 7.0 - 25.0    Anion Gap 13.4 5.0 - 15.0 mmol/L    eGFR 11.6 (L) >60.0 mL/min/1.73   Magnesium    Collection Time: 02/05/25  5:55 AM    Specimen: Blood   Result Value Ref Range    Magnesium 2.0 1.6 - 2.4 mg/dL   Phosphorus    Collection Time: 02/05/25  5:55 AM    Specimen: Blood   Result Value Ref Range    Phosphorus 4.1 2.5 - 4.5 mg/dL   Uric Acid    Collection Time: 02/05/25  5:55 AM    Specimen: Blood   Result Value Ref Range    Uric Acid 9.0  (H) 3.4 - 7.0 mg/dL   CBC Auto Differential    Collection Time: 02/05/25  5:55 AM    Specimen: Blood   Result Value Ref Range    WBC 9.53 3.40 - 10.80 10*3/mm3    RBC 3.23 (L) 4.14 - 5.80 10*6/mm3    Hemoglobin 8.9 (L) 13.0 - 17.7 g/dL    Hematocrit 27.6 (L) 37.5 - 51.0 %    MCV 85.4 79.0 - 97.0 fL    MCH 27.6 26.6 - 33.0 pg    MCHC 32.2 31.5 - 35.7 g/dL    RDW 15.2 12.3 - 15.4 %    RDW-SD 47.2 37.0 - 54.0 fl    MPV 9.8 6.0 - 12.0 fL    Platelets 162 140 - 450 10*3/mm3    Neutrophil % 86.1 (H) 42.7 - 76.0 %    Lymphocyte % 5.2 (L) 19.6 - 45.3 %    Monocyte % 6.3 5.0 - 12.0 %    Eosinophil % 1.6 0.3 - 6.2 %    Basophil % 0.2 0.0 - 1.5 %    Immature Grans % 0.6 (H) 0.0 - 0.5 %    Neutrophils, Absolute 8.20 (H) 1.70 - 7.00 10*3/mm3    Lymphocytes, Absolute 0.50 (L) 0.70 - 3.10 10*3/mm3    Monocytes, Absolute 0.60 0.10 - 0.90 10*3/mm3    Eosinophils, Absolute 0.15 0.00 - 0.40 10*3/mm3    Basophils, Absolute 0.02 0.00 - 0.20 10*3/mm3    Immature Grans, Absolute 0.06 (H) 0.00 - 0.05 10*3/mm3    nRBC 0.0 0.0 - 0.2 /100 WBC   Cortisol    Collection Time: 02/05/25  5:55 AM    Specimen: Blood   Result Value Ref Range    Cortisol 19.20   mcg/dL   Lipase    Collection Time: 02/05/25  5:55 AM    Specimen: Blood   Result Value Ref Range    Lipase 1,603 (H) 13 - 60 U/L   POC Glucose Once    Collection Time: 02/05/25  6:28 AM    Specimen: Blood   Result Value Ref Range    Glucose 228 (H) 70 - 130 mg/dL     A/P  Acute renal failure with acidosis-acidosis improving.  Creatinine trending down with IV fluids.  Nephrology continuing to follow.  Likely largely prerenal on top of his underlying kidney disease.  Does not have a history of urinary outlet obstruction and currently has stents in place.  Will resume tamsulosin as soon as possible.  Pancreatitis-lipase trending down.  Poor renal clearance is likely contributed to high lipase levels.  Has denied any abdominal pain.  CT scan showed gallstones without obvious cholecystitis but  checking ultrasound as I wonder whether he passed the stone on Friday with the nausea and vomiting that he was having.  Keeping him n.p.o. for now until we see gallbladder ultrasound.  UTI-cultures pending.  Temperature staying down.  White count improved.  Continue Rocephin until cultures come back.  Anemia-likely largely secondary to renal failure with a dilutional drop after IV fluids.  No sign of active bleeding.  Will check iron studies etc.  Continue to monitor blood count.    Dementia-some confusion but oriented to person and place.  Will resume meds as takes po  Diabetes-continue low-dose insulin for correction while n.p.o.  Hyponatremia-improving with IV fluids  8.   Lovenox for DVT prophylaxis.    Electronically signed by Ritesh Farrell MD at 02/05/25 7814          Consult Notes (last 48 hours)        Vimal Bryant MD at 02/05/25 1259        Consult Orders    1. Inpatient General Surgery Consult [[id number]] ordered by Ritesh Farrell MD at 02/05/25 1016                 Colorectal & General Surgery  Consultation    Patient: Froy Ngo  YOB: 1945  MRN: [id number]      Assessment  Froy Ngo is a 79 y.o. male with suspected gallstone pancreatitis.  He was admitted with acute renal failure with significant electrolyte abnormalities and also found to have severely elevated lipase in the 2000's.  It is since decreased.  While he is asymptomatic, he did have an episode of nausea and vomiting early this weekend.  I suspect that he had an episode of pancreatitis that was most likely gallstone pancreatitis given that he does not drink any significant amount of alcohol and has cholelithiasis on ultrasound today.  I discussed with him, his wife, and Dr. Farrell.  We discussed proceeding with laparoscopic cholecystectomy with intraoperative cholangiogram prior to discharge, once his pancreatitis has had time to truly come down and his renal function is appropriate again.   For now, okay for clear liquid diet from my standpoint.  I will continue to follow closely and we will plan for surgery prior to discharge.    History of Present Illness   Froy Ngo is a 79 y.o. male who presents to the hospital with significant electrolyte abnormalities and acute kidney failure.  He was also found to have pancreatitis.  Today, he denies any significant abdominal pain of any kind and he says he is tolerating diet relatively well.  His wife reports that over the past few days, he has had very little appetite and he had an episode of nausea and vomiting over the weekend.    Past Medical History   Past Medical History:   Diagnosis Date    Dementia     Diabetes mellitus     Elevated cholesterol     Prostate disorder     Urinary retention         Past Surgical History   Past Surgical History:   Procedure Laterality Date    CYSTOSCOPY W/ URETERAL STENT PLACEMENT Bilateral 8/13/2024    Procedure: BILATERAL CYSTOSCOPY URETERAL CATHETER/STENT INSERTION WITH BILATERAL RETROGRADES;  Surgeon: Antoni Melendez MD;  Location: [facility];  Service: Urology;  Laterality: Bilateral;       Social History  Social History     Socioeconomic History    Marital status: Single   Tobacco Use    Smoking status: Never     Passive exposure: Never    Smokeless tobacco: Never   Vaping Use    Vaping status: Never Used   Substance and Sexual Activity    Alcohol use: Never    Drug use: Never    Sexual activity: Not Currently       Family History  History reviewed. No pertinent family history.    Review of Systems  Negative except as documented in the HPI.     Allergies  No Known Allergies    Medications    Current Facility-Administered Medications:     [Held by provider] atorvastatin (LIPITOR) tablet 10 mg, 10 mg, Oral, Daily, Ritesh Farrell MD    sennosides-docusate (PERICOLACE) 8.6-50 MG per tablet 2 tablet, 2 tablet, Oral, BID PRN **AND** polyethylene glycol (MIRALAX) packet 17 g, 17 g, Oral, Daily PRN  **AND** bisacodyl (DULCOLAX) EC tablet 5 mg, 5 mg, Oral, Daily PRN **AND** bisacodyl (DULCOLAX) suppository 10 mg, 10 mg, Rectal, Daily PRN, Rietsh Farrell MD    cefTRIAXone (ROCEPHIN) 1,000 mg in sodium chloride 0.9 % 100 mL MBP, 1,000 mg, Intravenous, Q24H, Ritesh Farrell MD    dextrose (D50W) (25 g/50 mL) IV injection 25 g, 25 g, Intravenous, Q15 Min PRN, Ritesh Farrell MD    dextrose (GLUTOSE) oral gel 15 g, 15 g, Oral, Q15 Min PRN, Ritesh Farrell MD    [Held by provider] donepezil (ARICEPT) tablet 10 mg, 10 mg, Oral, Nightly, Ritesh Farrell MD    [Held by provider] ferrous sulfate tablet 325 mg, 325 mg, Oral, Daily With Breakfast, Ritesh Farrell MD    [Held by provider] finasteride (PROSCAR) tablet 5 mg, 5 mg, Oral, Daily, Ritesh Farrell MD    glucagon (GLUCAGEN) injection 1 mg, 1 mg, Intramuscular, Q15 Min PRN, Ritesh Farrell MD    heparin (porcine) 5000 UNIT/ML injection 5,000 Units, 5,000 Units, Subcutaneous, Q12H, Ritesh Farrell MD, 5,000 Units at 02/05/25 0931    insulin regular (humuLIN R,novoLIN R) injection 2-7 Units, 2-7 Units, Subcutaneous, Q6H, Ritesh Farrell MD    [Held by provider] memantine (NAMENDA) tablet 5 mg, 5 mg, Oral, Daily, Ritesh Farrell MD    midodrine (PROAMATINE) tablet 10 mg, 10 mg, Oral, TID AC, Tad Benoit MD, 10 mg at 02/05/25 1149    nitroglycerin (NITROSTAT) SL tablet 0.4 mg, 0.4 mg, Sublingual, Q5 Min PRN, Ritesh Farrell MD    ondansetron ODT (ZOFRAN-ODT) disintegrating tablet 4 mg, 4 mg, Oral, Q6H PRN **OR** ondansetron (ZOFRAN) injection 4 mg, 4 mg, Intravenous, Q6H PRN, Ritesh Farrell MD    sodium chloride 0.9 % flush 10 mL, 10 mL, Intravenous, Q12H, Ritesh Farrell MD, 10 mL at 02/05/25 0931    sodium chloride 0.9 % flush 10 mL, 10 mL, Intravenous, PRN, Ritesh Farrell MD    sodium chloride 0.9 % infusion 40 mL, 40 mL, Intravenous, PRN, Ritesh Farrell MD    sodium chloride 0.9 % infusion, 75 mL/hr, Intravenous,  Continuous, Roberto Cantor, APRN, Last Rate: 75 mL/hr at 25 0931, 75 mL/hr at 25 0931    Vital Signs  Vitals:    25 1148   BP: 108/63   Pulse: 84   Resp: 16   Temp:    SpO2: 98%          Physical Exam  Constitutional: Resting comfortably, no acute distress  Neck: Supple, trachea midline  Respiratory: No increased work of breathing, Symmetric excursion  Cardiovascular: Well pefursed, no jugular venous distention evident   Abdominal: Soft, nontender, nondistended  Lymphatics: No cervical or suprascapular adenopathy  Skin: Warm, dry, no rash on visualized skin surfaces  Musculoskeletal: Symmetric strength, no obvious gross abnormalities  Psychiatric: Alert and oriented ×3, normal affect     Laboratory Results  I have personally reviewed CBC with Ocie 9, Humoryl 8.9, plates 162.  Lipase 1603 from the .  CMP with creatinine 4.76, bicarb 21, potassium 4.1, sodium 133, albumin 3.0, AST 34, ALT 54, total bilirubin 0.5, alkaline phosphatase 184.    Radiology  I have personally reviewed the gallbladder demonstrates relatively normal-appearing pancreas.  Cholelithiasis with minimal changes to the gallbladder itself.         Finesse Bryant MD  Colorectal & General Surgery  [facility]    [address], [address]  [place], [place], [facility]  P: [phone]  F: [phone]     Electronically signed by Vimal Bryant MD at 25 1347       Tad Benoit MD at 25 1405        Consult Orders    1. Nephrology (on -call MD unless specified) [[id number]] ordered by Reggie Alcazar MD at 25 1319                   [facility] Consult Note      Patient Name: Froy Ngo  : 1945  MRN: [id number]  Primary Care Physician:  Ryan Gutierrez MD  Referring Physician: No ref. provider found  Date of admission: 2025    Subjective     Reason for Consult: NEHEMIAS on CKD    HPI:   Froy Ngo is a 79 y.o. male with CKD 3B (baseline  SCr 1.7-2.0, followed by Dr. Kenji Simental of our group), dimension, type II DM, hx of obstructive uropathy s/p ureteral stent placement (8/13/2024) and recent prostate resection (12/13/2024 per family), presented to the hospital today for abnormal labs.    In the ER, sodium 123, SCR 5.16, BUN 70, CO2 13, gap 17; UA positive for nitrites, 2+ protein, and TNTC RBC & WBC. Patient was given IV bicarb push and rocephin. Currently receiving IL NS bolus. CT abd/pel pending.    Per family, patient was treated for UTI for the past month w 3 rounds of abx (cipro is the last regimen); hagen was removed 3-4 weeks ago s/p prostate resection, denies urinary retention since that time; Had nausea and vomiting Sunday, followed by fever, chills, and weakness in the next 2 days; been hydrating adequately per wife; denies further UTI symptoms.    Review of Systems:   14 point review of systems is otherwise negative except for mentioned above on HPI    Personal History     Past Medical History:   Diagnosis Date    Dementia     Diabetes mellitus     Elevated cholesterol     Prostate disorder     Urinary retention        Past Surgical History:   Procedure Laterality Date    CYSTOSCOPY W/ URETERAL STENT PLACEMENT Bilateral 8/13/2024    Procedure: BILATERAL CYSTOSCOPY URETERAL CATHETER/STENT INSERTION WITH BILATERAL RETROGRADES;  Surgeon: Antoni Melendez MD;  Location: [facility];  Service: Urology;  Laterality: Bilateral;       Family History: family history is not on file.    Social History:  reports that he has never smoked. He has never been exposed to tobacco smoke. He has never used smokeless tobacco. He reports that he does not drink alcohol and does not use drugs.    Home Medications:  Prior to Admission medications    Medication Sig Start Date End Date Taking? Authorizing Provider   amLODIPine (NORVASC) 5 MG tablet Take 1 tablet by mouth Daily. 8/10/24   Gurvinder Farrell MD   aspirin 81 MG EC tablet Take 1 tablet by mouth Daily.  8/4/24   Araseli Price MD   atorvastatin (LIPITOR) 10 MG tablet Take 1 tablet by mouth Daily. Indications: High Amount of Fats in the Blood 8/4/24   Araseli Price MD   donepezil (ARICEPT) 10 MG tablet Take 1 tablet by mouth Every Night. Indications: Alzheimer's Disease 10/4/23 10/3/24  Araseli Price MD   finasteride (Proscar) 5 MG tablet Take 1 tablet by mouth Daily. Indications: Benign Enlargement of Prostate 9/1/24   Araseli Price MD   hydrALAZINE (APRESOLINE) 25 MG tablet Take 1 tablet by mouth Every 8 (Eight) Hours. 8/9/24   Gurvinder Farrell MD   insulin lispro (HUMALOG/ADMELOG) 100 UNIT/ML injection Inject 2-7 Units under the skin into the appropriate area as directed 4 (Four) Times a Day Before Meals & at Bedtime. 8/20/24   Shruthi Acosta MD   memantine (NAMENDA) 10 MG tablet Take 1 tablet by mouth Daily. Indications: Alzheimer's Disease 11/6/23 11/5/24  Araseli Price MD   tamsulosin (FLOMAX) 0.4 MG capsule 24 hr capsule Take 1 capsule by mouth Daily. 8/10/24   Gurvinder Farrell MD       Allergies:  No Known Allergies    Objective     Vitals:   Temp:  [99.3 °F (37.4 °C)] 99.3 °F (37.4 °C)  Heart Rate:  [] 92  Resp:  [18] 18  BP: ()/(57-71) 93/64  No intake or output data in the 24 hours ending 02/04/25 1443    Physical Exam:   Constitutional: Awake, alert, no acute distress.  HEENT: Sclera anicteric, no conjunctival injection  Neck: Supple, no JVD  Respiratory: Clear to auscultation bilaterally, nonlabored respiration on RA  Cardiovascular: tachycardiac, RR, no murmurs, no rubs  Gastrointestinal: Positive bowel sounds, abdomen is soft, nontender and nondistended  : No palpable bladder  Musculoskeletal: No edema, no clubbing or cyanosis  Psychiatric: Appropriate affect, cooperative  Neurologic: moves all extremities  Skin: Warm and dry       Scheduled Meds:     cefTRIAXone, 1,000 mg, Intravenous, Once  midodrine, 5 mg, Oral, TID AC  sodium chloride, 1,000 mL,  Intravenous, Once      IV Meds:   sodium chloride, 100 mL/hr        Results Reviewed:   I have personally reviewed the results from the time of this admission to 2/4/2025 14:43 EST     Lab Results   Component Value Date    GLUCOSE 277 (H) 02/04/2025    CALCIUM 9.3 02/04/2025     (L) 02/04/2025    K 5.1 02/04/2025    CO2 13.2 (L) 02/04/2025    CL 93 (L) 02/04/2025    BUN 70 (H) 02/04/2025    CREATININE 5.16 (H) 02/04/2025    BCR 13.6 02/04/2025    ANIONGAP 16.8 (H) 02/04/2025      Lab Results   Component Value Date    MG 2.0 02/04/2025    PHOS 3.8 02/04/2025    ALBUMIN 3.5 02/04/2025           Assessment / Plan       * No active hospital problems. *      ASSESSMENT:   NEHEMIAS on CKD 3B, baseline Scr 1.7-2.0, secondary to prior AKIs d/t postobstructive uropathy, and longstanding hypertensive/diabetic nephrosclerosis.  Acute etiology is likely multifactorial: UTI followed by  hypotension limiting renal perfusion . CT abdomen/pelvis did not reveal hydro; slightly hypovolemic, receiving IL NS bolus now  Acute on chronic hyponatremia, sodium 126 corrected for BG, likely multifactorial: excessive PO fluid intake, poor solute intake, and NEHEMIAS on CKD   AGMA, secondary to NEHEMIAS on CKD , need to rule out lactic acidosis and ketosis started sodium bicarbonate drip and follow work up   Hx post obstructive uropathy, s/p  bilateral ureteral stent placement 8/30/2024, s/p prostate resection 12/13/2024, Rodriguez was removed 3 to 4 weeks ago per family report. CT abd/pel suggested enlarged prostate with possible cystitis / Prostatitis  due to recurrent UTI ?   UTI, failed outpatient management, on IV Rocephin, will follow cultures , possible prostatitis due to recurrent antibiotics  . Previous urine and blood cultures negative   Type II DM with CKD, managed by primary team  Hypotension  will hold amlodipine  and hydralazine , continue IVF , will order cortisol   Transaminases from hypotension , managed by primary team  Dementia, on  namenda at home    PLAN:  Agree with IV NS bolus, followed by continuous sodium bicarb drip/ infusion  to replaced volume and Hco3 deficit at 100 ml/hr   Hold home antihypertensives (Norvasc and hydralazine), order  midodrine 5 mg TID x 3 doses   I review his CT abdomen ( report not finalized ) bilateral ureteral stent w/o hydronephrosis . Bilateral renal atrophy . Enlarged prostate .  . No evidence of urinary retention   Check serum osm, uric acid, lactic acid, and beta hydroxybutyrate, cortisol   Check Fanny, UPCR, and urine osm  Surveillance labs    Spoke w wife by phone and caregiver at bedside     Thank you for involving us in the care of Froy Ngo.  Please feel free to call with any questions.    Tad Benoit MD  02/04/25  14:43 [facility]    [facility]  [phone]      Please note that portions of this note were completed with a voice recognition program.    Electronically signed by Tad Benoit MD at 02/04/25 8161

## 2025-02-06 NOTE — PROGRESS NOTES
[facility] Progress Note      Patient Name: Froy Ngo  : 1945  MRN: [id number]  Primary Care Physician:  Ryan Gutierrez MD  Date of admission: 2025    Subjective     Interval History:   Followup on NEHEMIAS on CKD    Overnight no event  Patient n.p.o.  On IV fluids  No chest pain, shortness of breath, palpitations    Review of Systems:   As noted above        Personal History      Medical History        Past Medical History:   Diagnosis Date    Dementia      Diabetes mellitus      Elevated cholesterol      Prostate disorder      Urinary retention              Surgical History         Past Surgical History:   Procedure Laterality Date    CYSTOSCOPY W/ URETERAL STENT PLACEMENT Bilateral 2024     Procedure: BILATERAL CYSTOSCOPY URETERAL CATHETER/STENT INSERTION WITH BILATERAL RETROGRADES;  Surgeon: Antoni Melendez MD;  Location: [facility];  Service: Urology;  Laterality: Bilateral;            Family History: family history is not on file.     Social History:  reports that he has never smoked. He has never been exposed to tobacco smoke. He has never used smokeless tobacco. He reports that he does not drink alcohol and does not use drugs.     Home Medications:          Prior to Admission medications    Medication Sig Start Date End Date Taking? Authorizing Provider   amLODIPine (NORVASC) 5 MG tablet Take 1 tablet by mouth Daily. 8/10/24     Gurvinder Farrell MD   aspirin 81 MG EC tablet Take 1 tablet by mouth Daily. 24     Araseli Price MD   atorvastatin (LIPITOR) 10 MG tablet Take 1 tablet by mouth Daily. Indications: High Amount of Fats in the Blood 24     Araseli Price MD   donepezil (ARICEPT) 10 MG tablet Take 1 tablet by mouth Every Night. Indications: Alzheimer's Disease 10/4/23 10/3/24   Araseli Price MD   finasteride (Proscar) 5 MG tablet Take 1 tablet by mouth Daily. Indications: Benign Enlargement of Prostate 24      Provider, MD Araseli   hydrALAZINE (APRESOLINE) 25 MG tablet Take 1 tablet by mouth Every 8 (Eight) Hours. 8/9/24     Gurvinder Farrell MD   insulin lispro (HUMALOG/ADMELOG) 100 UNIT/ML injection Inject 2-7 Units under the skin into the appropriate area as directed 4 (Four) Times a Day Before Meals & at Bedtime. 8/20/24     Shruthi Acosta MD   memantine (NAMENDA) 10 MG tablet Take 1 tablet by mouth Daily. Indications: Alzheimer's Disease 11/6/23 11/5/24   ProviderAraseli MD   tamsulosin (FLOMAX) 0.4 MG capsule 24 hr capsule Take 1 capsule by mouth Daily. 8/10/24     Gurvinder Farrell MD         Allergies:  Allergies   No Known Allergies        Objective      Vitals:   Temp:  [99.3 °F (37.4 °C)] 99.3 °F (37.4 °C)  Heart Rate:  [] 92  Resp:  [18] 18  BP: ()/(57-71) 93/64  No intake or output data in the 24 hours ending 02/04/25 1443     Physical Exam:   Constitutional: Awake, alert, no acute distress.  HEENT: Sclera anicteric, no conjunctival injection  Neck: Supple, no JVD  Respiratory: Clear to auscultation bilaterally, nonlabored respiration on RA  Cardiovascular: tachycardiac, RR, no murmurs, no rubs  Gastrointestinal: Positive bowel sounds, abdomen is soft, nontender and nondistended  : No palpable bladder  Musculoskeletal: No edema, no clubbing or cyanosis  Psychiatric: Appropriate affect, cooperative  Neurologic: moves all extremities  Skin: Warm and dry              Scheduled Meds:     atorvastatin, 10 mg, Oral, Daily  cefTRIAXone, 1,000 mg, Intravenous, Q24H  donepezil, 10 mg, Oral, Nightly  ferrous sulfate, 325 mg, Oral, Daily With Breakfast  finasteride, 5 mg, Oral, Daily  heparin (porcine), 5,000 Units, Subcutaneous, Q12H  insulin regular, 2-7 Units, Subcutaneous, Q6H  memantine, 5 mg, Oral, Daily  midodrine, 10 mg, Oral, TID AC  sodium chloride, 10 mL, Intravenous, Q12H  tamsulosin, 0.4 mg, Oral, Daily  torsemide, 40 mg, Oral, Daily      IV Meds:   sodium chloride, 75 mL/hr, Last  Rate: 75 mL/hr (02/06/25 0100)        Results Reviewed:   I have personally reviewed the results from the time of this admission to 2/6/2025 08:38 EST     Results from last 7 days   Lab Units 02/06/25  0554 02/05/25  0555 02/04/25  1202   SODIUM mmol/L 130* 133* 123*   POTASSIUM mmol/L 4.2 4.1 5.1   CHLORIDE mmol/L 96* 98 93*   CO2 mmol/L 19.2* 21.6* 13.2*   BUN mg/dL 73* 80* 70*   CREATININE mg/dL 3.99* 4.76* 5.16*   CALCIUM mg/dL 8.4* 8.4* 9.3   BILIRUBIN mg/dL 0.7 0.5 0.7   ALK PHOS U/L 232* 184* 219*   ALT (SGPT) U/L 55* 54* 78*   AST (SGOT) U/L 42* 34 63*   GLUCOSE mg/dL 164* 226* 277*       Estimated Creatinine Clearance: 18.1 mL/min (A) (by C-G formula based on SCr of 3.99 mg/dL (H)).    Results from last 7 days   Lab Units 02/06/25  0554 02/05/25  0555 02/04/25  1202   MAGNESIUM mg/dL 2.1 2.0 2.0   PHOSPHORUS mg/dL  --  4.1 3.8       Results from last 7 days   Lab Units 02/05/25  0555   URIC ACID mg/dL 9.0*       Results from last 7 days   Lab Units 02/06/25  0554 02/05/25  0555 02/04/25  1202   WBC 10*3/mm3 10.20 9.53 15.48*   HEMOGLOBIN g/dL 9.4* 8.9* 11.0*   PLATELETS 10*3/mm3 196 162 262             Assessment / Plan     ASSESSMENT:   NEHEMIAS on CKD 3B, baseline Scr 1.7-2.0, secondary to prior AKIs d/t postobstructive uropathy, and longstanding hypertensive/diabetic nephrosclerosis.  Acute etiology is likely multifactorial: UTI followed by  hypotension limiting renal perfusion . CT abdomen/pelvis did not reveal hydro; slightly hypovolemic  Acute on chronic hyponatremia, likely multifactorial: excessive PO fluid intake, poor solute intake, and NEHEMIAS on CKD, improved  AGMA, secondary to NEHEMIAS on CKD, received bicarb gtt, improved  Hx post obstructive uropathy, s/p  bilateral ureteral stent placement 8/30/2024, s/p prostate resection 12/13/2024, Rodriguez was removed 3 to 4 weeks ago per family report. CT abd/pel suggested enlarged prostate with possible cystitis / Prostatitis  due to recurrent UTI ?   UTI, failed  outpatient management, on IV Rocephin, will follow cultures , possible prostatitis due to recurrent antibiotics. Previous urine and blood cultures negative   Type II DM with CKD, managed by primary team  Hypotension, will hold amlodipin and hydralazine. Cortisol did not suggest adrenal insufficiency   Transaminases from hypotension, managed by primary team  Dementia, on namenda at home  Pancreatitis/elevated lipase, followed closely by general surgery    PLAN:  Renal function continues to improve with medical management  Patient remains n.p.o. once patient is able to initiate oral intake, will discontinue IV fluids  Order single dose of torsemide  Continue surveillance labs and monitor urine output    Updated wife at bedside    Discussed with nursing staff    Thank you for involving us in the care of Froy Ngo.  Please feel free to call with any questions.    Tad Benoit MD  02/06/25  08:38 [facility]    [facility]  [phone]    Please note that portions of this note were completed with a voice recognition program.

## 2025-02-06 NOTE — PLAN OF CARE
Goal Outcome Evaluation:      Pt sleeping long intervals, daughter at bsd, no s/sx of pain or distress, voided in toilet, without difficulty, sr on tele, 88HR, room air satting hi 90's, confused,

## 2025-02-06 NOTE — PROGRESS NOTES
Subjective: Tolerating liquids.  Denies any abdominal pain.  Remains confused but apparently at baseline.  Wife is at bedside.  Slept last night.  Participated in physical therapy yesterday.    Objective:  Vitals:    02/05/25 1731 02/05/25 1926 02/06/25 0002 02/06/25 0643   BP: 109/64 109/50 113/61 111/69   BP Location: Left arm Left arm Left arm    Patient Position: Lying Lying Lying    Pulse: 90 93 90 89   Resp:  16 16    Temp: 99.4 °F (37.4 °C) 99 °F (37.2 °C) 98.7 °F (37.1 °C)    TempSrc: Oral Oral Oral    SpO2: 95% 96% 91%    Weight:       Height:           02/05 0700 02/05 0701 02/06 0700 02/06 0701 02/07 0700    P.O.  1020    I.V. (mL/kg)  1000 (11.4)    IV Piggyback 1100     Total Intake(mL/kg) 1100 (12.5) 2020 (23)    Urine (mL/kg/hr) 300 700 (0.3)    Total Output 300 700    Net +800 +1320      Gen Nad  Heart: RRR  Lungs: CTA  Abd: soft ntnd  Ext: no edema    Recent Results (from the past 24 hours)   POC Glucose Once    Collection Time: 02/05/25 11:02 AM    Specimen: Blood   Result Value Ref Range    Glucose 199 (H) 70 - 130 mg/dL   POC Glucose Once    Collection Time: 02/05/25  3:58 PM    Specimen: Blood   Result Value Ref Range    Glucose 234 (H) 70 - 130 mg/dL   POC Glucose Once    Collection Time: 02/05/25  8:51 PM    Specimen: Blood   Result Value Ref Range    Glucose 156 (H) 70 - 130 mg/dL   Comprehensive Metabolic Panel    Collection Time: 02/06/25  5:54 AM    Specimen: Blood   Result Value Ref Range    Glucose 164 (H) 65 - 99 mg/dL    BUN 73 (H) 8 - 23 mg/dL    Creatinine 3.99 (H) 0.76 - 1.27 mg/dL    Sodium 130 (L) 136 - 145 mmol/L    Potassium 4.2 3.5 - 5.2 mmol/L    Chloride 96 (L) 98 - 107 mmol/L    CO2 19.2 (L) 22.0 - 29.0 mmol/L    Calcium 8.4 (L) 8.6 - 10.5 mg/dL    Total Protein 6.6 6.0 - 8.5 g/dL    Albumin 3.1 (L) 3.5 - 5.2 g/dL    ALT (SGPT) 55 (H) 1 - 41 U/L    AST (SGOT) 42 (H) 1 - 40 U/L    Alkaline Phosphatase 232 (H) 39 - 117 U/L    Total Bilirubin 0.7 0.0 - 1.2 mg/dL    Globulin  3.5 gm/dL    A/G Ratio 0.9 g/dL    BUN/Creatinine Ratio 18.3 7.0 - 25.0    Anion Gap 14.8 5.0 - 15.0 mmol/L    eGFR 14.4 (L) >60.0 mL/min/1.73   Lipase    Collection Time: 02/06/25  5:54 AM    Specimen: Blood   Result Value Ref Range    Lipase 1,005 (H) 13 - 60 U/L   Magnesium    Collection Time: 02/06/25  5:54 AM    Specimen: Blood   Result Value Ref Range    Magnesium 2.1 1.6 - 2.4 mg/dL   Iron Profile    Collection Time: 02/06/25  5:54 AM    Specimen: Blood   Result Value Ref Range    Iron 38 (L) 59 - 158 mcg/dL    Iron Saturation (TSAT) 18 (L) 20 - 50 %    Transferrin 143 (L) 200 - 360 mg/dL    TIBC 213 (L) 298 - 536 mcg/dL   Vitamin B12    Collection Time: 02/06/25  5:54 AM    Specimen: Blood   Result Value Ref Range    Vitamin B-12 701 211 - 946 pg/mL   Folate    Collection Time: 02/06/25  5:54 AM    Specimen: Blood   Result Value Ref Range    Folate 6.34 4.78 - 24.20 ng/mL   CBC Auto Differential    Collection Time: 02/06/25  5:54 AM    Specimen: Blood   Result Value Ref Range    WBC 10.20 3.40 - 10.80 10*3/mm3    RBC 3.27 (L) 4.14 - 5.80 10*6/mm3    Hemoglobin 9.4 (L) 13.0 - 17.7 g/dL    Hematocrit 28.0 (L) 37.5 - 51.0 %    MCV 85.6 79.0 - 97.0 fL    MCH 28.7 26.6 - 33.0 pg    MCHC 33.6 31.5 - 35.7 g/dL    RDW 14.8 12.3 - 15.4 %    RDW-SD 46.1 37.0 - 54.0 fl    MPV 9.6 6.0 - 12.0 fL    Platelets 196 140 - 450 10*3/mm3    Neutrophil % 79.8 (H) 42.7 - 76.0 %    Lymphocyte % 8.2 (L) 19.6 - 45.3 %    Monocyte % 7.6 5.0 - 12.0 %    Eosinophil % 3.5 0.3 - 6.2 %    Basophil % 0.2 0.0 - 1.5 %    Immature Grans % 0.7 (H) 0.0 - 0.5 %    Neutrophils, Absolute 8.13 (H) 1.70 - 7.00 10*3/mm3    Lymphocytes, Absolute 0.84 0.70 - 3.10 10*3/mm3    Monocytes, Absolute 0.78 0.10 - 0.90 10*3/mm3    Eosinophils, Absolute 0.36 0.00 - 0.40 10*3/mm3    Basophils, Absolute 0.02 0.00 - 0.20 10*3/mm3    Immature Grans, Absolute 0.07 (H) 0.00 - 0.05 10*3/mm3    nRBC 0.0 0.0 - 0.2 /100 WBC   POC Glucose Once    Collection Time: 02/06/25   6:35 AM    Specimen: Blood   Result Value Ref Range    Glucose 182 (H) 70 - 130 mg/dL     A/p  Acute renal failure with metabolic acidosis-improving with IV fluids.  Nephrology following.  IV fluids as per nephrology.  Urinary tract infection-preliminary on blood cultures negative.  Urine culture currently pending.  Remains on Rocephin for now.  Temperature briefly 99.7 yesterday but otherwise afebrile.  White count down to normal has bilateral ureteral stents that apparently are scheduled to be either removed or replaced later this month by urology.  I am going to ask urology to see him while he is here.  Pancreatitis-no pain.  Lipase trending down.  Seen by surgery yesterday who agrees he likely passed a gallstone and is going to need a cholecystectomy once he is improved from a kidney standpoint. Will let surgery decide if ok to advance diet given Lipase still 1000.  Anemia-hemoglobin back up slightly today.  Some iron deficiency based on serum iron and percent saturation that I will resume his oral iron.  Diabetes-I will recheck A1c.  He was not on any blood sugar medicines at home.  Dementia-looks to be at baseline.  Back on home medicines.  Continue DVT prophylaxis with heparin

## 2025-02-06 NOTE — PROGRESS NOTES
Colorectal & General Surgery  Progress Note    Patient: Froy Ngo  YOB: 1945  MRN: [id number]      Assessment  Froy Ngo is a 79 y.o. male with gallstone pancreatitis currently admitted with acute renal failure and UTI.    Abdominal exam remains benign.  Okay to advance to full liquid diet from my standpoint.  Anticipate laparoscopic cholecystectomy prior to discharge.  Would like to see for significant improvement in renal function.  Dissipate that we could perform laparoscopic cholecystectomy on Saturday if he continues on his current trajectory.    Subjective  Significant atrophic.  Feels relatively well.  Tolerating liquids.    Objective    Vitals:    02/06/25 0723   BP: 121/71   Pulse: 85   Resp: 16   Temp: 98 °F (36.7 °C)   SpO2: 96%       Physical Exam  Constitutional: Well-developed well-nourished, no acute distress  Neck: Supple, trachea midline  Respiratory: No increased work of breathing, Symmetric excursion  Cardiovascular: Well pefursed, no jugular venous distention evident   Abdominal: Soft, non-tender, non-distended  Skin: Warm, dry, no rash on visualized skin surfaces  Psychiatric: Alert and oriented ×3, normal affect     Laboratory Results  I have personally reviewed CBC with Ocie 10, Humoryl 9, 0.26.  Lipase 1000.  CMP with creatinine 2.99, bicarb 22, potassium 3.2, AST 22, ALT 35, total bilirubin 0.7.    Radiology  No new imaging To review         Finesse Bryant MD  Colorectal & General Surgery  [facility], [place], operated by [facility]    [address], [address]  [place], [place], [address]  P: [phone]  F: [phone]

## 2025-02-07 ENCOUNTER — ANESTHESIA EVENT (OUTPATIENT)
Dept: PERIOP | Facility: HOSPITAL | Age: 80
End: 2025-02-07
Payer: COMMERCIAL

## 2025-02-07 ENCOUNTER — APPOINTMENT (OUTPATIENT)
Dept: GENERAL RADIOLOGY | Facility: HOSPITAL | Age: 80
End: 2025-02-07
Payer: COMMERCIAL

## 2025-02-07 ENCOUNTER — ANESTHESIA (OUTPATIENT)
Dept: PERIOP | Facility: HOSPITAL | Age: 80
End: 2025-02-07
Payer: COMMERCIAL

## 2025-02-07 LAB
ALBUMIN SERPL-MCNC: 3.1 G/DL (ref 3.5–5.2)
ALBUMIN/GLOB SERPL: 0.9 G/DL
ALP SERPL-CCNC: 353 U/L (ref 39–117)
ALT SERPL W P-5'-P-CCNC: 72 U/L (ref 1–41)
ANION GAP SERPL CALCULATED.3IONS-SCNC: 15 MMOL/L (ref 5–15)
AST SERPL-CCNC: 58 U/L (ref 1–40)
BASOPHILS # BLD AUTO: 0.01 10*3/MM3 (ref 0–0.2)
BASOPHILS NFR BLD AUTO: 0.1 % (ref 0–1.5)
BILIRUB SERPL-MCNC: 0.8 MG/DL (ref 0–1.2)
BUN SERPL-MCNC: 76 MG/DL (ref 8–23)
BUN/CREAT SERPL: 18.6 (ref 7–25)
CALCIUM SPEC-SCNC: 8.3 MG/DL (ref 8.6–10.5)
CHLORIDE SERPL-SCNC: 93 MMOL/L (ref 98–107)
CO2 SERPL-SCNC: 22 MMOL/L (ref 22–29)
CREAT SERPL-MCNC: 4.09 MG/DL (ref 0.76–1.27)
DEPRECATED RDW RBC AUTO: 44.4 FL (ref 37–54)
EGFRCR SERPLBLD CKD-EPI 2021: 13.9 ML/MIN/1.73
EOSINOPHIL # BLD AUTO: 0.52 10*3/MM3 (ref 0–0.4)
EOSINOPHIL NFR BLD AUTO: 6.1 % (ref 0.3–6.2)
ERYTHROCYTE [DISTWIDTH] IN BLOOD BY AUTOMATED COUNT: 14.5 % (ref 12.3–15.4)
GLOBULIN UR ELPH-MCNC: 3.6 GM/DL
GLUCOSE BLDC GLUCOMTR-MCNC: 166 MG/DL (ref 70–130)
GLUCOSE BLDC GLUCOMTR-MCNC: 185 MG/DL (ref 70–130)
GLUCOSE BLDC GLUCOMTR-MCNC: 206 MG/DL (ref 70–130)
GLUCOSE BLDC GLUCOMTR-MCNC: 232 MG/DL (ref 70–130)
GLUCOSE BLDC GLUCOMTR-MCNC: 311 MG/DL (ref 70–130)
GLUCOSE SERPL-MCNC: 184 MG/DL (ref 65–99)
HBA1C MFR BLD: 7.8 % (ref 4.8–5.6)
HCT VFR BLD AUTO: 27.9 % (ref 37.5–51)
HGB BLD-MCNC: 9.4 G/DL (ref 13–17.7)
IMM GRANULOCYTES # BLD AUTO: 0.06 10*3/MM3 (ref 0–0.05)
IMM GRANULOCYTES NFR BLD AUTO: 0.7 % (ref 0–0.5)
LIPASE SERPL-CCNC: 741 U/L (ref 13–60)
LYMPHOCYTES # BLD AUTO: 0.73 10*3/MM3 (ref 0.7–3.1)
LYMPHOCYTES NFR BLD AUTO: 8.6 % (ref 19.6–45.3)
MCH RBC QN AUTO: 28.6 PG (ref 26.6–33)
MCHC RBC AUTO-ENTMCNC: 33.7 G/DL (ref 31.5–35.7)
MCV RBC AUTO: 84.8 FL (ref 79–97)
MONOCYTES # BLD AUTO: 0.72 10*3/MM3 (ref 0.1–0.9)
MONOCYTES NFR BLD AUTO: 8.5 % (ref 5–12)
NEUTROPHILS NFR BLD AUTO: 6.43 10*3/MM3 (ref 1.7–7)
NEUTROPHILS NFR BLD AUTO: 76 % (ref 42.7–76)
NRBC BLD AUTO-RTO: 0 /100 WBC (ref 0–0.2)
PLATELET # BLD AUTO: 229 10*3/MM3 (ref 140–450)
PMV BLD AUTO: 9.2 FL (ref 6–12)
POTASSIUM SERPL-SCNC: 3.9 MMOL/L (ref 3.5–5.2)
PROT SERPL-MCNC: 6.7 G/DL (ref 6–8.5)
RBC # BLD AUTO: 3.29 10*6/MM3 (ref 4.14–5.8)
SODIUM SERPL-SCNC: 130 MMOL/L (ref 136–145)
WBC NRBC COR # BLD AUTO: 8.47 10*3/MM3 (ref 3.4–10.8)

## 2025-02-07 PROCEDURE — 85025 COMPLETE CBC W/AUTO DIFF WBC: CPT | Performed by: INTERNAL MEDICINE

## 2025-02-07 PROCEDURE — 25010000002 ONDANSETRON PER 1 MG: Performed by: NURSE ANESTHETIST, CERTIFIED REGISTERED

## 2025-02-07 PROCEDURE — 74420 UROGRAPHY RTRGR +-KUB: CPT

## 2025-02-07 PROCEDURE — 83690 ASSAY OF LIPASE: CPT | Performed by: INTERNAL MEDICINE

## 2025-02-07 PROCEDURE — 25010000002 HEPARIN (PORCINE) PER 1000 UNITS: Performed by: UROLOGY

## 2025-02-07 PROCEDURE — 83036 HEMOGLOBIN GLYCOSYLATED A1C: CPT | Performed by: INTERNAL MEDICINE

## 2025-02-07 PROCEDURE — 25010000002 PROPOFOL 10 MG/ML EMULSION: Performed by: NURSE ANESTHETIST, CERTIFIED REGISTERED

## 2025-02-07 PROCEDURE — 63710000001 INSULIN REGULAR HUMAN PER 5 UNITS: Performed by: INTERNAL MEDICINE

## 2025-02-07 PROCEDURE — 25010000002 DEXAMETHASONE SODIUM PHOSPHATE 20 MG/5ML SOLUTION: Performed by: NURSE ANESTHETIST, CERTIFIED REGISTERED

## 2025-02-07 PROCEDURE — 99232 SBSQ HOSP IP/OBS MODERATE 35: CPT | Performed by: SURGERY

## 2025-02-07 PROCEDURE — C1769 GUIDE WIRE: HCPCS | Performed by: UROLOGY

## 2025-02-07 PROCEDURE — 25010000002 MIDAZOLAM PER 1 MG: Performed by: ANESTHESIOLOGY

## 2025-02-07 PROCEDURE — 0TP98DZ REMOVAL OF INTRALUMINAL DEVICE FROM URETER, VIA NATURAL OR ARTIFICIAL OPENING ENDOSCOPIC: ICD-10-PCS | Performed by: UROLOGY

## 2025-02-07 PROCEDURE — BT141ZZ FLUOROSCOPY OF KIDNEYS, URETERS AND BLADDER USING LOW OSMOLAR CONTRAST: ICD-10-PCS | Performed by: UROLOGY

## 2025-02-07 PROCEDURE — 25010000002 CEFTRIAXONE PER 250 MG: Performed by: INTERNAL MEDICINE

## 2025-02-07 PROCEDURE — 25510000001 IOPAMIDOL 61 % SOLUTION: Performed by: UROLOGY

## 2025-02-07 PROCEDURE — 25010000002 LIDOCAINE 2% SOLUTION: Performed by: NURSE ANESTHETIST, CERTIFIED REGISTERED

## 2025-02-07 PROCEDURE — 63710000001 INSULIN REGULAR HUMAN PER 5 UNITS: Performed by: UROLOGY

## 2025-02-07 PROCEDURE — 82948 REAGENT STRIP/BLOOD GLUCOSE: CPT

## 2025-02-07 PROCEDURE — 80053 COMPREHEN METABOLIC PANEL: CPT | Performed by: INTERNAL MEDICINE

## 2025-02-07 PROCEDURE — 25810000003 LACTATED RINGERS PER 1000 ML: Performed by: NURSE ANESTHETIST, CERTIFIED REGISTERED

## 2025-02-07 PROCEDURE — 63710000001 INSULIN GLARGINE PER 5 UNITS: Performed by: UROLOGY

## 2025-02-07 RX ORDER — DIPHENHYDRAMINE HYDROCHLORIDE 50 MG/ML
12.5 INJECTION INTRAMUSCULAR; INTRAVENOUS
Status: DISCONTINUED | OUTPATIENT
Start: 2025-02-07 | End: 2025-02-07 | Stop reason: HOSPADM

## 2025-02-07 RX ORDER — ATROPINE SULFATE 0.4 MG/ML
0.4 INJECTION, SOLUTION INTRAMUSCULAR; INTRAVENOUS; SUBCUTANEOUS ONCE AS NEEDED
Status: DISCONTINUED | OUTPATIENT
Start: 2025-02-07 | End: 2025-02-07 | Stop reason: HOSPADM

## 2025-02-07 RX ORDER — BUPIVACAINE HCL/0.9 % NACL/PF 0.125 %
PLASTIC BAG, INJECTION (ML) EPIDURAL AS NEEDED
Status: DISCONTINUED | OUTPATIENT
Start: 2025-02-07 | End: 2025-02-07 | Stop reason: SURG

## 2025-02-07 RX ORDER — MIDAZOLAM HYDROCHLORIDE 1 MG/ML
0.5 INJECTION, SOLUTION INTRAMUSCULAR; INTRAVENOUS
Status: COMPLETED | OUTPATIENT
Start: 2025-02-07 | End: 2025-02-07

## 2025-02-07 RX ORDER — FENTANYL CITRATE 50 UG/ML
25 INJECTION, SOLUTION INTRAMUSCULAR; INTRAVENOUS
Status: DISCONTINUED | OUTPATIENT
Start: 2025-02-07 | End: 2025-02-07 | Stop reason: HOSPADM

## 2025-02-07 RX ORDER — FLUMAZENIL 0.1 MG/ML
0.2 INJECTION INTRAVENOUS AS NEEDED
Status: DISCONTINUED | OUTPATIENT
Start: 2025-02-07 | End: 2025-02-07 | Stop reason: HOSPADM

## 2025-02-07 RX ORDER — NALOXONE HCL 0.4 MG/ML
0.2 VIAL (ML) INJECTION AS NEEDED
Status: DISCONTINUED | OUTPATIENT
Start: 2025-02-07 | End: 2025-02-07 | Stop reason: HOSPADM

## 2025-02-07 RX ORDER — HYDROCODONE BITARTRATE AND ACETAMINOPHEN 7.5; 325 MG/1; MG/1
1 TABLET ORAL EVERY 4 HOURS PRN
Status: DISCONTINUED | OUTPATIENT
Start: 2025-02-07 | End: 2025-02-07 | Stop reason: HOSPADM

## 2025-02-07 RX ORDER — ONDANSETRON 2 MG/ML
INJECTION INTRAMUSCULAR; INTRAVENOUS AS NEEDED
Status: DISCONTINUED | OUTPATIENT
Start: 2025-02-07 | End: 2025-02-07 | Stop reason: SURG

## 2025-02-07 RX ORDER — LABETALOL HYDROCHLORIDE 5 MG/ML
5 INJECTION, SOLUTION INTRAVENOUS
Status: DISCONTINUED | OUTPATIENT
Start: 2025-02-07 | End: 2025-02-07 | Stop reason: HOSPADM

## 2025-02-07 RX ORDER — LIDOCAINE HYDROCHLORIDE 10 MG/ML
0.5 INJECTION, SOLUTION INFILTRATION; PERINEURAL ONCE AS NEEDED
Status: DISCONTINUED | OUTPATIENT
Start: 2025-02-07 | End: 2025-02-07 | Stop reason: HOSPADM

## 2025-02-07 RX ORDER — SODIUM CHLORIDE 0.9 % (FLUSH) 0.9 %
3 SYRINGE (ML) INJECTION EVERY 12 HOURS SCHEDULED
Status: DISCONTINUED | OUTPATIENT
Start: 2025-02-07 | End: 2025-02-07 | Stop reason: HOSPADM

## 2025-02-07 RX ORDER — IPRATROPIUM BROMIDE AND ALBUTEROL SULFATE 2.5; .5 MG/3ML; MG/3ML
3 SOLUTION RESPIRATORY (INHALATION) ONCE AS NEEDED
Status: DISCONTINUED | OUTPATIENT
Start: 2025-02-07 | End: 2025-02-07 | Stop reason: HOSPADM

## 2025-02-07 RX ORDER — ONDANSETRON 2 MG/ML
4 INJECTION INTRAMUSCULAR; INTRAVENOUS ONCE AS NEEDED
Status: DISCONTINUED | OUTPATIENT
Start: 2025-02-07 | End: 2025-02-07 | Stop reason: HOSPADM

## 2025-02-07 RX ORDER — FAMOTIDINE 10 MG/ML
20 INJECTION, SOLUTION INTRAVENOUS ONCE
Status: COMPLETED | OUTPATIENT
Start: 2025-02-07 | End: 2025-02-07

## 2025-02-07 RX ORDER — HYDROCODONE BITARTRATE AND ACETAMINOPHEN 5; 325 MG/1; MG/1
1 TABLET ORAL ONCE AS NEEDED
Status: DISCONTINUED | OUTPATIENT
Start: 2025-02-07 | End: 2025-02-07 | Stop reason: HOSPADM

## 2025-02-07 RX ORDER — FENTANYL CITRATE 50 UG/ML
50 INJECTION, SOLUTION INTRAMUSCULAR; INTRAVENOUS ONCE AS NEEDED
Status: DISCONTINUED | OUTPATIENT
Start: 2025-02-07 | End: 2025-02-07 | Stop reason: HOSPADM

## 2025-02-07 RX ORDER — EPHEDRINE SULFATE 50 MG/ML
5 INJECTION, SOLUTION INTRAVENOUS ONCE AS NEEDED
Status: DISCONTINUED | OUTPATIENT
Start: 2025-02-07 | End: 2025-02-07 | Stop reason: HOSPADM

## 2025-02-07 RX ORDER — HYDROMORPHONE HYDROCHLORIDE 1 MG/ML
0.25 INJECTION, SOLUTION INTRAMUSCULAR; INTRAVENOUS; SUBCUTANEOUS
Status: DISCONTINUED | OUTPATIENT
Start: 2025-02-07 | End: 2025-02-07 | Stop reason: HOSPADM

## 2025-02-07 RX ORDER — DEXAMETHASONE SODIUM PHOSPHATE 4 MG/ML
INJECTION, SOLUTION INTRA-ARTICULAR; INTRALESIONAL; INTRAMUSCULAR; INTRAVENOUS; SOFT TISSUE AS NEEDED
Status: DISCONTINUED | OUTPATIENT
Start: 2025-02-07 | End: 2025-02-07 | Stop reason: SURG

## 2025-02-07 RX ORDER — SODIUM CHLORIDE, SODIUM LACTATE, POTASSIUM CHLORIDE, CALCIUM CHLORIDE 600; 310; 30; 20 MG/100ML; MG/100ML; MG/100ML; MG/100ML
9 INJECTION, SOLUTION INTRAVENOUS CONTINUOUS
Status: DISCONTINUED | OUTPATIENT
Start: 2025-02-07 | End: 2025-02-07

## 2025-02-07 RX ORDER — PROPOFOL 10 MG/ML
VIAL (ML) INTRAVENOUS AS NEEDED
Status: DISCONTINUED | OUTPATIENT
Start: 2025-02-07 | End: 2025-02-07 | Stop reason: SURG

## 2025-02-07 RX ORDER — PROMETHAZINE HYDROCHLORIDE 25 MG/1
25 TABLET ORAL ONCE AS NEEDED
Status: DISCONTINUED | OUTPATIENT
Start: 2025-02-07 | End: 2025-02-07 | Stop reason: HOSPADM

## 2025-02-07 RX ORDER — SODIUM CHLORIDE 0.9 % (FLUSH) 0.9 %
3-10 SYRINGE (ML) INJECTION AS NEEDED
Status: DISCONTINUED | OUTPATIENT
Start: 2025-02-07 | End: 2025-02-07 | Stop reason: HOSPADM

## 2025-02-07 RX ORDER — PROMETHAZINE HYDROCHLORIDE 25 MG/1
25 SUPPOSITORY RECTAL ONCE AS NEEDED
Status: DISCONTINUED | OUTPATIENT
Start: 2025-02-07 | End: 2025-02-07 | Stop reason: HOSPADM

## 2025-02-07 RX ORDER — HYDRALAZINE HYDROCHLORIDE 20 MG/ML
5 INJECTION INTRAMUSCULAR; INTRAVENOUS
Status: DISCONTINUED | OUTPATIENT
Start: 2025-02-07 | End: 2025-02-07 | Stop reason: HOSPADM

## 2025-02-07 RX ORDER — SODIUM CHLORIDE, SODIUM LACTATE, POTASSIUM CHLORIDE, CALCIUM CHLORIDE 600; 310; 30; 20 MG/100ML; MG/100ML; MG/100ML; MG/100ML
INJECTION, SOLUTION INTRAVENOUS CONTINUOUS PRN
Status: DISCONTINUED | OUTPATIENT
Start: 2025-02-07 | End: 2025-02-07 | Stop reason: SURG

## 2025-02-07 RX ORDER — LIDOCAINE HYDROCHLORIDE 20 MG/ML
INJECTION, SOLUTION INFILTRATION; PERINEURAL AS NEEDED
Status: DISCONTINUED | OUTPATIENT
Start: 2025-02-07 | End: 2025-02-07 | Stop reason: SURG

## 2025-02-07 RX ORDER — IOPAMIDOL 612 MG/ML
INJECTION, SOLUTION INTRAVASCULAR AS NEEDED
Status: DISCONTINUED | OUTPATIENT
Start: 2025-02-07 | End: 2025-02-07 | Stop reason: HOSPADM

## 2025-02-07 RX ADMIN — TORSEMIDE 40 MG: 20 TABLET ORAL at 17:54

## 2025-02-07 RX ADMIN — LIDOCAINE HYDROCHLORIDE 100 MG: 20 INJECTION, SOLUTION INFILTRATION; PERINEURAL at 15:37

## 2025-02-07 RX ADMIN — Medication 10 ML: at 09:00

## 2025-02-07 RX ADMIN — CEFTRIAXONE SODIUM 1000 MG: 1 INJECTION, POWDER, FOR SOLUTION INTRAMUSCULAR; INTRAVENOUS at 14:19

## 2025-02-07 RX ADMIN — INSULIN HUMAN 3 UNITS: 100 INJECTION, SOLUTION PARENTERAL at 11:44

## 2025-02-07 RX ADMIN — SODIUM CHLORIDE, POTASSIUM CHLORIDE, SODIUM LACTATE AND CALCIUM CHLORIDE: 600; 310; 30; 20 INJECTION, SOLUTION INTRAVENOUS at 15:36

## 2025-02-07 RX ADMIN — OLANZAPINE 5 MG: 5 TABLET, FILM COATED ORAL at 01:18

## 2025-02-07 RX ADMIN — Medication 100 MCG: at 15:41

## 2025-02-07 RX ADMIN — PROPOFOL 150 MG: 10 INJECTION, EMULSION INTRAVENOUS at 15:37

## 2025-02-07 RX ADMIN — TAMSULOSIN HYDROCHLORIDE 0.4 MG: 0.4 CAPSULE ORAL at 17:56

## 2025-02-07 RX ADMIN — INSULIN HUMAN 5 UNITS: 100 INJECTION, SOLUTION PARENTERAL at 20:37

## 2025-02-07 RX ADMIN — FERROUS SULFATE TAB 325 MG (65 MG ELEMENTAL FE) 325 MG: 325 (65 FE) TAB at 17:54

## 2025-02-07 RX ADMIN — INSULIN GLARGINE 10 UNITS: 100 INJECTION, SOLUTION SUBCUTANEOUS at 20:37

## 2025-02-07 RX ADMIN — MIDAZOLAM 0.5 MG: 1 INJECTION INTRAMUSCULAR; INTRAVENOUS at 14:18

## 2025-02-07 RX ADMIN — Medication 100 MCG: at 15:54

## 2025-02-07 RX ADMIN — INSULIN HUMAN 2 UNITS: 100 INJECTION, SOLUTION PARENTERAL at 17:54

## 2025-02-07 RX ADMIN — FAMOTIDINE 20 MG: 10 INJECTION INTRAVENOUS at 14:18

## 2025-02-07 RX ADMIN — ONDANSETRON 4 MG: 2 INJECTION, SOLUTION INTRAMUSCULAR; INTRAVENOUS at 15:45

## 2025-02-07 RX ADMIN — ATORVASTATIN CALCIUM 10 MG: 20 TABLET, FILM COATED ORAL at 17:55

## 2025-02-07 RX ADMIN — INSULIN HUMAN 3 UNITS: 100 INJECTION, SOLUTION PARENTERAL at 01:23

## 2025-02-07 RX ADMIN — HEPARIN SODIUM 5000 UNITS: 5000 INJECTION INTRAVENOUS; SUBCUTANEOUS at 20:37

## 2025-02-07 RX ADMIN — MEMANTINE HYDROCHLORIDE 5 MG: 5 TABLET, FILM COATED ORAL at 17:55

## 2025-02-07 RX ADMIN — Medication 100 MCG: at 16:08

## 2025-02-07 RX ADMIN — DEXAMETHASONE SODIUM PHOSPHATE 8 MG: 4 INJECTION, SOLUTION INTRAMUSCULAR; INTRAVENOUS at 15:41

## 2025-02-07 RX ADMIN — DONEPEZIL HYDROCHLORIDE 10 MG: 10 TABLET, FILM COATED ORAL at 20:36

## 2025-02-07 RX ADMIN — MIDAZOLAM 0.5 MG: 1 INJECTION INTRAMUSCULAR; INTRAVENOUS at 14:23

## 2025-02-07 NOTE — OP NOTE
CYSTOSCOPY RETROGRADE PYELOGRAM AND STENT INSERTION  Procedure Note    Froy Ngo  2/7/2025    Pre-op Diagnosis:   Bilateral hydronephrosis    Post-op Diagnosis:     Post-Op Diagnosis Codes:     * Bilateral hydronephrosis [N13.30]    Procedure(s):  CYSTOSCOPY RETROGRADE PYELOGRAM AND STENT REMOVAL    Surgeon(s):  Antoni Melendez MD    Anesthesia: General    Staff:   Circulator: Kiley López RN  Scrub Person: Enma Farah    Estimated Blood Loss: minimal    Specimens:                * No orders in the log *      Drains:   [REMOVED] Continuous Bladder Irrigation Triple-lumen 22 Fr (Removed)       Findings: Bilateral hydronephrosis markedly improved.  Drainage slow but no clear indication of any obstruction.  Stents left out.    Complications: None apparent    Indications: 79-year-old gentleman with bladder outlet obstruction bilateral hydronephrosis with acute renal insufficiency has bilateral indwelling stents and recently underwent prostatic arterial embolization he is now voiding and his residuals have been acceptable he is here with a recent UTI.  Plan for stent removal possible stent replacement while on antibiotics.      Procedure: Patient was taken the operative suite given general endotracheal anesthesia.  Placed lithotomy position.  Prepped and draped in a sterile fashion.  Surgical timeout was performed.  Cystoscope was inserted.  The bladder slowly visualized.  Both stents were visualized and removed.  His prostatic urethra was open.  His bladder neck was open very impressive results from his PAE.  Both stents were seen removed.  No calcifications were noted.  Bilateral retrograde pyelograms were performed with open-ended ureteral catheter and half-strength contrast.  Both left and right retrograde showed a very straight normal caliber ureter just mildly dilated from the stent but the pelvis on each side was in normal size with no significant hydro and no significant blunting of the  calyces.  Eyewashes for short period of time it seemed to be draining and no clear indication of any obstruction and I did not feel that stents were warranted again.  He had some bleeding from the bladder neck I cauterized this and I was very happy with how open his prostatic urethra.  We left the catheter out he was awoken and taken to recovery in stable condition.      Antoni Melendez MD     Date: 2/7/2025  Time: 16:09 EST

## 2025-02-07 NOTE — PROGRESS NOTES
Subjective: Wife is at bedside this morning.  Spoke with nursing yesterday because he became agitated and also had a noninjury fall while in the bathroom alone.  He is frustrated that he is requiring assistance and is not allowed to be up by himself.  He did require a dose of Zyprexa yesterday.  Nursing notes report he slept on and off during the night.  Remains confused.  Currently talking nearly nonstop.  Wife notes hiccups started yesterday and have continued throughout the night    Objective:  Vitals:    02/06/25 0723 02/06/25 1317 02/06/25 1948 02/06/25 2343   BP: 121/71 110/67 107/62 125/71   BP Location: Left arm Left arm     Patient Position: Lying Lying     Pulse: 85 95 84 87   Resp: 16 16 16 16   Temp: 98 °F (36.7 °C) 98 °F (36.7 °C) 98.8 °F (37.1 °C) 99.1 °F (37.3 °C)   TempSrc: Oral Oral Oral Oral   SpO2: 96% 96%  97%   Weight:       Height:         Gen: Awake and alert, remains confused  Heart: Regular rate and rhythm  Lungs: Clear to auscultation; hiccups frequently  Abdomen: Soft nontender nondistended  Extremities: No edema    Recent Results (from the past 24 hours)   POC Glucose Once    Collection Time: 02/06/25 10:41 AM    Specimen: Blood   Result Value Ref Range    Glucose 275 (H) 70 - 130 mg/dL   POC Glucose Once    Collection Time: 02/06/25  3:41 PM    Specimen: Blood   Result Value Ref Range    Glucose 237 (H) 70 - 130 mg/dL   POC Glucose Once    Collection Time: 02/06/25  8:54 PM    Specimen: Blood   Result Value Ref Range    Glucose 254 (H) 70 - 130 mg/dL   POC Glucose Once    Collection Time: 02/07/25  1:17 AM    Specimen: Blood   Result Value Ref Range    Glucose 206 (H) 70 - 130 mg/dL   POC Glucose Once    Collection Time: 02/07/25  5:54 AM    Specimen: Blood   Result Value Ref Range    Glucose 185 (H) 70 - 130 mg/dL   Lipase    Collection Time: 02/07/25  6:58 AM    Specimen: Blood   Result Value Ref Range    Lipase 741 (H) 13 - 60 U/L   Comprehensive Metabolic Panel    Collection Time:  02/07/25  6:58 AM    Specimen: Blood   Result Value Ref Range    Glucose 184 (H) 65 - 99 mg/dL    BUN 76 (H) 8 - 23 mg/dL    Creatinine 4.09 (H) 0.76 - 1.27 mg/dL    Sodium 130 (L) 136 - 145 mmol/L    Potassium 3.9 3.5 - 5.2 mmol/L    Chloride 93 (L) 98 - 107 mmol/L    CO2 22.0 22.0 - 29.0 mmol/L    Calcium 8.3 (L) 8.6 - 10.5 mg/dL    Total Protein 6.7 6.0 - 8.5 g/dL    Albumin 3.1 (L) 3.5 - 5.2 g/dL    ALT (SGPT) 72 (H) 1 - 41 U/L    AST (SGOT) 58 (H) 1 - 40 U/L    Alkaline Phosphatase 353 (H) 39 - 117 U/L    Total Bilirubin 0.8 0.0 - 1.2 mg/dL    Globulin 3.6 gm/dL    A/G Ratio 0.9 g/dL    BUN/Creatinine Ratio 18.6 7.0 - 25.0    Anion Gap 15.0 5.0 - 15.0 mmol/L    eGFR 13.9 (L) >60.0 mL/min/1.73   Hemoglobin A1c    Collection Time: 02/07/25  6:58 AM    Specimen: Blood   Result Value Ref Range    Hemoglobin A1C 7.80 (H) 4.80 - 5.60 %   CBC Auto Differential    Collection Time: 02/07/25  6:58 AM    Specimen: Blood   Result Value Ref Range    WBC 8.47 3.40 - 10.80 10*3/mm3    RBC 3.29 (L) 4.14 - 5.80 10*6/mm3    Hemoglobin 9.4 (L) 13.0 - 17.7 g/dL    Hematocrit 27.9 (L) 37.5 - 51.0 %    MCV 84.8 79.0 - 97.0 fL    MCH 28.6 26.6 - 33.0 pg    MCHC 33.7 31.5 - 35.7 g/dL    RDW 14.5 12.3 - 15.4 %    RDW-SD 44.4 37.0 - 54.0 fl    MPV 9.2 6.0 - 12.0 fL    Platelets 229 140 - 450 10*3/mm3    Neutrophil % 76.0 42.7 - 76.0 %    Lymphocyte % 8.6 (L) 19.6 - 45.3 %    Monocyte % 8.5 5.0 - 12.0 %    Eosinophil % 6.1 0.3 - 6.2 %    Basophil % 0.1 0.0 - 1.5 %    Immature Grans % 0.7 (H) 0.0 - 0.5 %    Neutrophils, Absolute 6.43 1.70 - 7.00 10*3/mm3    Lymphocytes, Absolute 0.73 0.70 - 3.10 10*3/mm3    Monocytes, Absolute 0.72 0.10 - 0.90 10*3/mm3    Eosinophils, Absolute 0.52 (H) 0.00 - 0.40 10*3/mm3    Basophils, Absolute 0.01 0.00 - 0.20 10*3/mm3    Immature Grans, Absolute 0.06 (H) 0.00 - 0.05 10*3/mm3    nRBC 0.0 0.0 - 0.2 /100 WBC     Assessment and plan  1.  Acute renal failure-creatinine bumped up slightly overnight.   Nephrology continues to follow.  Etiology likely related to some hypovolemia, probable hypotensive episode on top of his underlying hypertensive nephrosclerosis.  2.  Pancreatitis-lipase continues to improve but alkaline phosphatase and liver enzymes have bumped up.  He remains nontender and tolerated a full liquid diet yesterday.  Currently is n.p.o. for ureteral stent exchange today.  Plan is for laparoscopic cholecystectomy when stable from nephrology standpoint.  Surgery continues to follow  3.  ID-blood cultures negative.  Urine cultures with mixed urogenital frank.  Going for stent exchange today.  To be able to DC Rocephin and follow white count and temperature curve  4.  Anemia-hemoglobin stable.  Multi factorial.  Back on oral iron  5.  Diabetes-blood sugar staying about 200.  Going to add some basal insulin for better control during hospitalization.  6.  Dementia-having some periods of agitation.  Has Zyprexa ordered as needed.  If starts having more trouble may need to add a daily dose during the evening time but would like to avoid.  Family plans to keep sitter at bedside to help control behaviors.  7.  Hiccups-possibly related to irritation of the diaphragm from gallbladder/pancreatitis.  May need to use gabapentin if persists. Baclofen looks to be contraindicated based on renal function.  8.  Continue DVT prophylaxis.

## 2025-02-07 NOTE — PAYOR COMM NOTE
"Froy Ngo (79 y.o. Male)      CONTINUED STAY CLINICALS:  REF#  [id number]     UR: [phone]     [facility]: NPI [id number] [facility]# [id number]     KEE BETANCOURT RN,CCP     Date of Birth   1945    Social Security Number       Address   [address] [place] [address]    Home Phone   [phone]    MRN   [id number]       Jehovah's witness   None    Marital Status   Single                            Admission Date   2/4/25    Admission Type   Emergency    Admitting Provider   Rtiesh Farrell MD    Attending Provider   Ritesh Farrell MD    Department, Room/Bed   [facility] MAIN OR, [facility] Main OR/MAIN OR       Discharge Date       Discharge Disposition       Discharge Destination                                 Attending Provider: Ritesh Farrell MD    Allergies: No Known Allergies    Isolation: None   Infection: None   Code Status: CPR    Ht: 182.9 cm (72\")   Wt: 88 kg (194 lb)    Admission Cmt: None   Principal Problem: ARF (acute renal failure) [N17.9]                   Active Insurance as of 2/4/2025       Primary Coverage       Payor Plan Insurance Group Employer/Plan Group    ANTHEM BLUE CROSS ANTHEM BLUE CROSS BLUE SHIELD PPO [id number]       Payor Plan Address Payor Plan Phone Number Payor Plan Fax Number Effective Dates    [address] [phone]  4/1/2019 - None Entered    [facility] [address]         Subscriber Name Subscriber Birth Date Member ID       FROY NGO 1945 [id number]               Secondary Coverage       Payor Plan Insurance Group Employer/Plan Group    MEDICARE MEDICARE A ONLY        Payor Plan Address Payor Plan Phone Number Payor Plan Fax Number Effective Dates    [address] [phone]  6/1/2009 - None Entered    [facility] [address]         Subscriber Name Subscriber Birth Date Member ID       FROY NGO 1945 [id number]                     Emergency Contacts        " (Rel.) Home Phone Work Phone Mobile Phone    ROBERT TURNER (Spouse) [phone] -- [phone]    robert braswell (Daughter) [phone] -- --    ROSMERY KINGSLEY (Friend) -- -- [phone]              Vital Signs (last day)       Date/Time Temp Temp src Pulse Resp BP Patient Position SpO2    02/07/25 1408 99.7 (37.6) Oral 106 23 -- Lying 96    02/07/25 1329 97.5 (36.4) Oral 88 16 115/70 Lying 99    02/07/25 0902 -- -- -- -- 128/76 Lying --    02/07/25 0810 98.1 (36.7) Oral 89 16 143/103 Lying 96    02/06/25 2343 99.1 (37.3) Oral 87 16 125/71 -- 97    02/06/25 1948 98.8 (37.1) Oral 84 16 107/62 -- --    02/06/25 1317 98 (36.7) Oral 95 16 110/67 Lying 96    02/06/25 0723 98 (36.7) Oral 85 16 121/71 Lying 96    02/06/25 0643 -- -- 89 -- 111/69 -- --    02/06/25 0002 98.7 (37.1) Oral 90 16 113/61 Lying 91          Oxygen Therapy (last day)       Date/Time SpO2 Device (Oxygen Therapy) Flow (L/min) (Oxygen Therapy) Oxygen Concentration (%) ETCO2 (mmHg)    02/07/25 1408 96 room air -- -- --    02/07/25 1329 99 room air -- -- --    02/07/25 0850 -- room air -- -- --    02/07/25 0810 96 room air -- -- --    02/06/25 2343 97 -- -- -- --    02/06/25 1317 96 room air -- -- --    02/06/25 0815 -- room air -- -- --    02/06/25 0723 96 -- -- -- --    02/06/25 0002 91 room air -- -- --          Intake & Output (last day)         02/06 0701 02/07 0700 02/07 0701 02/08 0700    P.O. 1010     I.V. (mL/kg) 692.5 (7.9)     Total Intake(mL/kg) 1702.5 (19.3)     Urine (mL/kg/hr) 2350 (1.1) 350 (0.4)    Total Output 2350 350    Net -647.5 -350          Urine Unmeasured Occurrence  2 x          Lines, Drains & Airways       Active LDAs       Name Placement date Placement time Site Days    Peripheral IV 02/04/25 1344 Right Antecubital 02/04/25  1344  Antecubital  3                  Current Facility-Administered Medications   Medication Dose Route Frequency Provider Last Rate Last Admin    [Transfer Hold] acetaminophen (TYLENOL)  tablet 650 mg  650 mg Oral Q6H PRN Ritesh Farrell MD   650 mg at 02/06/25 2030    [Transfer Hold] atorvastatin (LIPITOR) tablet 10 mg  10 mg Oral Daily Ritesh Farrell MD   10 mg at 02/06/25 0816    Atropine Sulfate (PF) injection 0.4 mg  0.4 mg Intravenous Once PRN Donna Reeder CRNA        [Transfer Hold] sennosides-docusate (PERICOLACE) 8.6-50 MG per tablet 2 tablet  2 tablet Oral BID PRN Ritesh aFrrell MD        And    [Transfer Hold] polyethylene glycol (MIRALAX) packet 17 g  17 g Oral Daily PRN Ritesh Farrell MD        And    [Transfer Hold] bisacodyl (DULCOLAX) EC tablet 5 mg  5 mg Oral Daily PRN Ritesh Farrell MD        And    [Transfer Hold] bisacodyl (DULCOLAX) suppository 10 mg  10 mg Rectal Daily PRN Ritesh Farrell MD        [Transfer Hold] dextrose (D50W) (25 g/50 mL) IV injection 25 g  25 g Intravenous Q15 Min PRN Ritesh Farrell MD        [Transfer Hold] dextrose (GLUTOSE) oral gel 15 g  15 g Oral Q15 Min PRN Ritesh Farrell MD        diphenhydrAMINE (BENADRYL) injection 12.5 mg  12.5 mg Intravenous Q15 Min PRN Donna Reeder CRNA        [Transfer Hold] donepezil (ARICEPT) tablet 10 mg  10 mg Oral Nightly Ritesh Farrell MD   10 mg at 02/06/25 2030    ePHEDrine injection 5 mg  5 mg Intravenous Once PRN Donna Reeder CRNA        fentaNYL citrate (PF) (SUBLIMAZE) injection 25 mcg  25 mcg Intravenous Q5 Min PRN Donna Reeder CRNA        fentaNYL citrate (PF) (SUBLIMAZE) injection 50 mcg  50 mcg Intravenous Once PRN David Paulson MD        [Transfer Hold] ferrous sulfate tablet 325 mg  325 mg Oral Daily With Breakfast Ritesh Farrell MD        [Transfer Hold] finasteride (PROSCAR) tablet 5 mg  5 mg Oral Daily Ritesh Farrell MD   5 mg at 02/06/25 0816    flumazenil (ROMAZICON) injection 0.2 mg  0.2 mg Intravenous PRN Donna Reeder CRNA        [Transfer Hold] glucagon (GLUCAGEN)  injection 1 mg  1 mg Intramuscular Q15 Min PRN Ritesh Farrell MD        [Transfer Hold] heparin (porcine) 5000 UNIT/ML injection 5,000 Units  5,000 Units Subcutaneous Q12H Ritesh Farrell MD   5,000 Units at 02/06/25 2031    hydrALAZINE (APRESOLINE) injection 5 mg  5 mg Intravenous Q10 Min PRN Donna Reeder CRNA        HYDROcodone-acetaminophen (NORCO) 5-325 MG per tablet 1 tablet  1 tablet Oral Once PRN Donna Reeder CRNA        HYDROcodone-acetaminophen (NORCO) 7.5-325 MG per tablet 1 tablet  1 tablet Oral Q4H PRN Donna Reeder CRNA        HYDROmorphone (DILAUDID) injection 0.25 mg  0.25 mg Intravenous Q5 Min PRN Donna Reeder CRNA        [Transfer Hold] insulin glargine (LANTUS, SEMGLEE) injection 10 Units  10 Units Subcutaneous Nightly Ritesh Farrell MD        [Transfer Hold] insulin regular (humuLIN R,novoLIN R) injection 2-7 Units  2-7 Units Subcutaneous Q6H Ritesh Farrell MD   3 Units at 02/07/25 1144    ipratropium-albuterol (DUO-NEB) nebulizer solution 3 mL  3 mL Nebulization Once PRN Donna Reeder CRNA        labetalol (NORMODYNE,TRANDATE) injection 5 mg  5 mg Intravenous Q5 Min PRN Donna Reeder CRNA        lactated ringers infusion  9 mL/hr Intravenous Continuous David Paulson MD        lidocaine (XYLOCAINE) 1 % injection 0.5 mL  0.5 mL Intradermal Once PRN David Paulson MD        [Transfer Hold] memantine (NAMENDA) tablet 5 mg  5 mg Oral Daily Ritesh Farrell MD   5 mg at 02/06/25 0816    naloxone (NARCAN) injection 0.2 mg  0.2 mg Intravenous PRN Donna Reeder CRNA        [Transfer Hold] nitroglycerin (NITROSTAT) SL tablet 0.4 mg  0.4 mg Sublingual Q5 Min PRN Ritesh Farrell, MD        [Transfer Hold] OLANZapine (zyPREXA) injection 5 mg  5 mg Intramuscular Q8H PRN Ritesh Farrell MD        [Transfer Hold] OLANZapine (zyPREXA) tablet 5 mg  5 mg Oral BID  PRN Ritesh Farrell MD   5 mg at 02/07/25 0118    [Transfer Hold] ondansetron ODT (ZOFRAN-ODT) disintegrating tablet 4 mg  4 mg Oral Q6H PRN Ritesh Farrell MD        Or    [Transfer Hold] ondansetron (ZOFRAN) injection 4 mg  4 mg Intravenous Q6H PRN Ritesh Farrell MD        ondansetron (ZOFRAN) injection 4 mg  4 mg Intravenous Once PRN Donna Reeder CRNA        promethazine (PHENERGAN) suppository 25 mg  25 mg Rectal Once PRN Donna Reeder CRNA        Or    promethazine (PHENERGAN) tablet 25 mg  25 mg Oral Once PRN Donna Reeder CRNA        [Transfer Hold] sodium chloride 0.9 % flush 10 mL  10 mL Intravenous Q12H Ritesh Farrell MD   10 mL at 02/07/25 0900    [Transfer Hold] sodium chloride 0.9 % flush 10 mL  10 mL Intravenous PRN Ritesh Farrell MD        sodium chloride 0.9 % flush 3 mL  3 mL Intravenous Q12H David Paulson MD        sodium chloride 0.9 % flush 3-10 mL  3-10 mL Intravenous PRN David Paulson MD        [Transfer Hold] sodium chloride 0.9 % infusion 40 mL  40 mL Intravenous PRN Ritesh Farrell MD        [Transfer Hold] tamsulosin (FLOMAX) 24 hr capsule 0.4 mg  0.4 mg Oral Daily Ritesh Farrell MD   0.4 mg at 02/06/25 0816    [Transfer Hold] torsemide (DEMADEX) tablet 40 mg  40 mg Oral Daily Tad Benoit MD   40 mg at 02/06/25 0819     Facility-Administered Medications Ordered in Other Encounters   Medication Dose Route Frequency Provider Last Rate Last Admin    dexAMETHasone sodium phosphate injection   Intravenous PRN Donna Reeder CRNA   8 mg at 02/07/25 1541    lactated ringers infusion   Intravenous Continuous PRN Donna Reeder CRNA   New Bag at 02/07/25 1536    lidocaine (XYLOCAINE) 2% injection   Infiltration PRN Donna Reeder CRNA   100 mg at 02/07/25 1537    ondansetron (ZOFRAN) injection   Intravenous PRN Inge-Donna Santos CRNA   4 mg at  02/07/25 1545    Phenylephrine HCl-NaCl 100 mcg/ml injection   Intravenous PRN Donna Reeder, CRNA   100 mcg at 02/07/25 1608    Propofol (DIPRIVAN) injection   Intravenous PRN Donna Reeder, CRNA   150 mg at 02/07/25 1537     Lab Results (last 24 hours)       Procedure Component Value Units Date/Time    Blood Culture - Blood, Arm, Left [[id number]]  (Normal) Collected: 02/04/25 1547    Specimen: Blood from Arm, Left Updated: 02/07/25 1600     Blood Culture No growth at 3 days    Blood Culture - Blood, Arm, Right [[id number]]  (Normal) Collected: 02/04/25 1548    Specimen: Blood from Arm, Right Updated: 02/07/25 1600     Blood Culture No growth at 3 days    POC Glucose Once [[id number]]  (Abnormal) Collected: 02/07/25 1110    Specimen: Blood Updated: 02/07/25 1112     Glucose 232 mg/dL     Lipase [[id number]]  (Abnormal) Collected: 02/07/25 0658    Specimen: Blood Updated: 02/07/25 0800     Lipase 741 U/L     Comprehensive Metabolic Panel [[id number]]  (Abnormal) Collected: 02/07/25 0658    Specimen: Blood Updated: 02/07/25 0755     Glucose 184 mg/dL      BUN 76 mg/dL      Creatinine 4.09 mg/dL      Sodium 130 mmol/L      Potassium 3.9 mmol/L      Chloride 93 mmol/L      CO2 22.0 mmol/L      Calcium 8.3 mg/dL      Total Protein 6.7 g/dL      Albumin 3.1 g/dL      ALT (SGPT) 72 U/L      AST (SGOT) 58 U/L      Alkaline Phosphatase 353 U/L      Total Bilirubin 0.8 mg/dL      Globulin 3.6 gm/dL      A/G Ratio 0.9 g/dL      BUN/Creatinine Ratio 18.6     Anion Gap 15.0 mmol/L      eGFR 13.9 mL/min/1.73     Narrative:      GFR Categories in Chronic Kidney Disease (CKD)      GFR Category          GFR (mL/min/1.73)    Interpretation  G1                     90 or greater         Normal or high (1)  G2                      60-89                Mild decrease (1)  G3a                   45-59                Mild to moderate decrease  G3b                   30-44                Moderate to severe  decrease  G4                    15-29                Severe decrease  G5                    14 or less           Kidney failure          (1)In the absence of evidence of kidney disease, neither GFR category G1 or G2 fulfill the criteria for CKD.    eGFR calculation 2021 CKD-EPI creatinine equation, which does not include race as a factor    Hemoglobin A1c [[id number]]  (Abnormal) Collected: 02/07/25 0658    Specimen: Blood Updated: 02/07/25 0747     Hemoglobin A1C 7.80 %     Narrative:      Hemoglobin A1C Ranges:    Increased Risk for Diabetes  5.7% to 6.4%  Diabetes                     >= 6.5%  Diabetic Goal                < 7.0%    CBC & Differential [[id number]]  (Abnormal) Collected: 02/07/25 0658    Specimen: Blood Updated: 02/07/25 0732    Narrative:      The following orders were created for panel order CBC & Differential.  Procedure                               Abnormality         Status                     ---------                               -----------         ------                     CBC Auto Differential[[id number]]        Abnormal            Final result                 Please view results for these tests on the individual orders.    CBC Auto Differential [[id number]]  (Abnormal) Collected: 02/07/25 0658    Specimen: Blood Updated: 02/07/25 0732     WBC 8.47 10*3/mm3      RBC 3.29 10*6/mm3      Hemoglobin 9.4 g/dL      Hematocrit 27.9 %      MCV 84.8 fL      MCH 28.6 pg      MCHC 33.7 g/dL      RDW 14.5 %      RDW-SD 44.4 fl      MPV 9.2 fL      Platelets 229 10*3/mm3      Neutrophil % 76.0 %      Lymphocyte % 8.6 %      Monocyte % 8.5 %      Eosinophil % 6.1 %      Basophil % 0.1 %      Immature Grans % 0.7 %      Neutrophils, Absolute 6.43 10*3/mm3      Lymphocytes, Absolute 0.73 10*3/mm3      Monocytes, Absolute 0.72 10*3/mm3      Eosinophils, Absolute 0.52 10*3/mm3      Basophils, Absolute 0.01 10*3/mm3      Immature Grans, Absolute 0.06 10*3/mm3      nRBC 0.0 /100 WBC     POC Glucose Once  [[id number]]  (Abnormal) Collected: 02/07/25 0554    Specimen: Blood Updated: 02/07/25 0556     Glucose 185 mg/dL     POC Glucose Once [[id number]]  (Abnormal) Collected: 02/07/25 0117    Specimen: Blood Updated: 02/07/25 0118     Glucose 206 mg/dL     POC Glucose Once [[id number]]  (Abnormal) Collected: 02/06/25 2054    Specimen: Blood Updated: 02/06/25 2101     Glucose 254 mg/dL           Imaging Results (Last 24 Hours)       Procedure Component Value Units Date/Time    FL Retrograde Pyelogram In OR [[id number]] Resulted: 02/07/25 1605     Updated: 02/07/25 1606          ECG/EMG Results (last 24 hours)       Procedure Component Value Units Date/Time    Telemetry Scan [[id number]] Resulted: 02/04/25     Updated: 02/07/25 0035    Telemetry Scan [[id number]] Resulted: 02/04/25     Updated: 02/07/25 0640             Operative/Procedure Notes (last 24 hours)        Antoni Melendez MD at 02/07/25 1548              CYSTOSCOPY RETROGRADE PYELOGRAM AND STENT INSERTION  Procedure Note    Froy Ngo  2/7/2025    Pre-op Diagnosis:   Bilateral hydronephrosis    Post-op Diagnosis:     Post-Op Diagnosis Codes:     * Bilateral hydronephrosis [N13.30]    Procedure(s):  CYSTOSCOPY RETROGRADE PYELOGRAM AND STENT REMOVAL    Surgeon(s):  Antoni Melendez MD    Anesthesia: General    Staff:   Circulator: Kiley López RN  Scrub Person: Enma Farah    Estimated Blood Loss: minimal    Specimens:                * No orders in the log *      Drains:   [REMOVED] Continuous Bladder Irrigation Triple-lumen 22 Fr (Removed)       Findings: Bilateral hydronephrosis markedly improved.  Drainage slow but no clear indication of any obstruction.  Stents left out.    Complications: None apparent    Indications: 79-year-old gentleman with bladder outlet obstruction bilateral hydronephrosis with acute renal insufficiency has bilateral indwelling stents and recently underwent prostatic arterial embolization he is now voiding and his  residuals have been acceptable he is here with a recent UTI.  Plan for stent removal possible stent replacement while on antibiotics.      Procedure: Patient was taken the operative suite given general endotracheal anesthesia.  Placed lithotomy position.  Prepped and draped in a sterile fashion.  Surgical timeout was performed.  Cystoscope was inserted.  The bladder slowly visualized.  Both stents were visualized and removed.  His prostatic urethra was open.  His bladder neck was open very impressive results from his PAE.  Both stents were seen removed.  No calcifications were noted.  Bilateral retrograde pyelograms were performed with open-ended ureteral catheter and half-strength contrast.  Both left and right retrograde showed a very straight normal caliber ureter just mildly dilated from the stent but the pelvis on each side was in normal size with no significant hydro and no significant blunting of the calyces.  Eyewashes for short period of time it seemed to be draining and no clear indication of any obstruction and I did not feel that stents were warranted again.  He had some bleeding from the bladder neck I cauterized this and I was very happy with how open his prostatic urethra.  We left the catheter out he was awoken and taken to recovery in stable condition.      Antoni Melendez MD     Date: 2/7/2025  Time: 16:09 EST    Electronically signed by Antoni Melendez MD at 02/07/25 1611          Physician Progress Notes (last 24 hours)        Antoni Melendez MD at 02/07/25 1517              [facility] Progress Note    02/07/25 15:17 EST        Talk to Mr. Easton this afternoon.  Plan is to perform cystoscopy under some anesthesia and remove his stents.  Bilateral retrograde pyelograms to assess his hydronephrosis and see if we can potentially leave the stent out.  I told him there is a very high likelihood that he will have to continue with stents for the time being.    Electronically  signed by Antoni Melendez MD at 25 1517       Tad Benoit MD at 25 1134              [facility] Progress Note      Patient Name: Froy Ngo  : 1945  MRN: [id number]  Primary Care Physician:  Ryan Gutierrez MD  Date of admission: 2025    Subjective     Interval History:   Follow-up NEHEMIAS on CKD    Denies chest pain, SOA, N/V/D  UOP yesterday 2.35 L on torsemide 40  Planning on ureteral stents exchanged today    Review of Systems:   As noted above    Objective     Vitals:   Temp:  [97.5 °F (36.4 °C)-99.7 °F (37.6 °C)] 99.7 °F (37.6 °C)  Heart Rate:  [] 106  Resp:  [16-23] 23  BP: (107-143)/() 115/70    Intake/Output Summary (Last 24 hours) at 2025 1520  Last data filed at 2025 1116  Gross per 24 hour   Intake 720 ml   Output 2250 ml   Net -1530 ml       Physical Exam:    General Appearance: Awake, pleasant, chronically ill, MO, no acute distress   Skin: warm and dry  HEENT: oral mucosa normal, nonicteric sclera  Neck: supple, no JVD  Lungs: CTA, nonlabored on RA  Heart: RRR, no rub, no murmur, no carotid bruits  Abdomen: soft, nontender, nondistended, BS +  : no palpable bladder  Extremities: no edema, cyanosis or clubbing  Neuro: Rambling speech, moves all extremities    Scheduled Meds:     [Transfer Hold] atorvastatin, 10 mg, Oral, Daily  [Transfer Hold] donepezil, 10 mg, Oral, Nightly  [Transfer Hold] ferrous sulfate, 325 mg, Oral, Daily With Breakfast  [Transfer Hold] finasteride, 5 mg, Oral, Daily  [Transfer Hold] heparin (porcine), 5,000 Units, Subcutaneous, Q12H  [Transfer Hold] insulin glargine, 10 Units, Subcutaneous, Nightly  [Transfer Hold] insulin regular, 2-7 Units, Subcutaneous, Q6H  [Transfer Hold] memantine, 5 mg, Oral, Daily  [Transfer Hold] sodium chloride, 10 mL, Intravenous, Q12H  sodium chloride, 3 mL, Intravenous, Q12H  [Transfer Hold] tamsulosin, 0.4 mg, Oral, Daily  [Transfer Hold] torsemide, 40 mg,  Oral, Daily      IV Meds:   lactated ringers, 9 mL/hr        Results Reviewed:   I have personally reviewed the results from the time of this admission to 2/7/2025 15:20 EST     Results from last 7 days   Lab Units 02/07/25  0658 02/06/25  0554 02/05/25  0555   SODIUM mmol/L 130* 130* 133*   POTASSIUM mmol/L 3.9 4.2 4.1   CHLORIDE mmol/L 93* 96* 98   CO2 mmol/L 22.0 19.2* 21.6*   BUN mg/dL 76* 73* 80*   CREATININE mg/dL 4.09* 3.99* 4.76*   CALCIUM mg/dL 8.3* 8.4* 8.4*   BILIRUBIN mg/dL 0.8 0.7 0.5   ALK PHOS U/L 353* 232* 184*   ALT (SGPT) U/L 72* 55* 54*   AST (SGOT) U/L 58* 42* 34   GLUCOSE mg/dL 184* 164* 226*       Estimated Creatinine Clearance: 17.6 mL/min (A) (by C-G formula based on SCr of 4.09 mg/dL (H)).    Results from last 7 days   Lab Units 02/06/25  0554 02/05/25  0555 02/04/25  1202   MAGNESIUM mg/dL 2.1 2.0 2.0   PHOSPHORUS mg/dL  --  4.1 3.8       Results from last 7 days   Lab Units 02/05/25  0555   URIC ACID mg/dL 9.0*       Results from last 7 days   Lab Units 02/07/25  0658 02/06/25  0554 02/05/25  0555 02/04/25  1202   WBC 10*3/mm3 8.47 10.20 9.53 15.48*   HEMOGLOBIN g/dL 9.4* 9.4* 8.9* 11.0*   PLATELETS 10*3/mm3 229 196 162 262             Assessment / Plan     ASSESSMENT:   NEHEMIAS on CKD 3B, baseline Scr 1.7-2.0, secondary to prior AKIs d/t postobstructive uropathy, and longstanding hypertensive/diabetic nephrosclerosis.  Acute etiology is likely multifactorial: UTI followed by  hypotension limiting renal perfusion. CT abdomen/pelvis did not reveal hydro; euvolemic  Acute on chronic hyponatremia, likely multifactorial: excessive PO fluid intake, poor solute intake, and NEHEMIAS on CKD, improved  Hx post obstructive uropathy, s/p bilateral ureteral stent placement 8/30/2024, s/p prostate resection 12/13/2024, Rodriguez was removed 3 to 4 weeks ago per family report. CT abd/pel suggested enlarged prostate with possible cystitis / Prostatitis  due to recurrent UTI ?;  Urology planning on ureter stent change  today (2/7)   UTI, failed outpatient management, on IV Rocephin, urine cultures negative.  Possible prostatitis due to recurrent antibiotics  Type II DM with CKD, managed by primary team  Hypotension, will hold amlodipin and hydralazine. Cortisol did not suggest adrenal insufficiency   Transaminases from hypotension, managed by primary team  Dementia, on namenda at home  Pancreatitis/elevated lipase, evaluated by general surgery, planning on laparoscopic cholecystectomy prior to discharge (no sooner than Sunday)    PLAN:  Ureter stent exchange noted today  Resume torsemide after procedure  Surveillance labs    Thank you for involving us in the care of Froy Ngo.  Please feel free to call with any questions.    Tad Benoit MD  02/07/25  15:20 [facility]    [facility]  [phone]    Please note that portions of this note were completed with a voice recognition program.    Electronically signed by Tad Benoit MD at 02/07/25 1520       Vimal Bryant MD at 02/07/25 0843          Colorectal & General Surgery  Progress Note    Patient: Froy Ngo  YOB: 1945  MRN: [id number]      Assessment  Froy Ngo is a 79 y.o. male with likely gallstone pancreatitis.  Will plan for laparoscopic cholecystectomy prior to discharge.  For now, recommend optimization of his renal function, plans for stent exchange noted today.    After his procedure today, okay for regular diet from my standpoint.    I will continue to follow but will plan for laparoscopic cholecystectomy no sooner than Sunday at this point.    Subjective  No significant events.  More confused this morning.    Objective    Vitals:    02/07/25 0810   BP: (!) 143/103   Pulse: 89   Resp: 16   Temp: 98.1 °F (36.7 °C)   SpO2: 96%       Physical Exam  Constitutional: Well-developed well-nourished, no acute distress  Neck: Supple, trachea midline  Respiratory: No increased work of breathing,  Symmetric excursion  Cardiovascular: Well pefursed, no jugular venous distention evident   Abdominal: Soft, non-tender, non-distended  Skin: Warm, dry, no rash on visualized skin surfaces  Psychiatric: Alert and oriented ×3, normal affect,     Laboratory Results  I have personally reviewed CBC with Mohit Kruegeryl 9, platelet 29.  Lipase 741.  Hemoglobin A1c 7.8.  C-peptide 4.09.  AST 58, ALT 72, total bilirubin 0.8.    Radiology  None to review         Finesse Bryant MD  Colorectal & General Surgery  [facility]    [address], [address]  [place], [place], [address]  P: [phone]  F: [phone]       Electronically signed by Vimal Bryant MD at 02/07/25 0846       Ritesh Farrell MD at 02/07/25 0808          Subjective: Wife is at bedside this morning.  Spoke with nursing yesterday because he became agitated and also had a noninjury fall while in the bathroom alone.  He is frustrated that he is requiring assistance and is not allowed to be up by himself.  He did require a dose of Zyprexa yesterday.  Nursing notes report he slept on and off during the night.  Remains confused.  Currently talking nearly nonstop.  Wife notes hiccups started yesterday and have continued throughout the night    Objective:  Vitals:    02/06/25 0723 02/06/25 1317 02/06/25 1948 02/06/25 2343   BP: 121/71 110/67 107/62 125/71   BP Location: Left arm Left arm     Patient Position: Lying Lying     Pulse: 85 95 84 87   Resp: 16 16 16 16   Temp: 98 °F (36.7 °C) 98 °F (36.7 °C) 98.8 °F (37.1 °C) 99.1 °F (37.3 °C)   TempSrc: Oral Oral Oral Oral   SpO2: 96% 96%  97%   Weight:       Height:         Gen: Awake and alert, remains confused  Heart: Regular rate and rhythm  Lungs: Clear to auscultation; hiccups frequently  Abdomen: Soft nontender nondistended  Extremities: No edema    Recent Results (from the past 24 hours)   POC Glucose Once    Collection Time: 02/06/25 10:41 AM    Specimen: Blood   Result Value Ref  Range    Glucose 275 (H) 70 - 130 mg/dL   POC Glucose Once    Collection Time: 02/06/25  3:41 PM    Specimen: Blood   Result Value Ref Range    Glucose 237 (H) 70 - 130 mg/dL   POC Glucose Once    Collection Time: 02/06/25  8:54 PM    Specimen: Blood   Result Value Ref Range    Glucose 254 (H) 70 - 130 mg/dL   POC Glucose Once    Collection Time: 02/07/25  1:17 AM    Specimen: Blood   Result Value Ref Range    Glucose 206 (H) 70 - 130 mg/dL   POC Glucose Once    Collection Time: 02/07/25  5:54 AM    Specimen: Blood   Result Value Ref Range    Glucose 185 (H) 70 - 130 mg/dL   Lipase    Collection Time: 02/07/25  6:58 AM    Specimen: Blood   Result Value Ref Range    Lipase 741 (H) 13 - 60 U/L   Comprehensive Metabolic Panel    Collection Time: 02/07/25  6:58 AM    Specimen: Blood   Result Value Ref Range    Glucose 184 (H) 65 - 99 mg/dL    BUN 76 (H) 8 - 23 mg/dL    Creatinine 4.09 (H) 0.76 - 1.27 mg/dL    Sodium 130 (L) 136 - 145 mmol/L    Potassium 3.9 3.5 - 5.2 mmol/L    Chloride 93 (L) 98 - 107 mmol/L    CO2 22.0 22.0 - 29.0 mmol/L    Calcium 8.3 (L) 8.6 - 10.5 mg/dL    Total Protein 6.7 6.0 - 8.5 g/dL    Albumin 3.1 (L) 3.5 - 5.2 g/dL    ALT (SGPT) 72 (H) 1 - 41 U/L    AST (SGOT) 58 (H) 1 - 40 U/L    Alkaline Phosphatase 353 (H) 39 - 117 U/L    Total Bilirubin 0.8 0.0 - 1.2 mg/dL    Globulin 3.6 gm/dL    A/G Ratio 0.9 g/dL    BUN/Creatinine Ratio 18.6 7.0 - 25.0    Anion Gap 15.0 5.0 - 15.0 mmol/L    eGFR 13.9 (L) >60.0 mL/min/1.73   Hemoglobin A1c    Collection Time: 02/07/25  6:58 AM    Specimen: Blood   Result Value Ref Range    Hemoglobin A1C 7.80 (H) 4.80 - 5.60 %   CBC Auto Differential    Collection Time: 02/07/25  6:58 AM    Specimen: Blood   Result Value Ref Range    WBC 8.47 3.40 - 10.80 10*3/mm3    RBC 3.29 (L) 4.14 - 5.80 10*6/mm3    Hemoglobin 9.4 (L) 13.0 - 17.7 g/dL    Hematocrit 27.9 (L) 37.5 - 51.0 %    MCV 84.8 79.0 - 97.0 fL    MCH 28.6 26.6 - 33.0 pg    MCHC 33.7 31.5 - 35.7 g/dL    RDW 14.5  12.3 - 15.4 %    RDW-SD 44.4 37.0 - 54.0 fl    MPV 9.2 6.0 - 12.0 fL    Platelets 229 140 - 450 10*3/mm3    Neutrophil % 76.0 42.7 - 76.0 %    Lymphocyte % 8.6 (L) 19.6 - 45.3 %    Monocyte % 8.5 5.0 - 12.0 %    Eosinophil % 6.1 0.3 - 6.2 %    Basophil % 0.1 0.0 - 1.5 %    Immature Grans % 0.7 (H) 0.0 - 0.5 %    Neutrophils, Absolute 6.43 1.70 - 7.00 10*3/mm3    Lymphocytes, Absolute 0.73 0.70 - 3.10 10*3/mm3    Monocytes, Absolute 0.72 0.10 - 0.90 10*3/mm3    Eosinophils, Absolute 0.52 (H) 0.00 - 0.40 10*3/mm3    Basophils, Absolute 0.01 0.00 - 0.20 10*3/mm3    Immature Grans, Absolute 0.06 (H) 0.00 - 0.05 10*3/mm3    nRBC 0.0 0.0 - 0.2 /100 WBC     Assessment and plan  1.  Acute renal failure-creatinine bumped up slightly overnight.  Nephrology continues to follow.  Etiology likely related to some hypovolemia, probable hypotensive episode on top of his underlying hypertensive nephrosclerosis.  2.  Pancreatitis-lipase continues to improve but alkaline phosphatase and liver enzymes have bumped up.  He remains nontender and tolerated a full liquid diet yesterday.  Currently is n.p.o. for ureteral stent exchange today.  Plan is for laparoscopic cholecystectomy when stable from nephrology standpoint.  Surgery continues to follow  3.  ID-blood cultures negative.  Urine cultures with mixed urogenital frank.  Going for stent exchange today.  To be able to DC Rocephin and follow white count and temperature curve  4.  Anemia-hemoglobin stable.  Multi factorial.  Back on oral iron  5.  Diabetes-blood sugar staying about 200.  Going to add some basal insulin for better control during hospitalization.  6.  Dementia-having some periods of agitation.  Has Zyprexa ordered as needed.  If starts having more trouble may need to add a daily dose during the evening time but would like to avoid.  Family plans to keep sitter at bedside to help control behaviors.  7.  Hiccups-possibly related to irritation of the diaphragm from  gallbladder/pancreatitis.  May need to use gabapentin if persists. Baclofen looks to be contraindicated based on renal function.  8.  Continue DVT prophylaxis.        Electronically signed by Ritesh Farrell MD at 02/07/25 0824       Consult Notes (last 24 hours)  Notes from 02/06/25 1618 through 02/07/25 1618   No notes of this type exist for this encounter.

## 2025-02-07 NOTE — ANESTHESIA POSTPROCEDURE EVALUATION
Patient: Froy Ngo    Procedure Summary       Date: 02/07/25 Room / Location: [facility] OR 01 / [facility] MAIN OR    Anesthesia Start: 1536 Anesthesia Stop: 1621    Procedure: CYSTOSCOPY RETROGRADE PYELOGRAM AND STENT REMOVAL (Bilateral) Diagnosis:       Bilateral hydronephrosis      (Bilateral hydronephrosis)    Surgeons: Antoni Melendez MD Provider: Daron Aguirre MD    Anesthesia Type: general ASA Status: 3            Anesthesia Type: general    Vitals  Vitals Value Taken Time   /68 02/07/25 1700   Temp 37.6 °C (99.6 °F) 02/07/25 1617   Pulse 88 02/07/25 1701   Resp 15 02/07/25 1617   SpO2 100 % 02/07/25 1701   Vitals shown include unfiled device data.        Post Anesthesia Care and Evaluation    Patient location during evaluation: PACU  Patient participation: complete - patient participated  Level of consciousness: awake  Pain management: adequate    Airway patency: patent  Anesthetic complications: No anesthetic complications  PONV Status: none  Cardiovascular status: acceptable  Respiratory status: acceptable  Hydration status: acceptable

## 2025-02-07 NOTE — PROGRESS NOTES
[facility] Progress [facility]    02/07/25 15:17 EST        Talk to Mr. Easton this afternoon.  Plan is to perform cystoscopy under some anesthesia and remove his stents.  Bilateral retrograde pyelograms to assess his hydronephrosis and see if we can potentially leave the stent out.  I told him there is a very high likelihood that he will have to continue with stents for the time being.

## 2025-02-07 NOTE — PROGRESS NOTES
[facility] Progress Note      Patient Name: Froy Ngo  : 1945  MRN: [id number]  Primary Care Physician:  Ryan Gutierrez MD  Date of admission: 2025    Subjective     Interval History:   Follow-up NEHEMIAS on CKD    Denies chest pain, SOA, N/V/D  UOP yesterday 2.35 L on torsemide 40  Planning on ureteral stents exchanged today    Review of Systems:   As noted above    Objective     Vitals:   Temp:  [97.5 °F (36.4 °C)-99.7 °F (37.6 °C)] 99.7 °F (37.6 °C)  Heart Rate:  [] 106  Resp:  [16-23] 23  BP: (107-143)/() 115/70    Intake/Output Summary (Last 24 hours) at 2025 1520  Last data filed at 2025 1116  Gross per 24 hour   Intake 720 ml   Output 2250 ml   Net -1530 ml       Physical Exam:    General Appearance: Awake, pleasant, chronically ill, MO, no acute distress   Skin: warm and dry  HEENT: oral mucosa normal, nonicteric sclera  Neck: supple, no JVD  Lungs: CTA, nonlabored on RA  Heart: RRR, no rub, no murmur, no carotid bruits  Abdomen: soft, nontender, nondistended, BS +  : no palpable bladder  Extremities: no edema, cyanosis or clubbing  Neuro: Rambling speech, moves all extremities    Scheduled Meds:     [Transfer Hold] atorvastatin, 10 mg, Oral, Daily  [Transfer Hold] donepezil, 10 mg, Oral, Nightly  [Transfer Hold] ferrous sulfate, 325 mg, Oral, Daily With Breakfast  [Transfer Hold] finasteride, 5 mg, Oral, Daily  [Transfer Hold] heparin (porcine), 5,000 Units, Subcutaneous, Q12H  [Transfer Hold] insulin glargine, 10 Units, Subcutaneous, Nightly  [Transfer Hold] insulin regular, 2-7 Units, Subcutaneous, Q6H  [Transfer Hold] memantine, 5 mg, Oral, Daily  [Transfer Hold] sodium chloride, 10 mL, Intravenous, Q12H  sodium chloride, 3 mL, Intravenous, Q12H  [Transfer Hold] tamsulosin, 0.4 mg, Oral, Daily  [Transfer Hold] torsemide, 40 mg, Oral, Daily      IV Meds:   lactated ringers, 9 mL/hr        Results Reviewed:   I have personally reviewed  the results from the time of this admission to 2/7/2025 15:20 EST     Results from last 7 days   Lab Units 02/07/25  0658 02/06/25  0554 02/05/25  0555   SODIUM mmol/L 130* 130* 133*   POTASSIUM mmol/L 3.9 4.2 4.1   CHLORIDE mmol/L 93* 96* 98   CO2 mmol/L 22.0 19.2* 21.6*   BUN mg/dL 76* 73* 80*   CREATININE mg/dL 4.09* 3.99* 4.76*   CALCIUM mg/dL 8.3* 8.4* 8.4*   BILIRUBIN mg/dL 0.8 0.7 0.5   ALK PHOS U/L 353* 232* 184*   ALT (SGPT) U/L 72* 55* 54*   AST (SGOT) U/L 58* 42* 34   GLUCOSE mg/dL 184* 164* 226*       Estimated Creatinine Clearance: 17.6 mL/min (A) (by C-G formula based on SCr of 4.09 mg/dL (H)).    Results from last 7 days   Lab Units 02/06/25  0554 02/05/25  0555 02/04/25  1202   MAGNESIUM mg/dL 2.1 2.0 2.0   PHOSPHORUS mg/dL  --  4.1 3.8       Results from last 7 days   Lab Units 02/05/25  0555   URIC ACID mg/dL 9.0*       Results from last 7 days   Lab Units 02/07/25  0658 02/06/25  0554 02/05/25  0555 02/04/25  1202   WBC 10*3/mm3 8.47 10.20 9.53 15.48*   HEMOGLOBIN g/dL 9.4* 9.4* 8.9* 11.0*   PLATELETS 10*3/mm3 229 196 162 262             Assessment / Plan     ASSESSMENT:   NEHEMIAS on CKD 3B, baseline Scr 1.7-2.0, secondary to prior AKIs d/t postobstructive uropathy, and longstanding hypertensive/diabetic nephrosclerosis.  Acute etiology is likely multifactorial: UTI followed by  hypotension limiting renal perfusion. CT abdomen/pelvis did not reveal hydro; euvolemic  Acute on chronic hyponatremia, likely multifactorial: excessive PO fluid intake, poor solute intake, and NEHEMIAS on CKD, improved  Hx post obstructive uropathy, s/p bilateral ureteral stent placement 8/30/2024, s/p prostate resection 12/13/2024, Rodriguez was removed 3 to 4 weeks ago per family report. CT abd/pel suggested enlarged prostate with possible cystitis / Prostatitis  due to recurrent UTI ?;  Urology planning on ureter stent change today (2/7)   UTI, failed outpatient management, on IV Rocephin, urine cultures negative.  Possible  prostatitis due to recurrent antibiotics  Type II DM with CKD, managed by primary team  Hypotension, will hold amlodipin and hydralazine. Cortisol did not suggest adrenal insufficiency   Transaminases from hypotension, managed by primary team  Dementia, on namenda at home  Pancreatitis/elevated lipase, evaluated by general surgery, planning on laparoscopic cholecystectomy prior to discharge (no sooner than Sunday)    PLAN:  Ureter stent exchange noted today  Resume torsemide after procedure  Surveillance labs    Thank you for involving us in the care of Froy Ngo.  Please feel free to call with any questions.    Tad Benoit MD  02/07/25  15:20 [facility]    [facility]  [phone]    Please note that portions of this note were completed with a voice recognition program.

## 2025-02-07 NOTE — ANESTHESIA PREPROCEDURE EVALUATION
Anesthesia Evaluation                  Airway   Mallampati: II  TM distance: >3 FB  Neck ROM: full  Comment: Difficult Airway: NoFinal Airway Type: supraglottic airwayMask Difficulty Assessment: 1 - vent by maskFinal Supraglottic Airway: classicSGA Size: 4Number of Attempts at Approach: 1     Dental - normal exam     Pulmonary    (-) decreased breath sounds, wheezes  Cardiovascular - normal exam    (+) hypertension, hyperlipidemia      Neuro/Psych  (+) psychiatric history, dementia  GI/Hepatic/Renal/Endo    (+) renal disease-, diabetes mellitus    Musculoskeletal     Abdominal    Substance History      OB/GYN          Other                          Anesthesia Plan    ASA 3     general     intravenous induction     Anesthetic plan, risks, benefits, and alternatives have been provided, discussed and informed consent has been obtained with: patient.        CODE STATUS:    Code Status (Patient has no pulse and is not breathing): CPR (Attempt to Resuscitate)  Medical Interventions (Patient has pulse or is breathing): Full Support

## 2025-02-07 NOTE — PLAN OF CARE
Goal Outcome Evaluation:  Plan of Care Reviewed With: patient        Progress: no change  Outcome Evaluation: Pt returned to room  from RR about 1735, alert , confuse, denies pain, vss, voided x1 240ml  pink tinged urine,in toilet with wife assisting pt,refused staff to help him, wants to be independent in br and is steady ,weak, had a fall yesterday, pt gets upset when safety instructions given. Caregiver at d, [place] drinking fluids and eating sherbet,

## 2025-02-07 NOTE — CASE MANAGEMENT/SOCIAL WORK
Continued Stay Note  [facility]     Patient Name: Froy Ngo  MRN: [id number]  Today's Date: 2/7/2025    Admit Date: 2/4/2025    Plan: Plan home with spouse and private caregivers.  GRISELDA Perez RN   Discharge Plan       Row Name 02/07/25 0903       Plan    Plan Plan home with spouse and private caregivers.  GRISELDA Perez RN    Patient/Family in Agreement with Plan yes    Plan Comments Per surgery  -Will plan for laparoscopic cholecystectomy prior to discharge.  For now, recommend optimization of his renal function, plans for stent exchange noted today.  Spoke with pt and pt's spouse (Lo Ngo [phone]) at bedside and she states plan is tor return home.  CCP following for needs.  Plan home with spouse and private caregivers.  GRISELDA Perez RN                   Discharge Codes    No documentation.                 Expected Discharge Date and Time       Expected Discharge Date Expected Discharge Time    Feb 10, 2025               Deloris Perez RN

## 2025-02-07 NOTE — ANESTHESIA PROCEDURE NOTES
Airway  Urgency: elective    Date/Time: 2/7/2025 3:38 PM  Airway not difficult    General Information and Staff    Patient location during procedure: OR  CRNA/CAA: Donna Reeder CRNA    Indications and Patient Condition  Indications for airway management: airway protection    Preoxygenated: yes  MILS not maintained throughout  Mask difficulty assessment: 0 - not attempted    Final Airway Details  Final airway type: supraglottic airway      Successful airway: unique  Size 5     Number of attempts at approach: 1  Assessment: lips, teeth, and gum same as pre-op and atraumatic intubation    Additional Comments  LMA inserted with ease, assist to SV

## 2025-02-07 NOTE — SIGNIFICANT NOTE
02/07/25 1353   OTHER   Discipline physical therapy assistant   Rehab Time/Intention   Session Not Performed patient unavailable for treatment  (Pt off the floor for stent exchange. Will follow up with pt next visit.)   Recommendation   PT - Next Appointment 02/08/25

## 2025-02-07 NOTE — PLAN OF CARE
Goal Outcome Evaluation:      Patient sleeping on and off.Continues w freq voiding, no retention noted.Alert with confusion.VSS.Room air.NPO at midnight for possible stent exchange today.

## 2025-02-07 NOTE — PROGRESS NOTES
Colorectal & General Surgery  Progress Note    Patient: Froy Ngo  YOB: 1945  MRN: [id number]      Assessment  Froy Ngo is a 79 y.o. male with likely gallstone pancreatitis.  Will plan for laparoscopic cholecystectomy prior to discharge.  For now, recommend optimization of his renal function, plans for stent exchange noted today.    After his procedure today, okay for regular diet from my standpoint.    I will continue to follow but will plan for laparoscopic cholecystectomy no sooner than Sunday at this point.    Subjective  No significant events.  More confused this morning.    Objective    Vitals:    02/07/25 0810   BP: (!) 143/103   Pulse: 89   Resp: 16   Temp: 98.1 °F (36.7 °C)   SpO2: 96%       Physical Exam  Constitutional: Well-developed well-nourished, no acute distress  Neck: Supple, trachea midline  Respiratory: No increased work of breathing, Symmetric excursion  Cardiovascular: Well pefursed, no jugular venous distention evident   Abdominal: Soft, non-tender, non-distended  Skin: Warm, dry, no rash on visualized skin surfaces  Psychiatric: Alert and oriented ×3, normal affect,     Laboratory Results  I have personally reviewed CBC with Nasra ARMSTRONG, Humoryl 9, platelet 29.  Lipase 741.  Hemoglobin A1c 7.8.  C-peptide 4.09.  AST 58, ALT 72, total bilirubin 0.8.    Radiology  None to review         Finesse Bryant MD  Colorectal & General Surgery  [facility]    [address], [address]  [place], [place], [address]  P: [phone]  F: [phone]

## 2025-02-08 LAB
ALBUMIN SERPL-MCNC: 2.9 G/DL (ref 3.5–5.2)
ALBUMIN/GLOB SERPL: 0.9 G/DL
ALP SERPL-CCNC: 297 U/L (ref 39–117)
ALT SERPL W P-5'-P-CCNC: 54 U/L (ref 1–41)
ANION GAP SERPL CALCULATED.3IONS-SCNC: 13 MMOL/L (ref 5–15)
AST SERPL-CCNC: 31 U/L (ref 1–40)
B-OH-BUTYR SERPL-SCNC: 0.09 MMOL/L (ref 0.02–0.27)
BACTERIA UR QL AUTO: ABNORMAL /HPF
BASOPHILS # BLD AUTO: 0.01 10*3/MM3 (ref 0–0.2)
BASOPHILS NFR BLD AUTO: 0.3 % (ref 0–1.5)
BILIRUB SERPL-MCNC: 0.4 MG/DL (ref 0–1.2)
BILIRUB UR QL STRIP: NEGATIVE
BUN SERPL-MCNC: 77 MG/DL (ref 8–23)
BUN/CREAT SERPL: 21 (ref 7–25)
CALCIUM SPEC-SCNC: 8.3 MG/DL (ref 8.6–10.5)
CHLORIDE SERPL-SCNC: 92 MMOL/L (ref 98–107)
CLARITY UR: CLEAR
CO2 SERPL-SCNC: 22 MMOL/L (ref 22–29)
COLOR UR: YELLOW
CREAT SERPL-MCNC: 3.67 MG/DL (ref 0.76–1.27)
DEPRECATED RDW RBC AUTO: 47.1 FL (ref 37–54)
EGFRCR SERPLBLD CKD-EPI 2021: 15.9 ML/MIN/1.73
EOSINOPHIL # BLD AUTO: 0.01 10*3/MM3 (ref 0–0.4)
EOSINOPHIL NFR BLD AUTO: 0.3 % (ref 0.3–6.2)
ERYTHROCYTE [DISTWIDTH] IN BLOOD BY AUTOMATED COUNT: 14.2 % (ref 12.3–15.4)
GLOBULIN UR ELPH-MCNC: 3.2 GM/DL
GLUCOSE BLDC GLUCOMTR-MCNC: 162 MG/DL (ref 70–130)
GLUCOSE BLDC GLUCOMTR-MCNC: 184 MG/DL (ref 70–130)
GLUCOSE BLDC GLUCOMTR-MCNC: 258 MG/DL (ref 70–130)
GLUCOSE BLDC GLUCOMTR-MCNC: 411 MG/DL (ref 70–130)
GLUCOSE BLDC GLUCOMTR-MCNC: 483 MG/DL (ref 70–130)
GLUCOSE BLDC GLUCOMTR-MCNC: 494 MG/DL (ref 70–130)
GLUCOSE BLDC GLUCOMTR-MCNC: 520 MG/DL (ref 70–130)
GLUCOSE SERPL-MCNC: 507 MG/DL (ref 65–99)
GLUCOSE UR STRIP-MCNC: ABNORMAL MG/DL
HCT VFR BLD AUTO: 26.4 % (ref 37.5–51)
HGB BLD-MCNC: 7.9 G/DL (ref 13–17.7)
HGB UR QL STRIP.AUTO: ABNORMAL
HYALINE CASTS UR QL AUTO: ABNORMAL /LPF
IMM GRANULOCYTES # BLD AUTO: 0.05 10*3/MM3 (ref 0–0.05)
IMM GRANULOCYTES NFR BLD AUTO: 1.3 % (ref 0–0.5)
KETONES UR QL STRIP: NEGATIVE
LEUKOCYTE ESTERASE UR QL STRIP.AUTO: NEGATIVE
LIPASE SERPL-CCNC: 424 U/L (ref 13–60)
LYMPHOCYTES # BLD AUTO: 0.39 10*3/MM3 (ref 0.7–3.1)
LYMPHOCYTES NFR BLD AUTO: 10.1 % (ref 19.6–45.3)
MAGNESIUM SERPL-MCNC: 1.9 MG/DL (ref 1.6–2.4)
MCH RBC QN AUTO: 27.1 PG (ref 26.6–33)
MCHC RBC AUTO-ENTMCNC: 29.9 G/DL (ref 31.5–35.7)
MCV RBC AUTO: 90.7 FL (ref 79–97)
MONOCYTES # BLD AUTO: 0.29 10*3/MM3 (ref 0.1–0.9)
MONOCYTES NFR BLD AUTO: 7.5 % (ref 5–12)
NEUTROPHILS NFR BLD AUTO: 3.12 10*3/MM3 (ref 1.7–7)
NEUTROPHILS NFR BLD AUTO: 80.5 % (ref 42.7–76)
NITRITE UR QL STRIP: NEGATIVE
NRBC BLD AUTO-RTO: 0 /100 WBC (ref 0–0.2)
PH UR STRIP.AUTO: <=5 [PH] (ref 5–8)
PHOSPHATE SERPL-MCNC: 5.1 MG/DL (ref 2.5–4.5)
PLATELET # BLD AUTO: 214 10*3/MM3 (ref 140–450)
PMV BLD AUTO: 9.5 FL (ref 6–12)
POTASSIUM SERPL-SCNC: 4.1 MMOL/L (ref 3.5–5.2)
PROT SERPL-MCNC: 6.1 G/DL (ref 6–8.5)
PROT UR QL STRIP: ABNORMAL
RBC # BLD AUTO: 2.91 10*6/MM3 (ref 4.14–5.8)
RBC # UR STRIP: ABNORMAL /HPF
REF LAB TEST METHOD: ABNORMAL
SODIUM SERPL-SCNC: 127 MMOL/L (ref 136–145)
SP GR UR STRIP: 1.01 (ref 1–1.03)
SQUAMOUS #/AREA URNS HPF: ABNORMAL /HPF
UROBILINOGEN UR QL STRIP: ABNORMAL
WBC # UR STRIP: ABNORMAL /HPF
WBC NRBC COR # BLD AUTO: 3.87 10*3/MM3 (ref 3.4–10.8)

## 2025-02-08 PROCEDURE — 81001 URINALYSIS AUTO W/SCOPE: CPT | Performed by: INTERNAL MEDICINE

## 2025-02-08 PROCEDURE — 99232 SBSQ HOSP IP/OBS MODERATE 35: CPT | Performed by: SURGERY

## 2025-02-08 PROCEDURE — 82010 KETONE BODYS QUAN: CPT | Performed by: INTERNAL MEDICINE

## 2025-02-08 PROCEDURE — 63710000001 INSULIN REGULAR HUMAN PER 5 UNITS: Performed by: UROLOGY

## 2025-02-08 PROCEDURE — 83735 ASSAY OF MAGNESIUM: CPT | Performed by: UROLOGY

## 2025-02-08 PROCEDURE — 82948 REAGENT STRIP/BLOOD GLUCOSE: CPT

## 2025-02-08 PROCEDURE — 85025 COMPLETE CBC W/AUTO DIFF WBC: CPT | Performed by: UROLOGY

## 2025-02-08 PROCEDURE — 25010000002 HEPARIN (PORCINE) PER 1000 UNITS: Performed by: UROLOGY

## 2025-02-08 PROCEDURE — 63710000001 INSULIN LISPRO (HUMAN) PER 5 UNITS: Performed by: INTERNAL MEDICINE

## 2025-02-08 PROCEDURE — 84100 ASSAY OF PHOSPHORUS: CPT | Performed by: UROLOGY

## 2025-02-08 PROCEDURE — 80053 COMPREHEN METABOLIC PANEL: CPT | Performed by: UROLOGY

## 2025-02-08 PROCEDURE — 63710000001 INSULIN GLARGINE PER 5 UNITS: Performed by: INTERNAL MEDICINE

## 2025-02-08 PROCEDURE — 97530 THERAPEUTIC ACTIVITIES: CPT

## 2025-02-08 PROCEDURE — 83690 ASSAY OF LIPASE: CPT | Performed by: UROLOGY

## 2025-02-08 RX ORDER — INSULIN LISPRO 100 [IU]/ML
1-200 INJECTION, SOLUTION INTRAVENOUS; SUBCUTANEOUS
Status: DISCONTINUED | OUTPATIENT
Start: 2025-02-08 | End: 2025-02-08

## 2025-02-08 RX ORDER — IBUPROFEN 600 MG/1
1 TABLET ORAL
Status: DISCONTINUED | OUTPATIENT
Start: 2025-02-08 | End: 2025-02-08

## 2025-02-08 RX ORDER — INSULIN LISPRO 100 [IU]/ML
4-24 INJECTION, SOLUTION INTRAVENOUS; SUBCUTANEOUS
Status: DISCONTINUED | OUTPATIENT
Start: 2025-02-08 | End: 2025-02-13 | Stop reason: HOSPADM

## 2025-02-08 RX ORDER — INSULIN LISPRO 100 [IU]/ML
7 INJECTION, SOLUTION INTRAVENOUS; SUBCUTANEOUS
Status: DISCONTINUED | OUTPATIENT
Start: 2025-02-08 | End: 2025-02-13 | Stop reason: HOSPADM

## 2025-02-08 RX ORDER — DEXTROSE MONOHYDRATE 25 G/50ML
25 INJECTION, SOLUTION INTRAVENOUS
Status: DISCONTINUED | OUTPATIENT
Start: 2025-02-08 | End: 2025-02-13 | Stop reason: HOSPADM

## 2025-02-08 RX ORDER — DEXTROSE MONOHYDRATE 25 G/50ML
10-50 INJECTION, SOLUTION INTRAVENOUS
Status: DISCONTINUED | OUTPATIENT
Start: 2025-02-08 | End: 2025-02-08

## 2025-02-08 RX ORDER — IBUPROFEN 600 MG/1
1 TABLET ORAL
Status: DISCONTINUED | OUTPATIENT
Start: 2025-02-08 | End: 2025-02-13 | Stop reason: HOSPADM

## 2025-02-08 RX ORDER — NICOTINE POLACRILEX 4 MG
15 LOZENGE BUCCAL
Status: DISCONTINUED | OUTPATIENT
Start: 2025-02-08 | End: 2025-02-08

## 2025-02-08 RX ORDER — INSULIN LISPRO 100 [IU]/ML
1-200 INJECTION, SOLUTION INTRAVENOUS; SUBCUTANEOUS AS NEEDED
Status: DISCONTINUED | OUTPATIENT
Start: 2025-02-08 | End: 2025-02-08

## 2025-02-08 RX ORDER — NICOTINE POLACRILEX 4 MG
15 LOZENGE BUCCAL
Status: DISCONTINUED | OUTPATIENT
Start: 2025-02-08 | End: 2025-02-13 | Stop reason: HOSPADM

## 2025-02-08 RX ADMIN — DONEPEZIL HYDROCHLORIDE 10 MG: 10 TABLET, FILM COATED ORAL at 21:33

## 2025-02-08 RX ADMIN — MEMANTINE HYDROCHLORIDE 5 MG: 5 TABLET, FILM COATED ORAL at 08:58

## 2025-02-08 RX ADMIN — HEPARIN SODIUM 5000 UNITS: 5000 INJECTION INTRAVENOUS; SUBCUTANEOUS at 08:59

## 2025-02-08 RX ADMIN — INSULIN LISPRO 12 UNITS: 100 INJECTION, SOLUTION INTRAVENOUS; SUBCUTANEOUS at 13:05

## 2025-02-08 RX ADMIN — ATORVASTATIN CALCIUM 10 MG: 20 TABLET, FILM COATED ORAL at 08:59

## 2025-02-08 RX ADMIN — FINASTERIDE 5 MG: 5 TABLET, FILM COATED ORAL at 08:58

## 2025-02-08 RX ADMIN — HEPARIN SODIUM 5000 UNITS: 5000 INJECTION INTRAVENOUS; SUBCUTANEOUS at 21:33

## 2025-02-08 RX ADMIN — Medication 10 ML: at 21:33

## 2025-02-08 RX ADMIN — INSULIN LISPRO 24 UNITS: 100 INJECTION, SOLUTION INTRAVENOUS; SUBCUTANEOUS at 08:59

## 2025-02-08 RX ADMIN — INSULIN LISPRO 7 UNITS: 100 INJECTION, SOLUTION INTRAVENOUS; SUBCUTANEOUS at 08:59

## 2025-02-08 RX ADMIN — INSULIN LISPRO 7 UNITS: 100 INJECTION, SOLUTION INTRAVENOUS; SUBCUTANEOUS at 17:16

## 2025-02-08 RX ADMIN — INSULIN LISPRO 7 UNITS: 100 INJECTION, SOLUTION INTRAVENOUS; SUBCUTANEOUS at 13:12

## 2025-02-08 RX ADMIN — Medication 10 ML: at 09:00

## 2025-02-08 RX ADMIN — INSULIN LISPRO 4 UNITS: 100 INJECTION, SOLUTION INTRAVENOUS; SUBCUTANEOUS at 21:33

## 2025-02-08 RX ADMIN — TAMSULOSIN HYDROCHLORIDE 0.4 MG: 0.4 CAPSULE ORAL at 08:58

## 2025-02-08 RX ADMIN — INSULIN GLARGINE 12 UNITS: 100 INJECTION, SOLUTION SUBCUTANEOUS at 21:33

## 2025-02-08 RX ADMIN — INSULIN HUMAN 7 UNITS: 100 INJECTION, SOLUTION PARENTERAL at 06:39

## 2025-02-08 RX ADMIN — FERROUS SULFATE TAB 325 MG (65 MG ELEMENTAL FE) 325 MG: 325 (65 FE) TAB at 08:57

## 2025-02-08 RX ADMIN — TORSEMIDE 40 MG: 20 TABLET ORAL at 08:58

## 2025-02-08 RX ADMIN — INSULIN LISPRO 4 UNITS: 100 INJECTION, SOLUTION INTRAVENOUS; SUBCUTANEOUS at 17:15

## 2025-02-08 NOTE — PLAN OF CARE
Goal Outcome Evaluation:  Plan of Care Reviewed With: patient        Progress: no change  Outcome Evaluation: Patient  anxious  this  shift,   would  not   let  staff  assist  him  to the  BR.  gait  unsteady.    Blood  glucose  elevated  this  shift.  Insulin    given  as  ordered.       Room  air.  SR on  the  monitor.     nursing  will  continue  to monitor

## 2025-02-08 NOTE — PROGRESS NOTES
Feeling well. No complaints. Eating a cheeseburger, fries, and ice cream watching the [employer] game. Wife feels he has made a wonderful recovery.    Af, vss, Blood sugars 258-520 with most recent 258    Collins x 3.  No jvd.  Heart RRR.  Lungs clear.  Abdomen NT, ND, bs +.  Ext without edema.    Na 127, BUN 77, Cr 3.67  Lipase 424, alk phos 297, alt 54  Hb 7.9    Assessment and plan  1.  Acute renal failure-creatinine improving since stent removal.  Nephrology continues to follow.  Etiology likely related to some hypovolemia, probable hypotensive episode on top of his underlying hypertensive nephrosclerosis. Bp improving.  2.  Pancreatitis-lipase continues to improve but alkaline phosphatase and liver enzymes slightly elevated  He remains nontender and tolerated a regular diet yesterday.  Plan is for laparoscopic cholecystectomy when stable from nephrology standpoint.  Surgery continues to follow  3.  ID-blood cultures negative.  Urine cultures with mixed urogenital frank.  Going for stent exchange today.  To be able to DC Rocephin and follow white count and temperature curve  4.  Anemia-hemoglobin down some.  Multi factorial.  Back on oral iron  5.  Diabetes-blood sugar trending back down after procedure yesterday.  Going to add some basal insulin for better control during hospitalization.  6.  Dementia-much better today.  Family plans to keep sitter at bedside to help control behaviors.  7.  Hiccups-resolved.  8.  Continue DVT prophylaxis.   Detail Level: Detailed Depth Of Biopsy: dermis Was A Bandage Applied: Yes Size Of Lesion In Cm: 0 Biopsy Type: H and E Biopsy Method: Dermablade Anesthesia Type: 2% lidocaine with epinephrine and a 1:10 solution of 8.4% sodium bicarbonate Anesthesia Volume In Cc: 0.5 Hemostasis: Drysol Wound Care: Vaseline Dressing: bandage Destruction After The Procedure: No Type Of Destruction Used: Curettage Lab: [id number] Lab Facility: [id number] Billing Type: Third-Party Bill Information: Selecting Yes will display possible errors in your note based on the variables you have selected. This validation is only offered as a suggestion for you. PLEASE NOTE THAT THE VALIDATION TEXT WILL BE REMOVED WHEN YOU FINALIZE YOUR NOTE. IF YOU WANT TO FAX A PRELIMINARY NOTE YOU WILL NEED TO TOGGLE THIS TO 'NO' IF YOU DO NOT WANT IT IN YOUR FAXED NOTE.

## 2025-02-08 NOTE — PROGRESS NOTES
[facility] Progress Note      Patient Name: Froy Ngo  : 1945  MRN: [id number]  Primary Care Physician:  Ryan Gutierrez MD  Date of admission: 2025    Subjective     Interval History:   Follow-up NEHEMIAS on CKD    Denies chest pain, SOA, N/V/D  Being cleaned up by the staff  No SOA or LE swelling    Review of Systems:   As noted above    Objective     Vitals:   Temp:  [97.5 °F (36.4 °C)-99.7 °F (37.6 °C)] 97.5 °F (36.4 °C)  Heart Rate:  [] 84  Resp:  [15-23] 18  BP: ()/(60-82) 144/82  Flow (L/min) (Oxygen Therapy):  [4] 4    Intake/Output Summary (Last 24 hours) at 2025 0820  Last data filed at 2025  Gross per 24 hour   Intake 237 ml   Output 790 ml   Net -553 ml       Physical Exam:    General Appearance: Awake, pleasant, chronically ill, MO, no acute distress   Skin: warm and dry  HEENT: oral mucosa normal, nonicteric sclera  Neck: supple, no JVD  Lungs: CTA, nonlabored on RA  Heart: RRR, no rub, no murmur, no carotid bruits  Abdomen: soft, nontender, nondistended, BS +  : no palpable bladder  Extremities: no edema, cyanosis or clubbing  Neuro: Rambling speech, moves all extremities    Scheduled Meds:     atorvastatin, 10 mg, Oral, Daily  donepezil, 10 mg, Oral, Nightly  ferrous sulfate, 325 mg, Oral, Daily With Breakfast  finasteride, 5 mg, Oral, Daily  heparin (porcine), 5,000 Units, Subcutaneous, Q12H  insulin glargine, 1-200 Units, Subcutaneous, Nightly - Glucommander  insulin lispro, 1-200 Units, Subcutaneous, With Meals, HS, AND Midsleep  memantine, 5 mg, Oral, Daily  sodium chloride, 10 mL, Intravenous, Q12H  tamsulosin, 0.4 mg, Oral, Daily  torsemide, 40 mg, Oral, Daily      IV Meds:          Results Reviewed:   I have personally reviewed the results from the time of this admission to 2025 08:20 EST     Results from last 7 days   Lab Units 25  0510 25  0658 25  0554   SODIUM mmol/L 127* 130* 130*   POTASSIUM  mmol/L 4.1 3.9 4.2   CHLORIDE mmol/L 92* 93* 96*   CO2 mmol/L 22.0 22.0 19.2*   BUN mg/dL 77* 76* 73*   CREATININE mg/dL 3.67* 4.09* 3.99*   CALCIUM mg/dL 8.3* 8.3* 8.4*   BILIRUBIN mg/dL 0.4 0.8 0.7   ALK PHOS U/L 297* 353* 232*   ALT (SGPT) U/L 54* 72* 55*   AST (SGOT) U/L 31 58* 42*   GLUCOSE mg/dL 507* 184* 164*       Estimated Creatinine Clearance: 19.6 mL/min (A) (by C-G formula based on SCr of 3.67 mg/dL (H)).    Results from last 7 days   Lab Units 02/08/25  0510 02/06/25  0554 02/05/25  0555 02/04/25  1202   MAGNESIUM mg/dL 1.9 2.1 2.0 2.0   PHOSPHORUS mg/dL 5.1*  --  4.1 3.8       Results from last 7 days   Lab Units 02/05/25  0555   URIC ACID mg/dL 9.0*       Results from last 7 days   Lab Units 02/08/25  0510 02/07/25  0658 02/06/25  0554 02/05/25  0555 02/04/25  1202   WBC 10*3/mm3 3.87 8.47 10.20 9.53 15.48*   HEMOGLOBIN g/dL 7.9* 9.4* 9.4* 8.9* 11.0*   PLATELETS 10*3/mm3 214 229 196 162 262             Assessment / Plan     ASSESSMENT:   NEHEMIAS on CKD 3B, baseline Scr 1.7-2.0, secondary to prior AKIs d/t postobstructive uropathy, and longstanding hypertensive/diabetic nephrosclerosis.  Acute etiology is likely multifactorial: UTI followed by  hypotension limiting renal perfusion. CT abdomen/pelvis did not reveal hydro; euvolemic  Acute on chronic hyponatremia, likely multifactorial: excessive PO fluid intake, poor solute intake, and NEHEMIAS on CKD, improved  Hx post obstructive uropathy, s/p bilateral ureteral stent placement 8/30/2024, s/p prostate resection 12/13/2024, Rodriguez was removed 3 to 4 weeks ago per family report. CT abd/pel suggested enlarged prostate with possible cystitis / Prostatitis  due to recurrent UTI ?;  Urology planning on ureter stent change today (2/7)   UTI, failed outpatient management, on IV Rocephin, urine cultures negative.  Possible prostatitis due to recurrent antibiotics  Type II DM with CKD, managed by primary team  Hypotension, will hold amlodipin and hydralazine. Cortisol  did not suggest adrenal insufficiency   Transaminases from hypotension, managed by primary team  Dementia, on namenda at home  Pancreatitis/elevated lipase, evaluated by general surgery, planning on laparoscopic cholecystectomy prior to discharge (no sooner than Sunday)    PLAN:    Kidney function is improving . Ureter stent exchange 2/07/2025  NA is low likely due to hyperglycemia. Managements of BS by the primary  Cont Torsemide  daily  Surveillance labs    Thank you for involving us in the care of Froy Ngo.  Please feel free to call with any questions.    Louie Titus MD  02/08/25  08:20 [facility]    [facility]  [phone]    Please note that portions of this note were completed with a voice recognition program.

## 2025-02-08 NOTE — PLAN OF CARE
Goal Outcome Evaluation:  Plan of Care Reviewed With: patient, spouse        Progress: improving  Outcome Evaluation: Pt sitting on EOB upon entry for PT treatment on this date and is agreeable to treatment session. Pt states he is not currently having any pain. Gait belt donned prior to ambulation but patient refuses to use walker during ambulation. Pt educated about the safety of using a walker but continues to refuse. Pt able to ambulate 150' on this date with CGA from therapist throughout. Pt does not have any overt LOB with only mild instability noted during ambulation on this date. Pt able to complete standing exercises consisting of standing hip abd, ext, calf raises and hip flex in order to work on strength but also challenge balance in a standing way. Pt completed standing exercises within the walker on this date for safety and balance purposes. PT remains a good candidate for skilled PT services to address ongoing deficits while in the hospital. Plan to d/c to home with  upon leaving the hospital.    Anticipated Discharge Disposition (PT): home, home with home health

## 2025-02-08 NOTE — PROGRESS NOTES
[facility] DAILY PROGRESS NOTE      Name: Froy Ngo  Age: 79 y.o.  Sex: male  :  1945  MRN: [id number]    Date: 2025             Chief complaint: No new complaints. Pleasant and appreciative of care. Voiding spontaneously without difficulty.    POD #1: s/p  cystoscopy with retrograde pyelogram and bilateral stent removal    - AVSS, good UOP  - WBC - 3.87 (8.47, 10.20, 9.53, 15.48)  - Hgb - 7.9 (9.4, 9.4, 8.9, 11.0)  - Cr - 3.67 (4.09, 3.99, 4.76, 5.16)    Physical Exam:    General Appearance:    Alert, cooperative,    Lungs:     Respirations unlabored, no wheezing   Abdomen:      Soft, NDNT, no palpable bladder distension, nontender   Neuro/Psych:   Orientation intact, mood/affect pleasant       Assessment:    BPH with LUTS s/p PAE  Hx of hydronephrosis    Plan:    - Patient voiding well. RN reports some bloody urine initially after procedure but it has since cleared.  - Labs reviewed and discussed with patient and spouse. Continue to trend  - Blood glucose quite elevated today - management per primary.  - Noted plans for laparoscopic cholecystectomy prior to discharge per general surgery.  - Urology to follow.    Bhargavi Porter, APRN  2025  07:27 EST    Plan reviewed and discussed with Dr. Marbin Anderson.

## 2025-02-08 NOTE — PROGRESS NOTES
Dr. Gutierrez will be rounding today but noted blood sugar significant spike this am on labs and called nurse. Nurse notes pt has had normal temp and vital signs. He did drink a regular coke last night but no glucose infusions.  Pt underwent ureteral stent removal yesterday and did well. He had started basal insulin of 10mg insulin glargine last night.  I'm checking urine for ketones and serum betahydroxybutyrate.  Serum CO2 looks ok.  I am placing on glucommander protocol for subcutaneous insulin.  If not responding may need insulin infusion, which apparently can only be done in the unit.

## 2025-02-08 NOTE — PROGRESS NOTES
Colorectal & General Surgery  Progress Note    Patient: Froy Ngo  YOB: 1945  MRN: [id number]      Assessment  Froy Ngo is a 79 y.o. male with gallstone pancreatitis as well as acute renal failure and some delirium superimposed on dementia.    He is resting comfortably this morning, so I let him sleep.     Abdominal exam benign.  Lipase continues to downtrend.  Tolerating regular diet with no issues. He does have significant hyperglycemia this morning.  Creatinine slightly improved from yesterday after stent removal.    Depending on his clinical course, we will plan for laparoscopic cholecystectomy prior to discharge, but I do not think he is ready for that yet.    Okay to continue regular diet from my standpoint.    Please call anytime with questions or concerns.    Subjective  Confused overnight and somewhat agitated.  Tolerated diet well.    Objective    Vitals:    02/07/25 2316   BP: 112/78   Pulse: 88   Resp: 18   Temp: 97.7 °F (36.5 °C)   SpO2: 97%       Physical Exam  Constitutional: Well-developed well-nourished, no acute distress  Neck: Supple, trachea midline  Respiratory: No increased work of breathing, Symmetric excursion  Cardiovascular: Well pefursed, no jugular venous distention evident   Abdominal: Soft, non-tender, non-distended  Skin: Warm, dry, no rash on visualized skin surfaces    Laboratory Results  I have personally reviewed lipase 424.  CBC WC 3, Humoryl and 7.9, platelet 214.  BMP with creatinine 3.67, bicarb 22, sodium 127.  Glucose up to 520.  AST 31, ALT 54, total bilirubin 0.4.    Radiology  None pertinent         Finesse Bryant MD  Colorectal & General Surgery  [facility]    [address] [place] [address], [address]  [place], [place], [address]  P: [phone]  F: [phone]

## 2025-02-08 NOTE — PLAN OF CARE
Goal Outcome Evaluation:  Plan of Care Reviewed With: patient        Progress: no change  Outcome Evaluation: Pt amb in room and hw with PT and caregiver, strength much improved, alert but remains confused with sitaution and place, voiding adeq yellow, light brown , sent for ua per order. Blood glucose monitored since readings has been high, schedule humalog and ssi given at mealtimes and blood glucose is improving, pt intake adequate, no s/sx of ditress, Dr. zamora came and updated of pt status, new orders received for labs in am and lantus at night. nad, sr on tele, room air satting hi 90's. bed alarm on and caregiver at bsd.

## 2025-02-08 NOTE — THERAPY TREATMENT NOTE
Patient Name: Froy Ngo  : 1945    MRN: [id number]                              Today's Date: 2025       Admit Date: 2025    Visit Dx:     ICD-10-CM ICD-9-CM   1. Acute renal failure, unspecified acute renal failure type  N17.9 584.9   2. Acute UTI  N39.0 599.0   3. Severe sepsis  A41.9 038.9    R65.20 995.92   4. Acute pancreatitis, unspecified complication status, unspecified pancreatitis type  K85.90 577.0   5. Decreased activities of daily living (ADL)  Z78.9 V49.89     Patient Active Problem List   Diagnosis    DKA (diabetic ketoacidosis)    Obstructive uropathy    Bilateral hydronephrosis    Hyperkalemia    Acute renal failure    Type 2 diabetes mellitus with hyperglycemia    Essential hypertension, benign    Dementia without behavioral disturbance    UTI (urinary tract infection), bacterial    Abnormal MRI of head    ARF (acute renal failure)     Past Medical History:   Diagnosis Date    Dementia     Diabetes mellitus     Elevated cholesterol     Prostate disorder     Urinary retention      Past Surgical History:   Procedure Laterality Date    CYSTOSCOPY W/ URETERAL STENT PLACEMENT Bilateral 2024    Procedure: BILATERAL CYSTOSCOPY URETERAL CATHETER/STENT INSERTION WITH BILATERAL RETROGRADES;  Surgeon: Antoni Melendez MD;  Location: [facility];  Service: Urology;  Laterality: Bilateral;      General Information       Row Name 25 1203          Physical Therapy Time and Intention    Document Type therapy note (daily note)  -     Mode of Treatment individual therapy;physical therapy  -       Row Name 25 1203          General Information    Patient Profile Reviewed yes  -     Prior Level of Function independent:  -     Existing Precautions/Restrictions fall  -       Row Name 25 1203          Cognition    Orientation Status (Cognition) oriented x 3  -       Row Name 25 1203          Safety Issues/Impairments Affecting Functional Mobility     Impairments Affecting Function (Mobility) balance;postural/trunk control;strength  -     Cognitive Impairments, Mobility Safety/Performance impulsivity;judgment  -               User Key  (r) = Recorded By, (t) = Taken By, (c) = Cosigned By      Initials Name Provider Type    Bharat Torrez, PT Physical Therapist                   Mobility       Row Name 02/08/25 1203          Bed Mobility    Comment, (Bed Mobility) Not tested, patient sitting EOB  -       Row Name 02/08/25 1203          Sit-Stand Transfer    Sit-Stand [place] (Transfers) minimum assist (75% patient effort)  -     Assistive Device (Sit-Stand Transfers) --  gait belt  -       Row Name 02/08/25 1203          Gait/Stairs (Locomotion)    [place] Level (Gait) verbal cues;contact guard  -     Patient was able to Ambulate yes  -     Distance in Feet (Gait) 150  -     Deviations/Abnormal Patterns (Gait) gait speed decreased;stride length decreased;mayr decreased;weight shifting decreased  -     Bilateral Gait Deviations weight shift ability decreased;forward flexed posture;heel strike decreased  -     Comment, (Gait/Stairs) No overt LOB during ambulation  -               User Key  (r) = Recorded By, (t) = Taken By, (c) = Cosigned By      Initials Name Provider Type    Bharat Torrez PT Physical Therapist                   Obj/Interventions       Row Name 02/08/25 1204          Motor Skills    Therapeutic Exercise other (see comments)  Standing hip abd, ext, hip marches and calf raises x 15 reps each  -       Row Name 02/08/25 1204          Balance    Balance Assessment standing static balance;standing dynamic balance  -     Static Standing Balance contact guard;verbal cues  -     Dynamic Standing Balance contact guard;minimal assist;verbal cues  -     Balance Interventions sit to stand;supported;static;standing;minimal challenge;dynamic  -       Row Name 02/08/25 1204          Sensory Assessment  (Somatosensory)    Sensory Assessment (Somatosensory) sensation intact  -               User Key  (r) = Recorded By, (t) = Taken By, (c) = Cosigned By      Initials Name Provider Type     Bharat Blackman, PT Physical Therapist                   Goals/Plan    No documentation.                  Clinical Impression       Row Name 02/08/25 1205          Pain    Pretreatment Pain Rating 0/10 - no pain  -     Posttreatment Pain Rating 0/10 - no pain  -       Row Name 02/08/25 1205          Plan of Care Review    Plan of Care Reviewed With patient;spouse  -     Progress improving  -     Outcome Evaluation Pt sitting on EOB upon entry for PT treatment on this date and is agreeable to treatment session. Pt states he is not currently having any pain. Gait belt donned prior to ambulation but patient refuses to use walker during ambulation. Pt educated about the safety of using a walker but continues to refuse. Pt able to ambulate 150' on this date with CGA from therapist throughout. Pt does not have any overt LOB with only mild instability noted during ambulation on this date. Pt able to complete standing exercises consisting of standing hip abd, ext, calf raises and hip flex in order to work on strength but also challenge balance in a standing way. Pt completed standing exercises within the walker on this date for safety and balance purposes. PT remains a good candidate for skilled PT services to address ongoing deficits while in the hospital. Plan to d/c to home with HH upon leaving the hospital.  -       Row Name 02/08/25 1205          Therapy Assessment/Plan (PT)    Criteria for Skilled Interventions Met (PT) yes  -     Therapy Frequency (PT) 5 times/wk  -       Row Name 02/08/25 1205          Vital Signs    O2 Delivery Pre Treatment room air  -     O2 Delivery Intra Treatment room air  -     O2 Delivery Post Treatment room air  -     Pre Patient Position Sitting  -     Intra Patient Position  Standing  -MH     Post Patient Position Sitting  -MH       Row Name 02/08/25 1205          Positioning and Restraints    Pre-Treatment Position in bed  -MH     Post Treatment Position bed  -MH     In Bed with family/caregiver;call light within reach;encouraged to call for assist;sitting EOB  -               User Key  (r) = Recorded By, (t) = Taken By, (c) = Cosigned By      Initials Name Provider Type    Bharat Torrez, PT Physical Therapist                   Outcome Measures       Row Name 02/08/25 1206          How much help from another person do you currently need...    Turning from your back to your side while in flat bed without using bedrails? 4  -MH     Moving from lying on back to sitting on the side of a flat bed without bedrails? 3  -MH     Moving to and from a bed to a chair (including a wheelchair)? 3  -MH     Standing up from a chair using your arms (e.g., wheelchair, bedside chair)? 3  -MH     Climbing 3-5 steps with a railing? 3  -MH     To walk in hospital room? 3  -MH     AM-PAC 6 Clicks Score (PT) 19  -               User Key  (r) = Recorded By, (t) = Taken By, (c) = Cosigned By      Initials Name Provider Type    Bharat Torrez, PT Physical Therapist                                 Physical Therapy Education       Title: PT OT SLP Therapies (Done)       Topic: Physical Therapy (Done)       Point: Mobility training (Done)       Learning Progress Summary            Patient Acceptance, E, NR,VU by  at 2/8/2025 1206    Acceptance, E,TB, VU by CHANTAL at 2/6/2025 1857   Family Acceptance, E, NR,VU by  at 2/8/2025 1206                      Point: Home exercise program (Done)       Learning Progress Summary            Patient Acceptance, E, NR,VU by  at 2/8/2025 1206    Acceptance, E,TB, VU by CHANTAL at 2/6/2025 1857   Family Acceptance, E, NR,VU by  at 2/8/2025 1206                      Point: Body mechanics (Done)       Learning Progress Summary            Patient Acceptance, E, NR,VU by   at 2/8/2025 1206    Acceptance, E,TB, VU by JK at 2/6/2025 1857    Acceptance, E,D, NR,VU by  at 2/5/2025 1557   Family Acceptance, E, NR,VU by  at 2/8/2025 1206                      Point: Precautions (Done)       Learning Progress Summary            Patient Acceptance, E, NR,VU by  at 2/8/2025 1206    Acceptance, E,TB, VU by JK at 2/6/2025 1857    Acceptance, E,D, NR,VU by  at 2/5/2025 1557   Family Acceptance, E, NR,VU by  at 2/8/2025 1206                                      User Key       Initials Effective Dates Name Provider Type Discipline     05/24/22 -  Brook Pelayo, PT Physical Therapist PT     06/10/22 -  Erika Menezes, RN Registered Nurse Nurse     09/11/24 -  Bharat Blackman, OLIVIA Physical Therapist PT                  PT Recommendation and Plan     Progress: improving  Outcome Evaluation: Pt sitting on EOB upon entry for PT treatment on this date and is agreeable to treatment session. Pt states he is not currently having any pain. Gait belt donned prior to ambulation but patient refuses to use walker during ambulation. Pt educated about the safety of using a walker but continues to refuse. Pt able to ambulate 150' on this date with CGA from therapist throughout. Pt does not have any overt LOB with only mild instability noted during ambulation on this date. Pt able to complete standing exercises consisting of standing hip abd, ext, calf raises and hip flex in order to work on strength but also challenge balance in a standing way. Pt completed standing exercises within the walker on this date for safety and balance purposes. PT remains a good candidate for skilled PT services to address ongoing deficits while in the hospital. Plan to d/c to home with HH upon leaving the hospital.     Time Calculation:         PT Charges       Row Name 02/08/25 1202             Time Calculation    Start Time 1056  -      Stop Time 1111  -      Time Calculation (min) 15 min  -      PT Received  On 02/08/25  -      PT - Next Appointment 02/10/25  -         Time Calculation- PT    Total Timed Code Minutes- PT 15 minute(s)  -         Timed Charges    42042 - PT Therapeutic Activity Minutes 15  -MH         Total Minutes    Timed Charges Total Minutes 15  -MH       Total Minutes 15  -MH                User Key  (r) = Recorded By, (t) = Taken By, (c) = Cosigned By      Initials Name Provider Type     Bharat Blackman, PT Physical Therapist                  Therapy Charges for Today       Code Description Service Date Service Provider Modifiers Qty    [id number]  PT THERAPEUTIC ACT EA 15 MIN 2/8/2025 Bharat Blackman, PT GP 1            PT G-Codes  Outcome Measure Options: AM-PAC 6 Clicks Daily Activity (OT)  AM-PAC 6 Clicks Score (PT): 19  AM-PAC 6 Clicks Score (OT): 21  PT Discharge Summary  Anticipated Discharge Disposition (PT): home, home with home health    Bharat Blackman, PT  2/8/2025

## 2025-02-09 LAB
ALBUMIN SERPL-MCNC: 2.9 G/DL (ref 3.5–5.2)
ALBUMIN/GLOB SERPL: 0.9 G/DL
ALP SERPL-CCNC: 265 U/L (ref 39–117)
ALT SERPL W P-5'-P-CCNC: 51 U/L (ref 1–41)
ANION GAP SERPL CALCULATED.3IONS-SCNC: 16 MMOL/L (ref 5–15)
AST SERPL-CCNC: 37 U/L (ref 1–40)
BACTERIA SPEC AEROBE CULT: NORMAL
BACTERIA SPEC AEROBE CULT: NORMAL
BILIRUB SERPL-MCNC: 0.3 MG/DL (ref 0–1.2)
BUN SERPL-MCNC: 83 MG/DL (ref 8–23)
BUN/CREAT SERPL: 23.4 (ref 7–25)
CALCIUM SPEC-SCNC: 8.4 MG/DL (ref 8.6–10.5)
CHLORIDE SERPL-SCNC: 95 MMOL/L (ref 98–107)
CO2 SERPL-SCNC: 23 MMOL/L (ref 22–29)
CREAT SERPL-MCNC: 3.54 MG/DL (ref 0.76–1.27)
DEPRECATED RDW RBC AUTO: 43.7 FL (ref 37–54)
EGFRCR SERPLBLD CKD-EPI 2021: 16.6 ML/MIN/1.73
ERYTHROCYTE [DISTWIDTH] IN BLOOD BY AUTOMATED COUNT: 14.3 % (ref 12.3–15.4)
GLOBULIN UR ELPH-MCNC: 3.4 GM/DL
GLUCOSE BLDC GLUCOMTR-MCNC: 109 MG/DL (ref 70–130)
GLUCOSE BLDC GLUCOMTR-MCNC: 197 MG/DL (ref 70–130)
GLUCOSE BLDC GLUCOMTR-MCNC: 225 MG/DL (ref 70–130)
GLUCOSE BLDC GLUCOMTR-MCNC: 309 MG/DL (ref 70–130)
GLUCOSE SERPL-MCNC: 207 MG/DL (ref 65–99)
HCT VFR BLD AUTO: 26.8 % (ref 37.5–51)
HGB BLD-MCNC: 8.7 G/DL (ref 13–17.7)
LIPASE SERPL-CCNC: 619 U/L (ref 13–60)
MCH RBC QN AUTO: 27.9 PG (ref 26.6–33)
MCHC RBC AUTO-ENTMCNC: 32.5 G/DL (ref 31.5–35.7)
MCV RBC AUTO: 85.9 FL (ref 79–97)
PLATELET # BLD AUTO: 273 10*3/MM3 (ref 140–450)
PMV BLD AUTO: 9.3 FL (ref 6–12)
POTASSIUM SERPL-SCNC: 3.4 MMOL/L (ref 3.5–5.2)
PROT SERPL-MCNC: 6.3 G/DL (ref 6–8.5)
RBC # BLD AUTO: 3.12 10*6/MM3 (ref 4.14–5.8)
SODIUM SERPL-SCNC: 134 MMOL/L (ref 136–145)
WBC NRBC COR # BLD AUTO: 8.24 10*3/MM3 (ref 3.4–10.8)

## 2025-02-09 PROCEDURE — 63710000001 INSULIN GLARGINE PER 5 UNITS: Performed by: INTERNAL MEDICINE

## 2025-02-09 PROCEDURE — 82948 REAGENT STRIP/BLOOD GLUCOSE: CPT

## 2025-02-09 PROCEDURE — 83690 ASSAY OF LIPASE: CPT | Performed by: INTERNAL MEDICINE

## 2025-02-09 PROCEDURE — 85027 COMPLETE CBC AUTOMATED: CPT | Performed by: INTERNAL MEDICINE

## 2025-02-09 PROCEDURE — 99232 SBSQ HOSP IP/OBS MODERATE 35: CPT | Performed by: SURGERY

## 2025-02-09 PROCEDURE — 80053 COMPREHEN METABOLIC PANEL: CPT | Performed by: INTERNAL MEDICINE

## 2025-02-09 PROCEDURE — 63710000001 INSULIN LISPRO (HUMAN) PER 5 UNITS: Performed by: INTERNAL MEDICINE

## 2025-02-09 PROCEDURE — 25010000002 HEPARIN (PORCINE) PER 1000 UNITS: Performed by: UROLOGY

## 2025-02-09 RX ORDER — POTASSIUM CHLORIDE 750 MG/1
20 TABLET, FILM COATED, EXTENDED RELEASE ORAL ONCE
Status: COMPLETED | OUTPATIENT
Start: 2025-02-09 | End: 2025-02-09

## 2025-02-09 RX ADMIN — Medication 10 ML: at 09:26

## 2025-02-09 RX ADMIN — HEPARIN SODIUM 5000 UNITS: 5000 INJECTION INTRAVENOUS; SUBCUTANEOUS at 09:25

## 2025-02-09 RX ADMIN — TORSEMIDE 40 MG: 20 TABLET ORAL at 09:25

## 2025-02-09 RX ADMIN — INSULIN GLARGINE 15 UNITS: 100 INJECTION, SOLUTION SUBCUTANEOUS at 21:49

## 2025-02-09 RX ADMIN — INSULIN LISPRO 7 UNITS: 100 INJECTION, SOLUTION INTRAVENOUS; SUBCUTANEOUS at 13:15

## 2025-02-09 RX ADMIN — INSULIN LISPRO 7 UNITS: 100 INJECTION, SOLUTION INTRAVENOUS; SUBCUTANEOUS at 17:11

## 2025-02-09 RX ADMIN — TAMSULOSIN HYDROCHLORIDE 0.4 MG: 0.4 CAPSULE ORAL at 09:25

## 2025-02-09 RX ADMIN — Medication 10 ML: at 21:50

## 2025-02-09 RX ADMIN — INSULIN LISPRO 4 UNITS: 100 INJECTION, SOLUTION INTRAVENOUS; SUBCUTANEOUS at 17:10

## 2025-02-09 RX ADMIN — HEPARIN SODIUM 5000 UNITS: 5000 INJECTION INTRAVENOUS; SUBCUTANEOUS at 21:49

## 2025-02-09 RX ADMIN — POTASSIUM CHLORIDE 20 MEQ: 750 TABLET, EXTENDED RELEASE ORAL at 17:10

## 2025-02-09 RX ADMIN — INSULIN LISPRO 8 UNITS: 100 INJECTION, SOLUTION INTRAVENOUS; SUBCUTANEOUS at 09:25

## 2025-02-09 RX ADMIN — FERROUS SULFATE TAB 325 MG (65 MG ELEMENTAL FE) 325 MG: 325 (65 FE) TAB at 09:24

## 2025-02-09 RX ADMIN — MEMANTINE HYDROCHLORIDE 5 MG: 5 TABLET, FILM COATED ORAL at 09:25

## 2025-02-09 RX ADMIN — INSULIN LISPRO 16 UNITS: 100 INJECTION, SOLUTION INTRAVENOUS; SUBCUTANEOUS at 13:14

## 2025-02-09 RX ADMIN — DONEPEZIL HYDROCHLORIDE 10 MG: 10 TABLET, FILM COATED ORAL at 21:49

## 2025-02-09 RX ADMIN — FINASTERIDE 5 MG: 5 TABLET, FILM COATED ORAL at 09:25

## 2025-02-09 RX ADMIN — ATORVASTATIN CALCIUM 10 MG: 20 TABLET, FILM COATED ORAL at 09:25

## 2025-02-09 RX ADMIN — INSULIN LISPRO 7 UNITS: 100 INJECTION, SOLUTION INTRAVENOUS; SUBCUTANEOUS at 09:26

## 2025-02-09 NOTE — PROGRESS NOTES
Feeling well. No complaints. Eating a regular diet without issue.      Af, vss, Blood sugars 184-309 with most recent 309     Collins x 3.  No jvd.  Heart RRR.  Lungs clear.  Abdomen NT, ND, bs +.  Ext without edema.     Na 134, BUN 83, Cr 3.54  Lipase 619, alk phos 265, alt 51  Hb 8.7     Assessment and plan  1.  Acute renal failure-creatinine improving since stent removal.  Nephrology continues to follow.  Etiology likely related to some hypovolemia, hypotension on top of his underlying hypertensive nephrosclerosis. Bp stable.  2.  Pancreatitis-no hypertriglyceridemia, etoh use, meds etc. Lipase slightly higher but alkaline phosphatase and liver enzymes Improving.  He remains nontender and tolerated a regular diet yesterday.  Plan is for laparoscopic cholecystectomy Tuesday if stable from nephrology standpoint.  Surgery continues to follow  3.  ID-blood cultures negative.  Urine cultures with mixed urogenital frank.  Most recent ua without wbc for the first time in a while.  4.  Anemia-hemoglobin slightly imporved.  Multi factorial.  Back on oral iron  5.  Diabetes-blood sugar trending back down after stent removal though eating more now.  Going to increase basal insulin for better control during hospitalization.  6.  Dementia-stable today.  Family plans to keep sitter at bedside to help control behaviors.  7.  Hiccups-resolved.  8.  Continue DVT prophylaxis.

## 2025-02-09 NOTE — PROGRESS NOTES
[facility] Progress Note      Patient Name: Froy Ngo  : 1945  MRN: [id number]  Primary Care Physician:  Ryan Gutierrez MD  Date of admission: 2025    Subjective     Interval History:   Follow-up NEHEMIAS on CKD    Denies chest pain, SOA, N/V/D      Review of Systems:   As noted above    Objective     Vitals:   Temp:  [97.1 °F (36.2 °C)-97.9 °F (36.6 °C)] 97.9 °F (36.6 °C)  Heart Rate:  [65-77] 65  Resp:  [18] 18  BP: (110-126)/(63-82) 122/70    Intake/Output Summary (Last 24 hours) at 2025 0858  Last data filed at 2025 2100  Gross per 24 hour   Intake 360 ml   Output 400 ml   Net -40 ml       Physical Exam:    General Appearance: Awake, pleasant, chronically ill, MO, no acute distress   Skin: warm and dry  HEENT: oral mucosa normal, nonicteric sclera  Neck: supple, no JVD  Lungs: CTA, nonlabored on RA  Heart: RRR, no rub, no murmur, no carotid bruits  Abdomen: soft, nontender, nondistended, BS +  : no palpable bladder  Extremities: no edema, cyanosis or clubbing  Neuro: Rambling speech, moves all extremities    Scheduled Meds:     atorvastatin, 10 mg, Oral, Daily  donepezil, 10 mg, Oral, Nightly  ferrous sulfate, 325 mg, Oral, Daily With Breakfast  finasteride, 5 mg, Oral, Daily  heparin (porcine), 5,000 Units, Subcutaneous, Q12H  insulin glargine, 12 Units, Subcutaneous, Nightly  insulin lispro, 4-24 Units, Subcutaneous, 4x Daily AC & at Bedtime  insulin lispro, 7 Units, Subcutaneous, TID With Meals  memantine, 5 mg, Oral, Daily  sodium chloride, 10 mL, Intravenous, Q12H  tamsulosin, 0.4 mg, Oral, Daily  torsemide, 40 mg, Oral, Daily      IV Meds:          Results Reviewed:   I have personally reviewed the results from the time of this admission to 2025 08:58 EST     Results from last 7 days   Lab Units 25  0611 25  0510 25  0658   SODIUM mmol/L 134* 127* 130*   POTASSIUM mmol/L 3.4* 4.1 3.9   CHLORIDE mmol/L 95* 92* 93*   CO2 mmol/L  23.0 22.0 22.0   BUN mg/dL 83* 77* 76*   CREATININE mg/dL 3.54* 3.67* 4.09*   CALCIUM mg/dL 8.4* 8.3* 8.3*   BILIRUBIN mg/dL 0.3 0.4 0.8   ALK PHOS U/L 265* 297* 353*   ALT (SGPT) U/L 51* 54* 72*   AST (SGOT) U/L 37 31 58*   GLUCOSE mg/dL 207* 507* 184*       Estimated Creatinine Clearance: 20.4 mL/min (A) (by C-G formula based on SCr of 3.54 mg/dL (H)).    Results from last 7 days   Lab Units 02/08/25  0510 02/06/25  0554 02/05/25  0555 02/04/25  1202   MAGNESIUM mg/dL 1.9 2.1 2.0 2.0   PHOSPHORUS mg/dL 5.1*  --  4.1 3.8       Results from last 7 days   Lab Units 02/05/25  0555   URIC ACID mg/dL 9.0*       Results from last 7 days   Lab Units 02/09/25  0611 02/08/25  0510 02/07/25  0658 02/06/25  0554 02/05/25  0555   WBC 10*3/mm3 8.24 3.87 8.47 10.20 9.53   HEMOGLOBIN g/dL 8.7* 7.9* 9.4* 9.4* 8.9*   PLATELETS 10*3/mm3 273 214 229 196 162             Assessment / Plan     ASSESSMENT:   NEHEMIAS on CKD 3B, baseline Scr 1.7-2.0, secondary to prior AKIs d/t postobstructive uropathy, and longstanding hypertensive/diabetic nephrosclerosis.  Acute etiology is likely multifactorial: UTI followed by  hypotension limiting renal perfusion. CT abdomen/pelvis did not reveal hydro; euvolemic  Acute on chronic hyponatremia, likely multifactorial: excessive PO fluid intake, poor solute intake, and NEHEMIAS on CKD, improved  Hx post obstructive uropathy, s/p bilateral ureteral stent placement 8/30/2024, s/p prostate resection 12/13/2024, Rodriguez was removed 3 to 4 weeks ago per family report. CT abd/pel suggested enlarged prostate with possible cystitis / Prostatitis  due to recurrent UTI ?;  Urology planning on ureter stent change today (2/7)   UTI, failed outpatient management, on IV Rocephin, urine cultures negative.  Possible prostatitis due to recurrent antibiotics  Type II DM with CKD, managed by primary team  Hypotension, will hold amlodipin and hydralazine. Cortisol did not suggest adrenal insufficiency   Transaminases from  hypotension, managed by primary team  Dementia, on namenda at home  Pancreatitis/elevated lipase, evaluated by general surgery, planning on laparoscopic cholecystectomy prior to discharge (no sooner than Sunday)    PLAN:    Kidney function is improving . Ureter stent exchange 2/07/2025  NA is improving. Managements of BS by the primary  Volume status is better. Will hold on Torsemide    Replete potassium. Adjusted CA is wnl . D/w nursing staff  Surveillance labs    Thank you for involving us in the care of Froy Ngo.  Please feel free to call with any questions.    Louie Titus MD  02/09/25  08:58 [facility]    [facility]  [phone]    Please note that portions of this note were completed with a voice recognition program.

## 2025-02-09 NOTE — PROGRESS NOTES
Colorectal & General Surgery  Progress Note    Patient: Froy Ngo  YOB: 1945  MRN: [id number]      Assessment  Froy Ngo is a 79 y.o. male with gallstone pancreatitis.  He is doing well from that standpoint.  Renal function continues to slowly improve.  Hyperglycemia has somewhat improved from yesterday.     Plan to pursue laparoscopic cholecystectomy prior to discharge to prevent any further episode of gallstone pancreatitis in the future.  Anticipate doing this on Tuesday depending on his overall clinical status, but given his vast improvement from yesterday to today, I think he will be ready.    Okay to continue regular diet and agree with remainder of care.    Please feel free to contact me anytime.    I will discuss all this with Dr. Gutierrez today.      Subjective  No significant events.  Feels great today.    Objective    Vitals:    02/09/25 0749   BP: 122/70   Pulse:    Resp: 18   Temp: 97.9 °F (36.6 °C)   SpO2:        Physical Exam  Constitutional: Well-developed well-nourished, no acute distress  Neck: Supple, trachea midline  Respiratory: No increased work of breathing, Symmetric excursion  Cardiovascular: Well pefursed, no jugular venous distention evident   Abdominal: Very benign exam.  Soft, non-tender, non-distended  Skin: Warm, dry, no rash on visualized skin surfaces  Psychiatric: Alert and oriented ×3, normal affect     Laboratory Results  I have personally reviewed CBC with Ocie 8, Humoryl and 8, plates 273.  Lipase 619.  CMP with creatinine 3.5, bicarb 23, AST 37, ALT 51, total bilirubin 0.3.  Glucose 207.    Radiology  None to review         Finesse Bryant MD  Colorectal & General Surgery  [facility]    [address] [place] [address], [address]  [place], [place], [address]  P: [phone]  F: [phone]      The above plan has been reviewed with the surgeon

## 2025-02-09 NOTE — PLAN OF CARE
Goal Outcome Evaluation:  Plan of Care Reviewed With: patient        Progress: no change  Outcome Evaluation: Pt resting comfortably at this time, no s/sx of discomfort, caregiver at bsd, sr on tele, room air satting hi 90's, up to toilet with sta, refuse for anyone to be in tolet with him, gets adamant when fall precautions explained and reminded to him. eating drinking adequately, insulin coverage per orders.

## 2025-02-10 PROBLEM — K85.10 GALLSTONE PANCREATITIS: Status: ACTIVE | Noted: 2025-02-04

## 2025-02-10 LAB
ALBUMIN SERPL-MCNC: 3.1 G/DL (ref 3.5–5.2)
ALBUMIN/GLOB SERPL: 0.9 G/DL
ALP SERPL-CCNC: 269 U/L (ref 39–117)
ALT SERPL W P-5'-P-CCNC: 49 U/L (ref 1–41)
ANION GAP SERPL CALCULATED.3IONS-SCNC: 15.2 MMOL/L (ref 5–15)
AST SERPL-CCNC: 31 U/L (ref 1–40)
BILIRUB SERPL-MCNC: 0.4 MG/DL (ref 0–1.2)
BUN SERPL-MCNC: 87 MG/DL (ref 8–23)
BUN/CREAT SERPL: 25.4 (ref 7–25)
CALCIUM SPEC-SCNC: 8.4 MG/DL (ref 8.6–10.5)
CHLORIDE SERPL-SCNC: 92 MMOL/L (ref 98–107)
CO2 SERPL-SCNC: 23.8 MMOL/L (ref 22–29)
CREAT SERPL-MCNC: 3.42 MG/DL (ref 0.76–1.27)
DEPRECATED RDW RBC AUTO: 44.9 FL (ref 37–54)
EGFRCR SERPLBLD CKD-EPI 2021: 17.3 ML/MIN/1.73
ERYTHROCYTE [DISTWIDTH] IN BLOOD BY AUTOMATED COUNT: 14.4 % (ref 12.3–15.4)
GLOBULIN UR ELPH-MCNC: 3.5 GM/DL
GLUCOSE BLDC GLUCOMTR-MCNC: 133 MG/DL (ref 70–130)
GLUCOSE BLDC GLUCOMTR-MCNC: 196 MG/DL (ref 70–130)
GLUCOSE BLDC GLUCOMTR-MCNC: 225 MG/DL (ref 70–130)
GLUCOSE BLDC GLUCOMTR-MCNC: 252 MG/DL (ref 70–130)
GLUCOSE BLDC GLUCOMTR-MCNC: 260 MG/DL (ref 70–130)
GLUCOSE SERPL-MCNC: 190 MG/DL (ref 65–99)
HCT VFR BLD AUTO: 27.7 % (ref 37.5–51)
HGB BLD-MCNC: 9.2 G/DL (ref 13–17.7)
LIPASE SERPL-CCNC: 595 U/L (ref 13–60)
MCH RBC QN AUTO: 28.7 PG (ref 26.6–33)
MCHC RBC AUTO-ENTMCNC: 33.2 G/DL (ref 31.5–35.7)
MCV RBC AUTO: 86.3 FL (ref 79–97)
PLATELET # BLD AUTO: 311 10*3/MM3 (ref 140–450)
PMV BLD AUTO: 9 FL (ref 6–12)
POTASSIUM SERPL-SCNC: 3.9 MMOL/L (ref 3.5–5.2)
PROT SERPL-MCNC: 6.6 G/DL (ref 6–8.5)
RBC # BLD AUTO: 3.21 10*6/MM3 (ref 4.14–5.8)
SODIUM SERPL-SCNC: 131 MMOL/L (ref 136–145)
WBC NRBC COR # BLD AUTO: 8.8 10*3/MM3 (ref 3.4–10.8)

## 2025-02-10 PROCEDURE — 82948 REAGENT STRIP/BLOOD GLUCOSE: CPT

## 2025-02-10 PROCEDURE — 63710000001 INSULIN GLARGINE PER 5 UNITS: Performed by: INTERNAL MEDICINE

## 2025-02-10 PROCEDURE — 97530 THERAPEUTIC ACTIVITIES: CPT

## 2025-02-10 PROCEDURE — 99232 SBSQ HOSP IP/OBS MODERATE 35: CPT | Performed by: SURGERY

## 2025-02-10 PROCEDURE — 85027 COMPLETE CBC AUTOMATED: CPT | Performed by: INTERNAL MEDICINE

## 2025-02-10 PROCEDURE — 83690 ASSAY OF LIPASE: CPT | Performed by: INTERNAL MEDICINE

## 2025-02-10 PROCEDURE — 80053 COMPREHEN METABOLIC PANEL: CPT | Performed by: INTERNAL MEDICINE

## 2025-02-10 PROCEDURE — 25010000002 HEPARIN (PORCINE) PER 1000 UNITS: Performed by: UROLOGY

## 2025-02-10 PROCEDURE — 63710000001 INSULIN LISPRO (HUMAN) PER 5 UNITS: Performed by: INTERNAL MEDICINE

## 2025-02-10 RX ORDER — BLOOD-GLUCOSE METER
KIT MISCELLANEOUS
Qty: 1 EACH | Refills: 0 | OUTPATIENT
Start: 2025-02-10

## 2025-02-10 RX ORDER — LANCETS 30 GAUGE
EACH MISCELLANEOUS
Qty: 100 EACH | Refills: 12 | OUTPATIENT
Start: 2025-02-10

## 2025-02-10 RX ORDER — BLOOD SUGAR DIAGNOSTIC
STRIP MISCELLANEOUS
Qty: 100 EACH | Refills: 12 | OUTPATIENT
Start: 2025-02-10

## 2025-02-10 RX ORDER — POTASSIUM CHLORIDE 750 MG/1
40 TABLET, FILM COATED, EXTENDED RELEASE ORAL ONCE
Status: COMPLETED | OUTPATIENT
Start: 2025-02-10 | End: 2025-02-10

## 2025-02-10 RX ORDER — BLOOD-GLUCOSE METER
1 EACH MISCELLANEOUS DAILY
Qty: 1 KIT | Refills: 0 | Status: CANCELLED | OUTPATIENT
Start: 2025-02-10

## 2025-02-10 RX ADMIN — INSULIN LISPRO 7 UNITS: 100 INJECTION, SOLUTION INTRAVENOUS; SUBCUTANEOUS at 18:13

## 2025-02-10 RX ADMIN — ATORVASTATIN CALCIUM 10 MG: 20 TABLET, FILM COATED ORAL at 09:37

## 2025-02-10 RX ADMIN — FINASTERIDE 5 MG: 5 TABLET, FILM COATED ORAL at 09:37

## 2025-02-10 RX ADMIN — DONEPEZIL HYDROCHLORIDE 10 MG: 10 TABLET, FILM COATED ORAL at 21:45

## 2025-02-10 RX ADMIN — HEPARIN SODIUM 5000 UNITS: 5000 INJECTION INTRAVENOUS; SUBCUTANEOUS at 21:45

## 2025-02-10 RX ADMIN — INSULIN GLARGINE 15 UNITS: 100 INJECTION, SOLUTION SUBCUTANEOUS at 21:44

## 2025-02-10 RX ADMIN — INSULIN LISPRO 7 UNITS: 100 INJECTION, SOLUTION INTRAVENOUS; SUBCUTANEOUS at 09:38

## 2025-02-10 RX ADMIN — TAMSULOSIN HYDROCHLORIDE 0.4 MG: 0.4 CAPSULE ORAL at 09:37

## 2025-02-10 RX ADMIN — HEPARIN SODIUM 5000 UNITS: 5000 INJECTION INTRAVENOUS; SUBCUTANEOUS at 09:37

## 2025-02-10 RX ADMIN — INSULIN LISPRO 12 UNITS: 100 INJECTION, SOLUTION INTRAVENOUS; SUBCUTANEOUS at 21:44

## 2025-02-10 RX ADMIN — INSULIN LISPRO 7 UNITS: 100 INJECTION, SOLUTION INTRAVENOUS; SUBCUTANEOUS at 13:24

## 2025-02-10 RX ADMIN — FERROUS SULFATE TAB 325 MG (65 MG ELEMENTAL FE) 325 MG: 325 (65 FE) TAB at 09:37

## 2025-02-10 RX ADMIN — INSULIN LISPRO 12 UNITS: 100 INJECTION, SOLUTION INTRAVENOUS; SUBCUTANEOUS at 13:25

## 2025-02-10 RX ADMIN — MEMANTINE HYDROCHLORIDE 5 MG: 5 TABLET, FILM COATED ORAL at 09:37

## 2025-02-10 RX ADMIN — POTASSIUM CHLORIDE 40 MEQ: 750 TABLET, EXTENDED RELEASE ORAL at 09:37

## 2025-02-10 RX ADMIN — Medication 10 ML: at 09:38

## 2025-02-10 RX ADMIN — Medication 10 ML: at 21:45

## 2025-02-10 RX ADMIN — INSULIN LISPRO 4 UNITS: 100 INJECTION, SOLUTION INTRAVENOUS; SUBCUTANEOUS at 09:38

## 2025-02-10 NOTE — PROGRESS NOTES
"   LOS: 6 days   Patient Care Team:  Ryan Gutierrez MD as PCP - General (Internal Medicine)      Subjective   Interval History: Patient reports urinary frequency but feels he is emptying well. No hematuria.    Objective     ROS   12 POINT NEG ROS PERTINENT IN HPI      Vital Signs  Temp:  [97.4 °F (36.3 °C)-98.2 °F (36.8 °C)] 97.5 °F (36.4 °C)  Heart Rate:  [73-84] 73  Resp:  [16-18] 16  BP: (104-128)/(66-70) 123/68      Intake/Output Summary (Last 24 hours) at 2/10/2025 0742  Last data filed at 2/10/2025 0609  Gross per 24 hour   Intake 600 ml   Output 1675 ml   Net -1075 ml       Flowsheet Rows      Flowsheet Row First Filed Value   Admission Height 182.9 cm (72\") Documented at 02/04/2025 1738   Admission Weight 88 kg (194 lb) Documented at 02/04/2025 1738            Physical Exam:     General appearance: alert, cooperative, oriented        Lab Results (all)       Procedure Component Value Units Date/Time    Lipase [[id number]]  (Abnormal) Collected: 02/10/25 0540    Specimen: Blood Updated: 02/10/25 0705     Lipase 595 U/L     Comprehensive Metabolic Panel [[id number]]  (Abnormal) Collected: 02/10/25 0540    Specimen: Blood Updated: 02/10/25 0658     Glucose 190 mg/dL      BUN 87 mg/dL      Creatinine 3.42 mg/dL      Sodium 131 mmol/L      Potassium 3.9 mmol/L      Chloride 92 mmol/L      CO2 23.8 mmol/L      Calcium 8.4 mg/dL      Total Protein 6.6 g/dL      Albumin 3.1 g/dL      ALT (SGPT) 49 U/L      AST (SGOT) 31 U/L      Alkaline Phosphatase 269 U/L      Total Bilirubin 0.4 mg/dL      Globulin 3.5 gm/dL      A/G Ratio 0.9 g/dL      BUN/Creatinine Ratio 25.4     Anion Gap 15.2 mmol/L      eGFR 17.3 mL/min/1.73     Narrative:      GFR Categories in Chronic Kidney Disease (CKD)      GFR Category          GFR (mL/min/1.73)    Interpretation  G1                     90 or greater         Normal or high (1)  G2                      60-89                Mild decrease (1)  G3a                   45-59                " Mild to moderate decrease  G3b                   30-44                Moderate to severe decrease  G4                    15-29                Severe decrease  G5                    14 or less           Kidney failure          (1)In the absence of evidence of kidney disease, neither GFR category G1 or G2 fulfill the criteria for CKD.    eGFR calculation 2021 CKD-EPI creatinine equation, which does not include race as a factor    CBC (No Diff) [[id number]]  (Abnormal) Collected: 02/10/25 0540    Specimen: Blood Updated: 02/10/25 0644     WBC 8.80 10*3/mm3      RBC 3.21 10*6/mm3      Hemoglobin 9.2 g/dL      Hematocrit 27.7 %      MCV 86.3 fL      MCH 28.7 pg      MCHC 33.2 g/dL      RDW 14.4 %      RDW-SD 44.9 fl      MPV 9.0 fL      Platelets 311 10*3/mm3     POC Glucose Once [[id number]]  (Abnormal) Collected: 02/10/25 0607    Specimen: Blood Updated: 02/10/25 0608     Glucose 196 mg/dL     POC Glucose Once [[id number]]  (Normal) Collected: 02/09/25 2047    Specimen: Blood Updated: 02/09/25 2049     Glucose 109 mg/dL     Blood Culture - Blood, Arm, Left [[id number]]  (Normal) Collected: 02/04/25 1547    Specimen: Blood from Arm, Left Updated: 02/09/25 1600     Blood Culture No growth at 5 days    Blood Culture - Blood, Arm, Right [[id number]]  (Normal) Collected: 02/04/25 1548    Specimen: Blood from Arm, Right Updated: 02/09/25 1600     Blood Culture No growth at 5 days    POC Glucose Once [[id number]]  (Abnormal) Collected: 02/09/25 1555    Specimen: Blood Updated: 02/09/25 1556     Glucose 197 mg/dL     POC Glucose Once [[id number]]  (Abnormal) Collected: 02/09/25 1041    Specimen: Blood Updated: 02/09/25 1042     Glucose 309 mg/dL     Comprehensive Metabolic Panel [[id number]]  (Abnormal) Collected: 02/09/25 0611    Specimen: Blood Updated: 02/09/25 0719     Glucose 207 mg/dL      BUN 83 mg/dL      Creatinine 3.54 mg/dL      Sodium 134 mmol/L      Potassium 3.4 mmol/L      Chloride 95 mmol/L      CO2 23.0 mmol/L       Calcium 8.4 mg/dL      Total Protein 6.3 g/dL      Albumin 2.9 g/dL      ALT (SGPT) 51 U/L      AST (SGOT) 37 U/L      Alkaline Phosphatase 265 U/L      Total Bilirubin 0.3 mg/dL      Globulin 3.4 gm/dL      A/G Ratio 0.9 g/dL      BUN/Creatinine Ratio 23.4     Anion Gap 16.0 mmol/L      eGFR 16.6 mL/min/1.73     Narrative:      GFR Categories in Chronic Kidney Disease (CKD)      GFR Category          GFR (mL/min/1.73)    Interpretation  G1                     90 or greater         Normal or high (1)  G2                      60-89                Mild decrease (1)  G3a                   45-59                Mild to moderate decrease  G3b                   30-44                Moderate to severe decrease  G4                    15-29                Severe decrease  G5                    14 or less           Kidney failure          (1)In the absence of evidence of kidney disease, neither GFR category G1 or G2 fulfill the criteria for CKD.    eGFR calculation 2021 CKD-EPI creatinine equation, which does not include race as a factor    Lipase [[id number]]  (Abnormal) Collected: 02/09/25 0611    Specimen: Blood Updated: 02/09/25 0710     Lipase 619 U/L     CBC (No Diff) [[id number]]  (Abnormal) Collected: 02/09/25 0611    Specimen: Blood Updated: 02/09/25 0640     WBC 8.24 10*3/mm3      RBC 3.12 10*6/mm3      Hemoglobin 8.7 g/dL      Hematocrit 26.8 %      MCV 85.9 fL      MCH 27.9 pg      MCHC 32.5 g/dL      RDW 14.3 %      RDW-SD 43.7 fl      MPV 9.3 fL      Platelets 273 10*3/mm3     POC Glucose Once [[id number]]  (Abnormal) Collected: 02/09/25 0610    Specimen: Blood Updated: 02/09/25 0612     Glucose 225 mg/dL     POC Glucose Once [[id number]]  (Abnormal) Collected: 02/08/25 2058    Specimen: Blood Updated: 02/08/25 2101     Glucose 162 mg/dL     POC Glucose Once [[id number]]  (Abnormal) Collected: 02/08/25 1608    Specimen: Blood Updated: 02/08/25 1610     Glucose 184 mg/dL     Urinalysis, Microscopic Only - Urine, Clean  Catch [[id number]]  (Abnormal) Collected: 02/08/25 1052    Specimen: Urine, Clean Catch Updated: 02/08/25 1111     RBC, UA Too Numerous to Count /HPF      WBC, UA 3-5 /HPF      Bacteria, UA None Seen /HPF      Squamous Epithelial Cells, UA 0-2 /HPF      Hyaline Casts, UA 3-6 /LPF      Methodology Automated Microscopy    Urinalysis With Microscopic If Indicated (No Culture) - Urine, Clean Catch [[id number]]  (Abnormal) Collected: 02/08/25 1052    Specimen: Urine, Clean Catch Updated: 02/08/25 1108     Color, UA Yellow     Appearance, UA Clear     pH, UA <=5.0     Specific Gravity, UA 1.012     Glucose,  mg/dL (2+)     Ketones, UA Negative     Bilirubin, UA Negative     Blood, UA Moderate (2+)     Protein, UA Trace     Leuk Esterase, UA Negative     Nitrite, UA Negative     Urobilinogen, UA 0.2 E.U./dL    POC Glucose Once [[id number]]  (Abnormal) Collected: 02/08/25 1047    Specimen: Blood Updated: 02/08/25 1048     Glucose 258 mg/dL     POC Glucose Once [[id number]]  (Abnormal) Collected: 02/08/25 0819    Specimen: Blood Updated: 02/08/25 0823     Glucose 411 mg/dL     Beta Hydroxybutyrate Quantitative [[id number]]  (Normal) Collected: 02/08/25 0510    Specimen: Blood Updated: 02/08/25 0739     Beta-Hydroxybutyrate Quant 0.086 mmol/L     Narrative:      In the assessment of possible diabetic ketoacidosis, the test should be interpreted along with other clinical and laboratory findings.  A level greater than 1 mmol/L should require further evaluation and levels of more than 3 mmol/L require immediate medical review.    Magnesium [[id number]]  (Normal) Collected: 02/08/25 0510    Specimen: Blood Updated: 02/08/25 0624     Magnesium 1.9 mg/dL     Comprehensive Metabolic Panel [[id number]]  (Abnormal) Collected: 02/08/25 0510    Specimen: Blood Updated: 02/08/25 0624     Glucose 507 mg/dL      BUN 77 mg/dL      Creatinine 3.67 mg/dL      Sodium 127 mmol/L      Potassium 4.1 mmol/L      Chloride 92 mmol/L      CO2 22.0  mmol/L      Calcium 8.3 mg/dL      Total Protein 6.1 g/dL      Albumin 2.9 g/dL      ALT (SGPT) 54 U/L      AST (SGOT) 31 U/L      Alkaline Phosphatase 297 U/L      Total Bilirubin 0.4 mg/dL      Globulin 3.2 gm/dL      A/G Ratio 0.9 g/dL      BUN/Creatinine Ratio 21.0     Anion Gap 13.0 mmol/L      eGFR 15.9 mL/min/1.73     Narrative:      GFR Categories in Chronic Kidney Disease (CKD)      GFR Category          GFR (mL/min/1.73)    Interpretation  G1                     90 or greater         Normal or high (1)  G2                      60-89                Mild decrease (1)  G3a                   45-59                Mild to moderate decrease  G3b                   30-44                Moderate to severe decrease  G4                    15-29                Severe decrease  G5                    14 or less           Kidney failure          (1)In the absence of evidence of kidney disease, neither GFR category G1 or G2 fulfill the criteria for CKD.    eGFR calculation 2021 CKD-EPI creatinine equation, which does not include race as a factor    POC Glucose Once [[id number]]  (Abnormal) Collected: 02/08/25 0620    Specimen: Blood Updated: 02/08/25 0623     Glucose 483 mg/dL     POC Glucose Once [[id number]]  (Abnormal) Collected: 02/08/25 0618    Specimen: Blood Updated: 02/08/25 0623     Glucose 520 mg/dL     Lipase [[id number]]  (Abnormal) Collected: 02/08/25 0510    Specimen: Blood Updated: 02/08/25 0622     Lipase 424 U/L     CBC & Differential [[id number]]  (Abnormal) Collected: 02/08/25 0510    Specimen: Blood Updated: 02/08/25 0621    Narrative:      The following orders were created for panel order CBC & Differential.  Procedure                               Abnormality         Status                     ---------                               -----------         ------                     CBC Auto Differential[[id number]]        Abnormal            Final result                 Please view results for these tests on  the individual orders.    CBC Auto Differential [[id number]]  (Abnormal) Collected: 02/08/25 0510    Specimen: Blood Updated: 02/08/25 0621     WBC 3.87 10*3/mm3      RBC 2.91 10*6/mm3      Hemoglobin 7.9 g/dL      Hematocrit 26.4 %      MCV 90.7 fL      MCH 27.1 pg      MCHC 29.9 g/dL      RDW 14.2 %      RDW-SD 47.1 fl      MPV 9.5 fL      Platelets 214 10*3/mm3      Neutrophil % 80.5 %      Lymphocyte % 10.1 %      Monocyte % 7.5 %      Eosinophil % 0.3 %      Basophil % 0.3 %      Immature Grans % 1.3 %      Neutrophils, Absolute 3.12 10*3/mm3      Lymphocytes, Absolute 0.39 10*3/mm3      Monocytes, Absolute 0.29 10*3/mm3      Eosinophils, Absolute 0.01 10*3/mm3      Basophils, Absolute 0.01 10*3/mm3      Immature Grans, Absolute 0.05 10*3/mm3      nRBC 0.0 /100 WBC     Phosphorus [[id number]]  (Abnormal) Collected: 02/08/25 0510    Specimen: Blood Updated: 02/08/25 0614     Phosphorus 5.1 mg/dL     POC Glucose Once [[id number]]  (Abnormal) Collected: 02/08/25 0436    Specimen: Blood Updated: 02/08/25 0438     Glucose 494 mg/dL     POC Glucose Once [[id number]]  (Abnormal) Collected: 02/07/25 2008    Specimen: Blood Updated: 02/07/25 2012     Glucose 311 mg/dL     POC Glucose Once [[id number]]  (Abnormal) Collected: 02/07/25 1638    Specimen: Blood Updated: 02/07/25 1641     Glucose 166 mg/dL     POC Glucose Once [[id number]]  (Abnormal) Collected: 02/07/25 1110    Specimen: Blood Updated: 02/07/25 1112     Glucose 232 mg/dL     Lipase [[id number]]  (Abnormal) Collected: 02/07/25 0658    Specimen: Blood Updated: 02/07/25 0800     Lipase 741 U/L     Comprehensive Metabolic Panel [[id number]]  (Abnormal) Collected: 02/07/25 0658    Specimen: Blood Updated: 02/07/25 0755     Glucose 184 mg/dL      BUN 76 mg/dL      Creatinine 4.09 mg/dL      Sodium 130 mmol/L      Potassium 3.9 mmol/L      Chloride 93 mmol/L      CO2 22.0 mmol/L      Calcium 8.3 mg/dL      Total Protein 6.7 g/dL      Albumin 3.1 g/dL      ALT (SGPT) 72 U/L       AST (SGOT) 58 U/L      Alkaline Phosphatase 353 U/L      Total Bilirubin 0.8 mg/dL      Globulin 3.6 gm/dL      A/G Ratio 0.9 g/dL      BUN/Creatinine Ratio 18.6     Anion Gap 15.0 mmol/L      eGFR 13.9 mL/min/1.73     Narrative:      GFR Categories in Chronic Kidney Disease (CKD)      GFR Category          GFR (mL/min/1.73)    Interpretation  G1                     90 or greater         Normal or high (1)  G2                      60-89                Mild decrease (1)  G3a                   45-59                Mild to moderate decrease  G3b                   30-44                Moderate to severe decrease  G4                    15-29                Severe decrease  G5                    14 or less           Kidney failure          (1)In the absence of evidence of kidney disease, neither GFR category G1 or G2 fulfill the criteria for CKD.    eGFR calculation 2021 CKD-EPI creatinine equation, which does not include race as a factor    Hemoglobin A1c [[id number]]  (Abnormal) Collected: 02/07/25 0658    Specimen: Blood Updated: 02/07/25 0747     Hemoglobin A1C 7.80 %     Narrative:      Hemoglobin A1C Ranges:    Increased Risk for Diabetes  5.7% to 6.4%  Diabetes                     >= 6.5%  Diabetic Goal                < 7.0%    CBC & Differential [[id number]]  (Abnormal) Collected: 02/07/25 0658    Specimen: Blood Updated: 02/07/25 0732    Narrative:      The following orders were created for panel order CBC & Differential.  Procedure                               Abnormality         Status                     ---------                               -----------         ------                     CBC Auto Differential[[id number]]        Abnormal            Final result                 Please view results for these tests on the individual orders.    CBC Auto Differential [[id number]]  (Abnormal) Collected: 02/07/25 0658    Specimen: Blood Updated: 02/07/25 0732     WBC 8.47 10*3/mm3      RBC 3.29 10*6/mm3       Hemoglobin 9.4 g/dL      Hematocrit 27.9 %      MCV 84.8 fL      MCH 28.6 pg      MCHC 33.7 g/dL      RDW 14.5 %      RDW-SD 44.4 fl      MPV 9.2 fL      Platelets 229 10*3/mm3      Neutrophil % 76.0 %      Lymphocyte % 8.6 %      Monocyte % 8.5 %      Eosinophil % 6.1 %      Basophil % 0.1 %      Immature Grans % 0.7 %      Neutrophils, Absolute 6.43 10*3/mm3      Lymphocytes, Absolute 0.73 10*3/mm3      Monocytes, Absolute 0.72 10*3/mm3      Eosinophils, Absolute 0.52 10*3/mm3      Basophils, Absolute 0.01 10*3/mm3      Immature Grans, Absolute 0.06 10*3/mm3      nRBC 0.0 /100 WBC     POC Glucose Once [[id number]]  (Abnormal) Collected: 02/07/25 0554    Specimen: Blood Updated: 02/07/25 0556     Glucose 185 mg/dL     POC Glucose Once [[id number]]  (Abnormal) Collected: 02/07/25 0117    Specimen: Blood Updated: 02/07/25 0118     Glucose 206 mg/dL     POC Glucose Once [[id number]]  (Abnormal) Collected: 02/06/25 2054    Specimen: Blood Updated: 02/06/25 2101     Glucose 254 mg/dL     POC Glucose Once [[id number]]  (Abnormal) Collected: 02/06/25 1541    Specimen: Blood Updated: 02/06/25 1544     Glucose 237 mg/dL     POC Glucose Once [[id number]]  (Abnormal) Collected: 02/06/25 1041    Specimen: Blood Updated: 02/06/25 1043     Glucose 275 mg/dL     Urine Culture - Urine, Urine, Clean Catch [[id number]] Collected: 02/04/25 1338    Specimen: Urine, Clean Catch Updated: 02/06/25 1017     Urine Culture >100,000 CFU/mL Normal Urogenital Jennie    Narrative:      Colonization of the urinary tract without infection is common. Treatment is discouraged unless the patient is symptomatic, pregnant, or undergoing an invasive urologic procedure.    Vitamin B12 [[id number]]  (Normal) Collected: 02/06/25 0554    Specimen: Blood Updated: 02/06/25 0702     Vitamin B-12 701 pg/mL     Narrative:      Results may be falsely increased if patient taking Biotin.      Folate [[id number]]  (Normal) Collected: 02/06/25 0554    Specimen: Blood  Updated: 02/06/25 0702     Folate 6.34 ng/mL     Narrative:      Results may be falsely increased if patient taking Biotin.      Lipase [[id number]]  (Abnormal) Collected: 02/06/25 0554    Specimen: Blood Updated: 02/06/25 0657     Lipase 1,005 U/L     Comprehensive Metabolic Panel [[id number]]  (Abnormal) Collected: 02/06/25 0554    Specimen: Blood Updated: 02/06/25 0650     Glucose 164 mg/dL      BUN 73 mg/dL      Creatinine 3.99 mg/dL      Sodium 130 mmol/L      Potassium 4.2 mmol/L      Chloride 96 mmol/L      CO2 19.2 mmol/L      Calcium 8.4 mg/dL      Total Protein 6.6 g/dL      Albumin 3.1 g/dL      ALT (SGPT) 55 U/L      AST (SGOT) 42 U/L      Alkaline Phosphatase 232 U/L      Total Bilirubin 0.7 mg/dL      Globulin 3.5 gm/dL      A/G Ratio 0.9 g/dL      BUN/Creatinine Ratio 18.3     Anion Gap 14.8 mmol/L      eGFR 14.4 mL/min/1.73     Narrative:      GFR Categories in Chronic Kidney Disease (CKD)      GFR Category          GFR (mL/min/1.73)    Interpretation  G1                     90 or greater         Normal or high (1)  G2                      60-89                Mild decrease (1)  G3a                   45-59                Mild to moderate decrease  G3b                   30-44                Moderate to severe decrease  G4                    15-29                Severe decrease  G5                    14 or less           Kidney failure          (1)In the absence of evidence of kidney disease, neither GFR category G1 or G2 fulfill the criteria for CKD.    eGFR calculation 2021 CKD-EPI creatinine equation, which does not include race as a factor    Magnesium [[id number]]  (Normal) Collected: 02/06/25 0554    Specimen: Blood Updated: 02/06/25 0650     Magnesium 2.1 mg/dL     Iron Profile [[id number]]  (Abnormal) Collected: 02/06/25 0554    Specimen: Blood Updated: 02/06/25 0650     Iron 38 mcg/dL      Iron Saturation (TSAT) 18 %      Transferrin 143 mg/dL      TIBC 213 mcg/dL     POC Glucose Once [[id number]]   (Abnormal) Collected: 02/06/25 0635    Specimen: Blood Updated: 02/06/25 0636     Glucose 182 mg/dL     CBC & Differential [[id number]]  (Abnormal) Collected: 02/06/25 0554    Specimen: Blood Updated: 02/06/25 0626    Narrative:      The following orders were created for panel order CBC & Differential.  Procedure                               Abnormality         Status                     ---------                               -----------         ------                     CBC Auto Differential[[id number]]        Abnormal            Final result                 Please view results for these tests on the individual orders.    CBC Auto Differential [[id number]]  (Abnormal) Collected: 02/06/25 0554    Specimen: Blood Updated: 02/06/25 0626     WBC 10.20 10*3/mm3      RBC 3.27 10*6/mm3      Hemoglobin 9.4 g/dL      Hematocrit 28.0 %      MCV 85.6 fL      MCH 28.7 pg      MCHC 33.6 g/dL      RDW 14.8 %      RDW-SD 46.1 fl      MPV 9.6 fL      Platelets 196 10*3/mm3      Neutrophil % 79.8 %      Lymphocyte % 8.2 %      Monocyte % 7.6 %      Eosinophil % 3.5 %      Basophil % 0.2 %      Immature Grans % 0.7 %      Neutrophils, Absolute 8.13 10*3/mm3      Lymphocytes, Absolute 0.84 10*3/mm3      Monocytes, Absolute 0.78 10*3/mm3      Eosinophils, Absolute 0.36 10*3/mm3      Basophils, Absolute 0.02 10*3/mm3      Immature Grans, Absolute 0.07 10*3/mm3      nRBC 0.0 /100 WBC     POC Glucose Once [[id number]]  (Abnormal) Collected: 02/05/25 2051    Specimen: Blood Updated: 02/05/25 2053     Glucose 156 mg/dL     POC Glucose Once [[id number]]  (Abnormal) Collected: 02/05/25 1558    Specimen: Blood Updated: 02/05/25 1600     Glucose 234 mg/dL     POC Glucose Once [[id number]]  (Abnormal) Collected: 02/05/25 1102    Specimen: Blood Updated: 02/05/25 1103     Glucose 199 mg/dL     Comprehensive Metabolic Panel [[id number]]  (Abnormal) Collected: 02/05/25 0555    Specimen: Blood Updated: 02/05/25 0707     Glucose 226 mg/dL      BUN 80  mg/dL      Creatinine 4.76 mg/dL      Sodium 133 mmol/L      Potassium 4.1 mmol/L      Chloride 98 mmol/L      CO2 21.6 mmol/L      Calcium 8.4 mg/dL      Total Protein 6.4 g/dL      Albumin 3.0 g/dL      ALT (SGPT) 54 U/L      AST (SGOT) 34 U/L      Alkaline Phosphatase 184 U/L      Total Bilirubin 0.5 mg/dL      Globulin 3.4 gm/dL      A/G Ratio 0.9 g/dL      BUN/Creatinine Ratio 16.8     Anion Gap 13.4 mmol/L      eGFR 11.6 mL/min/1.73     Narrative:      GFR Categories in Chronic Kidney Disease (CKD)      GFR Category          GFR (mL/min/1.73)    Interpretation  G1                     90 or greater         Normal or high (1)  G2                      60-89                Mild decrease (1)  G3a                   45-59                Mild to moderate decrease  G3b                   30-44                Moderate to severe decrease  G4                    15-29                Severe decrease  G5                    14 or less           Kidney failure          (1)In the absence of evidence of kidney disease, neither GFR category G1 or G2 fulfill the criteria for CKD.    eGFR calculation 2021 CKD-EPI creatinine equation, which does not include race as a factor    Magnesium [[id number]]  (Normal) Collected: 02/05/25 0555    Specimen: Blood Updated: 02/05/25 0707     Magnesium 2.0 mg/dL     Lipase [[id number]]  (Abnormal) Collected: 02/05/25 0555    Specimen: Blood Updated: 02/05/25 0657     Lipase 1,603 U/L     Cortisol [[id number]] Collected: 02/05/25 0555    Specimen: Blood Updated: 02/05/25 0653     Cortisol 19.20 mcg/dL     Narrative:      Cortisol Reference Ranges:    Cortisol 6AM - 10AM Range: 6.02-18.40 mcg/dl  Cortisol 4PM - 8PM Range: 2.68-10.50 mcg/dl      Results may be falsely increased if patient taking Biotin.      Phosphorus [[id number]]  (Normal) Collected: 02/05/25 0555    Specimen: Blood Updated: 02/05/25 0650     Phosphorus 4.1 mg/dL     Uric Acid [[id number]]  (Abnormal) Collected: 02/05/25 0555     Specimen: Blood Updated: 02/05/25 0650     Uric Acid 9.0 mg/dL     CBC & Differential [[id number]]  (Abnormal) Collected: 02/05/25 0555    Specimen: Blood Updated: 02/05/25 0635    Narrative:      The following orders were created for panel order CBC & Differential.  Procedure                               Abnormality         Status                     ---------                               -----------         ------                     CBC Auto Differential[[id number]]        Abnormal            Final result                 Please view results for these tests on the individual orders.    CBC Auto Differential [[id number]]  (Abnormal) Collected: 02/05/25 0555    Specimen: Blood Updated: 02/05/25 0635     WBC 9.53 10*3/mm3      RBC 3.23 10*6/mm3      Hemoglobin 8.9 g/dL      Hematocrit 27.6 %      MCV 85.4 fL      MCH 27.6 pg      MCHC 32.2 g/dL      RDW 15.2 %      RDW-SD 47.2 fl      MPV 9.8 fL      Platelets 162 10*3/mm3      Neutrophil % 86.1 %      Lymphocyte % 5.2 %      Monocyte % 6.3 %      Eosinophil % 1.6 %      Basophil % 0.2 %      Immature Grans % 0.6 %      Neutrophils, Absolute 8.20 10*3/mm3      Lymphocytes, Absolute 0.50 10*3/mm3      Monocytes, Absolute 0.60 10*3/mm3      Eosinophils, Absolute 0.15 10*3/mm3      Basophils, Absolute 0.02 10*3/mm3      Immature Grans, Absolute 0.06 10*3/mm3      nRBC 0.0 /100 WBC     POC Glucose Once [[id number]]  (Abnormal) Collected: 02/05/25 0628    Specimen: Blood Updated: 02/05/25 0630     Glucose 228 mg/dL     POC Glucose Once [[id number]]  (Abnormal) Collected: 02/04/25 2141    Specimen: Blood Updated: 02/04/25 2142     Glucose 248 mg/dL     POC Glucose Once [[id number]]  (Abnormal) Collected: 02/04/25 1755    Specimen: Blood Updated: 02/04/25 1757     Glucose 225 mg/dL     Lactic Acid, Plasma [[id number]]  (Normal) Collected: 02/04/25 1552    Specimen: Blood Updated: 02/04/25 1622     Lactate 1.8 mmol/L     Osmolality, Urine - Urine, Clean Catch [[id number]]  Collected: 02/04/25 1338    Specimen: Urine, Clean Catch Updated: 02/04/25 1531     Osmolality, Urine 359 mOsm/kg     Narrative:      Osmo Normal Reference Ranges:    Random:  mOsm/kg H2O, depending on fluid intake.  Random: >850 mOsm/kg H20, after 12 hour fluid restriction.    24 Hour: 300-900 mOsm/kg H2O.    Sodium, Urine, Random - Urine, Clean Catch [[id number]] Collected: 02/04/25 1338    Specimen: Urine, Clean Catch Updated: 02/04/25 1519     Sodium, Urine 26 mmol/L     Narrative:      Reference intervals for random urine have not been established.  Clinical usage is dependent upon physician's interpretation in combination with other laboratory tests.       Protein / Creatinine Ratio, Urine - Urine, Clean Catch [[id number]]  (Abnormal) Collected: 02/04/25 1338    Specimen: Urine, Clean Catch Updated: 02/04/25 1519     Protein/Creatinine Ratio, Urine 1,037.5 mg/G Crea      Creatinine, Urine 109.4 mg/dL      Total Protein, Urine 113.5 mg/dL     Beta Hydroxybutyrate Quantitative [[id number]]  (Normal) Collected: 02/04/25 1202    Specimen: Blood from Arm, Left Updated: 02/04/25 1510     Beta-Hydroxybutyrate Quant 0.210 mmol/L     Narrative:      In the assessment of possible diabetic ketoacidosis, the test should be interpreted along with other clinical and laboratory findings.  A level greater than 1 mmol/L should require further evaluation and levels of more than 3 mmol/L require immediate medical review.    Lipase [[id number]]  (Abnormal) Collected: 02/04/25 1202    Specimen: Blood from Arm, Left Updated: 02/04/25 1503     Lipase 2,610 U/L     Osmolality, Serum [[id number]]  (Normal) Collected: 02/04/25 1202    Specimen: Blood from Arm, Left Updated: 02/04/25 1440     Osmolality 299 mOsm/kg     Urinalysis With Culture If Indicated - Urine, Clean Catch [[id number]]  (Abnormal) Collected: 02/04/25 1338    Specimen: Urine, Clean Catch Updated: 02/04/25 1354     Color, UA Yellow     Appearance, UA Cloudy     pH, UA  6.5     Specific Gravity, UA 1.016     Glucose, UA Negative     Ketones, UA Negative     Bilirubin, UA Negative     Blood, UA Moderate (2+)     Protein,  mg/dL (2+)     Leuk Esterase, UA Large (3+)     Nitrite, UA Positive     Urobilinogen, UA 1.0 E.U./dL    Narrative:      In absence of clinical symptoms, the presence of pyuria, bacteria, and/or nitrites on the urinalysis result does not correlate with infection.    Urinalysis, Microscopic Only - Urine, Clean Catch [[id number]]  (Abnormal) Collected: 02/04/25 1338    Specimen: Urine, Clean Catch Updated: 02/04/25 1354     RBC, UA Too Numerous to Count /HPF      WBC, UA Too Numerous to Count /HPF      Bacteria, UA None Seen /HPF      Squamous Epithelial Cells, UA 0-2 /HPF      Hyaline Casts, UA 7-12 /LPF      Methodology Automated Microscopy    Comprehensive Metabolic Panel [[id number]]  (Abnormal) Collected: 02/04/25 1202    Specimen: Blood from Arm, Left Updated: 02/04/25 1243     Glucose 277 mg/dL      BUN 70 mg/dL      Creatinine 5.16 mg/dL      Sodium 123 mmol/L      Potassium 5.1 mmol/L      Chloride 93 mmol/L      CO2 13.2 mmol/L      Calcium 9.3 mg/dL      Total Protein 8.0 g/dL      Albumin 3.5 g/dL      ALT (SGPT) 78 U/L      AST (SGOT) 63 U/L      Alkaline Phosphatase 219 U/L      Total Bilirubin 0.7 mg/dL      Globulin 4.5 gm/dL      A/G Ratio 0.8 g/dL      BUN/Creatinine Ratio 13.6     Anion Gap 16.8 mmol/L      eGFR 10.6 mL/min/1.73     Narrative:      GFR Categories in Chronic Kidney Disease (CKD)      GFR Category          GFR (mL/min/1.73)    Interpretation  G1                     90 or greater         Normal or high (1)  G2                      60-89                Mild decrease (1)  G3a                   45-59                Mild to moderate decrease  G3b                   30-44                Moderate to severe decrease  G4                    15-29                Severe decrease  G5                    14 or less           Kidney  failure          (1)In the absence of evidence of kidney disease, neither GFR category G1 or G2 fulfill the criteria for CKD.    eGFR calculation 2021 CKD-EPI creatinine equation, which does not include race as a factor    Magnesium [[id number]]  (Normal) Collected: 02/04/25 1202    Specimen: Blood from Arm, Left Updated: 02/04/25 1243     Magnesium 2.0 mg/dL     Phosphorus [[id number]]  (Normal) Collected: 02/04/25 1202    Specimen: Blood from Arm, Left Updated: 02/04/25 1243     Phosphorus 3.8 mg/dL     CBC & Differential [[id number]]  (Abnormal) Collected: 02/04/25 1202    Specimen: Blood from Arm, Left Updated: 02/04/25 1233    Narrative:      The following orders were created for panel order CBC & Differential.  Procedure                               Abnormality         Status                     ---------                               -----------         ------                     CBC Auto Differential[[id number]]        Abnormal            Final result                 Please view results for these tests on the individual orders.    CBC Auto Differential [[id number]]  (Abnormal) Collected: 02/04/25 1202    Specimen: Blood from Arm, Left Updated: 02/04/25 1233     WBC 15.48 10*3/mm3      RBC 3.89 10*6/mm3      Hemoglobin 11.0 g/dL      Hematocrit 32.7 %      MCV 84.1 fL      MCH 28.3 pg      MCHC 33.6 g/dL      RDW 14.6 %      RDW-SD 44.0 fl      MPV 9.7 fL      Platelets 262 10*3/mm3      Neutrophil % 90.8 %      Lymphocyte % 2.6 %      Monocyte % 5.1 %      Eosinophil % 0.9 %      Basophil % 0.1 %      Immature Grans % 0.5 %      Neutrophils, Absolute 14.05 10*3/mm3      Lymphocytes, Absolute 0.41 10*3/mm3      Monocytes, Absolute 0.79 10*3/mm3      Eosinophils, Absolute 0.14 10*3/mm3      Basophils, Absolute 0.01 10*3/mm3      Immature Grans, Absolute 0.08 10*3/mm3      nRBC 0.0 /100 WBC     [place] Draw [[id number]] Collected: 02/04/25 1202    Specimen: Blood from Arm, Left Updated: 02/04/25 1215    Narrative:       The following orders were created for panel order [place] Draw.  Procedure                               Abnormality         Status                     ---------                               -----------         ------                     Green Top (Gel)[[id number]]                                  Final result               Lavender Top[[id number]]                                     Final result               Gold Top - SST[[id number]]                                   Final result               Light Blue Top[[id number]]                                   Final result                 Please view results for these tests on the individual orders.    Green Top (Gel) [[id number]] Collected: 02/04/25 1202    Specimen: Blood from Arm, Left Updated: 02/04/25 1215     Extra Tube Hold for add-ons.     Comment: Auto resulted.       Lavender Top [[id number]] Collected: 02/04/25 1202    Specimen: Blood from Arm, Left Updated: 02/04/25 1215     Extra Tube hold for add-on     Comment: Auto resulted       Gold Top - SST [[id number]] Collected: 02/04/25 1202    Specimen: Blood from Arm, Left Updated: 02/04/25 1215     Extra Tube Hold for add-ons.     Comment: Auto resulted.       Light Blue Top [[id number]] Collected: 02/04/25 1202    Specimen: Blood from Arm, Left Updated: 02/04/25 1215     Extra Tube Hold for add-ons.     Comment: Auto resulted               Imaging Results (All)       Procedure Component Value Units Date/Time    FL Retrograde Pyelogram In OR [[id number]] Collected: 02/07/25 1736     Updated: 02/10/25 0556    Narrative:      RETROGRADE PYELOGRAM INTERPRETATION ONLY 02/07/2025     HISTORY: Retrograde pyelogram. Ureteral stent removal     FINDINGS: There is contrast material in both collecting systems and  ureters. Bilateral ureteral stents appear to have been removed. No  unexpected findings are noted.. There is some contrast material in the  urinary bladder.     Fluoroscopy time 24 seconds 2.6 mGy 4 images         This report was finalized on 2/10/2025 5:53 AM by Dr. Rajiv Canela M.D on Workstation: [id number]       US Gallbladder [[id number]] Collected: 02/05/25 0907     Updated: 02/05/25 1315    Narrative:      US GALLBLADDER-     CLINICAL INFORMATION: Elevated liver enzymes, pancreatitis, gallstones.     Correlation with 02/04/2025 CT of the abdomen.     ULTRASOUND FINDINGS: Only a small portion of the pancreas could be  visualized, much of the pancreas obscured due to gas within the  overlying stomach and bowel. Visualized portion fails to demonstrate  ductal dilatation and the echotexture is within normal limits. No  peripancreatic fluid collection seen.     There are shadowing gallstones within the gallbladder, no  pericholecystic fluid nor biliary duct dilatation, CBD diameter is 4 mm.     Antegrade flow within the main portal vein seen on color Doppler and  spectral analysis. Liver size appears within normal limits. Hyperechoic  solid liver nodule measures 17 mm in maximum diameter. Given its  echotexture, suspect hepatic hemangioma. This can be confirmed with  dedicated liver CT or MRI.     The right kidney is 12.3 cm in length. The cortical echotexture is  within normal limits. No obstructive uropathy or stone. Stent in  position.     No free fluid within the upper abdomen. The remainder is unremarkable.     This report was finalized on 2/5/2025 1:12 PM by Dr. Elliot Najera M.D  on Workstation: [id number]       CT Abdomen Pelvis Without Contrast [[id number]] Collected: 02/04/25 1412     Updated: 02/04/25 1433    Narrative:      CT ABDOMEN PELVIS WO CONTRAST-     DATE OF EXAM: 2/4/2025 1:44 PM     INDICATION: UTI with bilateral ureteral stents, elevated WBC, elevated  Cr.     COMPARISON: CT 8/11/2024 and 8/4/2024.     TECHNIQUE: Multiple contiguous axial images were acquired through the  abdomen and pelvis without the intravenous administration of contrast  per reformatted coronal and sagittal sequences were  also reviewed.  Radiation dose reduction techniques were utilized, including automated  exposure control and exposure modulation based on body size.     FINDINGS:  Respiratory motion limits evaluation of the lung bases. Multifocal  bibasilar subsegmental atelectasis and/scarring. Partially imaged severe  calcified coronary artery disease. Relative hyperdensity of the  interventricular septum suggest anemia.     Cholelithiasis without specific CT evidence of acute cholecystitis. The  liver and spleen are unremarkable. Moderate peripancreatic fat  stranding. Nonspecific small cystic low-density lesion in the pancreatic  body, measuring up to 2 cm craniocaudal (coronal series 3 image 51).  Mild likely benign nodular thickening of the left adrenal gland. The  right adrenal gland is unremarkable in limited noncontrast CT  appearance. Bilateral ureteral stents in place. Both kidneys are  otherwise unremarkable in limited noncontrast CT appearance. The urinary  bladder is nondistended. Urinary bladder wall thickening may be  accentuated by underdistention but could reflect sequela of chronic  bladder outlet obstruction. Small right-sided urinary bladder  diverticulum. Enlarged prostate gland, measuring 6 cm in transverse  diameter, with mild mass effect on the floor the urinary bladder.     Mild diffuse gastric wall thickening is likely accentuated by under  distention. Mild colorectal stool. Colonic diverticula, without CT  evidence of diverticulitis. No bowel obstruction. The appendix is  normal.     No free fluid in the abdomen or pelvis. No free intraperitoneal air. No  pathologically enlarged lymph nodes in the abdomen or pelvis. Stable  soft tissue attenuation in the left inguinal canal.     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 Similar-appearing lumbar levoscoliosis and  multilevel lumbar spondylosis. No acute osseous abnormality or  concerning osseous lesion.        Impression:         1. Moderate peripancreatic fat stranding, which is nonspecific but could  reflect pancreatitis. Recommend correlating with amylase and lipase.  2. Nonspecific 2 cm low-density lesion in the body of the pancreas,  which could be further evaluated with MRI if clinically indicated.  3. Bilateral ureteral stents in place.  4. Enlarged prostate gland with urinary bladder wall thickening that  could be accentuated by underdistention but could reflect sequela of  chronic bladder outlet obstruction and/or cystitis. Urinary bladder  diverticulum indicating sequela of chronic bladder outlet obstruction.  Correlate with urinalysis.     This report was finalized on 2/4/2025 2:30 PM by Dami Ramos MD on  Workstation: [id number]               Assessment & Plan         ARF (acute renal failure)    Acute renal failure        Plan   - creatinine stable  - no acute urologic issues at this time  - frequency is likely related to residual inflammation after his procedure and high fluid intake. I expect this will gradually improve over the next few weeks  - will follow remotely. Please call with questions or concerns.    Ryan Gastelum Jr., MD  02/10/25  07:42 EST

## 2025-02-10 NOTE — THERAPY TREATMENT NOTE
Patient Name: Froy Ngo  : 1945    MRN: [id number]                              Today's Date: 2/10/2025       Admit Date: 2025    Visit Dx:     ICD-10-CM ICD-9-CM   1. Acute renal failure, unspecified acute renal failure type  N17.9 584.9   2. Acute UTI  N39.0 599.0   3. Severe sepsis  A41.9 038.9    R65.20 995.92   4. Acute pancreatitis, unspecified complication status, unspecified pancreatitis type  K85.90 577.0   5. Decreased activities of daily living (ADL)  Z78.9 V49.89   6. Gallstone pancreatitis  K85.10 577.0     574.20     Patient Active Problem List   Diagnosis    DKA (diabetic ketoacidosis)    Obstructive uropathy    Bilateral hydronephrosis    Hyperkalemia    Acute renal failure    Type 2 diabetes mellitus with hyperglycemia    Essential hypertension, benign    Dementia without behavioral disturbance    UTI (urinary tract infection), bacterial    Abnormal MRI of head    ARF (acute renal failure)    Gallstone pancreatitis     Past Medical History:   Diagnosis Date    Dementia     Diabetes mellitus     Elevated cholesterol     Prostate disorder     Urinary retention      Past Surgical History:   Procedure Laterality Date    CYSTOSCOPY W/ URETERAL STENT PLACEMENT Bilateral 2024    Procedure: BILATERAL CYSTOSCOPY URETERAL CATHETER/STENT INSERTION WITH BILATERAL RETROGRADES;  Surgeon: Antoni Melendez MD;  Location: [facility];  Service: Urology;  Laterality: Bilateral;    CYSTOSCOPY, RETROGRADE PYELOGRAM AND STENT INSERTION Bilateral 2025    Procedure: CYSTOSCOPY RETROGRADE PYELOGRAM AND STENT REMOVAL;  Surgeon: Antoni Melendez MD;  Location: [facility];  Service: Urology;  Laterality: Bilateral;      General Information       Row Name 02/10/25 1516          Physical Therapy Time and Intention    Document Type therapy note (daily note)  -PH     Mode of Treatment physical therapy  -PH       Row Name 02/10/25 1516          General Information    Existing  Precautions/Restrictions fall  -PH     Barriers to Rehab cognitive status  -PH       Row Name 02/10/25 1516          Cognition    Orientation Status (Cognition) oriented x 3  -PH       Row Name 02/10/25 1516          Safety Issues/Impairments Affecting Functional Mobility    Impairments Affecting Function (Mobility) balance;strength;endurance/activity tolerance  -PH     Comment, Safety Issues/Impairments (Mobility) gt belt and non skid socks donned  -PH               User Key  (r) = Recorded By, (t) = Taken By, (c) = Cosigned By      Initials Name Provider Type    PH Beatriz Rincon PTA Physical Therapist Assistant                   Mobility       Row Name 02/10/25 1516          Bed Mobility    Bed Mobility supine-sit;sit-supine  -PH     Supine-Sit [place] (Bed Mobility) standby assist  -PH     Sit-Supine [place] (Bed Mobility) standby assist  -PH     Assistive Device (Bed Mobility) bed rails;head of bed elevated  -PH       Row Name 02/10/25 1516          Sit-Stand Transfer    Sit-Stand [place] (Transfers) contact guard;verbal cues  -PH     Assistive Device (Sit-Stand Transfers) other (see comments)  -PH     Comment, (Sit-Stand Transfer) pt refused use of AD  -PH       Row Name 02/10/25 1516          Gait/Stairs (Locomotion)    [place] Level (Gait) contact guard;verbal cues;nonverbal cues (demo/gesture)  -PH     Assistive Device (Gait) walker, front-wheeled  -PH     Distance in Feet (Gait) 150  -PH     Deviations/Abnormal Patterns (Gait) gait speed decreased;mary decreased  -PH     Bilateral Gait Deviations forward flexed posture;heel strike decreased  -PH     [place] Level (Stairs) unable to assess  -PH     Comment, (Gait/Stairs) pt refused use of fww. Pt was unsteady at times and reached for [facility] [place] for support. Told pt he was unsteady and he stated he was not.  -PH               User Key  (r) = Recorded By, (t) = Taken By, (c) = Cosigned By      Initials Name  Provider Type     Beatriz Rincon PTA Physical Therapist Assistant                   Obj/Interventions       Row Name 02/10/25 1518          Motor Skills    Therapeutic Exercise other (see comments)  declined  -PH       Row Name 02/10/25 1518          Balance    Balance Assessment sitting static balance;standing static balance  -PH     Static Sitting Balance standby assist  -PH     Static Standing Balance contact guard  -PH     Position/Device Used, Standing Balance other (see comments)  no AD  -PH               User Key  (r) = Recorded By, (t) = Taken By, (c) = Cosigned By      Initials Name Provider Type     Beatriz Rincon PTA Physical Therapist Assistant                   Goals/Plan    No documentation.                  Clinical Impression       Row Name 02/10/25 1519          Pain    Pretreatment Pain Rating 0/10 - no pain  -PH     Posttreatment Pain Rating 0/10 - no pain  -PH       Row Name 02/10/25 1519          Plan of Care Review    Plan of Care Reviewed With patient  -PH     Progress no change  -PH     Outcome Evaluation Pt was seen for PT tx this PM. Pt was in bed and sat up to EOB w/ SBA. Sitter/friend in room. Pt stood w/ CGA. Pt adamantly declined use of fww. Pt amb around nsg station req CGA and no AD. Incr unsteadiness noted at times w/ pt reaching for nsg station counter for support. Attempted to discuss unsteadiness w/ pt who denied being unsteady. Pt politely declined further amb or ther ex. Pt and sitter discussed further amb later PM. Noted possible lap iris on scheduled for tomorrow. PT will follow up for re-eval after sx.  -PH       Row Name 02/10/25 1519          Vital Signs    O2 Delivery Pre Treatment room air  -PH     O2 Delivery Intra Treatment room air  -PH     O2 Delivery Post Treatment room air  -PH       Row Name 02/10/25 1519          Positioning and Restraints    Pre-Treatment Position in bed  -PH     Post Treatment Position bed  -PH     In Bed notified  nsg;fowlers;call light within reach;encouraged to call for assist;exit alarm on;with family/caregiver  -PH               User Key  (r) = Recorded By, (t) = Taken By, (c) = Cosigned By      Initials Name Provider Type    Beatriz Torres PTA Physical Therapist Assistant                   Outcome Measures       Row Name 02/10/25 1522          How much help from another person do you currently need...    Turning from your back to your side while in flat bed without using bedrails? 4  -PH     Moving from lying on back to sitting on the side of a flat bed without bedrails? 3  -PH     Moving to and from a bed to a chair (including a wheelchair)? 3  -PH     Standing up from a chair using your arms (e.g., wheelchair, bedside chair)? 3  -PH     Climbing 3-5 steps with a railing? 3  -PH     To walk in hospital room? 3  -PH     AM-PAC 6 Clicks Score (PT) 19  -PH     Highest Level of Mobility Goal 6 --> Walk 10 steps or more  -PH       Row Name 02/10/25 1522          Functional Assessment    Outcome Measure Options AM-PAC 6 Clicks Basic Mobility (PT)  -PH               User Key  (r) = Recorded By, (t) = Taken By, (c) = Cosigned By      Initials Name Provider Type    Beatriz Torres PTA Physical Therapist Assistant                                 Physical Therapy Education       Title: PT OT SLP Therapies (In Progress)       Topic: Physical Therapy (In Progress)       Point: Mobility training (In Progress)       Learning Progress Summary            Patient Acceptance, E,TB, NR by  at 2/10/2025 1523    Acceptance, E, NR,VU by  at 2/8/2025 1206    Acceptance, E,TB, VU by CHANTAL at 2/6/2025 1857   Family Acceptance, E, NR,VU by  at 2/8/2025 1206                      Point: Home exercise program (Done)       Learning Progress Summary            Patient Acceptance, E, NR,VU by  at 2/8/2025 1206    Acceptance, E,TB, VU by CHANTAL at 2/6/2025 1857   Family Acceptance, E, NR,VU by  at 2/8/2025 1206                       Point: Body mechanics (In Progress)       Learning Progress Summary            Patient Acceptance, E,TB, NR by  at 2/10/2025 1523    Acceptance, E, NR,VU by  at 2/8/2025 1206    Acceptance, E,TB, VU by JK at 2/6/2025 1857    Acceptance, E,D, NR,VU by  at 2/5/2025 1557   Family Acceptance, E, NR,VU by  at 2/8/2025 1206                      Point: Precautions (In Progress)       Learning Progress Summary            Patient Acceptance, E,TB, NR by  at 2/10/2025 1523    Acceptance, E, NR,VU by  at 2/8/2025 1206    Acceptance, E,TB, VU by JK at 2/6/2025 1857    Acceptance, E,D, NR,VU by  at 2/5/2025 1557   Family Acceptance, E, NR,VU by  at 2/8/2025 1206                                      User Key       Initials Effective Dates Name Provider Type Discipline     05/24/22 -  Brook Pelayo, PT Physical Therapist PT     06/16/21 -  Beatriz Rincon, VICKY Physical Therapist Assistant PT     06/10/22 -  Erika Menezes, RN Registered Nurse Nurse     09/11/24 -  Bharat Blackman, PT Physical Therapist PT                  PT Recommendation and Plan     Progress: no change  Outcome Evaluation: Pt was seen for PT tx this PM. Pt was in bed and sat up to EOB w/ SBA. Sitter/friend in room. Pt stood w/ CGA. Pt adamantly declined use of fww. Pt amb around nsg station req CGA and no AD. Incr unsteadiness noted at times w/ pt reaching for nsg station counter for support. Attempted to discuss unsteadiness w/ pt who denied being unsteady. Pt politely declined further amb or ther ex. Pt and sitter discussed further amb later PM. Noted possible lap iris on scheduled for tomorrow. PT will follow up for re-eval after sx.     Time Calculation:         PT Charges       Row Name 02/10/25 1523             Time Calculation    Start Time 1459  -PH      Stop Time 1507  -PH      Time Calculation (min) 8 min  -PH      PT Received On 02/10/25  -PH      PT - Next Appointment 02/11/25  -PH         Timed Charges     50015 - PT Therapeutic Activity Minutes 8  -PH         Total Minutes    Timed Charges Total Minutes 8  -PH       Total Minutes 8  -PH                User Key  (r) = Recorded By, (t) = Taken By, (c) = Cosigned By      Initials Name Provider Type    Beatriz Torres PTA Physical Therapist Assistant                  Therapy Charges for Today       Code Description Service Date Service Provider Modifiers Qty    83141566630 HC PT THERAPEUTIC ACT EA 15 MIN 2/10/2025 Beatriz Rincon PTA GP 1            PT G-Codes  Outcome Measure Options: AM-PAC 6 Clicks Basic Mobility (PT)  AM-PAC 6 Clicks Score (PT): 19  AM-PAC 6 Clicks Score (OT): 21  PT Discharge Summary  Anticipated Discharge Disposition (PT): home with home health, home with assist    Beatriz Rincon PTA  2/10/2025

## 2025-02-10 NOTE — PAYOR COMM NOTE
"Froy Ngo (79 y.o. Male)        PLEASE SEE ATTACHED FOR CONTINUED STAY AUTH.     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 LPN CCP   Date of Birth   1945    Social Security Number       Address   [address] [place] [address]    Home Phone   [phone]    MRN   [id number]       Faith   None    Marital Status   Single                            Admission Date   2/4/25    Admission Type   Emergency    Admitting Provider   Ritesh Farrell MD    Attending Provider   Ritesh Farrell MD    Department, Room/Bed   [address], E655/1       Discharge Date       Discharge Disposition       Discharge Destination                                 Attending Provider: Ritesh Farrell MD    Allergies: No Known Allergies    Isolation: None   Infection: None   Code Status: CPR    Ht: 182.9 cm (72\")   Wt: 88 kg (194 lb)    Admission Cmt: None   Principal Problem: ARF (acute renal failure) [N17.9]                   Active Insurance as of 2/4/2025       Primary Coverage       Payor Plan Insurance Group Employer/Plan Group    ANTHEM BLUE CROSS ANTHEM BLUE CROSS BLUE SHIELD PPO [id number]       Payor Plan Address Payor Plan Phone Number Payor Plan Fax Number Effective Dates    [address] [phone]  4/1/2019 - None Entered    [facility] [id number]         Subscriber Name Subscriber Birth Date Member ID       FROY NGO 1945 [id number]               Secondary Coverage       Payor Plan Insurance Group Employer/Plan Group    MEDICARE MEDICARE A ONLY        Payor Plan Address Payor Plan Phone Number Payor Plan Fax Number Effective Dates    [address] [phone]  6/1/2009 - None Entered    [facility] [id number]         Subscriber Name Subscriber Birth Date Member ID       FROY NGO 1945 [id number]                     Emergency Contacts        (Rel.) Home Phone Work Phone Mobile " Phone    VASU NGO (Spouse) [phone] -- [phone]    vasu braswell (Daughter) [phone] -- --    ROSMERY KINGSLEY (Friend) -- -- [phone]              Ev: NP [id number]  Tax ID [id number]  Oxygen Therapy (last 2 days)       Date/Time SpO2 Device (Oxygen Therapy) Flow (L/min) (Oxygen Therapy) Oxygen Concentration (%) ETCO2 (mmHg)    02/10/25 0700 99 -- -- -- --    02/10/25 0014 99 room air -- -- --    02/09/25 2058 -- room air -- -- --    02/09/25 1928 100 room air -- -- --    02/09/25 1318 98 room air -- -- --    02/09/25 0800 -- room air -- -- --    02/09/25 0749 -- room air -- -- --    02/09/25 0017 -- room air -- -- --    02/08/25 2328 98 room air -- -- --    02/08/25 2016 -- room air -- -- --    02/08/25 1925 100 room air -- -- --    02/08/25 1238 98 -- -- -- --    02/08/25 0800 -- room air -- -- --    02/08/25 0708 100 -- -- -- --    02/08/25 0010 -- room air -- -- --          Intake & Output (last 2 days)         02/08 0701  02/09 0700 02/09 0701  02/10 0700 02/10 0701  02/11 0700    P.O. 360 600 210    Total Intake(mL/kg) 360 (4.1) 600 (6.8) 210 (2.4)    Urine (mL/kg/hr) 700 (0.3) 1675 (0.8)     Total Output 700 1675     Net -340 -1075 +210                 Lines, Drains & Airways       Active LDAs       Name Placement date Placement time Site Days    Peripheral IV 02/04/25 1344 Right Antecubital 02/04/25  1344  Antecubital  5    Peripheral IV 02/09/25 2211 Anterior;Right Forearm 02/09/25  2211  Forearm  less than 1                  Medication Administration Report for Froy Ngo as of 2/8/25 through 2/10/25     Legend:    Given Hold Not Given Due Canceled Entry Other Actions    Time Time (Time) Time Time-Action         Discontinued     Completed     Future     MAR Hold     Linked             Medications 02/08/25 02/09/25 02/10/25     acetaminophen (TYLENOL) tablet 650 mg  Dose: 650 mg  Freq: Every 6 Hours PRN Route: PO  PRN Reason: Mild Pain  Start: 02/06/25 1101      Admin Instructions:   If given for fever, use fever parameter: fever greater than 100.4 °F  Based on patient request - if ordered for moderate or severe pain, provider allows for administration of a medication prescribed for a lower pain scale.    Do not exceed 4 grams of acetaminophen in a 24 hr period. Max dose of 2gm for AST/ALT greater than 120 units/L.    If given for pain, use the following pain scale:   Mild Pain = Pain Score of 1-3, CPOT 1-2  Moderate Pain = Pain Score of 4-6, CPOT 3-4  Severe Pain = Pain Score of 7-10, CPOT 5-8            sennosides-docusate (PERICOLACE) 8.6-50 MG per tablet 2 tablet  Dose: 2 tablet  Freq: 2 Times Daily PRN Route: PO  PRN Reason: Constipation  Start: 02/04/25 2108     Admin Instructions:   Start bowel management regimen if patient has not had a bowel movement after 12 hours.           And   polyethylene glycol (MIRALAX) packet 17 g  Dose: 17 g  Freq: Daily PRN Route: PO  PRN Reason: Constipation  PRN Comment: Use if senna-docusate is ineffective  Start: 02/04/25 2108     Admin Instructions:   Use if no bowel movement after 12 hours. Mix in 6-8 ounces of water.  Use 4-8 ounces of water, tea, or juice for each 17 gram dose.           And   bisacodyl (DULCOLAX) EC tablet 5 mg  Dose: 5 mg  Freq: Daily PRN Route: PO  PRN Reason: Constipation  PRN Comment: Use if polyethylene glycol is ineffective  Start: 02/04/25 2108     Admin Instructions:   Use if no bowel movement after 12 hours.  Swallow whole. Do not crush, split, or chew tablet.           And   bisacodyl (DULCOLAX) suppository 10 mg  Dose: 10 mg  Freq: Daily PRN Route: RE  PRN Reason: Constipation  PRN Comment: Use if bisacodyl oral is ineffective  Start: 02/04/25 2108     Admin Instructions:   Use if no bowel movement after 12 hours.  Hold for diarrhea           dextrose (D50W) (25 g/50 mL) IV injection 25 g  Dose: 25 g  Freq: Every 15 Minutes PRN Route: IV  PRN Reason: Low Blood Sugar  PRN Comment: Blood Sugar Less  Than 70  Start: 02/08/25 0844     Admin Instructions:   Blood sugar less than 70; patient has IV access - Unresponsive, NPO or Unable To Safely Swallow           dextrose (D50W) (25 g/50 mL) IV injection 25 g  Dose: 25 g  Freq: Every 15 Minutes PRN Route: IV  PRN Reason: Low Blood Sugar  PRN Comment: Blood Sugar Less Than 70  Start: 02/04/25 2108     Admin Instructions:   Blood sugar less than 70; patient has IV access - Unresponsive, NPO or Unable To Safely Swallow           dextrose (GLUTOSE) oral gel 15 g  Dose: 15 g  Freq: Every 15 Minutes PRN Route: PO  PRN Reason: Low Blood Sugar  PRN Comment: Blood sugar less than 70  Start: 02/08/25 0844     Admin Instructions:   BS<70, Patient Alert, Is not NPO, Can safely swallow.           dextrose (GLUTOSE) oral gel 15 g  Dose: 15 g  Freq: Every 15 Minutes PRN Route: PO  PRN Reason: Low Blood Sugar  PRN Comment: Blood sugar less than 70  Start: 02/04/25 2108     Admin Instructions:   BS<70, Patient Alert, Is not NPO, Can safely swallow.           glucagon (GLUCAGEN) injection 1 mg  Dose: 1 mg  Freq: Every 15 Minutes PRN Route: IM  PRN Reason: Low Blood Sugar  PRN Comment: Blood Glucose Less Than 70  Start: 02/08/25 0844     Admin Instructions:   Blood Glucose Less Than 70 - Patient Without IV Access - Unresponsive, NPO or Unable To Safely Swallow  Reconstitute powder for injection by adding 1 mL of -supplied sterile diluent or sterile water for injection to a vial containing 1 mg of the drug, to provide solutions containing 1 mg/mL. Shake vial gently to dissolve.           nitroglycerin (NITROSTAT) SL tablet 0.4 mg  Dose: 0.4 mg  Freq: Every 5 Minutes PRN Route: SL  PRN Reason: Chest Pain  Start: 02/04/25 2108     Admin Instructions:   If Pain Unrelieved After 3 Doses Notify MD  May administer up to 3 doses per episode. Hold if SBP less than 100.           OLANZapine (zyPREXA) injection 5 mg  Dose: 5 mg  Freq: Every 8 Hours PRN Route: IM  PRN Reason:  "Agitation  PRN Comment: Agitation  Start: 02/06/25 1101     Admin Instructions:   Dissolve the contents of the vial using 2.1 mL of Sterile Water for Injection for approximate concentration of 5 mg/mL. Caution: Look alike/sound alike drug alert.           OLANZapine (zyPREXA) tablet 5 mg  Dose: 5 mg  Freq: 2 Times Daily PRN Route: PO  PRN Comment: Agitation  Start: 02/06/25 1103     Admin Instructions:   Caution: Look alike/sound alike drug alert            ondansetron ODT (ZOFRAN-ODT) disintegrating tablet 4 mg  Dose: 4 mg  Freq: Every 6 Hours PRN Route: PO  PRN Reasons: Nausea,Vomiting  Start: 02/04/25 2108     Admin Instructions:   If BOTH ondansetron (ZOFRAN) and promethazine (PHENERGAN) are ordered use ondansetron first and THEN promethazine IF ondansetron is ineffective.  Place on tongue and allow to dissolve.           Or   ondansetron (ZOFRAN) injection 4 mg  Dose: 4 mg  Freq: Every 6 Hours PRN Route: IV  PRN Reasons: Nausea,Vomiting  Start: 02/04/25 2108     Admin Instructions:   If BOTH ondansetron (ZOFRAN) and promethazine (PHENERGAN) are ordered use ondansetron first and THEN promethazine IF ondansetron is ineffective.           Potassium Replacement - Follow Nurse / BPA Driven Protocol  Freq: As Needed Route: XX  PRN Reason: Other  Start: 02/09/25 1240     Admin Instructions:   Open Order & Select \"BHS Electrolyte Replacement Protocol Algorithm\" to View Details           sodium chloride 0.9 % flush 10 mL  Dose: 10 mL  Freq: As Needed Route: IV  PRN Reason: Line Care  Start: 02/04/25 2108           sodium chloride 0.9 % infusion 40 mL  Dose: 40 mL  Freq: As Needed Route: IV  PRN Reason: Line Care  Start: 02/04/25 2108     Admin Instructions:   Following administration of an IV intermittent medication, flush line with 40mL NS at 100mL/hr.           Completed Medications  Medications 02/08/25 02/09/25 02/10/25      midazolam (VERSED) injection 0.5 mg  Dose: 0.5 mg  Freq: Every 5 Minutes PRN Route: IV  PRN " Comment: Anxiety prophylaxis, Pre-op comfort  Start: 02/07/25 1409 End: 02/07/25 1423     Admin Instructions:   May repeat dose in 5 minutes one time then contact provider for additional orders.             Discontinued Medications  Medications 02/08/25 02/09/25 02/10/25      Atropine Sulfate (PF) injection 0.4 mg  Dose: 0.4 mg  Freq: Once As Needed Route: IV  PRN Reason: Bradycardia  PRN Comment: symptomatic bradycardia (hypotension, dizziness, confusion). Notify attending anesthesiologist.  Start: 02/07/25 1613 End: 02/07/25 1740           dextrose (D50W) (25 g/50 mL) IV injection 10-50 mL  Dose: 10-50 mL  Freq: Every 15 Minutes PRN Route: IV  PRN Reason: Low Blood Sugar  PRN Comment: Per Glucommander™  Start: 02/08/25 0708 End: 02/08/25 0836     Admin Instructions:   Blood Glucose <70  Patient Has IV Access, Is Unresponsive, NPO or Unable to Safely Swallow  Recheck Blood Glucose Per Glucommander™           dextrose (GLUTOSE) oral gel 15 g  Dose: 15 g  Freq: Every 15 Minutes PRN Route: PO  PRN Reason: Low Blood Sugar  PRN Comment: Per Glucommander™  Start: 02/08/25 0708 End: 02/08/25 0836     Admin Instructions:   Blood Glucose <70  Patient Alert, NOT NPO, Can Safely Swallow  Recheck Blood Glucose Per Glucommander™           diphenhydrAMINE (BENADRYL) injection 12.5 mg  Dose: 12.5 mg  Freq: Every 15 Minutes PRN Route: IV  PRN Reason: Itching  PRN Comment: May repeat x 1  Start: 02/07/25 1613 End: 02/07/25 1740     Admin Instructions:   Caution: Look alike/sound alike drug alert. This med may be ordered in other forms and routes. Before giving verify the last time the drug was given by any route/form.           ePHEDrine injection 5 mg  Dose: 5 mg  Freq: Once As Needed Route: IV  PRN Comment: symptomatic hypotension - Notify attending anesthesiologist if this needs to be given  Start: 02/07/25 1613 End: 02/07/25 1740     Admin Instructions:   Caution: Look alike/sound alike drug alert   Dilute with NS to 5-10  mg/mL.  Central line preferred, if unavailable use large bore IV access with frequent nurse monitoring of IV site.           fentaNYL citrate (PF) (SUBLIMAZE) injection 25 mcg  Dose: 25 mcg  Freq: Every 5 Minutes PRN Route: IV  PRN Reason: Severe Pain  Start: 02/07/25 1613 End: 02/07/25 1740     Admin Instructions:   Maximum total dose of fentanyl is 200 mcg.  If given for pain, use the following pain scale:  Mild Pain = Pain Score of 1-3, CPOT 1-2  Moderate Pain = Pain Score of 4-6, CPOT 3-4  Severe Pain = Pain Score of 7-10, CPOT 5-8           fentaNYL citrate (PF) (SUBLIMAZE) injection 50 mcg  Dose: 50 mcg  Freq: Once As Needed Route: IV  PRN Reason: Severe Pain  Start: 02/07/25 1409 End: 02/07/25 1626     Admin Instructions:   Maximum total dose of fentanyl is 50 mcg without additional orders from physician. May be used for preoperative pain or procedural sedation.  If given for pain, use the following pain scale:  Mild Pain = Pain Score of 1-3, CPOT 1-2  Moderate Pain = Pain Score of 4-6, CPOT 3-4  Severe Pain = Pain Score of 7-10, CPOT 5-8           flumazenil (ROMAZICON) injection 0.2 mg  Dose: 0.2 mg  Freq: As Needed Route: IV  PRN Comment: for benzodiazepine induced unresponsiveness or sedation  Start: 02/07/25 1613 End: 02/07/25 1740     Admin Instructions:   Notify Anesthesia if given  ** give IV over 15-30 seconds **           glucagon (GLUCAGEN) injection 1 mg  Dose: 1 mg  Freq: Every 15 Minutes PRN Route: IM  PRN Reason: Low Blood Sugar  PRN Comment: Per Glucommander™  Start: 02/08/25 0708 End: 02/08/25 0836     Admin Instructions:   Blood Glucose <70  Patient Without IV Access, Unresponsive, NPO or Unable to Safely Swallow  Recheck Blood Glucose Per Glucommander™  Reconstitute powder for injection by adding 1 mL of -supplied sterile diluent or sterile water for injection to a vial containing 1 mg of the drug, to provide solutions containing 1 mg/mL. Shake vial gently to dissolve.            glucagon (GLUCAGEN) injection 1 mg  Dose: 1 mg  Freq: Every 15 Minutes PRN Route: IM  PRN Reason: Low Blood Sugar  PRN Comment: Blood Glucose Less Than 70  Start: 02/04/25 2108 End: 02/08/25 0711     Admin Instructions:   Blood Glucose Less Than 70 - Patient Without IV Access - Unresponsive, NPO or Unable To Safely Swallow  Reconstitute powder for injection by adding 1 mL of -supplied sterile diluent or sterile water for injection to a vial containing 1 mg of the drug, to provide solutions containing 1 mg/mL. Shake vial gently to dissolve.           hydrALAZINE (APRESOLINE) injection 5 mg  Dose: 5 mg  Freq: Every 10 Minutes PRN Route: IV  PRN Reason: High Blood Pressure  PRN Comment: for systolic blood pressure greater than 180 mmHg or diastolic blood pressure greater than 105 mmHg  Start: 02/07/25 1613 End: 02/07/25 1740     Admin Instructions:   Up to 20 mg. If labetalol and hydralazine are both ordered, use labetalol first.  Caution: Look alike/sound alike drug alert           HYDROcodone-acetaminophen (NORCO) 5-325 MG per tablet 1 tablet  Dose: 1 tablet  Freq: Once As Needed Route: PO  PRN Reason: Moderate Pain  Start: 02/07/25 1613 End: 02/07/25 1740     Admin Instructions:   Based on patient request - if ordered for moderate or severe pain, provider allows for administration of a medication prescribed for a lower pain scale.  [RORY]    Do not exceed 4 grams of acetaminophen in a 24 hr period. Max dose of 2gm for AST/ALT greater than 120 units/L        If given for pain, use the following pain scale:   Mild Pain = Pain Score of 1-3, CPOT 1-2  Moderate Pain = Pain Score of 4-6, CPOT 3-4  Severe Pain = Pain Score of 7-10, CPOT 5-8           HYDROcodone-acetaminophen (NORCO) 7.5-325 MG per tablet 1 tablet  Dose: 1 tablet  Freq: Every 4 Hours PRN Route: PO  PRN Reason: Severe Pain  Start: 02/07/25 1613 End: 02/07/25 1740     Admin Instructions:   Based on patient request - if ordered for moderate or  severe pain, provider allows for administration of a medication prescribed for a lower pain scale.  [RORY]    Do not exceed 4 grams of acetaminophen in a 24 hr period. Max dose of 2gm for AST/ALT greater than 120 units/L        If given for pain, use the following pain scale:   Mild Pain = Pain Score of 1-3, CPOT 1-2  Moderate Pain = Pain Score of 4-6, CPOT 3-4  Severe Pain = Pain Score of 7-10, CPOT 5-8           HYDROmorphone (DILAUDID) injection 0.25 mg  Dose: 0.25 mg  Freq: Every 5 Minutes PRN Route: IV  PRN Reason: Moderate Pain  Start: 02/07/25 1613 End: 02/07/25 1740     Admin Instructions:   Maximum total dose of hydromorphone is 2 mg.  If given for pain, use the following pain scale:  Mild Pain = Pain Score of 1-3, CPOT 1-2  Moderate Pain = Pain Score of 4-6, CPOT 3-4  Severe Pain = Pain Score of 7-10, CPOT 5-8           insulin lispro (HUMALOG/ADMELOG) injection 1-200 Units  Dose: 1-200 Units  Freq: As Needed Route: SC  PRN Reason: High Blood Sugar  PRN Comment: Per Glucommander  Start: 02/08/25 0708 End: 02/08/25 0836     Admin Instructions:   Correction Doses Outside of Scheduled Meal & Bedtime Per Glucommander™   Use This MAR Entry For Insulin Doses When There is NO SCHEDULED MAR Entry Available  Administer Correction Insulin Even if Patient NPO or Unable to Eat  per Glucommander           iopamidol (ISOVUE-300) 61 % injection  Freq: As Needed  Start: 02/07/25 1606 End: 02/07/25 1614           ipratropium-albuterol (DUO-NEB) nebulizer solution 3 mL  Dose: 3 mL  Freq: Once As Needed Route: NEBULIZATION  PRN Reasons: Wheezing,Shortness of Air  PRN Comment: bronchospasm  Start: 02/07/25 1613 End: 02/07/25 1740     Admin Instructions:   Notify Anesthesia if minineb is given           labetalol (NORMODYNE,TRANDATE) injection 5 mg  Dose: 5 mg  Freq: Every 5 Minutes PRN Route: IV  PRN Reason: High Blood Pressure  PRN Comment: for systolic blood pressure greater than 180 mmHg or diastolic blood pressure greater  "than 105 mmHg  Start: 02/07/25 1613 End: 02/07/25 1740     Admin Instructions:   Hold for heart rate less than 60.    If labetalol and hydralazine are both ordered, use labetalol first. Maximum dose of labetalol is 20mg IV while in PACU, notify anesthesiologist for further orders if needed.  Give IV Push over 2 minutes.           lidocaine (XYLOCAINE) 1 % injection 0.5 mL  Dose: 0.5 mL  Freq: Once As Needed Route: ID  PRN Comment: IV Start  Start: 02/07/25 1409 End: 02/07/25 1626           naloxone (NARCAN) injection 0.2 mg  Dose: 0.2 mg  Freq: As Needed Route: IV  PRN Reasons: Opioid Reversal,Respiratory Depression  PRN Comment: unresponsiveness, decrease oxygen saturation  Indications of Use: ACUTE RESPIRATORY FAILURE,OPIOID-INDUCED RESPIRATORY DEPRESSION  Start: 02/07/25 1613 End: 02/07/25 1740     Admin Instructions:   Notify Anesthesia if given           ondansetron (ZOFRAN) injection 4 mg  Dose: 4 mg  Freq: Once As Needed Route: IV  PRN Reasons: Nausea,Vomiting  Indications of Use: POSTOPERATIVE NAUSEA AND VOMITING  Start: 02/07/25 1613 End: 02/07/25 1740     Admin Instructions:   If BOTH ondansetron (ZOFRAN) and promethazine (PHENERGAN) are ordered use ondansetron first and THEN promethazine IF ondansetron is ineffective.           Potassium Replacement - Follow Nurse / BPA Driven Protocol  Freq: As Needed Route: XX  PRN Reason: Other  Start: 02/09/25 1240 End: 02/09/25 1243     Admin Instructions:   Open Order & Select \"BHS Electrolyte Replacement Protocol Algorithm\" to View Details            promethazine (PHENERGAN) suppository 25 mg  Dose: 25 mg  Freq: Once As Needed Route: RE  PRN Reasons: Nausea,Vomiting  Start: 02/07/25 1613 End: 02/07/25 1740     Admin Instructions:   If BOTH ondansetron (ZOFRAN) and promethazine (PHENERGAN) are ordered use ondansetron first and THEN promethazine IF ondansetron is ineffective.           Or   promethazine (PHENERGAN) tablet 25 mg  Dose: 25 mg  Freq: Once As Needed " "Route: PO  PRN Reasons: Nausea,Vomiting  Start: 02/07/25 1613 End: 02/07/25 1740     Admin Instructions:   If BOTH ondansetron (ZOFRAN) and promethazine (PHENERGAN) are ordered use ondansetron first and THEN promethazine IF ondansetron is ineffective.           sodium chloride 0.9 % flush 3-10 mL  Dose: 3-10 mL  Freq: As Needed Route: IV  PRN Reason: Line Care  Start: 02/07/25 1409 End: 02/07/25 1626                       Blood Administration Record (From admission, onward)      None               Physician Progress Notes (last 72 hours)        Ryan Gastelum Jr., MD at 02/10/25 0741             LOS: 6 days   Patient Care Team:  Ryan Gutierrez MD as PCP - General (Internal Medicine)      Subjective   Interval History: Patient reports urinary frequency but feels he is emptying well. No hematuria.    Objective     ROS   12 POINT NEG ROS PERTINENT IN HPI      Vital Signs  Temp:  [97.4 °F (36.3 °C)-98.2 °F (36.8 °C)] 97.5 °F (36.4 °C)  Heart Rate:  [73-84] 73  Resp:  [16-18] 16  BP: (104-128)/(66-70) 123/68      Intake/Output Summary (Last 24 hours) at 2/10/2025 0742  Last data filed at 2/10/2025 0609  Gross per 24 hour   Intake 600 ml   Output 1675 ml   Net -1075 ml       Flowsheet Rows      Flowsheet Row First Filed Value   Admission Height 182.9 cm (72\") Documented at 02/04/2025 1738   Admission Weight 88 kg (194 lb) Documented at 02/04/2025 1738            Physical Exam:     General appearance: alert, cooperative, oriented        Lab Results (all)       Procedure Component Value Units Date/Time    Lipase [[id number]]  (Abnormal) Collected: 02/10/25 0540    Specimen: Blood Updated: 02/10/25 0705     Lipase 595 U/L     Comprehensive Metabolic Panel [[id number]]  (Abnormal) Collected: 02/10/25 0540    Specimen: Blood Updated: 02/10/25 0658     Glucose 190 mg/dL      BUN 87 mg/dL      Creatinine 3.42 mg/dL      Sodium 131 mmol/L      Potassium 3.9 mmol/L      Chloride 92 mmol/L      CO2 23.8 mmol/L      Calcium " 8.4 mg/dL      Total Protein 6.6 g/dL      Albumin 3.1 g/dL      ALT (SGPT) 49 U/L      AST (SGOT) 31 U/L      Alkaline Phosphatase 269 U/L      Total Bilirubin 0.4 mg/dL      Globulin 3.5 gm/dL      A/G Ratio 0.9 g/dL      BUN/Creatinine Ratio 25.4     Anion Gap 15.2 mmol/L      eGFR 17.3 mL/min/1.73     Narrative:      GFR Categories in Chronic Kidney Disease (CKD)      GFR Category          GFR (mL/min/1.73)    Interpretation  G1                     90 or greater         Normal or high (1)  G2                      60-89                Mild decrease (1)  G3a                   45-59                Mild to moderate decrease  G3b                   30-44                Moderate to severe decrease  G4                    15-29                Severe decrease  G5                    14 or less           Kidney failure          (1)In the absence of evidence of kidney disease, neither GFR category G1 or G2 fulfill the criteria for CKD.    eGFR calculation 2021 CKD-EPI creatinine equation, which does not include race as a factor    CBC (No Diff) [[id number]]  (Abnormal) Collected: 02/10/25 0540    Specimen: Blood Updated: 02/10/25 0644     WBC 8.80 10*3/mm3      RBC 3.21 10*6/mm3      Hemoglobin 9.2 g/dL      Hematocrit 27.7 %      MCV 86.3 fL      MCH 28.7 pg      MCHC 33.2 g/dL      RDW 14.4 %      RDW-SD 44.9 fl      MPV 9.0 fL      Platelets 311 10*3/mm3     POC Glucose Once [[id number]]  (Abnormal) Collected: 02/10/25 0607    Specimen: Blood Updated: 02/10/25 0608     Glucose 196 mg/dL     POC Glucose Once [[id number]]  (Normal) Collected: 02/09/25 2047    Specimen: Blood Updated: 02/09/25 2049     Glucose 109 mg/dL     Blood Culture - Blood, Arm, Left [[id number]]  (Normal) Collected: 02/04/25 1547    Specimen: Blood from Arm, Left Updated: 02/09/25 1600     Blood Culture No growth at 5 days    Blood Culture - Blood, Arm, Right [[id number]]  (Normal) Collected: 02/04/25 1548    Specimen: Blood from Arm, Right Updated:  02/09/25 1600     Blood Culture No growth at 5 days    POC Glucose Once [[id number]]  (Abnormal) Collected: 02/09/25 1555    Specimen: Blood Updated: 02/09/25 1556     Glucose 197 mg/dL     POC Glucose Once [[id number]]  (Abnormal) Collected: 02/09/25 1041    Specimen: Blood Updated: 02/09/25 1042     Glucose 309 mg/dL     Comprehensive Metabolic Panel [[id number]]  (Abnormal) Collected: 02/09/25 0611    Specimen: Blood Updated: 02/09/25 0719     Glucose 207 mg/dL      BUN 83 mg/dL      Creatinine 3.54 mg/dL      Sodium 134 mmol/L      Potassium 3.4 mmol/L      Chloride 95 mmol/L      CO2 23.0 mmol/L      Calcium 8.4 mg/dL      Total Protein 6.3 g/dL      Albumin 2.9 g/dL      ALT (SGPT) 51 U/L      AST (SGOT) 37 U/L      Alkaline Phosphatase 265 U/L      Total Bilirubin 0.3 mg/dL      Globulin 3.4 gm/dL      A/G Ratio 0.9 g/dL      BUN/Creatinine Ratio 23.4     Anion Gap 16.0 mmol/L      eGFR 16.6 mL/min/1.73     Narrative:      GFR Categories in Chronic Kidney Disease (CKD)      GFR Category          GFR (mL/min/1.73)    Interpretation  G1                     90 or greater         Normal or high (1)  G2                      60-89                Mild decrease (1)  G3a                   45-59                Mild to moderate decrease  G3b                   30-44                Moderate to severe decrease  G4                    15-29                Severe decrease  G5                    14 or less           Kidney failure          (1)In the absence of evidence of kidney disease, neither GFR category G1 or G2 fulfill the criteria for CKD.    eGFR calculation 2021 CKD-EPI creatinine equation, which does not include race as a factor    Lipase [[id number]]  (Abnormal) Collected: 02/09/25 0611    Specimen: Blood Updated: 02/09/25 0710     Lipase 619 U/L     CBC (No Diff) [[id number]]  (Abnormal) Collected: 02/09/25 0611    Specimen: Blood Updated: 02/09/25 0640     WBC 8.24 10*3/mm3      RBC 3.12 10*6/mm3      Hemoglobin 8.7  g/dL      Hematocrit 26.8 %      MCV 85.9 fL      MCH 27.9 pg      MCHC 32.5 g/dL      RDW 14.3 %      RDW-SD 43.7 fl      MPV 9.3 fL      Platelets 273 10*3/mm3     POC Glucose Once [[id number]]  (Abnormal) Collected: 02/09/25 0610    Specimen: Blood Updated: 02/09/25 0612     Glucose 225 mg/dL     POC Glucose Once [[id number]]  (Abnormal) Collected: 02/08/25 2058    Specimen: Blood Updated: 02/08/25 2101     Glucose 162 mg/dL     POC Glucose Once [[id number]]  (Abnormal) Collected: 02/08/25 1608    Specimen: Blood Updated: 02/08/25 1610     Glucose 184 mg/dL     Urinalysis, Microscopic Only - Urine, Clean Catch [[id number]]  (Abnormal) Collected: 02/08/25 1052    Specimen: Urine, Clean Catch Updated: 02/08/25 1111     RBC, UA Too Numerous to Count /HPF      WBC, UA 3-5 /HPF      Bacteria, UA None Seen /HPF      Squamous Epithelial Cells, UA 0-2 /HPF      Hyaline Casts, UA 3-6 /LPF      Methodology Automated Microscopy    Urinalysis With Microscopic If Indicated (No Culture) - Urine, Clean Catch [[id number]]  (Abnormal) Collected: 02/08/25 1052    Specimen: Urine, Clean Catch Updated: 02/08/25 1108     Color, UA Yellow     Appearance, UA Clear     pH, UA <=5.0     Specific Gravity, UA 1.012     Glucose,  mg/dL (2+)     Ketones, UA Negative     Bilirubin, UA Negative     Blood, UA Moderate (2+)     Protein, UA Trace     Leuk Esterase, UA Negative     Nitrite, UA Negative     Urobilinogen, UA 0.2 E.U./dL    POC Glucose Once [[id number]]  (Abnormal) Collected: 02/08/25 1047    Specimen: Blood Updated: 02/08/25 1048     Glucose 258 mg/dL     POC Glucose Once [[id number]]  (Abnormal) Collected: 02/08/25 0819    Specimen: Blood Updated: 02/08/25 0823     Glucose 411 mg/dL     Beta Hydroxybutyrate Quantitative [[id number]]  (Normal) Collected: 02/08/25 0510    Specimen: Blood Updated: 02/08/25 0739     Beta-Hydroxybutyrate Quant 0.086 mmol/L     Narrative:      In the assessment of possible diabetic ketoacidosis, the test  should be interpreted along with other clinical and laboratory findings.  A level greater than 1 mmol/L should require further evaluation and levels of more than 3 mmol/L require immediate medical review.    Magnesium [[id number]]  (Normal) Collected: 02/08/25 0510    Specimen: Blood Updated: 02/08/25 0624     Magnesium 1.9 mg/dL     Comprehensive Metabolic Panel [[id number]]  (Abnormal) Collected: 02/08/25 0510    Specimen: Blood Updated: 02/08/25 0624     Glucose 507 mg/dL      BUN 77 mg/dL      Creatinine 3.67 mg/dL      Sodium 127 mmol/L      Potassium 4.1 mmol/L      Chloride 92 mmol/L      CO2 22.0 mmol/L      Calcium 8.3 mg/dL      Total Protein 6.1 g/dL      Albumin 2.9 g/dL      ALT (SGPT) 54 U/L      AST (SGOT) 31 U/L      Alkaline Phosphatase 297 U/L      Total Bilirubin 0.4 mg/dL      Globulin 3.2 gm/dL      A/G Ratio 0.9 g/dL      BUN/Creatinine Ratio 21.0     Anion Gap 13.0 mmol/L      eGFR 15.9 mL/min/1.73     Narrative:      GFR Categories in Chronic Kidney Disease (CKD)      GFR Category          GFR (mL/min/1.73)    Interpretation  G1                     90 or greater         Normal or high (1)  G2                      60-89                Mild decrease (1)  G3a                   45-59                Mild to moderate decrease  G3b                   30-44                Moderate to severe decrease  G4                    15-29                Severe decrease  G5                    14 or less           Kidney failure          (1)In the absence of evidence of kidney disease, neither GFR category G1 or G2 fulfill the criteria for CKD.    eGFR calculation 2021 CKD-EPI creatinine equation, which does not include race as a factor    POC Glucose Once [[id number]]  (Abnormal) Collected: 02/08/25 0620    Specimen: Blood Updated: 02/08/25 0623     Glucose 483 mg/dL     POC Glucose Once [[id number]]  (Abnormal) Collected: 02/08/25 0618    Specimen: Blood Updated: 02/08/25 0623     Glucose 520 mg/dL     Lipase  [[id number]]  (Abnormal) Collected: 02/08/25 0510    Specimen: Blood Updated: 02/08/25 0622     Lipase 424 U/L     CBC & Differential [[id number]]  (Abnormal) Collected: 02/08/25 0510    Specimen: Blood Updated: 02/08/25 0621    Narrative:      The following orders were created for panel order CBC & Differential.  Procedure                               Abnormality         Status                     ---------                               -----------         ------                     CBC Auto Differential[[id number]]        Abnormal            Final result                 Please view results for these tests on the individual orders.    CBC Auto Differential [[id number]]  (Abnormal) Collected: 02/08/25 0510    Specimen: Blood Updated: 02/08/25 0621     WBC 3.87 10*3/mm3      RBC 2.91 10*6/mm3      Hemoglobin 7.9 g/dL      Hematocrit 26.4 %      MCV 90.7 fL      MCH 27.1 pg      MCHC 29.9 g/dL      RDW 14.2 %      RDW-SD 47.1 fl      MPV 9.5 fL      Platelets 214 10*3/mm3      Neutrophil % 80.5 %      Lymphocyte % 10.1 %      Monocyte % 7.5 %      Eosinophil % 0.3 %      Basophil % 0.3 %      Immature Grans % 1.3 %      Neutrophils, Absolute 3.12 10*3/mm3      Lymphocytes, Absolute 0.39 10*3/mm3      Monocytes, Absolute 0.29 10*3/mm3      Eosinophils, Absolute 0.01 10*3/mm3      Basophils, Absolute 0.01 10*3/mm3      Immature Grans, Absolute 0.05 10*3/mm3      nRBC 0.0 /100 WBC     Phosphorus [[id number]]  (Abnormal) Collected: 02/08/25 0510    Specimen: Blood Updated: 02/08/25 0614     Phosphorus 5.1 mg/dL     POC Glucose Once [[id number]]  (Abnormal) Collected: 02/08/25 0436    Specimen: Blood Updated: 02/08/25 0438     Glucose 494 mg/dL     POC Glucose Once [[id number]]  (Abnormal) Collected: 02/07/25 2008    Specimen: Blood Updated: 02/07/25 2012     Glucose 311 mg/dL     POC Glucose Once [[id number]]  (Abnormal) Collected: 02/07/25 1638    Specimen: Blood Updated: 02/07/25 1641     Glucose 166 mg/dL     POC Glucose Once  [[id number]]  (Abnormal) Collected: 02/07/25 1110    Specimen: Blood Updated: 02/07/25 1112     Glucose 232 mg/dL     Lipase [[id number]]  (Abnormal) Collected: 02/07/25 0658    Specimen: Blood Updated: 02/07/25 0800     Lipase 741 U/L     Comprehensive Metabolic Panel [[id number]]  (Abnormal) Collected: 02/07/25 0658    Specimen: Blood Updated: 02/07/25 0755     Glucose 184 mg/dL      BUN 76 mg/dL      Creatinine 4.09 mg/dL      Sodium 130 mmol/L      Potassium 3.9 mmol/L      Chloride 93 mmol/L      CO2 22.0 mmol/L      Calcium 8.3 mg/dL      Total Protein 6.7 g/dL      Albumin 3.1 g/dL      ALT (SGPT) 72 U/L      AST (SGOT) 58 U/L      Alkaline Phosphatase 353 U/L      Total Bilirubin 0.8 mg/dL      Globulin 3.6 gm/dL      A/G Ratio 0.9 g/dL      BUN/Creatinine Ratio 18.6     Anion Gap 15.0 mmol/L      eGFR 13.9 mL/min/1.73     Narrative:      GFR Categories in Chronic Kidney Disease (CKD)      GFR Category          GFR (mL/min/1.73)    Interpretation  G1                     90 or greater         Normal or high (1)  G2                      60-89                Mild decrease (1)  G3a                   45-59                Mild to moderate decrease  G3b                   30-44                Moderate to severe decrease  G4                    15-29                Severe decrease  G5                    14 or less           Kidney failure          (1)In the absence of evidence of kidney disease, neither GFR category G1 or G2 fulfill the criteria for CKD.    eGFR calculation 2021 CKD-EPI creatinine equation, which does not include race as a factor    Hemoglobin A1c [[id number]]  (Abnormal) Collected: 02/07/25 0658    Specimen: Blood Updated: 02/07/25 0747     Hemoglobin A1C 7.80 %     Narrative:      Hemoglobin A1C Ranges:    Increased Risk for Diabetes  5.7% to 6.4%  Diabetes                     >= 6.5%  Diabetic Goal                < 7.0%    CBC & Differential [[id number]]  (Abnormal) Collected: 02/07/25 0658    Specimen:  Blood Updated: 02/07/25 0732    Narrative:      The following orders were created for panel order CBC & Differential.  Procedure                               Abnormality         Status                     ---------                               -----------         ------                     CBC Auto Differential[[id number]]        Abnormal            Final result                 Please view results for these tests on the individual orders.    CBC Auto Differential [[id number]]  (Abnormal) Collected: 02/07/25 0658    Specimen: Blood Updated: 02/07/25 0732     WBC 8.47 10*3/mm3      RBC 3.29 10*6/mm3      Hemoglobin 9.4 g/dL      Hematocrit 27.9 %      MCV 84.8 fL      MCH 28.6 pg      MCHC 33.7 g/dL      RDW 14.5 %      RDW-SD 44.4 fl      MPV 9.2 fL      Platelets 229 10*3/mm3      Neutrophil % 76.0 %      Lymphocyte % 8.6 %      Monocyte % 8.5 %      Eosinophil % 6.1 %      Basophil % 0.1 %      Immature Grans % 0.7 %      Neutrophils, Absolute 6.43 10*3/mm3      Lymphocytes, Absolute 0.73 10*3/mm3      Monocytes, Absolute 0.72 10*3/mm3      Eosinophils, Absolute 0.52 10*3/mm3      Basophils, Absolute 0.01 10*3/mm3      Immature Grans, Absolute 0.06 10*3/mm3      nRBC 0.0 /100 WBC     POC Glucose Once [[id number]]  (Abnormal) Collected: 02/07/25 0554    Specimen: Blood Updated: 02/07/25 0556     Glucose 185 mg/dL     POC Glucose Once [[id number]]  (Abnormal) Collected: 02/07/25 0117    Specimen: Blood Updated: 02/07/25 0118     Glucose 206 mg/dL     POC Glucose Once [[id number]]  (Abnormal) Collected: 02/06/25 2054    Specimen: Blood Updated: 02/06/25 2101     Glucose 254 mg/dL     POC Glucose Once [[id number]]  (Abnormal) Collected: 02/06/25 1541    Specimen: Blood Updated: 02/06/25 1544     Glucose 237 mg/dL     POC Glucose Once [[id number]]  (Abnormal) Collected: 02/06/25 1041    Specimen: Blood Updated: 02/06/25 1043     Glucose 275 mg/dL     Urine Culture - Urine, Urine, Clean Catch [[id number]] Collected: 02/04/25  1338    Specimen: Urine, Clean Catch Updated: 02/06/25 1017     Urine Culture >100,000 CFU/mL Normal Urogenital Jennie    Narrative:      Colonization of the urinary tract without infection is common. Treatment is discouraged unless the patient is symptomatic, pregnant, or undergoing an invasive urologic procedure.    Vitamin B12 [[id number]]  (Normal) Collected: 02/06/25 0554    Specimen: Blood Updated: 02/06/25 0702     Vitamin B-12 701 pg/mL     Narrative:      Results may be falsely increased if patient taking Biotin.      Folate [[id number]]  (Normal) Collected: 02/06/25 0554    Specimen: Blood Updated: 02/06/25 0702     Folate 6.34 ng/mL     Narrative:      Results may be falsely increased if patient taking Biotin.      Lipase [[id number]]  (Abnormal) Collected: 02/06/25 0554    Specimen: Blood Updated: 02/06/25 0657     Lipase 1,005 U/L     Comprehensive Metabolic Panel [[id number]]  (Abnormal) Collected: 02/06/25 0554    Specimen: Blood Updated: 02/06/25 0650     Glucose 164 mg/dL      BUN 73 mg/dL      Creatinine 3.99 mg/dL      Sodium 130 mmol/L      Potassium 4.2 mmol/L      Chloride 96 mmol/L      CO2 19.2 mmol/L      Calcium 8.4 mg/dL      Total Protein 6.6 g/dL      Albumin 3.1 g/dL      ALT (SGPT) 55 U/L      AST (SGOT) 42 U/L      Alkaline Phosphatase 232 U/L      Total Bilirubin 0.7 mg/dL      Globulin 3.5 gm/dL      A/G Ratio 0.9 g/dL      BUN/Creatinine Ratio 18.3     Anion Gap 14.8 mmol/L      eGFR 14.4 mL/min/1.73     Narrative:      GFR Categories in Chronic Kidney Disease (CKD)      GFR Category          GFR (mL/min/1.73)    Interpretation  G1                     90 or greater         Normal or high (1)  G2                      60-89                Mild decrease (1)  G3a                   45-59                Mild to moderate decrease  G3b                   30-44                Moderate to severe decrease  G4                    15-29                Severe decrease  G5                    14 or less            Kidney failure          (1)In the absence of evidence of kidney disease, neither GFR category G1 or G2 fulfill the criteria for CKD.    eGFR calculation 2021 CKD-EPI creatinine equation, which does not include race as a factor    Magnesium [[id number]]  (Normal) Collected: 02/06/25 0554    Specimen: Blood Updated: 02/06/25 0650     Magnesium 2.1 mg/dL     Iron Profile [[id number]]  (Abnormal) Collected: 02/06/25 0554    Specimen: Blood Updated: 02/06/25 0650     Iron 38 mcg/dL      Iron Saturation (TSAT) 18 %      Transferrin 143 mg/dL      TIBC 213 mcg/dL     POC Glucose Once [[id number]]  (Abnormal) Collected: 02/06/25 0635    Specimen: Blood Updated: 02/06/25 0636     Glucose 182 mg/dL     CBC & Differential [[id number]]  (Abnormal) Collected: 02/06/25 0554    Specimen: Blood Updated: 02/06/25 0626    Narrative:      The following orders were created for panel order CBC & Differential.  Procedure                               Abnormality         Status                     ---------                               -----------         ------                     CBC Auto Differential[[id number]]        Abnormal            Final result                 Please view results for these tests on the individual orders.    CBC Auto Differential [[id number]]  (Abnormal) Collected: 02/06/25 0554    Specimen: Blood Updated: 02/06/25 0626     WBC 10.20 10*3/mm3      RBC 3.27 10*6/mm3      Hemoglobin 9.4 g/dL      Hematocrit 28.0 %      MCV 85.6 fL      MCH 28.7 pg      MCHC 33.6 g/dL      RDW 14.8 %      RDW-SD 46.1 fl      MPV 9.6 fL      Platelets 196 10*3/mm3      Neutrophil % 79.8 %      Lymphocyte % 8.2 %      Monocyte % 7.6 %      Eosinophil % 3.5 %      Basophil % 0.2 %      Immature Grans % 0.7 %      Neutrophils, Absolute 8.13 10*3/mm3      Lymphocytes, Absolute 0.84 10*3/mm3      Monocytes, Absolute 0.78 10*3/mm3      Eosinophils, Absolute 0.36 10*3/mm3      Basophils, Absolute 0.02 10*3/mm3      Immature Grans,  Absolute 0.07 10*3/mm3      nRBC 0.0 /100 WBC     POC Glucose Once [[id number]]  (Abnormal) Collected: 02/05/25 2051    Specimen: Blood Updated: 02/05/25 2053     Glucose 156 mg/dL     POC Glucose Once [[id number]]  (Abnormal) Collected: 02/05/25 1558    Specimen: Blood Updated: 02/05/25 1600     Glucose 234 mg/dL     POC Glucose Once [[id number]]  (Abnormal) Collected: 02/05/25 1102    Specimen: Blood Updated: 02/05/25 1103     Glucose 199 mg/dL     Comprehensive Metabolic Panel [[id number]]  (Abnormal) Collected: 02/05/25 0555    Specimen: Blood Updated: 02/05/25 0707     Glucose 226 mg/dL      BUN 80 mg/dL      Creatinine 4.76 mg/dL      Sodium 133 mmol/L      Potassium 4.1 mmol/L      Chloride 98 mmol/L      CO2 21.6 mmol/L      Calcium 8.4 mg/dL      Total Protein 6.4 g/dL      Albumin 3.0 g/dL      ALT (SGPT) 54 U/L      AST (SGOT) 34 U/L      Alkaline Phosphatase 184 U/L      Total Bilirubin 0.5 mg/dL      Globulin 3.4 gm/dL      A/G Ratio 0.9 g/dL      BUN/Creatinine Ratio 16.8     Anion Gap 13.4 mmol/L      eGFR 11.6 mL/min/1.73     Narrative:      GFR Categories in Chronic Kidney Disease (CKD)      GFR Category          GFR (mL/min/1.73)    Interpretation  G1                     90 or greater         Normal or high (1)  G2                      60-89                Mild decrease (1)  G3a                   45-59                Mild to moderate decrease  G3b                   30-44                Moderate to severe decrease  G4                    15-29                Severe decrease  G5                    14 or less           Kidney failure          (1)In the absence of evidence of kidney disease, neither GFR category G1 or G2 fulfill the criteria for CKD.    eGFR calculation 2021 CKD-EPI creatinine equation, which does not include race as a factor    Magnesium [[id number]]  (Normal) Collected: 02/05/25 0555    Specimen: Blood Updated: 02/05/25 0707     Magnesium 2.0 mg/dL     Lipase [[id number]]  (Abnormal)  Collected: 02/05/25 0555    Specimen: Blood Updated: 02/05/25 0657     Lipase 1,603 U/L     Cortisol [[id number]] Collected: 02/05/25 0555    Specimen: Blood Updated: 02/05/25 0653     Cortisol 19.20 mcg/dL     Narrative:      Cortisol Reference Ranges:    Cortisol 6AM - 10AM Range: 6.02-18.40 mcg/dl  Cortisol 4PM - 8PM Range: 2.68-10.50 mcg/dl      Results may be falsely increased if patient taking Biotin.      Phosphorus [[id number]]  (Normal) Collected: 02/05/25 0555    Specimen: Blood Updated: 02/05/25 0650     Phosphorus 4.1 mg/dL     Uric Acid [[id number]]  (Abnormal) Collected: 02/05/25 0555    Specimen: Blood Updated: 02/05/25 0650     Uric Acid 9.0 mg/dL     CBC & Differential [[id number]]  (Abnormal) Collected: 02/05/25 0555    Specimen: Blood Updated: 02/05/25 0635    Narrative:      The following orders were created for panel order CBC & Differential.  Procedure                               Abnormality         Status                     ---------                               -----------         ------                     CBC Auto Differential[[id number]]        Abnormal            Final result                 Please view results for these tests on the individual orders.    CBC Auto Differential [[id number]]  (Abnormal) Collected: 02/05/25 0555    Specimen: Blood Updated: 02/05/25 0635     WBC 9.53 10*3/mm3      RBC 3.23 10*6/mm3      Hemoglobin 8.9 g/dL      Hematocrit 27.6 %      MCV 85.4 fL      MCH 27.6 pg      MCHC 32.2 g/dL      RDW 15.2 %      RDW-SD 47.2 fl      MPV 9.8 fL      Platelets 162 10*3/mm3      Neutrophil % 86.1 %      Lymphocyte % 5.2 %      Monocyte % 6.3 %      Eosinophil % 1.6 %      Basophil % 0.2 %      Immature Grans % 0.6 %      Neutrophils, Absolute 8.20 10*3/mm3      Lymphocytes, Absolute 0.50 10*3/mm3      Monocytes, Absolute 0.60 10*3/mm3      Eosinophils, Absolute 0.15 10*3/mm3      Basophils, Absolute 0.02 10*3/mm3      Immature Grans, Absolute 0.06 10*3/mm3      nRBC 0.0 /100  WBC     POC Glucose Once [[id number]]  (Abnormal) Collected: 02/05/25 0628    Specimen: Blood Updated: 02/05/25 0630     Glucose 228 mg/dL     POC Glucose Once [[id number]]  (Abnormal) Collected: 02/04/25 2141    Specimen: Blood Updated: 02/04/25 2142     Glucose 248 mg/dL     POC Glucose Once [[id number]]  (Abnormal) Collected: 02/04/25 1755    Specimen: Blood Updated: 02/04/25 1757     Glucose 225 mg/dL     Lactic Acid, Plasma [[id number]]  (Normal) Collected: 02/04/25 1552    Specimen: Blood Updated: 02/04/25 1622     Lactate 1.8 mmol/L     Osmolality, Urine - Urine, Clean Catch [[id number]] Collected: 02/04/25 1338    Specimen: Urine, Clean Catch Updated: 02/04/25 1531     Osmolality, Urine 359 mOsm/kg     Narrative:      Osmo Normal Reference Ranges:    Random:  mOsm/kg H2O, depending on fluid intake.  Random: >850 mOsm/kg H20, after 12 hour fluid restriction.    24 Hour: 300-900 mOsm/kg H2O.    Sodium, Urine, Random - Urine, Clean Catch [[id number]] Collected: 02/04/25 1338    Specimen: Urine, Clean Catch Updated: 02/04/25 1519     Sodium, Urine 26 mmol/L     Narrative:      Reference intervals for random urine have not been established.  Clinical usage is dependent upon physician's interpretation in combination with other laboratory tests.       Protein / Creatinine Ratio, Urine - Urine, Clean Catch [[id number]]  (Abnormal) Collected: 02/04/25 1338    Specimen: Urine, Clean Catch Updated: 02/04/25 1519     Protein/Creatinine Ratio, Urine 1,037.5 mg/G Crea      Creatinine, Urine 109.4 mg/dL      Total Protein, Urine 113.5 mg/dL     Beta Hydroxybutyrate Quantitative [[id number]]  (Normal) Collected: 02/04/25 1202    Specimen: Blood from Arm, Left Updated: 02/04/25 1510     Beta-Hydroxybutyrate Quant 0.210 mmol/L     Narrative:      In the assessment of possible diabetic ketoacidosis, the test should be interpreted along with other clinical and laboratory findings.  A level greater than 1 mmol/L should require  further evaluation and levels of more than 3 mmol/L require immediate medical review.    Lipase [[id number]]  (Abnormal) Collected: 02/04/25 1202    Specimen: Blood from Arm, Left Updated: 02/04/25 1503     Lipase 2,610 U/L     Osmolality, Serum [[id number]]  (Normal) Collected: 02/04/25 1202    Specimen: Blood from Arm, Left Updated: 02/04/25 1440     Osmolality 299 mOsm/kg     Urinalysis With Culture If Indicated - Urine, Clean Catch [[id number]]  (Abnormal) Collected: 02/04/25 1338    Specimen: Urine, Clean Catch Updated: 02/04/25 1354     Color, UA Yellow     Appearance, UA Cloudy     pH, UA 6.5     Specific Gravity, UA 1.016     Glucose, UA Negative     Ketones, UA Negative     Bilirubin, UA Negative     Blood, UA Moderate (2+)     Protein,  mg/dL (2+)     Leuk Esterase, UA Large (3+)     Nitrite, UA Positive     Urobilinogen, UA 1.0 E.U./dL    Narrative:      In absence of clinical symptoms, the presence of pyuria, bacteria, and/or nitrites on the urinalysis result does not correlate with infection.    Urinalysis, Microscopic Only - Urine, Clean Catch [[id number]]  (Abnormal) Collected: 02/04/25 1338    Specimen: Urine, Clean Catch Updated: 02/04/25 1354     RBC, UA Too Numerous to Count /HPF      WBC, UA Too Numerous to Count /HPF      Bacteria, UA None Seen /HPF      Squamous Epithelial Cells, UA 0-2 /HPF      Hyaline Casts, UA 7-12 /LPF      Methodology Automated Microscopy    Comprehensive Metabolic Panel [[id number]]  (Abnormal) Collected: 02/04/25 1202    Specimen: Blood from Arm, Left Updated: 02/04/25 1243     Glucose 277 mg/dL      BUN 70 mg/dL      Creatinine 5.16 mg/dL      Sodium 123 mmol/L      Potassium 5.1 mmol/L      Chloride 93 mmol/L      CO2 13.2 mmol/L      Calcium 9.3 mg/dL      Total Protein 8.0 g/dL      Albumin 3.5 g/dL      ALT (SGPT) 78 U/L      AST (SGOT) 63 U/L      Alkaline Phosphatase 219 U/L      Total Bilirubin 0.7 mg/dL      Globulin 4.5 gm/dL      A/G Ratio 0.8 g/dL       BUN/Creatinine Ratio 13.6     Anion Gap 16.8 mmol/L      eGFR 10.6 mL/min/1.73     Narrative:      GFR Categories in Chronic Kidney Disease (CKD)      GFR Category          GFR (mL/min/1.73)    Interpretation  G1                     90 or greater         Normal or high (1)  G2                      60-89                Mild decrease (1)  G3a                   45-59                Mild to moderate decrease  G3b                   30-44                Moderate to severe decrease  G4                    15-29                Severe decrease  G5                    14 or less           Kidney failure          (1)In the absence of evidence of kidney disease, neither GFR category G1 or G2 fulfill the criteria for CKD.    eGFR calculation 2021 CKD-EPI creatinine equation, which does not include race as a factor    Magnesium [[id number]]  (Normal) Collected: 02/04/25 1202    Specimen: Blood from Arm, Left Updated: 02/04/25 1243     Magnesium 2.0 mg/dL     Phosphorus [[id number]]  (Normal) Collected: 02/04/25 1202    Specimen: Blood from Arm, Left Updated: 02/04/25 1243     Phosphorus 3.8 mg/dL     CBC & Differential [[id number]]  (Abnormal) Collected: 02/04/25 1202    Specimen: Blood from Arm, Left Updated: 02/04/25 1233    Narrative:      The following orders were created for panel order CBC & Differential.  Procedure                               Abnormality         Status                     ---------                               -----------         ------                     CBC Auto Differential[[id number]]        Abnormal            Final result                 Please view results for these tests on the individual orders.    CBC Auto Differential [[id number]]  (Abnormal) Collected: 02/04/25 1202    Specimen: Blood from Arm, Left Updated: 02/04/25 1233     WBC 15.48 10*3/mm3      RBC 3.89 10*6/mm3      Hemoglobin 11.0 g/dL      Hematocrit 32.7 %      MCV 84.1 fL      MCH 28.3 pg      MCHC 33.6 g/dL      RDW 14.6 %      RDW-SD  44.0 fl      MPV 9.7 fL      Platelets 262 10*3/mm3      Neutrophil % 90.8 %      Lymphocyte % 2.6 %      Monocyte % 5.1 %      Eosinophil % 0.9 %      Basophil % 0.1 %      Immature Grans % 0.5 %      Neutrophils, Absolute 14.05 10*3/mm3      Lymphocytes, Absolute 0.41 10*3/mm3      Monocytes, Absolute 0.79 10*3/mm3      Eosinophils, Absolute 0.14 10*3/mm3      Basophils, Absolute 0.01 10*3/mm3      Immature Grans, Absolute 0.08 10*3/mm3      nRBC 0.0 /100 WBC     [place] [facility] [[id number]] Collected: 02/04/25 1202    Specimen: Blood from Arm, Left Updated: 02/04/25 1215    Narrative:      The following orders were created for panel order [place] [facility].  Procedure                               Abnormality         Status                     ---------                               -----------         ------                     Green Top (Gel)[[id number]]                                  Final result               Lavender Top[[id number]]                                     Final result               Gold Top - SST[[id number]]                                   Final result               Light Blue Top[[id number]]                                   Final result                 Please view results for these tests on the individual orders.    Green Top (Gel) [[id number]] Collected: 02/04/25 1202    Specimen: Blood from Arm, Left Updated: 02/04/25 1215     Extra Tube Hold for add-ons.     Comment: Auto resulted.       Lavender Top [[id number]] Collected: 02/04/25 1202    Specimen: Blood from Arm, Left Updated: 02/04/25 1215     Extra Tube hold for add-on     Comment: Auto resulted       Gold Top - SST [[id number]] Collected: 02/04/25 1202    Specimen: Blood from Arm, Left Updated: 02/04/25 1215     Extra Tube Hold for add-ons.     Comment: Auto resulted.       Light Blue Top [[id number]] Collected: 02/04/25 1202    Specimen: Blood from Arm, Left Updated: 02/04/25 1215     Extra Tube Hold for add-ons.     Comment: Auto resulted                Imaging Results (All)       Procedure Component Value Units Date/Time    FL Retrograde Pyelogram In OR [[id number]] Collected: 02/07/25 1736     Updated: 02/10/25 0556    Narrative:      RETROGRADE PYELOGRAM INTERPRETATION ONLY 02/07/2025     HISTORY: Retrograde pyelogram. Ureteral stent removal     FINDINGS: There is contrast material in both collecting systems and  ureters. Bilateral ureteral stents appear to have been removed. No  unexpected findings are noted.. There is some contrast material in the  urinary bladder.     Fluoroscopy time 24 seconds 2.6 mGy 4 images        This report was finalized on 2/10/2025 5:53 AM by Dr. Rajiv Canela M.D on Workstation: [id number]       US Gallbladder [[id number]] Collected: 02/05/25 0907     Updated: 02/05/25 1315    Narrative:      US GALLBLADDER-     CLINICAL INFORMATION: Elevated liver enzymes, pancreatitis, gallstones.     Correlation with 02/04/2025 CT of the abdomen.     ULTRASOUND FINDINGS: Only a small portion of the pancreas could be  visualized, much of the pancreas obscured due to gas within the  overlying stomach and bowel. Visualized portion fails to demonstrate  ductal dilatation and the echotexture is within normal limits. No  peripancreatic fluid collection seen.     There are shadowing gallstones within the gallbladder, no  pericholecystic fluid nor biliary duct dilatation, CBD diameter is 4 mm.     Antegrade flow within the main portal vein seen on color Doppler and  spectral analysis. Liver size appears within normal limits. Hyperechoic  solid liver nodule measures 17 mm in maximum diameter. Given its  echotexture, suspect hepatic hemangioma. This can be confirmed with  dedicated liver CT or MRI.     The right kidney is 12.3 cm in length. The cortical echotexture is  within normal limits. No obstructive uropathy or stone. Stent in  position.     No free fluid within the upper abdomen. The remainder is unremarkable.     This report was  finalized on 2/5/2025 1:12 PM by Dr. Elliot Najera M.D  on Workstation: [id number]       CT Abdomen Pelvis Without Contrast [[id number]] Collected: 02/04/25 1412     Updated: 02/04/25 1433    Narrative:      CT ABDOMEN PELVIS WO CONTRAST-     DATE OF EXAM: 2/4/2025 1:44 PM     INDICATION: UTI with bilateral ureteral stents, elevated WBC, elevated  Cr.     COMPARISON: CT 8/11/2024 and 8/4/2024.     TECHNIQUE: Multiple contiguous axial images were acquired through the  abdomen and pelvis without the intravenous administration of contrast  per reformatted coronal and sagittal sequences were also reviewed.  Radiation dose reduction techniques were utilized, including automated  exposure control and exposure modulation based on body size.     FINDINGS:  Respiratory motion limits evaluation of the lung bases. Multifocal  bibasilar subsegmental atelectasis and/scarring. Partially imaged severe  calcified coronary artery disease. Relative hyperdensity of the  interventricular septum suggest anemia.     Cholelithiasis without specific CT evidence of acute cholecystitis. The  liver and spleen are unremarkable. Moderate peripancreatic fat  stranding. Nonspecific small cystic low-density lesion in the pancreatic  body, measuring up to 2 cm craniocaudal (coronal series 3 image 51).  Mild likely benign nodular thickening of the left adrenal gland. The  right adrenal gland is unremarkable in limited noncontrast CT  appearance. Bilateral ureteral stents in place. Both kidneys are  otherwise unremarkable in limited noncontrast CT appearance. The urinary  bladder is nondistended. Urinary bladder wall thickening may be  accentuated by underdistention but could reflect sequela of chronic  bladder outlet obstruction. Small right-sided urinary bladder  diverticulum. Enlarged prostate gland, measuring 6 cm in transverse  diameter, with mild mass effect on the floor the urinary bladder.     Mild diffuse gastric wall thickening is likely  accentuated by under  distention. Mild colorectal stool. Colonic diverticula, without CT  evidence of diverticulitis. No bowel obstruction. The appendix is  normal.     No free fluid in the abdomen or pelvis. No free intraperitoneal air. No  pathologically enlarged lymph nodes in the abdomen or pelvis. Stable  soft tissue attenuation in the left inguinal canal.     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 Similar-appearing lumbar levoscoliosis and  multilevel lumbar spondylosis. No acute osseous abnormality or  concerning osseous lesion.       Impression:         1. Moderate peripancreatic fat stranding, which is nonspecific but could  reflect pancreatitis. Recommend correlating with amylase and lipase.  2. Nonspecific 2 cm low-density lesion in the body of the pancreas,  which could be further evaluated with MRI if clinically indicated.  3. Bilateral ureteral stents in place.  4. Enlarged prostate gland with urinary bladder wall thickening that  could be accentuated by underdistention but could reflect sequela of  chronic bladder outlet obstruction and/or cystitis. Urinary bladder  diverticulum indicating sequela of chronic bladder outlet obstruction.  Correlate with urinalysis.     This report was finalized on 2/4/2025 2:30 PM by Dami Ramos MD on  Workstation: [id number]               Assessment & Plan         ARF (acute renal failure)    Acute renal failure        Plan   - creatinine stable  - no acute urologic issues at this time  - frequency is likely related to residual inflammation after his procedure and high fluid intake. I expect this will gradually improve over the next few weeks  - will follow remotely. Please call with questions or concerns.    Ryan Gastelum Jr., MD  02/10/25  07:42 EST        Electronically signed by Ryan Gastelum Jr., MD at 02/10/25 8164       Ryan Gutierrez MD at 02/09/25 3842              Feeling well. No  complaints. Eating a regular diet without issue.      Af, vss, Blood sugars 184-309 with most recent 309     Collins x 3.  No jvd.  Heart RRR.  Lungs clear.  Abdomen NT, ND, bs +.  Ext without edema.     Na 134, BUN 83, Cr 3.54  Lipase 619, alk phos 265, alt 51  Hb 8.7     Assessment and plan  1.  Acute renal failure-creatinine improving since stent removal.  Nephrology continues to follow.  Etiology likely related to some hypovolemia, hypotension on top of his underlying hypertensive nephrosclerosis. Bp stable.  2.  Pancreatitis-no hypertriglyceridemia, etoh use, meds etc. Lipase slightly higher but alkaline phosphatase and liver enzymes Improving.  He remains nontender and tolerated a regular diet yesterday.  Plan is for laparoscopic cholecystectomy Tuesday if stable from nephrology standpoint.  Surgery continues to follow  3.  ID-blood cultures negative.  Urine cultures with mixed urogenital frank.  Most recent ua without wbc for the first time in a while.  4.  Anemia-hemoglobin slightly imporved.  Multi factorial.  Back on oral iron  5.  Diabetes-blood sugar trending back down after stent removal though eating more now.  Going to increase basal insulin for better control during hospitalization.  6.  Dementia-stable today.  Family plans to keep sitter at bedside to help control behaviors.  7.  Hiccups-resolved.  8.  Continue DVT prophylaxis.                   Electronically signed by Ryan Gutierrez MD at 02/09/25 1205       Vimal Bryant MD at 02/09/25 1104          Colorectal & General Surgery  Progress Note    Patient: Froy Ngo  YOB: 1945  MRN: [id number]      Assessment  Froy Ngo is a 79 y.o. male with gallstone pancreatitis.  He is doing well from that standpoint.  Renal function continues to slowly improve.  Hyperglycemia has somewhat improved from yesterday.     Plan to pursue laparoscopic cholecystectomy prior to discharge to prevent any further episode of  gallstone pancreatitis in the future.  Anticipate doing this on Tuesday depending on his overall clinical status, but given his vast improvement from yesterday to today, I think he will be ready.    Okay to continue regular diet and agree with remainder of care.    Please feel free to contact me anytime.    I will discuss all this with Dr. Gutierrez today.      Subjective  No significant events.  Feels great today.    Objective    Vitals:    25 0749   BP: 122/70   Pulse:    Resp: 18   Temp: 97.9 °F (36.6 °C)   SpO2:        Physical Exam  Constitutional: Well-developed well-nourished, no acute distress  Neck: Supple, trachea midline  Respiratory: No increased work of breathing, Symmetric excursion  Cardiovascular: Well pefursed, no jugular venous distention evident   Abdominal: Very benign exam.  Soft, non-tender, non-distended  Skin: Warm, dry, no rash on visualized skin surfaces  Psychiatric: Alert and oriented ×3, normal affect     Laboratory Results  I have personally reviewed CBC with Ocie 8, Humoryl and 8, plates 273.  Lipase 619.  CMP with creatinine 3.5, bicarb 23, AST 37, ALT 51, total bilirubin 0.3.  Glucose 207.    Radiology  None to review         Finesse Bryant MD  Colorectal & General Surgery  [facility]    [address], [address]  [place], [place], [facility]  P: [phone]  F: [phone]       Electronically signed by Vimal Bryant MD at 25 1106       Louie Titus MD at 25 0858              [facility] Progress Note      Patient Name: Froy Ngo  : 1945  MRN: [id number]  Primary Care Physician:  Ryan Gutierrez MD  Date of admission: 2025    Subjective     Interval History:   Follow-up NEHEMIAS on CKD    Denies chest pain, SOA, N/V/D      Review of Systems:   As noted above    Objective     Vitals:   Temp:  [97.1 °F (36.2 °C)-97.9 °F (36.6 °C)] 97.9 °F (36.6 °C)  Heart Rate:  [65-77] 65  Resp:  [18] 18  BP:  (110126)/(63-82) 122/70    Intake/Output Summary (Last 24 hours) at 2/9/2025 0858  Last data filed at 2/8/2025 2100  Gross per 24 hour   Intake 360 ml   Output 400 ml   Net -40 ml       Physical Exam:    General Appearance: Awake, pleasant, chronically ill, MO, no acute distress   Skin: warm and dry  HEENT: oral mucosa normal, nonicteric sclera  Neck: supple, no JVD  Lungs: CTA, nonlabored on RA  Heart: RRR, no rub, no murmur, no carotid bruits  Abdomen: soft, nontender, nondistended, BS +  : no palpable bladder  Extremities: no edema, cyanosis or clubbing  Neuro: Rambling speech, moves all extremities    Scheduled Meds:     atorvastatin, 10 mg, Oral, Daily  donepezil, 10 mg, Oral, Nightly  ferrous sulfate, 325 mg, Oral, Daily With Breakfast  finasteride, 5 mg, Oral, Daily  heparin (porcine), 5,000 Units, Subcutaneous, Q12H  insulin glargine, 12 Units, Subcutaneous, Nightly  insulin lispro, 4-24 Units, Subcutaneous, 4x Daily AC & at Bedtime  insulin lispro, 7 Units, Subcutaneous, TID With Meals  memantine, 5 mg, Oral, Daily  sodium chloride, 10 mL, Intravenous, Q12H  tamsulosin, 0.4 mg, Oral, Daily  torsemide, 40 mg, Oral, Daily      IV Meds:          Results Reviewed:   I have personally reviewed the results from the time of this admission to 2/9/2025 08:58 EST     Results from last 7 days   Lab Units 02/09/25  0611 02/08/25  0510 02/07/25  0658   SODIUM mmol/L 134* 127* 130*   POTASSIUM mmol/L 3.4* 4.1 3.9   CHLORIDE mmol/L 95* 92* 93*   CO2 mmol/L 23.0 22.0 22.0   BUN mg/dL 83* 77* 76*   CREATININE mg/dL 3.54* 3.67* 4.09*   CALCIUM mg/dL 8.4* 8.3* 8.3*   BILIRUBIN mg/dL 0.3 0.4 0.8   ALK PHOS U/L 265* 297* 353*   ALT (SGPT) U/L 51* 54* 72*   AST (SGOT) U/L 37 31 58*   GLUCOSE mg/dL 207* 507* 184*       Estimated Creatinine Clearance: 20.4 mL/min (A) (by C-G formula based on SCr of 3.54 mg/dL (H)).    Results from last 7 days   Lab Units 02/08/25  0510 02/06/25  0554 02/05/25  0555 02/04/25  1202   MAGNESIUM  mg/dL 1.9 2.1 2.0 2.0   PHOSPHORUS mg/dL 5.1*  --  4.1 3.8       Results from last 7 days   Lab Units 02/05/25  0555   URIC ACID mg/dL 9.0*       Results from last 7 days   Lab Units 02/09/25  0611 02/08/25  0510 02/07/25  0658 02/06/25  0554 02/05/25  0555   WBC 10*3/mm3 8.24 3.87 8.47 10.20 9.53   HEMOGLOBIN g/dL 8.7* 7.9* 9.4* 9.4* 8.9*   PLATELETS 10*3/mm3 273 214 229 196 162             Assessment / Plan     ASSESSMENT:   NEHEMIAS on CKD 3B, baseline Scr 1.7-2.0, secondary to prior AKIs d/t postobstructive uropathy, and longstanding hypertensive/diabetic nephrosclerosis.  Acute etiology is likely multifactorial: UTI followed by  hypotension limiting renal perfusion. CT abdomen/pelvis did not reveal hydro; euvolemic  Acute on chronic hyponatremia, likely multifactorial: excessive PO fluid intake, poor solute intake, and NEHEMIAS on CKD, improved  Hx post obstructive uropathy, s/p bilateral ureteral stent placement 8/30/2024, s/p prostate resection 12/13/2024, Rodriguez was removed 3 to 4 weeks ago per family report. CT abd/pel suggested enlarged prostate with possible cystitis / Prostatitis  due to recurrent UTI ?;  Urology planning on ureter stent change today (2/7)   UTI, failed outpatient management, on IV Rocephin, urine cultures negative.  Possible prostatitis due to recurrent antibiotics  Type II DM with CKD, managed by primary team  Hypotension, will hold amlodipin and hydralazine. Cortisol did not suggest adrenal insufficiency   Transaminases from hypotension, managed by primary team  Dementia, on namenda at home  Pancreatitis/elevated lipase, evaluated by general surgery, planning on laparoscopic cholecystectomy prior to discharge (no sooner than Sunday)    PLAN:    Kidney function is improving . Ureter stent exchange 2/07/2025  NA is improving. Managements of BS by the primary  Volume status is better. Will hold on Torsemide    Replete potassium. Adjusted CA is wnl . D/w nursing staff  Surveillance labs    Thank you  for involving us in the care of Froy Ngo.  Please feel free to call with any questions.    Louie Titus MD  02/09/25  08:58 [facility]    [facility]  [phone]    Please note that portions of this note were completed with a voice recognition program.    Electronically signed by Louie Titus MD at 02/09/25 1417       Ryan Gutierrez MD at 02/08/25 1236                Feeling well. No complaints. Eating a cheeseburger, fries, and ice cream watching the [employer] game. Wife feels he has made a wonderful recovery.    Af, vss, Blood sugars 258-520 with most recent 258    Collins x 3.  No jvd.  Heart RRR.  Lungs clear.  Abdomen NT, ND, bs +.  Ext without edema.    Na 127, BUN 77, Cr 3.67  Lipase 424, alk phos 297, alt 54  Hb 7.9    Assessment and plan  1.  Acute renal failure-creatinine improving since stent removal.  Nephrology continues to follow.  Etiology likely related to some hypovolemia, probable hypotensive episode on top of his underlying hypertensive nephrosclerosis. Bp improving.  2.  Pancreatitis-lipase continues to improve but alkaline phosphatase and liver enzymes slightly elevated  He remains nontender and tolerated a regular diet yesterday.  Plan is for laparoscopic cholecystectomy when stable from nephrology standpoint.  Surgery continues to follow  3.  ID-blood cultures negative.  Urine cultures with mixed urogenital frank.  Going for stent exchange today.  To be able to DC Rocephin and follow white count and temperature curve  4.  Anemia-hemoglobin down some.  Multi factorial.  Back on oral iron  5.  Diabetes-blood sugar trending back down after procedure yesterday.  Going to add some basal insulin for better control during hospitalization.  6.  Dementia-much better today.  Family plans to keep sitter at bedside to help control behaviors.  7.  Hiccups-resolved.  8.  Continue DVT prophylaxis.    Electronically signed by Ryan Gutierrez MD at 02/08/25 6711        Louie Titus MD at 25 0819              [facility] Progress Note      Patient Name: Froy Ngo  : 1945  MRN: [id number]  Primary Care Physician:  Ryan Gutierrez MD  Date of admission: 2025    Subjective     Interval History:   Follow-up NEHEMIAS on CKD    Denies chest pain, SOA, N/V/D  Being cleaned up by the staff  No SOA or LE swelling    Review of Systems:   As noted above    Objective     Vitals:   Temp:  [97.5 °F (36.4 °C)-99.7 °F (37.6 °C)] 97.5 °F (36.4 °C)  Heart Rate:  [] 84  Resp:  [15-23] 18  BP: ()/(60-82) 144/82  Flow (L/min) (Oxygen Therapy):  [4] 4    Intake/Output Summary (Last 24 hours) at 2025 0820  Last data filed at 2025  Gross per 24 hour   Intake 237 ml   Output 790 ml   Net -553 ml       Physical Exam:    General Appearance: Awake, pleasant, chronically ill, MO, no acute distress   Skin: warm and dry  HEENT: oral mucosa normal, nonicteric sclera  Neck: supple, no JVD  Lungs: CTA, nonlabored on RA  Heart: RRR, no rub, no murmur, no carotid bruits  Abdomen: soft, nontender, nondistended, BS +  : no palpable bladder  Extremities: no edema, cyanosis or clubbing  Neuro: Rambling speech, moves all extremities    Scheduled Meds:     atorvastatin, 10 mg, Oral, Daily  donepezil, 10 mg, Oral, Nightly  ferrous sulfate, 325 mg, Oral, Daily With Breakfast  finasteride, 5 mg, Oral, Daily  heparin (porcine), 5,000 Units, Subcutaneous, Q12H  insulin glargine, 1-200 Units, Subcutaneous, Nightly - Glucommander  insulin lispro, 1-200 Units, Subcutaneous, With Meals, HS, AND Midsleep  memantine, 5 mg, Oral, Daily  sodium chloride, 10 mL, Intravenous, Q12H  tamsulosin, 0.4 mg, Oral, Daily  torsemide, 40 mg, Oral, Daily      IV Meds:          Results Reviewed:   I have personally reviewed the results from the time of this admission to 2025 08:20 EST     Results from last 7 days   Lab Units 25  0510 25  0658  02/06/25  0554   SODIUM mmol/L 127* 130* 130*   POTASSIUM mmol/L 4.1 3.9 4.2   CHLORIDE mmol/L 92* 93* 96*   CO2 mmol/L 22.0 22.0 19.2*   BUN mg/dL 77* 76* 73*   CREATININE mg/dL 3.67* 4.09* 3.99*   CALCIUM mg/dL 8.3* 8.3* 8.4*   BILIRUBIN mg/dL 0.4 0.8 0.7   ALK PHOS U/L 297* 353* 232*   ALT (SGPT) U/L 54* 72* 55*   AST (SGOT) U/L 31 58* 42*   GLUCOSE mg/dL 507* 184* 164*       Estimated Creatinine Clearance: 19.6 mL/min (A) (by C-G formula based on SCr of 3.67 mg/dL (H)).    Results from last 7 days   Lab Units 02/08/25  0510 02/06/25  0554 02/05/25  0555 02/04/25  1202   MAGNESIUM mg/dL 1.9 2.1 2.0 2.0   PHOSPHORUS mg/dL 5.1*  --  4.1 3.8       Results from last 7 days   Lab Units 02/05/25  0555   URIC ACID mg/dL 9.0*       Results from last 7 days   Lab Units 02/08/25  0510 02/07/25  0658 02/06/25  0554 02/05/25  0555 02/04/25  1202   WBC 10*3/mm3 3.87 8.47 10.20 9.53 15.48*   HEMOGLOBIN g/dL 7.9* 9.4* 9.4* 8.9* 11.0*   PLATELETS 10*3/mm3 214 229 196 162 262             Assessment / Plan     ASSESSMENT:   NEHEMIAS on CKD 3B, baseline Scr 1.7-2.0, secondary to prior AKIs d/t postobstructive uropathy, and longstanding hypertensive/diabetic nephrosclerosis.  Acute etiology is likely multifactorial: UTI followed by  hypotension limiting renal perfusion. CT abdomen/pelvis did not reveal hydro; euvolemic  Acute on chronic hyponatremia, likely multifactorial: excessive PO fluid intake, poor solute intake, and NEHEMIAS on CKD, improved  Hx post obstructive uropathy, s/p bilateral ureteral stent placement 8/30/2024, s/p prostate resection 12/13/2024, Rodriguez was removed 3 to 4 weeks ago per family report. CT abd/pel suggested enlarged prostate with possible cystitis / Prostatitis  due to recurrent UTI ?;  Urology planning on ureter stent change today (2/7)   UTI, failed outpatient management, on IV Rocephin, urine cultures negative.  Possible prostatitis due to recurrent antibiotics  Type II DM with CKD, managed by primary  team  Hypotension, will hold amlodipin and hydralazine. Cortisol did not suggest adrenal insufficiency   Transaminases from hypotension, managed by primary team  Dementia, on namenda at home  Pancreatitis/elevated lipase, evaluated by general surgery, planning on laparoscopic cholecystectomy prior to discharge (no sooner than )    PLAN:    Kidney function is improving . Ureter stent exchange 2025  NA is low likely due to hyperglycemia. Managements of BS by the primary  Cont Torsemide  daily  Surveillance labs    Thank you for involving us in the care of rFoy Ngo.  Please feel free to call with any questions.    Louie Titus MD  25  08:20 [facility]    [facility]  [phone]    Please note that portions of this note were completed with a voice recognition program.    Electronically signed by Louie Titus MD at 25 1633       Bhargavi Porter APRN at 25 2888       Attestation signed by Marbin Anderson MD at 25 8350    I have reviewed this documentation and agree.                      [facility] DAILY PROGRESS NOTE      Name: Froy Ngo  Age: 79 y.o.  Sex: male  :  1945  MRN: [id number]    Date: 2025             Chief complaint: No new complaints. Pleasant and appreciative of care. Voiding spontaneously without difficulty.    POD #1: s/p  cystoscopy with retrograde pyelogram and bilateral stent removal    - AVSS, good UOP  - WBC - 3.87 (8.47, 10.20, 9.53, 15.48)  - Hgb - 7.9 (9.4, 9.4, 8.9, 11.0)  - Cr - 3.67 (4.09, 3.99, 4.76, 5.16)    Physical Exam:    General Appearance:    Alert, cooperative,    Lungs:     Respirations unlabored, no wheezing   Abdomen:      Soft, NDNT, no palpable bladder distension, nontender   Neuro/Psych:   Orientation intact, mood/affect pleasant       Assessment:    BPH with LUTS s/p PAE  Hx of hydronephrosis    Plan:    - Patient voiding well. RN reports some bloody urine initially after  procedure but it has since cleared.  - Labs reviewed and discussed with patient and spouse. Continue to trend  - Blood glucose quite elevated today - management per primary.  - Noted plans for laparoscopic cholecystectomy prior to discharge per general surgery.  - Urology to follow.    Bhargavi Porter, APRN  2/8/2025  07:27 EST    Plan reviewed and discussed with Dr. Marbin Anderson.    Electronically signed by Marbin Anderson MD at 02/08/25 1537       Ritesh Farrell MD at 02/08/25 0712          Dr. Gutierrez will be rounding today but noted blood sugar significant spike this am on labs and called nurse. Nurse notes pt has had normal temp and vital signs. He did drink a regular coke last night but no glucose infusions.  Pt underwent ureteral stent removal yesterday and did well. He had started basal insulin of 10mg insulin glargine last night.  I'm checking urine for ketones and serum betahydroxybutyrate.  Serum CO2 looks ok.  I am placing on glucommander protocol for subcutaneous insulin.  If not responding may need insulin infusion, which apparently can only be done in the unit.      Electronically signed by Ritesh Farrell MD at 02/08/25 0716       Vimal Bryant MD at 02/08/25 0638          Colorectal & General Surgery  Progress Note    Patient: Froy Ngo  YOB: 1945  MRN: [id number]      Assessment  Froy Ngo is a 79 y.o. male with gallstone pancreatitis as well as acute renal failure and some delirium superimposed on dementia.    He is resting comfortably this morning, so I let him sleep.     Abdominal exam benign.  Lipase continues to downtrend.  Tolerating regular diet with no issues. He does have significant hyperglycemia this morning.  Creatinine slightly improved from yesterday after stent removal.    Depending on his clinical course, we will plan for laparoscopic cholecystectomy prior to discharge, but I do not think he is ready for that yet.    Okay to continue  regular diet from my standpoint.    Please call anytime with questions or concerns.    Subjective  Confused overnight and somewhat agitated.  Tolerated diet well.    Objective    Vitals:    02/07/25 2316   BP: 112/78   Pulse: 88   Resp: 18   Temp: 97.7 °F (36.5 °C)   SpO2: 97%       Physical Exam  Constitutional: Well-developed well-nourished, no acute distress  Neck: Supple, trachea midline  Respiratory: No increased work of breathing, Symmetric excursion  Cardiovascular: Well pefursed, no jugular venous distention evident   Abdominal: Soft, non-tender, non-distended  Skin: Warm, dry, no rash on visualized skin surfaces    Laboratory Results  I have personally reviewed lipase 424.  CBC WC 3, Humoryl and 7.9, platelet 214.  BMP with creatinine 3.67, bicarb 22, sodium 127.  Glucose up to 520.  AST 31, ALT 54, total bilirubin 0.4.    Radiology  None pertinent         Finesse Bryant MD  Colorectal & General Surgery  [facility] ([facility]    [address] [place] [address], [address]  [place], [place], [facility]  P: [phone]  F: [phone]       Electronically signed by Vimal Bryant MD at 02/08/25 0536

## 2025-02-10 NOTE — PROGRESS NOTES
Colorectal & General Surgery  Progress Note    Patient: Froy Ngo  YOB: 1945  MRN: [id number]      Assessment  Froy Ngo is a 79 y.o. male with gallstone pancreatitis.  Renal function continues to normalize.  Discussed with Dr. Mckenna today and plan for laparoscopic cholecystectomy with intraoperative cholangiogram tomorrow.  N.p.o. after midnight.  Discussed the risk, benefits, alternatives of the procedure with the patient himself today.  Informed consent was obtained.    Subjective  No significant events.  Feels well today.    Objective    Vitals:    02/10/25 1423   BP: 149/73   Pulse: 87   Resp: 16   Temp: 97.6 °F (36.4 °C)   SpO2: 98%       Physical Exam  Constitutional: Well-developed well-nourished, no acute distress  Neck: Supple, trachea midline  Respiratory: No increased work of breathing, Symmetric excursion  Cardiovascular: Well pefursed, no jugular venous distention evident   Abdominal: Soft, non-tender, non-distended  Skin: Warm, dry, no rash on visualized skin surfaces  Psychiatric: Alert and oriented ×3, normal affect     Laboratory Results  I have personally reviewed CBC with Ocie 8, hemoglobin 9, platelets 311.  Lipase 595.  CMP with creatinine 3.42, bicarb 23, albumin 3.1, AST 31, AST 49, total bilirubin 0.4.    Radiology  None to review       Finesse Bryant MD  Colorectal & General Surgery  [facility]    [address], [address]  [place], [place], [address]  P: [phone]  F: [phone]

## 2025-02-10 NOTE — PROGRESS NOTES
Feeling well. No complaints. Eating a regular diet without issue. Had a bowel movement.     Af, vss, Blood sugars 109-260 with most recent 260     Collins x 3.  No jvd.  Heart RRR.  Lungs clear.  Abdomen NT, ND, bs +.  Ext without edema.     Na 131, BUN 87, Cr 3.42  Lipase 595, alk phos 269, alt 49  Hb 9.2     Assessment and plan  1.  Acute renal failure-creatinine improving since stent removal.  Nephrology continues to follow.  Etiology likely related to some hypovolemia, hypotension on top of his underlying hypertensive nephrosclerosis. Bp stable.  2.  Pancreatitis-no hypertriglyceridemia, etoh use, meds etc. Lipase slightly better, alkaline phosphatase and liver enzymes stable.  He remains nontender and tolerated a regular diet yesterday.  Plan is for laparoscopic cholecystectomy tomorrow. Discussed with surgery.  3.  ID-blood cultures negative.  Urine cultures with mixed urogenital frank.  Most recent ua without wbc for the first time in a while.  4.  Anemia-hemoglobin slightly imporved.  Multi factorial.  Back on oral iron  5.  Diabetes-blood sugar trending back down after stent removal though eating more now.  Hopefully improve as pancreatitis resolves. Hope to restart meformin and monitor.   6.  Dementia-stable today.  Family plans to keep sitter at bedside to help control behaviors.  7.  Hiccups-resolved.  8.  Continue DVT prophylaxis.    Discussed with MARIA TERESA garza.

## 2025-02-10 NOTE — CONSULTS
"Diabetes Education  Assessment/Teaching    Patient Name:  Froy Ngo  YOB: 1945  MRN: [id number]  Admit Date:  2/4/2025      Assessment Date:  2/10/2025  Flowsheet Row Most Recent Value   General Information     Referral From: A1c, Database  [A1C 7.8%]   Height 182.9 cm (72\")   Height Method Stated   Weight 88 kg (194 lb)   Weight Method Stated   Pregnancy Assessment    Diabetes History    What type of diabetes do you have? Type 2   Length of Diabetes Diagnosis 10 + years   Current DM knowledge poor   Do you test your blood sugar at home? no   Education Preferences    Nutrition Information    Assessment Topics    DM Goals             Flowsheet Row Most Recent Value   DM Education Needs    Medication Oral  [glucophage]   Healthy Coping Appropriate   Discharge Plan Home, Follow-up with MD, Home Care   Motivation Moderate   Teaching Method Explanation, Discussion, Teach back, Handouts   Patient Response Verbalized understanding, Needs reinforcement   [family at visit]              Other Comments:  Spoke with pt and his family at bedside what his potential education needs might be upon discharge. Unclear at this time if pt would go home on insulin,and that would determine if pt needs a BG meter. MD would like to keep things simple with pts care.We will follow up closer to discharge         Electronically signed by:  Lolita Goldberg RN  02/10/25 13:33 EST  "

## 2025-02-10 NOTE — PLAN OF CARE
Problem: Adult Inpatient Plan of Care  Goal: Plan of Care Review  Outcome: Progressing  Flowsheets (Taken 2/10/2025 0347)  Progress: no change  Outcome Evaluation: Patient  resting  at this  time.  No complaints  of pain  or   discomfort  voiced.    Took  and  tolerated  meds  well.   BS  noted  and  treated.   Patient up  to  bathroom  with  standby  assist  of  staff and  caretaker.  insists  of   beingh  in  the   bathroom  alone  ignoring  safety   protocols.  Voices  understanding  of  this.   Remain  on  room  air.  SR  on  the  monitor.   Plan  for  lap  cholecystectomy    on  Tuesday. Nursing  will  comntinue  to monitor  Plan of Care Reviewed With: caregiver  Goal: Patient-Specific Goal (Individualized)  Outcome: Progressing  Goal: Absence of Hospital-Acquired Illness or Injury  Outcome: Progressing  Intervention: Identify and Manage Fall Risk  Recent Flowsheet Documentation  Taken 2/10/2025 0152 by Sabra Guillen RN  Safety Promotion/Fall Prevention:   safety round/check completed   room organization consistent  Taken 2/10/2025 0011 by Sabra Guillen RN  Safety Promotion/Fall Prevention:   fall prevention program maintained   room organization consistent   safety round/check completed  Taken 2/9/2025 2202 by Sabra Guillen RN  Safety Promotion/Fall Prevention: safety round/check completed  Taken 2/9/2025 2058 by Sabra Guillen RN  Safety Promotion/Fall Prevention:   assistive device/personal items within reach   clutter free environment maintained   fall prevention program maintained   nonskid shoes/slippers when out of bed   room organization consistent   safety round/check completed  Intervention: Prevent Skin Injury  Recent Flowsheet Documentation  Taken 2/10/2025 0152 by Sabra Guillen RN  Body Position:   position changed independently   side-lying   left  Taken 2/10/2025 0011 by Sabra Guillen RN  Body Position:   position changed independently    side-lying   left  Taken 2/9/2025 2202 by Sabra Guillen RN  Body Position:   position changed independently   sitting up in bed  Taken 2/9/2025 2058 by Sabra Guillen RN  Body Position: position changed independently  Intervention: Prevent Infection  Recent Flowsheet Documentation  Taken 2/9/2025 2058 by Sabra Guillen RN  Infection Prevention:   environmental surveillance performed   equipment surfaces disinfected   hand hygiene promoted   personal protective equipment utilized   rest/sleep promoted   single patient room provided  Goal: Optimal Comfort and Wellbeing  Outcome: Progressing  Intervention: Provide Person-Centered Care  Recent Flowsheet Documentation  Taken 2/10/2025 0011 by Sabra Guillen RN  Trust Relationship/Rapport: care explained  Taken 2/9/2025 2058 by Sabra Guillen RN  Trust Relationship/Rapport:   care explained   choices provided  Goal: Readiness for Transition of Care  Outcome: Progressing   Goal Outcome Evaluation:  Plan of Care Reviewed With: caregiver        Progress: no change  Outcome Evaluation: Patient  resting  at this  time.  No complaints  of pain  or   discomfort  voiced.    Took  and  tolerated  meds  well.   BS  noted  and  treated.   Patient up  to  bathroom  with  standby  assist  of  staff and  caretaker.  insists  of   beingh  in  the   bathroom  alone  ignoring  safety   protocols.  Voices  understanding  of  this.   Remain  on  room  air.  SR  on  the  monitor.   Plan  for  lap  cholecystectomy    on  Tuesday. Nursing  will  comntinue  to monitor

## 2025-02-10 NOTE — PROGRESS NOTES
[facility] Progress Note      Patient Name: Froy Ngo  : 1945  MRN: [id number]  Primary Care Physician:  Ryan Gutierrez MD  Date of admission: 2025    Subjective     Interval History:   Follow-up NEHEMIAS on CKD    Patient lying in bed  Denies any chest pain, shortness of breath, palpitations  Tolerating oral intake    Patient followed by surgery planning for laparoscopic cholecystectomy with intraoperative cholangiogram, tomorrow    Review of Systems:   As noted above    Objective     Vitals:   Temp:  [97.4 °F (36.3 °C)-97.6 °F (36.4 °C)] 97.6 °F (36.4 °C)  Heart Rate:  [73-87] 87  Resp:  [16] 16  BP: (111-149)/(66-73) 149/73    Intake/Output Summary (Last 24 hours) at 2/10/2025 2277  Last data filed at 2/10/2025 1300  Gross per 24 hour   Intake 930 ml   Output 1325 ml   Net -395 ml       Physical Exam:    General Appearance: Awake, pleasant, chronically ill, MO, no acute distress   Skin: warm and dry  HEENT: oral mucosa normal, nonicteric sclera  Neck: supple, no JVD  Lungs: CTA, nonlabored on RA  Heart: RRR, no rub, no murmur, no carotid bruits  Abdomen: soft, nontender, nondistended, BS +  : no palpable bladder  Extremities: no edema, cyanosis or clubbing  Neuro: Rambling speech, moves all extremities    Scheduled Meds:     atorvastatin, 10 mg, Oral, Daily  donepezil, 10 mg, Oral, Nightly  ferrous sulfate, 325 mg, Oral, Daily With Breakfast  finasteride, 5 mg, Oral, Daily  heparin (porcine), 5,000 Units, Subcutaneous, Q12H  insulin glargine, 15 Units, Subcutaneous, Nightly  insulin lispro, 4-24 Units, Subcutaneous, 4x Daily AC & at Bedtime  insulin lispro, 7 Units, Subcutaneous, TID With Meals  memantine, 5 mg, Oral, Daily  sodium chloride, 10 mL, Intravenous, Q12H  tamsulosin, 0.4 mg, Oral, Daily      IV Meds:          Results Reviewed:   I have personally reviewed the results from the time of this admission to 2/10/2025 17:47 EST     Results from last 7 days    Lab Units 02/10/25  0540 02/09/25  0611 02/08/25  0510   SODIUM mmol/L 131* 134* 127*   POTASSIUM mmol/L 3.9 3.4* 4.1   CHLORIDE mmol/L 92* 95* 92*   CO2 mmol/L 23.8 23.0 22.0   BUN mg/dL 87* 83* 77*   CREATININE mg/dL 3.42* 3.54* 3.67*   CALCIUM mg/dL 8.4* 8.4* 8.3*   BILIRUBIN mg/dL 0.4 0.3 0.4   ALK PHOS U/L 269* 265* 297*   ALT (SGPT) U/L 49* 51* 54*   AST (SGOT) U/L 31 37 31   GLUCOSE mg/dL 190* 207* 507*       Estimated Creatinine Clearance: 21.1 mL/min (A) (by C-G formula based on SCr of 3.42 mg/dL (H)).    Results from last 7 days   Lab Units 02/08/25  0510 02/06/25  0554 02/05/25  0555 02/04/25  1202   MAGNESIUM mg/dL 1.9 2.1 2.0 2.0   PHOSPHORUS mg/dL 5.1*  --  4.1 3.8       Results from last 7 days   Lab Units 02/05/25  0555   URIC ACID mg/dL 9.0*       Results from last 7 days   Lab Units 02/10/25  0540 02/09/25  0611 02/08/25  0510 02/07/25  0658 02/06/25  0554   WBC 10*3/mm3 8.80 8.24 3.87 8.47 10.20   HEMOGLOBIN g/dL 9.2* 8.7* 7.9* 9.4* 9.4*   PLATELETS 10*3/mm3 311 273 214 229 196             Assessment / Plan     ASSESSMENT:   NEHEMIAS on CKD 3B, baseline Scr 1.7-2.0, secondary to prior AKIs d/t postobstructive uropathy, and longstanding hypertensive/diabetic nephrosclerosis.  Acute etiology is likely multifactorial: UTI followed by  hypotension limiting renal perfusion. CT abdomen/pelvis did not reveal hydro; euvolemic  Acute on chronic hyponatremia, likely multifactorial: excessive PO fluid intake, poor solute intake, and NEHEMIAS on CKD, improved  Hx post obstructive uropathy, s/p bilateral ureteral stent placement 8/30/2024, s/p prostate resection 12/13/2024, Rodriguez was removed 3 to 4 weeks ago per family report. CT abd/pel suggested enlarged prostate with possible cystitis .;  Urology s/p cystoscopy with ureter stent removal (2/7/25)   UTI, failed outpatient management, on IV Rocephin, urine cultures negative.  Possible prostatitis due to recurrent antibiotics  Type II DM with CKD, managed by primary  team  Hypotension, will hold amlodipin and hydralazine. Cortisol did not suggest adrenal insufficiency   Transaminases from hypotension, managed by primary team  Dementia, on namenda at home  Pancreatitis/elevated lipase, evaluated by general surgery, planning on laparoscopic cholecystectomy prior to discharge (no sooner than Sunday)    PLAN:    Kidney function is improving .  With good urine output  s/p ureter stent removal 2/07/2025  Patient followed by surgery planning for laparoscopic cholecystectomy with intraoperative cholangiogram, tomorrow  Surveillance labs    Thank you for involving us in the care of Froy Ngo.  Please feel free to call with any questions.    Tad Benoit MD  02/10/25  17:47 EST    [facility]  [phone]    Please note that portions of this note were completed with a voice recognition program.

## 2025-02-10 NOTE — CASE MANAGEMENT/SOCIAL WORK
Continued Stay Note  [facility]     Patient Name: Froy Ngo  MRN: [id number]  Today's Date: 2/10/2025    Admit Date: 2/4/2025    Plan: Home with spouse   Discharge Plan       Row Name 02/10/25 1404       Plan    Plan Home with spouse    Plan Comments CCP spoke with patient's wife. Discussed d/c plans. Patient's wife plans for patient to return home as long as he is ambulating okay. Per PT notes from 2/8; patient ambulated 150 feet. Discussed home health; patient's wife unsure if home health is needed at this time but will let CCP know if it is. CCP will continue to follow and assist as needed. Celi HEATH                   Discharge Codes    No documentation.                 Expected Discharge Date and Time       Expected Discharge Date Expected Discharge Time    Feb 10, 2025               KUMAR Smith

## 2025-02-10 NOTE — PLAN OF CARE
Goal Outcome Evaluation:  Plan of Care Reviewed With: patient        Progress: no change  Outcome Evaluation: Pt was seen for PT tx this PM. Pt was in bed and sat up to EOB w/ SBA. Sitter/friend in room. Pt stood w/ CGA. Pt adamantly declined use of fww. Pt amb around nsg station req CGA and no AD. Incr unsteadiness noted at times w/ pt reaching for nsg station counter for support. Attempted to discuss unsteadiness w/ pt who denied being unsteady. Pt politely declined further amb or ther ex. Pt and sitter discussed further amb later PM. Noted possible lap iris on scheduled for tomorrow. PT will follow up for re-eval after sx.    Anticipated Discharge Disposition (PT): home with home health, home with assist

## 2025-02-11 ENCOUNTER — APPOINTMENT (OUTPATIENT)
Dept: GENERAL RADIOLOGY | Facility: HOSPITAL | Age: 80
End: 2025-02-11
Payer: COMMERCIAL

## 2025-02-11 ENCOUNTER — ANESTHESIA (OUTPATIENT)
Dept: PERIOP | Facility: HOSPITAL | Age: 80
End: 2025-02-11
Payer: COMMERCIAL

## 2025-02-11 ENCOUNTER — ANESTHESIA EVENT (OUTPATIENT)
Dept: PERIOP | Facility: HOSPITAL | Age: 80
End: 2025-02-11
Payer: COMMERCIAL

## 2025-02-11 LAB
ALBUMIN SERPL-MCNC: 3.2 G/DL (ref 3.5–5.2)
ALBUMIN/GLOB SERPL: 1 G/DL
ALP SERPL-CCNC: 239 U/L (ref 39–117)
ALT SERPL W P-5'-P-CCNC: 39 U/L (ref 1–41)
ANION GAP SERPL CALCULATED.3IONS-SCNC: 10 MMOL/L (ref 5–15)
AST SERPL-CCNC: 21 U/L (ref 1–40)
BILIRUB SERPL-MCNC: 0.4 MG/DL (ref 0–1.2)
BUN SERPL-MCNC: 79 MG/DL (ref 8–23)
BUN/CREAT SERPL: 29.6 (ref 7–25)
CALCIUM SPEC-SCNC: 8.7 MG/DL (ref 8.6–10.5)
CHLORIDE SERPL-SCNC: 100 MMOL/L (ref 98–107)
CO2 SERPL-SCNC: 24 MMOL/L (ref 22–29)
CREAT SERPL-MCNC: 2.67 MG/DL (ref 0.76–1.27)
DEPRECATED RDW RBC AUTO: 46.1 FL (ref 37–54)
EGFRCR SERPLBLD CKD-EPI 2021: 23.3 ML/MIN/1.73
ERYTHROCYTE [DISTWIDTH] IN BLOOD BY AUTOMATED COUNT: 14.4 % (ref 12.3–15.4)
GLOBULIN UR ELPH-MCNC: 3.2 GM/DL
GLUCOSE BLDC GLUCOMTR-MCNC: 201 MG/DL (ref 70–130)
GLUCOSE BLDC GLUCOMTR-MCNC: 209 MG/DL (ref 70–130)
GLUCOSE BLDC GLUCOMTR-MCNC: 209 MG/DL (ref 70–130)
GLUCOSE BLDC GLUCOMTR-MCNC: 258 MG/DL (ref 70–130)
GLUCOSE SERPL-MCNC: 166 MG/DL (ref 65–99)
HCT VFR BLD AUTO: 28.4 % (ref 37.5–51)
HGB BLD-MCNC: 9.2 G/DL (ref 13–17.7)
LIPASE SERPL-CCNC: 515 U/L (ref 13–60)
MCH RBC QN AUTO: 28.2 PG (ref 26.6–33)
MCHC RBC AUTO-ENTMCNC: 32.4 G/DL (ref 31.5–35.7)
MCV RBC AUTO: 87.1 FL (ref 79–97)
PLATELET # BLD AUTO: 354 10*3/MM3 (ref 140–450)
PMV BLD AUTO: 8.9 FL (ref 6–12)
POTASSIUM SERPL-SCNC: 4.2 MMOL/L (ref 3.5–5.2)
PROT SERPL-MCNC: 6.4 G/DL (ref 6–8.5)
RBC # BLD AUTO: 3.26 10*6/MM3 (ref 4.14–5.8)
SODIUM SERPL-SCNC: 134 MMOL/L (ref 136–145)
WBC NRBC COR # BLD AUTO: 8.55 10*3/MM3 (ref 3.4–10.8)

## 2025-02-11 PROCEDURE — 25810000003 SODIUM CHLORIDE PER 500 ML: Performed by: SURGERY

## 2025-02-11 PROCEDURE — 25010000002 ONDANSETRON PER 1 MG: Performed by: NURSE ANESTHETIST, CERTIFIED REGISTERED

## 2025-02-11 PROCEDURE — 47563 LAPARO CHOLECYSTECTOMY/GRAPH: CPT | Performed by: SURGERY

## 2025-02-11 PROCEDURE — BF131ZZ FLUOROSCOPY OF GALLBLADDER AND BILE DUCTS USING LOW OSMOLAR CONTRAST: ICD-10-PCS | Performed by: SURGERY

## 2025-02-11 PROCEDURE — 25010000002 FENTANYL CITRATE (PF) 50 MCG/ML SOLUTION: Performed by: NURSE ANESTHETIST, CERTIFIED REGISTERED

## 2025-02-11 PROCEDURE — 25010000002 SUGAMMADEX 200 MG/2ML SOLUTION: Performed by: NURSE ANESTHETIST, CERTIFIED REGISTERED

## 2025-02-11 PROCEDURE — 74300 X-RAY BILE DUCTS/PANCREAS: CPT

## 2025-02-11 PROCEDURE — 25010000002 LIDOCAINE 2% SOLUTION: Performed by: NURSE ANESTHETIST, CERTIFIED REGISTERED

## 2025-02-11 PROCEDURE — 85027 COMPLETE CBC AUTOMATED: CPT | Performed by: INTERNAL MEDICINE

## 2025-02-11 PROCEDURE — 80053 COMPREHEN METABOLIC PANEL: CPT | Performed by: INTERNAL MEDICINE

## 2025-02-11 PROCEDURE — 25010000002 HEPARIN (PORCINE) PER 1000 UNITS: Performed by: SURGERY

## 2025-02-11 PROCEDURE — 83690 ASSAY OF LIPASE: CPT | Performed by: INTERNAL MEDICINE

## 2025-02-11 PROCEDURE — 25810000003 LACTATED RINGERS PER 1000 ML: Performed by: NURSE ANESTHETIST, CERTIFIED REGISTERED

## 2025-02-11 PROCEDURE — 0FT44ZZ RESECTION OF GALLBLADDER, PERCUTANEOUS ENDOSCOPIC APPROACH: ICD-10-PCS | Performed by: SURGERY

## 2025-02-11 PROCEDURE — 25010000002 PROPOFOL 200 MG/20ML EMULSION: Performed by: NURSE ANESTHETIST, CERTIFIED REGISTERED

## 2025-02-11 PROCEDURE — 82948 REAGENT STRIP/BLOOD GLUCOSE: CPT

## 2025-02-11 PROCEDURE — 88304 TISSUE EXAM BY PATHOLOGIST: CPT | Performed by: SURGERY

## 2025-02-11 PROCEDURE — 25010000002 CEFOXITIN PER 1 G: Performed by: SURGERY

## 2025-02-11 PROCEDURE — 47563 LAPARO CHOLECYSTECTOMY/GRAPH: CPT | Performed by: SPECIALIST/TECHNOLOGIST, OTHER

## 2025-02-11 PROCEDURE — 63710000001 INSULIN LISPRO (HUMAN) PER 5 UNITS: Performed by: SURGERY

## 2025-02-11 PROCEDURE — 25510000002 IOTHALAMATE 60 % SOLUTION: Performed by: SURGERY

## 2025-02-11 PROCEDURE — 63710000001 INSULIN GLARGINE PER 5 UNITS: Performed by: SURGERY

## 2025-02-11 DEVICE — CLIP LIGAT VASC HORIZON TI MD/LG GRN 6CT: Type: IMPLANTABLE DEVICE | Site: ABDOMEN | Status: FUNCTIONAL

## 2025-02-11 RX ORDER — PROMETHAZINE HYDROCHLORIDE 25 MG/1
25 SUPPOSITORY RECTAL ONCE AS NEEDED
Status: DISCONTINUED | OUTPATIENT
Start: 2025-02-11 | End: 2025-02-11 | Stop reason: HOSPADM

## 2025-02-11 RX ORDER — ONDANSETRON 2 MG/ML
INJECTION INTRAMUSCULAR; INTRAVENOUS AS NEEDED
Status: DISCONTINUED | OUTPATIENT
Start: 2025-02-11 | End: 2025-02-11 | Stop reason: SURG

## 2025-02-11 RX ORDER — SODIUM CHLORIDE 9 MG/ML
INJECTION, SOLUTION INTRAVENOUS AS NEEDED
Status: DISCONTINUED | OUTPATIENT
Start: 2025-02-11 | End: 2025-02-11 | Stop reason: HOSPADM

## 2025-02-11 RX ORDER — ONDANSETRON 2 MG/ML
4 INJECTION INTRAMUSCULAR; INTRAVENOUS ONCE AS NEEDED
Status: DISCONTINUED | OUTPATIENT
Start: 2025-02-11 | End: 2025-02-11 | Stop reason: HOSPADM

## 2025-02-11 RX ORDER — MAGNESIUM HYDROXIDE 1200 MG/15ML
LIQUID ORAL AS NEEDED
Status: DISCONTINUED | OUTPATIENT
Start: 2025-02-11 | End: 2025-02-11 | Stop reason: HOSPADM

## 2025-02-11 RX ORDER — SODIUM CHLORIDE 0.9 % (FLUSH) 0.9 %
3 SYRINGE (ML) INJECTION EVERY 12 HOURS SCHEDULED
Status: DISCONTINUED | OUTPATIENT
Start: 2025-02-11 | End: 2025-02-11 | Stop reason: HOSPADM

## 2025-02-11 RX ORDER — FLUMAZENIL 0.1 MG/ML
0.2 INJECTION INTRAVENOUS AS NEEDED
Status: DISCONTINUED | OUTPATIENT
Start: 2025-02-11 | End: 2025-02-11 | Stop reason: HOSPADM

## 2025-02-11 RX ORDER — SODIUM CHLORIDE 0.9 % (FLUSH) 0.9 %
3-10 SYRINGE (ML) INJECTION AS NEEDED
Status: DISCONTINUED | OUTPATIENT
Start: 2025-02-11 | End: 2025-02-11 | Stop reason: HOSPADM

## 2025-02-11 RX ORDER — ROCURONIUM BROMIDE 10 MG/ML
INJECTION, SOLUTION INTRAVENOUS AS NEEDED
Status: DISCONTINUED | OUTPATIENT
Start: 2025-02-11 | End: 2025-02-11 | Stop reason: SURG

## 2025-02-11 RX ORDER — SODIUM CHLORIDE, SODIUM LACTATE, POTASSIUM CHLORIDE, CALCIUM CHLORIDE 600; 310; 30; 20 MG/100ML; MG/100ML; MG/100ML; MG/100ML
9 INJECTION, SOLUTION INTRAVENOUS CONTINUOUS
Status: DISCONTINUED | OUTPATIENT
Start: 2025-02-11 | End: 2025-02-11

## 2025-02-11 RX ORDER — NALOXONE HCL 0.4 MG/ML
0.2 VIAL (ML) INJECTION AS NEEDED
Status: DISCONTINUED | OUTPATIENT
Start: 2025-02-11 | End: 2025-02-11 | Stop reason: HOSPADM

## 2025-02-11 RX ORDER — IPRATROPIUM BROMIDE AND ALBUTEROL SULFATE 2.5; .5 MG/3ML; MG/3ML
3 SOLUTION RESPIRATORY (INHALATION) ONCE AS NEEDED
Status: DISCONTINUED | OUTPATIENT
Start: 2025-02-11 | End: 2025-02-11 | Stop reason: HOSPADM

## 2025-02-11 RX ORDER — HYDROMORPHONE HYDROCHLORIDE 1 MG/ML
0.25 INJECTION, SOLUTION INTRAMUSCULAR; INTRAVENOUS; SUBCUTANEOUS
Status: DISCONTINUED | OUTPATIENT
Start: 2025-02-11 | End: 2025-02-11 | Stop reason: HOSPADM

## 2025-02-11 RX ORDER — ATROPINE SULFATE 0.4 MG/ML
0.4 INJECTION, SOLUTION INTRAMUSCULAR; INTRAVENOUS; SUBCUTANEOUS ONCE AS NEEDED
Status: DISCONTINUED | OUTPATIENT
Start: 2025-02-11 | End: 2025-02-11 | Stop reason: HOSPADM

## 2025-02-11 RX ORDER — HYDROCODONE BITARTRATE AND ACETAMINOPHEN 7.5; 325 MG/1; MG/1
1 TABLET ORAL EVERY 4 HOURS PRN
Status: DISCONTINUED | OUTPATIENT
Start: 2025-02-11 | End: 2025-02-11 | Stop reason: HOSPADM

## 2025-02-11 RX ORDER — PROPOFOL 10 MG/ML
INJECTION, EMULSION INTRAVENOUS AS NEEDED
Status: DISCONTINUED | OUTPATIENT
Start: 2025-02-11 | End: 2025-02-11 | Stop reason: SURG

## 2025-02-11 RX ORDER — SODIUM CHLORIDE, SODIUM LACTATE, POTASSIUM CHLORIDE, CALCIUM CHLORIDE 600; 310; 30; 20 MG/100ML; MG/100ML; MG/100ML; MG/100ML
INJECTION, SOLUTION INTRAVENOUS CONTINUOUS PRN
Status: DISCONTINUED | OUTPATIENT
Start: 2025-02-11 | End: 2025-02-11 | Stop reason: SURG

## 2025-02-11 RX ORDER — ACETAMINOPHEN 500 MG
1000 TABLET ORAL ONCE
Status: DISCONTINUED | OUTPATIENT
Start: 2025-02-11 | End: 2025-02-11 | Stop reason: HOSPADM

## 2025-02-11 RX ORDER — BUPIVACAINE HYDROCHLORIDE AND EPINEPHRINE 5; 5 MG/ML; UG/ML
INJECTION, SOLUTION EPIDURAL; INTRACAUDAL; PERINEURAL AS NEEDED
Status: DISCONTINUED | OUTPATIENT
Start: 2025-02-11 | End: 2025-02-11 | Stop reason: HOSPADM

## 2025-02-11 RX ORDER — DIPHENHYDRAMINE HYDROCHLORIDE 50 MG/ML
12.5 INJECTION INTRAMUSCULAR; INTRAVENOUS
Status: DISCONTINUED | OUTPATIENT
Start: 2025-02-11 | End: 2025-02-11 | Stop reason: HOSPADM

## 2025-02-11 RX ORDER — FENTANYL CITRATE 50 UG/ML
25 INJECTION, SOLUTION INTRAMUSCULAR; INTRAVENOUS
Status: DISCONTINUED | OUTPATIENT
Start: 2025-02-11 | End: 2025-02-11 | Stop reason: HOSPADM

## 2025-02-11 RX ORDER — EPHEDRINE SULFATE 50 MG/ML
5 INJECTION, SOLUTION INTRAVENOUS ONCE AS NEEDED
Status: DISCONTINUED | OUTPATIENT
Start: 2025-02-11 | End: 2025-02-11 | Stop reason: HOSPADM

## 2025-02-11 RX ORDER — HYDRALAZINE HYDROCHLORIDE 20 MG/ML
5 INJECTION INTRAMUSCULAR; INTRAVENOUS
Status: DISCONTINUED | OUTPATIENT
Start: 2025-02-11 | End: 2025-02-11 | Stop reason: HOSPADM

## 2025-02-11 RX ORDER — PROMETHAZINE HYDROCHLORIDE 25 MG/1
25 TABLET ORAL ONCE AS NEEDED
Status: DISCONTINUED | OUTPATIENT
Start: 2025-02-11 | End: 2025-02-11 | Stop reason: HOSPADM

## 2025-02-11 RX ORDER — HYDROCODONE BITARTRATE AND ACETAMINOPHEN 5; 325 MG/1; MG/1
1 TABLET ORAL ONCE AS NEEDED
Status: DISCONTINUED | OUTPATIENT
Start: 2025-02-11 | End: 2025-02-11 | Stop reason: HOSPADM

## 2025-02-11 RX ORDER — LABETALOL HYDROCHLORIDE 5 MG/ML
5 INJECTION, SOLUTION INTRAVENOUS
Status: DISCONTINUED | OUTPATIENT
Start: 2025-02-11 | End: 2025-02-11 | Stop reason: HOSPADM

## 2025-02-11 RX ORDER — LIDOCAINE HYDROCHLORIDE 20 MG/ML
INJECTION, SOLUTION INFILTRATION; PERINEURAL AS NEEDED
Status: DISCONTINUED | OUTPATIENT
Start: 2025-02-11 | End: 2025-02-11 | Stop reason: SURG

## 2025-02-11 RX ORDER — LIDOCAINE HYDROCHLORIDE 10 MG/ML
0.5 INJECTION, SOLUTION INFILTRATION; PERINEURAL ONCE AS NEEDED
Status: DISCONTINUED | OUTPATIENT
Start: 2025-02-11 | End: 2025-02-11 | Stop reason: HOSPADM

## 2025-02-11 RX ORDER — FENTANYL CITRATE 50 UG/ML
INJECTION, SOLUTION INTRAMUSCULAR; INTRAVENOUS AS NEEDED
Status: DISCONTINUED | OUTPATIENT
Start: 2025-02-11 | End: 2025-02-11 | Stop reason: SURG

## 2025-02-11 RX ORDER — PHENYLEPHRINE HCL IN 0.9% NACL 1 MG/10 ML
SYRINGE (ML) INTRAVENOUS AS NEEDED
Status: DISCONTINUED | OUTPATIENT
Start: 2025-02-11 | End: 2025-02-11 | Stop reason: SURG

## 2025-02-11 RX ORDER — EPHEDRINE SULFATE 50 MG/ML
INJECTION INTRAVENOUS AS NEEDED
Status: DISCONTINUED | OUTPATIENT
Start: 2025-02-11 | End: 2025-02-11 | Stop reason: SURG

## 2025-02-11 RX ORDER — FAMOTIDINE 10 MG/ML
20 INJECTION, SOLUTION INTRAVENOUS ONCE
Status: DISCONTINUED | OUTPATIENT
Start: 2025-02-11 | End: 2025-02-11 | Stop reason: HOSPADM

## 2025-02-11 RX ADMIN — LIDOCAINE HYDROCHLORIDE 100 MG: 20 INJECTION, SOLUTION INFILTRATION; PERINEURAL at 10:27

## 2025-02-11 RX ADMIN — INSULIN GLARGINE 15 UNITS: 100 INJECTION, SOLUTION SUBCUTANEOUS at 22:08

## 2025-02-11 RX ADMIN — SUGAMMADEX 200 MG: 100 INJECTION, SOLUTION INTRAVENOUS at 11:33

## 2025-02-11 RX ADMIN — DONEPEZIL HYDROCHLORIDE 10 MG: 10 TABLET, FILM COATED ORAL at 22:02

## 2025-02-11 RX ADMIN — HEPARIN SODIUM 5000 UNITS: 5000 INJECTION INTRAVENOUS; SUBCUTANEOUS at 22:02

## 2025-02-11 RX ADMIN — CEFOXITIN SODIUM 2000 MG: 2 POWDER, FOR SOLUTION INTRAVENOUS at 10:19

## 2025-02-11 RX ADMIN — INSULIN LISPRO 12 UNITS: 100 INJECTION, SOLUTION INTRAVENOUS; SUBCUTANEOUS at 11:49

## 2025-02-11 RX ADMIN — INSULIN LISPRO 8 UNITS: 100 INJECTION, SOLUTION INTRAVENOUS; SUBCUTANEOUS at 17:10

## 2025-02-11 RX ADMIN — INSULIN LISPRO 7 UNITS: 100 INJECTION, SOLUTION INTRAVENOUS; SUBCUTANEOUS at 17:10

## 2025-02-11 RX ADMIN — ROCURONIUM BROMIDE 50 MG: 10 INJECTION, SOLUTION INTRAVENOUS at 10:27

## 2025-02-11 RX ADMIN — PROPOFOL 50 MG: 10 INJECTION, EMULSION INTRAVENOUS at 11:05

## 2025-02-11 RX ADMIN — ATORVASTATIN CALCIUM 10 MG: 20 TABLET, FILM COATED ORAL at 15:23

## 2025-02-11 RX ADMIN — EPHEDRINE SULFATE 10 MG: 50 INJECTION INTRAVENOUS at 10:44

## 2025-02-11 RX ADMIN — TAMSULOSIN HYDROCHLORIDE 0.4 MG: 0.4 CAPSULE ORAL at 15:23

## 2025-02-11 RX ADMIN — PROPOFOL 120 MG: 10 INJECTION, EMULSION INTRAVENOUS at 10:27

## 2025-02-11 RX ADMIN — FINASTERIDE 5 MG: 5 TABLET, FILM COATED ORAL at 15:23

## 2025-02-11 RX ADMIN — Medication 10 ML: at 22:02

## 2025-02-11 RX ADMIN — Medication 10 ML: at 09:03

## 2025-02-11 RX ADMIN — INSULIN LISPRO 8 UNITS: 100 INJECTION, SOLUTION INTRAVENOUS; SUBCUTANEOUS at 22:07

## 2025-02-11 RX ADMIN — SODIUM CHLORIDE, POTASSIUM CHLORIDE, SODIUM LACTATE AND CALCIUM CHLORIDE: 600; 310; 30; 20 INJECTION, SOLUTION INTRAVENOUS at 09:29

## 2025-02-11 RX ADMIN — FENTANYL CITRATE 50 MCG: 50 INJECTION, SOLUTION INTRAMUSCULAR; INTRAVENOUS at 11:11

## 2025-02-11 RX ADMIN — ONDANSETRON 4 MG: 2 INJECTION INTRAMUSCULAR; INTRAVENOUS at 11:22

## 2025-02-11 RX ADMIN — Medication 100 MCG: at 10:44

## 2025-02-11 RX ADMIN — MEMANTINE HYDROCHLORIDE 5 MG: 5 TABLET, FILM COATED ORAL at 15:23

## 2025-02-11 RX ADMIN — FENTANYL CITRATE 50 MCG: 50 INJECTION, SOLUTION INTRAMUSCULAR; INTRAVENOUS at 10:54

## 2025-02-11 NOTE — SIGNIFICANT NOTE
02/11/25 1024   OTHER   Discipline physical therapist   Rehab Time/Intention   Session Not Performed patient unavailable for treatment  (Pt kiersten in OR, will f/u when available)   Therapy Assessment/Plan (PT)   Criteria for Skilled Interventions Met (PT) yes   Recommendation   PT - Next Appointment 02/12/25

## 2025-02-11 NOTE — ANESTHESIA POSTPROCEDURE EVALUATION
"Patient: Froy Ngo    Procedure Summary       Date: 02/11/25 Room / Location: [facility] OR  / [facility] MAIN OR    Anesthesia Start: 1019 Anesthesia Stop: 1139    Procedure: CHOLECYSTECTOMY LAPAROSCOPIC INTRAOPERATIVE CHOLANGIOGRAM (Abdomen) Diagnosis:       Gallstone pancreatitis      (Gallstone pancreatitis [K85.10])    Surgeons: Vimal Bryant MD Provider: Kiya Palomares MD    Anesthesia Type: general ASA Status: 3            Anesthesia Type: general    Vitals  Vitals Value Taken Time   /81 02/11/25 1230   Temp 36.6 °C (97.8 °F) 02/11/25 1230   Pulse 77 02/11/25 1237   Resp 18 02/11/25 1230   SpO2 99 % 02/11/25 1237   Vitals shown include unfiled device data.        Post Anesthesia Care and Evaluation    Patient location during evaluation: PACU  Patient participation: complete - patient participated  Level of consciousness: awake  Pain management: adequate    Airway patency: patent  Anesthetic complications: No anesthetic complications    Cardiovascular status: acceptable  Respiratory status: acceptable  Hydration status: acceptable    Comments: /70 (BP Location: Left arm, Patient Position: Lying)   Pulse 72   Temp 36.3 °C (97.3 °F) (Oral)   Resp 18   Ht 182.9 cm (72\")   Wt 88 kg (194 lb)   SpO2 98%   BMI 26.31 kg/m²         "

## 2025-02-11 NOTE — OP NOTE
Colorectal & General Surgery  Operative Report    Patient: Froy Ngo  YOB: 1945  MRN: [id number]  DATE OF PROCEDURE: 02/11/25     PREOPERATIVE DIAGNOSIS:  Gallstone pancreatitis    POSTOPERATIVE DIAGNOSIS:  Same    PROCEDURE:  Laparoscopic cholecystectomy with intraoperative cholangiogram    FINDINGS:  Critical view of safety achieved prior to performing the cholangiogram.  Cholangiogram demonstrated flow of contrast to the cystic duct, the common hepatic duct, the left and right hepatic ducts, the common bile duct, and into the duodenum.  There were no filling defects.    SURGEON:  Finesse Bryant MD    ASSISTANT:  Assistant: Lilia Quispe CSA was responsible for performing the following activities: Retraction, Suturing, Closing, Placing Dressing, and Held/Positioned Camera and their skilled assistance was necessary for the success of this case.      ANESTHESIA:  General endotracheal    EBL:  5 mL    SPECIMEN:  Gallbladder    OPERATIVE DESCRIPTION:  The patient was brought to the operating room under the care of the nursing staff.  The patient was placed on the operating room table in the supine position where anesthesia was induced.  The patient was then prepped and positioned in the usual sterile fashion.  A standardized timeout was then performed.    The Veress needle was inserted into the right upper quadrant atraumatically.  The abdomen was insufflated to 15 mmHg.  Local anesthetic was infiltrated into all incision sites.  A 5 mm trocar was placed at the umbilicus.  3 additional trocars were placed in the right upper quadrant, the subxiphoid port being 10 mm.  The Veress needle site was inspected.  The gallbladder was identified and attachments to the omentum and duodenum were taken down sharply.  The peritoneum on either side of the gallbladder was incised.  Combination of blunt and sharp dissection was utilized to identify the cystic duct and cystic artery.  Critical view of safety was  achieved with 2 and only 2 structures entering the gallbladder.  A clip was placed at the junction of the gallbladder and the cystic duct.  An incision was then made on the cystic duct and a cholangiogram catheter was placed and secured in position.  A cholangiogram was then performed as described above.  The clip was then removed and the cholangiogram catheter was removed.  The cystic duct had 2 additional clips placed and was divided.  The cystic artery was then clipped, with 1 clip distally and 2 clips proximally.  It was also divided.  The gallbladder was then removed from the liver with the Bovie electrocautery.  The gallbladder was placed in an Endo Catch bag.  Hemostasis was verified.  Clip placement was verified.  The gallbladder was then removed through the subxiphoid port, which was closed with 0 Vicryl suture.  The remaining trocars were removed and the abdomen was desufflated.  All incisions were copiously irrigated.  The incisions were closed with 3-0 Monocryl suture and Exofin.    All needle, sponge, and instrument counts were correct at the end of the case.    The patient tolerated the procedure well and was transferred to the postanesthesia care unit in stable condition.    Finesse Bryant M.D.  Colorectal & General Surgery  [facility]    [address], [address]  [place], [place], [address]  P: [phone]  F: [phone]

## 2025-02-11 NOTE — PROGRESS NOTES
Feeling well. No complaints. Eating a regular diet without issue. Had a bowel movement. Up walking twice yesterday.     Af, vss, Blood sugars 133-252 with most recent 252     Collins x 3.  No jvd.  Heart RRR.  Lungs clear.  Abdomen NT, ND, bs +.  Ext without edema.     Na 134, BUN 79, Cr 2.67  Lipase 515, alk phos 239, alt 39  Hb 9.2     Assessment and plan  1.  Acute renal failure-creatinine continues to improve since stent removal.  Nephrology continues to follow.  Etiology likely related to some hypovolemia, hypotension on top of his underlying hypertensive nephrosclerosis. Bp stable.  2.  Pancreatitis-no hypertriglyceridemia, etoh use, meds etc. Lipase, alkaline phosphatase and liver enzymes slightly improved.  He remains nontender and tolerated a regular diet yesterday.  Plan is for laparoscopic cholecystectomy later this am. Discussed with surgery yesterday.  3.  ID-blood cultures negative.  Urine cultures with mixed urogenital frank.  Most recent ua without wbc for the first time in a while.  4.  Anemia-hemoglobin slightly imporved.  Multi factorial.  Back on oral iron  5.  Diabetes-blood sugar trending back down after stent removal though eating more now.  Hopefully improve as pancreatitis resolves. Hope to restart meformin and monitor.   6.  Dementia-stable today.  Family plans to keep sitter at bedside to help control behaviors.  7.  Hiccups-resolved.  8.  Continue DVT prophylaxis.     Discussed with dtr, caregiver.

## 2025-02-11 NOTE — PLAN OF CARE
Goal Outcome Evaluation:  Plan of Care Reviewed With: patient        Progress: no change  Outcome Evaluation: Pt alert this shift, calm, amb in hw and room with sba , voiding adequately, eating well, bm x1 this shift, denies pain, Dr. Bryant came and spoke to dtr about surgery in am, wife, Lo will sign consent in the am per telephone conversation this shift. sr on tele, room air satting hi 90's. personal caregiver at [facility].

## 2025-02-11 NOTE — PAYOR COMM NOTE
"Froy Ngo (79 y.o. Male)        PLEASE SEE ATTACHED FOR CONTINUED STAY REVIEW.     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   Date of Birth   1945    Social Security Number       Address   [address] [place] [address]    Home Phone   [phone]    MRN   [id number]       Orthodoxy   None    Marital Status   Single                            Admission Date   2/4/25    Admission Type   Emergency    Admitting Provider   Ritesh Farrell MD    Attending Provider   Ritesh Farrell MD    Department, Room/Bed   [address], E655/1       Discharge Date       Discharge Disposition       Discharge Destination                                 Attending Provider: Ritesh Farrell MD    Allergies: No Known Allergies    Isolation: None   Infection: None   Code Status: CPR    Ht: 182.9 cm (72\")   Wt: 88 kg (194 lb)    Admission Cmt: None   Principal Problem: ARF (acute renal failure) [N17.9]                   Active Insurance as of 2/4/2025       Primary Coverage       Payor Plan Insurance Group Employer/Plan Group    ANTHEM BLUE CROSS ANTHEM BLUE CROSS BLUE SHIELD PPO [id number]       Payor Plan Address Payor Plan Phone Number Payor Plan Fax Number Effective Dates    [address] [phone]  4/1/2019 - None Entered    [facility] [address]         Subscriber Name Subscriber Birth Date Member ID       FROY NOG 1945 [id number]               Secondary Coverage       Payor Plan Insurance Group Employer/Plan Group    MEDICARE MEDICARE A ONLY        Payor Plan Address Payor Plan Phone Number Payor Plan Fax Number Effective Dates    [address] [phone]  6/1/2009 - None Entered    [facility] [id number]         Subscriber Name Subscriber Birth Date Member ID       FROY NGO 1945 [id number]                     Emergency Contacts        (Rel.) Home Phone Work Phone Mobile " Phone    VASU NGO (Spouse) [phone] -- [phone]    vasu braswell (Daughter) [phone] -- --    ROSMERY KINGSLEY (Friend) -- -- [phone]              Ev: NP [id number]  Tax ID [id number]  Oxygen Therapy (last day)       Date/Time SpO2 Device (Oxygen Therapy) Flow (L/min) (Oxygen Therapy) Oxygen Concentration (%) ETCO2 (mmHg)    02/11/25 1310 98 -- -- -- --    02/11/25 1230 100 -- -- -- --    02/11/25 1229 -- room air -- -- --    02/11/25 1215 98 -- -- -- --    02/11/25 1200 100 room air -- -- --    02/11/25 1145 100 -- -- -- --    02/11/25 1140 100 -- -- -- --    02/11/25 1138 99 nasal cannula 2 -- --    02/11/25 0947 97 room air -- -- --    02/11/25 0830 -- room air -- -- --    02/11/25 0711 97 room air -- -- --    02/10/25 2339 98 room air -- -- --    02/10/25 2000 -- room air 4 -- --    02/10/25 1938 98 room air -- -- --    02/10/25 1423 98 room air -- -- --    02/10/25 0800 -- room air -- -- --    02/10/25 0700 99 -- -- -- --    02/10/25 0014 99 room air -- -- --          Intake & Output (last day)         02/10 0701  02/11 0700 02/11 0701  02/12 0700    P.O. 450 50    I.V. (mL/kg)  750 (8.5)    Total Intake(mL/kg) 450 (5.1) 800 (9.1)    Urine (mL/kg/hr) 800 (0.4) 100 (0.2)    Blood  10    Total Output 800 110    Net -350 +690          Stool Unmeasured Occurrence 2 x           Lines, Drains & Airways       Active LDAs       Name Placement date Placement time Site Days    Peripheral IV 02/11/25 1030 Right Wrist 02/11/25  1030  Wrist  less than 1                  Medication Administration Report for Froy Ngo as of 2/11/25 through 2/11/25     Legend:    Given Hold Not Given Due Canceled Entry Other Actions    Time Time (Time) Time Time-Action         Discontinued     Completed     Future     MAR Hold     Linked             Medications 02/11/25     acetaminophen (TYLENOL) tablet 650 mg  Dose: 650 mg  Freq: Every 6 Hours PRN Route: PO  PRN Reason: Mild Pain  Start: 02/06/25  1101     Admin Instructions:   If given for fever, use fever parameter: fever greater than 100.4 °F  Based on patient request - if ordered for moderate or severe pain, provider allows for administration of a medication prescribed for a lower pain scale.    Do not exceed 4 grams of acetaminophen in a 24 hr period. Max dose of 2gm for AST/ALT greater than 120 units/L.    If given for pain, use the following pain scale:   Mild Pain = Pain Score of 1-3, CPOT 1-2  Moderate Pain = Pain Score of 4-6, CPOT 3-4  Severe Pain = Pain Score of 7-10, CPOT 5-8      0945-MAR Hold     1254-MAR Unhold                   sennosides-docusate (PERICOLACE) 8.6-50 MG per tablet 2 tablet  Dose: 2 tablet  Freq: 2 Times Daily PRN Route: PO  PRN Reason: Constipation  Start: 02/04/25 2108     Admin Instructions:   Start bowel management regimen if patient has not had a bowel movement after 12 hours.      0945-MAR Hold     1254-MAR Unhold                  And   polyethylene glycol (MIRALAX) packet 17 g  Dose: 17 g  Freq: Daily PRN Route: PO  PRN Reason: Constipation  PRN Comment: Use if senna-docusate is ineffective  Start: 02/04/25 2108     Admin Instructions:   Use if no bowel movement after 12 hours. Mix in 6-8 ounces of water.  Use 4-8 ounces of water, tea, or juice for each 17 gram dose.      0945-MAR Hold     1254-MAR Unhold                  And   bisacodyl (DULCOLAX) EC tablet 5 mg  Dose: 5 mg  Freq: Daily PRN Route: PO  PRN Reason: Constipation  PRN Comment: Use if polyethylene glycol is ineffective  Start: 02/04/25 2108     Admin Instructions:   Use if no bowel movement after 12 hours.  Swallow whole. Do not crush, split, or chew tablet.      0945-MAR Hold     1254-MAR Unhold                  And   bisacodyl (DULCOLAX) suppository 10 mg  Dose: 10 mg  Freq: Daily PRN Route: RE  PRN Reason: Constipation  PRN Comment: Use if bisacodyl oral is ineffective  Start: 02/04/25 2108     Admin Instructions:   Use if no bowel movement after 12  hours.  Hold for diarrhea      0945-MAR Hold     1254-MAR Unhold                  dextrose (D50W) (25 g/50 mL) IV injection 25 g  Dose: 25 g  Freq: Every 15 Minutes PRN Route: IV  PRN Reason: Low Blood Sugar  PRN Comment: Blood Sugar Less Than 70  Start: 02/08/25 0844     Admin Instructions:   Blood sugar less than 70; patient has IV access - Unresponsive, NPO or Unable To Safely Swallow      0945-MAR Hold     1254-MAR Unhold                  dextrose (D50W) (25 g/50 mL) IV injection 25 g  Dose: 25 g  Freq: Every 15 Minutes PRN Route: IV  PRN Reason: Low Blood Sugar  PRN Comment: Blood Sugar Less Than 70  Start: 02/04/25 2108     Admin Instructions:   Blood sugar less than 70; patient has IV access - Unresponsive, NPO or Unable To Safely Swallow      0945-MAR Hold     1254-MAR Unhold                  dextrose (GLUTOSE) oral gel 15 g  Dose: 15 g  Freq: Every 15 Minutes PRN Route: PO  PRN Reason: Low Blood Sugar  PRN Comment: Blood sugar less than 70  Start: 02/08/25 0844     Admin Instructions:   BS<70, Patient Alert, Is not NPO, Can safely swallow.      0945-MAR Hold     1254-MAR Unhold                  dextrose (GLUTOSE) oral gel 15 g  Dose: 15 g  Freq: Every 15 Minutes PRN Route: PO  PRN Reason: Low Blood Sugar  PRN Comment: Blood sugar less than 70  Start: 02/04/25 2108     Admin Instructions:   BS<70, Patient Alert, Is not NPO, Can safely swallow.      0945-MAR Hold     1254-MAR Unhold                  glucagon (GLUCAGEN) injection 1 mg  Dose: 1 mg  Freq: Every 15 Minutes PRN Route: IM  PRN Reason: Low Blood Sugar  PRN Comment: Blood Glucose Less Than 70  Start: 02/08/25 0844     Admin Instructions:   Blood Glucose Less Than 70 - Patient Without IV Access - Unresponsive, NPO or Unable To Safely Swallow  Reconstitute powder for injection by adding 1 mL of -supplied sterile diluent or sterile water for injection to a vial containing 1 mg of the drug, to provide solutions containing 1 mg/mL. Shake vial  gently to dissolve.      0945-MAR Hold     1254-MAR Unhold                  nitroglycerin (NITROSTAT) SL tablet 0.4 mg  Dose: 0.4 mg  Freq: Every 5 Minutes PRN Route: SL  PRN Reason: Chest Pain  Start: 02/04/25 2108     Admin Instructions:   If Pain Unrelieved After 3 Doses Notify MD  May administer up to 3 doses per episode. Hold if SBP less than 100.      0945-MAR Hold     1254-MAR Unhold                  OLANZapine (zyPREXA) injection 5 mg  Dose: 5 mg  Freq: Every 8 Hours PRN Route: IM  PRN Reason: Agitation  PRN Comment: Agitation  Start: 02/06/25 1101     Admin Instructions:   Dissolve the contents of the vial using 2.1 mL of Sterile Water for Injection for approximate concentration of 5 mg/mL. Caution: Look alike/sound alike drug alert.      0945-MAR Hold     1254-MAR Unhold                  OLANZapine (zyPREXA) tablet 5 mg  Dose: 5 mg  Freq: 2 Times Daily PRN Route: PO  PRN Comment: Agitation  Start: 02/06/25 1103     Admin Instructions:   Caution: Look alike/sound alike drug alert      0945-MAR Hold     1254-MAR Unhold                   ondansetron ODT (ZOFRAN-ODT) disintegrating tablet 4 mg  Dose: 4 mg  Freq: Every 6 Hours PRN Route: PO  PRN Reasons: Nausea,Vomiting  Start: 02/04/25 2108     Admin Instructions:   If BOTH ondansetron (ZOFRAN) and promethazine (PHENERGAN) are ordered use ondansetron first and THEN promethazine IF ondansetron is ineffective.  Place on tongue and allow to dissolve.      0945-MAR Hold     1254-MAR Unhold                  Or   ondansetron (ZOFRAN) injection 4 mg  Dose: 4 mg  Freq: Every 6 Hours PRN Route: IV  PRN Reasons: Nausea,Vomiting  Start: 02/04/25 2108     Admin Instructions:   If BOTH ondansetron (ZOFRAN) and promethazine (PHENERGAN) are ordered use ondansetron first and THEN promethazine IF ondansetron is ineffective.      0945-MAR Hold     1254-MAR Unhold                  Potassium Replacement - Follow Nurse / BPA Driven Protocol  Freq: As Needed Route: XX  PRN Reason:  "Other  Start: 02/09/25 1240     Admin Instructions:   Open Order & Select \"[facility] Electrolyte Replacement Protocol Algorithm\" to View Details         sodium chloride 0.9 % flush 10 mL  Dose: 10 mL  Freq: As Needed Route: IV  PRN Reason: Line Care  Start: 02/04/25 2108      0945-MAR Hold     1254-MAR Unhold                  sodium chloride 0.9 % infusion 40 mL  Dose: 40 mL  Freq: As Needed Route: IV  PRN Reason: Line Care  Start: 02/04/25 2108     Admin Instructions:   Following administration of an IV intermittent medication, flush line with 40mL NS at 100mL/hr.      0945-MAR Hold     1254-MAR Unhold                  Completed Medications  Medications 02/11/25      midazolam (VERSED) injection 0.5 mg  Dose: 0.5 mg  Freq: Every 5 Minutes PRN Route: IV  PRN Comment: Anxiety prophylaxis, Pre-op comfort  Start: 02/07/25 1409 End: 02/07/25 1423     Admin Instructions:   May repeat dose in 5 minutes one time then contact provider for additional orders.           Discontinued Medications  Medications 02/11/25      Atropine Sulfate (PF) injection 0.4 mg  Dose: 0.4 mg  Freq: Once As Needed Route: IV  PRN Reason: Bradycardia  PRN Comment: symptomatic bradycardia (hypotension, dizziness, confusion). Notify attending anesthesiologist.  Start: 02/11/25 1144 End: 02/11/25 1256         Atropine Sulfate (PF) injection 0.4 mg  Dose: 0.4 mg  Freq: Once As Needed Route: IV  PRN Reason: Bradycardia  PRN Comment: symptomatic bradycardia (hypotension, dizziness, confusion). Notify attending anesthesiologist.  Start: 02/07/25 1613 End: 02/07/25 1740         bupivacaine-EPINEPHrine PF (MARCAINE w/EPI) 0.5% [id number] injection  Freq: As Needed  Start: 02/11/25 1102 End: 02/11/25 1137      1102-Given                   dextrose (D50W) (25 g/50 mL) IV injection 10-50 mL  Dose: 10-50 mL  Freq: Every 15 Minutes PRN Route: IV  PRN Reason: Low Blood Sugar  PRN Comment: Per Glu[employer]™  Start: 02/08/25 0708 End: 02/08/25 0836     Admin Instructions: "   Blood Glucose <70  Patient Has IV Access, Is Unresponsive, NPO or Unable to Safely Swallow  Recheck Blood Glucose Per [employer]ander™         dextrose (GLUTOSE) oral gel 15 g  Dose: 15 g  Freq: Every 15 Minutes PRN Route: PO  PRN Reason: Low Blood Sugar  PRN Comment: Per [employer]™  Start: 02/08/25 0708 End: 02/08/25 0836     Admin Instructions:   Blood Glucose <70  Patient Alert, NOT NPO, Can Safely Swallow  Recheck Blood Glucose Per Glu[employer]ander™         diphenhydrAMINE (BENADRYL) injection 12.5 mg  Dose: 12.5 mg  Freq: Every 15 Minutes PRN Route: IV  PRN Reason: Itching  PRN Comment: May repeat x 1  Start: 02/11/25 1144 End: 02/11/25 1256     Admin Instructions:   Caution: Look alike/sound alike drug alert. This med may be ordered in other forms and routes. Before giving verify the last time the drug was given by any route/form.         diphenhydrAMINE (BENADRYL) injection 12.5 mg  Dose: 12.5 mg  Freq: Every 15 Minutes PRN Route: IV  PRN Reason: Itching  PRN Comment: May repeat x 1  Start: 02/07/25 1613 End: 02/07/25 1740     Admin Instructions:   Caution: Look alike/sound alike drug alert. This med may be ordered in other forms and routes. Before giving verify the last time the drug was given by any route/form.         ePHEDrine injection 5 mg  Dose: 5 mg  Freq: Once As Needed Route: IV  PRN Comment: symptomatic hypotension - Notify attending anesthesiologist if this needs to be given  Start: 02/11/25 1144 End: 02/11/25 1256     Admin Instructions:   Caution: Look alike/sound alike drug alert   Dilute with NS to 5-10 mg/mL.  Central line preferred, if unavailable use large bore IV access with frequent nurse monitoring of IV site.         ePHEDrine injection 5 mg  Dose: 5 mg  Freq: Once As Needed Route: IV  PRN Comment: symptomatic hypotension - Notify attending anesthesiologist if this needs to be given  Start: 02/07/25 1613 End: 02/07/25 1740     Admin Instructions:   Caution: Look alike/sound alike drug  alert   Dilute with NS to 5-10 mg/mL.  Central line preferred, if unavailable use large bore IV access with frequent nurse monitoring of IV site.         fentaNYL citrate (PF) (SUBLIMAZE) injection 25 mcg  Dose: 25 mcg  Freq: Every 5 Minutes PRN Route: IV  PRN Reason: Severe Pain  Start: 02/11/25 1144 End: 02/11/25 1256     Admin Instructions:   Maximum total dose of fentanyl is 200 mcg.  If given for pain, use the following pain scale:  Mild Pain = Pain Score of 1-3, CPOT 1-2  Moderate Pain = Pain Score of 4-6, CPOT 3-4  Severe Pain = Pain Score of 7-10, CPOT 5-8         fentaNYL citrate (PF) (SUBLIMAZE) injection 25 mcg  Dose: 25 mcg  Freq: Every 5 Minutes PRN Route: IV  PRN Reason: Severe Pain  Start: 02/07/25 1613 End: 02/07/25 1740     Admin Instructions:   Maximum total dose of fentanyl is 200 mcg.  If given for pain, use the following pain scale:  Mild Pain = Pain Score of 1-3, CPOT 1-2  Moderate Pain = Pain Score of 4-6, CPOT 3-4  Severe Pain = Pain Score of 7-10, CPOT 5-8         fentaNYL citrate (PF) (SUBLIMAZE) injection 50 mcg  Dose: 50 mcg  Freq: Once As Needed Route: IV  PRN Reason: Severe Pain  Start: 02/07/25 1409 End: 02/07/25 1626     Admin Instructions:   Maximum total dose of fentanyl is 50 mcg without additional orders from physician. May be used for preoperative pain or procedural sedation.  If given for pain, use the following pain scale:  Mild Pain = Pain Score of 1-3, CPOT 1-2  Moderate Pain = Pain Score of 4-6, CPOT 3-4  Severe Pain = Pain Score of 7-10, CPOT 5-8         flumazenil (ROMAZICON) injection 0.2 mg  Dose: 0.2 mg  Freq: As Needed Route: IV  PRN Comment: for benzodiazepine induced unresponsiveness or sedation  Start: 02/11/25 1144 End: 02/11/25 1256     Admin Instructions:   Notify Anesthesia if given  ** give IV over 15-30 seconds **         flumazenil (ROMAZICON) injection 0.2 mg  Dose: 0.2 mg  Freq: As Needed Route: IV  PRN Comment: for benzodiazepine induced unresponsiveness  or sedation  Start: 02/07/25 1613 End: 02/07/25 1740     Admin Instructions:   Notify Anesthesia if given  ** give IV over 15-30 seconds **         glucagon (GLUCAGEN) injection 1 mg  Dose: 1 mg  Freq: Every 15 Minutes PRN Route: IM  PRN Reason: Low Blood Sugar  PRN Comment: Per [employer]ander™  Start: 02/08/25 0708 End: 02/08/25 0836     Admin Instructions:   Blood Glucose <70  Patient Without IV Access, Unresponsive, NPO or Unable to Safely Swallow  Recheck Blood Glucose Per Glu[employer]ander™  Reconstitute powder for injection by adding 1 mL of -supplied sterile diluent or sterile water for injection to a vial containing 1 mg of the drug, to provide solutions containing 1 mg/mL. Shake vial gently to dissolve.         glucagon (GLUCAGEN) injection 1 mg  Dose: 1 mg  Freq: Every 15 Minutes PRN Route: IM  PRN Reason: Low Blood Sugar  PRN Comment: Blood Glucose Less Than 70  Start: 02/04/25 2108 End: 02/08/25 0711     Admin Instructions:   Blood Glucose Less Than 70 - Patient Without IV Access - Unresponsive, NPO or Unable To Safely Swallow  Reconstitute powder for injection by adding 1 mL of -supplied sterile diluent or sterile water for injection to a vial containing 1 mg of the drug, to provide solutions containing 1 mg/mL. Shake vial gently to dissolve.         hydrALAZINE (APRESOLINE) injection 5 mg  Dose: 5 mg  Freq: Every 10 Minutes PRN Route: IV  PRN Reason: High Blood Pressure  PRN Comment: for systolic blood pressure greater than 180 mmHg or diastolic blood pressure greater than 105 mmHg  Start: 02/11/25 1144 End: 02/11/25 1256     Admin Instructions:   Up to 20 mg. If labetalol and hydralazine are both ordered, use labetalol first.  Caution: Look alike/sound alike drug alert         hydrALAZINE (APRESOLINE) injection 5 mg  Dose: 5 mg  Freq: Every 10 Minutes PRN Route: IV  PRN Reason: High Blood Pressure  PRN Comment: for systolic blood pressure greater than 180 mmHg or diastolic blood  pressure greater than 105 mmHg  Start: 02/07/25 1613 End: 02/07/25 1740     Admin Instructions:   Up to 20 mg. If labetalol and hydralazine are both ordered, use labetalol first.  Caution: Look alike/sound alike drug alert         HYDROcodone-acetaminophen (NORCO) 5-325 MG per tablet 1 tablet  Dose: 1 tablet  Freq: Once As Needed Route: PO  PRN Reason: Moderate Pain  Start: 02/11/25 1144 End: 02/11/25 1256     Admin Instructions:   Based on patient request - if ordered for moderate or severe pain, provider allows for administration of a medication prescribed for a lower pain scale.  [RORY]    Do not exceed 4 grams of acetaminophen in a 24 hr period. Max dose of 2gm for AST/ALT greater than 120 units/L        If given for pain, use the following pain scale:   Mild Pain = Pain Score of 1-3, CPOT 1-2  Moderate Pain = Pain Score of 4-6, CPOT 3-4  Severe Pain = Pain Score of 7-10, CPOT 5-8         HYDROcodone-acetaminophen (NORCO) 5-325 MG per tablet 1 tablet  Dose: 1 tablet  Freq: Once As Needed Route: PO  PRN Reason: Moderate Pain  Start: 02/07/25 1613 End: 02/07/25 1740     Admin Instructions:   Based on patient request - if ordered for moderate or severe pain, provider allows for administration of a medication prescribed for a lower pain scale.  [RORY]    Do not exceed 4 grams of acetaminophen in a 24 hr period. Max dose of 2gm for AST/ALT greater than 120 units/L        If given for pain, use the following pain scale:   Mild Pain = Pain Score of 1-3, CPOT 1-2  Moderate Pain = Pain Score of 4-6, CPOT 3-4  Severe Pain = Pain Score of 7-10, CPOT 5-8         HYDROcodone-acetaminophen (NORCO) 7.5-325 MG per tablet 1 tablet  Dose: 1 tablet  Freq: Every 4 Hours PRN Route: PO  PRN Reason: Severe Pain  Start: 02/11/25 1144 End: 02/11/25 1256     Admin Instructions:   Based on patient request - if ordered for moderate or severe pain, provider allows for administration of a medication prescribed for a lower pain  scale.  [RORY]    Do not exceed 4 grams of acetaminophen in a 24 hr period. Max dose of 2gm for AST/ALT greater than 120 units/L        If given for pain, use the following pain scale:   Mild Pain = Pain Score of 1-3, CPOT 1-2  Moderate Pain = Pain Score of 4-6, CPOT 3-4  Severe Pain = Pain Score of 7-10, CPOT 5-8         HYDROcodone-acetaminophen (NORCO) 7.5-325 MG per tablet 1 tablet  Dose: 1 tablet  Freq: Every 4 Hours PRN Route: PO  PRN Reason: Severe Pain  Start: 02/07/25 1613 End: 02/07/25 1740     Admin Instructions:   Based on patient request - if ordered for moderate or severe pain, provider allows for administration of a medication prescribed for a lower pain scale.  [RORY]    Do not exceed 4 grams of acetaminophen in a 24 hr period. Max dose of 2gm for AST/ALT greater than 120 units/L        If given for pain, use the following pain scale:   Mild Pain = Pain Score of 1-3, CPOT 1-2  Moderate Pain = Pain Score of 4-6, CPOT 3-4  Severe Pain = Pain Score of 7-10, CPOT 5-8         HYDROmorphone (DILAUDID) injection 0.25 mg  Dose: 0.25 mg  Freq: Every 5 Minutes PRN Route: IV  PRN Reason: Moderate Pain  Start: 02/11/25 1144 End: 02/11/25 1256     Admin Instructions:   Maximum total dose of hydromorphone is 2 mg.  If given for pain, use the following pain scale:  Mild Pain = Pain Score of 1-3, CPOT 1-2  Moderate Pain = Pain Score of 4-6, CPOT 3-4  Severe Pain = Pain Score of 7-10, CPOT 5-8         HYDROmorphone (DILAUDID) injection 0.25 mg  Dose: 0.25 mg  Freq: Every 5 Minutes PRN Route: IV  PRN Reason: Moderate Pain  Start: 02/07/25 1613 End: 02/07/25 1740     Admin Instructions:   Maximum total dose of hydromorphone is 2 mg.  If given for pain, use the following pain scale:  Mild Pain = Pain Score of 1-3, CPOT 1-2  Moderate Pain = Pain Score of 4-6, CPOT 3-4  Severe Pain = Pain Score of 7-10, CPOT 5-8         insulin lispro (HUMALOG/ADMELOG) injection 1-200 Units  Dose: 1-200 Units  Freq: As Needed Route: SC  PRN  Reason: High Blood Sugar  PRN Comment: Per [employer]  Start: 02/08/25 0708 End: 02/08/25 0836     Admin Instructions:   Correction Doses Outside of Scheduled Meal & Bedtime Per Glu[employer]ander™   Use This MAR Entry For Insulin Doses When There is NO SCHEDULED MAR Entry Available  Administer Correction Insulin Even if Patient NPO or Unable to Eat  per Glu[employer]ander         iopamidol (ISOVUE-300) 61 % injection  Freq: As Needed  Start: 02/07/25 1606 End: 02/07/25 1614         iothalamate (CONRAY) injection  Freq: As Needed  Start: 02/11/25 1102 End: 02/11/25 1137      1102-Given [C]                   ipratropium-albuterol (DUO-NEB) nebulizer solution 3 mL  Dose: 3 mL  Freq: Once As Needed Route: NEBULIZATION  PRN Reasons: Wheezing,Shortness of Air  PRN Comment: bronchospasm  Start: 02/11/25 1144 End: 02/11/25 1256     Admin Instructions:   Notify Anesthesia if minineb is given         ipratropium-albuterol (DUO-NEB) nebulizer solution 3 mL  Dose: 3 mL  Freq: Once As Needed Route: NEBULIZATION  PRN Reasons: Wheezing,Shortness of Air  PRN Comment: bronchospasm  Start: 02/07/25 1613 End: 02/07/25 1740     Admin Instructions:   Notify Anesthesia if minineb is given         labetalol (NORMODYNE,TRANDATE) injection 5 mg  Dose: 5 mg  Freq: Every 5 Minutes PRN Route: IV  PRN Reason: High Blood Pressure  PRN Comment: for systolic blood pressure greater than 180 mmHg or diastolic blood pressure greater than 105 mmHg  Start: 02/11/25 1144 End: 02/11/25 1256     Admin Instructions:   Hold for heart rate less than 60.    If labetalol and hydralazine are both ordered, use labetalol first. Maximum dose of labetalol is 20mg IV while in PACU, notify anesthesiologist for further orders if needed.  Give IV Push over 2 minutes.         labetalol (NORMODYNE,TRANDATE) injection 5 mg  Dose: 5 mg  Freq: Every 5 Minutes PRN Route: IV  PRN Reason: High Blood Pressure  PRN Comment: for systolic blood pressure greater than 180 mmHg or diastolic  blood pressure greater than 105 mmHg  Start: 02/07/25 1613 End: 02/07/25 1740     Admin Instructions:   Hold for heart rate less than 60.    If labetalol and hydralazine are both ordered, use labetalol first. Maximum dose of labetalol is 20mg IV while in PACU, notify anesthesiologist for further orders if needed.  Give IV Push over 2 minutes.         lidocaine (XYLOCAINE) 1 % injection 0.5 mL  Dose: 0.5 mL  Freq: Once As Needed Route: ID  PRN Comment: IV Start  Start: 02/11/25 1000 End: 02/11/25 1141         lidocaine (XYLOCAINE) 1 % injection 0.5 mL  Dose: 0.5 mL  Freq: Once As Needed Route: ID  PRN Comment: IV Start  Start: 02/07/25 1409 End: 02/07/25 1626         naloxone (NARCAN) injection 0.2 mg  Dose: 0.2 mg  Freq: As Needed Route: IV  PRN Reasons: Opioid Reversal,Respiratory Depression  PRN Comment: unresponsiveness, decrease oxygen saturation  Indications of Use: ACUTE RESPIRATORY FAILURE,OPIOID-INDUCED RESPIRATORY DEPRESSION  Start: 02/11/25 1144 End: 02/11/25 1256     Admin Instructions:   Notify Anesthesia if given         naloxone (NARCAN) injection 0.2 mg  Dose: 0.2 mg  Freq: As Needed Route: IV  PRN Reasons: Opioid Reversal,Respiratory Depression  PRN Comment: unresponsiveness, decrease oxygen saturation  Indications of Use: ACUTE RESPIRATORY FAILURE,OPIOID-INDUCED RESPIRATORY DEPRESSION  Start: 02/07/25 1613 End: 02/07/25 1740     Admin Instructions:   Notify Anesthesia if given         ondansetron (ZOFRAN) injection 4 mg  Dose: 4 mg  Freq: Once As Needed Route: IV  PRN Reasons: Nausea,Vomiting  Indications of Use: POSTOPERATIVE NAUSEA AND VOMITING  Start: 02/11/25 1144 End: 02/11/25 1256     Admin Instructions:   If BOTH ondansetron (ZOFRAN) and promethazine (PHENERGAN) are ordered use ondansetron first and THEN promethazine IF ondansetron is ineffective.         ondansetron (ZOFRAN) injection 4 mg  Dose: 4 mg  Freq: Once As Needed Route: IV  PRN Reasons: Nausea,Vomiting  Indications of Use:  "POSTOPERATIVE NAUSEA AND VOMITING  Start: 02/07/25 1613 End: 02/07/25 1740     Admin Instructions:   If BOTH ondansetron (ZOFRAN) and promethazine (PHENERGAN) are ordered use ondansetron first and THEN promethazine IF ondansetron is ineffective.         Potassium Replacement - Follow Nurse / BPA Driven Protocol  Freq: As Needed Route: XX  PRN Reason: Other  Start: 02/09/25 1240 End: 02/09/25 1243     Admin Instructions:   Open Order & Select \"[facility] Electrolyte Replacement Protocol Algorithm\" to View Details          promethazine (PHENERGAN) suppository 25 mg  Dose: 25 mg  Freq: Once As Needed Route: RE  PRN Reasons: Nausea,Vomiting  Start: 02/11/25 1144 End: 02/11/25 1256     Admin Instructions:   If BOTH ondansetron (ZOFRAN) and promethazine (PHENERGAN) are ordered use ondansetron first and THEN promethazine IF ondansetron is ineffective.         Or   promethazine (PHENERGAN) tablet 25 mg  Dose: 25 mg  Freq: Once As Needed Route: PO  PRN Reasons: Nausea,Vomiting  Start: 02/11/25 1144 End: 02/11/25 1256     Admin Instructions:   If BOTH ondansetron (ZOFRAN) and promethazine (PHENERGAN) are ordered use ondansetron first and THEN promethazine IF ondansetron is ineffective.            promethazine (PHENERGAN) suppository 25 mg  Dose: 25 mg  Freq: Once As Needed Route: RE  PRN Reasons: Nausea,Vomiting  Start: 02/07/25 1613 End: 02/07/25 1740     Admin Instructions:   If BOTH ondansetron (ZOFRAN) and promethazine (PHENERGAN) are ordered use ondansetron first and THEN promethazine IF ondansetron is ineffective.         Or   promethazine (PHENERGAN) tablet 25 mg  Dose: 25 mg  Freq: Once As Needed Route: PO  PRN Reasons: Nausea,Vomiting  Start: 02/07/25 1613 End: 02/07/25 1740     Admin Instructions:   If BOTH ondansetron (ZOFRAN) and promethazine (PHENERGAN) are ordered use ondansetron first and THEN promethazine IF ondansetron is ineffective.         sodium chloride (NS) irrigation solution  Freq: As Needed  Start: 02/11/25 " 1103 End: 02/11/25 1137      1103-Given [C]                   sodium chloride 0.9 % flush 3-10 mL  Dose: 3-10 mL  Freq: As Needed Route: IV  PRN Reason: Line Care  Start: 02/11/25 1000 End: 02/11/25 1141         sodium chloride 0.9 % flush 3-10 mL  Dose: 3-10 mL  Freq: As Needed Route: IV  PRN Reason: Line Care  Start: 02/07/25 1409 End: 02/07/25 1626         sodium chloride 0.9 % solution  Freq: As Needed  Start: 02/11/25 1103 End: 02/11/25 1137      1103-Given [C]                                  Operative/Procedure Notes (last 24 hours)        Vimal Bryant MD at 02/11/25 1052          Colorectal & General Surgery  Operative Report    Patient: Froy Ngo  YOB: 1945  MRN: [id number]  DATE OF PROCEDURE: 02/11/25     PREOPERATIVE DIAGNOSIS:  Gallstone pancreatitis    POSTOPERATIVE DIAGNOSIS:  Same    PROCEDURE:  Laparoscopic cholecystectomy with intraoperative cholangiogram    FINDINGS:  Critical view of safety achieved prior to performing the cholangiogram.  Cholangiogram demonstrated flow of contrast to the cystic duct, the common hepatic duct, the left and right hepatic ducts, the common bile duct, and into the duodenum.  There were no filling defects.    SURGEON:  Finesse Bryant MD    ASSISTANT:  Assistant: Lilia Quispe CSA was responsible for performing the following activities: Retraction, Suturing, Closing, Placing Dressing, and Held/Positioned Camera and their skilled assistance was necessary for the success of this case.      ANESTHESIA:  General endotracheal    EBL:  5 mL    SPECIMEN:  Gallbladder    OPERATIVE DESCRIPTION:  The patient was brought to the operating room under the care of the nursing staff.  The patient was placed on the operating room table in the supine position where anesthesia was induced.  The patient was then prepped and positioned in the usual sterile fashion.  A standardized timeout was then performed.    The Veress needle was inserted into the  right upper quadrant atraumatically.  The abdomen was insufflated to 15 mmHg.  Local anesthetic was infiltrated into all incision sites.  A 5 mm trocar was placed at the umbilicus.  3 additional trocars were placed in the right upper quadrant, the subxiphoid port being 10 mm.  The Veress needle site was inspected.  The gallbladder was identified and attachments to the omentum and duodenum were taken down sharply.  The peritoneum on either side of the gallbladder was incised.  Combination of blunt and sharp dissection was utilized to identify the cystic duct and cystic artery.  Critical view of safety was achieved with 2 and only 2 structures entering the gallbladder.  A clip was placed at the junction of the gallbladder and the cystic duct.  An incision was then made on the cystic duct and a cholangiogram catheter was placed and secured in position.  A cholangiogram was then performed as described above.  The clip was then removed and the cholangiogram catheter was removed.  The cystic duct had 2 additional clips placed and was divided.  The cystic artery was then clipped, with 1 clip distally and 2 clips proximally.  It was also divided.  The gallbladder was then removed from the liver with the Bovie electrocautery.  The gallbladder was placed in an Endo Catch bag.  Hemostasis was verified.  Clip placement was verified.  The gallbladder was then removed through the subxiphoid port, which was closed with 0 Vicryl suture.  The remaining trocars were removed and the abdomen was desufflated.  All incisions were copiously irrigated.  The incisions were closed with 3-0 Monocryl suture and Exofin.    All needle, sponge, and instrument counts were correct at the end of the case.    The patient tolerated the procedure well and was transferred to the postanesthesia care unit in stable condition.    Finesse Bryant M.D.  Colorectal & General Surgery  [facility]    [address], [address]  [place], [place],  [address]  P: [phone]  F: [phone]      Electronically signed by Vimal Bryant MD at 25 1130          Physician Progress Notes (last 24 hours)        Ryan Gutierrez MD at 25 0919          Feeling well. No complaints. Eating a regular diet without issue. Had a bowel movement. Up walking twice yesterday.     Af, vss, Blood sugars 133-252 with most recent 252     Collins x 3.  No jvd.  Heart RRR.  Lungs clear.  Abdomen NT, ND, bs +.  Ext without edema.     Na 134, BUN 79, Cr 2.67  Lipase 515, alk phos 239, alt 39  Hb 9.2     Assessment and plan  1.  Acute renal failure-creatinine continues to improve since stent removal.  Nephrology continues to follow.  Etiology likely related to some hypovolemia, hypotension on top of his underlying hypertensive nephrosclerosis. Bp stable.  2.  Pancreatitis-no hypertriglyceridemia, etoh use, meds etc. Lipase, alkaline phosphatase and liver enzymes slightly improved.  He remains nontender and tolerated a regular diet yesterday.  Plan is for laparoscopic cholecystectomy later this am. Discussed with surgery yesterday.  3.  ID-blood cultures negative.  Urine cultures with mixed urogenital frank.  Most recent ua without wbc for the first time in a while.  4.  Anemia-hemoglobin slightly imporved.  Multi factorial.  Back on oral iron  5.  Diabetes-blood sugar trending back down after stent removal though eating more now.  Hopefully improve as pancreatitis resolves. Hope to restart meformin and monitor.   6.  Dementia-stable today.  Family plans to keep sitter at bedside to help control behaviors.  7.  Hiccups-resolved.  8.  Continue DVT prophylaxis.     Discussed with dtr, caregiver.    Electronically signed by Ryan Gutierrez MD at 25 0984       Tad Benoit MD at 25 0737              [facility] Progress Note      Patient Name: Froy Ngo  : 1945  MRN: [id number]  Primary Care Physician:  Brenda  Ryan Kan MD  Date of admission: 2/4/2025    Subjective     Interval History:   Follow-up NEHEMIAS on CKD    Overnight no event   N.p.o.  Patient lying in bed  Denies any chest pain, shortness of breath, palpitations  Tolerating oral intake    Schedule for laparoscopic cholecystectomy with intraoperative cholangiogram, today     Review of Systems:   As noted above    Objective     Vitals:   Temp:  [97.4 °F (36.3 °C)-97.8 °F (36.6 °C)] 97.8 °F (36.6 °C)  Heart Rate:  [77-87] 78  Resp:  [16] 16  BP: (120-149)/(61-73) 128/61  Flow (L/min) (Oxygen Therapy):  [4] 4    Intake/Output Summary (Last 24 hours) at 2/11/2025 0725  Last data filed at 2/11/2025 0416  Gross per 24 hour   Intake 450 ml   Output 800 ml   Net -350 ml       Physical Exam:    General Appearance: Awake, pleasant, chronically ill, MO, no acute distress   Skin: warm and dry  HEENT: oral mucosa normal, nonicteric sclera  Neck: supple, no JVD  Lungs: CTA, nonlabored on RA  Heart: RRR, no rub, no murmur, no carotid bruits  Abdomen: soft, nontender, nondistended, BS +  : no palpable bladder  Extremities: no edema, cyanosis or clubbing  Neuro: Rambling speech, moves all extremities    Scheduled Meds:     atorvastatin, 10 mg, Oral, Daily  donepezil, 10 mg, Oral, Nightly  ferrous sulfate, 325 mg, Oral, Daily With Breakfast  finasteride, 5 mg, Oral, Daily  heparin (porcine), 5,000 Units, Subcutaneous, Q12H  insulin glargine, 15 Units, Subcutaneous, Nightly  insulin lispro, 4-24 Units, Subcutaneous, 4x Daily AC & at Bedtime  insulin lispro, 7 Units, Subcutaneous, TID With Meals  memantine, 5 mg, Oral, Daily  sodium chloride, 10 mL, Intravenous, Q12H  tamsulosin, 0.4 mg, Oral, Daily      IV Meds:          Results Reviewed:   I have personally reviewed the results from the time of this admission to 2/11/2025 07:25 EST     Results from last 7 days   Lab Units 02/11/25  0439 02/10/25  0540 02/09/25  0611   SODIUM mmol/L 134* 131* 134*   POTASSIUM mmol/L 4.2 3.9 3.4*    CHLORIDE mmol/L 100 92* 95*   CO2 mmol/L 24.0 23.8 23.0   BUN mg/dL 79* 87* 83*   CREATININE mg/dL 2.67* 3.42* 3.54*   CALCIUM mg/dL 8.7 8.4* 8.4*   BILIRUBIN mg/dL 0.4 0.4 0.3   ALK PHOS U/L 239* 269* 265*   ALT (SGPT) U/L 39 49* 51*   AST (SGOT) U/L 21 31 37   GLUCOSE mg/dL 166* 190* 207*       Estimated Creatinine Clearance: 27 mL/min (A) (by C-G formula based on SCr of 2.67 mg/dL (H)).    Results from last 7 days   Lab Units 02/08/25  0510 02/06/25  0554 02/05/25  0555 02/04/25  1202   MAGNESIUM mg/dL 1.9 2.1 2.0 2.0   PHOSPHORUS mg/dL 5.1*  --  4.1 3.8       Results from last 7 days   Lab Units 02/05/25  0555   URIC ACID mg/dL 9.0*       Results from last 7 days   Lab Units 02/11/25  0439 02/10/25  0540 02/09/25  0611 02/08/25  0510 02/07/25  0658   WBC 10*3/mm3 8.55 8.80 8.24 3.87 8.47   HEMOGLOBIN g/dL 9.2* 9.2* 8.7* 7.9* 9.4*   PLATELETS 10*3/mm3 354 311 273 214 229             Assessment / Plan     ASSESSMENT:   NEHEMIAS on CKD 3B, baseline Scr 1.7-2.0, secondary to prior AKIs d/t postobstructive uropathy, and longstanding hypertensive/diabetic nephrosclerosis.  Acute etiology is likely multifactorial: UTI followed by  hypotension limiting renal perfusion. CT abdomen/pelvis did not reveal hydro; euvolemic  Acute on chronic hyponatremia, likely multifactorial: excessive PO fluid intake, poor solute intake, and NEHEMIAS on CKD, improved, slowly improving   Hx post obstructive uropathy, s/p bilateral ureteral stent placement 8/30/2024, s/p prostate resection 12/13/2024, Rodriguez was removed 3 to 4 weeks ago per family report. CT abd/pel suggested enlarged prostate with possible cystitis / Prostatitis  due to recurrent UTI ?;  Urology planning on ureter stent change today (2/7)   UTI, failed outpatient management, on IV Rocephin, urine cultures negative.  Possible prostatitis due to recurrent antibiotics  Type II DM with CKD, managed by primary team  Hypotension, will hold amlodipin and hydralazine. Cortisol did not  suggest adrenal insufficiency   Transaminases from hypotension, managed by primary team  Dementia, on namenda at home  Pancreatitis/elevated lipase, evaluated by general surgery, planning on laparoscopic cholecystectomy     PLAN:    Kidney function is improving   cr at 2.6  Lytes and volume status stable ,   s/p ureter stent exchange 2025  Schedule for laparoscopic cholecystectomy with intraoperative cholangiogram, today   Surveillance labs    Thank you for involving us in the care of Froy Ngo.  Please feel free to call with any questions.    Tad Benoit MD  25  07:25 EST    [facility]  [phone]    Please note that portions of this note were completed with a voice recognition program.    Electronically signed by Tad Benoit MD at 25 0729       Tad Benoit MD at 02/10/25 1747              [facility] Progress Note      Patient Name: Froy Ngo  : 1945  MRN: [id number]  Primary Care Physician:  Ryan Gutierrez MD  Date of admission: 2025    Subjective     Interval History:   Follow-up NEHEMIAS on CKD    Patient lying in bed  Denies any chest pain, shortness of breath, palpitations  Tolerating oral intake    Patient followed by surgery planning for laparoscopic cholecystectomy with intraoperative cholangiogram, tomorrow    Review of Systems:   As noted above    Objective     Vitals:   Temp:  [97.4 °F (36.3 °C)-97.6 °F (36.4 °C)] 97.6 °F (36.4 °C)  Heart Rate:  [73-87] 87  Resp:  [16] 16  BP: (111-149)/(66-73) 149/73    Intake/Output Summary (Last 24 hours) at 2/10/2025 1747  Last data filed at 2/10/2025 1300  Gross per 24 hour   Intake 930 ml   Output 1325 ml   Net -395 ml       Physical Exam:    General Appearance: Awake, pleasant, chronically ill, MO, no acute distress   Skin: warm and dry  HEENT: oral mucosa normal, nonicteric sclera  Neck: supple, no JVD  Lungs: CTA, nonlabored on RA  Heart:  RRR, no rub, no murmur, no carotid bruits  Abdomen: soft, nontender, nondistended, BS +  : no palpable bladder  Extremities: no edema, cyanosis or clubbing  Neuro: Rambling speech, moves all extremities    Scheduled Meds:     atorvastatin, 10 mg, Oral, Daily  donepezil, 10 mg, Oral, Nightly  ferrous sulfate, 325 mg, Oral, Daily With Breakfast  finasteride, 5 mg, Oral, Daily  heparin (porcine), 5,000 Units, Subcutaneous, Q12H  insulin glargine, 15 Units, Subcutaneous, Nightly  insulin lispro, 4-24 Units, Subcutaneous, 4x Daily AC & at Bedtime  insulin lispro, 7 Units, Subcutaneous, TID With Meals  memantine, 5 mg, Oral, Daily  sodium chloride, 10 mL, Intravenous, Q12H  tamsulosin, 0.4 mg, Oral, Daily      IV Meds:          Results Reviewed:   I have personally reviewed the results from the time of this admission to 2/10/2025 17:47 EST     Results from last 7 days   Lab Units 02/10/25  0540 02/09/25  0611 02/08/25  0510   SODIUM mmol/L 131* 134* 127*   POTASSIUM mmol/L 3.9 3.4* 4.1   CHLORIDE mmol/L 92* 95* 92*   CO2 mmol/L 23.8 23.0 22.0   BUN mg/dL 87* 83* 77*   CREATININE mg/dL 3.42* 3.54* 3.67*   CALCIUM mg/dL 8.4* 8.4* 8.3*   BILIRUBIN mg/dL 0.4 0.3 0.4   ALK PHOS U/L 269* 265* 297*   ALT (SGPT) U/L 49* 51* 54*   AST (SGOT) U/L 31 37 31   GLUCOSE mg/dL 190* 207* 507*       Estimated Creatinine Clearance: 21.1 mL/min (A) (by C-G formula based on SCr of 3.42 mg/dL (H)).    Results from last 7 days   Lab Units 02/08/25  0510 02/06/25  0554 02/05/25  0555 02/04/25  1202   MAGNESIUM mg/dL 1.9 2.1 2.0 2.0   PHOSPHORUS mg/dL 5.1*  --  4.1 3.8       Results from last 7 days   Lab Units 02/05/25  0555   URIC ACID mg/dL 9.0*       Results from last 7 days   Lab Units 02/10/25  0540 02/09/25  0611 02/08/25  0510 02/07/25  0658 02/06/25  0554   WBC 10*3/mm3 8.80 8.24 3.87 8.47 10.20   HEMOGLOBIN g/dL 9.2* 8.7* 7.9* 9.4* 9.4*   PLATELETS 10*3/mm3 311 273 214 229 196             Assessment / Plan     ASSESSMENT:   NEHEMIAS on CKD  3B, baseline Scr 1.7-2.0, secondary to prior AKIs d/t postobstructive uropathy, and longstanding hypertensive/diabetic nephrosclerosis.  Acute etiology is likely multifactorial: UTI followed by  hypotension limiting renal perfusion. CT abdomen/pelvis did not reveal hydro; euvolemic  Acute on chronic hyponatremia, likely multifactorial: excessive PO fluid intake, poor solute intake, and NEHEMIAS on CKD, improved  Hx post obstructive uropathy, s/p bilateral ureteral stent placement 8/30/2024, s/p prostate resection 12/13/2024, Rodriguez was removed 3 to 4 weeks ago per family report. CT abd/pel suggested enlarged prostate with possible cystitis / Prostatitis  due to recurrent UTI ?;  Urology planning on ureter stent change today (2/7)   UTI, failed outpatient management, on IV Rocephin, urine cultures negative.  Possible prostatitis due to recurrent antibiotics  Type II DM with CKD, managed by primary team  Hypotension, will hold amlodipin and hydralazine. Cortisol did not suggest adrenal insufficiency   Transaminases from hypotension, managed by primary team  Dementia, on namenda at home  Pancreatitis/elevated lipase, evaluated by general surgery, planning on laparoscopic cholecystectomy prior to discharge (no sooner than Sunday)    PLAN:    Kidney function is improving .  With good urine output  s/p ureter stent exchange 2/07/2025  Patient followed by surgery planning for laparoscopic cholecystectomy with intraoperative cholangiogram, tomorrow  Surveillance labs    Thank you for involving us in the care of Froy Ngo.  Please feel free to call with any questions.    Tad Benoit MD  02/10/25  17:47 [facility]    [facility] \[facility]\""  [phone]    Please note that portions of this note were completed with a voice recognition program.    Electronically signed by Tad Benoit MD at 02/10/25 3332       Vimal Bryant MD at 02/10/25 6591          Colorectal & General Surgery  Progress  Note    Patient: Froy Ngo  YOB: 1945  MRN: [id number]      Assessment  Froy Ngo is a 79 y.o. male with gallstone pancreatitis.  Renal function continues to normalize.  Discussed with Dr. Mckenna today and plan for laparoscopic cholecystectomy with intraoperative cholangiogram tomorrow.  N.p.o. after midnight.  Discussed the risk, benefits, alternatives of the procedure with the patient himself today.  Informed consent was obtained.    Subjective  No significant events.  Feels well today.    Objective    Vitals:    02/10/25 1423   BP: 149/73   Pulse: 87   Resp: 16   Temp: 97.6 °F (36.4 °C)   SpO2: 98%       Physical Exam  Constitutional: Well-developed well-nourished, no acute distress  Neck: Supple, trachea midline  Respiratory: No increased work of breathing, Symmetric excursion  Cardiovascular: Well pefursed, no jugular venous distention evident   Abdominal: Soft, non-tender, non-distended  Skin: Warm, dry, no rash on visualized skin surfaces  Psychiatric: Alert and oriented ×3, normal affect     Laboratory Results  I have personally reviewed CBC with Ocie 8, hemoglobin 9, platelets 311.  Lipase 595.  CMP with creatinine 3.42, bicarb 23, albumin 3.1, AST 31, AST 49, total bilirubin 0.4.    Radiology  None to review       Finesse Bryant MD  Colorectal & General Surgery  [facility]    [address] [place] [address], [address]  [place], [place], [facility]  P: [phone]  F: [phone]       Electronically signed by Vimal Bryant MD at 02/10/25 6780       Consult Notes (last 24 hours)  Notes from 02/10/25 1423 through 02/11/25 1423   No notes of this type exist for this encounter.

## 2025-02-11 NOTE — NURSING NOTE
Pt. Has schedule for surgery tomorrow at 1625, heparin injection scheduled at 2100 tonight, called surgeon to make sure It is okay to give  heparin injection tonight, surgeon said it is okay

## 2025-02-11 NOTE — SIGNIFICANT NOTE
02/11/25 1000   OTHER   Discipline occupational therapist   Rehab Time/Intention   Session Not Performed patient unavailable for treatment  (Patient in OR. OT will follow up as able.)   Recommendation   OT - Next Appointment 02/12/25

## 2025-02-11 NOTE — ANESTHESIA PROCEDURE NOTES
Airway  Urgency: elective    Date/Time: 2/11/2025 10:28 AM  Airway not difficult    General Information and Staff    Patient location during procedure: OR  Anesthesiologist: Kiya Palomares MD  CRNA/CAA: Mina Suresh CRNA    Indications and Patient Condition  Indications for airway management: airway protection    Preoxygenated: yes  MILS maintained throughout  Mask difficulty assessment: 1 - vent by mask    Final Airway Details  Final airway type: endotracheal airway      Successful airway: ETT  Cuffed: yes   Successful intubation technique: direct laryngoscopy  Endotracheal tube insertion site: oral  Blade: Franck  Blade size: 4  ETT size (mm): 7.5  Cormack-Lehane Classification: grade IIa - partial view of glottis  Placement verified by: chest auscultation and capnometry   Measured from: teeth  ETT/EBT  to teeth (cm): 23  Number of attempts at approach: 1  Assessment: lips, teeth, and gum same as pre-op and atraumatic intubation

## 2025-02-11 NOTE — PLAN OF CARE
Goal Outcome Evaluation:         Pt. Alert, oriented x3, Pt. Has surgery today 1625, being on NPO after midnight, no voiced c/o pain, v/s stable, 02 sats 97% on room air, Pt. Resting in bed quietly during the night time, no s/s acute distress noted

## 2025-02-12 LAB
ALBUMIN SERPL-MCNC: 3.2 G/DL (ref 3.5–5.2)
ALBUMIN/GLOB SERPL: 0.9 G/DL
ALP SERPL-CCNC: 222 U/L (ref 39–117)
ALT SERPL W P-5'-P-CCNC: 57 U/L (ref 1–41)
ANION GAP SERPL CALCULATED.3IONS-SCNC: 12 MMOL/L (ref 5–15)
AST SERPL-CCNC: 48 U/L (ref 1–40)
BILIRUB SERPL-MCNC: 0.4 MG/DL (ref 0–1.2)
BUN SERPL-MCNC: 61 MG/DL (ref 8–23)
BUN/CREAT SERPL: 24.8 (ref 7–25)
CALCIUM SPEC-SCNC: 9 MG/DL (ref 8.6–10.5)
CHLORIDE SERPL-SCNC: 101 MMOL/L (ref 98–107)
CO2 SERPL-SCNC: 23 MMOL/L (ref 22–29)
CREAT SERPL-MCNC: 2.46 MG/DL (ref 0.76–1.27)
CYTO UR: NORMAL
DEPRECATED RDW RBC AUTO: 47.2 FL (ref 37–54)
EGFRCR SERPLBLD CKD-EPI 2021: 25.7 ML/MIN/1.73
ERYTHROCYTE [DISTWIDTH] IN BLOOD BY AUTOMATED COUNT: 14.7 % (ref 12.3–15.4)
GLOBULIN UR ELPH-MCNC: 3.5 GM/DL
GLUCOSE BLDC GLUCOMTR-MCNC: 128 MG/DL (ref 70–130)
GLUCOSE BLDC GLUCOMTR-MCNC: 140 MG/DL (ref 70–130)
GLUCOSE BLDC GLUCOMTR-MCNC: 182 MG/DL (ref 70–130)
GLUCOSE BLDC GLUCOMTR-MCNC: 241 MG/DL (ref 70–130)
GLUCOSE SERPL-MCNC: 156 MG/DL (ref 65–99)
HCT VFR BLD AUTO: 31.1 % (ref 37.5–51)
HGB BLD-MCNC: 9.7 G/DL (ref 13–17.7)
LAB AP CASE REPORT: NORMAL
LIPASE SERPL-CCNC: 204 U/L (ref 13–60)
MCH RBC QN AUTO: 27.4 PG (ref 26.6–33)
MCHC RBC AUTO-ENTMCNC: 31.2 G/DL (ref 31.5–35.7)
MCV RBC AUTO: 87.9 FL (ref 79–97)
PATH REPORT.FINAL DX SPEC: NORMAL
PATH REPORT.GROSS SPEC: NORMAL
PLATELET # BLD AUTO: 395 10*3/MM3 (ref 140–450)
PMV BLD AUTO: 8.9 FL (ref 6–12)
POTASSIUM SERPL-SCNC: 4.5 MMOL/L (ref 3.5–5.2)
PROT SERPL-MCNC: 6.7 G/DL (ref 6–8.5)
RBC # BLD AUTO: 3.54 10*6/MM3 (ref 4.14–5.8)
SODIUM SERPL-SCNC: 136 MMOL/L (ref 136–145)
WBC NRBC COR # BLD AUTO: 16.18 10*3/MM3 (ref 3.4–10.8)

## 2025-02-12 PROCEDURE — 85027 COMPLETE CBC AUTOMATED: CPT | Performed by: SURGERY

## 2025-02-12 PROCEDURE — 80053 COMPREHEN METABOLIC PANEL: CPT | Performed by: SURGERY

## 2025-02-12 PROCEDURE — 63710000001 INSULIN GLARGINE PER 5 UNITS: Performed by: SURGERY

## 2025-02-12 PROCEDURE — 97116 GAIT TRAINING THERAPY: CPT

## 2025-02-12 PROCEDURE — 63710000001 INSULIN LISPRO (HUMAN) PER 5 UNITS: Performed by: SURGERY

## 2025-02-12 PROCEDURE — 83690 ASSAY OF LIPASE: CPT | Performed by: SURGERY

## 2025-02-12 PROCEDURE — 99024 POSTOP FOLLOW-UP VISIT: CPT | Performed by: SURGERY

## 2025-02-12 PROCEDURE — 25010000002 HEPARIN (PORCINE) PER 1000 UNITS: Performed by: SURGERY

## 2025-02-12 PROCEDURE — 82948 REAGENT STRIP/BLOOD GLUCOSE: CPT

## 2025-02-12 RX ADMIN — HEPARIN SODIUM 5000 UNITS: 5000 INJECTION INTRAVENOUS; SUBCUTANEOUS at 08:29

## 2025-02-12 RX ADMIN — Medication 10 ML: at 21:06

## 2025-02-12 RX ADMIN — FINASTERIDE 5 MG: 5 TABLET, FILM COATED ORAL at 08:28

## 2025-02-12 RX ADMIN — DONEPEZIL HYDROCHLORIDE 10 MG: 10 TABLET, FILM COATED ORAL at 21:06

## 2025-02-12 RX ADMIN — TAMSULOSIN HYDROCHLORIDE 0.4 MG: 0.4 CAPSULE ORAL at 08:28

## 2025-02-12 RX ADMIN — INSULIN LISPRO 8 UNITS: 100 INJECTION, SOLUTION INTRAVENOUS; SUBCUTANEOUS at 12:18

## 2025-02-12 RX ADMIN — INSULIN LISPRO 7 UNITS: 100 INJECTION, SOLUTION INTRAVENOUS; SUBCUTANEOUS at 17:40

## 2025-02-12 RX ADMIN — MEMANTINE HYDROCHLORIDE 5 MG: 5 TABLET, FILM COATED ORAL at 08:28

## 2025-02-12 RX ADMIN — INSULIN LISPRO 7 UNITS: 100 INJECTION, SOLUTION INTRAVENOUS; SUBCUTANEOUS at 08:29

## 2025-02-12 RX ADMIN — Medication 10 ML: at 08:29

## 2025-02-12 RX ADMIN — INSULIN LISPRO 4 UNITS: 100 INJECTION, SOLUTION INTRAVENOUS; SUBCUTANEOUS at 08:29

## 2025-02-12 RX ADMIN — INSULIN GLARGINE 15 UNITS: 100 INJECTION, SOLUTION SUBCUTANEOUS at 21:06

## 2025-02-12 RX ADMIN — HEPARIN SODIUM 5000 UNITS: 5000 INJECTION INTRAVENOUS; SUBCUTANEOUS at 21:06

## 2025-02-12 RX ADMIN — FERROUS SULFATE TAB 325 MG (65 MG ELEMENTAL FE) 325 MG: 325 (65 FE) TAB at 08:28

## 2025-02-12 RX ADMIN — INSULIN LISPRO 7 UNITS: 100 INJECTION, SOLUTION INTRAVENOUS; SUBCUTANEOUS at 12:18

## 2025-02-12 RX ADMIN — ATORVASTATIN CALCIUM 10 MG: 20 TABLET, FILM COATED ORAL at 08:28

## 2025-02-12 NOTE — PLAN OF CARE
Goal Outcome Evaluation:      Pt. Alert, oriented x4, confused at times, family staying at bedside during this shift, being monitored post operation status, voiding well, used urinal at bedside, no voiced c/o pain at all, sleeping in bed quietly until this mariaa, v/s stable, 02 sats 96% on room air, no s/s acute distress noted at this time

## 2025-02-12 NOTE — PLAN OF CARE
Goal Outcome Evaluation:  Plan of Care Reviewed With: patient        Progress: improving  Outcome Evaluation: Pt seen for PT tx today. Pt agreeable to PT with the encouragement of family member present. Pt is modif Independent with bed mobility and SBA with stands. Pt used his walker again with the encouragement of family as pt was initially refusing. Pt ambulated 300ft, SBA/rwx. No unsteadiness or  LOB noted. PT will sign off as family is ambulating with pt and no acute skilled PT is needed at this time.    Anticipated Discharge Disposition (PT): home with home health, home with assist

## 2025-02-12 NOTE — SIGNIFICANT NOTE
02/12/25 1254   OTHER   Discipline occupational therapist   Rehab Time/Intention   Session Not Performed other (see comments)  (Patient and family decline further need for skilled OT intervention in acute care setting. Report patient is mobilizing with RW, and do not anticipate need or want for HH/SNF at time of discharge. OT will sign off.)

## 2025-02-12 NOTE — SIGNIFICANT NOTE
02/12/25 1108   PT Discharge Summary   Anticipated Discharge Disposition (PT) home with home health;home with assist   Reason for Discharge All goals achieved   Outcomes Achieved Able to achieve all goals within established timeline   Discharge Destination Home;Home with assist

## 2025-02-12 NOTE — PROGRESS NOTES
Postoperative day 1 laparoscopic cholecystectomy    Awake and alert, seems to be doing quite well.  Tolerating full liquid diet.    Has remained afebrile with stable vital signs since surgery.  WBC up secondary to surgery at 16  Hemoglobin stable at 9.7  GFR slightly improved at 25.7  Lipase decreased to 204    Abdomen is soft and incisions are clean    Advance to regular diet  Should be fine for discharge from surgical standpoint tomorrow

## 2025-02-12 NOTE — THERAPY TREATMENT NOTE
Patient Name: Froy Ngo  : 1945    MRN: [id number]                              Today's Date: 2025       Admit Date: 2025    Visit Dx:     ICD-10-CM ICD-9-CM   1. Acute renal failure, unspecified acute renal failure type  N17.9 584.9   2. Acute UTI  N39.0 599.0   3. Severe sepsis  A41.9 038.9    R65.20 995.92   4. Acute pancreatitis, unspecified complication status, unspecified pancreatitis type  K85.90 577.0   5. Decreased activities of daily living (ADL)  Z78.9 V49.89   6. Gallstone pancreatitis  K85.10 577.0     574.20     Patient Active Problem List   Diagnosis    DKA (diabetic ketoacidosis)    Obstructive uropathy    Bilateral hydronephrosis    Hyperkalemia    Acute renal failure    Type 2 diabetes mellitus with hyperglycemia    Essential hypertension, benign    Dementia without behavioral disturbance    UTI (urinary tract infection), bacterial    Abnormal MRI of head    ARF (acute renal failure)    Gallstone pancreatitis     Past Medical History:   Diagnosis Date    Dementia     Diabetes mellitus     Elevated cholesterol     Prostate disorder     Urinary retention      Past Surgical History:   Procedure Laterality Date    CHOLECYSTECTOMY WITH INTRAOPERATIVE CHOLANGIOGRAM N/A 2025    Procedure: CHOLECYSTECTOMY LAPAROSCOPIC INTRAOPERATIVE CHOLANGIOGRAM;  Surgeon: Vimal Bryant MD;  Location: [facility];  Service: General;  Laterality: N/A;    CYSTOSCOPY W/ URETERAL STENT PLACEMENT Bilateral 2024    Procedure: BILATERAL CYSTOSCOPY URETERAL CATHETER/STENT INSERTION WITH BILATERAL RETROGRADES;  Surgeon: Antoni Melendez MD;  Location: [facility];  Service: Urology;  Laterality: Bilateral;    CYSTOSCOPY, RETROGRADE PYELOGRAM AND STENT INSERTION Bilateral 2025    Procedure: CYSTOSCOPY RETROGRADE PYELOGRAM AND STENT REMOVAL;  Surgeon: Antoni Melendez MD;  Location: [facility] OR;  Service: Urology;  Laterality: Bilateral;      General Information        Row Name 02/12/25 1101          Physical Therapy Time and Intention    Document Type therapy note (daily note)  -EB     Mode of Treatment physical therapy  -EB       Row Name 02/12/25 1101          General Information    Patient Profile Reviewed yes  -EB     Existing Precautions/Restrictions fall  -EB       Row Name 02/12/25 1101          Cognition    Orientation Status (Cognition) oriented x 3  -EB       Row Name 02/12/25 1101          Safety Issues/Impairments Affecting Functional Mobility    Impairments Affecting Function (Mobility) endurance/activity tolerance;strength  -EB               User Key  (r) = Recorded By, (t) = Taken By, (c) = Cosigned By      Initials Name Provider Type    EB aRkel Mccormack PTA Physical Therapist Assistant                   Mobility       Row Name 02/12/25 1101          Bed Mobility    Supine-Sit Falls (Bed Mobility) modified independence  -EB     Sit-Supine Falls (Bed Mobility) modified independence  -EB     Assistive Device (Bed Mobility) bed rails;head of bed elevated  -EB       Row Name 02/12/25 1101          Sit-Stand Transfer    Sit-Stand Falls (Transfers) standby assist  -EB     Assistive Device (Sit-Stand Transfers) walker, front-wheeled  -EB       Row Name 02/12/25 1101          Gait/Stairs (Locomotion)    Falls Level (Gait) standby assist  -EB     Assistive Device (Gait) walker, front-wheeled  -EB     Distance in Feet (Gait) 300  -EB     Deviations/Abnormal Patterns (Gait) gait speed decreased;mary decreased;stride length decreased  -EB     Bilateral Gait Deviations forward flexed posture;heel strike decreased  -EB     Comment, (Gait/Stairs) no unsteadiness or LOB noted when pt using rwx.  -EB               User Key  (r) = Recorded By, (t) = Taken By, (c) = Cosigned By      Initials Name Provider Type    Rakel Pizano PTA Physical Therapist Assistant                   Obj/Interventions    No documentation.                  Goals/Plan     No documentation.                  Clinical Impression       Row Name 02/12/25 1103          Pain    Pretreatment Pain Rating 0/10 - no pain  -EB     Posttreatment Pain Rating 0/10 - no pain  -EB       Row Name 02/12/25 1103          Plan of Care Review    Plan of Care Reviewed With patient  -EB     Progress improving  -EB     Outcome Evaluation Pt seen for PT tx today. Pt agreeable to PT with the encouragement of family member present. Pt is modif Independent with bed mobility and SBA with stands. Pt used his walker again with the encouragement of family as pt was initially refusing. Pt ambulated 300ft, SBA/rwx. No unsteadiness or  LOB noted. PT will sign off as family is ambulating with pt and no acute skilled PT is needed at this time.  -EB       Row Name 02/12/25 1103          Positioning and Restraints    Pre-Treatment Position in bed  -EB     Post Treatment Position bed  -EB     In Bed supine;call light within reach;encouraged to call for assist;exit alarm on;with family/caregiver  -EB               User Key  (r) = Recorded By, (t) = Taken By, (c) = Cosigned By      Initials Name Provider Type    Rakel Pizano PTA Physical Therapist Assistant                   Outcome Measures       Row Name 02/12/25 1106 02/12/25 0830       How much help from another person do you currently need...    Turning from your back to your side while in flat bed without using bedrails? 4  -EB 4  -JK    Moving from lying on back to sitting on the side of a flat bed without bedrails? 4  -EB 4  -JK    Moving to and from a bed to a chair (including a wheelchair)? 4  -EB 4  -JK    Standing up from a chair using your arms (e.g., wheelchair, bedside chair)? 4  -EB 4  -JK    Climbing 3-5 steps with a railing? 3  -EB 3  -JK    To walk in hospital room? 3  -EB 3  -JK    AM-PAC 6 Clicks Score (PT) 22  -EB 22  -JK    Highest Level of Mobility Goal 7 --> Walk 25 feet or more  -EB 7 --> Walk 25 feet or more  -JK              User Key  (r) =  Recorded By, (t) = Taken By, (c) = Cosigned By      Initials Name Provider Type    Rakel Pizano PTA Physical Therapist Assistant    Erika Morris RN Registered Nurse                                 Physical Therapy Education       Title: PT OT SLP Therapies (In Progress)       Topic: Physical Therapy (In Progress)       Point: Mobility training (In Progress)       Learning Progress Summary            Patient Acceptance, E, NR by JL at 2/12/2025 0305    Acceptance, E, NR by JL at 2/11/2025 0339    Acceptance, E,TB, NR by  at 2/10/2025 1523    Acceptance, E, NR,VU by  at 2/8/2025 1206    Acceptance, E,TB, VU by CHANTAL at 2/6/2025 1857   Family Acceptance, E, NR,VU by  at 2/8/2025 1206                      Point: Home exercise program (In Progress)       Learning Progress Summary            Patient Acceptance, E, NR by OSCAR at 2/12/2025 0305    Acceptance, E, NR by JL at 2/11/2025 0339    Acceptance, E, NR,VU by  at 2/8/2025 1206    Acceptance, E,TB, VU by CHANTAL at 2/6/2025 1857   Family Acceptance, E, NR,VU by  at 2/8/2025 1206                      Point: Body mechanics (In Progress)       Learning Progress Summary            Patient Acceptance, E, NR by JL at 2/12/2025 0305    Acceptance, E, NR by OSCAR at 2/11/2025 0339    Acceptance, E,TB, NR by  at 2/10/2025 1523    Acceptance, E, NR,VU by  at 2/8/2025 1206    Acceptance, E,TB, VU by JK at 2/6/2025 1857    Acceptance, E,D, NR,VU by  at 2/5/2025 1557   Family Acceptance, E, NR,VU by  at 2/8/2025 1206                      Point: Precautions (In Progress)       Learning Progress Summary            Patient Acceptance, E, NR by JL at 2/12/2025 0305    Acceptance, E, NR by OSCAR at 2/11/2025 0339    Acceptance, E,TB, NR by  at 2/10/2025 1523    Acceptance, E, NR,VU by  at 2/8/2025 1206    Acceptance, E,TB, VU by JK at 2/6/2025 1857    Acceptance, E,D, NR,VU by MG at 2/5/2025 1557   Family Acceptance, E, NR,VU by  at 2/8/2025 1206                                       User Key       Initials Effective Dates Name Provider Type Discipline    MG 05/24/22 -  Brook Pelayo, PT Physical Therapist PT    PH 06/16/21 -  Beatriz Rincon PTA Physical Therapist Assistant PT    JL 10/20/20 -  Tomi Soliz, RN Registered Nurse Nurse    JK 06/10/22 -  Erika Menezes, RN Registered Nurse Nurse     09/11/24 -  Bharat Blackman PT Physical Therapist PT                  PT Recommendation and Plan     Progress: improving  Outcome Evaluation: Pt seen for PT tx today. Pt agreeable to PT with the encouragement of family member present. Pt is modif Independent with bed mobility and SBA with stands. Pt used his walker again with the encouragement of family as pt was initially refusing. Pt ambulated 300ft, SBA/rwx. No unsteadiness or  LOB noted. PT will sign off as family is ambulating with pt and no acute skilled PT is needed at this time.     Time Calculation:         PT Charges       Row Name 02/12/25 1101             Time Calculation    Start Time 1047  -EB      Stop Time 1056  -EB      Time Calculation (min) 9 min  -EB      PT Received On 02/12/25  -EB      PT - Next Appointment 02/14/25  -EB         Time Calculation- PT    Total Timed Code Minutes- PT 9 minute(s)  -EB                User Key  (r) = Recorded By, (t) = Taken By, (c) = Cosigned By      Initials Name Provider Type    EB Rakel Mccormack PTA Physical Therapist Assistant                  Therapy Charges for Today       Code Description Service Date Service Provider Modifiers Qty    24169962442 HC GAIT TRAINING EA 15 MIN 2/12/2025 Rakel Mccormack PTA GP 1            PT G-Codes  Outcome Measure Options: AM-PAC 6 Clicks Basic Mobility (PT)  AM-PAC 6 Clicks Score (PT): 22  AM-PAC 6 Clicks Score (OT): 21  PT Discharge Summary  Anticipated Discharge Disposition (PT): home with home health, home with assist    Rakel Mccormack PTA  2/12/2025

## 2025-02-12 NOTE — CONSULTS
"Nutrition Services    Patient Name:  Froy Ngo  YOB: 1945  MRN: [id number]  Admit Date:  2/4/2025  Assessment Date:  02/12/25    CLINICAL NUTRITION - EDUCATION     ASSESSMENT  Encounter Information         Consult from Family/Caregiver    Education topic Diabetes, Low fat    Learner Patient, Child or Children    Learning readiness Motivated to learn         Anthropometrics         Height Height: 182.9 cm (72\")    Weight Weight: 88 kg (194 lb) (02/04/25 1738)    BMI  Body mass index is 26.31 kg/m².   Overweight (25 - 29.9)    Comments      Medication/Biochemical          Pertinent medications Insulin      Pertinent lab values Results from last 7 days   Lab Units 02/12/25  0515 02/11/25  0439 02/10/25  0540   SODIUM mmol/L 136 134* 131*   POTASSIUM mmol/L 4.5 4.2 3.9   CHLORIDE mmol/L 101 100 92*   CO2 mmol/L 23.0 24.0 23.8   BUN mg/dL 61* 79* 87*   CREATININE mg/dL 2.46* 2.67* 3.42*   CALCIUM mg/dL 9.0 8.7 8.4*   BILIRUBIN mg/dL 0.4 0.4 0.4   ALK PHOS U/L 222* 239* 269*   ALT (SGPT) U/L 57* 39 49*   AST (SGOT) U/L 48* 21 31   GLUCOSE mg/dL 156* 166* 190*       Lab Results   Component Value Date    HGBA1C 7.80 (H) 02/07/2025        DIAGNOSIS  PES Statement         Problem  Food and nutrition knowledge deficit    Etiology Medical Diagnosis - AKF, pancreatitis, DM    Signs/Symptoms Information deficit     INTERVENTION  Intervention/Evaluation         Goal   Disease management/therapy, Provide information , Reduce/improve symptoms    Educated regarding Appropriate portions, Beverage choices, Eating pattern, Food choices, Food preparation, Food shopping, Label reading    Resources given Discussion only, Printed materials    Monitor/evaluation  Per protocol    Discharge planning Contact RD if questions/concerns     RD to follow per protocol.     Electronically signed by:  Alyssa Dover RD  02/12/25 09:32 EST  "

## 2025-02-12 NOTE — CASE MANAGEMENT/SOCIAL WORK
Continued Stay Note  [facility]     Patient Name: Froy gNo  MRN: [id number]  Today's Date: 2/12/2025    Admit Date: 2/4/2025    Plan: Plan home with spouse and private care givers.   GRISELDA Perez RN   Discharge Plan       Row Name 02/12/25 1415       Plan    Plan Plan home with spouse and private care givers.   GRISELDA Perez RN    Patient/Family in Agreement with Plan yes    Plan Comments Per PT and OT pt does not have any acute needs. Pt has private care givers whenever he needs.   Plan home with spouse.  GRISELDA Perez RN                   Discharge Codes    No documentation.                 Expected Discharge Date and Time       Expected Discharge Date Expected Discharge Time    Feb 14, 2025               Deloris Perez RN

## 2025-02-12 NOTE — PROGRESS NOTES
Feeling well. No complaints. Eating a liquid diet without issue.      Af, vss, Blood sugars 156-258 with most recent 182     Collins x 3.  No jvd.  Heart RRR.  Lungs clear.  Abdomen NT, ND, bs +. Wounds clean.  Ext without edema.     Na 136, BUN 61, Cr 2.46  Lipase 204, alk phos 222, alt 57, ast 48  Hb 9.7, wbc 16.18     Assessment and plan  1.  Acute renal failure-creatinine continues to improve since stent removal.  Nephrology continues to follow.  Etiology likely related to some hypovolemia, hypotension on top of his underlying hypertensive nephrosclerosis. Bp stable.  2.  Pancreatitis-no hypertriglyceridemia, etoh use, meds etc. Laparoscopic iris yesterday and most labs improving though wbc up some.  He remains nontender and tolerating liquid diet.   3.  ID-blood cultures negative.  Urine cultures with mixed urogenital frank.  Most recent ua without wbc for the first time in a while.  4.  Anemia-hemoglobin slightly imporved.  Multi factorial.  Back on oral iron  5.  Diabetes-blood sugar trending back down after stent and gallbladder removal.  Hopefully improve as pancreatitis continues to resolve. Hope to restart meformin and monitor.   6.  Dementia-stable today.  Family plans to keep sitter at bedside to help control behaviors.  7.  Hiccups-resolved.  8.  Continue DVT prophylaxis.     Discussed with wife, dtr, caregiver.

## 2025-02-12 NOTE — PROGRESS NOTES
[facility] Progress Note      Patient Name: Froy Ngo  : 1945  MRN: [id number]  Primary Care Physician:  Ryan Gutierrez MD  Date of admission: 2025    Subjective     Interval History:   Follow-up NEHEMIAS on CKD    Patient underwent laparoscopic cholecystectomy with intraoperative cholangiogram, yesterday without any complications    Patient lying in bed comfortable  Minimal abdominal discomfort,  no bowel movements    Feeling thirsty    Sitter at bedside    Review of Systems:   As noted above    Objective     Vitals:   Temp:  [97.3 °F (36.3 °C)-98 °F (36.7 °C)] 97.9 °F (36.6 °C)  Heart Rate:  [72-92] 92  Resp:  [16-20] 18  BP: (133-179)/(66-83) 138/68  Flow (L/min) (Oxygen Therapy):  [2] 2    Intake/Output Summary (Last 24 hours) at 2025 0809  Last data filed at 2025 1500  Gross per 24 hour   Intake 1040 ml   Output 510 ml   Net 530 ml       Physical Exam:    General Appearance: Awake, pleasant, chronically ill, MO, no acute distress   Skin: warm and dry  HEENT: oral mucosa normal, nonicteric sclera  Neck: supple, no JVD  Lungs: CTA, nonlabored on RA  Heart: RRR, no rub, no murmur, no carotid bruits  Abdomen: nondistended, surgical incisions in place.  Bowel sounds absent lumbar abdominal  : no palpable bladder  Extremities: no edema, cyanosis or clubbing  Neuro: Rambling speech, moves all extremities    Scheduled Meds:     atorvastatin, 10 mg, Oral, Daily  donepezil, 10 mg, Oral, Nightly  ferrous sulfate, 325 mg, Oral, Daily With Breakfast  finasteride, 5 mg, Oral, Daily  heparin (porcine), 5,000 Units, Subcutaneous, Q12H  insulin glargine, 15 Units, Subcutaneous, Nightly  insulin lispro, 4-24 Units, Subcutaneous, 4x Daily AC & at Bedtime  insulin lispro, 7 Units, Subcutaneous, TID With Meals  memantine, 5 mg, Oral, Daily  sodium chloride, 10 mL, Intravenous, Q12H  tamsulosin, 0.4 mg, Oral, Daily      IV Meds:          Results Reviewed:   I have  personally reviewed the results from the time of this admission to 2/12/2025 08:09 EST     Results from last 7 days   Lab Units 02/12/25  0515 02/11/25  0439 02/10/25  0540   SODIUM mmol/L 136 134* 131*   POTASSIUM mmol/L 4.5 4.2 3.9   CHLORIDE mmol/L 101 100 92*   CO2 mmol/L 23.0 24.0 23.8   BUN mg/dL 61* 79* 87*   CREATININE mg/dL 2.46* 2.67* 3.42*   CALCIUM mg/dL 9.0 8.7 8.4*   BILIRUBIN mg/dL 0.4 0.4 0.4   ALK PHOS U/L 222* 239* 269*   ALT (SGPT) U/L 57* 39 49*   AST (SGOT) U/L 48* 21 31   GLUCOSE mg/dL 156* 166* 190*       Estimated Creatinine Clearance: 29.3 mL/min (A) (by C-G formula based on SCr of 2.46 mg/dL (H)).    Results from last 7 days   Lab Units 02/08/25  0510 02/06/25  0554   MAGNESIUM mg/dL 1.9 2.1   PHOSPHORUS mg/dL 5.1*  --                Results from last 7 days   Lab Units 02/12/25  0515 02/11/25  0439 02/10/25  0540 02/09/25  0611 02/08/25  0510   WBC 10*3/mm3 16.18* 8.55 8.80 8.24 3.87   HEMOGLOBIN g/dL 9.7* 9.2* 9.2* 8.7* 7.9*   PLATELETS 10*3/mm3 395 354 311 273 214             Assessment / Plan     ASSESSMENT:   NEHEMIAS on CKD 3B, baseline Scr 1.7-2.0, secondary to prior AKIs d/t postobstructive uropathy, and longstanding hypertensive/diabetic nephrosclerosis.  Acute etiology is likely multifactorial: UTI followed by  hypotension limiting renal perfusion. CT abdomen/pelvis did not reveal hydro; euvolemic  Acute on chronic hyponatremia, likely multifactorial: excessive PO fluid intake, poor solute intake, and NEHEMIAS on CKD, normalizing  Hx post obstructive uropathy, s/p bilateral ureteral stent placement 8/30/2024, s/p prostate resection 12/13/2024, Rodriguez was removed 3 to 4 weeks ago per family report. CT abd/pel suggested enlarged prostate with possible cystitis ;  Urology scheduled for cystoscopy with ureter stent (2/7/25)   UTI, urine cultures negative.  Managed by infectious disease  Type II DM with CKD, managed by primary team  Hypotension, will hold amlodipin and hydralazine. Cortisol did  not suggest adrenal insufficiency   Transaminases from hypotension, managed by primary team  Dementia, on namenda at home  Gallstones pancreatitis s/p  laparoscopic cholecystectomy 2/11/2025 after general surgery    PLAN:    Kidney function is improving creatinine down to 2.4   s/p ureter stent exchange 2/07/2025  Volume status status and electrolytes stabl on currente  regimen  Surveillance labs    Thank you for involving us in the care of Froy Ngo.  Please feel free to call with any questions.    Tad Benoit MD  02/12/25  08:09 [facility]    [facility]  [phone]    Please note that portions of this note were completed with a voice recognition program.

## 2025-02-13 ENCOUNTER — READMISSION MANAGEMENT (OUTPATIENT)
Dept: CALL CENTER | Facility: HOSPITAL | Age: 80
End: 2025-02-13
Payer: COMMERCIAL

## 2025-02-13 VITALS
SYSTOLIC BLOOD PRESSURE: 134 MMHG | RESPIRATION RATE: 18 BRPM | HEIGHT: 72 IN | TEMPERATURE: 98 F | DIASTOLIC BLOOD PRESSURE: 75 MMHG | BODY MASS INDEX: 26.28 KG/M2 | WEIGHT: 194 LBS | OXYGEN SATURATION: 97 % | HEART RATE: 86 BPM

## 2025-02-13 PROBLEM — E11.10 DKA (DIABETIC KETOACIDOSIS): Status: RESOLVED | Noted: 2024-08-04 | Resolved: 2025-02-13

## 2025-02-13 PROBLEM — K85.10 GALLSTONE PANCREATITIS: Status: RESOLVED | Noted: 2025-02-04 | Resolved: 2025-02-13

## 2025-02-13 PROBLEM — E11.65 TYPE 2 DIABETES MELLITUS WITH HYPERGLYCEMIA: Status: RESOLVED | Noted: 2024-08-11 | Resolved: 2025-02-13

## 2025-02-13 PROBLEM — N39.0 UTI (URINARY TRACT INFECTION), BACTERIAL: Status: RESOLVED | Noted: 2024-08-20 | Resolved: 2025-02-13

## 2025-02-13 PROBLEM — I10 ESSENTIAL HYPERTENSION, BENIGN: Status: RESOLVED | Noted: 2024-08-11 | Resolved: 2025-02-13

## 2025-02-13 PROBLEM — A49.9 UTI (URINARY TRACT INFECTION), BACTERIAL: Status: RESOLVED | Noted: 2024-08-20 | Resolved: 2025-02-13

## 2025-02-13 PROBLEM — E87.5 HYPERKALEMIA: Status: RESOLVED | Noted: 2024-08-07 | Resolved: 2025-02-13

## 2025-02-13 PROBLEM — N13.30 BILATERAL HYDRONEPHROSIS: Status: RESOLVED | Noted: 2024-08-07 | Resolved: 2025-02-13

## 2025-02-13 PROBLEM — N13.9 OBSTRUCTIVE UROPATHY: Status: RESOLVED | Noted: 2024-08-07 | Resolved: 2025-02-13

## 2025-02-13 PROBLEM — N17.9 ACUTE RENAL FAILURE: Status: RESOLVED | Noted: 2024-08-11 | Resolved: 2025-02-13

## 2025-02-13 PROBLEM — N17.9 ARF (ACUTE RENAL FAILURE): Status: RESOLVED | Noted: 2025-02-04 | Resolved: 2025-02-13

## 2025-02-13 LAB
ALBUMIN SERPL-MCNC: 3.1 G/DL (ref 3.5–5.2)
ALBUMIN/GLOB SERPL: 0.9 G/DL
ALP SERPL-CCNC: 204 U/L (ref 39–117)
ALT SERPL W P-5'-P-CCNC: 44 U/L (ref 1–41)
ANION GAP SERPL CALCULATED.3IONS-SCNC: 9.8 MMOL/L (ref 5–15)
AST SERPL-CCNC: 31 U/L (ref 1–40)
BILIRUB SERPL-MCNC: 0.5 MG/DL (ref 0–1.2)
BUN SERPL-MCNC: 46 MG/DL (ref 8–23)
BUN/CREAT SERPL: 20.2 (ref 7–25)
CALCIUM SPEC-SCNC: 8.8 MG/DL (ref 8.6–10.5)
CHLORIDE SERPL-SCNC: 98 MMOL/L (ref 98–107)
CO2 SERPL-SCNC: 24.2 MMOL/L (ref 22–29)
CREAT SERPL-MCNC: 2.28 MG/DL (ref 0.76–1.27)
DEPRECATED RDW RBC AUTO: 45 FL (ref 37–54)
EGFRCR SERPLBLD CKD-EPI 2021: 28.1 ML/MIN/1.73
ERYTHROCYTE [DISTWIDTH] IN BLOOD BY AUTOMATED COUNT: 14.4 % (ref 12.3–15.4)
GLOBULIN UR ELPH-MCNC: 3.5 GM/DL
GLUCOSE BLDC GLUCOMTR-MCNC: 134 MG/DL (ref 70–130)
GLUCOSE SERPL-MCNC: 124 MG/DL (ref 65–99)
HCT VFR BLD AUTO: 29.6 % (ref 37.5–51)
HGB BLD-MCNC: 9.7 G/DL (ref 13–17.7)
LIPASE SERPL-CCNC: 213 U/L (ref 13–60)
MCH RBC QN AUTO: 28.3 PG (ref 26.6–33)
MCHC RBC AUTO-ENTMCNC: 32.8 G/DL (ref 31.5–35.7)
MCV RBC AUTO: 86.3 FL (ref 79–97)
PLATELET # BLD AUTO: 381 10*3/MM3 (ref 140–450)
PMV BLD AUTO: 8.5 FL (ref 6–12)
POTASSIUM SERPL-SCNC: 4.5 MMOL/L (ref 3.5–5.2)
PROT SERPL-MCNC: 6.6 G/DL (ref 6–8.5)
RBC # BLD AUTO: 3.43 10*6/MM3 (ref 4.14–5.8)
SODIUM SERPL-SCNC: 132 MMOL/L (ref 136–145)
WBC NRBC COR # BLD AUTO: 13.84 10*3/MM3 (ref 3.4–10.8)

## 2025-02-13 PROCEDURE — 85027 COMPLETE CBC AUTOMATED: CPT | Performed by: INTERNAL MEDICINE

## 2025-02-13 PROCEDURE — 99024 POSTOP FOLLOW-UP VISIT: CPT | Performed by: SURGERY

## 2025-02-13 PROCEDURE — 63710000001 INSULIN LISPRO (HUMAN) PER 5 UNITS: Performed by: SURGERY

## 2025-02-13 PROCEDURE — 80053 COMPREHEN METABOLIC PANEL: CPT | Performed by: INTERNAL MEDICINE

## 2025-02-13 PROCEDURE — 83690 ASSAY OF LIPASE: CPT | Performed by: INTERNAL MEDICINE

## 2025-02-13 PROCEDURE — 82948 REAGENT STRIP/BLOOD GLUCOSE: CPT

## 2025-02-13 PROCEDURE — 25010000002 HEPARIN (PORCINE) PER 1000 UNITS: Performed by: SURGERY

## 2025-02-13 RX ADMIN — Medication 10 ML: at 08:12

## 2025-02-13 RX ADMIN — ATORVASTATIN CALCIUM 10 MG: 20 TABLET, FILM COATED ORAL at 08:14

## 2025-02-13 RX ADMIN — TAMSULOSIN HYDROCHLORIDE 0.4 MG: 0.4 CAPSULE ORAL at 08:11

## 2025-02-13 RX ADMIN — FERROUS SULFATE TAB 325 MG (65 MG ELEMENTAL FE) 325 MG: 325 (65 FE) TAB at 08:11

## 2025-02-13 RX ADMIN — HEPARIN SODIUM 5000 UNITS: 5000 INJECTION INTRAVENOUS; SUBCUTANEOUS at 08:12

## 2025-02-13 RX ADMIN — INSULIN LISPRO 7 UNITS: 100 INJECTION, SOLUTION INTRAVENOUS; SUBCUTANEOUS at 08:11

## 2025-02-13 RX ADMIN — MEMANTINE HYDROCHLORIDE 5 MG: 5 TABLET, FILM COATED ORAL at 08:11

## 2025-02-13 RX ADMIN — FINASTERIDE 5 MG: 5 TABLET, FILM COATED ORAL at 08:11

## 2025-02-13 NOTE — CASE MANAGEMENT/SOCIAL WORK
Case Management Discharge Note      Final Note: Pt discharged home with family and private caregivers.   GRISELDA Perez RN         Selected Continued Care - Discharged on 2/13/2025 Admission date: 2/4/2025 - Discharge disposition: Home or Self Care      Destination    No services have been selected for the patient.                Durable Medical Equipment    No services have been selected for the patient.                Dialysis/Infusion    No services have been selected for the patient.                Home Medical Care    No services have been selected for the patient.                Therapy    No services have been selected for the patient.                Community Resources    No services have been selected for the patient.                Community & DME    No services have been selected for the patient.                    Transportation Services  Private: Car    Final Discharge Disposition Code: 01 - home or self-care

## 2025-02-13 NOTE — DISCHARGE INSTRUCTIONS
POST OP RECOMMENDATIONS  Dr. Finesse Bryant  [phone]  Discharge Instructions    Activity  No lifting more than 15 pounds for 4 weeks  Diet  As tolerated  Dressings  The glue on your incisions will fall off on its own in 1-2 weeks.  You do not need to pack any of your incisions  Bathing  It's ok to shower anytime.   Do not submerge your incisions (bath, pool, lake, ocean, etc.) for 3 weeks.  You can wash your incisions with warm soapy water very gently and pat dry  Pain Management  Unless you have been told otherwise, it's ok to take Tylenol 1000 mg every 6 hours as needed.  I have provided you a prescription for a narcotic pain medication. Take this as needed.   I have also provided you a muscle relaxer if you were still using it in the hospital. Take this scheduled for a couple days, and then you can transition to taking it only as needed.   Please call the office if you have intense pain that is not relieved.   Blood clot prevention  You should be up walking multiple times each day to prevent blood clots from forming.   Driving  Do not drive while taking narcotic pain medications.   Before driving, make sure that you are able to move and rotate appropriately to keep you and the rest of us safe on the road.   Follow up appointment  My office will call you with a follow up appointment if you do not have one already. It will be in 1-2 weeks.   If you do not hear from my office in 1 week, please call [phone] to ask about scheduling.   Remember to contact me for any of the following:   Fever > 101 degrees  Severe pain that cannot be controlled by taking your pain pills  Severe nausea or vomiting that cannot be controlled by taking your nausea pills  Significant bleeding of your incisions  Drainage that has a bad smell or is yellow or green in appearance  Any other questions or concerns

## 2025-02-13 NOTE — PROGRESS NOTES
[facility] Progress Note      Patient Name: Froy Ngo  : 1945  MRN: [id number]  Primary Care Physician:  Ryan Gutierrez MD  Date of admission: 2025    Subjective     Interval History:   Follow-up NEHEMIAS on CKD        Patient lying in bed comfortable  Minimal abdominal discomfort,  no bowel movements  BM present      Urine spontaneous     Daughter at bedside     Review of Systems:   As noted above    Objective     Vitals:   Temp:  [97.7 °F (36.5 °C)-98.7 °F (37.1 °C)] 98 °F (36.7 °C)  Heart Rate:  [77-88] 86  Resp:  [18] 18  BP: (127-142)/(57-76) 134/75    Intake/Output Summary (Last 24 hours) at 2025  Last data filed at 2025  Gross per 24 hour   Intake 120 ml   Output --   Net 120 ml       Physical Exam:    General Appearance: Awake, pleasant,   no acute distress   Skin: warm and dry  HEENT: oral mucosa normal, nonicteric sclera  Neck: supple, no JVD  Lungs: CTA, nonlabored on RA  Heart: RRR, no rub, no murmur, no carotid bruits  Abdomen: nondistended, surgical incisions in place.  Bowel sounds +   : no palpable bladder  Extremities: no edema, cyanosis or clubbing  Neuro: no focalization     Scheduled Meds:     atorvastatin, 10 mg, Oral, Daily  donepezil, 10 mg, Oral, Nightly  ferrous sulfate, 325 mg, Oral, Daily With Breakfast  finasteride, 5 mg, Oral, Daily  heparin (porcine), 5,000 Units, Subcutaneous, Q12H  insulin glargine, 15 Units, Subcutaneous, Nightly  insulin lispro, 4-24 Units, Subcutaneous, 4x Daily AC & at Bedtime  insulin lispro, 7 Units, Subcutaneous, TID With Meals  memantine, 5 mg, Oral, Daily  sodium chloride, 10 mL, Intravenous, Q12H  tamsulosin, 0.4 mg, Oral, Daily      IV Meds:          Results Reviewed:   I have personally reviewed the results from the time of this admission to 2025 08:28 EST     Results from last 7 days   Lab Units 25  0516 25  0515 25  0439   SODIUM mmol/L 132* 136 134*    POTASSIUM mmol/L 4.5 4.5 4.2   CHLORIDE mmol/L 98 101 100   CO2 mmol/L 24.2 23.0 24.0   BUN mg/dL 46* 61* 79*   CREATININE mg/dL 2.28* 2.46* 2.67*   CALCIUM mg/dL 8.8 9.0 8.7   BILIRUBIN mg/dL 0.5 0.4 0.4   ALK PHOS U/L 204* 222* 239*   ALT (SGPT) U/L 44* 57* 39   AST (SGOT) U/L 31 48* 21   GLUCOSE mg/dL 124* 156* 166*       Estimated Creatinine Clearance: 31.6 mL/min (A) (by C-G formula based on SCr of 2.28 mg/dL (H)).    Results from last 7 days   Lab Units 02/08/25  0510   MAGNESIUM mg/dL 1.9   PHOSPHORUS mg/dL 5.1*               Results from last 7 days   Lab Units 02/13/25  0517 02/12/25  0515 02/11/25  0439 02/10/25  0540 02/09/25  0611   WBC 10*3/mm3 13.84* 16.18* 8.55 8.80 8.24   HEMOGLOBIN g/dL 9.7* 9.7* 9.2* 9.2* 8.7*   PLATELETS 10*3/mm3 381 395 354 311 273             Assessment / Plan     ASSESSMENT:   NEHEMIAS on CKD 3B, baseline Scr 1.7-2.0, secondary to prior AKIs d/t postobstructive uropathy, and longstanding hypertensive/diabetic nephrosclerosis.  Acute etiology is likely multifactorial: UTI followed by  hypotension limiting renal perfusion. CT abdomen/pelvis did not reveal hydro; euvolemic  Acute on chronic hyponatremia, likely multifactorial: excessive PO fluid intake, poor solute intake, and NEHEMIAS on CKD, normalizing  Hx post obstructive uropathy, s/p bilateral ureteral stent placement 8/30/2024, s/p prostate resection 12/13/2024, CT abd/pel suggested enlarged prostate with possible cystitis ;  Urology s/p cystoscopy with ureter stent removal (2/7/25)   UTI, urine cultures negative.  Managed by infectious disease  Type II DM with CKD, managed by primary team  Hypotension, will hold amlodipin and hydralazine. Cortisol did not suggest adrenal insufficiency   Transaminases from hypotension, managed by primary team  Dementia, on namenda at home  Gallstones pancreatitis s/p  laparoscopic cholecystectomy 2/11/2025 after general surgery    PLAN:    Kidney function is improving creatinine down to 2.2   s/p  ureter stent removal  2/07/2025  Renal function gradually improving with medical management.    Patient can be followed closely as an outpatient renal clinic upon discharge  Surveillance labs    Thank you for involving us in the care of Froy Ngo.  Please feel free to call with any questions.    Tad Benoit MD  02/13/25  08:28 [facility]    [facility]  [phone]    Please note that portions of this note were completed with a voice recognition program.

## 2025-02-13 NOTE — PROGRESS NOTES
Colorectal & General Surgery  Progress Note    Patient: Froy Ngo  YOB: 1945  MRN: [id number]      Assessment  Froy Ngo is a 79 y.o. male with gallstone pancreatitis is now postoperative day 2 from laparoscopic cholecystectomy with intraoperative cholangiogram.  Seems to be recovering quite well.  Resting comfortably this morning.  Tolerated diet yesterday.  Okay for discharge from my standpoint anytime.    I will see him in the office next week to ensure he is recovering well.    Subjective  No significant events.  Tolerated diet yesterday.  Resting comfortably this morning.    Objective    Vitals:    02/13/25 0725   BP: 134/75   Pulse: 86   Resp: 18   Temp: 98 °F (36.7 °C)   SpO2: 97%       Physical Exam  Constitutional: Well-developed well-nourished, no acute distress  Neck: Supple, trachea midline  Respiratory: No increased work of breathing, Symmetric excursion  Cardiovascular: Well pefursed, no jugular venous distention evident   Abdominal: Incisions in good order.  Soft, non-tender, non-distended  Skin: Warm, dry, no rash on visualized skin surfaces    Laboratory Results  I have personally reviewed CBC with Ocie 13, hemoglobin 9, platelets 3 1.  Lipase 213.  CMP with creatinine 2.2, AST 31, ALT 44, total bilirubin 0.5.    Radiology  None pertinent for review         Finesse Bryant MD  Colorectal & General Surgery  [facility]    [address], [address]  [place], [place], [address]  P: [phone]  F: [phone]

## 2025-02-13 NOTE — PLAN OF CARE
Problem: Adult Inpatient Plan of Care  Goal: Plan of Care Review  Outcome: Progressing  Flowsheets (Taken 2/13/2025 0521)  Progress: improving  Outcome Evaluation: Patient   resting  at  this  time.  Denies  any  pain.  Blood  sugars  noted  and  treated.    4   surgical  lap  sites  clean  and  dry.   Room  air.  SR  on the  monitor.  Possible  discharge     today.  Nursing  will  continue to monitor  Plan of Care Reviewed With:   patient   caregiver  Goal: Patient-Specific Goal (Individualized)  Outcome: Progressing  Goal: Absence of Hospital-Acquired Illness or Injury  Outcome: Progressing  Intervention: Identify and Manage Fall Risk  Recent Flowsheet Documentation  Taken 2/13/2025 0410 by Sabra Guillen RN  Safety Promotion/Fall Prevention:   safety round/check completed   room organization consistent  Taken 2/13/2025 0209 by Sabra Guillen RN  Safety Promotion/Fall Prevention:   fall prevention program maintained   room organization consistent   safety round/check completed  Taken 2/13/2025 0025 by Sabra Guillen RN  Safety Promotion/Fall Prevention:   safety round/check completed   room organization consistent   nonskid shoes/slippers when out of bed   fall prevention program maintained   clutter free environment maintained   assistive device/personal items within reach  Taken 2/12/2025 2212 by Sabra Guillen RN  Safety Promotion/Fall Prevention:   safety round/check completed   room organization consistent  Taken 2/12/2025 2048 by Sabra Guillen RN  Safety Promotion/Fall Prevention:   room organization consistent   safety round/check completed   nonskid shoes/slippers when out of bed   fall prevention program maintained   clutter free environment maintained   assistive device/personal items within reach  Intervention: Prevent Skin Injury  Recent Flowsheet Documentation  Taken 2/13/2025 0410 by Sabra Guillen RN  Body Position: position changed  independently  Taken 2/13/2025 0025 by Sabra Guillen RN  Body Position: sitting up in bed  Taken 2/12/2025 2212 by Sabra Guillen RN  Body Position:   position changed independently   side-lying   left  Taken 2/12/2025 2048 by Sabra Guillen RN  Body Position:   position changed independently   sitting up in bed  Skin Protection: incontinence pads utilized  Intervention: Prevent and Manage VTE (Venous Thromboembolism) Risk  Recent Flowsheet Documentation  Taken 2/12/2025 2048 by Sabra Guillen RN  VTE Prevention/Management: (On  SQ   heparin) other (see comments)  Intervention: Prevent Infection  Recent Flowsheet Documentation  Taken 2/12/2025 2048 by Sabra Guillen RN  Infection Prevention:   environmental surveillance performed   equipment surfaces disinfected   hand hygiene promoted   personal protective equipment utilized   rest/sleep promoted   single patient room provided  Goal: Optimal Comfort and Wellbeing  Outcome: Progressing  Intervention: Monitor Pain and Promote Comfort  Recent Flowsheet Documentation  Taken 2/12/2025 2048 by Sabra Guillen RN  Pain Management Interventions: pain management plan reviewed with patient/caregiver  Intervention: Provide Person-Centered Care  Recent Flowsheet Documentation  Taken 2/13/2025 0025 by Sabra Guillen RN  Trust Relationship/Rapport: care explained  Taken 2/12/2025 2048 by Sabra Guillen RN  Trust Relationship/Rapport:   care explained   choices provided  Goal: Readiness for Transition of Care  Outcome: Progressing   Goal Outcome Evaluation:  Plan of Care Reviewed With: patient, caregiver        Progress: improving  Outcome Evaluation: Patient   resting  at  this  time.  Denies  any  pain.  Blood  sugars  noted  and  treated.    4   surgical  lap  sites  clean  and  dry.   Room  air.  SR  on the  monitor.  Possible  discharge     today.  Nursing  will  continue to monitor

## 2025-02-13 NOTE — DISCHARGE SUMMARY
Admit date February 4, 2025    Discharge date February 13, 2025    Principle diagnosis: Galltone pancreatitis    Secondary diagnosis: Acute renal failure, chronic renal failure, diabetes type 2, elevated lfts,     Patient was admitted from the office on February 4, 2025 with nausea, abdominal pain, delerium. Found to have a lipase of 2610, bun/cr 70/5.16, blood sugar as high as 520, wbc 15.48. CT abdomen with pancreatitis, prostate enlargement, ureteral stents. Nephrology and surgery saw patient. Dr. Melendez removed the ureteral stents with almost immediate improvement in the renal function. Laparascopic cholecystectomy was performed on February 11 by Dr. Bryant with near complete normalization in the lipase. Discharged home on his home meds. Insulin should not be needed now that the underlying inflammation (gallbladder and ureteral stents) are removed. To follow up at PCP in 7 days but will be checking in on him in the meantime.

## 2025-02-14 ENCOUNTER — TELEPHONE (OUTPATIENT)
Dept: SURGERY | Facility: CLINIC | Age: 80
End: 2025-02-14
Payer: COMMERCIAL

## 2025-02-14 NOTE — TELEPHONE ENCOUNTER
Attempted to contact patient to schedule 2 week post op with Dr. Bryant. Unable to leave voicemail. Hub ok to schedule.

## 2025-02-14 NOTE — OUTREACH NOTE
Prep Survey      Flowsheet Row Responses   [facility] facility patient discharged from? [place]   Is LACE score < 7 ? No   Eligibility Readm Mgmt   Discharge diagnosis CYSTOSCOPY RETROGRADE PYELOGRAM AND STENT REMOVAL-acute renal failure   Does the patient have one of the following disease processes/diagnoses(primary or secondary)? Other   Does the patient have Home health ordered? No   Is there a DME ordered? No   Prep survey completed? Yes            VASU CONTRERAS - Registered Nurse

## 2025-02-17 ENCOUNTER — READMISSION MANAGEMENT (OUTPATIENT)
Dept: CALL CENTER | Facility: HOSPITAL | Age: 80
End: 2025-02-17
Payer: COMMERCIAL

## 2025-02-17 NOTE — OUTREACH NOTE
Medical Week 1 Survey      Flowsheet Row Responses   [facility] patient discharged from? [place]   Does the patient have one of the following disease processes/diagnoses(primary or secondary)? Other   Week 1 attempt successful? Yes   Call start time 1444   Call end time 1448   Discharge diagnosis CYSTOSCOPY RETROGRADE PYELOGRAM AND STENT REMOVAL-acute renal failure   Is patient permission given to speak with other caregiver? Yes   List who call center can speak with Radha   Person spoke with today (if not patient) and relationship Radha-Caregiver   Meds reviewed with patient/caregiver? Yes   Does the patient have all medications ordered at discharge? N/A   Is the patient taking all medications as directed (includes completed medication regime)? Yes   Comments Patient reports that he is doing well, no further abdominal pain, nausea or other issues. Patient states that his laproscopic incisions are healing WNL. The patient reports that he is tolerating po intake and voids WNL.   Did the patient receive a copy of their discharge instructions? Yes   What is the patient's perception of their health status since discharge? Improving   Is the patient/caregiver able to teach back signs and symptoms related to disease process for when to call PCP? Yes   Is the patient/caregiver able to teach back signs and symptoms related to disease process for when to call 911? Yes   Is the patient/caregiver able to teach back the hierarchy of who to call/visit for symptoms/problems? PCP, Specialist, Home health nurse, Urgent Care, ED, 911 Yes   Week 1 call completed? Yes   Revoked No further contact(revokes)-requires comment   Wrap up additional comments Quick call, pt reports that he is doing well and was very pleased with his care at    Call end time 1448            Niyah STEVEN - Registered Nurse

## 2025-02-20 NOTE — PAYOR COMM NOTE
"Froy Ngo (79 y.o. Male)        PLEASE SEE ATTACHED FOR CONTINUED STAY AUTH.  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   Date of Birth   1945    Social Security Number       Address   [address] [place] [address]    Home Phone   [phone]    MRN   [id number]       Faith   None    Marital Status   Single                            Admission Date   2/4/25    Admission Type   Emergency    Admitting Provider   Ritesh Farrell MD    Attending Provider       Department, Room/Bed   [address], E655/1       Discharge Date   2/13/2025    Discharge Disposition   Home or Self Care    Discharge Destination                                 Attending Provider: (none)   Allergies: No Known Allergies    Isolation: None   Infection: None   Code Status: Prior    Ht: 182.9 cm (72\")   Wt: 88 kg (194 lb)    Admission Cmt: None   Principal Problem: ARF (acute renal failure) [N17.9]                   Active Insurance as of 2/4/2025       Primary Coverage       Payor Plan Insurance Group Employer/Plan Group    ANTHEM BLUE CROSS ANTHEM BLUE CROSS BLUE SHIELD PPO [id number]       Payor Plan Address Payor Plan Phone Number Payor Plan Fax Number Effective Dates    [address] [phone]  4/1/2019 - None Entered    [facility] [address]         Subscriber Name Subscriber Birth Date Member ID       FROY NGO 1945 [id number]               Secondary Coverage       Payor Plan Insurance Group Employer/Plan Group    MEDICARE MEDICARE A ONLY        Payor Plan Address Payor Plan Phone Number Payor Plan Fax Number Effective Dates    [address] [phone]  6/1/2009 - None Entered    [facility] [id number]         Subscriber Name Subscriber Birth Date Member ID       FROY NGO 1945 [id number]                     Emergency Contacts        (Rel.) " Home Phone Work Phone Mobile Phone    ROBERT TURNER (Spouse) [phone] -- [phone]    robert braswell (Daughter) [phone] -- --    ROSMERY KINGSLEY (Friend) -- -- [phone]              Ev: NP [id number]  Tax ID [id number]  Oxygen Therapy (last 2 days) before discharge       Date/Time SpO2 Device (Oxygen Therapy) Flow (L/min) (Oxygen Therapy) Oxygen Concentration (%) ETCO2 (mmHg)    02/13/25 0800 -- room air -- -- --    02/13/25 0725 97 -- -- -- --    02/12/25 2351 98 room air -- -- --    02/12/25 2048 -- room air -- -- --    02/12/25 1935 97 room air -- -- --    02/12/25 1430 -- room air -- -- --    02/12/25 1429 97 -- -- -- --    02/12/25 0830 -- room air -- -- --    02/12/25 0721 97 -- -- -- --    02/11/25 2000 -- room air 2 -- --    02/11/25 1930 98 room air -- -- --    02/11/25 1430 -- room air -- -- --    02/11/25 1310 98 -- -- -- --    02/11/25 1230 100 -- -- -- --    02/11/25 1229 -- room air -- -- --    02/11/25 1215 98 -- -- -- --    02/11/25 1200 100 room air -- -- --    02/11/25 1145 100 -- -- -- --    02/11/25 1140 100 -- -- -- --    02/11/25 1138 99 nasal cannula 2 -- --    02/11/25 0947 97 room air -- -- --    02/11/25 0830 -- room air -- -- --    02/11/25 0711 97 room air -- -- --          Lines, Drains & Airways       Active LDAs       None              Inactive LDAs       Name Placement date Placement time Removal date Removal time Site Days    [REMOVED] Peripheral IV 02/04/25 1344 Right Antecubital 02/04/25  1344  02/09/25  0800  not present on asmnt  Antecubital  4    [REMOVED] Peripheral IV 02/09/25 2211 Anterior;Right Forearm 02/09/25  2211  02/11/25  1258  Forearm  1    [REMOVED] Peripheral IV 02/11/25 1030 Right Wrist 02/11/25  1030  02/13/25  1028  Wrist  1    [REMOVED] Continuous Bladder Irrigation Triple-lumen 22 Fr 08/13/24  1259  02/04/25  1756  Triple-lumen  175    [REMOVED] ETT  02/11/25  1028  created via procedure documentation  02/11/25  1136  -- less than  1    [REMOVED] Supraglottic Airway 5 25  1551  created via procedure documentation  25  1609  -- less than 1                  Medication Administration Report for Froy Ngo as of 25 through 25     Legend:    Given Hold Not Given Due Canceled Entry Other Actions    Time Time (Time) Time Time-Action         Discontinued     Completed     Future     MAR Hold     Linked             Medications 25     Completed Medications   midazolam (VERSED) injection 0.5 mg  Dose: 0.5 mg  Freq: Every 5 Minutes PRN Route: IV  PRN Comment: Anxiety prophylaxis, Pre-op comfort  Start: 25 140 End: 25 1423     Admin Instructions:   May repeat dose in 5 minutes one time then contact provider for additional orders.               Tad Beonit MD   Physician  Nephrology     Progress Notes     Signed     Date of Service: 25  Creation Time: 25     Signed       Expand All Collapse All[]Expand All by Default         [facility] Progress Note       Patient Name: Froy Ngo  : 1945  MRN: [id number]  Primary Care Physician:  Ryan Gutierrez MD  Date of admission: 2025     Subjective      Interval History:   Follow-up NEHEMIAS on CKD     Patient underwent laparoscopic cholecystectomy with intraoperative cholangiogram, yesterday without any complications     Patient lying in bed comfortable  Minimal abdominal discomfort,  no bowel movements     Feeling thirsty     Sitter at bedside     Review of Systems:   As noted above     Objective      Vitals:   Temp:  [97.3 °F (36.3 °C)-98 °F (36.7 °C)] 97.9 °F (36.6 °C)  Heart Rate:  [72-92] 92  Resp:  [16-20] 18  BP: (133-179)/(66-83) 138/68  Flow (L/min) (Oxygen Therapy):  [2] 2     Intake/Output Summary (Last 24 hours) at 2025  Last data filed at 2025 1500      Gross per 24 hour   Intake 1040 ml   Output 510 ml   Net 530 ml         Physical Exam:           General  Appearance: Awake, pleasant, chronically ill, MO, no acute distress   Skin: warm and dry  HEENT: oral mucosa normal, nonicteric sclera  Neck: supple, no JVD  Lungs: CTA, nonlabored on RA  Heart: RRR, no rub, no murmur, no carotid bruits  Abdomen: nondistended, surgical incisions in place.  Bowel sounds absent lumbar abdominal  : no palpable bladder  Extremities: no edema, cyanosis or clubbing  Neuro: Rambling speech, moves all extremities     Scheduled Meds:           Scheduled Medication   atorvastatin, 10 mg, Oral, Daily  donepezil, 10 mg, Oral, Nightly  ferrous sulfate, 325 mg, Oral, Daily With Breakfast  finasteride, 5 mg, Oral, Daily  heparin (porcine), 5,000 Units, Subcutaneous, Q12H  insulin glargine, 15 Units, Subcutaneous, Nightly  insulin lispro, 4-24 Units, Subcutaneous, 4x Daily AC & at Bedtime  insulin lispro, 7 Units, Subcutaneous, TID With Meals  memantine, 5 mg, Oral, Daily  sodium chloride, 10 mL, Intravenous, Q12H  tamsulosin, 0.4 mg, Oral, Daily         IV Meds:   Infusion Medications               Results Reviewed:   I have personally reviewed the results from the time of this admission to 2/12/2025 08:09 EST             Results from last 7 days   Lab Units 02/12/25  0515 02/11/25  0439 02/10/25  0540   SODIUM mmol/L 136 134* 131*   POTASSIUM mmol/L 4.5 4.2 3.9   CHLORIDE mmol/L 101 100 92*   CO2 mmol/L 23.0 24.0 23.8   BUN mg/dL 61* 79* 87*   CREATININE mg/dL 2.46* 2.67* 3.42*   CALCIUM mg/dL 9.0 8.7 8.4*   BILIRUBIN mg/dL 0.4 0.4 0.4   ALK PHOS U/L 222* 239* 269*   ALT (SGPT) U/L 57* 39 49*   AST (SGOT) U/L 48* 21 31   GLUCOSE mg/dL 156* 166* 190*         Estimated Creatinine Clearance: 29.3 mL/min (A) (by C-G formula based on SCr of 2.46 mg/dL (H)).           Results from last 7 days   Lab Units 02/08/25  0510 02/06/25  0554   MAGNESIUM mg/dL 1.9 2.1   PHOSPHORUS mg/dL 5.1*  --                             Results from last 7 days   Lab Units 02/12/25  0515 02/11/25  0439 02/10/25  0540  02/09/25  0611 02/08/25  0510   WBC 10*3/mm3 16.18* 8.55 8.80 8.24 3.87   HEMOGLOBIN g/dL 9.7* 9.2* 9.2* 8.7* 7.9*   PLATELETS 10*3/mm3 395 354 311 273 214                Assessment / Plan      ASSESSMENT:   NEHEMIAS on CKD 3B, baseline Scr 1.7-2.0, secondary to prior AKIs d/t postobstructive uropathy, and longstanding hypertensive/diabetic nephrosclerosis.  Acute etiology is likely multifactorial: UTI followed by  hypotension limiting renal perfusion. CT abdomen/pelvis did not reveal hydro; euvolemic  Acute on chronic hyponatremia, likely multifactorial: excessive PO fluid intake, poor solute intake, and NEHEMIAS on CKD, normalizing  Hx post obstructive uropathy, s/p bilateral ureteral stent placement 8/30/2024, s/p prostate resection 12/13/2024, Rodriguez was removed 3 to 4 weeks ago per family report. CT abd/pel suggested enlarged prostate with possible cystitis ;  Urology scheduled for cystoscopy with ureter stent (2/7/25)   UTI, urine cultures negative.  Managed by infectious disease  Type II DM with CKD, managed by primary team  Hypotension, will hold amlodipin and hydralazine. Cortisol did not suggest adrenal insufficiency   Transaminases from hypotension, managed by primary team  Dementia, on namenda at home  Gallstones pancreatitis s/p  laparoscopic cholecystectomy 2/11/2025 after general surgery     PLAN:     Kidney function is improving creatinine down to 2.4   s/p ureter stent exchange 2/07/2025  Volume status status and electrolytes stabl on currente  regimen  Surveillance labs     Thank you for involving us in the care of Froy Ngo.  Please feel free to call with any questions.     Tad Benoit MD  02/12/25  08:09 [facility]     [facility]  [phone]     Please note that portions of this note were completed with a voice recognition program.                  Ryan Gutierrez MD   Physician  Medicine     Progress Notes     Signed     Date of Service: 02/12/25 0833  Creation Time:  02/12/25 0833     Signed         Feeling well. No complaints. Eating a liquid diet without issue.      Af, vss, Blood sugars 156-258 with most recent 182     Collins x 3.  No jvd.  Heart RRR.  Lungs clear.  Abdomen NT, ND, bs +. Wounds clean.  Ext without edema.     Na 136, BUN 61, Cr 2.46  Lipase 204, alk phos 222, alt 57, ast 48  Hb 9.7, wbc 16.18     Assessment and plan  1.  Acute renal failure-creatinine continues to improve since stent removal.  Nephrology continues to follow.  Etiology likely related to some hypovolemia, hypotension on top of his underlying hypertensive nephrosclerosis. Bp stable.  2.  Pancreatitis-no hypertriglyceridemia, etoh use, meds etc. Laparoscopic iris yesterday and most labs improving though wbc up some.  He remains nontender and tolerating liquid diet.   3.  ID-blood cultures negative.  Urine cultures with mixed urogenital frank.  Most recent ua without wbc for the first time in a while.  4.  Anemia-hemoglobin slightly imporved.  Multi factorial.  Back on oral iron  5.  Diabetes-blood sugar trending back down after stent and gallbladder removal.  Hopefully improve as pancreatitis continues to resolve. Hope to restart meformin and monitor.   6.  Dementia-stable today.  Family plans to keep sitter at bedside to help control behaviors.  7.  Hiccups-resolved.  8.  Continue DVT prophylaxis.     Discussed with wife, dtr, caregiver.                            Alyssa Dover RD   Registered Dietitian  Nutrition     Consults     Signed     Date of Service: 02/12/25 0931  Creation Time: 02/12/25 0931  Consult Orders   Inpatient Nutrition Consult [[id number]] ordered by yRan Gutierrez MD at 02/12/25 0851          Signed         Nutrition Services     Patient Name:  Froy Ngo  YOB: 1945  MRN: [id number]  Admit Date:  2/4/2025  Assessment Date:  02/12/25     CLINICAL NUTRITION - EDUCATION      ASSESSMENT  Encounter Information            Consult from Family/Caregiver     "Education topic Diabetes, Low fat    Learner Patient, Child or Children    Learning readiness Motivated to learn            Anthropometrics            Height Height: 182.9 cm (72\")    Weight Weight: 88 kg (194 lb) (02/04/25 1738)    BMI  Body mass index is 26.31 kg/m².   Overweight (25 - 29.9)    Comments        Medication/Biochemical             Pertinent medications Insulin       Pertinent lab values        Results from last 7 days   Lab Units 02/12/25  0515 02/11/25  0439 02/10/25  0540   SODIUM mmol/L 136 134* 131*   POTASSIUM mmol/L 4.5 4.2 3.9   CHLORIDE mmol/L 101 100 92*   CO2 mmol/L 23.0 24.0 23.8   BUN mg/dL 61* 79* 87*   CREATININE mg/dL 2.46* 2.67* 3.42*   CALCIUM mg/dL 9.0 8.7 8.4*   BILIRUBIN mg/dL 0.4 0.4 0.4   ALK PHOS U/L 222* 239* 269*   ALT (SGPT) U/L 57* 39 49*   AST (SGOT) U/L 48* 21 31   GLUCOSE mg/dL 156* 166* 190*               Lab Results   Component Value Date     HGBA1C 7.80 (H) 02/07/2025         DIAGNOSIS  PES Statement            Problem  Food and nutrition knowledge deficit    Etiology Medical Diagnosis - AKF, pancreatitis, DM    Signs/Symptoms Information deficit      INTERVENTION  Intervention/Evaluation            Goal   Disease management/therapy, Provide information , Reduce/improve symptoms    Educated regarding Appropriate portions, Beverage choices, Eating pattern, Food choices, Food preparation, Food shopping, Label reading    Resources given Discussion only, Printed materials    Monitor/evaluation  Per protocol    Discharge planning Contact RD if questions/concerns      RD to follow per protocol.      Electronically signed by:  Alyssa Dover RD  02/12/25 09:32 Graham Mesa MD   Physician  Ambulatory Surgery     Progress Notes     Signed     Date of Service: 02/12/25 1357  Creation Time: 02/12/25 1357     Signed         Postoperative day 1 laparoscopic cholecystectomy     Awake and alert, seems to be doing quite well.  Tolerating full liquid " diet.     Has remained afebrile with stable vital signs since surgery.  WBC up secondary to surgery at 16  Hemoglobin stable at 9.7  GFR slightly improved at 25.7  Lipase decreased to 204     Abdomen is soft and incisions are clean     Advance to regular diet  Should be fine for discharge from surgical standpoint tomorrow                     Vimal Bryant MD   Physician  Surgery     Progress Notes     Signed     Date of Service: 02/13/25 0812  Creation Time: 02/13/25 0812     Signed         Colorectal & General Surgery  Progress Note     Patient: Froy Ngo  YOB: 1945  MRN: [id number]        Assessment  Froy Ngo is a 79 y.o. male with gallstone pancreatitis is now postoperative day 2 from laparoscopic cholecystectomy with intraoperative cholangiogram.  Seems to be recovering quite well.  Resting comfortably this morning.  Tolerated diet yesterday.  Okay for discharge from my standpoint anytime.     I will see him in the office next week to ensure he is recovering well.     Subjective  No significant events.  Tolerated diet yesterday.  Resting comfortably this morning.     Objective         Vitals:     02/13/25 0725   BP: 134/75   Pulse: 86   Resp: 18   Temp: 98 °F (36.7 °C)   SpO2: 97%         Physical Exam  Constitutional: Well-developed well-nourished, no acute distress  Neck: Supple, trachea midline  Respiratory: No increased work of breathing, Symmetric excursion  Cardiovascular: Well pefursed, no jugular venous distention evident   Abdominal: Incisions in good order.  Soft, non-tender, non-distended  Skin: Warm, dry, no rash on visualized skin surfaces     Laboratory Results  I have personally reviewed CBC with Ocie 13, hemoglobin 9, platelets 3 1.  Lipase 213.  CMP with creatinine 2.2, AST 31, ALT 44, total bilirubin 0.5.     Radiology  None pertinent for review           Finesse Bryant MD  Colorectal & General Surgery  [facility]     [address], [address]  [place], [place], [address]  P: [phone]  F: [phone]                         Vimal Bryant MD   Physician  Surgery     Progress Notes     Signed     Date of Service: 02/13/25 0812  Creation Time: 02/13/25 0812     Signed         Colorectal & General Surgery  Progress Note     Patient: Froy Ngo  YOB: 1945  MRN: [id number]        Assessment  Froy Ngo is a 79 y.o. male with gallstone pancreatitis is now postoperative day 2 from laparoscopic cholecystectomy with intraoperative cholangiogram.  Seems to be recovering quite well.  Resting comfortably this morning.  Tolerated diet yesterday.  Okay for discharge from my standpoint anytime.     I will see him in the office next week to ensure he is recovering well.     Subjective  No significant events.  Tolerated diet yesterday.  Resting comfortably this morning.     Objective         Vitals:     02/13/25 0725   BP: 134/75   Pulse: 86   Resp: 18   Temp: 98 °F (36.7 °C)   SpO2: 97%         Physical Exam  Constitutional: Well-developed well-nourished, no acute distress  Neck: Supple, trachea midline  Respiratory: No increased work of breathing, Symmetric excursion  Cardiovascular: Well pefursed, no jugular venous distention evident   Abdominal: Incisions in good order.  Soft, non-tender, non-distended  Skin: Warm, dry, no rash on visualized skin surfaces     Laboratory Results  I have personally reviewed CBC with Ocie 13, hemoglobin 9, platelets 3 1.  Lipase 213.  CMP with creatinine 2.2, AST 31, ALT 44, total bilirubin 0.5.     Radiology  None pertinent for review           Finesse Bryant MD  Colorectal & General Surgery  [facility]     [address], [address]  [place], [place], [address]  P: [phone]  F: [phone]                                Discharge Summary        Ryan Gutierrez MD at 02/13/25 0923                Admit date February 4, 2025    Discharge date February 13,  2025    Principle diagnosis: Galltone pancreatitis    Secondary diagnosis: Acute renal failure, chronic renal failure, diabetes type 2, elevated lfts,     Patient was admitted from the office on February 4, 2025 with nausea, abdominal pain, delerium. Found to have a lipase of 2610, bun/cr 70/5.16, blood sugar as high as 520, wbc 15.48. CT abdomen with pancreatitis, prostate enlargement, ureteral stents. Nephrology and surgery saw patient. Dr. Melendez removed the ureteral stents with almost immediate improvement in the renal function. Laparascopic cholecystectomy was performed on February 11 by Dr. Bryant with near complete normalization in the lipase. Discharged home on his home meds. Insulin should not be needed now that the underlying inflammation (gallbladder and ureteral stents) are removed. To follow up at PCP in 7 days but will be checking in on him in the meantime.        Electronically signed by Ryan Gutierrez MD at 02/13/25 3585

## 2025-02-27 ENCOUNTER — OFFICE VISIT (OUTPATIENT)
Dept: SURGERY | Facility: CLINIC | Age: 80
End: 2025-02-27
Payer: COMMERCIAL

## 2025-02-27 VITALS
BODY MASS INDEX: 23.16 KG/M2 | HEART RATE: 86 BPM | SYSTOLIC BLOOD PRESSURE: 126 MMHG | WEIGHT: 171 LBS | OXYGEN SATURATION: 99 % | HEIGHT: 72 IN | DIASTOLIC BLOOD PRESSURE: 76 MMHG

## 2025-02-27 DIAGNOSIS — K85.10 GALLSTONE PANCREATITIS: Primary | ICD-10-CM

## 2025-02-27 PROCEDURE — 99024 POSTOP FOLLOW-UP VISIT: CPT | Performed by: SURGERY

## 2025-02-27 RX ORDER — BISACODYL 10 MG
10 SUPPOSITORY, RECTAL RECTAL
COMMUNITY

## 2025-02-27 RX ORDER — ALPRAZOLAM 0.25 MG
0.25 TABLET ORAL
COMMUNITY

## 2025-02-27 NOTE — PROGRESS NOTES
Colorectal & General Surgery  Post - Op    Patient: Froy Ngo  YOB: 1945  MRN: [id number]      Assessment  Froy Ngo is a 79 y.o. male with recent episode gallstone pancreatitis and status post laparoscopic cholecystectomy intraoperative cholangiogram.  Overall, he is recovering quite well from surgery.  No changes from my standpoint today.    He is welcome to see me on an as-needed basis.    History of Present Illness   Froy Ngo is a 79 y.o. male status post laparoscopic cholecystectomy.  Overall, he is recovering well.  He is no complaints today.  He does not have any diarrhea.  He is tolerating diet but does have a slightly decreased appetite.    Vital Signs  Vitals:    02/27/25 1405   BP: 126/76   Pulse: 86   SpO2: 99%        Physical Exam  Constitutional: Resting comfortably, no acute distress  Neck: Supple, trachea midline  Respiratory: No increased work of breathing, Symmetric excursion  Cardiovascular: Well pefursed, no jugular venous distention evident   Abdominal: Incisions healing nicely.  Soft, non-tender, non-distended  Lymphatics: No cervical or suprascapular adenopathy  Skin: Warm, dry, no rash on visualized skin surfaces  Musculoskeletal: Symmetric strength, no obvious gross abnormalities  Psychiatric: Alert and oriented ×3, normal affect   BMI is within normal parameters. No other follow-up for BMI required.    Pathology  I have personally reviewed pathology results with the patient demonstrating chronic cholecystitis with cholelithiasis    Finesse Bryant MD  Colorectal & General Surgery  [facility]    [address], [address]  [place], [place], [address]  P: [phone]  F: [phone]

## (undated) DEVICE — DISPOSABLE MONOPOLAR ENDOSCOPIC CORD 10 FT. (3M): Brand: KIRWAN

## (undated) DEVICE — LOU CYSTO: Brand: MEDLINE INDUSTRIES, INC.

## (undated) DEVICE — TIDISHIELD UROLOGY DRAIN BAGS FROSTY VINYL STERILE FITS SIEMENS UROSKOP ACCESS 20 PER CASE: Brand: TIDISHIELD

## (undated) DEVICE — SYR LUERLOK 20CC BX/50

## (undated) DEVICE — ENDOPOUCH RETRIEVER SPECIMEN RETRIEVAL BAGS: Brand: ENDOPOUCH RETRIEVER

## (undated) DEVICE — GLV SURG BIOGEL LTX PF 7

## (undated) DEVICE — STPCK 3WY D201 DISCOFIX

## (undated) DEVICE — ENDOPATH PNEUMONEEDLE INSUFFLATION NEEDLES WITH LUER LOCK CONNECTORS 120MM: Brand: ENDOPATH

## (undated) DEVICE — SYR LL TP 10ML STRL

## (undated) DEVICE — INSUFFLATION TUBING SET, WITH GAS FILTER: Brand: N.A.

## (undated) DEVICE — GLV SURG PREMIERPRO ORTHO LTX PF SZ7 BRN

## (undated) DEVICE — CATH FOL BARDEX HEMATURIA 3WY 22F 30CC

## (undated) DEVICE — TROCAR: Brand: KII OPTICAL ACCESS SYSTEM

## (undated) DEVICE — TUR/ENDOSCOPIC CABLE, 10' (3.05 M): Brand: CONMED

## (undated) DEVICE — EXOFIN PRECISION PEN HIGH VISCOSITY TOPICAL SKIN ADHESIVE: Brand: EXOFIN PRECISION PEN, 1G

## (undated) DEVICE — SUT MNCRYL PLS ANTIB UD 4/0 PS2 18IN

## (undated) DEVICE — LAPAROSCOPIC SMOKE FILTRATION SYSTEM: Brand: PALL LAPAROSHIELD® PLUS LAPAROSCOPIC SMOKE FILTRATION SYSTEM

## (undated) DEVICE — DEV SUT GRSPR CLOSUR 15CM 14G

## (undated) DEVICE — 3M™ IOBAN™ 2 ANTIMICROBIAL INCISE DRAPE 6650EZ: Brand: IOBAN™ 2

## (undated) DEVICE — TROCAR: Brand: KII SLEEVE

## (undated) DEVICE — NITINOL WIRE WITH HYDROPHILIC TIP: Brand: SENSOR

## (undated) DEVICE — PK LAP CHOLE BG

## (undated) DEVICE — TROCAR: Brand: KII FIOS FIRST ENTRY

## (undated) DEVICE — SUT VIC 0 UR6 27IN VCP603H

## (undated) DEVICE — LAPAROVUE VISIBILITY SYSTEM LAPAROSCOPIC SOLUTIONS: Brand: LAPAROVUE

## (undated) DEVICE — SYRINGE,TOOMEY,IRRIGATION,70CC,STERILE: Brand: MEDLINE

## (undated) DEVICE — CATH URETRL FLXITP POLLACK STD 5F 70CM

## (undated) DEVICE — TOWEL,OR,DSP,ST,BLUE,STD,4/PK,20PK/CS: Brand: MEDLINE

## (undated) DEVICE — SOL NACL 0.9PCT 1000ML